# Patient Record
Sex: FEMALE | Race: WHITE | NOT HISPANIC OR LATINO | Employment: OTHER | URBAN - METROPOLITAN AREA
[De-identification: names, ages, dates, MRNs, and addresses within clinical notes are randomized per-mention and may not be internally consistent; named-entity substitution may affect disease eponyms.]

---

## 2017-01-09 ENCOUNTER — TELEPHONE (OUTPATIENT)
Dept: ORTHOPEDICS | Facility: CLINIC | Age: 62
End: 2017-01-09

## 2017-01-09 DIAGNOSIS — M17.12 PRIMARY OSTEOARTHRITIS OF LEFT KNEE: Primary | ICD-10-CM

## 2017-01-09 DIAGNOSIS — M23.307 DEGENERATIVE TEAR OF MENISCUS OF LEFT KNEE: ICD-10-CM

## 2017-01-09 NOTE — TELEPHONE ENCOUNTER
Patient called triage requesting left knee MRI since sx were exacerbated over the weekend did more activity. Will hold off on PT until she see's Dr. Robertson to discuss results of MRI. Dr. Robertson OK'd MRI of left knee she will call radiology and schedule and follow up with Dr. Robertson in clinic after.

## 2017-01-18 ENCOUNTER — OFFICE VISIT (OUTPATIENT)
Dept: ORTHOPEDICS | Facility: CLINIC | Age: 62
End: 2017-01-18

## 2017-01-18 VITALS
WEIGHT: 210 LBS | HEIGHT: 66 IN | SYSTOLIC BLOOD PRESSURE: 128 MMHG | DIASTOLIC BLOOD PRESSURE: 86 MMHG | BODY MASS INDEX: 33.75 KG/M2

## 2017-01-18 DIAGNOSIS — M17.12 PRIMARY OSTEOARTHRITIS OF LEFT KNEE: Primary | ICD-10-CM

## 2017-01-18 NOTE — Clinical Note
1/18/2017      RE: Gregoria Stein  [address]  [place] [facility] [address]       January 18, 2017: Gregoria Stein is a 61 year old female who is seen in f/u up for left knee MRI results.    Impression:  1. Horizontal oblique tear of the posterior horn of the medial  meniscus extending into the posterior root attachment, however no  definite root attachment tear. Associated, focal full-thickness  cartilage loss within the weightbearing aspect of the medial femoral  condyle.  2. Focal full-thickness cartilage loss of the median ridge of the  patella with subjacent bone marrow edema.  3. Moderate to large patellofemoral joint effusion.       I have personally reviewed the examination and initial interpretation  and I agree with the findings.     JUTTA ELLERMANN, MD        PMH:  Past Medical History   Diagnosis Date     Arthritis      C spine, thumbs bilateral, feet, shoulders, knees     Other chronic pain      feet, knees, shoulders, full spine, thumbs     Gastro-oesophageal reflux disease      intermittent     Anxiety      Restless leg syndrome      Labial irritation      labial mass       Active problem list:  Patient Active Problem List   Diagnosis     Vulvar irritation     Anxiety     Mass     PTSD (post-traumatic stress disorder)       FH:  History reviewed. No pertinent family history.    SH:  Social History     Social History     Marital Status: Single     Spouse Name: N/A     Number of Children: N/A     Years of Education: N/A     Occupational History     Not on file.     Social History Main Topics     Smoking status: Former Smoker     Quit date: 09/09/1983     Smokeless tobacco: Never Used     Alcohol Use: Yes      Comment: occasional     Drug Use: No     Sexual Activity: No     Other Topics Concern     Not on file     Social History Narrative       MEDS:  See EMR, reviewed  ALL:  See EMR, reviewed    REVIEW OF SYSTEMS:  CONSTITUTIONAL:NEGATIVE for fever, chills, change in weight  INTEGUMENTARY/SKIN:  NEGATIVE for worrisome rashes, moles or lesions  EYES: NEGATIVE for vision changes or irritation  ENT/MOUTH: NEGATIVE for ear, mouth and throat problems  RESP:NEGATIVE for significant cough or SOB  BREAST: NEGATIVE for masses, tenderness or discharge  CV: NEGATIVE for chest pain, palpitations or peripheral edema  GI: NEGATIVE for nausea, abdominal pain, heartburn, or change in bowel habits  :NEGATIVE for frequency, dysuria, or hematuria  :NEGATIVE for frequency, dysuria, or hematuria  NEURO: NEGATIVE for weakness, dizziness or paresthesias  ENDOCRINE: NEGATIVE for temperature intolerance, skin/hair changes  HEME/ALLERGY/IMMUNE: NEGATIVE for bleeding problems  PSYCHIATRIC: NEGATIVE for changes in mood or affect    SUBJECTIVE:  This 61-year-old female is seen in followup for her left knee MRI.  It showed degenerative changes, especially about the medial joint space with significant chondral wear along the femoral condyle with some gentle tearing of the medial meniscus and some wear on the posterior aspect of the kneecap.      OBJECTIVE:  Her exam is unchanged.  She denies locking or catching.  She has adequate range of motion, some discomfort along the medial joint line, nontender about the lateral joint line.  She has not had complaints of numbness or tingling into the extremity or pain that shoots in the extremity from the back.  Normal distal pulses and sensation.      ASSESSMENT:  Left knee DJD.      PLAN:  We went over conservative cares, including Synvisc injections, cortisone injections, medial  brace and indications for knee replacement.  I indicated that based on her x-rays, it would be best to try to maximize conservative cares before considering knee replacement.  The cortisone shot 1 month ago provided some transitory relief, but did not provide significant relief.  She had Synvisc injections into her opposite knee.  She will check with her insurance to see if it is covered.  She does not want  to do an  brace for the knees, as she thinks she is unlikely to wear it.  She will follow up with me for a Synvisc injection if allowed by her insurance.  She knows we could also do a repeat cortisone injection in 2 months and see if it gives her better relief.  If she would like to talk to the surgeon, I would be glad to refer her.     Priyank Robertson MD

## 2017-04-08 ENCOUNTER — APPOINTMENT (OUTPATIENT)
Dept: CT IMAGING | Facility: CLINIC | Age: 62
End: 2017-04-08
Attending: EMERGENCY MEDICINE
Payer: MEDICARE

## 2017-04-08 ENCOUNTER — HOSPITAL ENCOUNTER (EMERGENCY)
Facility: CLINIC | Age: 62
Discharge: HOME | End: 2017-04-08
Attending: EMERGENCY MEDICINE | Admitting: EMERGENCY MEDICINE
Payer: MEDICARE

## 2017-04-08 VITALS
BODY MASS INDEX: 32.47 KG/M2 | RESPIRATION RATE: 16 BRPM | TEMPERATURE: 97.5 F | SYSTOLIC BLOOD PRESSURE: 129 MMHG | HEIGHT: 66 IN | OXYGEN SATURATION: 99 % | HEART RATE: 73 BPM | DIASTOLIC BLOOD PRESSURE: 92 MMHG | WEIGHT: 202 LBS

## 2017-04-08 DIAGNOSIS — R10.9 RIGHT FLANK PAIN: ICD-10-CM

## 2017-04-08 LAB
ALBUMIN SERPL-MCNC: 3.8 G/DL (ref 3.4–5)
ALBUMIN UR-MCNC: NEGATIVE MG/DL
ALP SERPL-CCNC: 84 U/L (ref 40–150)
ALT SERPL W P-5'-P-CCNC: 33 U/L (ref 0–50)
ANION GAP SERPL CALCULATED.3IONS-SCNC: 6 MMOL/L (ref 3–14)
APPEARANCE UR: CLEAR
AST SERPL W P-5'-P-CCNC: 20 U/L (ref 0–45)
BACTERIA #/AREA URNS HPF: ABNORMAL /HPF
BASOPHILS # BLD AUTO: 0 10E9/L (ref 0–0.2)
BASOPHILS NFR BLD AUTO: 0.2 %
BILIRUB SERPL-MCNC: 0.5 MG/DL (ref 0.2–1.3)
BILIRUB UR QL STRIP: NEGATIVE
BUN SERPL-MCNC: 8 MG/DL (ref 7–30)
CALCIUM SERPL-MCNC: 8.8 MG/DL (ref 8.5–10.1)
CHLORIDE SERPL-SCNC: 109 MMOL/L (ref 94–109)
CO2 SERPL-SCNC: 28 MMOL/L (ref 20–32)
COLOR UR AUTO: YELLOW
CREAT SERPL-MCNC: 0.7 MG/DL (ref 0.52–1.04)
DIFFERENTIAL METHOD BLD: NORMAL
EOSINOPHIL # BLD AUTO: 0.1 10E9/L (ref 0–0.7)
EOSINOPHIL NFR BLD AUTO: 2.1 %
ERYTHROCYTE [DISTWIDTH] IN BLOOD BY AUTOMATED COUNT: 12.4 % (ref 10–15)
GFR SERPL CREATININE-BSD FRML MDRD: 85 ML/MIN/1.7M2
GLUCOSE SERPL-MCNC: 115 MG/DL (ref 70–99)
GLUCOSE UR STRIP-MCNC: NEGATIVE MG/DL
HCT VFR BLD AUTO: 42.6 % (ref 35–47)
HGB BLD-MCNC: 15 G/DL (ref 11.7–15.7)
HGB UR QL STRIP: NEGATIVE
IMM GRANULOCYTES # BLD: 0 10E9/L (ref 0–0.4)
IMM GRANULOCYTES NFR BLD: 0.2 %
KETONES UR STRIP-MCNC: NEGATIVE MG/DL
LEUKOCYTE ESTERASE UR QL STRIP: ABNORMAL
LIPASE SERPL-CCNC: 130 U/L (ref 73–393)
LYMPHOCYTES # BLD AUTO: 1.5 10E9/L (ref 0.8–5.3)
LYMPHOCYTES NFR BLD AUTO: 24 %
MCH RBC QN AUTO: 30.1 PG (ref 26.5–33)
MCHC RBC AUTO-ENTMCNC: 35.2 G/DL (ref 31.5–36.5)
MCV RBC AUTO: 86 FL (ref 78–100)
MONOCYTES # BLD AUTO: 0.3 10E9/L (ref 0–1.3)
MONOCYTES NFR BLD AUTO: 4.8 %
MUCOUS THREADS #/AREA URNS LPF: PRESENT /LPF
NEUTROPHILS # BLD AUTO: 4.3 10E9/L (ref 1.6–8.3)
NEUTROPHILS NFR BLD AUTO: 68.7 %
NITRATE UR QL: NEGATIVE
NRBC # BLD AUTO: 0 10*3/UL
NRBC BLD AUTO-RTO: 0 /100
PH UR STRIP: 7.5 PH (ref 5–7)
PLATELET # BLD AUTO: 196 10E9/L (ref 150–450)
POTASSIUM SERPL-SCNC: 3.5 MMOL/L (ref 3.4–5.3)
PROT SERPL-MCNC: 7.3 G/DL (ref 6.8–8.8)
RBC # BLD AUTO: 4.98 10E12/L (ref 3.8–5.2)
RBC #/AREA URNS AUTO: <1 /HPF (ref 0–2)
SODIUM SERPL-SCNC: 143 MMOL/L (ref 133–144)
SP GR UR STRIP: 1.01 (ref 1–1.03)
SQUAMOUS #/AREA URNS AUTO: <1 /HPF (ref 0–1)
URN SPEC COLLECT METH UR: ABNORMAL
UROBILINOGEN UR STRIP-MCNC: NORMAL MG/DL (ref 0–2)
WBC # BLD AUTO: 6.3 10E9/L (ref 4–11)
WBC #/AREA URNS AUTO: 1 /HPF (ref 0–2)

## 2017-04-08 PROCEDURE — 96374 THER/PROPH/DIAG INJ IV PUSH: CPT

## 2017-04-08 PROCEDURE — 74176 CT ABD & PELVIS W/O CONTRAST: CPT

## 2017-04-08 PROCEDURE — 99285 EMERGENCY DEPT VISIT HI MDM: CPT | Mod: 25

## 2017-04-08 PROCEDURE — 25000128 H RX IP 250 OP 636: Performed by: EMERGENCY MEDICINE

## 2017-04-08 PROCEDURE — 81001 URINALYSIS AUTO W/SCOPE: CPT | Performed by: EMERGENCY MEDICINE

## 2017-04-08 PROCEDURE — A9270 NON-COVERED ITEM OR SERVICE: HCPCS | Mod: GY | Performed by: EMERGENCY MEDICINE

## 2017-04-08 PROCEDURE — 83690 ASSAY OF LIPASE: CPT | Performed by: EMERGENCY MEDICINE

## 2017-04-08 PROCEDURE — 25000132 ZZH RX MED GY IP 250 OP 250 PS 637: Mod: GY | Performed by: EMERGENCY MEDICINE

## 2017-04-08 PROCEDURE — 96361 HYDRATE IV INFUSION ADD-ON: CPT

## 2017-04-08 PROCEDURE — 80053 COMPREHEN METABOLIC PANEL: CPT | Performed by: EMERGENCY MEDICINE

## 2017-04-08 PROCEDURE — 85025 COMPLETE CBC W/AUTO DIFF WBC: CPT | Performed by: EMERGENCY MEDICINE

## 2017-04-08 RX ORDER — KETOROLAC TROMETHAMINE 30 MG/ML
30 INJECTION, SOLUTION INTRAMUSCULAR; INTRAVENOUS ONCE
Status: COMPLETED | OUTPATIENT
Start: 2017-04-08 | End: 2017-04-08

## 2017-04-08 RX ORDER — ACETAMINOPHEN 500 MG
500 TABLET ORAL ONCE
Status: COMPLETED | OUTPATIENT
Start: 2017-04-08 | End: 2017-04-08

## 2017-04-08 RX ORDER — METHYLPREDNISOLONE 4 MG
TABLET, DOSE PACK ORAL
Qty: 21 TABLET | Refills: 0 | Status: SHIPPED | OUTPATIENT
Start: 2017-04-08 | End: 2019-09-24

## 2017-04-08 RX ADMIN — ACETAMINOPHEN 500 MG: 500 TABLET, FILM COATED ORAL at 15:55

## 2017-04-08 RX ADMIN — SODIUM CHLORIDE 1000 ML: 9 INJECTION, SOLUTION INTRAVENOUS at 14:04

## 2017-04-08 RX ADMIN — KETOROLAC TROMETHAMINE 30 MG: 30 INJECTION, SOLUTION INTRAMUSCULAR at 14:09

## 2017-04-08 ASSESSMENT — ENCOUNTER SYMPTOMS
FLANK PAIN: 1
APPETITE CHANGE: 1

## 2017-04-08 NOTE — ED AVS SNAPSHOT
Emergency Department    [address] [facility] [address]    Phone:  [phone]    Fax:  [phone]                                       Gregoria Stein   MRN: [id number]    Department:   Emergency Department   Date of Visit:  4/8/2017           Patient Information     Date Of Birth          1955        Your diagnoses for this visit were:     Right flank pain        You were seen by Rebekah Frausto MD.      Follow-up Information     Follow up with Krystal Dominguez MD. Schedule an appointment as soon as possible for a visit in 3 days.    Specialty:  Family Practice    Contact information:    [facility]  [address] [address] [phone]          Follow up with  Emergency Department.    Specialty:  EMERGENCY MEDICINE    Why:  As needed, If symptoms worsen    Contact information:    [address] [facility] [id number] [phone]        Discharge Instructions         Discharge Instructions  Back Pain  You were seen today for back pain. Back pain can have many causes, but most will get better without surgery or other specific treatment. Sometimes there is a herniated ( slipped ) disc. We don t usually do MRI scans to look for these right away, since most herniated discs will get better on their own with time.  Today, we did not find any evidence that your back pain was caused by a serious condition, such as an infection, fracture, or tumor. However, sometimes symptoms develop over time and cannot be found during an emergency visit, so it is very important that you follow up with your primary doctor.  Return to the Emergency Department if:    You develop a fever with your back pain.     You have weakness or change in sensation in one or both legs.    You lose control of your bowels or bladder, or can t empty your bladder.    Your pain gets much worse.     Follow-up with your doctor:    Unless your pain has completely gone away, please make an  appointment with your doctor within one week.  You may need further management of your back pain, such as more pain medication, imaging such as an X-ray or MRI, or physical therapy.    What can I do to help myself?    Remain Active -- People are often afraid that they will hurt their back further or delay recovery by remaining active, but this is one of the best things you can do for your back. In fact, prolonged bed rest is not recommended. Studies have shown that people with low back pain recover faster when they remain active. Movement helps to bring blood flow to the muscles and relieve muscle spasms as well as preventing loss of muscle strength.    Heat -- Using a heating pad can help with low back pain during the first few weeks. Do not sleep with a heating pad, as you can be burned.     Pain medications - You may take a pain medication such as Tylenol  (acetaminophen), Advil , Nuprin  (ibuprofen) or Aleve  (naproxen).  If you have been given a narcotic such as Vicodin  (hydrocodone with acetaminophen), Percocet  (oxycodone with acetaminophen), codeine, or a muscle relaxant such as Flexeril  (cyclobenzaprine) or Soma  (carisoprodol), do not drive for four hours after you have taken it. If the narcotic contains Tylenol  (acetaminophen), do not take Tylenol  with it. All narcotics will cause constipation, so eat a high fiber diet.   If you were given a prescription for medicine here today, be sure to read all of the information (including the package insert) that comes with your prescription.  This will include important information about the medicine, its side effects, and any warnings that you need to know about.  The pharmacist who fills the prescription can provide more information and answer questions you may have about the medicine.  If you have questions or concerns that the pharmacist cannot address, please call or return to the Emergency Department.   Opioid Medication Information    Pain medications  are among the most commonly prescribed medicines, so we are including this information for all our patients. If you did not receive pain medication or get a prescription for pain medicine, you can ignore it.     You may have been given a prescription for an opioid (narcotic) pain medicine and/or have received a pain medicine while here in the Emergency Department. These medicines can make you drowsy or impaired. You must not drive, operate dangerous equipment, or engage in any other dangerous activities while taking these medications. If you drive while taking these medications, you could be arrested for DUI, or driving under the influence. Do not drink any alcohol while you are taking these medications.     Opioid pain medications can cause addiction. If you have a history of chemical dependency of any type, you are at a higher risk of becoming addicted to pain medications.  Only take these prescribed medications to treat your pain when all other options have been tried. Take it for as short a time and as few doses as possible. Store your pain pills in a secure place, as they are frequently stolen and provide a dangerous opportunity for children or visitors in your house to start abusing these powerful medications. We will not replace any lost or stolen medicine.  As soon as your pain is better, you should flush all your remaining medication.     Many prescription pain medications contain Tylenol  (acetaminophen), including Vicodin , Tylenol #3 , Norco , Lortab , and Percocet .  You should not take any extra pills of Tylenol  if you are using these prescription medications or you can get very sick.  Do not ever take more than 3000 mg of acetaminophen in any 24 hour period.    All opioids tend to cause constipation. Drink plenty of water and eat foods that have a lot of fiber, such as fruits, vegetables, prune juice, apple juice and high fiber cereal.  Take a laxative if you don t move your bowels at least every  other day. Miralax , Milk of Magnesia, Colace , or Senna  can be used to keep you regular.      Remember that you can always come back to the Emergency Department if you are not able to see your regular doctor in the amount of time listed above, if you get any new symptoms, or if there is anything that worries you.        24 Hour Appointment Hotline       To make an appointment at any [facility], call [phone] ([phone]). If you don't have a family doctor or clinic, we will help you find one. [facility] are conveniently located to serve the needs of you and your family.             Review of your medicines      START taking        Dose / Directions Last dose taken    methylPREDNISolone 4 MG tablet   Commonly known as:  MEDROL DOSEPAK   Quantity:  21 tablet        Follow package instructions   Refills:  0          Our records show that you are taking the medicines listed below. If these are incorrect, please call your family doctor or clinic.        Dose / Directions Last dose taken    CLONAZEPAM PO   Dose:  0.5 mg        Take 0.5 mg by mouth 2 times daily Also 2 additional tablets at bedtime   Refills:  0        conjugated estrogens cream   Commonly known as:  PREMARIN        Place vaginally three times a week   Refills:  0        DHEA PO        Refills:  0        diclofenac 1 % Gel topical gel   Commonly known as:  VOLTAREN        Place onto the skin 4 times daily   Refills:  0        gabapentin 300 MG half-tab   Commonly known as:  NEURONTIN        Refills:  0        latanoprost 0.005 % ophthalmic solution   Commonly known as:  XALATAN   Dose:  1 drop        Place 1 drop into both eyes At Bedtime   Refills:  0        Magnesium Oxide 500 MG Caps        Take by mouth daily   Refills:  0        MIRAPEX 0.125 MG tablet   Dose:  0.5 mg   Generic drug:  pramipexole        Take 0.5 mg by mouth At Bedtime 2 tablets at bedtime   Refills:  0        OMEGA 3 PO   Dose:  1 tablet        Take 1 tablet by  mouth daily.   Refills:  0        st johns wort 300 MG Tabs tablet   Dose:  300 mg        Take 300 mg by mouth 4 times daily   Refills:  0        traMADol 50 MG tablet   Commonly known as:  ULTRAM   Dose:  1 tablet        Take 1 tablet by mouth every 12 hours as needed   Refills:  0        TYLENOL PO   Dose:  1000 mg        Take 1,000 mg by mouth every 8 hours as needed   Refills:  0        UNABLE TO FIND   Dose:  1 capsule        1 capsule daily MEDICATION NAME: Ashwagandha   Refills:  0        vitamin B complex with vitamin C Tabs tablet   Dose:  1 tablet        Take 1 tablet by mouth daily   Refills:  0        Vitamin D-3 5000 UNITS Tabs   Dose:  1 tablet        Take 1 tablet by mouth daily.   Refills:  0                Prescriptions were sent or printed at these locations (1 Prescription)                   Other Prescriptions                Printed at Department/Unit printer (1 of 1)         methylPREDNISolone (MEDROL DOSEPAK) 4 MG tablet                Procedures and tests performed during your visit     Abd/pelvis CT no contrast - Stone Protocol    CBC with platelets + differential    Comprehensive metabolic panel    Lipase    UA with Microscopic      Orders Needing Specimen Collection     None      Pending Results     No orders found from 4/6/2017 to 4/9/2017.            Pending Culture Results     No orders found from 4/6/2017 to 4/9/2017.            Test Results From Your Hospital Stay        4/8/2017  2:26 PM      Component Results     Component Value Ref Range & Units Status    Color Urine Yellow  Final    Appearance Urine Clear  Final    Glucose Urine Negative NEG mg/dL Final    Bilirubin Urine Negative NEG Final    Ketones Urine Negative NEG mg/dL Final    Specific Gravity Urine 1.013 1.003 - 1.035 Final    Blood Urine Negative NEG Final    pH Urine 7.5 (H) 5.0 - 7.0 pH Final    Protein Albumin Urine Negative NEG mg/dL Final    Urobilinogen mg/dL Normal 0.0 - 2.0 mg/dL Final    Nitrite Urine Negative  NEG Final    Leukocyte Esterase Urine Small (A) NEG Final    Source Midstream Urine  Final    WBC Urine 1 0 - 2 /HPF Final    RBC Urine <1 0 - 2 /HPF Final    Bacteria Urine Few (A) NEG /HPF Final    Squamous Epithelial /HPF Urine <1 0 - 1 /HPF Final    Mucous Urine Present (A) NEG /LPF Final         4/8/2017  2:25 PM      Component Results     Component Value Ref Range & Units Status    WBC 6.3 4.0 - 11.0 10e9/L Final    RBC Count 4.98 3.8 - 5.2 10e12/L Final    Hemoglobin 15.0 11.7 - 15.7 g/dL Final    Hematocrit 42.6 35.0 - 47.0 % Final    MCV 86 78 - 100 fl Final    MCH 30.1 26.5 - 33.0 pg Final    MCHC 35.2 31.5 - 36.5 g/dL Final    RDW 12.4 10.0 - 15.0 % Final    Platelet Count 196 150 - 450 10e9/L Final    Diff Method Automated Method  Final    % Neutrophils 68.7 % Final    % Lymphocytes 24.0 % Final    % Monocytes 4.8 % Final    % Eosinophils 2.1 % Final    % Basophils 0.2 % Final    % Immature Granulocytes 0.2 % Final    Nucleated RBCs 0 0 /100 Final    Absolute Neutrophil 4.3 1.6 - 8.3 10e9/L Final    Absolute Lymphocytes 1.5 0.8 - 5.3 10e9/L Final    Absolute Monocytes 0.3 0.0 - 1.3 10e9/L Final    Absolute Eosinophils 0.1 0.0 - 0.7 10e9/L Final    Absolute Basophils 0.0 0.0 - 0.2 10e9/L Final    Abs Immature Granulocytes 0.0 0 - 0.4 10e9/L Final    Absolute Nucleated RBC 0.0  Final         4/8/2017  2:42 PM      Component Results     Component Value Ref Range & Units Status    Sodium 143 133 - 144 mmol/L Final    Potassium 3.5 3.4 - 5.3 mmol/L Final    Chloride 109 94 - 109 mmol/L Final    Carbon Dioxide 28 20 - 32 mmol/L Final    Anion Gap 6 3 - 14 mmol/L Final    Glucose 115 (H) 70 - 99 mg/dL Final    Urea Nitrogen 8 7 - 30 mg/dL Final    Creatinine 0.70 0.52 - 1.04 mg/dL Final    GFR Estimate 85 >60 mL/min/1.7m2 Final    Non  GFR Calc    GFR Estimate If Black >90   GFR Calc   >60 mL/min/1.7m2 Final    Calcium 8.8 8.5 - 10.1 mg/dL Final    Bilirubin Total 0.5 0.2 - 1.3  mg/dL Final    Albumin 3.8 3.4 - 5.0 g/dL Final    Protein Total 7.3 6.8 - 8.8 g/dL Final    Alkaline Phosphatase 84 40 - 150 U/L Final    ALT 33 0 - 50 U/L Final    AST 20 0 - 45 U/L Final         4/8/2017  2:41 PM      Component Results     Component Value Ref Range & Units Status    Lipase 130 73 - 393 U/L Final         4/8/2017  4:02 PM      Narrative     ABDOMEN AND PELVIS CT WITHOUT CONTRAST 4/8/2017 3:45 PM    HISTORY: Right flank pain. Evaluate for stone.    COMPARISON: 10/1/2013    TECHNIQUE: Axial images performed from the lung bases to the ischial  tuberosities without IV or oral contrast. Coronal reformatted images  were also evaluated. Radiation dose for this scan was reduced using  automated exposure control, adjustment of the mA and/or kV according  to patient size, or iterative reconstruction technique.    FINDINGS:  The lung bases are clear. A few subcentimeter  low-attenuation hepatic lesions are too small to characterize but  unchanged. Allowing for noncontrast technique, the spleen, pancreas,  adrenal glands, and kidneys are unremarkable. No renal, ureteral, or  bladder calculi are seen. There is no hydronephrosis. Normal caliber  bowel. The appendix is normal. There is no free intraperitoneal air or  ascites.        Impression     IMPRESSION:   1. No urinary tract calculi are identified.  2. Normal appendix.  3. No acute abnormality.    JUANITA DICKENS MD                Clinical Quality Measure: Blood Pressure Screening     Your blood pressure was checked while you were in the emergency department today. The last reading we obtained was  BP: (!) 129/92 . Please read the guidelines below about what these numbers mean and what you should do about them.  If your systolic blood pressure (the top number) is less than 120 and your diastolic blood pressure (the bottom number) is less than 80, then your blood pressure is normal. There is nothing more that you need to do about it.  If your systolic blood  pressure (the top number) is 120-139 or your diastolic blood pressure (the bottom number) is 80-89, your blood pressure may be higher than it should be. You should have your blood pressure rechecked within a year by a primary care provider.  If your systolic blood pressure (the top number) is 140 or greater or your diastolic blood pressure (the bottom number) is 90 or greater, you may have high blood pressure. High blood pressure is treatable, but if left untreated over time it can put you at risk for heart attack, stroke, or kidney failure. You should have your blood pressure rechecked by a primary care provider within the next 4 weeks.  If your provider in the emergency department today gave you specific instructions to follow-up with your doctor or provider even sooner than that, you should follow that instruction and not wait for up to 4 weeks for your follow-up visit.        Thank you for choosing [facility]       Thank you for choosing [facility] for your care. Our goal is always to provide you with excellent care. Hearing back from our patients is one way we can continue to improve our services. Please take a few minutes to complete the written survey that you may receive in the mail after you visit with us. Thank you!        [facility] Information     [facility] gives you secure access to your electronic health record. If you see a primary care provider, you can also send messages to your care team and make appointments. If you have questions, please call your primary care clinic.  If you do not have a primary care provider, please call [phone] and they will assist you.        Care EveryWhere ID     This is your Care EveryWhere ID. This could be used by other organizations to access your [facility] medical records  [id number]        After Visit Summary       This is your record. Keep this with you and show to your community pharmacist(s) and doctor(s) at your next visit.

## 2017-04-08 NOTE — ED PROVIDER NOTES
"  History     Chief Complaint:  Flank pain    HPI   Gregoria Stein is a 61 year old female with a history of chronic pain from arthritis and GERD who presents with flank pain. Three days ago, the patient got out of her car for a hair appointment when she began experiencing right flank pain that radiates into her hip and groin. During her hair appointment, she needed to stand up 3-4 times due to the pain. Two days ago, the pain became more severe and constant and she describes it as being the worst pain she has ever had. She has also been experiencing a loss of appetite and has not been drinking normally. The patient went in to see her primary care physician who thought her pain was musculoskeletal. She has taken tramadol and medical marijuana for the pain, which have not been helping. She states that the pain is not on her skin and does not feel like her previous episode of shingles. The pain is not exacerbated with deep breathing.     Allergies:  Penicillins  Versed     Medications:    Gabapentin  Vitamin B complex  Magnesium oxide  Voltaren gel  Premarin  Clonazepam  Tylenol  Xalatan  Mirapex  Tramadol  Vitamin D3  Omega 3   Medical marijuana     Past Medical History:    Anxiety  Arthritis  GERD  Chronic pain  PTSD  Restless leg syndrome  Shingles     Past Surgical History:    Colonoscopy  Excise mass vagina  Urethral dilation  Orthopedic surgery    Family History:    History reviewed. No pertinent family history.    Social History:  Marital Status: single  Presents to the ED alone  Tobacco Use: former smoker  Alcohol Use: positive  PCP: Krystal Dominguez     Review of Systems   Constitutional: Positive for appetite change.   Genitourinary: Positive for flank pain.   10 point review of systems performed and is negative except as above and in HPI.    Physical Exam   First Vitals:  BP: 170/88  Pulse: 90  Temp: 97.5  F (36.4  C)  Resp: 16  Height: 167.6 cm (5' 6\")  Weight: 91.6 kg (202 lb)  SpO2: 97 %    Physical " Exam  General: Sitting on the gurney. Appears somewhat uncomfortable.  Head:  The scalp, face, and head appear normal  Mouth/Throat: Mucous membranes are moist.  CV:  Regular rate    Normal S1 and S2  No pathological murmur   Resp:  Breath sounds clear and equal bilaterally    Non-labored, no retractions or accessory muscle use    No coarseness    No wheezing   GI:  Abdomen is soft, no rigidity    No tenderness to palpation  MS:  Normal motor assessment of all extremities.    Good capillary refill noted.    No CVA tenderness to percussion.     Mild right SI tenderness to palpation.  Skin:  No rash or lesions noted.  Neuro: Speech is normal and fluent. No apparent deficit.    No numbness or weakness of the lower extremities.  Psych: Awake. Alert.  Normal affect.      Appropriate interactions.    Emergency Department Course   Imaging:  Radiographic findings were communicated with the patient who voiced understanding of the findings.    Abd/pelvis CT no contrast - [facility] Protocol  1. No urinary tract calculi are identified.  2. Normal appendix.  3. No acute abnormality.  Report per radiology.    Laboratory:  CBC:  WBC 6.3, HGB 15.0, , otherwise WNL  CMP: glucose 115 (H), otherwise WNL (Creatinine 0.70)  Lipase: 130    UA: Clear yellow urine,  pH 7.5 (H), leukocyte esterase small, bacteria few, mucous present, otherwise WNL    Interventions:  (1558) Normal Saline, 1 liter, IV bolus  (1409) Toradol, 30 mg, IV injection  (1555) Tylenol, 500 mg, PO    Emergency Department Course:  Nursing notes and vitals reviewed.  I performed an exam of the patient as documented above.   A peripheral IV was established. Blood was drawn from the patient. This was sent for laboratory testing, findings above.   Urine sample was obtained and sent for laboratory analysis, findings above.   The patient was sent for a abd/pelvis CT while in the emergency department, findings above.   (1505) I rechecked the patient.   (1614) I rechecked  the patient and discussed her results.  Findings and plan explained to the patient. Patient discharged home with instructions regarding supportive care, medications, and reasons to return. The importance of close follow-up was reviewed. The patient was prescribed a Medrol Dosepak    Impression & Plan    Medical Decision Making:  Gregoria Stein is a 61 year old female who presents with right flank pain.     A broad differential diagnosis was considered including diverticulitis, ureterolithiasis, tumor, colitis, cholecystitis, aneurysm, dissection, hydronephrosis, pneumonia, rib fracture, UTI, pyelonephritis amongst many other etiologies.  I doubt PE given the patient's lack of symptoms and risk factors.      The workup in the ED is at this point negative.  No etiology for the patients pain is found at this point and my suspicion of an intraabdominal or intrathoracic catastrophe or other worrisome etiology is very low.  I will not therefore admit for serial exams and further workup.  Patient is hemodynamically stable in ED. Plan is home with flank pain recheck by primary care physician or return to ED at that time.  Return for fevers greater than 102, increasing pain, other new symptoms develop.  Flank pain handout given.  Questions were answered.     Diagnosis:    ICD-10-CM    1. Right flank pain R10.9        Disposition:  Discharge to home.    Discharge Medications:  New Prescriptions    METHYLPREDNISOLONE (MEDROL DOSEPAK) 4 MG TABLET    Follow package instructions     Blanco HERRERA, am serving as a scribe on 4/8/2017 at 1:54 PM to personally document services performed by Dr. Frausto based on my observations and the provider's statements to me.        Rebekah Frausto MD  04/11/17 0221

## 2017-04-08 NOTE — ED AVS SNAPSHOT
Emergency Department    [address] [place] [address]    Phone:  [phone]    Fax:  [phone]                                       Gregoria Stein   MRN: [id number]    Department:   Emergency Department   Date of Visit:  4/8/2017           After Visit Summary Signature Page     I have received my discharge instructions, and my questions have been answered. I have discussed any challenges I see with this plan with the nurse or doctor.    ..........................................................................................................................................  Patient/Patient Representative Signature      ..........................................................................................................................................  Patient Representative Print Name and Relationship to Patient    ..................................................               ................................................  Date                                            Time    ..........................................................................................................................................  Reviewed by Signature/Title    ...................................................              ..............................................  Date                                                            Time

## 2017-07-08 ENCOUNTER — HEALTH MAINTENANCE LETTER (OUTPATIENT)
Age: 62
End: 2017-07-08

## 2019-09-24 ENCOUNTER — OFFICE VISIT (OUTPATIENT)
Dept: FAMILY MEDICINE | Facility: CLINIC | Age: 64
End: 2019-09-24
Payer: MEDICARE

## 2019-09-24 VITALS
OXYGEN SATURATION: 97 % | TEMPERATURE: 98 F | RESPIRATION RATE: 16 BRPM | HEART RATE: 83 BPM | DIASTOLIC BLOOD PRESSURE: 80 MMHG | HEIGHT: 66 IN | SYSTOLIC BLOOD PRESSURE: 124 MMHG | WEIGHT: 214 LBS | BODY MASS INDEX: 34.39 KG/M2

## 2019-09-24 DIAGNOSIS — M25.561 RIGHT KNEE PAIN, UNSPECIFIED CHRONICITY: ICD-10-CM

## 2019-09-24 DIAGNOSIS — G25.81 RESTLESS LEG SYNDROME: ICD-10-CM

## 2019-09-24 DIAGNOSIS — D17.30 LIPOMA OF SKIN AND SUBCUTANEOUS TISSUE: Primary | ICD-10-CM

## 2019-09-24 DIAGNOSIS — R53.83 FATIGUE, UNSPECIFIED TYPE: ICD-10-CM

## 2019-09-24 LAB
% GRANULOCYTES: 72.3 %G (ref 40–75)
GRANULOCYTES #: 4 K/UL (ref 1.6–8.3)
HCT VFR BLD AUTO: 45.7 % (ref 35–47)
HEMOGLOBIN: 14 G/DL (ref 11.7–15.7)
LYMPHOCYTES # BLD AUTO: 1.2 K/UL (ref 0.8–5.3)
LYMPHOCYTES NFR BLD AUTO: 22.7 %L (ref 20–48)
MCH RBC QN AUTO: 28.3 PG (ref 26.5–35)
MCHC RBC AUTO-ENTMCNC: 30.6 G/DL (ref 32–36)
MCV RBC AUTO: 92.3 FL (ref 78–100)
MID #: 0.3 K/UL (ref 0–2.2)
MID %: 5 %M (ref 0–20)
PLATELET # BLD AUTO: 192 K/UL (ref 150–450)
RBC # BLD AUTO: 4.95 M/UL (ref 3.8–5.2)
TSH SERPL DL<=0.005 MIU/L-ACNC: 1.22 MU/L (ref 0.4–4)
WBC # BLD AUTO: 5.5 K/UL (ref 4–11)

## 2019-09-24 RX ORDER — LAMOTRIGINE 25 MG/1
50 TABLET ORAL DAILY
COMMUNITY
End: 2022-06-30

## 2019-09-24 RX ORDER — PRAMIPEXOLE DIHYDROCHLORIDE 0.12 MG/1
0.25 TABLET ORAL AT BEDTIME
Qty: 60 TABLET | Refills: 3 | Status: SHIPPED | OUTPATIENT
Start: 2019-09-24 | End: 2020-07-30

## 2019-09-24 ASSESSMENT — ENCOUNTER SYMPTOMS
JOINT SWELLING: 1
DIAPHORESIS: 1
FATIGUE: 1
ARTHRALGIAS: 1
WEAKNESS: 0
WHEEZING: 0
FEVER: 0
BLOOD IN STOOL: 0
CHILLS: 0
NERVOUS/ANXIOUS: 1
RHINORRHEA: 0
VOMITING: 0
DIARRHEA: 0
BRUISES/BLEEDS EASILY: 0
FREQUENCY: 0
SHORTNESS OF BREATH: 0
UNEXPECTED WEIGHT CHANGE: 0
CONSTIPATION: 0
HEADACHES: 0
NUMBNESS: 0
NAUSEA: 0
ABDOMINAL PAIN: 0
CHEST TIGHTNESS: 0
PALPITATIONS: 0
DYSURIA: 0

## 2019-09-24 ASSESSMENT — MIFFLIN-ST. JEOR: SCORE: 1547.45

## 2019-09-24 NOTE — PATIENT INSTRUCTIONS
Here is the plan from today's visit    1. Lipoma of skin and subcutaneous tissue  Wait for U/S results from [facility]    2. Right knee pain, unspecified chronicity  Take 600 mg of Ibuprofen every 6 hours as needed and alternate with Tylenol in between  Stretch hamstrings  Continue to use your topical medications  Go to PT    3. Fatigue, unspecified type  Will draw labs (TSH, CBC, TB) and follow up with a phone call.    4. Restless leg syndrome  Take meds as prescribed  - pramipexole (MIRAPEX) 0.25 MG tablet; Take 2 tablets (0.25 mg) by mouth At Bedtime 2 tablets at bedtime    Exercises for Patellofemoral Syndrome  Ice your knee before and after the exercises  1. The Clam Shell  Hold it open for 10 seconds,  repeat 10 times in the AM and 10 in the PM      2 The VMO Exercise  Sit on a hard surface, grab your less painful leg, sit up straight and raise the other leg off the floor just a few inches-don't lean back. Hold it up for 10 sec then relax  -repeat 10 times in the AM and 10 times in the PM       3 The Plank  Hold your body straight as a board for 10 sec then -repeat 10 times in the AM and 10 the  PM          Please call or return to clinic if your symptoms don't go away.    Follow up plan  Please make a clinic appointment for follow up with me (DANIEL PERSAUD) in 4  weeks for follow up of knee pain and sweating.    Thank you for coming to [facility] today.  Lab Testing:  **If you had lab testing today and your results are reassuring or normal they will be mailed to you or sent through [employer] within 7 days.   **If the lab tests need quick action we will call you with the results.  The phone number we will call with results is # [phone] (home) . If this is not the best number please call our clinic and change the number.  Medication Refills:  If you need any refills please call your pharmacy and they will contact us.   If you need to  your refill at a new pharmacy, please contact the new  pharmacy directly. The new pharmacy will help you get your medications transferred faster.   Scheduling:  If you have any concerns about today's visit or wish to schedule another appointment please call our office during normal business hours [phone] (8-5:00 M-F)  If a referral was made to a [facility] and you don't get a call from central scheduling please call [phone].  If a Mammogram was ordered for you at [facility] call [phone] to schedule or change your appointment.  If you had an XRay/CT/Ultrasound/MRI ordered the number is [phone] to schedule or change your radiology appointment.   Medical Concerns:  If you have urgent medical concerns please call [phone] at any time of the day.    Susan Coronel MD

## 2019-09-24 NOTE — PROGRESS NOTES
Preceptor Attestation:   Patient seen and discussed with the resident. Assessment and plan reviewed with resident and agreed upon.   Supervising Physician:  DO [facility]

## 2019-09-24 NOTE — PROGRESS NOTES
"       HPI       Gregoria Stein is a 64 year old  who presents for   Chief Complaint   Patient presents with     Knee Pain     Right knee arthritis pain       Joint/Muscle Pain      Onset: Had a lump behind you R knee that has been there for 2 years, but they got an U/S at [facility] and it looked like normal tissue \"skin lump\". Hard to tell if it has gotten bigger. Had a R knee replacement in July of 2018 and is going to follow up with [facility] and re X-ray due to the swelling.     Injury?  no    Description:   Location(s): Distal posterior middle hamstring  Character: Burning and tingling, feels tight and stretched, increase pressure feel, sometimes radiates up back of leg    Intensity: 8/10 at it's worst, 4-5/10 at its best    Progression of Symptoms: same    Accompanying Signs & Symptoms:  Other symptoms: tingling    History:   Previous similar pain: no      Worsened by    Overuse?: Yes Details: with standing and driving  Morning Stiffness?:Yes Details: since replacement    Alleviating factors:  Improved by: marijuana helps, ibuprofen and tylenol didn't help too much, PT helped early on, cbd oil, capacin, tiger balm  Therapies Tried and outcome: Nothing    Alcohol: sometimes, 1 once a week, glass of wine  Drugs: Marijuana   Smoking: no    +++++++  Sweating/Fatigue    Has been going on for about 6 months. No night sweats, more during the day. Hard to tell if exercise tolerance is down due to knee therapy. Not sure if its around stress and anxiety. A/C seems to helps. She is post-menopausal and feels like hot flashes. No abnormal bleeding or bruising. No abnormal weight less, is gaining weight. No fevers or chills. No recent travels or incarceration. Has had a positive Mantoux test back in 1983 and was treated. No viral illness.    Problem, Medication and Allergy Lists were reviewed and updated if needed..    Patient is a new patient to this clinic and so  I reviewed/updated the Past Medical History, the Family " "History and the Social History .         Review of Systems:   Review of Systems   Constitutional: Positive for diaphoresis and fatigue. Negative for chills, fever and unexpected weight change.   HENT: Negative for postnasal drip, rhinorrhea and sneezing.    Respiratory: Negative for chest tightness, shortness of breath and wheezing.    Cardiovascular: Negative for chest pain and palpitations.   Gastrointestinal: Negative for abdominal pain, blood in stool, constipation, diarrhea, nausea and vomiting.   Endocrine: Positive for heat intolerance.   Genitourinary: Negative for dysuria, frequency, urgency, vaginal bleeding, vaginal discharge and vaginal pain.   Musculoskeletal: Positive for arthralgias and joint swelling.        R knee   Skin: Negative for rash.   Neurological: Negative for syncope, weakness, numbness and headaches.   Hematological: Does not bruise/bleed easily.   Psychiatric/Behavioral: The patient is nervous/anxious.             Physical Exam:     Vitals:    09/24/19 0931 09/24/19 0933   BP: (!) 140/83 124/80   Pulse: 83    Resp: 16    Temp: 98  F (36.7  C)    TempSrc: Oral    SpO2: 97%    Weight: 97.1 kg (214 lb)    Height: 1.676 m (5' 6\")      Body mass index is 34.54 kg/m .  Vitals were reviewed and were normal     Physical Exam  Constitutional:       General: She is not in acute distress.     Appearance: Normal appearance. She is obese. She is not ill-appearing or diaphoretic.   HENT:      Head: Normocephalic and atraumatic.   Cardiovascular:      Rate and Rhythm: Normal rate and regular rhythm.      Pulses: Normal pulses.      Heart sounds: Normal heart sounds. No murmur.   Pulmonary:      Effort: Pulmonary effort is normal.      Breath sounds: Normal breath sounds. No stridor. No wheezing or rhonchi.   Abdominal:      General: Abdomen is flat. Bowel sounds are normal.      Palpations: Abdomen is soft.   Musculoskeletal:      Right knee: She exhibits swelling. She exhibits normal range of motion, " no effusion, no deformity, no erythema, no LCL laxity and no MCL laxity. No tenderness found.        Legs:       Comments: R vertical midline scar from R knee replacement   Skin:     General: Skin is warm and dry.   Neurological:      Mental Status: She is alert and oriented to person, place, and time.          Right Knee Exam     Muscle Strength   The patient has normal right knee strength.    Tenderness   Right knee tenderness location: distal lateral hamsting.    Range of Motion   The patient has normal right knee ROM.    Tests   Suzanne:  Medial - negative Lateral - negative  Varus: negative Valgus: negative  Lachman:  Anterior - negative    Posterior - negative  Drawer:  Anterior - negative    Posterior - negative    Other   Erythema: absent  Scars: present  Sensation: normal  Swelling: moderate  Effusion: no effusion present    Comments:  Gait: Limps      Left Knee Exam   Left knee exam is normal.    Muscle Strength   The patient has normal left knee strength.              Results:   Results are ordered and pending    Assessment and Plan       Gregoria Stein is a 64 year old  who presents for R knee pain with lump and diaphoresis with fatigue. Given patients history, surgical history, physical exam with her limping I believe the R knee pain is due to her R knee replacement and limping and the lump is most likely a lipoma looking at past notes, but will wait for U/S results from [facility].  As for her diaphoresis and fatigue given her lack of other symptoms and history of anxiety that it is most likely due to anxiety and stress, but she has been treated for TB in the past and this needs to be ruled out.   DDx: TB, Hyperthyroidism, Cancer    1. Lipoma of skin and subcutaneous tissue  Wait for U/S results from [facility]    2. Right knee pain, unspecified chronicity  Take 600 mg of Ibuprofen every 6 hours as needed and alternate with Tylenol in between  Stretch hamstrings  Continue to use topical medications  (Mendoza Steiner)  Go to PT appointment on Oct 5.  Follow up sooner with her ortho doc at [facility].    3. Fatigue, unspecified type  Will draw labs (TSH, CBC, TB) and follow up with a phone call.    4. Restless leg syndrome  Take meds as prescribed  - pramipexole (MIRAPEX) 0.25 MG tablet; Take 2 tablets (0.25 mg) by mouth At Bedtime 2 tablets at bedtime    Follow up plan  Follow up with me in 4 weeks for follow up of knee pain and sweating.      Medications Discontinued During This Encounter   Medication Reason     methylPREDNISolone (MEDROL DOSEPAK) 4 MG tablet Medication Reconciliation Clean Up       Options for treatment and follow-up care were reviewed with the patient. Gregoria Stein  engaged in the decision making process and verbalized understanding of the options discussed and agreed with the final plan.    Susan Coronel MD

## 2019-09-26 LAB
GAMMA INTERFERON BACKGROUND BLD IA-ACNC: 0.02 IU/ML
M TB IFN-G BLD-IMP: NEGATIVE
M TB IFN-G CD4+ BCKGRND COR BLD-ACNC: >10 IU/ML
MITOGEN IGNF BCKGRD COR BLD-ACNC: 0.02 IU/ML
MITOGEN IGNF BCKGRD COR BLD-ACNC: 0.03 IU/ML

## 2019-10-05 ENCOUNTER — HEALTH MAINTENANCE LETTER (OUTPATIENT)
Age: 64
End: 2019-10-05

## 2019-10-11 ENCOUNTER — OFFICE VISIT (OUTPATIENT)
Dept: FAMILY MEDICINE | Facility: CLINIC | Age: 64
End: 2019-10-11
Payer: MEDICARE

## 2019-10-11 VITALS
HEART RATE: 80 BPM | HEIGHT: 66 IN | WEIGHT: 218 LBS | OXYGEN SATURATION: 97 % | DIASTOLIC BLOOD PRESSURE: 82 MMHG | SYSTOLIC BLOOD PRESSURE: 138 MMHG | BODY MASS INDEX: 35.03 KG/M2 | TEMPERATURE: 97.8 F

## 2019-10-11 DIAGNOSIS — M75.41 IMPINGEMENT SYNDROME, SHOULDER, RIGHT: Primary | ICD-10-CM

## 2019-10-11 DIAGNOSIS — T14.8XXA BRUISING: ICD-10-CM

## 2019-10-11 DIAGNOSIS — S46.001A ROTATOR CUFF INJURY, RIGHT, INITIAL ENCOUNTER: ICD-10-CM

## 2019-10-11 ASSESSMENT — MIFFLIN-ST. JEOR: SCORE: 1565.59

## 2019-10-11 NOTE — PATIENT INSTRUCTIONS
"Here is the plan from today's visit    1. Impingement syndrome, shoulder, right  Go to the PT you like and schedule an appointment for your R shoulder impingement and rotator cuff injury  - PHYSICAL THERAPY REFERRAL - EXTERNAL; Future    2. Rotator cuff injury, right, initial encounter  Same as above  - PHYSICAL THERAPY REFERRAL - EXTERNAL; Future    3. Bruising  Come in for a lab visit and I'll call if anything abnormal  - CBC with Diff Plt ([facility])  - Comprehensive Metabolic Panel ([facility]); Future  - Vitamin B12  - Folate    Exercises for Shoulder Pain  Patient will start range of motion exercise at home. He should perform the following sequence 2-3 times per day with the affected shoulder    1) Ice shoulder for 15 minutes prior to exercises    2) Perform Codman's exercises: bend at waist supporting yourself by resting unaffected arm on table. Left the affected arm go limp and hang down. Rotate arm in clockwise [place] 10 times. Then switch directions are repeat. Continue this cycle for 5 minutes. Move arm in small circles to begin with progressively increasing the circumference of rotation.      3) Wall walk: stand just less that arm's length away from wall. Place hand on wall and use finger to \"walk\" up the wall surface. Stop when limited by pain. \"Walk\" back down to starting position. Repeat for 5 minutes.      2) Ice shoulder for 15 minutes following exercises.    These exercises were demonstrated for the patient in the examine room.    Please call or return to clinic if your symptoms don't go away.    Follow up plan  Please make a clinic appointment for follow up with me (DANIEL PERSAUD) in 4  weeks for R shoulder pain and bruising.    Thank you for coming to [facility] today.  Lab Testing:  **If you had lab testing today and your results are reassuring or normal they will be mailed to you or sent through [employer] within 7 days.   **If the lab tests need quick action we will call you with the " results.  The phone number we will call with results is # [phone] (home) . If this is not the best number please call our clinic and change the number.  Medication Refills:  If you need any refills please call your pharmacy and they will contact us.   If you need to  your refill at a new pharmacy, please contact the new pharmacy directly. The new pharmacy will help you get your medications transferred faster.   Scheduling:  If you have any concerns about today's visit or wish to schedule another appointment please call our office during normal business hours [phone] (8-5:00 M-F)  If a referral was made to a [facility] and you don't get a call from central scheduling please call [phone].  If a Mammogram was ordered for you at [facility] call [phone] to schedule or change your appointment.  If you had an XRay/CT/Ultrasound/MRI ordered the number is [phone] to schedule or change your radiology appointment.   Medical Concerns:  If you have urgent medical concerns please call [phone] at any time of the day.    Susan Coronel MD

## 2019-10-11 NOTE — PROGRESS NOTES
Preceptor Attestation:   Patient seen, evaluated and discussed with the resident. I have verified the content of the note, which accurately reflects my assessment of the patient and the plan of care.   Supervising Physician:  Amalia Landon MD

## 2019-10-11 NOTE — PROGRESS NOTES
HPI       Gregoria Stein is a 64 year old  who presents for   Chief Complaint   Patient presents with     Pain     right shoulder pain, sharp stabbing,1 yr, recently gotten worse, pain level 6     Bleeding/Bruising     brusing in multiple areas, arms, legs, thighs.       Joint/Muscle Pain      Onset: Had for about a year, lately R shoulder hurts more, pain shooting down to thumb, RC repair about 14 yo    Injury?  no    Description:   Location(s): R shoudler  Character: Sharp, Stabbing and Burning    Intensity: 5-9/10 every day    Progression of Symptoms: worse    Accompanying Signs & Symptoms:  Other symptoms: radiation of pain to R thumb    History:   Previous similar pain: Yes Details: about a year, xray with arthritis       Worsened by    Overuse?: Hard to tell  Morning Stiffness?:no    Alleviating factors:  Improved by: stretching, acetaminophen, Ibuprofen and tiger balm and cbd pain  Therapies Tried and outcome: Nothing    Easy bruising  Noticed bruising along stomach and inner thigh that she can't explain. But juan pushpa bone. Knows the ones on her R arm are from her hitting herself during PTSD episode. Not sleeping well. Can't tell about fatigue. No wt changes. No fever or night sweats.  No n/v. NO hair or skin changes. No heat or cold intolerance. Has IBS. No lymphadenopathy. Poor diet.      Off note pt was very anxious and tearful when I came in. Pt stated she had a bad experience in at another doctors office recently.  Pt therapist of 10 years recently moved away and she has a new one, but has been stressful.  She has had thoughts of suicide and had a plan before, but she does not intend to follow through. She made a suicide contract with her new therapist. Pt states she currently has suicidal thoughts, but does not have a plan nor would she go through with one. Pt declined seeing behavioral health.     +++++++    Problem, Medication and Allergy Lists were reviewed and updated if  "needed..    Patient is an established patient of this clinic..         Review of Systems:   Review of Systems   Constitutional: Negative for chills, fatigue, fever and unexpected weight change.   HENT: Negative for postnasal drip, rhinorrhea, sneezing and sore throat.    Eyes: Negative for visual disturbance.   Respiratory: Negative for cough, shortness of breath and wheezing.    Cardiovascular: Negative for chest pain and palpitations.   Gastrointestinal: Negative for abdominal pain, blood in stool, constipation, diarrhea, nausea and vomiting.   Endocrine: Negative for cold intolerance and heat intolerance.   Genitourinary: Negative for dysuria, frequency, urgency, vaginal bleeding and vaginal discharge.   Musculoskeletal: Positive for arthralgias.   Skin: Negative for rash.        Bruising on R forearm and bilateral inner thighs    Allergic/Immunologic: Negative for immunocompromised state.   Neurological: Negative for syncope, weakness and headaches.   Hematological: Negative for adenopathy. Bruises/bleeds easily.   Psychiatric/Behavioral: Positive for self-injury and suicidal ideas. Negative for confusion. The patient is nervous/anxious.    All other systems reviewed and are negative.           Physical Exam:     Vitals:    10/11/19 1638 10/11/19 1639   BP: (!) 142/83 138/82   Pulse: 80    Temp: 97.8  F (36.6  C)    TempSrc: Oral    SpO2: 97%    Weight: 98.9 kg (218 lb)    Height: 1.676 m (5' 6\")      Body mass index is 35.19 kg/m .  Vitals were reviewed and were normal     Physical Exam  Vitals signs and nursing note reviewed.   Constitutional:       Appearance: Normal appearance. She is obese.   HENT:      Head: Normocephalic and atraumatic.   Cardiovascular:      Rate and Rhythm: Normal rate and regular rhythm.      Pulses: Normal pulses.      Heart sounds: Normal heart sounds. No murmur.   Pulmonary:      Effort: Pulmonary effort is normal.      Breath sounds: Normal breath sounds. No wheezing.   Abdominal: "      General: Abdomen is flat. Bowel sounds are normal. There is no distension.      Palpations: Abdomen is soft.      Tenderness: There is no tenderness.   Skin:     General: Skin is warm and dry.      Findings: Bruising present.      Comments: R forearm old healing, green yellow   Neurological:      Mental Status: She is alert.   Psychiatric:         Attention and Perception: Attention and perception normal.         Mood and Affect: Mood is anxious and depressed. Affect is tearful.         Speech: Speech is delayed.         Behavior: Behavior is withdrawn. Behavior is cooperative.         Thought Content: Thought content includes suicidal ideation. Thought content does not include suicidal plan.         Judgement: Judgment normal.        Right Shoulder Exam     Tenderness   The patient is experiencing no tenderness.    Range of Motion   Active abduction: normal   Passive abduction: normal   Extension: normal   External rotation: normal   Forward flexion: normal Right shoulder forward flexion: Painful.   Internal rotation 90 degrees: normal     Muscle Strength   The patient has normal right shoulder strength.    Other   Erythema: absent  Scars: absent  Sensation: normal  Pulse: present    Comments:  Positive: Neers and Apley's behind the head  Negative: Aydin Cyr and Bony      Left Shoulder Exam   Left shoulder exam is normal.                Results:   No testing ordered today    Assessment and Plan     Gregoria Stein is a 64 year old  who presents for R shoulder pain and bruising.     Given patients history and exam her shoulder pain is most likely due to a rotator cuff injury and impingement syndrome with a positive Neer's and Apley, plus pain with certain motions. As for the patients bruising, it appears to be self inflicted as pt knows she does hit herself in distress and sometimes can not remember these events.     1. Impingement syndrome, shoulder, right  Go to the PT you like and schedule an  appointment for your R shoulder impingement and rotator cuff injury  Home stretches  Ibuprofen and heat for pain  - PHYSICAL THERAPY REFERRAL - EXTERNAL; Future    2. Rotator cuff injury, right, initial encounter  Same as above  - PHYSICAL THERAPY REFERRAL - EXTERNAL; Future    3. Bruising  Come in for a lab visit and I'll call if anything abnormal  - CBC with Diff Plt ([facility])  - Comprehensive Metabolic Panel ([facility]); Future  - Vitamin B12  - Folate    Follow up plan  Please make a clinic appointment for follow up with me (SUSAN CORONEL) in 4  weeks for R shoulder pain and bruising.     There are no discontinued medications.    Options for treatment and follow-up care were reviewed with the patient. Gregoria Stein  engaged in the decision making process and verbalized understanding of the options discussed and agreed with the final plan.    Susan Coronel MD

## 2019-10-11 NOTE — Clinical Note
Karen: in future, I might add a trauma dx or a mental health dx to the visit list for this type of pt - affects her ability to follow up, attend recommended consultants and do self care, and you had to do some medical care around it (safety plan check in, etc). MARTIN aragon

## 2019-10-12 ASSESSMENT — ENCOUNTER SYMPTOMS
RHINORRHEA: 0
NAUSEA: 0
PALPITATIONS: 0
FEVER: 0
WEAKNESS: 0
WHEEZING: 0
NERVOUS/ANXIOUS: 1
DIARRHEA: 0
CONFUSION: 0
BLOOD IN STOOL: 0
FREQUENCY: 0
FATIGUE: 0
COUGH: 0
HEADACHES: 0
SORE THROAT: 0
ABDOMINAL PAIN: 0
ARTHRALGIAS: 1
ADENOPATHY: 0
BRUISES/BLEEDS EASILY: 1
VOMITING: 0
CHILLS: 0
UNEXPECTED WEIGHT CHANGE: 0
CONSTIPATION: 0
SHORTNESS OF BREATH: 0
DYSURIA: 0

## 2019-10-14 DIAGNOSIS — M75.41 IMPINGEMENT SYNDROME, SHOULDER, RIGHT: ICD-10-CM

## 2019-10-14 DIAGNOSIS — T14.8XXA BRUISING: ICD-10-CM

## 2019-10-14 LAB
ALBUMIN SERPL-MCNC: 4.5 MG/DL (ref 3.5–4.7)
ALP SERPL-CCNC: 66.7 U/L (ref 31.7–110.5)
ALT SERPL-CCNC: 27.3 U/L (ref 0–45)
AST SERPL-CCNC: 21.1 U/L (ref 0–45)
BASOPHILS # BLD AUTO: 0 10E9/L (ref 0–0.2)
BASOPHILS NFR BLD AUTO: 0.4 %
BILIRUB SERPL-MCNC: <0.4 MG/DL (ref 0.2–1.3)
BUN SERPL-MCNC: 14.7 MG/DL (ref 7–19)
CALCIUM SERPL-MCNC: 9 MG/DL (ref 8.5–10.1)
CHLORIDE SERPLBLD-SCNC: 104.9 MMOL/L (ref 98–110)
CO2 SERPL-SCNC: 24.8 MMOL/L (ref 20–32)
CREAT SERPL-MCNC: 0.7 MG/DL (ref 0.5–1)
DIFFERENTIAL METHOD BLD: NORMAL
EOSINOPHIL # BLD AUTO: 0.3 10E9/L (ref 0–0.7)
EOSINOPHIL NFR BLD AUTO: 3.8 %
ERYTHROCYTE [DISTWIDTH] IN BLOOD BY AUTOMATED COUNT: 13.2 % (ref 10–15)
FOLATE SERPL-MCNC: 10.2 NG/ML
GFR SERPL CREATININE-BSD FRML MDRD: >90 ML/MIN/1.7 M2
GLUCOSE SERPL-MCNC: 129.5 MG'DL (ref 70–99)
HCT VFR BLD AUTO: 44 % (ref 35–47)
HGB BLD-MCNC: 14.1 G/DL (ref 11.7–15.7)
IMM GRANULOCYTES # BLD: 0 10E9/L (ref 0–0.4)
IMM GRANULOCYTES NFR BLD: 0.4 %
LYMPHOCYTES # BLD AUTO: 1.3 10E9/L (ref 0.8–5.3)
LYMPHOCYTES NFR BLD AUTO: 19.4 %
MCH RBC QN AUTO: 28.5 PG (ref 26.5–33)
MCHC RBC AUTO-ENTMCNC: 32 G/DL (ref 31.5–36.5)
MCV RBC AUTO: 89 FL (ref 78–100)
MONOCYTES # BLD AUTO: 0.4 10E9/L (ref 0–1.3)
MONOCYTES NFR BLD AUTO: 5.5 %
NEUTROPHILS # BLD AUTO: 4.9 10E9/L (ref 1.6–8.3)
NEUTROPHILS NFR BLD AUTO: 70.5 %
NRBC # BLD AUTO: 0 10*3/UL
NRBC BLD AUTO-RTO: 0 /100
PLATELET # BLD AUTO: 211 10E9/L (ref 150–450)
POTASSIUM SERPL-SCNC: 4 MMOL/DL (ref 3.3–4.5)
PROT SERPL-MCNC: 6.9 G/DL (ref 6.8–8.8)
RBC # BLD AUTO: 4.95 10E12/L (ref 3.8–5.2)
SODIUM SERPL-SCNC: 141.9 MMOL/L (ref 132.6–141.4)
VIT B12 SERPL-MCNC: 259 PG/ML (ref 193–986)
WBC # BLD AUTO: 6.9 10E9/L (ref 4–11)

## 2019-10-16 ENCOUNTER — TELEPHONE (OUTPATIENT)
Dept: FAMILY MEDICINE | Facility: CLINIC | Age: 64
End: 2019-10-16

## 2019-10-16 NOTE — TELEPHONE ENCOUNTER
"Per PCP,\"Please call patient with the following message: Labs look fairly unremarkable, your blood sugar level was up. We should recheck this next visit. Call with any questions or concerns. \"    RN attempted to reach patient, but was unable to reach. Left voicemail with name and callback number. If she calls back, please relay the message above. Thanks.  Elena Hartman RN    "

## 2019-10-16 NOTE — LETTER
October 17, 2019      Gregoria Stein  [address]  [place] [address]        Dear ,    We attempted reaching you by phone but we were unsuccessful. We are writing to inform you of your test results.    Your labs look fairly unremarkable, your blood sugar level was up. We should recheck this next visit. Call with any questions or concerns      If you have any questions or concerns, please call the clinic at the number listed above.       Sincerely,        Susan Coronel MD, Elena Hartman RN    Component      Latest Ref Rng & Units 10/14/2019   WBC      4.0 - 11.0 10e9/L 6.9   RBC Count      3.8 - 5.2 10e12/L 4.95   Hemoglobin      11.7 - 15.7 g/dL 14.1   Hematocrit      35.0 - 47.0 % 44.0   MCV      78 - 100 fl 89   MCH      26.5 - 33.0 pg 28.5   MCHC      31.5 - 36.5 g/dL 32.0   RDW      10.0 - 15.0 % 13.2   Platelet Count      150 - 450 10e9/L 211   Diff Method       Automated Method   % Neutrophils      % 70.5   % Lymphocytes      % 19.4   % Monocytes      % 5.5   % Eosinophils      % 3.8   % Basophils      % 0.4   % Immature Granulocytes      % 0.4   Nucleated RBCs      0 /100 0   Absolute Neutrophil      1.6 - 8.3 10e9/L 4.9   Absolute Lymphocytes      0.8 - 5.3 10e9/L 1.3   Absolute Monocytes      0.0 - 1.3 10e9/L 0.4   Absolute Eosinophils      0.0 - 0.7 10e9/L 0.3   Absolute Basophils      0.0 - 0.2 10e9/L 0.0   Abs Immature Granulocytes      0 - 0.4 10e9/L 0.0   Absolute Nucleated RBC       0.0   Calcium      8.5 - 10.1 mg/dL 9.0   Chloride      98.0 - 110.0 mmol/L 104.9   Carbon Dioxide      20.0 - 32.0 mmol/L 24.8   Creatinine      0.5 - 1.0 mg/dL 0.7   Glucose      70.0 - 99.0 mg'dL 129.5 (H)   Potassium      3.3 - 4.5 mmol/dL 4.0   Sodium      132.6 - 141.4 mmol/L 141.9 (H)   Protein Total      6.8 - 8.8 g/dL 6.9   GFR Estimate      >60.0 mL/min/1.7 m2 >90   GFR Estimate If Black      >60.0 mL/min/1.7 m2 >90   Albumin      3.5 - 4.7 mg/dL 4.5   Alkaline Phosphatase      31.7 - 110.5 U/L  66.7   ALT      0.0 - 45.0 U/L 27.3   AST      0.0 - 45.0 U/L 21.1   Bilirubin Total      0.2 - 1.3 mg/dL <0.4   Urea Nitrogen      7.0 - 19.0 mg/dL 14.7   Vitamin B12      193 - 986 pg/mL 259   Folate      >5.4 ng/mL 10.2

## 2019-10-17 NOTE — TELEPHONE ENCOUNTER
RN made second and final attempt to reach patient, unable to reach. Left message with name and callback number.    If she calls back, please relay message from provider in previous note. RN sent letter.  Elena Hartman RN

## 2019-10-18 ENCOUNTER — TRANSFERRED RECORDS (OUTPATIENT)
Dept: HEALTH INFORMATION MANAGEMENT | Facility: CLINIC | Age: 64
End: 2019-10-18

## 2019-10-21 ENCOUNTER — TELEPHONE (OUTPATIENT)
Dept: FAMILY MEDICINE | Facility: CLINIC | Age: 64
End: 2019-10-21

## 2019-10-21 NOTE — TELEPHONE ENCOUNTER
Called JAMES at 3:12 pm October 21, 2019    RN triage had a call form [facility] about pt complaining of her arm pain.      Informed JAMES that I had diagnosed the patient with Shoulder impingement and rotator cuff injury. My treatment plan for the patient was to do Physical Therapy, home stretches, ibuprofen and heat. SW relayed understanding.  Told her to call back with any questions.    Susan Coronel MD

## 2019-10-21 NOTE — TELEPHONE ENCOUNTER
[facility] phone call message- patient requesting to speak directly with PCP or provider.    PCP: Susan Coronel    Reason for Call: The mental health  called from [facility] to let you know that the patient has been hospitalized for MH reasons. She has told them that you diagnosed her with a pinched nerve and they are wondering if this is true and how she should be treated.  Would you be willing to call Unit #[id number] at [phone] and talk with any SW about this?    Additional Details:     Are you willing to speak with a nurse? Yes    Is a call back needed? Yes    Patient informed that it may take up to 2 business days to hear back from PCP:Yes    OK to leave a message on voice mail? Yes    Primary language: English      needed? No    Call taken on October 21, 2019 at 1:36 PM by Liz Grover    Only route to PCP unless willing to speak with a nurse.

## 2019-10-22 ASSESSMENT — PATIENT HEALTH QUESTIONNAIRE - PHQ9: SUM OF ALL RESPONSES TO PHQ QUESTIONS 1-9: 23

## 2019-11-04 ENCOUNTER — TELEPHONE (OUTPATIENT)
Dept: FAMILY MEDICINE | Facility: CLINIC | Age: 64
End: 2019-11-04

## 2019-11-04 ENCOUNTER — OFFICE VISIT (OUTPATIENT)
Dept: FAMILY MEDICINE | Facility: CLINIC | Age: 64
End: 2019-11-04
Payer: MEDICARE

## 2019-11-04 VITALS
SYSTOLIC BLOOD PRESSURE: 134 MMHG | HEART RATE: 101 BPM | OXYGEN SATURATION: 99 % | WEIGHT: 207 LBS | RESPIRATION RATE: 16 BRPM | DIASTOLIC BLOOD PRESSURE: 89 MMHG | BODY MASS INDEX: 33.27 KG/M2 | HEIGHT: 66 IN | TEMPERATURE: 98.2 F

## 2019-11-04 DIAGNOSIS — B37.0 THRUSH: ICD-10-CM

## 2019-11-04 DIAGNOSIS — B37.2 YEAST INFECTION OF THE SKIN: ICD-10-CM

## 2019-11-04 DIAGNOSIS — K11.7 XEROSTOMIA: ICD-10-CM

## 2019-11-04 DIAGNOSIS — R73.9 HYPERGLYCEMIA: ICD-10-CM

## 2019-11-04 DIAGNOSIS — H04.123 DRY EYES: ICD-10-CM

## 2019-11-04 DIAGNOSIS — L30.4 INTERTRIGO: Primary | ICD-10-CM

## 2019-11-04 DIAGNOSIS — R63.1 POLYDIPSIA: Primary | ICD-10-CM

## 2019-11-04 DIAGNOSIS — F43.10 PTSD (POST-TRAUMATIC STRESS DISORDER): ICD-10-CM

## 2019-11-04 LAB — HBA1C MFR BLD: 5.3 % (ref 4.1–5.7)

## 2019-11-04 RX ORDER — CLOTRIMAZOLE 10 MG/1
10 LOZENGE ORAL
Qty: 70 TROCHE | Refills: 0 | Status: SHIPPED | OUTPATIENT
Start: 2019-11-04 | End: 2019-11-11

## 2019-11-04 RX ORDER — HYDROXYZINE PAMOATE 50 MG/1
50 CAPSULE ORAL DAILY PRN
COMMUNITY
Start: 2019-10-31 | End: 2020-01-03

## 2019-11-04 RX ORDER — CLOTRIMAZOLE 1 %
CREAM (GRAM) TOPICAL 2 TIMES DAILY
Qty: 60 G | Refills: 0 | Status: SHIPPED | OUTPATIENT
Start: 2019-11-04 | End: 2019-12-13

## 2019-11-04 ASSESSMENT — MIFFLIN-ST. JEOR: SCORE: 1521.7

## 2019-11-04 NOTE — TELEPHONE ENCOUNTER
Message Return    11/4/2019  5:32 AM    Message returned by Jaqueline Gilbert DO    Patient: Gregoria Stein   Phone number-  [phone] (home)       return their call  Phone conversation with: Patient    Situation: Gregoria Stein  Is a 64 year old  female who is calling because of  Symptoms, wants to know if she needs to go to the hospital.  Background : Home since Fri from inpatient psych admission. Had blurred vision so went to eye Dr Dowling - said exam was significant for double vision that improved with covering one eye. Right now mild stomachache, small blurred vision that clears in the AM, mouth dry, thirsty. No n/v. Is an RN. Did have high BG on 10/14, unknown if fasting. Did have some concerning events while hospitalized - was given ECT and got some breathing problems/salivation/vomiting requiring 12h monitoring. Was weaning off clonazepam and started vistaril, last few months started lamictal. Does have a persistent underarm yeast infection. Some intermittent mild HA. No falls, syncope, fever, severe abdominal pain, bleeding, focal weakness, diarrhea.  Assessment: 63 yo with recent ECT during an inpatient psych hospitalization, recent wean off clonazepam and newly dx diplopia, now with intermittent mild HA, thirst. One mild elevated BG on clinic labs (unknown fasting), but symptoms not concerning for new HHS/DKA.  Recommendation/Plan: Call clinic in AM for same day appt.  Patient agreeable to plan.    DO [facility] Resident PGY-2

## 2019-11-04 NOTE — PROGRESS NOTES
HPI       Gregoria Stein is a 64 year old who presents for RECHECK (went to [facility] on 10/14 and [facility] on 10/15 admitted. Was having double vision on Saturday however went to her eye provider and eyes are fine per pt. Dry mouth, thirsty, abdomianl pain with no N/V. Improved today along with her vision. ) and Derm Problem (infection x's 1mo under her left armpit and some in her groin area not going away. )       # Depression: Patient feeling quite depressed. She feels defeated. She recently admitted herself to [facility] from 10/15-11/1. This was voluntary. She also left voluntarily because her experience was so poor. She now feels somewhat discouraged about being able to get better and is fearful that if she ever feels suicidal, she will have nowhere to go. She does state hope for getting better, just isn't sure how she'll do it. She is future-oriented in several aspects. She wants to lose weight to be able to exercise like she did when she was young. She thinks this round of depression is related, at least in part, to her therapist of 10 years having left the practice recently. She does follow with psychiatric nurse practitioner, who manages her medications.  She was prescribed hydroxyzine, but hasn't been taking that much. Has only taken one 50mg tablet since discharge on11/1. She was weaned down on clonazepam. She currently has 3 days left of 0.25mg daily, then she'll be completely done. Previously she smoked cannabis, but she hasn't smoked since prior to her admission because her lungs hurt so bad (she denies any chest pain today).    # Dry eyes and mouth: Started at end of hospitalization. Patient tried systane eye drops, which helped a little, but not much. She noticed blurry vision over the weekend, but thinks her vision is better now. Had glycopyrollate during ECT; otherwise no anticholinergics. Noted dry mouth started early in admission at [facility]. She said  "she was always thirsty and thinks she didn't drink very much while she was at [facility]--she only had small sips with pills. Feels like mouth continues to be very dry. No increased urination. No heat or cold intolerance. No lightheadedness, dizziness, or headaches out of the ordinary. No numbness or tingling.       # Yeast infections: In bilateral armpits and groin. Patient also has some white on lateral tongue. She notes she had biteblock in place during ECT and wonders if she bit her tongue. She's used some antifungal cream, but is running out of it.    Problem, Medication and Allergy Lists were reviewed and updated if needed..    Patient is an established patient of this clinic..         Review of Systems:   ROS as per HPI, otherwise negative.          Physical Exam:    /89   Pulse 101   Temp 98.2  F (36.8  C) (Oral)   Resp 16   Ht 1.686 m (5' 6.38\")   Wt 93.9 kg (207 lb)   SpO2 99%   BMI 33.03 kg/m    General appearance: well-developed, dressed casually. obese, patient appears quite fidgety and anxious at times, and calmer at others (quite calm while looking at pictures of dogs, for example).   Psych: cooperative, makes good eye contact, smiles occasionally, seems anxious, but can be calmed, \"anxious\" mood with congruent and labile affect, appropriate but slightly pressured speech with normal prosody, logical and goal oriented thought process with some loose associations. Insight and judgement are appropriate. Attention span and concentration intact.   Eyes: extraocular movements intact, sclera are anicteric and not injected, EOMi, PERRL  Oropharynx: appears moist with healthy pink mucosa, no erythema or exudates, white plaque at left lateral border of tongue that does not scrape away  Neck: neck supple without impaired range of motion  Cardiovascular: regular rate and rhythm without appreciable murmur or extra sounds, no edema noted  Lungs: breathing comfortably on room air without increased effort, " LCTAB  Skin: erythematous patches with clearly demarcated borders in bilateral armpits without scaling or ulceration; did not assess groin folds  Lymph: nontender lymphadenopathy bilateral armpits   Musculoskeletal: range of motion normal based on observed movements during encounter  Neurologic: alert and oriented to person, place, and time; spontaneous speech intact, comprehends well        Results:     Results for orders placed or performed in visit on 11/04/19   Hemoglobin A1c ([facility])     Status: None   Result Value Ref Range    Hemoglobin A1C 5.3 4.1 - 5.7 %     Assessment and Plan   Gregoria Stein is a 64 year old who presented to clinic today for dry eyes and mouth. Really, she is a very pleasant woman with complex mental illness at this time including treatment-resistant depression. The complexity of her case was too much to cover in 20 minutes, with her being a new patient to me. We discussed this with each other and she understood at our visit today, we'd only have time to address a few issues, but that she should continue following with her PCP and psychiatrist for regular and more in-depth care.     Keratoconjunctivitis sicca and xerostomia: As for dry eyes and dry mouth, the most likely explanation is medication side effect. Lamotrigine and pramipexole can both cause dry eyes and dry mouth. She recently increased from 25mg lamotrigine to 50mg (within the past month). Other causes of dry eyes/mouth may include rheum diseases such as Sjogren's (though this seems less likely in absence of other signs/symptoms) vs diabetes (ruled out with normal HbA1c). Glycopyrollate likely wore off well before she was even discharged from hospital, so I don't think this contributed.   - Encouraged symptomatic management with sugar-free lozenges for dry mouth and eye drops  - Encouraged patient to follow-up with psychiatric nurse practitioner for continued monitoring of psychiatric medications    Intertrigo and thrush:  Intertrigo typically quite common and benign. Will continue to monitor. Have slightly heightened awareness because she has it in bilateral axillae, groin, and thrush on left lateral tongue border.    - prescribed oral clotrimazole   - prescribed topical clotrimazole     Encouraged patient to follow-up at [facility] (possibly with Dr. Coronel) regarding these symptoms in 1-2 weeks.     Medications Discontinued During This Encounter   Medication Reason     st johns wort 300 MG TABS      diclofenac (VOLTAREN) 1 % GEL      vitamin  B complex with vitamin C (VITAMIN  B COMPLEX) TABS      Magnesium Oxide 500 MG CAPS      Omega-3 Fatty Acids (OMEGA 3 PO)      Cholecalciferol (VITAMIN D-3) 5000 UNITS TABS      traMADol (ULTRAM) 50 MG tablet      latanoprost (XALATAN) 0.005 % ophthalmic solution      UNABLE TO FIND      conjugated estrogens (PREMARIN) vaginal cream      Nutritional Supplements (DHEA PO)      CLONAZEPAM PO         Options for treatment and follow-up care were reviewed with the patient. Gregoria Stein  engaged in the decision making process and verbalized understanding of the options discussed and agreed with the final plan.    Jeaneth Patterson MD , PGY-1, 3:54 PM November 4, 2019

## 2019-11-04 NOTE — PATIENT INSTRUCTIONS
- Go to lab to have blood drawn  - Follow-up with your nurse practitioner regarding lamotrigine dosing  - Follow-up at [facility] (possibly with Dr. Coronel) regarding these symptoms in 1-2 weeks. If symptoms not improving, can make adjustments at this point.  - Prescriptions sent for topical clotrimazole, oral clotrimazole, and eye drops

## 2019-11-06 ENCOUNTER — DOCUMENTATION ONLY (OUTPATIENT)
Dept: PSYCHOLOGY | Facility: CLINIC | Age: 64
End: 2019-11-06

## 2019-11-06 NOTE — PROGRESS NOTES
BH Referral was placed, chart review was complete and indicates that patient has a significant mental health team in place.  Chart also states she recently completed day treatment program at [facility] and they suggested she start a partial hospitalization program.  Did not see other info regarding where this program might be located.      Patient was hospitalized at [facility] recently for suicidal ideation.  One piece that contributed to increase in symptoms is long-time therapist retired and had first appt with a new therapist prior to the admission.    Psychiatrist: JODI Perry at [facility]  Therapist: Emily Lang, saw today for 1st visit, [phone]  [facility] : Shirley Coombsbright, [place], [phone]  [facility] Worker: Franky Gandara, [phone] (office), [phone] (cell)    Plan: Before adding more people to the team which could confuse things further, it would be good to try to get a hold of her [facility] Worker or the [facility]  to find out what plans are in place.  They typically assist with referrals and connecting to other  providers if needed.  We can clarify who is going to do what.

## 2019-11-07 NOTE — PROGRESS NOTES
Preceptor Attestation:   Patient seen, evaluated and discussed with the resident. I have verified the content of the note, which accurately reflects my assessment of the patient and the plan of care.   Supervising Physician:  Alvin Schwartz MD

## 2019-11-11 ENCOUNTER — TELEPHONE (OUTPATIENT)
Dept: PSYCHOLOGY | Facility: CLINIC | Age: 64
End: 2019-11-11

## 2019-11-11 ENCOUNTER — OFFICE VISIT (OUTPATIENT)
Dept: FAMILY MEDICINE | Facility: CLINIC | Age: 64
End: 2019-11-11
Payer: MEDICARE

## 2019-11-11 VITALS
DIASTOLIC BLOOD PRESSURE: 73 MMHG | WEIGHT: 211 LBS | SYSTOLIC BLOOD PRESSURE: 140 MMHG | HEIGHT: 66 IN | OXYGEN SATURATION: 96 % | TEMPERATURE: 98.3 F | BODY MASS INDEX: 33.91 KG/M2 | HEART RATE: 80 BPM

## 2019-11-11 DIAGNOSIS — F43.10 PTSD (POST-TRAUMATIC STRESS DISORDER): ICD-10-CM

## 2019-11-11 DIAGNOSIS — B37.2 YEAST INFECTION OF THE SKIN: ICD-10-CM

## 2019-11-11 DIAGNOSIS — R03.0 ELEVATED BP WITHOUT DIAGNOSIS OF HYPERTENSION: ICD-10-CM

## 2019-11-11 DIAGNOSIS — S46.001D INJURY OF RIGHT ROTATOR CUFF, SUBSEQUENT ENCOUNTER: ICD-10-CM

## 2019-11-11 DIAGNOSIS — T14.8XXA BRUISING: ICD-10-CM

## 2019-11-11 DIAGNOSIS — K11.7 XEROSTOMIA: Primary | ICD-10-CM

## 2019-11-11 ASSESSMENT — ENCOUNTER SYMPTOMS
SEIZURES: 0
ARTHRALGIAS: 1
EYE PAIN: 0
BACK PAIN: 0
VOMITING: 0
FEVER: 0
SORE THROAT: 0
NAUSEA: 0
ABDOMINAL PAIN: 0
COLOR CHANGE: 0
HEMATURIA: 0
COUGH: 0
DIARRHEA: 0
NERVOUS/ANXIOUS: 1
CHILLS: 0
DYSURIA: 0
SHORTNESS OF BREATH: 0
PALPITATIONS: 0

## 2019-11-11 ASSESSMENT — MIFFLIN-ST. JEOR: SCORE: 1533.84

## 2019-11-11 NOTE — PATIENT INSTRUCTIONS
Here is the plan from today's visit    1. Xerostomia  - Has improved. Most likely from past medication.  Use Lozenges as needed.    2. PTSD (post-traumatic stress disorder)  - Follow up with your NP as scheduled  - Will have Release of Information set up  - Speak with Behavioral Health here for bridging therapy    3. Yeast infection of the skin  - Continue using medication till gone  - Follow up with me in 1-2 weeks    4. Bruising  -  Improving.   - Labs have been normal.   - Keep an eye on     5. Rotator cuff injury, right, initial encounter  - Go to PT   - Do stretches and take ibuprofen as needed  - Follow up in 4 weeks after PT    Please call or return to clinic if your symptoms don't go away.    Follow up plan  Please make a clinic appointment for follow up with me (DANIEL PERSAUD) in 2  weeks for follow up for rash.    Thank you for coming to [facility] today.  Lab Testing:  **If you had lab testing today and your results are reassuring or normal they will be mailed to you or sent through [employer] within 7 days.   **If the lab tests need quick action we will call you with the results.  The phone number we will call with results is # [phone] (home) . If this is not the best number please call our clinic and change the number.  Medication Refills:  If you need any refills please call your pharmacy and they will contact us.   If you need to  your refill at a new pharmacy, please contact the new pharmacy directly. The new pharmacy will help you get your medications transferred faster.   Scheduling:  If you have any concerns about today's visit or wish to schedule another appointment please call our office during normal business hours [phone] (8-5:00 M-F)  If a referral was made to a [facility] and you don't get a call from central scheduling please call [phone].  If a Mammogram was ordered for you at [facility] call [phone] to schedule or change  your appointment.  If you had an XRay/CT/Ultrasound/MRI ordered the number is [phone] to schedule or change your radiology appointment.   Medical Concerns:  If you have urgent medical concerns please call [phone] at any time of the day.    Susan Coronel MD
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
## 2019-11-11 NOTE — TELEPHONE ENCOUNTER
"Scheduled with Dr. eTe for 11/21 at 8:20 am.    Primary Care Behavioral Health Consult Note    Meeting lasted: 10 minutes  Others present: patient    Identifying Information and Presenting Problem:    Dr. Coronel requested behavioral health consultation for this patient regarding possible referrals.  The patient is a 64 year old American individual that agreed to be seen by behavioral health today.    Topics Discussed/Interventions Provided:       Please see documentation review note from 11/6/19 as well.    Gregoria had a therapist for 10 years. She trusted her implicitly and they had a very good working relationship.  This therapist was at an agency in [facility].  This therapist suggested that Gregoria see a therapist she knew at [facility] who works with complex trauma.  Gregoria has seen this therapist about 6x and hasn't felt like it's a good fit for her.  She recognizes that it will be difficult to find someone that she connects with as much as she did with her previous therapist, but she thinks she needs to look elsewhere.  Is not comfortable asking this therapist for other referrals within [facility] because this therapist is the .  Asked her psychiatric NP if there was a therapist at that agency, but Gregoria states her NP told her \"I'm not sure.\"     discharge summary stated that Gregoria was referred to a PHP program. Gregoria went one time to this program and did not feel like it fit for her.  She is unsure if she wants to pursue other day tx or php options at this time.    This provider met with the patient to discuss the role of the behavioral health service in the clinic, that we typically provide 8-10 sessions, and generally educate the patient on the psychotherapy services. The patient was given the opportunity to ask questions and state her goals/expectations for initiating services with the behavioral health team. Gregoria is interested in meeting with our  team here right now so " we can assist her with locating another therapist for ongoing therapy.      Assessment:     Mental Status: Gregoria appeared generally alert and oriented. Dress was neat and casual and appropriate to the weather and occasion. Grooming and hygiene were good. Eye contact was good. Speech was of normal volume and rate and was clear, coherent, and relevant. Mood was sad/tearful with congruent affect. Thought processes were circumstantial. Thought content was WNL with no evidence of psychotic or paranoid features. No evidence of SI/HI or self-harm, intent, or plans. Memory appeared grossly intact. Insight and judgment appeared good and patient exhibited good impulse control during the appointment.     PHQ-9 SCORE 10/11/2019   PHQ-9 Total Score 23       Plan:      Will see this provider next Thursday    She will continue with her psychiatric nurse practitioner.    Kimberli was getting NETTIE for identified team members so we can contact them for assistance in determining next steps to best meet Gregoria's needs.

## 2019-11-12 ENCOUNTER — OFFICE VISIT (OUTPATIENT)
Dept: FAMILY MEDICINE | Facility: CLINIC | Age: 64
End: 2019-11-12
Payer: MEDICARE

## 2019-11-12 ENCOUNTER — MYC MEDICAL ADVICE (OUTPATIENT)
Dept: FAMILY MEDICINE | Facility: CLINIC | Age: 64
End: 2019-11-12

## 2019-11-12 ENCOUNTER — CARE COORDINATION (OUTPATIENT)
Dept: FAMILY MEDICINE | Facility: CLINIC | Age: 64
End: 2019-11-12

## 2019-11-12 ENCOUNTER — TELEPHONE (OUTPATIENT)
Dept: FAMILY MEDICINE | Facility: CLINIC | Age: 64
End: 2019-11-12

## 2019-11-12 VITALS
WEIGHT: 213.4 LBS | BODY MASS INDEX: 34.44 KG/M2 | DIASTOLIC BLOOD PRESSURE: 86 MMHG | HEART RATE: 99 BPM | OXYGEN SATURATION: 97 % | SYSTOLIC BLOOD PRESSURE: 139 MMHG | RESPIRATION RATE: 16 BRPM | TEMPERATURE: 97.8 F

## 2019-11-12 DIAGNOSIS — M25.511 CHRONIC RIGHT SHOULDER PAIN: Primary | ICD-10-CM

## 2019-11-12 DIAGNOSIS — F51.01 PRIMARY INSOMNIA: ICD-10-CM

## 2019-11-12 DIAGNOSIS — G89.29 CHRONIC RIGHT SHOULDER PAIN: Primary | ICD-10-CM

## 2019-11-12 NOTE — TELEPHONE ENCOUNTER
RN called her and the only opening we have is 0920 and she needs more time to get here. RN and patient determined that the 1100 will have to be kept.  Elena Hartman RN
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
## 2019-11-12 NOTE — TELEPHONE ENCOUNTER
Message Return    11/12/2019  3:53 AM    Message returned by Sissy Saul MD    Patient: Gregoria Stein   Phone number-  [phone] (home)       Phone conversation with: Patient    Situation: Gregoria Stein  Is a 64 year old  female who is calling because of : pain  Background : unable to tolerate pain. Has chronic pain at baseline but for past month has been having subacute stabbing right arm pain. Shoots down arm. Cannot get comfortable. No injury. Also has restless legs. In the past used medical marijuana with relief. Would like to be certified. Was seen in clinic for this and was referred to PT, but states needs another plan.  Assessment: 64 year old with anxiety and chronic pain calling with intolerable right arm pain x1 month  Recommendation/Plan:   Advised caller to  call clinic in AM for same day appointment if pain remains intolerable in the am.    Monique Saul MD

## 2019-11-12 NOTE — PROGRESS NOTES
"Behavioral Health Home Services  [facility]     Social Work Care Navigator Note    Patient: Gregoria Stein  Date: November 12, 2019  Preferred Name: Gregoria    Previous PHQ-9:   PHQ-9 SCORE 10/11/2019   PHQ-9 Total Score 23     Previous CLAIRE-7: No flowsheet data found.  NORMAN LEVEL:  No flowsheet data found.    Preferred Contact:  No data recorded    Type of Contact Today: Face to Face in Clinic    Data: (subjective / Objective):  Struggling with chronic pain, mental health, restless legs, and not sleeping well.  Wants a therapy dog.  Wants to be prescribed medical marijuana again. Would like a new individual therapist since her previous therapist recently retired.     [facility] Introduction:  Getting to Know You - Whole Person Care:  This new service is called Behavioral Health Home services, which is designed to support you as a whole person beyond just your medical needs.      Tell me about what types of things that are causing you stress OR impacting your quality of life?  \"I can't sleep, I hurt so bad, I just want to drive off and take care of myself,\" (said multiple times) asked pt where would she drive off to she replied \"I don't know, somewhere like [place], [place], [place].\"  She was crying throughout most of assessment. It appears her chronic pain is the main contributor to not sleeping and amplifying her mental health symptoms.     Was prescribed medical marijuana in the past but currently has no prescription.  Per pt, she has a friend who saw Dr. Landon then was prescribed medical marijuana therefore her friend suggested to her to see Dr. Landon.  States she will not buy marijuana off the streets, has bought marijuana from friends who bring it back from [place] but stated she does not want to rely on them. Previously was being prescribed it by a provider at [facility]. She reports the physician she was seeing there stopped working there and they never reassigned her a new " "physician.     Scheduled appt with Dr. Landon on 12/5/2019 at 9:20am.  Once scheduled pt stopped crying, softened her voice back to a normal volume and was able to have a conversation with this writer about her concerns and what to do from here.  Pt stated she bottles up all her emotions and apologized for the consistent crying.  She reports having a hard time asking for help.    She stated she had a bad experience with receiving ECT through [facility] - felt forced to do it even though she was voluntary.  Educated pt about the [facility] program - she stated she was interested.  Signed NETTIE for Mental Health Counseling Service who likely completed a DA. Thanked this writer for listening and said she felt like our conversation went somewhere.   Discussed a safety plan.  She stated she would call her  and/or [facility] worker - when asked what would she do if neither answered, she stated \"call a crisis line.\"  Did not have the number for a crisis line.  Gave pt a brochure for [facility] and the [facility] suicide hotline.  Gave business card. Mentioned her appt with Dr. Tee next week; will visit with her after this appt.     Pt has a MH dx and eligible health insurance to enroll in [facility]. Will have pt sign the 2 [place] [employer] at next appointment.     Current providers from Dr. Tee's note:  [facility]iat[facility], [phone]  [facility] Worker: Franky Gandara, [phone] (office), [phone] (cell)    Patient response to [facility] Service offering:   Interested in enrolling in [facility] services and scheduled appt / will drop-in to complete the consent form and Brief Needs Assessment    Renuka Flynn, Social Work Care Coordinator             "

## 2019-11-14 ENCOUNTER — TELEPHONE (OUTPATIENT)
Dept: FAMILY MEDICINE | Facility: CLINIC | Age: 64
End: 2019-11-14

## 2019-11-14 NOTE — TELEPHONE ENCOUNTER
Yesterday 11/13/19, this writer canceled pt's appt with Dr. Landon due to finding out Dr. Landon is not taking new pt's at this time.      Pt called this writer today 11/14/2019 and had questions about appts made.  Reviewed appts made on 11/21/2019.  Explained Dr. Landon is not taking new pt's and the appt with Dr. Coronel is specifically to talk about medical marijuana; if Dr. Coronel believes she meets qualifications she then will make a referral to a MD who can enroll her through the medical marijuana registry.  Apologized to pt and she agreed with all appts, appeared to understand the purpose of her appt with Dr. Coronel.  Renuka Flynn, [facility] Social Work Care Coordinator

## 2019-11-15 ENCOUNTER — TELEPHONE (OUTPATIENT)
Dept: FAMILY MEDICINE | Facility: CLINIC | Age: 64
End: 2019-11-15

## 2019-11-15 NOTE — TELEPHONE ENCOUNTER
"[facility] phone call message- medication clarification/question:    Full Medication Name:   pramipexole (MIRAPEX) 0.125 MG tablet  gabapentin (NEURONTIN) 300 MG tablet  Trazodone 50 MG tablets (not listed in medication tab of chart)    Question/Clarification needed: Patient expressed concerns she has with her medications. Patient is fairly new to our clinic, and from her perspective some medications have not been transitioned smoothly during the establish care process.    pramipexole (MIRAPEX) 0.125 MG tablet - patient reports previously taking 3 - 0.25 MG tablets and acknowledges having a conversation with Dr. Coronel about lowering her does to 2 - 0.25 MG tablets. Medication was sent as 0.125 MG tablets, but in the sig it states \"Take 2 tablets (0.25 mg) by mouth At Bedtime 2 tablets at bedtime - Oral\", please clarify.    gabapentin (NEURONTIN) 300 MG tablet - patient reports taking 3 - 300 MG tablets to equal 900 MG at bedtime for her restless leg syndrome.    Trazodone (not listed in the medication tab for patient) 50 MG tablets - Patient states in the hospital she was taking 50 - 100 MG at bedtime to help her sleep.     Please call patient to discuss if medications can be prescribed as requested/previously prescribed, or to discuss why the dose changes are necessary.     Pharmacy confirmed as   [id number] [address] [address]  Phone: [phone] Fax: [phone]  : Yes    OK to leave a message on voice mail? Yes    Advised patient that RN would call back within 3 hours, unless emergent.    Primary language: English      needed? No    Call taken on November 15, 2019 at 2:51 PM by Ervin Carrera    "

## 2019-11-15 NOTE — TELEPHONE ENCOUNTER
RN notified patient that message was received and that I will be routing this to PCP for corrections to be made if appropriate.  Elena Hartman RN

## 2019-11-16 NOTE — TELEPHONE ENCOUNTER
Message Return  11/16/2019  10:22 AM    Message returned by Susan Coronel MD    Patient: Gregoria Stein   Phone number-  [phone] (home)       I left a message on answering machine  Phone conversation with: Patient    Situation: Gregoria Stein  Is a 64 year old  female who is called with questions about her medications.  Background : Left message for pt.  Assessment: Left message.  Recommendation/Plan: Left message clarify that pramipexole should be 2 tablets at bedtime. Gabapentin changes where changed be a different provider, but can increase to a total of 600 mg a day for now and will reassess at next visit. As for the trazodone, it was prescribed at her last hospital visit. I would prefer we discuss options at next visit.   Advised caller to call with any questions and to follow up with me at her scheduled appointment.      Susan Coronel MD

## 2019-11-18 ENCOUNTER — TELEPHONE (OUTPATIENT)
Dept: FAMILY MEDICINE | Facility: CLINIC | Age: 64
End: 2019-11-18

## 2019-11-18 NOTE — TELEPHONE ENCOUNTER
[facility] [[email or web address]] Family Medicine phone call message - order or referral request from patient:     Order or referral being requested: Requested order for OT    Additional Details: OT orders to evaluate and treat.    Referral only -Specialty  Location     OK to leave a message on voice mail? Yes    Primary language: English      needed? No    Call taken on November 18, 2019 at 2:12 PM by Liz Grover    Order request route to [facility] TRIAGE   Referrals Route to [facility] (Green/[place]/Purple) CARE COORDINATOR

## 2019-11-18 NOTE — TELEPHONE ENCOUNTER
Returned call to home care OT, unable to reach. Left VM with verbal orders per protocol as requested. Left callback number for questions.    Sol Rivera RN

## 2019-11-21 ENCOUNTER — CARE COORDINATION (OUTPATIENT)
Dept: PSYCHOLOGY | Facility: CLINIC | Age: 64
End: 2019-11-21
Payer: MEDICARE

## 2019-11-21 ENCOUNTER — OFFICE VISIT (OUTPATIENT)
Dept: FAMILY MEDICINE | Facility: CLINIC | Age: 64
End: 2019-11-21
Payer: MEDICARE

## 2019-11-21 ENCOUNTER — OFFICE VISIT (OUTPATIENT)
Dept: PSYCHOLOGY | Facility: CLINIC | Age: 64
End: 2019-11-21
Payer: MEDICARE

## 2019-11-21 ENCOUNTER — TRANSFERRED RECORDS (OUTPATIENT)
Dept: HEALTH INFORMATION MANAGEMENT | Facility: CLINIC | Age: 64
End: 2019-11-21

## 2019-11-21 VITALS
BODY MASS INDEX: 34.23 KG/M2 | HEIGHT: 66 IN | DIASTOLIC BLOOD PRESSURE: 82 MMHG | RESPIRATION RATE: 16 BRPM | TEMPERATURE: 97.9 F | OXYGEN SATURATION: 97 % | HEART RATE: 89 BPM | WEIGHT: 213 LBS | SYSTOLIC BLOOD PRESSURE: 135 MMHG

## 2019-11-21 DIAGNOSIS — F33.2 SEVERE EPISODE OF RECURRENT MAJOR DEPRESSIVE DISORDER, WITHOUT PSYCHOTIC FEATURES (H): Primary | ICD-10-CM

## 2019-11-21 DIAGNOSIS — F41.1 GENERALIZED ANXIETY DISORDER: ICD-10-CM

## 2019-11-21 DIAGNOSIS — G89.29 OTHER CHRONIC PAIN: ICD-10-CM

## 2019-11-21 DIAGNOSIS — Z78.9 PATIENT REQUESTS ALTERNATE TREATMENT: Primary | ICD-10-CM

## 2019-11-21 DIAGNOSIS — F43.10 PTSD (POST-TRAUMATIC STRESS DISORDER): ICD-10-CM

## 2019-11-21 RX ORDER — TRAMADOL HYDROCHLORIDE 50 MG/1
50 TABLET ORAL ONCE
COMMUNITY
Start: 2017-04-05 | End: 2019-11-21

## 2019-11-21 ASSESSMENT — MIFFLIN-ST. JEOR: SCORE: 1542.91

## 2019-11-21 NOTE — PROGRESS NOTES
Behavioral Health Home Services  [facility] Clinic: [place]    Social Work Care Navigator Note    Patient: Gregoria Stein  Date: November 21, 2019  Preferred Name: Gregoria    Previous PHQ-9:   PHQ-9 SCORE 10/11/2019   PHQ-9 Total Score 23     Previous CLAIRE-7: No flowsheet data found.  NORMAN LEVEL:  No flowsheet data found.    Preferred Contact:  Need for : No  Preferred Contact: Cell    Type of Contact Today: Face to Face in Clinic    Data: (subjective / Objective):  Recent ED/IP Admission or Discharge?   Non-[place] IP Visit date: 10/15/2019, discharged on 11/1/2019    Patient Goals:  No data recorded    [facility] Core Service Provided:  Care Coordination: provided care management services/referrals necessary to ensure patient and their identified supports have access to medical, behavioral health, pharmacology and recovery support services.  Ensured that patient's care is integrated across all settings and services.   Health and Wellness Promotion  Individual and Family Support: aimed to help clients reduce barriers to achieving goals, increase health literacy and knowledge about chronic condition(s), increase self-efficacy skills, and improve health outcomes    Current Stressors / Issues / Care Plan Objective Addressed Today:  Managing chronic pain, lack of sleep and MH - PTSD and anxiety. Working with this writer to become enrolled in [facility].     Intervention:  Motivational Interviewing: Expressed Empathy/Understanding and Open-ended questions   Target Behavior(s): Explored thoughts and readiness to participate in individual therapy and Explored current social supports and reinforced opportunities to increase engagement    Assessment: (Progress on Goals / Homework):   Re-introduced self and role due to pt not recognizing this writer from last week.  Pt presented significantly different than the last week.  She appeared well groomed, calm, and had organized thoughts.    Spoke about her session with Dr. Tee and  the decision to continue working with Gianna Brewer ([facility]) since this is who her previous therapist of 10 years recommended.  Signed NETTIE to receive DA from them - this writer faxed today 19.   Pt signed [facility] [place] DHS documents today 2019.  Completed Brief Needs, and Health & Wellness flowsheets.  Will complete HAP in next 30-60 days - chose to complete this another time to not overwhelm pt.    Spent the next 10-15 minutes talking about how pt [place] people in [place], then around 28yo moved near [place] where she lived for 30 years.  She enjoyed rock climbing out there, states she misses being out there since many of her friends still live there.  Has a bachelors as a RN.  Talked about her dog - collie/rotweiler mix had  3 years ago which appears to still be a hard conversation to talk about.  She showed this writer pictures.  Her ultimate goal is to buy an RV and travel around the  [place] and [place].      Plan: (Homework, other):  Patient was encouraged to continue to seek condition-related information and education.      Scheduled a Clinic follow up appointment with PCP in 1 week     Patient has set self-identified goals and will monitor progress until the next appointment on: 2019 at 1:00pm.     Renuka Flynn, Social Work Care Coordinator

## 2019-11-21 NOTE — PROGRESS NOTES
Preceptor Attestation:   Patient seen, evaluated and discussed with the resident. I have verified the content of the note, which accurately reflects my assessment of the patient and the plan of care.   Supervising Physician:  Kim Taylor MD
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
## 2019-11-21 NOTE — PATIENT INSTRUCTIONS
Here is the plan from today's visit    1. Patient requests alternate treatment  - Will refer to either Dr. Schwartz or Dr. Simmons to discuss medical cannabis     2. PTSD (post-traumatic stress disorder)  - as above     3. Other chronic pain  - as above       Follow up plan  Follow up with me for a wellness exam when able.    Thank you for coming to [facility] today.  Lab Testing:  **If you had lab testing today and your results are reassuring or normal they will be mailed to you or sent through [facility] within 7 days.   **If the lab tests need quick action we will call you with the results.  The phone number we will call with results is # [phone] (home) . If this is not the best number please call our clinic and change the number.  Medication Refills:  If you need any refills please call your pharmacy and they will contact us.   If you need to  your refill at a new pharmacy, please contact the new pharmacy directly. The new pharmacy will help you get your medications transferred faster.   Scheduling:  If you have any concerns about today's visit or wish to schedule another appointment please call our office during normal business hours [phone] (8-5:00 M-F)  If a referral was made to a [facility] and you don't get a call from central scheduling please call [phone].  If a Mammogram was ordered for you at [facility] call [phone] to schedule or change your appointment.  If you had an XRay/CT/Ultrasound/MRI ordered the number is [phone] to schedule or change your radiology appointment.   Medical Concerns:  If you have urgent medical concerns please call [phone] at any time of the day.    Susan Coronel MD      You will be referred to a [facility] Medicinal Cannabis Evaluator for a 40 minute visit to see whether Medicinal Cannabis would be indicated for your condition. This is the next step in the process at [facility] and does not guarantee you  will receive certification for Medical Cannabis.  It is possible they may need more records, may ask you to see other providers for evaluation, or may determine that you do not meet criteria for Medical Cannabis    Medical Cannabis is usually not the best choice nor the best studied choice for the above conditions, therefore most patients will not qualify if they have not tried other treatments before considering Medical Cannabis.    Annual program fee is $200, ($50 for some Medical Assistance Programs/Social security Disability.)    All cost for the medication is out-of-pocket and can average between $100-$300/month    You will need to schedule a 40 minute visit with a qualifying physician.    The visit is to determine if you would qualify for the program and does not guarantee that you will qualify.  If there is not enough information to determine this, then you may need release records, do additional visits or visits to other providers before the determination is made.    You will need to be recertified yearly.

## 2019-11-22 ENCOUNTER — TRANSFERRED RECORDS (OUTPATIENT)
Dept: HEALTH INFORMATION MANAGEMENT | Facility: CLINIC | Age: 64
End: 2019-11-22

## 2019-11-22 ASSESSMENT — ANXIETY QUESTIONNAIRES
1. FEELING NERVOUS, ANXIOUS, OR ON EDGE: SEVERAL DAYS
7. FEELING AFRAID AS IF SOMETHING AWFUL MIGHT HAPPEN: MORE THAN HALF THE DAYS
5. BEING SO RESTLESS THAT IT IS HARD TO SIT STILL: MORE THAN HALF THE DAYS
GAD7 TOTAL SCORE: 13
2. NOT BEING ABLE TO STOP OR CONTROL WORRYING: MORE THAN HALF THE DAYS
4. TROUBLE RELAXING: MORE THAN HALF THE DAYS
IF YOU CHECKED OFF ANY PROBLEMS ON THIS QUESTIONNAIRE, HOW DIFFICULT HAVE THESE PROBLEMS MADE IT FOR YOU TO DO YOUR WORK, TAKE CARE OF THINGS AT HOME, OR GET ALONG WITH OTHER PEOPLE: VERY DIFFICULT
6. BECOMING EASILY ANNOYED OR IRRITABLE: MORE THAN HALF THE DAYS
3. WORRYING TOO MUCH ABOUT DIFFERENT THINGS: MORE THAN HALF THE DAYS

## 2019-11-22 ASSESSMENT — PATIENT HEALTH QUESTIONNAIRE - PHQ9: SUM OF ALL RESPONSES TO PHQ QUESTIONS 1-9: 16

## 2019-11-23 ASSESSMENT — ANXIETY QUESTIONNAIRES: GAD7 TOTAL SCORE: 13

## 2019-11-25 NOTE — PROGRESS NOTES
Behavioral Health Progress Note    Client Legal Name: Gregoria Stein   Client Preferred Name: Gregoria   Service Type: Individual  Length of Visit: 45 minutes  Attendees: patient     Identifying Information and Presenting Problem:    The patient is a 64 year old American female who I saw briefly when she was in a visit with Dr. Coronel on 11/21.  Gregoria was quite overwhelmed and trying to figure out next steps.  She was also scheduled for this intake today to complete a diagnostic assessment for enrollment in behavioral health home program.  Gregoria had a therapist she was seeing, but when I saw her a week ago, she was not going to return to this therapist and did not want us to reach out to this therapist for the DA so she could enroll in [facility].  So, she was scheduled with me today so that I could complete the DA so she could enroll.     Treatment Objective(s) Addressed in This Session:  first visit n/a    Progress on / Status of Treatment Objective(s) / Homework:  first visit n/a    PHQ-9 SCORE 10/11/2019 11/22/2019   PHQ-9 Total Score 23 16       CLAIRE-7 SCORE 11/22/2019   Total Score 13     WHODAS 2.0 Total Score 11/22/2019   Total Score 41     CAGE-AID: 0/4  PTSD 4/4    Topics Discussed/Interventions Provided:  See paragraph above for background information.  Gregoria presents today and states she is in a very different spot. She was feeling very desperate and unable to cope, so she decided to reach back out to the therapist she was seeing.  This therapist was very relieved hear back from her.  They decided Gregoria would come back in (appt scheduled later today) to see if they can work out a way to move forward.  Gregoria acknowledges that the transition has been very hard because her therapist of 10 years retired.  The therapist recommended this new therapist that Gregoria saw about 6 times.      Gregoria wanted to talk through her decision to return to this therapist.  Gregoria shared some insights she had about why  she was reacting the way she was to the new therapist.  Provided reinforcement that she was making a good decision for herself.  Discussed that her previous therapist had specifically picked this other person for Gregoria, so it is definitely worth returning to have more conversation.  Encouraged Gregoria to think about what it is she would like to communicate to the therapist to help move their work forward.  Gregoria states that if she can find a way to trust someone else, it will help her feel more sure of herself, decrease her anxiety, and she will feel less scared of the world.     Gregoria felt badly about how she had acted when she was in clinic the last time.  Provided reassurance that the team is aware of her pain, as well as how overwhelmed she has been and understand how that may be effecting her behavior.          Assessment: The patient appeared to be active and engaged in today's session and was receptive to feedback.     Mental Status: Gregoria appeared generally alert and oriented. Dress was casual and appropriate to the weather and occasion. Grooming and hygiene were good. Eye contact was good. Speech was of normal volume and rate and was clear, coherent, and relevant. Mood was anxious with congruent affect. Thought processes were relevant, logical and goal-directed. Thought content was WNL with no evidence of psychotic or paranoid features. No evidence of SI/HI or self-harm, intent, or plans. Memory appeared grossly intact. Insight and judgment appeared fair to good and patient exhibited good impulse control during the appointment.     Does the patient appear to be at imminent risk of harm to self/others at this time? No    The session was necessary to address providing support for Gregoria's decision to return to the therapist that her long-time therapist had referred her to.  If we are not able to get a DA from this therapist for entrance into [facility], Gregoria will return to see me and we will complete the  DA at that time.    Diagnosis (DSM-5):  MDD, recurrent, severe  CLAIRE    Plan:  1. Follow up if a DA is needed, deferred today as she is returning to other therapist and may be open to signing a release so that DA can be released to us so she can be enrolled in [facility].   2. Discussed role of primary care  in her care.     Psychiatrist: JODI Perry at [facility]  Therapist: Emily Lang, saw today for 1st visit, [phone]  [facility], [phone]  [facility] Worker: Franky Gandara, [phone] (office), [phone] (cell)     3. Patient met with [facility] after our visit today.

## 2019-12-06 ENCOUNTER — DOCUMENTATION ONLY (OUTPATIENT)
Dept: PSYCHOLOGY | Facility: CLINIC | Age: 64
End: 2019-12-06

## 2019-12-06 NOTE — PROGRESS NOTES
Behavioral Health Home Diagnostic Assessment Review    PATIENT'S NAME: Gregoria Stein  MRN:   [id number]  :   1955  DATE OF Review 2019    Date of Diagnostic Assessment:  10/14/19 - Psychiatric Evaluation as part of ED visit and hospitalization, this writer also met with patient on . Reviewed Psychiatric Evaluations at that time and also discussed symptoms and ongoing treatment plan further with patient, as well as administered the WHODAS and CAGE-AID.    Mental Health Provider and Clinic: [place] [facility] [place] [facility] for ongoing therapy.    DSM5 Diagnoses:  Major Depression, recurrent, severe; Generalized Anxiety Disorder;   PTSD also likely, but further documentation needed  WHODAS Score:   WHODAS 2.0 Total Score 2019   Total Score 41       Cage-AID score is: 0/4     [facility] RECOMMENDATIONS/PLAN:     Diagnostic assessment has been reviewed and patient has met diagnostic criteria for enrollment into the Behavioral Health Home program.      Sharmila Tee PsyD, LP  Behavioral Health Clinician  [facility]

## 2019-12-10 ENCOUNTER — PATIENT OUTREACH (OUTPATIENT)
Dept: FAMILY MEDICINE | Facility: CLINIC | Age: 64
End: 2019-12-10
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
## 2019-12-13 ENCOUNTER — OFFICE VISIT (OUTPATIENT)
Dept: FAMILY MEDICINE | Facility: CLINIC | Age: 64
End: 2019-12-13
Payer: MEDICARE

## 2019-12-13 ENCOUNTER — CARE COORDINATION (OUTPATIENT)
Dept: PSYCHOLOGY | Facility: CLINIC | Age: 64
End: 2019-12-13
Payer: MEDICARE

## 2019-12-13 VITALS
HEIGHT: 66 IN | RESPIRATION RATE: 16 BRPM | OXYGEN SATURATION: 97 % | SYSTOLIC BLOOD PRESSURE: 132 MMHG | TEMPERATURE: 98.3 F | HEART RATE: 90 BPM | DIASTOLIC BLOOD PRESSURE: 80 MMHG | BODY MASS INDEX: 33.62 KG/M2 | WEIGHT: 209.2 LBS

## 2019-12-13 DIAGNOSIS — L30.9 DERMATITIS: ICD-10-CM

## 2019-12-13 DIAGNOSIS — B37.2 YEAST INFECTION OF THE SKIN: Primary | ICD-10-CM

## 2019-12-13 RX ORDER — CLOTRIMAZOLE 1 %
CREAM (GRAM) TOPICAL 2 TIMES DAILY
Qty: 60 G | Refills: 0 | Status: SHIPPED | OUTPATIENT
Start: 2019-12-13 | End: 2020-02-05

## 2019-12-13 RX ORDER — TRIAMCINOLONE ACETONIDE 1 MG/ML
LOTION TOPICAL 3 TIMES DAILY
Qty: 60 ML | Refills: 0 | Status: SHIPPED | OUTPATIENT
Start: 2019-12-13 | End: 2020-02-05

## 2019-12-13 ASSESSMENT — ENCOUNTER SYMPTOMS
FEVER: 0
COUGH: 0
PALPITATIONS: 0
CHILLS: 0
ARTHRALGIAS: 1
VOMITING: 0
NERVOUS/ANXIOUS: 0
SEIZURES: 0
SORE THROAT: 0
DYSURIA: 0
ABDOMINAL PAIN: 0
BACK PAIN: 0
EYE PAIN: 0
SHORTNESS OF BREATH: 0
HEMATURIA: 0
COLOR CHANGE: 0

## 2019-12-13 ASSESSMENT — MIFFLIN-ST. JEOR: SCORE: 1517.73

## 2019-12-13 NOTE — PATIENT INSTRUCTIONS
Here is the plan from today's visit    1. Yeast infection of the skin  This could be a recurrence of your previous infections. Start using Lotrimin twice a day again. If it doesn't seem to clear up most likely skin irritation.  If it clears; try using deoderant to prevent moisture build up and change of recurrence.  - clotrimazole (LOTRIMIN) 1 % external cream; Apply topically 2 times daily  Dispense: 60 g; Refill: 0    2. Dermatitis  If it appears to be more like skin irritation use Kenalog 2 to 3 times a day.  Lotion after showers  Avoid long hot showers  - triamcinolone (KENALOG) 0.1 % external lotion; Apply topically 3 times daily  Dispense: 60 mL; Refill: 0    Please call or return to clinic if your symptoms don't go away.    Follow up plan  Please make a clinic appointment for follow up with me (DANIEL PERSAUD) in 2-4  weeks for follow up of rash.    Thank you for coming to [facility] today.  Lab Testing:  **If you had lab testing today and your results are reassuring or normal they will be mailed to you or sent through [employer] within 7 days.   **If the lab tests need quick action we will call you with the results.  The phone number we will call with results is # [phone] (home) . If this is not the best number please call our clinic and change the number.  Medication Refills:  If you need any refills please call your pharmacy and they will contact us.   If you need to  your refill at a new pharmacy, please contact the new pharmacy directly. The new pharmacy will help you get your medications transferred faster.   Scheduling:  If you have any concerns about today's visit or wish to schedule another appointment please call our office during normal business hours [phone] (8-5:00 M-F)  If a referral was made to a [facility] and you don't get a call from central scheduling please call [phone].  If a Mammogram was ordered for you at [facility] call  [phone] to schedule or change your appointment.  If you had an XRay/CT/Ultrasound/MRI ordered the number is [phone] to schedule or change your radiology appointment.   Medical Concerns:  If you have urgent medical concerns please call [phone] at any time of the day.    Susan Coronel MD

## 2019-12-13 NOTE — PROGRESS NOTES
Behavioral Health Home Services  [facility]: [place]    Social Work Care Navigator Note    Patient: Gregoria Stein  Date: December 13, 2019  Preferred Name: Gregoria    Previous PHQ-9:   PHQ-9 SCORE 10/11/2019 11/22/2019   PHQ-9 Total Score 23 16     Previous CLAIRE-7:   CLAIRE-7 SCORE 11/22/2019   Total Score 13     NORMAN LEVEL:  No flowsheet data found.    Preferred Contact:  Need for : No  Preferred Contact: Cell    Type of Contact Today: Face to Face in Clinic    Data: (subjective / Objective):  Recent ED/IP Admission or Discharge?   None    Patient Goals:  No data recorded    [facility] Core Service Provided:  Care Coordination: provided care management services/referrals necessary to ensure patient and their identified supports have access to medical, behavioral health, pharmacology and recovery support services.  Ensured that patient's care is integrated across all settings and services.   Individual and Family Support: aimed to help clients reduce barriers to achieving goals, increase health literacy and knowledge about chronic condition(s), increase self-efficacy skills, and improve health outcomes    Current Stressors / Issues / Care Plan Objective Addressed Today:  None reported.  Resolved car issues, still took medical ride to appt today 12/13/2019.  Spoke about potential services.       Intervention:  Motivational Interviewing: Expressed Empathy/Understanding and Open-ended questions   Target Behavior(s): Explored current social supports and reinforced opportunities to increase engagement    Assessment: (Progress on Goals / Homework):  Pt appeared calm and in good spirits during appointment today 12/13/2019; states she took vistaril prior to appt.  She went to the dentist on 12/11/2019; had a great experience and plans to continue going back there.  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    Gave pt a copy of an email I received when asking questions about the adult  art therapy group at [facility] which is every other Friday.  Pt loves art and has made a few pieces herself.  Signed NETTIE; faxed to [facility].  I sent an email to Blanca who runs the group to ask her of next steps.       Plan: (Homework, other):  Patient was encouraged to continue to seek condition-related information and education.      Scheduled a Clinic follow up appointment with JAMES BAIN and PCP in 1 week on 12/18/2019 at 1:00pm. This writer will likely be calling her regarding the referral update to [facility] for the art therapy group.     Patient has set self-identified goals and will monitor progress until the next appointment.     Renuka Flynn, Social Work Care Coordinator

## 2019-12-13 NOTE — PROGRESS NOTES
"       HPI       Gregoria Stein is a 64 year old  who presents for   Chief Complaint   Patient presents with     Derm Problem     rash under left arm, pt thinking it is a fungal issue     Referral     mammogram referral        Rash/Lesion  Onset: Maybe there a couple of months (Pt history of a \"crack\" under L axilla 7-8 yrs ago)    Description:   Location: Left axillary  Color: Darker syed color used to be brither, but cream helped (Lotrimin)  Character: round, smooth  Itching (Pruritis): no  Pain?:no    Progression of Symptoms:  same    Accompanying Signs & Symptoms:  Fever: no  Body aches or joint pain:  no  Sore throat symptoms:no  Recent cold symptoms: no    History:   Previous similar rash: Yes Details: 7-8 years ago    Precipitating factors:   Exposure to similar rash: no  New exposures: no  Recent travel: no  New Medication: no    What makes it better?:  Cream helps when irritated   Therapies Tried and outcome:  Nothing    +++++++  Bruising  Pt had seen me previously for bruising on inner thigh. We had done a work up for this which came back unremarkable and she hadn't been having bruising after she was seen for this. Pt states she doesn't have bruising there now, but it just feel achy in her R thigh. Pt thinks it may be related to her knee replacement.  She has an appointment with [facility] for this soon and will bring it up with them. If they do not think it's related she is going to come back and see me.     Problem, Medication and Allergy Lists were reviewed and updated if needed..    Patient is an established patient of this clinic..         Review of Systems:   Review of Systems   Constitutional: Negative for chills and fever.   HENT: Negative for ear pain and sore throat.    Eyes: Negative for pain and visual disturbance.   Respiratory: Negative for cough and shortness of breath.    Cardiovascular: Negative for chest pain and palpitations.   Gastrointestinal: Negative for abdominal pain and vomiting. " "  Genitourinary: Negative for dysuria and hematuria.   Musculoskeletal: Positive for arthralgias. Negative for back pain.        R knee and thigh pain   Skin: Positive for rash. Negative for color change.   Neurological: Negative for seizures and syncope.   Psychiatric/Behavioral: The patient is not nervous/anxious.    All other systems reviewed and are negative.           Physical Exam:     Vitals:    12/13/19 1305   BP: 132/80   BP Location: Left arm   Patient Position: Sitting   Cuff Size: Adult Regular   Pulse: 90   Resp: 16   Temp: 98.3  F (36.8  C)   TempSrc: Oral   SpO2: 97%   Weight: 94.9 kg (209 lb 3.2 oz)   Height: 1.664 m (5' 5.5\")     Body mass index is 34.28 kg/m .  Vitals were reviewed and were normal     Physical Exam  Vitals signs and nursing note reviewed.   Constitutional:       General: She is not in acute distress.     Appearance: Normal appearance. She is obese. She is not ill-appearing or toxic-appearing.   HENT:      Head: Normocephalic and atraumatic.   Skin:     General: Skin is warm and dry.      Capillary Refill: Capillary refill takes less than 2 seconds.      Findings: Erythema and rash present.          Neurological:      Mental Status: She is alert.   Psychiatric:         Mood and Affect: Mood normal.         Behavior: Behavior normal.                 Results:   No testing ordered today    Assessment and Plan      Gregoria Stein is a 64 year old  who presents for rash. Pt was seen previously by Dr. Patterson for a yeast infection of her left axilla back on 11/4 and was prescribed lotrimin, which seemed to help. She now has it again or it never went away she is unsure. Given history and exam it is most likely a yeast infection again, but is possible that is a dermatitis.       1. Yeast infection of the skin  This could be a recurrence of your previous infections. Start using Lotrimin twice a day again. If it doesn't seem to clear up most likely skin irritation.  If it clears; try using " deoderant to prevent moisture build up and chance of recurrence.  - clotrimazole (LOTRIMIN) 1 % external cream; Apply topically 2 times daily  Dispense: 60 g; Refill: 0    2. Dermatitis  If it appears to be more like skin irritation use Kenalog 2 to 3 times a day.  Lotion after showers  Avoid long hot showers  - triamcinolone (KENALOG) 0.1 % external lotion; Apply topically 3 times daily  Dispense: 60 mL; Refill: 0    Follow up plan  Follow up with me (SUSAN CORONEL) in 2-4  weeks for follow up of rash.       Medications Discontinued During This Encounter   Medication Reason     clotrimazole (LOTRIMIN) 1 % external cream Reorder       Options for treatment and follow-up care were reviewed with the patient. Gregoria Stein  engaged in the decision making process and verbalized understanding of the options discussed and agreed with the final plan.    Susan Coronel MD

## 2019-12-13 NOTE — PROGRESS NOTES
Preceptor Attestation:   Patient seen, evaluated and discussed with the resident. I have verified the content of the note, which accurately reflects my assessment of the patient and the plan of care.   Supervising Physician:  Martin Domingo MD

## 2019-12-31 ENCOUNTER — OFFICE VISIT (OUTPATIENT)
Dept: FAMILY MEDICINE | Facility: CLINIC | Age: 64
End: 2019-12-31
Payer: MEDICARE

## 2019-12-31 VITALS
SYSTOLIC BLOOD PRESSURE: 123 MMHG | BODY MASS INDEX: 33.97 KG/M2 | HEART RATE: 90 BPM | HEIGHT: 67 IN | RESPIRATION RATE: 16 BRPM | TEMPERATURE: 98.7 F | WEIGHT: 216.4 LBS | DIASTOLIC BLOOD PRESSURE: 83 MMHG | OXYGEN SATURATION: 92 %

## 2019-12-31 DIAGNOSIS — Z13.9 SCREENING FOR CONDITION: ICD-10-CM

## 2019-12-31 DIAGNOSIS — F43.10 PTSD (POST-TRAUMATIC STRESS DISORDER): Primary | ICD-10-CM

## 2019-12-31 DIAGNOSIS — G89.4 CHRONIC PAIN SYNDROME: ICD-10-CM

## 2019-12-31 LAB
AMPHETAMINES QUAL: NEGATIVE
BARBITURATES QUAL URINE: NEGATIVE
BENZODIAZEPINE QUAL URINE: NEGATIVE
BUPRENORPHINE QUAL URINE: NEGATIVE
CANNABINOIDS UR QL SCN: POSITIVE
COCAINE QUAL URINE: NEGATIVE
METHAMPHETAMINE: NEGATIVE
METHODONE QUAL: NEGATIVE
MORPHINE QUAL: NEGATIVE
OXYCODONE QUAL: NEGATIVE
PHENCYCLIDINE: NEGATIVE
PROPOXYPHENE: NEGATIVE
TEMPERATURE OF URINE WAS BETWEEN 90-100 DEGREES F: YES
TRICYCLIC ANTIDEPRESSANTS: NEGATIVE

## 2019-12-31 ASSESSMENT — MIFFLIN-ST. JEOR: SCORE: 1566.82

## 2019-12-31 ASSESSMENT — PAIN SCALES - GENERAL: PAINLEVEL: SEVERE PAIN (6)

## 2019-12-31 NOTE — PROGRESS NOTES
Indication for Medical Cannabis Certification:  Patient presents with:  Follow Up: Patient is here to discuss medical connibus     Post traumatic stress disorder, Dr. Mandel saw and reiterated diagnosis 11/19.  Was certified for Medical Cannabis before through [facility] medicine at [place].  Found good relief both with daytime symtpoms and then would use a higher THC for night.    Also has had shooting pain, burning from knee issues.  Had replacement 7/19/18 right knee.       ASSESSMENT/PLAN:      ICD-10-CM    1. PTSD (post-traumatic stress disorder) F43.10    2. Chronic pain syndrome G89.4    3. Screening for condition Z13.9 Rapid Urine Drug Screen ([facility])     Meet criteria for Medical Cannabis both for PTSD and for Chronic Pain.  Will certify for PTSD since that is what is most bothering patient now but also may show benefit with her pain.  >25 minutes was spent with patient, with >50% in counseling and coordination of care        SUBJECTIVE:    Gregoria Stein is a 64 year old female who presents to clinic today for the following health issues PTSD and chronic pain.       History of treatments for qualifying conditions:    For PTSD, did serotonin specific reuptake inhibitor's, Clonzepam, ECT, inpatient hospitalization, Sensory motor therapy, other therapies such as EMDR.  Continues to suffer from PTSD without a lot of relief.  Has become more isolated, does not do activities she used to.  DSM-5 criteria reviewed and patient meets criteria.    Behavioral Health Diagnoses:    Any history of psychosis, hallucinations or delusions?  No   Substance Use Diagnoses:  Some marijuana    Problem list and histories reviewed & adjusted, as indicated.  Additional history: as documented    Patient Active Problem List   Diagnosis     Vulvar irritation     Anxiety     Mass     PTSD (post-traumatic stress disorder)     Past Surgical History:   Procedure Laterality Date     COLONOSCOPY       EXCISE MASS VAGINA Left  4/10/2015    Procedure: EXCISE MASS VAGINA;  Surgeon: Stanislav Byers MD;  Location: [facility] OR     GENITOURINARY SURGERY      Urethrial dilation     JOINT REPLACEMENT Right 2018     ORTHOPEDIC SURGERY      right rotator cuff, left achilles tendon repair, neuroma removed right foot, right knee arthroscopy,        Social History     Tobacco Use     Smoking status: Former Smoker     Packs/day: 0.00     Last attempt to quit: 1983     Years since quittin.3     Smokeless tobacco: Never Used   Substance Use Topics     Alcohol use: Yes     Comment: occasional     Family History   Problem Relation Age of Onset     Macular Degeneration Mother      Osteoporosis Mother      Heart Disease Father 49     Multiple Sclerosis Sister      Diabetes Paternal Uncle         heart issues     Cancer No family hx of      Coronary Artery Disease No family hx of          Current Outpatient Medications   Medication Sig Dispense Refill     Acetaminophen (TYLENOL PO) Take 1,000 mg by mouth every 8 hours as needed        clotrimazole (LOTRIMIN) 1 % external cream Apply topically 2 times daily 60 g 0     gabapentin (NEURONTIN) 300 MG tablet        hydrOXYzine (VISTARIL) 50 MG capsule Take 50 mg by mouth daily as needed       ketotifen (ZADITOR/REFRESH ANTI-ITCH) 0.025 % ophthalmic solution Place 1 drop into both eyes 2 times daily 10 mL 0     lamoTRIgine (LAMICTAL) 25 MG tablet Take 75 mg by mouth daily        pramipexole (MIRAPEX) 0.125 MG tablet Take 2 tablets (0.25 mg) by mouth At Bedtime 2 tablets at bedtime 60 tablet 3     triamcinolone (KENALOG) 0.1 % external lotion Apply topically 3 times daily 60 mL 0     Allergies   Allergen Reactions     Penicillins Rash and Hives     Versed [Midazolam] Other (See Comments)     Stopped breathing  Over 10 years ago but possible sensitivity to too much of this medication  Became apnic       Reviewed and updated as needed this visit by clinical staff  Tobacco  Allergies  Meds  Med Hx  Surg Hx   "Fam Hx  Soc Hx      Reviewed and updated as needed this visit by Provider         Review of Systems    Constitutional, HEENT, cardiovascular, pulmonary, gi and gu systems are negative, except as otherwise noted.    OBJECTIVE:    /83   Pulse 90   Temp 98.7  F (37.1  C) (Oral)   Resp 16   Ht 1.69 m (5' 6.54\")   Wt 98.2 kg (216 lb 6.4 oz)   LMP  (LMP Unknown)   SpO2 92%   Breastfeeding No   BMI 34.37 kg/m    Body mass index is 34.37 kg/m .    Physical Exam    GENERAL: healthy, alert and no distress, but sometimes tearful.  RESP: lungs clear to auscultation - no rales, rhonchi or wheezes  CV: regular rate and rhythm, normal S1 S2, no S3 or S4, no murmur, click or rub, no peripheral edema and peripheral pulses strong  ABDOMEN: soft, nontender, no hepatosplenomegaly, no masses and bowel sounds normal  MS: limited motion of right shoulder, but can lift just above 90 degrees.  Slight effusion of right knee, good rom, but slightly tender, no erythema.    Diagnostic Test Results:  Results for orders placed or performed in visit on 12/31/19   Rapid Urine Drug Screen ([facility])     Status: Abnormal   Result Value Ref Range    Cannabinoids Qual Urine POSITIVE (A) NEGATIVE    Phencyclidine NEGATIVE NEGATIVE    Cocaine Qual Urine NEGATIVE NEGATIVE    Methamphetamine Qual NEGATIVE NEGATIVE    Morphine Qual NEGATIVE NEGATIVE    Amphetamines Qual NEGATIVE NEGATIVE    Benzodiazepine Qual Urine NEGATIVE NEGATIVE    Tricyclic Antidepressants NEGATIVE NEGATIVE    Methadone Qual NEGATIVE NEGATIVE    Barbiturates Qual Urine NEGATIVE NEGATIVE    Oxycodone Qual NEGATIVE NEGATIVE    Propoxyphene NEGATIVE NEGATIVE    Buprenorphine Qual Urine NEGATIVE NEGATIVE    Temperature of Urine was Between  Degrees F YES YES           [facility]      Had discussion that:       Medical Cannabis is usually not the best choice nor the best studied choice for the above conditions, therefore most " patients will not qualify if they have not tried other treatments before considering Medical Cannabis.        Annual program fee is $200, ($50 for some Medical Assistance Programs/Social security Disability.)        All cost for the medication is out-of-pocket and can average between $100-$300/month    The patient will need to be recertified yearly.    E-mail [email or web address]    I certify that this patient has intractable pain. That is, this patient has pain whose cause cannot be removed and, according to generally accepted medical practice, the full range of pain management modalities appropriate for this patient has been used without adequate result or with intolerable side effects.    Have patient fill out PEG 3 item pain scale form:  1. What number best describes your pain on average in the past week, on a scale from 0 to 10  where 0 is  no pain  and 10 is  pain as bad as you can imagine ?  5  The following two questions ask you to describe how, during the past week, pain has interfered with your life on a  0 to 10  scale, where 0 is  does not interfere at all  and 10 is  completelyinterferes.     2. What number best describes how, during the past week, pain has interfered with your enjoyment of life? [0 to 10]  7  3. What number best describes how, during the past week, pain has interfered with your general activity? [0 to 10]  7  Scoring: The PEG score is the average of the 3 individual item scores. For clinical use, round to the nearest whole number

## 2020-01-03 ENCOUNTER — OFFICE VISIT (OUTPATIENT)
Dept: FAMILY MEDICINE | Facility: CLINIC | Age: 65
End: 2020-01-03
Payer: MEDICARE

## 2020-01-03 VITALS
DIASTOLIC BLOOD PRESSURE: 82 MMHG | TEMPERATURE: 97.9 F | WEIGHT: 213 LBS | RESPIRATION RATE: 16 BRPM | SYSTOLIC BLOOD PRESSURE: 125 MMHG | BODY MASS INDEX: 33.83 KG/M2 | HEART RATE: 93 BPM | OXYGEN SATURATION: 97 %

## 2020-01-03 DIAGNOSIS — F51.04 PSYCHOPHYSIOLOGICAL INSOMNIA: Primary | ICD-10-CM

## 2020-01-03 DIAGNOSIS — K21.9 LARYNGOPHARYNGEAL REFLUX: ICD-10-CM

## 2020-01-03 DIAGNOSIS — N39.3 FEMALE STRESS INCONTINENCE: ICD-10-CM

## 2020-01-03 DIAGNOSIS — G89.4 CHRONIC PAIN SYNDROME: ICD-10-CM

## 2020-01-03 DIAGNOSIS — F41.9 ANXIETY: ICD-10-CM

## 2020-01-03 DIAGNOSIS — F51.01 PRIMARY INSOMNIA: ICD-10-CM

## 2020-01-03 RX ORDER — GABAPENTIN 300 MG/1
900 CAPSULE ORAL AT BEDTIME
Refills: 2 | COMMUNITY
Start: 2019-11-14 | End: 2020-01-03

## 2020-01-03 RX ORDER — GABAPENTIN 300 MG/1
900 CAPSULE ORAL AT BEDTIME
Qty: 270 CAPSULE | Refills: 0 | Status: SHIPPED | OUTPATIENT
Start: 2020-01-03 | End: 2020-03-16

## 2020-01-03 RX ORDER — HYDROXYZINE PAMOATE 50 MG/1
50 CAPSULE ORAL 3 TIMES DAILY PRN
Qty: 90 CAPSULE | Refills: 3 | Status: SHIPPED | OUTPATIENT
Start: 2020-01-03 | End: 2023-01-11

## 2020-01-03 RX ORDER — TRAZODONE HYDROCHLORIDE 50 MG/1
TABLET, FILM COATED ORAL
COMMUNITY
Start: 2019-12-19 | End: 2020-01-03

## 2020-01-03 RX ORDER — FAMOTIDINE 20 MG/1
20 TABLET, FILM COATED ORAL 2 TIMES DAILY
COMMUNITY
Start: 2019-12-10 | End: 2022-06-30

## 2020-01-03 RX ORDER — TRAZODONE HYDROCHLORIDE 100 MG/1
TABLET ORAL
Qty: 30 TABLET | Refills: 3 | Status: SHIPPED | OUTPATIENT
Start: 2020-01-03 | End: 2020-03-23

## 2020-01-03 ASSESSMENT — PAIN SCALES - GENERAL: PAINLEVEL: MODERATE PAIN (5)

## 2020-01-03 NOTE — PROGRESS NOTES
HPI       Gregoria Stein is a 64 year old  who presents for GERD, urinary incontinence and increasing her Trazodone.     GERD  Pt has been on Famotidine in the past for GERD, but hasn't been taking it everyday for at least 4-6 months. Uses PRN. Pt noticed when she is lying down she gets a tickle in her throat, causing some voice hoarsness, and sore throat with trying to clear her throat. Some foods make it worse (tomatoes and a glass of wine). Pt question is wondering if she should start taking the medication it every day.     Trazadone  Currently on 50 mg PRN at pm since leaving [facility], which isn't working. Was on 100 mg PRN there and it helped. Would like to go back to 100 mg again. Having trouble staying asleep and getting to sleep. Chronic issue.     Urinary Incontinence  Pt has had in the past had leakage when she has to go. Recently had an episode when she started to have an urge to go while walking and then began to have incontinence and was not able to stop it on Xmas day. Hasn't had that happen again. Does get leakage with coughing. Pt gets the feeling that she has to go, but isn't urgent right away and then becomes urgent. Pt now going to the bathroom with first feeling. Pt feels like she doesn't empty completely. Pt has had known problem with leakage and saw PT for kegals which helped 3 years ago, but didn't keep it up.  No dysuria, abnormal discharge, fever, or frequency symptoms.     +++++++    Problem, Medication and Allergy Lists were reviewed and updated if needed..    Patient is an established patient of this clinic..         Review of Systems:   Review of Systems   ROS: 10 point ROS neg other than the symptoms noted above in the HPI.         Physical Exam:     Vitals:    01/03/20 1312   BP: 125/82   Pulse: 93   Resp: 16   Temp: 97.9  F (36.6  C)   TempSrc: Oral   SpO2: 97%   Weight: 96.6 kg (213 lb)     Body mass index is 33.83 kg/m .  Vitals were reviewed and were normal     Physical  Exam  Vitals signs and nursing note reviewed.   Constitutional:       General: She is not in acute distress.     Appearance: Normal appearance. She is obese. She is not ill-appearing or diaphoretic.   HENT:      Mouth/Throat:      Mouth: Mucous membranes are moist.      Pharynx: Oropharynx is clear. No posterior oropharyngeal erythema.   Abdominal:      General: Abdomen is flat. Bowel sounds are normal.      Palpations: Abdomen is soft.   Skin:     General: Skin is warm and dry.      Capillary Refill: Capillary refill takes less than 2 seconds.   Neurological:      Mental Status: She is alert.   Psychiatric:         Attention and Perception: Attention and perception normal.         Mood and Affect: Mood is anxious.         Speech: Speech normal.         Behavior: Behavior is cooperative.         Thought Content: Thought content normal.         Judgment: Judgment normal.       Pt declined a  examination      Results:   No testing ordered today    Assessment and Plan      Pt is a well known to me. Has frequent anxiety and stress. Pt says things are getting better one step at a time. Pt meds were refilled and adjusted accordingly.    Pts incontinence is most likely due to stress incontinence given exam and history. DDx: Mixed incontinence, Urge incontinence     1. Psychophysiological insomnia  - traZODone (DESYREL) 100 MG tablet; Take1 Tablet at night orally for sleep PRN  Dispense: 30 tablet; Refill: 3    2. Laryngopharyngeal reflux  - Continue to take the prescribed Famotidine     3. Female stress incontinence  Discussed timed-voiding  PT referral for stress incontinence   - [facility]; Future    4. Primary insomnia  - traZODone (DESYREL) 100 MG tablet; Take1 Tablet at night orally for sleep PRN  Dispense: 30 tablet; Refill: 3    5. Anxiety  - hydrOXYzine (VISTARIL) 50 MG capsule; Take 1 capsule (50 mg) by mouth 3 times daily as needed for anxiety  Dispense: 90 capsule; Refill:  3    6. Chronic pain syndrome  - gabapentin (NEURONTIN) 300 MG capsule; Take 3 capsules (900 mg) by mouth At Bedtime  Dispense: 270 capsule; Refill: 0      Follow up plan  Follow up as needed. Plan to make an wellness exam with me.     Medications Discontinued During This Encounter   Medication Reason     gabapentin (NEURONTIN) 300 MG tablet Medication Reconciliation Clean Up     Famotidine 20 MG CHEW Medication Reconciliation Clean Up     hydrOXYzine (VISTARIL) 50 MG capsule Reorder     gabapentin (NEURONTIN) 300 MG capsule Reorder     traZODone (DESYREL) 50 MG tablet Reorder       Options for treatment and follow-up care were reviewed with the patient. Gregoria Stein  engaged in the decision making process and verbalized understanding of the options discussed and agreed with the final plan.    Susan Coronel MD

## 2020-01-03 NOTE — PATIENT INSTRUCTIONS
Here is the plan from today's visit    1. Psychophysiological insomnia  - traZODone (DESYREL) 100 MG tablet; Take1 Tablet at night orally for sleep PRN  Dispense: 30 tablet; Refill: 3    2. Laryngopharyngeal reflux  - Continue to take the prescribed Famotidine     3. Female stress incontinence  Discussed timed-voiding  PT referral for stress incontinence   - [facility] - [facility]; Future    4. Primary insomnia  - traZODone (DESYREL) 100 MG tablet; Take1 Tablet at night orally for sleep PRN  Dispense: 30 tablet; Refill: 3    5. Anxiety  - hydrOXYzine (VISTARIL) 50 MG capsule; Take 1 capsule (50 mg) by mouth 3 times daily as needed for anxiety  Dispense: 90 capsule; Refill: 3    6. Chronic pain syndrome  - gabapentin (NEURONTIN) 300 MG capsule; Take 3 capsules (900 mg) by mouth At Bedtime  Dispense: 270 capsule; Refill: 0      Please call or return to clinic if your symptoms don't go away.    Follow up plan  Follow up as needed. Plan to make an wellness exam with me.    Thank you for coming to [facility] today.  Lab Testing:  **If you had lab testing today and your results are reassuring or normal they will be mailed to you or sent through [email or web address] within 7 days.   **If the lab tests need quick action we will call you with the results.  The phone number we will call with results is # [phone] (home) . If this is not the best number please call our clinic and change the number.  Medication Refills:  If you need any refills please call your pharmacy and they will contact us.   If you need to  your refill at a new pharmacy, please contact the new pharmacy directly. The new pharmacy will help you get your medications transferred faster.   Scheduling:  If you have any concerns about today's visit or wish to schedule another appointment please call our office during normal business hours [phone] (8-5:00 M-F)  If a referral was made to a [facility] and you  don't get a call from central scheduling please call [phone].  If a Mammogram was ordered for you at [facility] call [phone] to schedule or change your appointment.  If you had an XRay/CT/Ultrasound/MRI ordered the number is [phone] to schedule or change your radiology appointment.   Medical Concerns:  If you have urgent medical concerns please call [phone] at any time of the day.    Susan Coronel MD

## 2020-01-04 ASSESSMENT — PATIENT HEALTH QUESTIONNAIRE - PHQ9: SUM OF ALL RESPONSES TO PHQ QUESTIONS 1-9: 13

## 2020-01-09 ENCOUNTER — TRANSFERRED RECORDS (OUTPATIENT)
Dept: HEALTH INFORMATION MANAGEMENT | Facility: CLINIC | Age: 65
End: 2020-01-09

## 2020-01-11 ASSESSMENT — PATIENT HEALTH QUESTIONNAIRE - PHQ9: SUM OF ALL RESPONSES TO PHQ QUESTIONS 1-9: 14

## 2020-01-17 ENCOUNTER — TELEPHONE (OUTPATIENT)
Dept: FAMILY MEDICINE | Facility: CLINIC | Age: 65
End: 2020-01-17

## 2020-01-17 NOTE — TELEPHONE ENCOUNTER
[facility]  [facility]: Analys    Social Work Care Navigator Note    Patient: Gregoria Stein  Date: January 17, 2020  Preferred Name: Gregoria    Previous PHQ-9:   PHQ-9 SCORE 11/22/2019 12/31/2019 1/3/2020   PHQ-9 Total Score 16 14 13   PHQ-A Total Score - 0 0     Previous CLAIRE-7:   CLAIRE-7 SCORE 11/22/2019   Total Score 13     NORMAN LEVEL:  No flowsheet data found.    Preferred Contact:  Need for : No  Preferred Contact: Cell    Type of Contact Today: Phone call (patient / identified key support person reached)    Data: (subjective / Objective):  Recent ED/IP Admission or Discharge?   None    Patient Goals:  No data recorded    [facility] Core Service Provided:  Care Coordination: provided care management services/referrals necessary to ensure patient and their identified supports have access to medical, behavioral health, pharmacology and recovery support services.  Ensured that patient's care is integrated across all settings and services.     Current Stressors / Issues / Care Plan Objective Addressed Today:  None reported during this phone call.     Intervention:  Motivational Interviewing: Expressed Empathy/Understanding and Open-ended questions   Target Behavior(s): Explored current social supports and reinforced opportunities to increase engagement    Assessment: (Progress on Goals / Homework):  This writer called pt today 1/17/2020.  Updated her on [facility] receiving referral for the [facility] every other Friday from 9:00am to 10:00am.  Pt has been accepted.  Pt asked this writer to send the details over [employer] since she was on the bus.   She is able to attend their next session on 1/24/2020.     Plan: (Homework, other):  Patient was encouraged to continue to seek condition-related information and education.  [employer] message sent today 1/17/2020.    Scheduled a Phone follow up appointment with JAMES BAIN in 1 week     Patient has set self-identified goals and will  monitor progress.   Renuka Flynn, Social Work Care Coordinator

## 2020-02-05 ENCOUNTER — OFFICE VISIT (OUTPATIENT)
Dept: FAMILY MEDICINE | Facility: CLINIC | Age: 65
End: 2020-02-05
Payer: MEDICARE

## 2020-02-05 VITALS
DIASTOLIC BLOOD PRESSURE: 81 MMHG | HEIGHT: 66 IN | TEMPERATURE: 98.4 F | WEIGHT: 219.2 LBS | SYSTOLIC BLOOD PRESSURE: 132 MMHG | BODY MASS INDEX: 35.23 KG/M2 | OXYGEN SATURATION: 98 % | RESPIRATION RATE: 16 BRPM | HEART RATE: 75 BPM

## 2020-02-05 DIAGNOSIS — Z23 NEED FOR PROPHYLACTIC VACCINATION AND INOCULATION AGAINST INFLUENZA: ICD-10-CM

## 2020-02-05 DIAGNOSIS — H61.23 BILATERAL IMPACTED CERUMEN: ICD-10-CM

## 2020-02-05 DIAGNOSIS — Z00.00 ENCOUNTER FOR MEDICARE ANNUAL WELLNESS EXAM: Primary | ICD-10-CM

## 2020-02-05 ASSESSMENT — MIFFLIN-ST. JEOR: SCORE: 1578.9

## 2020-02-05 ASSESSMENT — PATIENT HEALTH QUESTIONNAIRE - PHQ9: SUM OF ALL RESPONSES TO PHQ QUESTIONS 1-9: 21

## 2020-02-05 NOTE — PROGRESS NOTES
Preceptor Attestation:   Patient seen, evaluated and discussed with the resident. I have verified the content of the note, which accurately reflects my assessment of the patient and the plan of care.   Supervising Physician:  Stacey Ly MD.

## 2020-02-05 NOTE — NURSING NOTE
Ear Wash Report    bilateral  ear wash completed. 460 mL of water with hydrogen peroxide were irrigated in left ear and 160 mL of water with hydrogen peroxide were irrigated in right ear. Patient tolerated well.       Patricia Avery, MAGUE

## 2020-02-05 NOTE — PROGRESS NOTES
"SUBJECTIVE:   Gregoria Stein is a 64 year old female who presents for Preventive Visit.    Are you in the first 12 months of your Medicare Part B coverage?  No    Physical Health:    In general, how would you rate your overall physical health? fair    Outside of work, how many days during the week do you exercise? 1 day/week    Outside of work, approximately how many minutes a day do you exercise?15-30 minutes  If you drink alcohol do you typically have >3 drinks per day or >7 drinks per week? Yes - AUDIT SCORE:       1    Do you usually eat at least 4 servings of fruit and vegetables a day, include whole grains & fiber and avoid regularly eating high fat or \"junk\" foods? Yes    Do you have any problems taking medications regularly?  YES, working it (80-90%)    Do you have any side effects from medications? none    Needs assistance for the following daily activities: no assistance needed    Which of the following safety concerns are present in your home?  none identified     Hearing impairment: No    In the past 6 months, have you been bothered by leaking of urine? yes    Mental Health:    In general, how would you rate your overall mental or emotional health? poor  PHQ-2 Score: (!) 4; reviewed long history of depression and anxiety    Do you feel safe in your environment? Yes    Have you ever done Advance Care Planning? (For example, a Health Directive, POLST, or a discussion with a medical provider or your loved ones about your wishes): No, advance care planning information given to patient to review.  Patient declined advance care planning discussion at this time.    Additional concerns to address?  YES; Eye discharge with left ear, discharge was whitish like mucus, just last night, but none today, no fevers chills, no cough, no chest pain, SOB with exercise, some eye redness, and blurry vision but eyedrops helped.    Fall risk: Not applicable      Cognitive Screenin) Repeat 3 items (Leader, Season, Table) " : repeated them  2) Clock draw: NORMAL  3) 3 item recall: Recalls 3 objects  Results: NORMAL clock, 1-2 items recalled: COGNITIVE IMPAIRMENT LESS LIKELY    Mini-CogTM Copyright ARIANE Murphy. Licensed by the author for use in [employer]; reprinted with permission (lalita@[facility]). All rights reserved.      Do you have sleep apnea, excessive snoring or daytime drowsiness?: no        Reviewed and updated as needed this visit by clinical staff  Tobacco  Allergies  Meds  Med Hx  Surg Hx  Fam Hx  Soc Hx        Reviewed and updated as needed this visit by Provider        Social History     Tobacco Use     Smoking status: Former Smoker     Packs/day: 0.00     Last attempt to quit: 1983     Years since quittin.4     Smokeless tobacco: Never Used   Substance Use Topics     Alcohol use: Yes     Comment: occasional                           Current providers sharing in care for this patient include:   Patient Care Team:  Susan Coronel MD as PCP - General  [place], Radha Blanco MD as MD (OB/Gyn)  Stanislav Byers MD as Referring Physician (Oncology)  Renuka Flynn as  (Clinic)    The following health maintenance items are reviewed in Epic and correct as of today:  Health Maintenance   Topic Date Due     DEPRESSION ACTION PLAN  1955     LIPID  2002     ZOSTER IMMUNIZATION (1 of 2) 2007     PAP  2018     PHQ-9  2020     MAMMO SCREENING  2021     MEDICARE ANNUAL WELLNESS VISIT  2021     COLONOSCOPY  2023     ADVANCE CARE PLANNING  2025     DTAP/TDAP/TD IMMUNIZATION (4 - Td) 2029     HEPATITIS C SCREENING  Completed     HIV SCREENING  Completed     INFLUENZA VACCINE  Completed     IPV IMMUNIZATION  Aged Out     MENINGITIS IMMUNIZATION  Aged Out     Lab work is in process  Mammogram Screening: Mammogram Screening: Patient over age 50, mutual decision to screen reflected in health maintenance. Referral in.  Last 3 Pap and HPV  "Results:  normal    ROS:  Constitutional, HEENT, cardiovascular, pulmonary, GI, , musculoskeletal, neuro, skin, endocrine and psych systems are negative, except as otherwise noted.    OBJECTIVE:   /81   Pulse 75   Temp 98.4  F (36.9  C) (Oral)   Resp 16   Ht 1.689 m (5' 6.5\")   Wt 99.4 kg (219 lb 3.2 oz)   LMP  (LMP Unknown)   SpO2 98%   BMI 34.85 kg/m   Estimated body mass index is 34.85 kg/m  as calculated from the following:    Height as of this encounter: 1.689 m (5' 6.5\").    Weight as of this encounter: 99.4 kg (219 lb 3.2 oz).  EXAM:   GENERAL APPEARANCE: healthy, alert and no distress  EYES: Eyes grossly normal to inspection, PERRL and conjunctivae and sclerae normal  HENT: ear canals both impacted with cerumen, nose and mouth without ulcers or lesions, oropharynx clear and oral mucous membranes moist  NECK: no adenopathy, no asymmetry, masses, or scars and thyroid normal to palpation  RESP: lungs clear to auscultation - no rales, rhonchi or wheezes  CV: regular rate and rhythm, normal S1 S2, no S3 or S4, no murmur, click or rub, no peripheral edema and peripheral pulses strong  ABDOMEN: soft, nontender, no hepatosplenomegaly, no masses and bowel sounds normal  MS: no musculoskeletal defects are noted and gait is age appropriate without ataxia  SKIN: no suspicious lesions or rashes  NEURO: Normal strength and tone, sensory exam grossly normal, mentation intact and speech normal  PSYCH: mentation appears normal and affect normal/bright    Pt wanted deferred PAP and breast exam for a different visit.    Diagnostic Test Results:  Ordered and pending    ASSESSMENT / PLAN:   Gregoria was seen today for physical and imm/inj.    Diagnoses and all orders for this visit:    Encounter for Medicare annual wellness exam  -     Lipid Cascade (Bridgman's); Future    Need for prophylactic vaccination and inoculation against influenza  -     Fluzone quad, preserve-free/prefilled  0.5ml, 6+ months " "[08391]    Bilateral impacted cerumen      Bilateral impacted cerumen  - Ear flush done by PCS    COUNSELING:  Reviewed preventive health counseling, as reflected in patient instructions  Special attention given to:       Regular exercise       Healthy diet/nutrition    Estimated body mass index is 34.85 kg/m  as calculated from the following:    Height as of this encounter: 1.689 m (5' 6.5\").    Weight as of this encounter: 99.4 kg (219 lb 3.2 oz).    Weight management plan: Discussed healthy diet and exercise guidelines     reports that she quit smoking about 36 years ago. She smoked 0.00 packs per day. She has never used smokeless tobacco.    Appropriate preventive services were discussed with this patient, including applicable screening as appropriate for cardiovascular disease, diabetes, osteopenia/osteoporosis, and glaucoma.  As appropriate for age/gender, discussed screening for colorectal cancer, prostate cancer, breast cancer, and cervical cancer. Checklist reviewing preventive services available has been given to the patient.    Reviewed patients plan of care and provided an AVS. The Basic Care Plan (routine screening as documented in Health Maintenance) for Gregoria meets the Care Plan requirement. This Care Plan has been established and reviewed with the Patient.    Counseling Resources:  ATP IV Guidelines  Pooled Cohorts Equation Calculator  Breast Cancer Risk Calculator  FRAX Risk Assessment  ICSI Preventive Guidelines  Dietary Guidelines for Americans, 2010  USDA's MyPlate  ASA Prophylaxis  Lung CA Screening    Susan Coronel MD  [facility]  "

## 2020-02-05 NOTE — PATIENT INSTRUCTIONS
Here is the plan from today's visit    1. Encounter for Medicare annual wellness exam  Come when fasting for lab visit  - Lipid [place] ([place]); Future      Please call or return to clinic if your symptoms don't go away.    Follow up plan  Please make a clinic appointment for follow up with me (SUSAN CORONEL) in 2-4  weeks for PAP.    Thank you for coming to [facility] today.  Lab Testing:  **If you had lab testing today and your results are reassuring or normal they will be mailed to you or sent through [employer] within 7 days.   **If the lab tests need quick action we will call you with the results.  The phone number we will call with results is # [phone] (home) . If this is not the best number please call our clinic and change the number.  Medication Refills:  If you need any refills please call your pharmacy and they will contact us.   If you need to  your refill at a new pharmacy, please contact the new pharmacy directly. The new pharmacy will help you get your medications transferred faster.   Scheduling:  If you have any concerns about today's visit or wish to schedule another appointment please call our office during normal business hours [phone] (8-5:00 M-F)  If a referral was made to a [facility] and you don't get a call from central scheduling please call [phone].  If a Mammogram was ordered for you at [facility] call [phone] to schedule or change your appointment.  If you had an XRay/CT/Ultrasound/MRI ordered the number is [phone] to schedule or change your radiology appointment.   Medical Concerns:  If you have urgent medical concerns please call [phone] at any time of the day.    Susan Coronel MD  If you had an XRay/CT/Ultrasound/MRI ordered the number is [phone] to schedule or change your radiology appointment.   Medical Concerns:  If you have urgent medical concerns please call [phone] at any time of the  day.    MD JIMENEZ Rodríguez MEDICARE PERSONAL PREVENTIVE SERVICES PLAN - SERVICES     Review these tests with your doctor then decide which ones you want and take this page home for your reference  Immunization History   Administered Date(s) Administered     TDAP Vaccine (Boostrix) 07/26/2010, 09/10/2015, 03/09/2019          IMMUNIZATIONS Description Recommend today?     Influenza (flu shot) Helps to prevent flu; you should get it every year Yes; Recommended and ordered.   PCV 13 Prevents pneumonia; given once No: is not indicated today.   PPSV 23 Prevents pneumonia; given once No: is not indicated today.   Tetanus Prevents tetanus; need every 10 years No; is up to date.   Herpes Zoster (shingles) Prevents or lessens symptoms from shingles; given once Recommeded and patient declined.    Hepatitis B  If you have any of the following risk factors you should be immunized for hepatitis B: severe kidney disease, people who live in the same house as a carrier of Hepatitis B virus, people who live in  institutions (e.g. nursing homes or group homes), homosexual men, patients with hemophilia who received Factor VIII or IX concentrates, abusers of illicit injectable drugs No; is up to date.           Health Maintenance   Topic Date Due     ADVANCE CARE PLANNING  1955     DEPRESSION ACTION PLAN  1955     MEDICARE ANNUAL WELLNESS VISIT  08/02/1975     LIPID  08/02/2002     ZOSTER IMMUNIZATION (1 of 2) 08/02/2007     PAP  11/03/2018     INFLUENZA VACCINE (1) 09/01/2019     PHQ-9  07/03/2020     MAMMO SCREENING  01/07/2021     COLONOSCOPY  01/02/2023     DTAP/TDAP/TD IMMUNIZATION (4 - Td) 03/09/2029     HEPATITIS C SCREENING  Completed     HIV SCREENING  Completed     IPV IMMUNIZATION  Aged Out     MENINGITIS IMMUNIZATION  Aged Out       SCREENING TESTS     Description   Year of Last Screening   Recommended Today?   Heart disease screening blood tests    Cholesterol level Reducing cholesterol can reduce  your risk of heart attacks by 25%.  Screening is recommended yearly if you are at risk of heart disease otherwise every 4-5 years  Yes; Recommended and ordered.   Diabetes screening tests    Hemoglobin A1c blood test   Finding and treating diabetes early can reduce complications.  Screening recommended/covered yearly if you have high blood pressure, high cholesterol, obesity (BMI >30), or a history of high blood glucose tests; or 2 of the following: family history of diabetes, overweight (BMI >25 but <30), age 65 years or older, and a history of diabetes of pregnancy or gave birth to baby weighing more than 9 lbs.  No; is up to date.   Hepatitis B screening Finding hepatitis B early can reduce complications.  Screening is recommended for persons with selected risk factors.  No: is not indicated today.   Hepatitis C screening Finding hepatitis C early can reduce complications.  Screening is recommended for all persons born from 1945 through 1965 and for those with selected other risk factors.   No; is up to date.   HIV screening Finding HIV early can reduce complications.  Screening is recommended for persons with risk factors for HIV infection.  No; is up to date.   Glaucoma screening Early detection of glaucoma can prevent blindness.   Please talk to your eye doctor about this.           SCREENING TESTS     Description   Year of Last Screening   Recommended Today?   Colorectal cancer screening    Fecal occult blood test     Screening colonoscopy Screening for colon cancer has been shown to reduce death from colon cancer by 25-30%. Screening recommended to start at 50 years and continuing until age 75 years.    No; is up to date.   Breast Cancer Screening (women)    Mammogram Mammogram screening for breast cancer has been shown to reduce the risk of dying from breast cancer and prolong life. Screening is recommended every 1-2 years for women aged 50 to 74 years.   Yes; Recommended and ordered.   Cervical Cancer  screening (women)    Pap Cervical pap smears can reduce cervical cancer. Screening is recommended annually if high risk (history of abnormal pap smears) otherwise every 2-3 years, stop screening at 65 years of age if history of normal paps.  Recommeded and patient declined.    Screening for Osteoporosis:  Bone mass measurements (women)    Dexa Scan Screening and treating Osteoporosis can reduce the risk of hip and spine fractures. Screening is recommended in women 65 years or older and in women and men at risk of osteoporosis.  No: is not indicated today.   Screening for Lung Cancer     Low-dose CT scanning Screening can reduce mortality in persons aged 55-80 who have smoked at least 30 pack-years and who are either still smoking or have quit in the past 15 years.  No: is not indicated today.   Abdominal Aortic Aneurysm (AAA) screening    Ultrasound (US)   An aneurysm treated before rupture is very safe -a ruptured aneurysm can be fatal.  Screening  by US for AAA is limited to patients who meet one of the following criteria:    Men who are 65-75 years old and have smoked more than 100 cigarettes in their lifetime    Anyone with a family history of abdominal aortic aneurysm  No: is not indicated today.       MEDICARE WELLNESS EXAM PATIENT INSTRUCTIONS    Yearly exam:     See your health care provider every year in order to review changes in your health, review medicines that you take, and discuss preventive care needs such as immunizations and cancer screening.    Get a flu shot each year.     Advance Directive:    If you have not done so, you are encouraged to complete an advance directive. If you would like support with this, please contact the clinic  through the main clinic line. More information about advance directives can be found at:     [email or web address]    Nutrition:     Eat at least 5 servings of fruits and vegetables each day.     Eat  whole-grain bread, whole-wheat pasta and brown rice instead of white grains and rice.     Talk to your doctor about Calcium and Vitamin D.     Lifestyle:    Exercise for at least 150 minutes a week (30 minutes a day, 5 days a week). This will help you control your weight and prevent disease.     Limit alcohol to one drink per day.     If you smoke, try to quit - your doctor will be happy to help.     Wear sunscreen to prevent skin cancer.     See your dentist every six months for an exam and cleaning.     See your eye doctor every 1 to 2 years to screen for conditions such as glaucoma, macular degeneration and cataracts.          Patient Education   Personalized Prevention Plan  You are due for the preventive services outlined below.  Your care team is available to assist you in scheduling these services.  If you have already completed any of these items, please share that information with your care team to update in your medical record.  Health Maintenance Due   Topic Date Due     Discuss Advance Care Planning  1955     Depression Action Plan  1955     Annual Wellness Visit  08/02/1975     Cholesterol Lab  08/02/2002     Zoster (Shingles) Vaccine (1 of 2) 08/02/2007     PAP Smear  11/03/2018     Flu Vaccine (1) 09/01/2019      Getting flu today, lipids when fasting, deferred PAP for another visit

## 2020-02-06 ENCOUNTER — DOCUMENTATION ONLY (OUTPATIENT)
Dept: FAMILY MEDICINE | Facility: CLINIC | Age: 65
End: 2020-02-06

## 2020-02-06 ENCOUNTER — THERAPY VISIT (OUTPATIENT)
Dept: PHYSICAL THERAPY | Facility: CLINIC | Age: 65
End: 2020-02-06
Payer: MEDICARE

## 2020-02-06 DIAGNOSIS — N39.46 MIXED STRESS AND URGE URINARY INCONTINENCE: ICD-10-CM

## 2020-02-06 DIAGNOSIS — N39.3 FEMALE STRESS INCONTINENCE: ICD-10-CM

## 2020-02-06 PROCEDURE — 97112 NEUROMUSCULAR REEDUCATION: CPT | Mod: GP | Performed by: PHYSICAL THERAPIST

## 2020-02-06 PROCEDURE — 97535 SELF CARE MNGMENT TRAINING: CPT | Mod: GP | Performed by: PHYSICAL THERAPIST

## 2020-02-06 PROCEDURE — 97161 PT EVAL LOW COMPLEX 20 MIN: CPT | Mod: GP | Performed by: PHYSICAL THERAPIST

## 2020-02-06 NOTE — PROGRESS NOTES
"When opening a documentation only encounter, be sure to enter in \"Chief Complaint\" Forms and in \" Comments\" Title of form, description if needed.    Gregoria is a 64 year old  female  Form received via: Fax  Form now resides in: Provider Ready    HAZEL Foy 10:47 AM February 6, 2020      Form has been completed by provider.     Form sent out via: Fax to Leida Holder at Fax Number: [phone]  Patient informed: No  Output date: February 6, 2020    HAZEL Foy 10:47 AM February 6, 2020    **Please close the encounter**                      "

## 2020-02-06 NOTE — LETTER
DEPARTMENT OF HEALTH AND HUMAN SERVICES  CENTERS FOR MEDICARE & MEDICAID SERVICES    PLAN/UPDATED PLAN OF PROGRESS FOR OUTPATIENT REHABILITATION    PATIENTS NAME:  Gregoria Stein     : 1955    PROVIDER NUMBER:    [id number]    HICN:  [id number]    [facility]    MEDICAL RECORD NUMBER: [id number]     START OF CARE DATE:  SOC Date: 20   TYPE:  PT    PRIMARY/TREATMENT DIAGNOSIS: (Pertinent Medical Diagnosis)     Female stress incontinence  Mixed stress and urge urinary incontinence    VISITS FROM START OF CARE:  Rxs Used: 1     [facility] Initial Evaluation  Subjective:  The history is provided by the patient. No  was used.   Gregoria Stein being seen for incontinence.   Date of Onset: 1/3/20, date of order. Problem occurred: felt strong urge to urinate and leaked  and reported as 0/10 on pain scale. General health as reported by patient is fair. Health conditions: history of fractures, implanted device, incontinence, mental illness, osteoarthritis, depression, overweight, sleep disorder.  Medical allergies: penecillin.    Current medications: sleep, RLS medication, mood medication, PTSD and pain medication.   Primary job tasks include:  Repetitive tasks, driving and prolonged sitting.           Patient is RN (disabled/retired), work at  for memory care part time.     Therapist Assessment:   Clinical Impression: Pt presents to [facility] [address] with primary complaint of urge and stress incontinence.  Per clinical examination, pt with myofascial tenderness of pelvic floor muscles, poor coordination of pelvic floor muscles.  Pt will benefit from skilled physical therapy for muscle coordination training and down regulation of nervous system to address urinary urge and stress incontinence.    Subjective: Patient presents today with urge and stress incontinence.  She had one episode the end of December she had an episode of leaking urine with a strong urge to go. She has noticed her stream of urine being weaker. She is also seeing physical therapy for her left shoulder and right knee.  Current activity:biking, walking  Goals:to have bladder control and no incontinence    Urination   Do you leak on the way to the bathroom or with a strong urge to void? yes  Do you leak with cough, sneeze, jumping, running? yes  Any other activities that cause leaking? n/a  Do you have triggers that make you feel you can't wait to go to the bathroom? no What are they? PATIENTS NAME:  Gregoria Stein   : 1955    n/a  Type of pad and number used per day? no  When you leak what is the amount? Medium to large  How long can you delay the need to urinate? 30 minutes  How many times do you get up to urinate at night? 2x  How many times do you urinate during the day? Every 2 hours  Can you stop the flow of urine when on the toilet? yes  Is the volume of urine passed usually: medium  Do you strain to pass urine? no  Do you have a slow or hesitant urinary stream? yes  Do you have difficulty initiating the urine stream? no  How many bladder infections have you had in the last 12 months? 0  What is you fluid intake per day? Water (8oz) 8oz-32oz  Caffeine occasionally 1cup  Alcohol occasionally 1 cup    Bowel Habits  Frequency of bowel movements? 2, a day  Consistency of stool? [place] stool scale? Type 4-type 6  Do you ignore the urge to defecate? no  Do you strain to pass stool? No  Diet: patient reports that it is very erratic, she tries to eat vegan. She tries to eat a lot of fruits and and veggies.    Pelvic Pain  When do you have pelvic pain? No  Are you sexually active? no  Have you given birth? no  Have you ever been worried for your physical safety? yes  Any abdominal or pelvic surgeries? Yes, vulvar mass removed  Are you having regular exercise? Performing PT HEP  Have you practiced the PF  (kegel) exercise for 4 or more weeks? no  The patient has had dull back pain since her 20's.     Objective:    OBSERVATION: rounded shoulders, posterior pelvic tilt in sitting    ABDOMINAL WALL: no trigger points noted along rectus abdominis, iliopsoas, and transverse abdominis bilaterally.    EXTERNAL ASSESSMENT:  Skin condition:normal  Bearing down/coughing: NT  Muscle contraction/perineal mobility: slight lift, no urogenital triangle descent, co-contraction of abdominals and glutes, breath holding      INTERNAL ASSESSMENT:  Baseline PF tone: slight higher tone of levator ani bilatearlly  PF Tone with cough: NT   PF Response quality: sluggish  PF Power: Center: 2+/5  Accessory Muscle use:glutes and abdominals  Endurance: Maximum contraction in seconds:3-5 seconds   Quick contraction repetitions prior to fatigue: NT      PATIENTS NAME:  Gregoria Stein   : 1955    Hip MMT: WNL  Lumbar ROM: WNL    Assessment/Plan:    Patient is a 64 year old female with pelvic complaints.    Patient has the following significant findings with corresponding treatment plan.                Diagnosis 1:  Pelvic floor dysfunction  Decreased strength - therapeutic exercise, therapeutic activities and home program  Impaired muscle performance - biofeedback, electric stimulation, neuro re-education and home program  Decreased function - therapeutic activities, home program and functional performance testing  Impaired posture - neuro re-education, therapeutic activities and home program    Therapy Evaluation Codes:   Cumulative Therapy Evaluation is: Low complexity.    Previous and current functional limitations:  (See Goal Flow Sheet for this information)    Short term and Long term goals: (See Goal Flow Sheet for this information)     Communication ability:  Patient appears to be able to clearly communicate and understand verbal and written communication and follow directions correctly.  Treatment Explanation - The following has  "been discussed with the patient:   RX ordered/plan of care  Anticipated outcomes  Possible risks and side effects  This patient would benefit from PT intervention to resume normal activities.   Rehab potential is good.    Frequency:  1 X week, once daily  Duration:  for 4 weeks tapering to 2 X a month over 2 months  Discharge Plan:  Achieve all LTG.  Independent in home treatment program.  Reach maximal therapeutic benefit.    Caregiver Signature/Credentials _____________________________ Date ________      Laura Hook DPT   I have reviewed and certified the need for these services and plan of treatment while under my care.        PHYSICIAN'S SIGNATURE:   _____________________________________  Date___________     Amalia Landon MD    Certification period:  Beginning of Cert date period: 02/06/20 to  End of Cert period date: 05/01/20     Functional Level Progress Report: Please see attached \"Goal Flow sheet for Functional level.\"    ____X____ Continue Services or       ________ DC Services                Service dates: From  SOC Date: 02/06/20 date to present                         "

## 2020-02-06 NOTE — PROGRESS NOTES
[facility] Initial Evaluation  Subjective:  The history is provided by the patient. No  was used.   Gregoria Stein being seen for incontinence.   Date of Onset: 1/3/20, date of order. Problem occurred: felt strong urge to urinate and leaked  and reported as 0/10 on pain scale. General health as reported by patient is fair. Health conditions: history of fractures, implanted device, incontinence, mental illness, osteoarthritis, depression, overweight, sleep disorder.  Medical allergies: penecillin.    Current medications: sleep, RLS medication, mood medication, PTSD and pain medication.   Primary job tasks include:  Repetitive tasks, driving and prolonged sitting.           Patient is RN (disabled/retired), work at  for memory care part time.         Therapist Assessment:   Clinical Impression: Pt presents to [facility] with primary complaint of urge and stress incontinence.  Per clinical examination, pt with myofascial tenderness of pelvic floor muscles, poor coordination of pelvic floor muscles.  Pt will benefit from skilled physical therapy for muscle coordination training and down regulation of nervous system to address urinary urge and stress incontinence.    Subjective: Patient presents today with urge and stress incontinence. She had one episode the end of December she had an episode of leaking urine with a strong urge to go. She has noticed her stream of urine being weaker. She is also seeing physical therapy for her left shoulder and right knee.    Current activity:biking, walking    Goals:to have bladder control and no incontinence    Urination   Do you leak on the way to the bathroom or with a strong urge to void? yes  Do you leak with cough, sneeze, jumping, running? yes  Any other activities that cause leaking? n/a  Do you have triggers that make you feel you can't wait to go to the bathroom? no What are they? n/a  Type  of pad and number used per day? no  When you leak what is the amount? Medium to large  How long can you delay the need to urinate? 30 minutes  How many times do you get up to urinate at night? 2x  How many times do you urinate during the day? Every 2 hours  Can you stop the flow of urine when on the toilet? yes  Is the volume of urine passed usually: medium  Do you strain to pass urine? no  Do you have a slow or hesitant urinary stream? yes  Do you have difficulty initiating the urine stream? no  How many bladder infections have you had in the last 12 months? 0  What is you fluid intake per day? Water (8oz) 8oz-32oz  Caffeine occasionally 1cup  Alcohol occasionally 1 cup    Bowel Habits  Frequency of bowel movements? 2, a day  Consistency of stool? [place] stool scale? Type 4-type 6  Do you ignore the urge to defecate? no  Do you strain to pass stool? No  Diet: patient reports that it is very erratic, she tries to eat vegan. She tries to eat a lot of fruits and and veggies.    Pelvic Pain  When do you have pelvic pain? No  Are you sexually active? no  Have you given birth? no  Have you ever been worried for your physical safety? yes  Any abdominal or pelvic surgeries? Yes, vulvar mass removed  Are you having regular exercise? Performing PT HEP  Have you practiced the PF (kegel) exercise for 4 or more weeks? no  The patient has had dull back pain since her 20's.     Objective:    OBSERVATION: rounded shoulders, posterior pelvic tilt in sitting    ABDOMINAL WALL: no trigger points noted along rectus abdominis, iliopsoas, and transverse abdominis bilaterally.    EXTERNAL ASSESSMENT:  Skin condition:normal  Bearing down/coughing: NT  Muscle contraction/perineal mobility: slight lift, no urogenital triangle descent, co-contraction of abdominals and glutes, breath holding      INTERNAL ASSESSMENT:  Baseline PF tone: slight higher tone of levator ani bilatearlly  PF Tone with cough: NT   PF Response quality: sluggish  PF  Power: Center: 2+/5  Accessory Muscle use:glutes and abdominals  Endurance: Maximum contraction in seconds:3-5 seconds   Quick contraction repetitions prior to fatigue: NT       Hip MMT: WNL       Lumbar ROM: WNL      Assessment/Plan:    Patient is a 64 year old female with pelvic complaints.    Patient has the following significant findings with corresponding treatment plan.                Diagnosis 1:  Pelvic floor dysfunction  Decreased strength - therapeutic exercise, therapeutic activities and home program  Impaired muscle performance - biofeedback, electric stimulation, neuro re-education and home program  Decreased function - therapeutic activities, home program and functional performance testing  Impaired posture - neuro re-education, therapeutic activities and home program    Therapy Evaluation Codes:   Cumulative Therapy Evaluation is: Low complexity.    Previous and current functional limitations:  (See Goal Flow Sheet for this information)    Short term and Long term goals: (See Goal Flow Sheet for this information)     Communication ability:  Patient appears to be able to clearly communicate and understand verbal and written communication and follow directions correctly.  Treatment Explanation - The following has been discussed with the patient:   RX ordered/plan of care  Anticipated outcomes  Possible risks and side effects  This patient would benefit from PT intervention to resume normal activities.   Rehab potential is good.    Frequency:  1 X week, once daily  Duration:  for 4 weeks tapering to 2 X a month over 2 months  Discharge Plan:  Achieve all LTG.  Independent in home treatment program.  Reach maximal therapeutic benefit.    Please refer to the daily flowsheet for treatment today, total treatment time and time spent performing 1:1 timed codes.

## 2020-02-13 ENCOUNTER — MYC MEDICAL ADVICE (OUTPATIENT)
Dept: FAMILY MEDICINE | Facility: CLINIC | Age: 65
End: 2020-02-13

## 2020-02-13 ENCOUNTER — THERAPY VISIT (OUTPATIENT)
Dept: PHYSICAL THERAPY | Facility: CLINIC | Age: 65
End: 2020-02-13
Payer: MEDICARE

## 2020-02-13 DIAGNOSIS — T88.7XXA MEDICATION SIDE EFFECTS: Primary | ICD-10-CM

## 2020-02-13 DIAGNOSIS — N39.46 MIXED STRESS AND URGE URINARY INCONTINENCE: ICD-10-CM

## 2020-02-13 PROCEDURE — 97535 SELF CARE MNGMENT TRAINING: CPT | Mod: GP | Performed by: PHYSICAL THERAPIST

## 2020-02-13 PROCEDURE — 97112 NEUROMUSCULAR REEDUCATION: CPT | Mod: GP | Performed by: PHYSICAL THERAPIST

## 2020-02-14 ENCOUNTER — TELEPHONE (OUTPATIENT)
Dept: FAMILY MEDICINE | Facility: CLINIC | Age: 65
End: 2020-02-14

## 2020-02-14 NOTE — TELEPHONE ENCOUNTER
"[facility]  Type of Contact: Phone call (patient / identified key support person reached)  Renuka Flynn, Social Work Care Coordinator    Health and Wellness Promotion  Individual and Family Support: aimed to help clients reduce barriers to achieving goals, increase health literacy and knowledge about chronic condition(s), increase self-efficacy skills, and improve health outcomes    Pt left VM with this writer today 2/14/2020 stating she is uncomfortable, was incontinent last night before bed, and wants to figure out a care plan.     Called pt back today 2/14/2020.  First talked about her starting art therapy every other week at [facility] - she said she is liking it, she has it again next Friday 2/28/2020.  She had a in-home therapist come to her home for crisis stabilization (this part of conversation was hard to follow).  She said she did not like this person since she was not relatable, \"too young\" and did not have her degree yet.  She wants to see a therapist at [facility], her online bio says:  LGBT, likes dogs, , gerontology minor, works with trauma.  Shirley,  is calling them to see if this therapist has availability.   She then went into talking about her mental and physical health by saying \"I got so many health things to deal with\" and \"I just cant take it anymore... I am not sleeping. I am exhausted by my health and getting help... I am exhausted by everything.  I just want to walk the other way.\"  This writer suggested seeing her PCP Dr. Coronel more often, scheduled an appointment on 2/28/2020 at 1:00pm (will see this writer after).  Since this appt is 2 weeks away, encouraged pt to call anytime and if she has more specific urgent medical questions we can connect her with Elena BILLS (Elena is aware of this conversation).    Wrote in speciality comments for Dr. Coronel to review this note prior to this appt.           "

## 2020-02-14 NOTE — TELEPHONE ENCOUNTER
Pt is on Lamictal and hasn't had a liver panel done recently. Will add on to other labs previously ordered at last visit.     Susan Coronel MD

## 2020-02-20 ENCOUNTER — THERAPY VISIT (OUTPATIENT)
Dept: PHYSICAL THERAPY | Facility: CLINIC | Age: 65
End: 2020-02-20
Payer: MEDICARE

## 2020-02-20 DIAGNOSIS — N39.46 MIXED STRESS AND URGE URINARY INCONTINENCE: ICD-10-CM

## 2020-02-20 PROCEDURE — 97112 NEUROMUSCULAR REEDUCATION: CPT | Mod: GP | Performed by: PHYSICAL THERAPIST

## 2020-02-20 PROCEDURE — 97535 SELF CARE MNGMENT TRAINING: CPT | Mod: GP | Performed by: PHYSICAL THERAPIST

## 2020-03-05 ENCOUNTER — THERAPY VISIT (OUTPATIENT)
Dept: PHYSICAL THERAPY | Facility: CLINIC | Age: 65
End: 2020-03-05
Payer: MEDICARE

## 2020-03-05 DIAGNOSIS — N39.46 MIXED STRESS AND URGE URINARY INCONTINENCE: ICD-10-CM

## 2020-03-05 PROCEDURE — 97535 SELF CARE MNGMENT TRAINING: CPT | Mod: GP | Performed by: PHYSICAL THERAPIST

## 2020-03-05 PROCEDURE — 97112 NEUROMUSCULAR REEDUCATION: CPT | Mod: GP | Performed by: PHYSICAL THERAPIST

## 2020-03-11 DIAGNOSIS — T88.7XXA MEDICATION SIDE EFFECTS: ICD-10-CM

## 2020-03-11 DIAGNOSIS — Z00.00 ENCOUNTER FOR MEDICARE ANNUAL WELLNESS EXAM: ICD-10-CM

## 2020-03-11 LAB
ALBUMIN SERPL-MCNC: 4.6 MG/DL (ref 3.5–4.7)
ALP SERPL-CCNC: 75.4 U/L (ref 31.7–110.5)
ALT SERPL-CCNC: 19.6 U/L (ref 0–45)
AST SERPL-CCNC: 19.2 U/L (ref 0–45)
BILIRUB SERPL-MCNC: <0.4 MG/DL (ref 0.2–1.3)
BILIRUBIN DIRECT: 0.1 MG/DL (ref 0.1–0.3)
CHOLEST SERPL-MCNC: 206.1 MG/DL (ref 0–200)
CHOLEST/HDLC SERPL: 4.2 {RATIO} (ref 0–5)
HDLC SERPL-MCNC: 49 MG/DL
LDLC SERPL CALC-MCNC: 143 MG/DL (ref 0–129)
PROT SERPL-MCNC: 6.8 G/DL (ref 6.8–8.8)
TRIGL SERPL-MCNC: 70.9 MG/DL (ref 0–150)
VLDL CHOLESTEROL: 14.2 MG/DL (ref 7–32)

## 2020-03-12 ENCOUNTER — DOCUMENTATION ONLY (OUTPATIENT)
Dept: FAMILY MEDICINE | Facility: CLINIC | Age: 65
End: 2020-03-12

## 2020-03-12 NOTE — PROGRESS NOTES
"When opening a documentation only encounter, be sure to enter in \"Chief Complaint\" Forms and in \" Comments\" Title of form, description if needed.    Gregoria is a 64 year old  female  Form received via: Fax  Form now resides in: Provider Ready    Rekha Bryant CMA        Form has been completed by provider.     Form sent out via: Fax to [facility] [employer] 3/10/2020 at Fax Number: [phone]  Patient informed: NA   Output date: March 12, 2020    Rekha Bryant CMA      **Please close the encounter**            "

## 2020-03-16 DIAGNOSIS — G89.4 CHRONIC PAIN SYNDROME: ICD-10-CM

## 2020-03-16 RX ORDER — GABAPENTIN 300 MG/1
900 CAPSULE ORAL AT BEDTIME
Qty: 270 CAPSULE | Refills: 0 | Status: SHIPPED | OUTPATIENT
Start: 2020-03-16 | End: 2020-04-28

## 2020-03-16 NOTE — TELEPHONE ENCOUNTER

## 2020-03-17 ENCOUNTER — PATIENT OUTREACH (OUTPATIENT)
Dept: FAMILY MEDICINE | Facility: CLINIC | Age: 65
End: 2020-03-17

## 2020-03-17 NOTE — PROGRESS NOTES
"[facility]  Type of Contact: Phone call (patient / identified key support person reached)  Renuka Flynn, Social Work Care Coordinator    Care Coordination: provided care management services/referrals necessary to ensure patient and their identified supports have access to medical, behavioral health, pharmacology and recovery support services.  Ensured that patient's care is integrated across all settings and services.     This writer called pt today 3/17/2020 for follow-up.     Mental Health: Pt mentioned her anxiety about COVID-19, advised pt of the CDC guidelines.  Said she has 2 roles of toilet paper left, low on food but feels she will be okay.  Stopped attending art therapy at [facility], \"I felt like I just did not want to go anymore.\"  Getting a new [employer] worker soon.     Physical Health/Medications:   Started medical marijuana today 3/17/2020.  Reviewed gabapentin medication order sent to [facility].  Has appt scheduled next week on 3/25/2020 with PCP Dr. Coronel.  Does not want to come in - would still like appt, mentioned phone appointments and that someone will call her about this in the next week.     Will route to Dr. Coronel    "

## 2020-03-18 ENCOUNTER — TELEPHONE (OUTPATIENT)
Dept: FAMILY MEDICINE | Facility: CLINIC | Age: 65
End: 2020-03-18

## 2020-03-18 ENCOUNTER — VIRTUAL VISIT (OUTPATIENT)
Dept: FAMILY MEDICINE | Facility: CLINIC | Age: 65
End: 2020-03-18
Payer: MEDICARE

## 2020-03-18 DIAGNOSIS — R42 DIZZINESS: ICD-10-CM

## 2020-03-18 DIAGNOSIS — G89.29 OTHER CHRONIC PAIN: ICD-10-CM

## 2020-03-18 DIAGNOSIS — H53.9 VISION CHANGES: Primary | ICD-10-CM

## 2020-03-18 NOTE — TELEPHONE ENCOUNTER
[facility]  Type of Contact: Phone call (patient / identified key support person reached)  Renuka Flynn, Social Work Care Coordinator    Health and Wellness Promotion  Individual and Family Support: aimed to help clients reduce barriers to achieving goals, increase health literacy and knowledge about chronic condition(s), increase self-efficacy skills, and improve health outcomes     Pt called this writer today 3/18/2020 after her telephone appt with Dr. Srivastava.  Pt was tearful throughout phone call.  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  Having multiple physical issues.    Appears to be experiencing some paranoia, talked about being able to hear neighbors coughing and their vents connecting with her apartment vents.  Said she keeps thinking her phone is ringing, like the alarm is going off. Stating passive suicidal statements, no plan. Has feelings of wanting to give up.    Encouraged pt to stop/limit her time on social media, talk with her work about her concerns, and call a crisis line like [facility].  She is interested in talking with Dr. Tee tomorrow 3/19/20.        Set up a telephone appt with Dr. Tee tomorrow 3/19/20 at 10:20am.

## 2020-03-18 NOTE — PROGRESS NOTES
"Family Medicine Video Visit Note    Patient was verbally read the following and verbal consent was obtained.    \"I understand that I may revoke this request for a phone visit at any time.  This consent will automatically  3 months from the signed date and time.\"    Name person giving consent:  Patient   Date verbal consent given:  3/18/2020  Time verbal consent given:  1:37 PM    Patients name: Gregoria  Appointment start time:  2:00 PM    HPI  Gregoria Stein is a 64 year old female     Dizziness, pressure in eyes. Changes in vision which have been ongoing but might be worse now. Feels like it is hard to get them open. Ongoing for several weeks. Feeling somewhat tired but no specific weakness. Aches in neck and shoulders. Ibuprofen is helping, but hard to tell.     Taking hydroxyzine more for anxiety.     Not measuring temperature. No cough, chest pain, shortness of breath. A little bit of congestion in the throat and hoarse voice. No nausea, vomiting. No change in diarrhea (hx IBS). No nasal discharge. No headache.     Rash on the waist that is itching and burning. Using triamcinolone that is helping.     Sweating, vision changes, facial pressure are worsening but very slowly over many weeks.     Objective:   General: Awake, alert, no distress  Respiratory: No respiratory distress  Skin: Area of rash examined with no visual rash or skin lesions, could not palpate given video  Neuro: Patient ambulatory without focal deficit.  Cranial nerves appear grossly intact with symmetric feces.    Assessment/Plan:    1. Vision changes  2. Dizziness  3. Other chronic pain  Patient is being seen today for vision changes, dizziness, and some muscular aches in the shoulders.  Based on the visual exam on video this showed no focal neurological deficit and she had no significantly worrisome symptoms although cannot check her temperature.  Reassured her that this is unlikely to be an intracranial process given that it is very " slowly progressive not acutely worsening.  Her vision changes also were not consistent with this as she describes them as being difficult to open the eyelids bilaterally.  Given we cannot assess visual fields or visual acuity over video conferencing recommended an in person visit for next week, sooner if acutely worsening.    After visit information  Patient declined AVS       Appointment end time: 2:19 PM  This is a telephone visit that took 19 minutes.  Clinician location:  ALEE Keys

## 2020-03-18 NOTE — TELEPHONE ENCOUNTER
[facility] phone call message-patient reporting a symptom:     Symptom: Headache, Dizzy    When did symptoms begin? Yesterday    Characteristics: (location on body, intensity, what makes it better or worse, associated symptoms):      Additional Details: No travel, fever, cough    Same Day Visit Offered: Not scheduling     Additional comments:     OK to leave message on voice mail? Yes    Advised patient that RN would call back within 3 hours, unless emergent   Primary language: English      needed? No    Call taken on March 18, 2020 at 8:20 AM by Pati Wilson

## 2020-03-18 NOTE — LETTER
[facility]  [address],  [address]  [facility] [address]  Phone: [phone]  Fax: [phone]    March 18, 2020        Gregoria Stein  [address]  [place] [address]          To whom it may concern:    RE: Gregoria Stein    Patient was seen by our clinic on 3/18/20 and will be out of work until 3/25/20.     Please contact me for questions or concerns.      Sincerely,        Feliciano Srivastava MD  (signed electronically as above)

## 2020-03-18 NOTE — TELEPHONE ENCOUNTER
Patient complains that she has been having throbbing headaches and her dizziness and blurred vision have worsened (she has had both of these symptoms since her hospitalization in October).    RN scheduled her for a telephone visit.  Elena Hartman RN

## 2020-03-18 NOTE — PROGRESS NOTES
Preceptor Attestation:   Patient discussed with the resident. Assessment and plan reviewed with resident and agreed upon.   Supervising Physician:  DO [facility]

## 2020-03-19 ENCOUNTER — VIRTUAL VISIT (OUTPATIENT)
Dept: PSYCHOLOGY | Facility: CLINIC | Age: 65
End: 2020-03-19
Payer: MEDICARE

## 2020-03-19 DIAGNOSIS — F43.10 PTSD (POST-TRAUMATIC STRESS DISORDER): Primary | ICD-10-CM

## 2020-03-19 DIAGNOSIS — F33.2 SEVERE EPISODE OF RECURRENT MAJOR DEPRESSIVE DISORDER, WITHOUT PSYCHOTIC FEATURES (H): ICD-10-CM

## 2020-03-19 NOTE — TELEPHONE ENCOUNTER
Message Return    3/18/2020  11:37 PM    Message returned by Janel Greco MD    Patient: Gregoria Stein   Phone number-  [phone] (home)       Phone conversation with: Patient    Situation: Gregoria Stein  Is a 64 year old  female who is calling because of  Dizziness/imbalanced, frontal headache    Background: Dizziness has been going on for a couple of days. Headache goes in a band around her forehead, and then will radiated down to neck and upper back. Also reports decreased vision and double vision, but says that it's been going on for a long time, she's not sure how long; says that she's followed by ophthalmology and that they haven't found anything wrong with her eyes. Has tried ibuprofen and nyquil, may have brought the pain down a point or two. (was 6 or 7 and now is 5 or 6).      Unsure if she has a fever, is having some sweating. Denies cough or shortness of breath. Denies sick contact that she knows of and no recent travel.     She reports that her call was prompted because she has not been feeling better and headache has gotten a little worse (though improved with ibuprofen)    Assessment: Patient calling with symptoms of dizziness, headache and decreased vision for which she was seen earlier today in a virtual visit with Dr. Srivastava (see his 3/18/20 note). Based on the information provided, there does not appear to be any emergent worsening of her symptoms. Her symptoms are consistent with a tension headache, though her dizziness is difficult to clearly assess over the phone. Of note, per chart review, she started medical marijuana on 3/17/20; this could be contributing to feelings of dizziness or feeling unwell and should be further investigated.     Recommendation/Plan: I recommended continuing tylenol and iburpofen, and adding on ice or hot packs to her head and neck. She has an appointment with her PCP Dr. Coronel on 3/25/20; I recommended that she keep this appointment and that if she  began to feel worse that she should try to make an appointment to come in sooner so that she could be evaluated in-person. She understood chago greed with this plan. Would recommend that the provider she works with next discuss if she has noticed significant worsening since starting marijuana therapy.

## 2020-03-19 NOTE — PROGRESS NOTES
"Gregoria Stein is a 64 year old female who is being evaluated via a billable telephone visit.      The patient has been notified of following:     \"This telephone visit will be conducted via a call between you and your physician/provider. We have found that certain health care needs can be provided without the need for a physical exam.  This service lets us provide the care you need with a short phone conversation.  If a prescription is necessary we can send it directly to your pharmacy.  If lab work is needed we can place an order for that and you can then stop by our lab to have the test done at a later time.    If during the course of the call the physician/provider feels a telephone visit is not appropriate, you will not be charged for this service.\"     Gregoria Stein complains of increased symptoms - feeling flooded, anxious, hypervigilant, jumpy and startled.  She feels in such distress that she doesn't feel as if she can understand the conversations she is having with people in order to relay them to others.    I have reviewed and updated the patient's Past Medical History, Social History, Family History and Medication List.    ALLERGIES  Penicillins and Versed [midazolam]    Additional provider notes:   Reviewed risks/benefits of telephone visits, discussed that I will call her back if we get disconnected  Gregoria provided the name and phone number of her  , Heaven, [phone], as a person to contact if there is a crisis. She states she does not have another family member for us to contact.  Gregoria states she was at home for our visit.    Gregoria describes feeling completely overwhelmed. In addition to her ongoing trauma symptoms that she tries to cope with on a daily basis, she is experiencing an exacerbation of symptoms.  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  All of this with the coronavirus is " bringing up the feelings, memories, and fears that she experienced at that time.  She is very scared about going into work, but is finding it very difficult to navigate what she needs to do.  She is having some physical symptoms that she has discussed with MD and they recommended that she not go to work.  She is unsure of what she needs to do next.  Discussed needing to call her supervisor or HR to find out what their protocols are right now if she is sick.  Gregoria doesn't feel as if she is able to do this because she can't relay the information to them of what she has been told and she states she has a hard time understanding what they are saying to her as well.  She has been in contact with her supportive work , but is unsure what the result of that conversation was.  Encouraged her to ask people to send her a summary of her conversations with them by email or in written format to help her with having more time to process what she is being told.  Encouraged her to limit social media right now. Focus on what she is able to control to the best of her ability, as well as focusing on the things that have not changed despite all the other changes happening now.  Reminded her that she still has all the skills she learned when working with her long-term therapist that retired that summer - all she learned is still with her even with all that is happening.     Due to the level of distress that Gregoria is experiencing, conducted risk assessment.  PATIENT RISK ASSESSMENT:  Today Gregoria Stein reports feeling very overwhelmed, flooded, anxious, and unable to understand information being told to her easily.  She denies any plan to harm herself.  It appears the patient has a history of self-harm (punching herself), but not of attempting to die by suicide.  Based on all available evidence including the factors cited above, she does not appear to be at imminent risk for self-harm, does not meet criteria for a  72-hr hold, and therefore involuntary hospitalization will not be pursued at this  time.  Did offer to Gregoria if she felt hospitalization would be helpful right now, she did not think it was warranted.  She does not want to harm herself, but is feeling very distressed.   We will call her   to follow-up and she is scheduled with another phone visit with me for next week.  Provided Gregoria with the crisis line number. She decliend the text number because it is too hard for her to text right now.  She is aware she can call 911 and go to the hospital if needed.  The patient agreed to utilize these services as indicated if the need were to arise.    I asked about sending       Assessment/Plan:  PTSD   MDD, recurent, severe, w/o psychosis    Plan:  We will Call MH  Shirley  Crisis numbers provided  Encouraged Gregoria to call her HR department to find out what they need/protocols in place about when to stay home depending on symptoms experiencing  F/u appt on Monday at 10:40.    [facility] : [facility], [phone]  [facility] Worker: Franky Gandara, [phone] (office), [phone] (cell)  Phone call duration: 40 minutes    Sharmila Tee PsyD, LP    Thank you

## 2020-03-23 ENCOUNTER — TELEPHONE (OUTPATIENT)
Dept: PSYCHOLOGY | Facility: CLINIC | Age: 65
End: 2020-03-23

## 2020-03-23 NOTE — TELEPHONE ENCOUNTER
I had set up a time last week to talk to Gregoria at 10:40 this morning.  The appointment never ended up getting on my schedule and so I did not call her at 10:40.  I realized this this afternoon and attempted to call her to check-in to see how she is doing.  Her phone went to [employer].  I left a message telling her I would call her back tomorrow or she could also call the clinic and let them know a couple of times that might work best for me to give her a call.

## 2020-03-24 NOTE — TELEPHONE ENCOUNTER
Tried to call Gregoria today to check-in on her after our telephone visit last Thursday. I was not able to reach her. Asked her to please give the clinic a call to let them know what might be some good times/days for me to call.  Let her know I would try back again later.

## 2020-03-25 ENCOUNTER — OFFICE VISIT (OUTPATIENT)
Dept: FAMILY MEDICINE | Facility: CLINIC | Age: 65
End: 2020-03-25
Payer: MEDICARE

## 2020-03-25 VITALS
TEMPERATURE: 98.5 F | BODY MASS INDEX: 34.03 KG/M2 | HEART RATE: 96 BPM | SYSTOLIC BLOOD PRESSURE: 136 MMHG | WEIGHT: 214 LBS | OXYGEN SATURATION: 97 % | DIASTOLIC BLOOD PRESSURE: 84 MMHG

## 2020-03-25 DIAGNOSIS — F41.9 ANXIETY: ICD-10-CM

## 2020-03-25 DIAGNOSIS — E66.812 CLASS 2 OBESITY WITH BODY MASS INDEX (BMI) OF 35.0 TO 35.9 IN ADULT, UNSPECIFIED OBESITY TYPE, UNSPECIFIED WHETHER SERIOUS COMORBIDITY PRESENT: Chronic | ICD-10-CM

## 2020-03-25 DIAGNOSIS — H53.9 VISION CHANGES: ICD-10-CM

## 2020-03-25 DIAGNOSIS — R42 DIZZINESS: ICD-10-CM

## 2020-03-25 DIAGNOSIS — F43.10 PTSD (POST-TRAUMATIC STRESS DISORDER): Primary | ICD-10-CM

## 2020-03-25 RX ORDER — PRAMIPEXOLE DIHYDROCHLORIDE 0.25 MG/1
0.75 TABLET ORAL
COMMUNITY
Start: 2018-07-20 | End: 2020-06-05

## 2020-03-25 ASSESSMENT — ANXIETY QUESTIONNAIRES
7. FEELING AFRAID AS IF SOMETHING AWFUL MIGHT HAPPEN: NEARLY EVERY DAY
3. WORRYING TOO MUCH ABOUT DIFFERENT THINGS: NEARLY EVERY DAY
6. BECOMING EASILY ANNOYED OR IRRITABLE: MORE THAN HALF THE DAYS
5. BEING SO RESTLESS THAT IT IS HARD TO SIT STILL: NEARLY EVERY DAY
2. NOT BEING ABLE TO STOP OR CONTROL WORRYING: NEARLY EVERY DAY
1. FEELING NERVOUS, ANXIOUS, OR ON EDGE: NEARLY EVERY DAY
4. TROUBLE RELAXING: NEARLY EVERY DAY
GAD7 TOTAL SCORE: 20

## 2020-03-25 ASSESSMENT — PATIENT HEALTH QUESTIONNAIRE - PHQ9: SUM OF ALL RESPONSES TO PHQ QUESTIONS 1-9: 19
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
## 2020-03-25 NOTE — PATIENT INSTRUCTIONS
Here is the plan from today's visit    1. PTSD (post-traumatic stress disorder)  2. Anxiety  - Try meditation music and meditation.  - Follow up with me more often either via telephone or visit.  - Call Dr. Tee as needed.  - Can take atarax at least four times a day.    3. Vision changes  - Wear glasses more often  - Eat and drink even if you don't feel like it    4. Dizziness  - Do the maneuver (Spock) around ear for 10 secs 3-5 times a day  - Take hydroxyzine if needed (1/2 tab)     Please call or return to clinic if your symptoms don't go away.    Follow up plan  Please make a clinic appointment for follow up with me (DANIEL PERSAUD) in 2 weeks for check-in.    Thank you for coming to [facility] today.  Lab Testing:  **If you had lab testing today and your results are reassuring or normal they will be mailed to you or sent through [email or web address] within 7 days.   **If the lab tests need quick action we will call you with the results.  The phone number we will call with results is # [phone] (home) . If this is not the best number please call our clinic and change the number.  Medication Refills:  If you need any refills please call your pharmacy and they will contact us.   If you need to  your refill at a new pharmacy, please contact the new pharmacy directly. The new pharmacy will help you get your medications transferred faster.   Scheduling:  If you have any concerns about today's visit or wish to schedule another appointment please call our office during normal business hours [phone] (8-5:00 M-F)  If a referral was made to a [facility] and you don't get a call from central scheduling please call [phone].  If a Mammogram was ordered for you at [facility] call [phone] to schedule or change your appointment.  If you had an XRay/CT/Ultrasound/MRI ordered the number is [phone] to schedule or change your radiology appointment.   Medical  Concerns:  If you have urgent medical concerns please call [phone] at any time of the day.    Susan Coronel MD

## 2020-03-25 NOTE — PROGRESS NOTES
Preceptor Attestation:   Patient seen, evaluated and discussed with the resident. I have verified the content of the note, which accurately reflects my assessment of the patient and the plan of care.   Supervising Physician:  Rigo Simmons MD

## 2020-03-26 ENCOUNTER — TELEPHONE (OUTPATIENT)
Dept: FAMILY MEDICINE | Facility: CLINIC | Age: 65
End: 2020-03-26

## 2020-03-26 ASSESSMENT — ANXIETY QUESTIONNAIRES: GAD7 TOTAL SCORE: 20

## 2020-03-26 NOTE — TELEPHONE ENCOUNTER
"This writer called Shirley BOYCE (Targeted ), [facility], [phone].  Spoke to her for 30 minutes.    Reviewed recent office visit with PCP Dr. Coronel from yesterday 3/25/20.  Went over each concern Gregoria has.  We mainly talked about her working and what happens if she decided to work less along with what are the best ways to support her.      Work: Pt has her Medicaid supplement because she is working.  She is on MA-EPD.  Per Shirley, to keep her MA-EPD pt needs tomake $50ish/month.  There is a possibility of her not working for 4 months and still keep her MA-EPD.  Shirley did not know the details but knows the financial worker could assist with this if it gets to that point.     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  When we should be concerned is if she stops coming/contacting us.  Shirley would want to know.    Since pt's previous therapist retired, Shirley has attempted to connect pt with 3-4 different therapists.  She knows pt has been trying to connect with a therapist who has a sliding scale fee, but she is not sure who and where this person works.   This writer suggested to Shirley to make a referral to Rain Johnson at [facility] who has soon openings and has experience working with those with trauma/PTSD, also takes Medicare as a primary.     PLAN:  -[facility] Care Team will have frequent contact to make sure pt feels supported  -Pt will continue to have appts with Dr. Tee as needed    -Pt will have visits with PCP Dr. Coronel every 2 weeks/as needed  -Pt will be [facility] discharged due to pt having a TCM (unable to bill Medicaid since billing for both [facility] and TCM is considered double " alley  -Renuka RAMIREZ will continue to work with her and call her as needed for additional support

## 2020-03-27 NOTE — TELEPHONE ENCOUNTER
[facility]  Type of Contact: Phone call (patient / identified key support person reached)  Renuka Flynn, Social Work Care Coordinator    Care Coordination: provided care management services/referrals necessary to ensure patient and their identified supports have access to medical, behavioral health, pharmacology and recovery support services.  Ensured that patient's care is integrated across all settings and services.   Health and Wellness Promotion  Individual and Family Support: aimed to help clients reduce barriers to achieving goals, increase health literacy and knowledge about chronic condition(s), increase self-efficacy skills, and improve health outcomes     This writer called pt today 3/27/2020.      Work: Working it out with employment counselor who will talk with pt's boss about future shifts and what that looks like.   Wants to continue working as much as she is able to.      Medications:  She spoke with her psychiatrist Mera Fonseca yesterday 3/26/20.  Will see her again in one month. LamoTRIgine (LAMICTAL) went down to 1.5 25MG tablets (was taking 3 25MG tablets day).      Physical Concerns: Said she had a rash, unknown cause.  She mentioned she forgot to bring it up at her last appt with Dr. Coronel.  Says she will MyChart Dr. Coronel and/or call our clinic if it gets worse and/or she becomes concerned.     Mental Health:   Pt is starting with new therapists, Asha QIU through [facility] in [place] [phone] on 4/21/2020.  She seemed excited about this.  Encouraged pt to talk about this appointment with Dr. Tee so she can discuss what it may be like to establish a new relationship with a therapist. She will continue to work with [facility] through [facility] and this writer as needed.      Future Appointments: Pt mentioned a few things she really liked about her last appointment with Dr. Coronel that she would like continued.   -Enjoyed  having meditation music at the beginning of appt.    -Likes when Dr. Coronel asks her to recall what they just went over.    -We discussed possible ways to organize the appts to help herself and Dr. Coronel, and still address the concerns she has.  The plan is pt will bring a sheet of paper, with each concern in its own square. This may help with memory and organization.   Scheduled appt in one month with PCP Dr. Coronel, attempted to schedule in 2 weeks but pt refused to be scheduled that day since she knows she will be working.      Pt decided to continue working with our care team at [facility] and if she has thoughts of wanting to switch clinics, she may switch in the summer or fall months.     Future appointments scheduled:   -4/2/2020 at 10:20am with Dr. Tee  -4/22/2020 at 3:40pm with PCP Dr. Coronel     Will route to Dr. Coronel, Dr. Tee, and Elena BILLS

## 2020-04-01 ENCOUNTER — VIRTUAL VISIT (OUTPATIENT)
Dept: PHYSICAL THERAPY | Facility: CLINIC | Age: 65
End: 2020-04-01
Payer: MEDICARE

## 2020-04-01 DIAGNOSIS — N39.46 MIXED STRESS AND URGE URINARY INCONTINENCE: ICD-10-CM

## 2020-04-01 PROCEDURE — 99207 C THERAPEUTIC ACTIVITIES: CPT | Mod: GP | Performed by: PHYSICAL THERAPIST

## 2020-04-01 NOTE — PROGRESS NOTES
"Physical Therapy Virtual Follow Up Visit      The patient has been notified of following:     \"This virtual visit will be conducted between you and your provider. We have found that certain health care needs can be provided without the need for physical presence.  This service lets us provide the care you need with a virtual visit.\"    Due to external, as well as internal [facility] management of the COVID-19 Virus, Gregoria Stein was not seen in our clinic.  As a substitution, we implemented a virtual visit to manage this patient's condition utilizing the PTRx virtual visit platform via the patient s existing code.  The provider, Laura Hook, reviewed the patient's chart, PTRx prescription, and spoke with the patient to determine the following telemedicine visit is appropriate and effective for the patient's care.    The following type of visit was completed:   Telephone Visit:  A telephone visit was used in conjunction with the online PTRx exercise platform.        S: Gregoria Stein is a 64 year old female. Connected virtually on the PTRx platform to discuss their condition/progress. They noted improvements in urges to urinate, and no urinary incontinence in the past few weeks.  They noted ongoing pain or limitations with endurance holds of pelvic floor muscles and consistently keeping up with all of her physical therapy exercises (knee, shoulder, and pelvis).     Current pain level: 0/10    O: Patient demonstrated no pain and good awareness of pelvic floor muscles in sitting after cued to place towel underneath perineum for tactile feedback. Pt able to contract pelvic floor muscles for 2-3 seconds in seated without breath holding. Pt reports only 2-3 strong urges to urinate this past week.    PTRx Content from today's visit:    Therapeutic Activity:    Clam Shell- Verbally reviewed clam shell exercise with band above knees. Instructed pt to perform on both sides 15x, once a day. Educated pt on how " strengthening glutes and hip rotators can help stabilize pelvis and may assist with reducing incontinence.    Seated PFM- pt completed 6x 2-3 second PFM contractions with towel under perineum for feedback. Pt able to perform without breath holding. Educated pt on goal of increasing PFM contraction to 5 seconds at 75% contraction of PFM.    Verbally reviewed supine PFM strengthening exercise and knees to chest. Discussed pt call back if she has any questions/concerns, or if her symptoms are not improving.    A:   Patient's symptoms are resolving.  Response to therapy has shown an improvement in  Incontinence, reduced urinary urgency.      P: Patient will continue with the exercise program assigned on their PTRx code and will add the following measures to manage their pain/condition: clam shells, tactile cues with towel for PFM strengthening exercise in sitting.     Current treatment program is being advanced to more complex exercises.      Virtual visit contact time    Time of service began: 9:00 AM  Time of service ended: 9:25 AM  Total Time for set up, visit, and documentation: 8 minutes    Payor: MEDICARE / Plan: MEDICARE / Product Type: Medicare /   .  Procedure Code/s   Therapeutic Activities (30060): 25 minutes    I have reviewed the note as documented above.  This accurately captures the substance of my conversation with the patient.  Provider location: [place], [place]  ([facility]State)  Patient location: home    ___________________________________________________

## 2020-04-02 ENCOUNTER — VIRTUAL VISIT (OUTPATIENT)
Dept: PSYCHOLOGY | Facility: CLINIC | Age: 65
End: 2020-04-02
Payer: MEDICARE

## 2020-04-02 DIAGNOSIS — F41.1 GENERALIZED ANXIETY DISORDER: ICD-10-CM

## 2020-04-02 DIAGNOSIS — F33.2 SEVERE EPISODE OF RECURRENT MAJOR DEPRESSIVE DISORDER, WITHOUT PSYCHOTIC FEATURES (H): ICD-10-CM

## 2020-04-02 DIAGNOSIS — F43.10 PTSD (POST-TRAUMATIC STRESS DISORDER): Primary | ICD-10-CM

## 2020-04-02 NOTE — PROGRESS NOTES
"Behavioral Health Progress Note    Client Legal Name: Gregoria Stein   Client Preferred Name: Gregoria   Service Type: Phone Visit  Length of Visit: 40 minutes  Attendees: patient     The following statement was read to the patient at the outset of this visit:    \"We have found that certain health care needs can be provided without the need for a face to face visit.  This service lets us provide the care you need with a phone conversation.   I will have full access to your [facility] medical record during this entire phone call.   I will be taking notes for your medical record.  Since this is like an office visit, we will bill your insurance company for this service. There are potential benefits and risks of telephone visits (e.g. limits to patient confidentiality) that differ from in-person visits.?  Confidentiality still applies for telephone services, and nobody will record the visit.  It is important to be in a quiet, private space that is free of distractions (including cell phone or other devices) during the visit.??If during the course of the call I believe a telephone visit is not appropriate, you will not be charged for this service.\"    Consent has been obtained for this service by care team member: Yes    Emergency contact: Ely rausch [phone]  Closest Emergency Room: uses [place]   Location at time of call: at home    Identifying Information and Presenting Problem:    The patient is a 64 year old American female who is being seen for problematic symptoms of anxiety.    Treatment Objective(s) Addressed in This Session:  No treatment plan as Gregoria is transferring to a community therapist in a couple of weeks.  Assisting with bridging right now.    Progress on / Status of Treatment Objective(s) / Homework:  Gregoria reports anxiety is just as bad as when I spoke to her a couple of weeks ago. She sounds calmer and more able to engage in the visit today.    PHQ-9 SCORE 1/3/2020 2/5/2020 " 3/25/2020   PHQ-9 Total Score 13 21 19   PHQ-A Total Score 0 - -       CLAIRE-7 SCORE 11/22/2019 3/25/2020   Total Score 13 20       Topics Discussed/Interventions Provided:  Gregoria was having a cup of tea to help herself feel more calm at the outset of our visit.  She states she has been having a very difficult time right now with her anxiety.  She wanted to do an in person visit at clinic with her PCP, but felt shaky the whole time. Dr. Coronel did play some music for her at the end of the visit and Gregoria felt this was very helpful.  She is wondering if she comes in for another visit, if maybe music could be played at the outset to help decrease her shakiness.  She will talk to Dr. Coronel about that.     Gregoria is anxious today because she is supposed to return to work this afternoon. She is worried because her  was going to be there, but then said she couldn't be there in person.  Problem solved with Gregoria what might be some options she could talk to her  about.  Gregoria is going to call her after this visit to see if her  can call in on gregoria's phone when Gregoria meets with her supervisor so they can all still meet together.  Gregoria is worried that she won't be able to say what she wants to say the right way because cognitively she feels so foggy right now and the anxiety makes the fogginess even worse.      Gregoria has been doing deep breathing, playing music to help with her anxiety.     Gregoria has an appt with a new thearpist on 4/21.  She wanted to focus part of our visit on this upcoming appt because it has been hard with her to establish with someone new.  Someone referred Gregoria to this person and Gregoria states the messages the therapist has left have been very nice.  Discussed giving the relationship time to develop.  It may be difficult to recall when she first started with her last therapist she was with for 10 years, but it took time for them to develop the  relationship they had.  She won't likely have this with this person right away, but that doesn't mean it won't happen.      Gregoria worries about asking her team members for help as she sees it as losing her independence. Suggested a reframe that working with her team members and asking for help from them, helps her maintain her independence, not lost it. Gregoria liked that frame. Met her new ILS worker this week and felt good about that person.  Looking forward to working with her.     Discussed doing a vidoe visit as a follow up for our next appt.  We will try it through her smartphone.        Assessment: The patient appeared to be active and engaged in today's session and was receptive to feedback.     Mental Status: Gregoria sounded alert and oriented. She seemed more calm and focused on the phone today, but still very anxious. Speech was of normal volume and rate and was clear, coherent, and relevant. Mood was anxious with congruent affect. Thought processes were less rapid and more organized today. Thought content was WNL with no evidence of psychotic or paranoid features. No evidence of SI/HI or self-harm, intent, or plans. Memory appeared grossly intact. Insight and judgment appeared good and patient exhibited good impulse control during the appointment.     Does the patient appear to be at imminent risk of harm to self/others at this time? No    The session was necessary to address significant anxiety that have been interfering with patient's ability to function at home and work.  Ongoing psychotherapy is necessary to decrease anxiety and assist with bridging care until she establishes with her new therapist on 4/21.    Diagnosis (DSM-5):  PTSD, MDD< severe w/o psychosis, Za    Plan:  1. Follow up on 4/13 at 11:20 by video  2. Sending video instructions by [employer]  3. Utilize crisis resources as needed.      NOTE: Treatment plan update due n/a.  Diagnostic assessment update due n/a.

## 2020-04-03 NOTE — PATIENT INSTRUCTIONS
"You can use either your smartphone or a laptop/computer that is set up with a camera and microphone to do a phone visit.  You will select which device you want to use for the virtual visit.  If you use a smartphone/tablet, we will need the phone number to send you a text invitation to the visit.  If you are using a laptop/computer, we will need your email address to send you the invitation to the visit.      Smartphone/tablet:  Go to your Apple Angle Store or Google Play store to download an angle called \"[employer] Touchpoint\"  This is the angle that you will use for your visit. It is free.  That way when you receive the text invitation, you can just click on the invitation and it will bring right into the visit through the angle.    Computer/Laptop with Webcam:  It is important that you have set our default web browser to a modern web browswer such as "[employer]e (recommended), [employer]der or My Visual Brief.  Internet [employer] is not compatible with the telemedicine website.  If you need instructions about how to change your default web browser on a PC, we can email them to you or send them through a Anke message.  If you use a Mac, your default web browser should work already.    Please be ready 15 minutes before your visit.  You will receive an invitation to the visit via email or text message (whichever was specified by you) from your provider as soon as the provider is ready.  Please be ready to click the link in the invitation so you can join the visit.  Your provider will send the invitation once.  If you don't join the telemedicine visit within five minutes, the provider will call you to complete a telephone visit instead.  If you don't answer the phone or respond to the invitation within 15 minutes, your provider will likely go onto their next patient and you will need to reschedule your visit.    "

## 2020-04-09 ENCOUNTER — PATIENT OUTREACH (OUTPATIENT)
Dept: FAMILY MEDICINE | Facility: CLINIC | Age: 65
End: 2020-04-09

## 2020-04-09 NOTE — PROGRESS NOTES
"Behavioral Health Home Services  Type of Contact: Phone call (patient / identified key support person reached)  Renuka Flynn, Social Work Care Coordinator    Care Coordination: provided care management services/referrals necessary to ensure patient and their identified supports have access to medical, behavioral health, pharmacology and recovery support services.  Ensured that patient's care is integrated across all settings and services.      Pt called this writer today 4/9/2020, left a VM.  Called back and spoke with pt.  She said she mainly wanted to talk about how she is having difficultly determining if how she is feeling is due to mental health or if there is something physical and/or mediation related.  She feels groggy each morning, still decreasing her Lamictal day to day, psychiatrist is not suggesting to completely go off.  Still having dizziness, thinks it is improving \"a little bit.\" Still unsure if there are any changes.   We spent most of our conversation focusing on what are ways to improve each aspect since it is likely a mix of mental/physical/meds.  Feelings of being more isolated, heightening anxiety. She has a bike in her living room (a real bike, that she puts on a block that locks the bike in place to be able to bike within home).  She road it for 20 minutes today 4/9/2020, played some music.  She has always been an avid biker - good memories for her.  Encouraged her to continue doing this every morning, focusing on getting on the bike even if its only for 5 minutes.  She is going to work today 4/9/2020, started going back to work last week.  Going to work makes her anxious.     Reviewed future appointments, encouraged her to call anytime and thanked her for reaching out.    Will route to PCP Dr. Coronel and Dr. Tee.   "

## 2020-04-13 ENCOUNTER — VIRTUAL VISIT (OUTPATIENT)
Dept: PSYCHOLOGY | Facility: CLINIC | Age: 65
End: 2020-04-13
Payer: MEDICARE

## 2020-04-13 DIAGNOSIS — F41.1 GENERALIZED ANXIETY DISORDER: ICD-10-CM

## 2020-04-13 DIAGNOSIS — F33.2 SEVERE EPISODE OF RECURRENT MAJOR DEPRESSIVE DISORDER, WITHOUT PSYCHOTIC FEATURES (H): ICD-10-CM

## 2020-04-13 DIAGNOSIS — F43.10 PTSD (POST-TRAUMATIC STRESS DISORDER): Primary | ICD-10-CM

## 2020-04-21 ENCOUNTER — TELEPHONE (OUTPATIENT)
Dept: FAMILY MEDICINE | Facility: CLINIC | Age: 65
End: 2020-04-21

## 2020-04-21 NOTE — TELEPHONE ENCOUNTER
Pcs LVM to confirm Video Visit. Informed pt to check email on video visit instructions and if any questions to contact clinic.    Taylor Saavedra MA

## 2020-04-22 ENCOUNTER — VIRTUAL VISIT (OUTPATIENT)
Dept: FAMILY MEDICINE | Facility: CLINIC | Age: 65
End: 2020-04-22
Payer: MEDICARE

## 2020-04-22 DIAGNOSIS — F41.9 ANXIETY: ICD-10-CM

## 2020-04-22 DIAGNOSIS — Z00.00 HEALTHCARE MAINTENANCE: Primary | ICD-10-CM

## 2020-04-22 NOTE — PROGRESS NOTES
"Family Medicine Telephone Visit Note           Telephone Visit Consent   Patient was verbally read the following and verbal consent was obtained.    \"This telephone visit will be conducted via a call between you and your physician/provider. We have found that certain health care needs can be provided without the need for a physical exam.  This service lets us provide the care you need with a short phone conversation.  If a prescription is necessary we can send it directly to your pharmacy.  If lab work is needed we can place an order for that and you can then stop by our lab to have the test done at a later time.    Telephone visits are billed at different rates depending on your insurance coverage. During this emergency period, for some insurers they may be billed the same as an in-person visit.  Please reach out to your insurance provider with any questions.    If during the course of the call the physician/provider feels a telephone visit is not appropriate, you will not be charged for this service.\"    Name person giving consent:  Patient   Date verbal consent given:  4/22/2020  Time verbal consent given: 3:57 PM     Chief Complaint   Patient presents with     Bump on head     Mammogram            HPI   Patients name: Gregoria  Appointment start time:  3:57 PM    Was meant to be a video visit however cameras were not working to view each other so had to turn to a telephone visit.     Bump on Head: Will rescheduled for video visit as pt would like to it to be visualized as pictures were not even able to be sent to me. However, based on description: flaky, bump with some scabbing that she has scratched at. Has had long history of sun exposure with rock climbing in past. Could be actinic keratosis, however unable to assess given technically difficulty.      Mammogram: Will order as patient has been wanting it based on recommendation. We planned to put in an order and she would like that. No new breast pain or nipple " discharge. Pt has nipple piercing in the past and that can gunk up, but no pus. Pt states her left breast has had tenderness for a long time and had a mammogram after that started that were unremarkable. Feels like if she squeezes breast really hard she will have a sharp pain there that last for a little bit. No skin changes or unexpected wt loss. No night sweats.      Memory Concern:  Pt has noticed now and that she is forgets things here and then. Such as forgetting directions and forgetting what she wanted to ask when talking to . She thinks some of it could be due to her anxiety or PTSD, but still is concerned.       Current Outpatient Medications   Medication Sig Dispense Refill     Acetaminophen (TYLENOL PO) Take 1,000 mg by mouth every 8 hours as needed        famotidine (PEPCID) 20 MG tablet Take 20 mg by mouth 2 times daily       gabapentin (NEURONTIN) 300 MG capsule Take 3 capsules (900 mg) by mouth At Bedtime 270 capsule 0     hydrOXYzine (VISTARIL) 50 MG capsule Take 1 capsule (50 mg) by mouth 3 times daily as needed for anxiety 90 capsule 3     lamoTRIgine (LAMICTAL) 25 MG tablet Take 75 mg by mouth daily        pramipexole (MIRAPEX) 0.125 MG tablet Take 2 tablets (0.25 mg) by mouth At Bedtime 2 tablets at bedtime 60 tablet 3     pramipexole (MIRAPEX) 0.25 MG tablet Take 0.75 mg by mouth       traZODone (DESYREL) 100 MG tablet Take1 Tablet at night orally for sleep PRN 30 tablet 3     Allergies   Allergen Reactions     Penicillins Rash and Hives     Versed [Midazolam] Other (See Comments)     Stopped breathing  Over 10 years ago but possible sensitivity to too much of this medication  Became apnic              Review of Systems:     ROS COMP: Constitutional, HEENT, cardiovascular, pulmonary, gi and gu systems are negative, except as otherwise noted.         Physical Exam:     LMP  (LMP Unknown)   Estimated body mass index is 34.03 kg/m  as calculated from the following:    Height as of  "2/5/20: 1.689 m (5' 6.5\").    Weight as of 3/25/20: 97.1 kg (214 lb).    Exam:  Constitutional: healthy, alert and no distress  Psychiatric: mentation appears normal and affect normal/bright  Memory: Able to repeat 3 words immediately and five minutes later, able to spell world forward and backwards, able to to serial 7's, oriented to person, place, and time.         Assessment and Plan   1. Healthcare maintenance  Order placed for mammogram post-covid, as this is for a routine screening, and this is an elective imaging.   - Screening Mammogram Digital Bilateral; Future    2. Memory Concern  Your memory is reassuring based on the questions and activities you were able to pass. Would recommend trying memory games. If from your anxiety try breathing exercises and clearing thoughts first.   - Lumosity  - Word puzzles  - Sudoku     3. Bump on head  - Will have  schedule a new video visit      Please call or return to clinic if your symptoms don't go away.    Follow up plan  Please make a clinic appointment for follow up with me (SUSAN CORONEL) in 2  weeks for check in.    Refilled medications that would be required in the next 3 months.     After Visit Information:  Patient chose to view AVS via [email or web address]    No follow-ups on file.    Appointment end time: 4:30 PM  This is a telephone visit that took 30 minutes.      Clinician location:  Home    Susan Coronel MD  I precepted today with Dr. Giovanni Fuentes.        "

## 2020-04-22 NOTE — PROGRESS NOTES
Preceptor Attestation:   Patient discussed with the resident. Assessment and plan reviewed with resident and agreed upon. Spoke to patient on the phone.   Supervising Physician:  Giovanni Fuentes MD  [facility]

## 2020-04-22 NOTE — PATIENT INSTRUCTIONS
Here is the plan from today's visit    1. Healthcare maintenance  Order placed for mammogram post-covid, as this is for a routine screening, and this is an elective imaging.   - Screening Mammogram Digital Bilateral; Future    2. Memory Concern  Your memory is reassuring based on the questions and activities you were able to pass. Would recommend trying memory games. If from your anxiety try breathing exercises and clearing thoughts first.   - Lumosity  - Word puzzles  - Sudoku     3. Bump on head  - Will have  schedule a new video visit      Please call or return to clinic if your symptoms don't go away.    Follow up plan  Please make a clinic appointment for follow up with me (DANIEL PERSAUD) in 2  weeks for check in.    Thank you for coming to [facility] today.  Lab Testing:  **If you had lab testing today and your results are reassuring or normal they will be mailed to you or sent through [employer] within 7 days.   **If the lab tests need quick action we will call you with the results.  The phone number we will call with results is # [phone] (home) . If this is not the best number please call our clinic and change the number.  Medication Refills:  If you need any refills please call your pharmacy and they will contact us.   If you need to  your refill at a new pharmacy, please contact the new pharmacy directly. The new pharmacy will help you get your medications transferred faster.   Scheduling:  If you have any concerns about today's visit or wish to schedule another appointment please call our office during normal business hours [phone] (8-5:00 M-F)  If a referral was made to a [facility] and you don't get a call from central scheduling please call [phone].  If a Mammogram was ordered for you at [facility] call [phone] to schedule or change your appointment.  If you had an XRay/CT/Ultrasound/MRI ordered the number is [phone] to  schedule or change your radiology appointment.   Medical Concerns:  If you have urgent medical concerns please call [phone] at any time of the day.    Susan Coronel MD

## 2020-04-22 NOTE — Clinical Note
Can we reschedule her a video visit for a bump on her head? Can be with any provider this week. We were unable to see each other during our visit.

## 2020-04-27 ENCOUNTER — TELEPHONE (OUTPATIENT)
Dept: FAMILY MEDICINE | Facility: CLINIC | Age: 65
End: 2020-04-27

## 2020-04-27 NOTE — TELEPHONE ENCOUNTER
Called patient to verify the source of technology that would be used in tomorrow's video visit. Patient did not answer, left detailed message informing patient to download AW Touchpoint fatou if they would be using phone. Otherwise if using laptop they will receive a link around appointment time. Welcomed call back with any concerns or questions.  Rola Flores CMA on 4/27/2020 at 3:43 PM

## 2020-04-28 ENCOUNTER — VIRTUAL VISIT (OUTPATIENT)
Dept: FAMILY MEDICINE | Facility: CLINIC | Age: 65
End: 2020-04-28
Payer: MEDICARE

## 2020-04-28 DIAGNOSIS — G89.4 CHRONIC PAIN SYNDROME: ICD-10-CM

## 2020-04-28 DIAGNOSIS — R23.2 HOT FLASHES: ICD-10-CM

## 2020-04-28 DIAGNOSIS — L98.9 SKIN LESION: Primary | ICD-10-CM

## 2020-04-28 RX ORDER — GABAPENTIN 300 MG/1
900 CAPSULE ORAL AT BEDTIME
Qty: 270 CAPSULE | Refills: 0 | Status: SHIPPED | OUTPATIENT
Start: 2020-04-28 | End: 2020-08-11

## 2020-04-28 RX ORDER — FOLIC ACID/MULTIVIT,IRON,MINER 0.4MG-18MG
1 TABLET ORAL DAILY
COMMUNITY
End: 2023-08-09

## 2020-04-28 NOTE — PROGRESS NOTES
"Family Medicine Video Visit Note  Gregoria is being evaluated via a billable video visit.           Video Visit Consent     Patient was verbally read the following and verbal consent was obtained.  \"This video visit will be conducted via a call between you and your physician/provider. We have found that certain health care needs can be provided without the need for an in-person physical exam.  This service lets us provide the care you need with a video conversation.  If a prescription is necessary we can send it directly to your pharmacy.  If lab work is needed we can place an order for that and you can then stop by our lab to have the test done at a later time.    If during the course of the call the physician/provider feels a video visit is not appropriate, you will not be charged for this service.\"     (Name person giving consent:  Patient   Date verbal consent given:  4/28/2020  Time verbal consent given:  2:18 PM)    Patient would like the video invitation sent by: Text to cell phone: [phone]    Chief Complaint   Patient presents with     Lesion     spot on right back side of head above ear, noticed a couple weeks ago, it can be sensitive, seems to be about the same size as it started at, no known injury to cause it     Current Outpatient Medications   Medication Sig Dispense Refill     Acetaminophen (TYLENOL PO) Take 1,000 mg by mouth every 8 hours as needed        cholecalciferol (VITAMIN D3) 5000 units (125 mcg) capsule Take by mouth daily       famotidine (PEPCID) 20 MG tablet Take 20 mg by mouth 2 times daily       gabapentin (NEURONTIN) 300 MG capsule Take 3 capsules (900 mg) by mouth At Bedtime 270 capsule 0     hydrOXYzine (VISTARIL) 50 MG capsule Take 1 capsule (50 mg) by mouth 3 times daily as needed for anxiety 90 capsule 3     lamoTRIgine (LAMICTAL) 25 MG tablet Take 50 mg by mouth daily        Omega-3 Fatty Acids (OMEGA-3 FISH OIL) 1200 MG CAPS        pramipexole (MIRAPEX) 0.25 MG tablet Take " "0.75 mg by mouth       traZODone (DESYREL) 100 MG tablet Take1 Tablet at night orally for sleep PRN 30 tablet 3     pramipexole (MIRAPEX) 0.125 MG tablet Take 2 tablets (0.25 mg) by mouth At Bedtime 2 tablets at bedtime 60 tablet 3     Allergies   Allergen Reactions     Penicillins Rash and Hives     Versed [Midazolam] Other (See Comments)     Stopped breathing  Over 10 years ago but possible sensitivity to too much of this medication  Became apnic            HPI     Video Start Time: 2:26 PM    Gregoria presents to clinic today for the following health issues:    She feels she is coping okay, and she is having some trouble with these COVID19 changes. 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 She was concerned about PTSD flaring up due to this chaos, and     Saw Dr. Coronel last week via telephone. Has lesion on head and she picks at it. She is hoping to show it to me on video today.    It is not itchy   No other spots she notices   She spends a lot of time outside - rock climber, snowboarding, hiking - she is concerned because she has lots of sun exposure  Had a lesion on forehead, and saw dermatologist about 3 months ago. Did look at scalp, but perhaps too fast. She used nitrogen spray to treat lesions on forehead. She had a lot of areas - on her body as well.   First noticed it a couple weeks ago  Has not noticed any change in the last couple weeks  She picks at it, and it has not bled though - but notices a little bit of scab. Did not have scab prior to picking - does not think she is picking off a plaque.     Would prefer to start process with us, not with dermatologist.     Additional concerns  - Sweats: When she saw Dr. Coronel last week, she was concerned about sweating a lot. She said it feels like menopause, not night sweats. She is not sure why it is happening. It has been going on for a few months. Now she notices it more since she is not in a \"regular " "life.\" She won't be exerting herself, and will sweat a lot - mostly in face, neck, and upper body. Will happen every day. Not legs or arms. LMP was about 10 years ago - post menopausal. Had hot flashes then, and did not have years. It feels reminiscent of that.     - Refill: Takes gabapentin for restless leg, will run out in 5 days          Review of Systems:     ROS COMP: Constitutional, HEENT, cardiovascular, pulmonary, gi and gu systems are negative, except as otherwise noted.         Physical Exam:     LMP  (LMP Unknown)   Estimated body mass index is 34.03 kg/m  as calculated from the following:    Height as of 2/5/20: 1.689 m (5' 6.5\").    Weight as of 3/25/20: 97.1 kg (214 lb).    Physical exam performed via video visit.  Physical Exam  Vitals signs reviewed.   Constitutional:       General: She is not in acute distress.     Appearance: She is obese. She is not ill-appearing, toxic-appearing or diaphoretic.   HENT:      Mouth/Throat:      Mouth: Mucous membranes are moist.   Eyes:      Conjunctiva/sclera: Conjunctivae normal.   Pulmonary:      Effort: Pulmonary effort is normal.      Breath sounds: Normal breath sounds.   Skin:     Coloration: Skin is not jaundiced.      Findings: Lesion (difficult to view, but able to see on video - about 1cm well circumscribed pigmented lesion, patient reports as being rough texture, no surrounding erythema, no flaking) present. No bruising, erythema or rash.   Neurological:      Mental Status: She is alert and oriented to person, place, and time.   Psychiatric:         Mood and Affect: Mood normal.         Behavior: Behavior normal.         Thought Content: Thought content normal.         Judgment: Judgment normal.           Assessment and Plan   1. Skin lesion  Unable to provide definitive diagnosis over video visit. Most likely, based on limited exam and history, seborrheic keratosis. However, cannot exclude malignancy. Discussed the risks and benefits of an in-person " exam. Patient agreeable to waiting 1 month, and then will come in for inperson visit for dermoscopy and better diagnosis. At this time, does not appear to be fungal/dermatitis requiring topical treatment. We discussed signs and symptoms and when to contact us sooner (bleeding, change in shape/texture, pustular discharge, etc)    2. Hot flashes  Patient did not want to delve into this further today, and wants to discuss at future in person visit, but would like it noted. For the past couple of months, nearly every day she is having hot flashes including sweating in her neck, upper body. Patient says this is like menopause, however she has been post-menopausal for about 10 years. Patient does not have any vaginal symptoms. Could be related to anxiety, could be related to medications (miripex?). Unlikely malignancy, but recommend usual HM including pap and mammogram, would recommend PCP to perform complete physical including lymph node palpation, breast exam, and gynecologic exam.     3. Chronic pain syndrome  Refill given.  checked, no concerns.   - gabapentin (NEURONTIN) 300 MG capsule; Take 3 capsules (900 mg) by mouth At Bedtime  Dispense: 270 capsule; Refill: 0    Refilled medications that would be required in the next 3 months.     After Visit Information:  Patient chose to view AVS via [employer]  No follow-ups on file.    Video-Visit Details    Type of service:  Video Visit, completed majority of exam (about 15min) and had disconnection - but called patient via telephone to review plan for about 3 minutes.     Video End Time (time video stopped): 2:46 PM    Originating Location (pt. Location): Home    Distant Location (provider location):  [facility]     Mode of Communication:  Video Conference via [employer]  Precepted with Dr. Valero, who did see patient on video.

## 2020-04-28 NOTE — Clinical Note
Please call patient and scheduled in person visit with PCP, Dr. Coronel, in 1 month.    Thank you!  Annabelle Elizalde,   PGY3 Family Medicine Resident  Pager: [phone]

## 2020-04-28 NOTE — PROGRESS NOTES
"1. When she saw Dr. Coronel last week, she was concerned about sweating a lot. She said it feels like menopause, not night sweats. She is not sure why it is happening. It has been going on for a few months. Now she notices it more since she is not in a \"regular life.\" She won't be exerting herself, and will sweat a lot - mostly in face, neck, and upper body. Will happen every day. Not legs or arms. LMP was about 10 years ago - post menopausal. Had hot flashes then, and did not have years. It feels reminiscent of that.     2. Takes gabapentin for restless leg, willr un out in 5 days   "

## 2020-04-28 NOTE — PROGRESS NOTES
Preceptor Attestation:   Patient seen, evaluated and discussed with the resident. I have verified the content of the note, which accurately reflects my assessment of the patient and the plan of care.   Supervising Physician:  Megan Valero MD

## 2020-05-13 ENCOUNTER — OFFICE VISIT (OUTPATIENT)
Dept: FAMILY MEDICINE | Facility: CLINIC | Age: 65
End: 2020-05-13
Payer: MEDICARE

## 2020-05-13 VITALS
RESPIRATION RATE: 22 BRPM | WEIGHT: 222 LBS | HEART RATE: 80 BPM | TEMPERATURE: 98.1 F | SYSTOLIC BLOOD PRESSURE: 119 MMHG | DIASTOLIC BLOOD PRESSURE: 76 MMHG | OXYGEN SATURATION: 96 % | BODY MASS INDEX: 35.68 KG/M2 | HEIGHT: 66 IN

## 2020-05-13 DIAGNOSIS — F43.10 PTSD (POST-TRAUMATIC STRESS DISORDER): ICD-10-CM

## 2020-05-13 DIAGNOSIS — F41.9 ANXIETY: ICD-10-CM

## 2020-05-13 DIAGNOSIS — R23.2 HOT FLASHES: ICD-10-CM

## 2020-05-13 DIAGNOSIS — L98.9 SKIN LESION: Primary | ICD-10-CM

## 2020-05-13 ASSESSMENT — ANXIETY QUESTIONNAIRES
1. FEELING NERVOUS, ANXIOUS, OR ON EDGE: MORE THAN HALF THE DAYS
GAD7 TOTAL SCORE: 16
7. FEELING AFRAID AS IF SOMETHING AWFUL MIGHT HAPPEN: MORE THAN HALF THE DAYS
2. NOT BEING ABLE TO STOP OR CONTROL WORRYING: MORE THAN HALF THE DAYS
3. WORRYING TOO MUCH ABOUT DIFFERENT THINGS: NEARLY EVERY DAY
6. BECOMING EASILY ANNOYED OR IRRITABLE: MORE THAN HALF THE DAYS
5. BEING SO RESTLESS THAT IT IS HARD TO SIT STILL: MORE THAN HALF THE DAYS
IF YOU CHECKED OFF ANY PROBLEMS ON THIS QUESTIONNAIRE, HOW DIFFICULT HAVE THESE PROBLEMS MADE IT FOR YOU TO DO YOUR WORK, TAKE CARE OF THINGS AT HOME, OR GET ALONG WITH OTHER PEOPLE: VERY DIFFICULT

## 2020-05-13 ASSESSMENT — PATIENT HEALTH QUESTIONNAIRE - PHQ9
5. POOR APPETITE OR OVEREATING: NEARLY EVERY DAY
SUM OF ALL RESPONSES TO PHQ QUESTIONS 1-9: 21

## 2020-05-13 ASSESSMENT — MIFFLIN-ST. JEOR: SCORE: 1575.8

## 2020-05-13 NOTE — PROGRESS NOTES
Gregoria is a 64 year old  who presents for   Patient presents with:  Derm Problem: x2-3 Months, pt states to have some derm issues on scalp and forehead states to notice scabbing  Excessive Sweating: pt states to have be swesting more than usual       Assessment and Plan       Pt has been doing well. Pt has had a history of frequent sun exposure with rock climbing. Pt also notes rubbing/scratcing her head a lot with anxiety. The lesions on her scalp are likely from irritation. DDx: carbuncle or actinic keratosis. Lesion on her ear has been there for about a month and not bothersome, but has had skin removed from this ear in the past, but doesn't remember exactly what for, most likely BCC given nodular appearance and location. DDx: scar tissue or keloid. Unsure cause of sweats, but given no other associating symptoms less likely due to CAD, cancer, lymphoma, or thyroid issue, could be due to anxiety/PTSD, but would like to monitor.      1. Skin lesion  Follow up with dermatology for ear lesion  As for other lesions on scalp: avoid place anything irritating over site, can use triple antibiotic cream, use head and shoulders and conditioner to prevent dry skin.   Avoid rubbing or scratching area.   - DERMATOLOGY REFERRAL - INTERNAL    2. Anxiety  3. PTSD (post-traumatic stress disorder)  - Continue to follow up with your Therapist and Psychiatrist     4. Sweating  - Log  events of sweating to see if there is a pattern. Note other symptoms that happen at these times.     Follow up plan  Please make a clinic appointment for follow up with your primary physician Susan Coronel MD in 2-4 weeks for check in.       Return in about 2 weeks (around 5/27/2020) for Follow up for skin lesion.    There are no discontinued medications.      Susan Coronel MD         HPI       Gregoria is a 64 year old  who presents for   Patient presents with:  Derm Problem: x2-3 Months, pt states to have some derm issues on scalp and  forehead states to notice scabbing  Excessive Sweating: pt states to have be swesting more than usual       Visit [place]  1. Skin Rash    Rash/Lesion  Onset: Noticed it 2- 3 months ago. Pt has been rubbing her head when she's nervous. Found it then. Hasn't really scratched scalp, but does sometimes. Cute 1 weeks ago. Noticed bump on left ear noticed it at least 2 weeks.     Description:   Location: Right side posterior   Color: unable to see   Character: Not ozzing, feels like a bump, not itchy, has a history of acatinic keratosis in past. Frequently outdoor in the past with rock climbing.   Bump on ear not painful or itchy, had acitinic keratosis removed from there.   Itching (Pruritis): no  Pain?:Yes Details: only when scratching or rubbing at it.    Progression of Symptoms:  same    Accompanying Signs & Symptoms:  Fever: no  Body aches or joint pain:  no  Sore throat symptoms:no  Recent cold symptoms: no    History:   Previous similar rash: Yes Details: Acitinic keratosis     Precipitating factors:   Exposure to similar rash: no  New exposures: {no  Recent travel: no  New Medication: no    What makes it better?:  Triple antibiotic helps when irritated   Therapies Tried and outcome:  Nothing    2. Sweating  Pt feels like she been sweating more then usual. Was in the laundry area, but was warm there. Started getting sweating like when menopausal. Seems to happen with light activity. No other symptoms. Not shortness of breath. No chest pain. No increase in normal pain. No palpitations. No night sweats.  No new visual changes. No headaches. No anxiety during this time. Some lethargy. No diarrhea or constipation. Maybe some heat intolerance. No weakness. No hair or skin changes.     3. Anxiety/Depression    Pt has found a therapist that works for her which has been very helpful. Feels about the same as our last visit. Still feels like it's ramped up due to Covid-19. Some intrusive thougths of suicide, but says not a  "good idea, no plans. Talked about with her therapist.     PHQ 2/5/2020 3/25/2020 5/13/2020   PHQ-9 Total Score 21 19 21   Q9: Thoughts of better off dead/self-harm past 2 weeks More than half the days More than half the days More than half the days   F/U: Thoughts of suicide or self-harm - - -   F/U: Self harm-plan - - -   F/U: Self-harm action - - -   F/U: Safety concerns - - -   PHQ-A Total Score - - -   PHQ-A Mood affect on daily activities - - -   PHQ-A Suicide Ideation past 2 weeks - - -     CLAIRE-7 SCORE 11/22/2019 3/25/2020 5/13/2020   Total Score 13 20 16     Problem, Medication and Allergy Lists were reviewed and updated if needed..  Patient is an established patient of this clinic.  Reviewed and updated as needed this visit by clinical staff  Tobacco  Allergies  Meds  Med Hx  Surg Hx  Fam Hx  Soc Hx      Reviewed and updated as needed this visit by Provider       Health Maintenance Due   Topic Date Due     DEPRESSION ACTION PLAN  1955     ZOSTER IMMUNIZATION (1 of 2) 08/02/2007     PAP  11/03/2018          Review of Systems:   Review of Systems  10 point review of symptoms was otherwise negative except as noted in HPI         Physical Exam:     Vitals:    05/13/20 1100 05/13/20 1104   BP: (!) 150/93 119/76   BP Location: Right arm Right arm   Patient Position: Sitting Sitting   Cuff Size: Adult Regular Adult Large   Pulse: 80    Resp: 22    Temp: 98.1  F (36.7  C)    TempSrc: Oral    SpO2: 96%    Weight: 100.7 kg (222 lb)    Height: 1.664 m (5' 5.5\")      Body mass index is 36.38 kg/m .  Vitals were reviewed and were normal     Physical Exam  Vitals signs and nursing note reviewed.   Constitutional:       General: Gregoria Stein is not in acute distress.     Appearance: Normal appearance. Gregoria Stein is obese. Gregoria Stein is not ill-appearing or diaphoretic.   HENT:      Head: Normocephalic.      Comments: 2 small bumps on scalp, appear to be furuncles from irritating of razors and or " itching. See photos below     Ears:      Comments: Small raised bump on Left ear anterior helix. No telangiectasis or ulceration.   Eyes:      Extraocular Movements: Extraocular movements intact.      Conjunctiva/sclera: Conjunctivae normal.      Pupils: Pupils are equal, round, and reactive to light.   Neck:      Musculoskeletal: Normal range of motion and neck supple.   Cardiovascular:      Rate and Rhythm: Normal rate and regular rhythm.      Pulses: Normal pulses.      Heart sounds: Normal heart sounds. No murmur.   Musculoskeletal: Normal range of motion.      Right lower leg: No edema.      Left lower leg: No edema.   Lymphadenopathy:      Cervical: No cervical adenopathy.   Skin:     General: Skin is warm and dry.      Findings: Lesion present.      Comments: See photos below and head and ear exam   Neurological:      Mental Status: Gregoria Stein is alert.   Psychiatric:         Attention and Perception: Attention and perception normal.         Mood and Affect: Mood is anxious.         Speech: Speech normal.         Behavior: Behavior normal. Behavior is cooperative.         Thought Content: Thought content does not include suicidal ideation. Thought content does not include suicidal plan.         Judgment: Judgment normal.                           Results:   No testing ordered today    Options for treatment and follow-up care were reviewed with the patient. Gregoria Stein  engaged in the decision making process and verbalized understanding of the options discussed and agreed with the final plan.  Susan Coronel MD  PGY 1 [facility]

## 2020-05-13 NOTE — PATIENT INSTRUCTIONS
Here is the plan from today's visit    1. Skin lesion  Follow up with dermatology for ear lesion  As for other lesions on scalp: avoid place anything irritating over site, can use triple antibiotic cream, use head and shoulders and conditioner to prevent dry skin.   Avoid rubbing or scratching area.   - DERMATOLOGY REFERRAL - INTERNAL    2. Anxiety  3. PTSD (post-traumatic stress disorder)  - Continue to follow up with your Therapist and Psychiatrist     4. Sweating  Log your events of sweating to see if there is a pattern. Note other symptoms that happen at these times.       Please call or return to clinic if your symptoms don't go away.    Follow up plan  Please make a clinic appointment for follow up with your primary physician Susan Coronel MD in 2-4 weeks for check in.    Thank you for coming to [facility] today.  Lab Testing:  **If you had lab testing today and your results are reassuring or normal they will be mailed to you or sent through [employer] within 7 days.   **If the lab tests need quick action we will call you with the results.  The phone number we will call with results is # [phone] (home) . If this is not the best number please call our clinic and change the number.  Medication Refills:  If you need any refills please call your pharmacy and they will contact us.   If you need to  your refill at a new pharmacy, please contact the new pharmacy directly. The new pharmacy will help you get your medications transferred faster.   Scheduling:  If you have any concerns about today's visit or wish to schedule another appointment please call our office during normal business hours [phone] (8-5:00 M-F)  If a referral was made to a [facility] and you don't get a call from central scheduling please call [phone].  If a Mammogram was ordered for you at [facility] call [phone] to schedule or change your appointment.  If you had an  XRay/CT/Ultrasound/MRI ordered the number is [phone] to schedule or change your radiology appointment.   Medical Concerns:  If you have urgent medical concerns please call [phone] at any time of the day.    Susan Coronel MD

## 2020-05-13 NOTE — PROGRESS NOTES
Preceptor Attestation:    Patient seen and evaluated in person. I discussed the patient with the resident. I have verified the content of the note, which accurately reflects my assessment of the patient and the plan of care.   Supervising Physician:  Giovanni Fuentes MD.

## 2020-05-14 ASSESSMENT — ANXIETY QUESTIONNAIRES: GAD7 TOTAL SCORE: 16

## 2020-05-21 ENCOUNTER — TELEPHONE (OUTPATIENT)
Dept: FAMILY MEDICINE | Facility: CLINIC | Age: 65
End: 2020-05-21

## 2020-05-21 NOTE — TELEPHONE ENCOUNTER
"Pt left this writer a VM today 5/21/2020 stating she is \"really anxious\" and having a difficult time working. Requesting an appointment with Dr. Coronel to talk about physical health symptoms and a leave of absence for work letter.     Will route to PCP Dr. Coronel and [facility] .   "

## 2020-05-27 ENCOUNTER — VIRTUAL VISIT (OUTPATIENT)
Dept: FAMILY MEDICINE | Facility: CLINIC | Age: 65
End: 2020-05-27
Payer: MEDICARE

## 2020-05-27 ENCOUNTER — TELEPHONE (OUTPATIENT)
Dept: PSYCHOLOGY | Facility: CLINIC | Age: 65
End: 2020-05-27

## 2020-05-27 DIAGNOSIS — F41.9 ANXIETY: Primary | ICD-10-CM

## 2020-05-27 DIAGNOSIS — F43.10 PTSD (POST-TRAUMATIC STRESS DISORDER): ICD-10-CM

## 2020-05-27 DIAGNOSIS — L98.9 SKIN LESION: ICD-10-CM

## 2020-05-27 NOTE — PATIENT INSTRUCTIONS
Here is the plan from today's visit    1. Anxiety  2. PTSD (post-traumatic stress disorder)  - Follow up with JAMES and Dr. Raza  - Keep your appt with your therapist today  - Please call us or hot line if feel thoughts of suicide  - Below are resources of support groups for gender dysphoria   - Please see me next week; if I don't hear from you next week I will reach out  - Here is your info for the dermatologist:  [facility]: [facility] [place] [phone]   [email or web address]      Some online resources for transgender health    Need a therapist or other type of provider? Check out:     LGBT therapists.org    http://mnlgbtqdirectory.org    [facility]  Comprehensive, effective, and affirming healthcare services for trans and gender-diverse communities.  http://[email or web address]?[email or web address]-00-05    [facility]   Community-based non-profit committed to advancing the health & wellness of LGBTQ communities through research, education and advocacy. http://www.[employer].org    Thoughts of self harm?   Call Trans Lifeline at 231-190-4544. This service is staffed by trans people 24/7.     https://www.[place]transgenderhealth.org/support-groups      Please call or return to clinic if your symptoms don't go away.    Follow up plan  Please make a clinic appointment for follow up with me (DANIEL PERSAUD) in 1  week for PTSD follow up.    Thank you for coming to [facility] today.  Lab Testing:  **If you had lab testing today and your results are reassuring or normal they will be mailed to you or sent through [employer] within 7 days.   **If the lab tests need quick action we will call you with the results.  The phone number we will call with results is # [phone] (home) . If this is not the best number please call our clinic and change the number.  Medication Refills:  If you need any refills please call your pharmacy and they  will contact us.   If you need to  your refill at a new pharmacy, please contact the new pharmacy directly. The new pharmacy will help you get your medications transferred faster.   Scheduling:  If you have any concerns about today's visit or wish to schedule another appointment please call our office during normal business hours [phone] (8-5:00 M-F)  If a referral was made to a [facility] and you don't get a call from central scheduling please call [phone].  If a Mammogram was ordered for you at [facility] call [phone] to schedule or change your appointment.  If you had an XRay/CT/Ultrasound/MRI ordered the number is [phone] to schedule or change your radiology appointment.   Medical Concerns:  If you have urgent medical concerns please call [phone] at any time of the day.    Susan Coronel MD

## 2020-05-27 NOTE — LETTER
WORK/SCHOOL FORM    5/27/2020    Re: Gregoria Stein  1955    From: [facility]       To Whom It May Concern:     Gregoria Stein underwent a .  As such Gregoria Stein age and medical conditions has her currently fit into a  high risk  category for COVID-19.     For patients that work outside the home, we recommend that all reasonable accommodations be made to allow working remotely or modifying work responsibilities to protect your health.     If you have any questions or concerns about the above request, please contact the [facility] [phone].    Thank you for your partnership.    Sincerely,   [employer]      Susan Coronel MD  5/27/2020 1:39 PM

## 2020-05-27 NOTE — TELEPHONE ENCOUNTER
"Primary Care Behavioral Health Consult Note    Meeting lasted: 25 minutes  Others present: none    Identifying Information and Presenting Problem:    Dr. Coronel requested behavioral health consultation for this patient regarding crisis consultation and safety planning.  The patient is a 64 year old American individual that agreed to be called by behavioral health today.    Topics Discussed/Interventions Provided:       Discussed case with Dr. Coronel and SW before calling patient. See Dr. Coronel note for additional information on patient's presentation and risk assessment. Dr. Coronel requested assistance establishing a safety plan.     Reached patient by phone who reported that she was feeling calmer. She acknowledged that she is easily distressed and overwhelmed. Has been feeling very stressed and vulnerable at work with Covid-19 and feels like her PTSD and anxiety has been getting triggered.     Facilitated establishing a safety plan (see below note). Praised Gregroia for reaching out for help and contacting her health team for support. Plan is for Dr. Coronel to write a letter to help her take time off work and advocate for her health needs. Feels reassured by this.     Gregoria felt comfortable following through with safety plan and denied any safety concerns. Although she thinks about suicide, she said she has no plans to follow o thoughts and knows that she just wants to fix this situation. Established close follow up plan during this difficult time.     Assessment:     Mental Status: Gregoria sounded generally alert and oriented. Speech was of normal volume and rate and was clear, coherent, and relevant. Mood was \"better\" with calmer, sometimes tearful affect. Thought processes were relevant, logical and goal-directed. Thought content was WNL with no evidence of psychotic or paranoid features. Memory appeared grossly intact. Insight and judgment appeared good and patient exhibited good impulse control during the " appointment.     Suicide risk assessment: Today Gregoria Stein reports active suicidal ideation of jumping off a bridge. She has a history of feeling easily overwhelmed, flooded, and anxious which she recognizes.  In addition, Gregoria Stein has notable risk factors for self-harm, including single status, anxiety and a history of self-harm (punching herself). However, risk is mitigated by spiritual beliefs, sobriety, absence of past attempts, ability to volunteer a safety plan, history of seeking help when needed and upcoming psychotherapy sessions. Therefore, based on all available evidence including the factors cited above, Gregoria Stein does not appear to be at imminent risk for self-harm, does not meet criteria for a 72-hr hold, and therefore remains appropriate for ongoing outpatient level of care. Dr. Coronel did offer to Gregoria if she felt hospitalization would be helpful right now, but she did not think it was warranted. She does not want to harm herself, but is feeling very distressed. We made a plan for her to attend therapy, will call her MH  and she will schedule close follow up with her providers. Made a safety plan that will be sent to Gregoria through [employer] and mail.     PHQ-9 SCORE 2/5/2020 3/25/2020 5/13/2020   PHQ-9 Total Score 21 19 21   PHQ-A Total Score - - -       CLAIRE-7 SCORE 11/22/2019 3/25/2020 5/13/2020   Total Score 13 20 16       Diagnosis (DSM-5):  PTSD  MDD, recurrent, severe w/o psychosis  CLAIRE    Plan:      Patient will talk to her employment counselor today at 1 pm to discuss what she needs at work.     Has called both CM and therapist today. Left a VM for there therapist letting her know of the agenda for today. Therapist appointment at 3 pm. Will discuss crisis, safety plan, and strategies. Patient gave verbal consent to communicate with therapist. May be useful to mail NETTIE to Gregoria to sign and mail back to clinic.     SW will call patient tomorrow to check-in.      Dr. Coronel requested weekly follow up at the moment. JAMES can help schedule this appointment tomorrow.     Safety plan shared through [employer] and sent through mail for patient to have available.     Routing note to PCP, SW, and Dr. Tee for update.       Safety Plan  Step 1: Warning Signs (thoughts, images, mood, situations, behaviors) that a crisis may be developing  1. Start feeling very stressed and anxious  2. I start feeling pressure and feel trapped  3. Feel like I'm dissociating  4. I think that I just want to escape     Step 2: Internal coping strategies - Things I can do to take my mind off my problems without contacting another person (relaxation technique, physical activity)  1. Thinking of that man in [place] got talent who sang so beautifully and went through many things  2. Grounding myself - 5 things I see, hear, smell, touch  3. Drawing  4. Just going outside  5. Remember that I have my medication (hydroxyzine) and I will remember to take it    Step 3: People and social settings that help to distract me  Name: SUG[employer] - LGBT group on Saturdays   Place: Outside in general - go to a park or lake    Step 4: People whom I can ask for help  Name: Provider - Dr. Coronel and Roro's team Phone: [phone]  Name: Gregoria Damon - [phone]  Name: My  - Shirley Velazco     Step 5: Professionals or agencies I can contract during a crisis  - [facility] Suicide Hotline: [phone] (suicidal thoughts)  - [facility]: [phone] (psychiatric crisis, but can transport self)  - [facility]: [phone] (psychiatric crisis, but cannot transport self)  - [facility] Adult Crisis Line: [phone] (crisis counseling and walk-in urgent care mental health)  - [facility] Multilingual Crisis Line: [phone]  -Suicide Prevention Lifeline Phone: 0-055-807-TALK (5968)  -If in immediate danger of harming self/others, call 9-1-1.    Crisis Text Line: Text MN to 770806. Free support at your  fingertips 24/7  People who text MN to 347542 will be connected with a counselor. Crisis Text Line is available 24 hours a day, seven days a week.    The one thing that is most important to me and worth living for is:  - My love of nature - seeing beautiful animals and landscapes. I have a strong spiritual connection to nature.   - I have a team of people to help me   - I just need to remember that this feeling is temporary, I just need to ride out the anxiety, I will feel better in time    Cleve GODOY & Derick BOOTH (2008). Suicide Safety Plan Template. Publisher: [facility] ([employer]) Mental Health Program and Suicide Prevention Resource Center

## 2020-05-27 NOTE — PROGRESS NOTES
Some online resources for transgender health    Need a therapist or other type of provider? Check out:     LGBT therapists.org    http://mnlgbtqdirectory.org    [facility] Excellence for Transgender Health  Comprehensive, effective, and affirming healthcare services for trans and gender-diverse communities.  http://[email or web address].[facility].edu/trans?page=case-00-05    [facility]   Community-based non-profit committed to advancing the health & wellness of LGBTQ communities through research, education and advocacy. http://www.[facility].org    Thoughts of self harm?   Call Trans Lifeline at 773-745-0660. This service is staffed by trans people 24/7.     https://www.[place]transgenderhealth.org/support-groups

## 2020-05-27 NOTE — TELEPHONE ENCOUNTER
Left VM with pt TCM (targeted ) Shirley Velazco [phone].  Summarized MH concerns right now.  Asked Shirley to call Gregoria SAMUELS to talk about financial resources available and to offer more emotional support.     This writer will check-in with pt tomorrow 5/28/20.  Renuka Flynn, FELIX  [facility] Social Work Care Coordinator

## 2020-05-27 NOTE — PROGRESS NOTES
"Family Medicine Telephone Visit Note               Telephone Visit Consent   Patient was verbally read the following and verbal consent was obtained.    \"Telephone visits are billed at different rates depending on your insurance coverage. During this emergency period, for some insurers they may be billed the same as an in-person visit.  Please reach out to your insurance provider with any questions.  If during the course of the call the physician/provider feels a telephone visit is not appropriate, you will not be charged for this service.\"    Name person giving consent:  Patient   Date verbal consent given:  5/27/2020  Time verbal consent given:  10:47 AM      Chief Complaint   Patient presents with     Anxiety     sweating and wants to discuss work     Derm Problem     pt wants to discuss possible skin cancer           HPI   Patients name: Gregoria  Appointment start time:  11:30 AM    Anxiety/PTSD  Pt supposed to go to work tomorrow. Wanting to see her counselor and wants FMLA, but not eligible.  Pt can't stop working as she'll lose insurance. Work is not providing appropriate protection for her. She's having to clean the goggle and she doesn't know what  she's using or for how long, not given proper information about this or instruction.  Some employees not wearing goggles. Coworker of hers was positive for Covid and is back at work. She's worried about getting it. She's starting to dissociate and gets anxious. Pt keeps getting stress at home, her PTSD is being triggered it feels like. She's mainly been sleeping and lying day. Pt feels like she can't handle what's happening at work. Pt feels like she can do this anymore. Pt works at  of nursing home. Pt is wanting to call [employer] and it's causing her to much distress. Feels like life is derailed. Pt feels like she's in a corner or trapped and starts to build at work. Sometimes she has thoughts of driving to the bridge after work in order to get out. " Pt has gone to the bridge and taken pictures and had thoughts of climbing the bridge and humberto up how she would do it. Keeps checking out the bridge. Pt currently doesn't have thoughts now, but everything is pushing her to it. Reason for not going through it is because she doesn't want her work to be the cause and not wanting to be able to stick up for others. Pt is going to talk to her therapist at 3 and wanting to increase those. Starting to have gender identity issues.     Would like a gender support group.     Therapist: Asha Damon [facility] in [place] [phone]; We have permission per pt to talk to her.     Skin Lesion  At our last visit Gregoria was referred to dermatology, but never heard from them about scheduling. Pt requesting phone number of clinic she was referred to do so. Has also not been helping with her anxiety.     Current Outpatient Medications   Medication Sig Dispense Refill     cholecalciferol (VITAMIN D3) 5000 units (125 mcg) capsule Take by mouth daily       famotidine (PEPCID) 20 MG tablet Take 20 mg by mouth 2 times daily       gabapentin (NEURONTIN) 300 MG capsule Take 3 capsules (900 mg) by mouth At Bedtime 270 capsule 0     hydrOXYzine (VISTARIL) 50 MG capsule Take 1 capsule (50 mg) by mouth 3 times daily as needed for anxiety 90 capsule 3     lamoTRIgine (LAMICTAL) 25 MG tablet Take 50 mg by mouth daily        Omega-3 Fatty Acids (OMEGA-3 FISH OIL) 1200 MG CAPS        pramipexole (MIRAPEX) 0.25 MG tablet Take 0.75 mg by mouth       traZODone (DESYREL) 100 MG tablet Take1 Tablet at night orally for sleep PRN 30 tablet 3     Acetaminophen (TYLENOL PO) Take 1,000 mg by mouth every 8 hours as needed        pramipexole (MIRAPEX) 0.125 MG tablet Take 2 tablets (0.25 mg) by mouth At Bedtime 2 tablets at bedtime 60 tablet 3     Allergies   Allergen Reactions     Penicillins Rash and Hives     Versed [Midazolam] Other (See Comments)     Stopped breathing  Over 10 years ago  "but possible sensitivity to too much of this medication  Became apnic              Review of Systems:     Constitutional, HEENT, cardiovascular, pulmonary, gi and gu systems are negative, except as otherwise noted.         Physical Exam:     LMP  (LMP Unknown)   Estimated body mass index is 36.38 kg/m  as calculated from the following:    Height as of 5/13/20: 1.664 m (5' 5.5\").    Weight as of 5/13/20: 100.7 kg (222 lb).    Exam:  Constitutional: healthy, alert, moderate distress and crying  Psychiatric: mentation appears normal, anxious, crying, fatigued, judgment and insight intact, worried and recently having thoughts of suicide, currently does not.         Assessment and Plan   Gregoria was seen today for anxiety and derm problem.    Diagnoses and all orders for this visit:    Anxiety  PTSD (post-traumatic stress disorder)  -  consulted for crisis and safety planning  - SW consulted for help with financial and work concerns  - Letter sent to pt for leave from work  - Encouraged to keep therapy appointment today; contacted pt therapist to update on situation and what has been done, permission given by pt.   Some online resources for transgender health provided to pt.  Need a therapist or other type of provider? Check out:     LGBT therapists.org    http://mnlgbtqdirectory.org    [facility] of Excellence for Transgender Health  Comprehensive, effective, and affirming healthcare services for trans and gender-diverse communities.  http://www.transhealth.[facility].edu/trans?page=case-00-05    [facility]   Community-based non-profit committed to advancing the health & wellness of LGBTQ communities through research, education and advocacy. http://www.[employer].org    Thoughts of self harm?   Call Trans Lifeline at 314-571-3958. This service is staffed by trans people 24/7.     https://www.[place]transgenderhealth.org/support-groups    Skin lesion  - [place] info for the dermatologist:  [place]: [facility] [place] [phone]   [email or web address]    Refilled medications that would be required in the next 3 months.     After Visit Information:  Will print and mail AVS     Return in about 1 week (around 6/3/2020) for Follow up on anxiety and PTSD.         Appointment end time: 1215 PM  This is a telephone visit that took 45 minutes.      Clinician location:  [facility]    Susan Coronel MD  I precepted today with Dr. Kim Taylor.

## 2020-05-27 NOTE — PROGRESS NOTES
Dr. Coronel requested behavioral health outreach to establish safety plan with patient. Please see outreach call by Dr. Raza on 5/27/2020 for details on this call. This safety plan has been shared with patient through [employer] and postal mail.     Safety Plan    Step 1: Warning Signs (thoughts, images, mood, situations, behaviors) that a crisis may be developing  1. Start feeling very stressed and anxious  2. I start feeling pressure and feel trapped  3. Feel like I'm dissociating  4. I think that I just want to escape     Step 2: Internal coping strategies - Things I can do to take my mind off my problems without contacting another person (relaxation technique, physical activity)  1. Thinking of that man in [place] got talent who sang so beautifully and went through many things  2. Grounding myself - 5 things I see, hear, smell, touch  3. Drawing  4. Just going outside  5. Remember that I have my medication (hydroxyzine) and I will remember to take it    Step 3: People and social settings that help to distract me  Name: [employer] - LGBT group on Saturdays   Place: Outside in general - go to a park or lake    Step 4: People whom I can ask for help  Name: Provider - Dr. Coronel and Roro's team Phone: [phone]  Name: Gregoria Damon - [phone]  Name: My  - Shirley Velazco     Step 5: Professionals or agencies I can contract during a crisis  - [facility] Suicide Hotline: [phone] (suicidal thoughts)  - [facility]: [phone] (psychiatric crisis, but can transport self)  - [facility]: [phone] (psychiatric crisis, but cannot transport self)  - [facility] Adult Crisis Line: [phone] (crisis counseling and walk-in urgent care mental health)  - [facility] Multilingual Crisis Line: [phone]  -Suicide Prevention Lifeline Phone: 2-382-353-TALK (2018)  -If in immediate danger of harming self/others, call 9-1-1.    Crisis Text Line: Text MN to 188987. Free support at your fingertips  24/7  People who text MN to 167985 will be connected with a counselor. Crisis Text Line is available 24 hours a day, seven days a week.    The one thing that is most important to me and worth living for is:  - My love of nature - seeing beautiful animals and landscapes. I have a strong spiritual connection to nature.   - I have a team of people to help me   - I just need to remember that this feeling is temporary, I just need to ride out the anxiety, I will feel better in time    Cleve GODOY & Derick BOOTH (2008). Suicide Safety Plan Template. Publisher: [facility] ([employer]) Mental Health Program and Suicide Prevention Resource Center

## 2020-05-27 NOTE — PROGRESS NOTES
Preceptor Attestation:   I talked to the patient on the phone. I discussed the patient with the resident. I have verified the content of the note, which accurately reflects my assessment of the patient and the plan of care.   Supervising Physician:  Kim Taylor MD.

## 2020-05-28 NOTE — TELEPHONE ENCOUNTER
"Talked with therapists at 3pm - talked about it \"I think\"  Was doing every other week with therapist, changing it to every week or even 2x a week.    Does not recall any safety plan    Has not heard from Shirley BOYCE, told,     SSDI right now,     She would love to work, but questions if she should go to work tomorrow 5/29/20.        Pt has her Medicaid supplement because she is working.  She is on MA-EPD.  Per Shirley, to keep her MA-EPD pt needs tomake $50ish/month.  There is a possibility of her not working for 4 months and still keep her MA-EPD.  Shirley did not know the details but knows the financial worker could assist with this if it gets to that point.        "

## 2020-05-28 NOTE — TELEPHONE ENCOUNTER
Called pt today 5/28/2020.  Pt talked with her therapist yesterday 5/27/20 at 3pm, unable to recall what was talked about. Does not recall talking about the safety plan made with Dr. Raza with her therapist.  We reviewed it together.  We talked about her working/not working is not black/white.  Talked about how not working is temporary as of now.    Reviewed crisis numbers to call and text. Pt states she has no active thoughts of wanting to hurt or kill herself as of right now.     PLAN:  -Was seeing therapist every other week, changing it to every week or even 2x a week.   -video appts with PCP Dr. Coronel every week now (appts are scheduled)   -continue to review safety plan at each visit  -Pt will talk with Shirley BOYCE about keeping her health insurance and possible financial help  -Planted tomatoes, zucchini, cucumbers close to her appt - in a neglected bed.  Plans to go visit this regularly to tend to plants and enjoy nature     Will route to Dr. Raza, PCP Dr. Coronel, and Dr. Tee.     Renuka Flynn, [facility] Social Work Care Coordinator

## 2020-05-29 NOTE — PROGRESS NOTES
Preceptor Attestation:  I have reviewed and agree with the behavioral health fellow's documentation for this video/phone visit.  I did not see the patient.  Supervising Clinical Psychologist:  Sharmila Tee PSYD LP

## 2020-06-05 ENCOUNTER — VIRTUAL VISIT (OUTPATIENT)
Dept: FAMILY MEDICINE | Facility: CLINIC | Age: 65
End: 2020-06-05
Payer: MEDICARE

## 2020-06-05 DIAGNOSIS — F43.10 PTSD (POST-TRAUMATIC STRESS DISORDER): Primary | ICD-10-CM

## 2020-06-05 DIAGNOSIS — Z00.00 HEALTHCARE MAINTENANCE: ICD-10-CM

## 2020-06-05 DIAGNOSIS — G25.81 RESTLESS LEG SYNDROME: ICD-10-CM

## 2020-06-05 DIAGNOSIS — F41.9 ANXIETY: ICD-10-CM

## 2020-06-05 RX ORDER — PRAMIPEXOLE DIHYDROCHLORIDE 0.25 MG/1
0.75 TABLET ORAL AT BEDTIME
Qty: 90 TABLET | Refills: 3 | Status: SHIPPED | OUTPATIENT
Start: 2020-06-05

## 2020-06-05 ASSESSMENT — ANXIETY QUESTIONNAIRES
IF YOU CHECKED OFF ANY PROBLEMS ON THIS QUESTIONNAIRE, HOW DIFFICULT HAVE THESE PROBLEMS MADE IT FOR YOU TO DO YOUR WORK, TAKE CARE OF THINGS AT HOME, OR GET ALONG WITH OTHER PEOPLE: EXTREMELY DIFFICULT
6. BECOMING EASILY ANNOYED OR IRRITABLE: NEARLY EVERY DAY
5. BEING SO RESTLESS THAT IT IS HARD TO SIT STILL: NEARLY EVERY DAY
1. FEELING NERVOUS, ANXIOUS, OR ON EDGE: SEVERAL DAYS
2. NOT BEING ABLE TO STOP OR CONTROL WORRYING: NEARLY EVERY DAY
3. WORRYING TOO MUCH ABOUT DIFFERENT THINGS: NEARLY EVERY DAY
GAD7 TOTAL SCORE: 19
7. FEELING AFRAID AS IF SOMETHING AWFUL MIGHT HAPPEN: NEARLY EVERY DAY

## 2020-06-05 ASSESSMENT — PATIENT HEALTH QUESTIONNAIRE - PHQ9
SUM OF ALL RESPONSES TO PHQ QUESTIONS 1-9: 25
5. POOR APPETITE OR OVEREATING: NEARLY EVERY DAY

## 2020-06-05 NOTE — PATIENT INSTRUCTIONS
Here is the plan from today's visit    1. PTSD (post-traumatic stress disorder)  2. Anxiety  - Keep therapy visits  - Follow up with me in a week  - Make appointment with NP to adjust medications.     3. Healthcare maintenance  - Screening Mammogram Digital Bilateral; Future  - They should call you with scheduling otherwise call Clinical referral to [facility] [phone] in 2 days.    4. Restless leg syndrome  - pramipexole (MIRAPEX) 0.25 MG tablet; Take 3 tablets (0.75 mg) by mouth At Bedtime  Dispense: 90 tablet; Refill: 3      Please call or return to clinic if your symptoms don't go away.    Follow up plan  Please make a clinic appointment for follow up with me (DANIEL PERSAUD) in 1  week for mental health follow up.    Thank you for coming to [facility] today.  Lab Testing:  **If you had lab testing today and your results are reassuring or normal they will be mailed to you or sent through [employer] within 7 days.   **If the lab tests need quick action we will call you with the results.  The phone number we will call with results is # [phone] (home) . If this is not the best number please call our clinic and change the number.  Medication Refills:  If you need any refills please call your pharmacy and they will contact us.   If you need to  your refill at a new pharmacy, please contact the new pharmacy directly. The new pharmacy will help you get your medications transferred faster.   Scheduling:  If you have any concerns about today's visit or wish to schedule another appointment please call our office during normal business hours [phone] (8-5:00 M-F)  If a referral was made to a [facility] and you don't get a call from central scheduling please call [phone].  If a Mammogram was ordered for you at [facility] call [phone] to schedule or change your appointment.  If you had an XRay/CT/Ultrasound/MRI ordered the number is  [phone] to schedule or change your radiology appointment.   Medical Concerns:  If you have urgent medical concerns please call [phone] at any time of the day.    Susan Coronel MD

## 2020-06-05 NOTE — PROGRESS NOTES
"Family Medicine Video Visit Note  Gregoria is being evaluated via a billable video visit.               Video Visit Consent     Patient was verbally read the following and verbal consent was obtained.  \"This video visit will be conducted via a call between you and your physician/provider. We have found that certain health care needs can be provided without the need for an in-person physical exam.  This service lets us provide the care you need with a video conversation.  If a prescription is necessary we can send it directly to your pharmacy.  If lab work is needed we can place an order for that and you can then stop by our lab to have the test done at a later time.    If during the course of the call the physician/provider feels a video visit is not appropriate, you will not be charged for this service.\"     Name person giving consent:  Patient   Date verbal consent given:  6/5/2020  Time verbal consent given:  1:36 PM    Patient would like the video invitation sent by: Text to cell phone: [phone]      Chief Complaint   Patient presents with     Recheck Medication     Mental health and medicaions     Current Outpatient Medications   Medication Sig Dispense Refill     Acetaminophen (TYLENOL PO) Take 1,000 mg by mouth every 8 hours as needed        cholecalciferol (VITAMIN D3) 5000 units (125 mcg) capsule Take by mouth daily       famotidine (PEPCID) 20 MG tablet Take 20 mg by mouth 2 times daily       gabapentin (NEURONTIN) 300 MG capsule Take 3 capsules (900 mg) by mouth At Bedtime 270 capsule 0     hydrOXYzine (VISTARIL) 50 MG capsule Take 1 capsule (50 mg) by mouth 3 times daily as needed for anxiety 90 capsule 3     lamoTRIgine (LAMICTAL) 25 MG tablet Take 50 mg by mouth daily        Omega-3 Fatty Acids (OMEGA-3 FISH OIL) 1200 MG CAPS        pramipexole (MIRAPEX) 0.25 MG tablet Take 3 tablets (0.75 mg) by mouth At Bedtime 90 tablet 3     traZODone (DESYREL) 100 MG tablet Take1 Tablet at night orally for sleep " "PRN 30 tablet 3     pramipexole (MIRAPEX) 0.125 MG tablet Take 2 tablets (0.25 mg) by mouth At Bedtime 2 tablets at bedtime 60 tablet 3     Allergies   Allergen Reactions     Penicillins Rash and Hives     Versed [Midazolam] Other (See Comments)     Stopped breathing  Over 10 years ago but possible sensitivity to too much of this medication  Became apnic              HPI     Video Start Time: 1:45 PM    Gregoria presents to clinic today for the following health issues:    PTSD/Anxiety   They had a therapy appt at noon today. It went well and focused on her mental health. Haven't talked about gender identity issues yet, wanting to focus on her depression on the moment. Last few days less thoughts of suicide. Feeling numb and shell. No plan. Pt planted some tomatoes and looking forward to taking care of them. Hangs out in hammock. Pt has been working on her bike and doing her PT exercises for her knee. Hasn't seen her NP who manages her psych medications.  Still medications everyday.     Preferred name Killian and has decided to use They/them/their pronouns. Pt knows they would freak out having how to wear a dress as a kid. Hated wearing make up. [place] like they weren't in they're own skin. Pt felt \"inocencia\" identity was pushed on them. Felt like they couldn't relate to lesbians their age.Hadn't bras and their boobs.  Found support group from last visit and plans to reach out to them. No knows is Trans.       PHQ 3/25/2020 5/13/2020 6/5/2020   PHQ-9 Total Score 19 21 25   Q9: Thoughts of better off dead/self-harm past 2 weeks More than half the days More than half the days More than half the days   F/U: Thoughts of suicide or self-harm - - -   F/U: Self harm-plan - - -   F/U: Self-harm action - - -   F/U: Safety concerns - - -   PHQ-A Total Score - - -   PHQ-A Mood affect on daily activities - - -   PHQ-A Suicide Ideation past 2 weeks - - -     Skin lesion  Follow up with Dermatology and they removed the lesion on her ear and " "waiting for results; seeing them has helped with her anxiety.     Medication Refill  Refill for Mirapex for restless leg syndrome. It does help, but has been worse with all the rioting and staying up too late. And is going to try and sleep sooner.    Mammogram  Pt was hesitant in the past and we were going to try the Mammogram bus, but covid interfered. Would like to go and get it done now. Explained that she may emy Covid testing. Pt hasn't noticed anything concerning such as lumps, skin changes, or abnormal discharge. No hx of breast cancer.           Review of Systems:     Constitutional, HEENT, cardiovascular, pulmonary, gi and gu systems are negative, except as otherwise noted.         Physical Exam:     LMP  (LMP Unknown)   Estimated body mass index is 36.38 kg/m  as calculated from the following:    Height as of 5/13/20: 1.664 m (5' 5.5\").    Weight as of 5/13/20: 100.7 kg (222 lb).    GENERAL: healthy, alert and no distress  HENT: normal cephalic/atraumatic,  Left ear with scaring from lesion removal  PSYCH: mentation appears normal, affect normal/bright, anxious, judgement and insight intact and appearance well groomed          Assessment and Plan   (F43.10) PTSD (post-traumatic stress disorder)  (primary encounter diagnosis)  (F41.9) Anxiety  Comment: Killian last week was commenting on past actions of actie suicidal thoughts and plans. Pt was contacted by SHELBY, JAMES, and saw her therapist.   Plan:    - Keep therapy visits   - Follow up with me in a week   - Make appointment with NP to adjust medications.     (Z00.00) Healthcare maintenance  Comment: Pt couldn't remember about referral prior and was going to try mammogram bus, but feel through due to Covid. Pt requesting to have done if available to due again.   Plan:     - Screening Mammogram Digital Bilateral    (G25.81) Restless leg syndrome  Plan: - pramipexole (MIRAPEX) 0.25 MG tablet    Refilled medications that would be required in the next 3 months. "     After Visit Information:  Will print and mail AVS   Return in about 1 week (around 6/12/2020) for Mental Health Follow up.      Video-Visit Details    Type of service:  Video Visit    Video End Time (time video stopped): 2:16 PM    Originating Location (pt. Location): Home    Distant Location (provider location):  [facility]     Mode of Communication:  Video Conference via [employer]ANI[employer]

## 2020-06-06 ASSESSMENT — ANXIETY QUESTIONNAIRES: GAD7 TOTAL SCORE: 19

## 2020-06-12 ENCOUNTER — VIRTUAL VISIT (OUTPATIENT)
Dept: FAMILY MEDICINE | Facility: CLINIC | Age: 65
End: 2020-06-12
Payer: MEDICARE

## 2020-06-12 DIAGNOSIS — F66 GENDER IDENTITY UNCERTAINTY IN ADULT: ICD-10-CM

## 2020-06-12 DIAGNOSIS — F43.10 PTSD (POST-TRAUMATIC STRESS DISORDER): Primary | ICD-10-CM

## 2020-06-12 ASSESSMENT — ANXIETY QUESTIONNAIRES
6. BECOMING EASILY ANNOYED OR IRRITABLE: SEVERAL DAYS
IF YOU CHECKED OFF ANY PROBLEMS ON THIS QUESTIONNAIRE, HOW DIFFICULT HAVE THESE PROBLEMS MADE IT FOR YOU TO DO YOUR WORK, TAKE CARE OF THINGS AT HOME, OR GET ALONG WITH OTHER PEOPLE: VERY DIFFICULT
1. FEELING NERVOUS, ANXIOUS, OR ON EDGE: SEVERAL DAYS
5. BEING SO RESTLESS THAT IT IS HARD TO SIT STILL: SEVERAL DAYS
GAD7 TOTAL SCORE: 10
2. NOT BEING ABLE TO STOP OR CONTROL WORRYING: SEVERAL DAYS
7. FEELING AFRAID AS IF SOMETHING AWFUL MIGHT HAPPEN: MORE THAN HALF THE DAYS
3. WORRYING TOO MUCH ABOUT DIFFERENT THINGS: MORE THAN HALF THE DAYS

## 2020-06-12 ASSESSMENT — PATIENT HEALTH QUESTIONNAIRE - PHQ9
5. POOR APPETITE OR OVEREATING: MORE THAN HALF THE DAYS
SUM OF ALL RESPONSES TO PHQ QUESTIONS 1-9: 16

## 2020-06-12 NOTE — PROGRESS NOTES
"Family Medicine Video Visit Note  Killian is being evaluated via a billable video visit.             Video Visit Consent     Patient was verbally read the following and verbal consent was obtained.  \"Video visits are billed at different rates depending on your insurance coverage. During this emergency period, for some insurers they may be billed the same as an in-person visit.  Please reach out to your insurance provider with any questions.  If during the course of the call the physician/provider feels a video visit is not appropriate, you will not be charged for this service.\"     (Name person giving consent:  Patient   Date verbal consent given:  6/12/2020  Time verbal consent given:  1:40 PM)    Patient would like the video invitation sent by: Text to cell phone: [phone]       Chief Complaint   Patient presents with     RECHECK              HPI     Video Start Time: 2:00 PM    Killian presents to clinic today for the following health issues:    PTSD/Anxiety:  Pt is doing pretty good today. Went for a bike ride today, for 4 miles. Hasn't been out since her knee problems. Woke up bad today, but told her said she needs to go out. Morning are hard for pt, \"oh shit, i'm here again\", passive thoughts of suicide. \"Doesn't know how to be in the world.\" Pt feels like their gender identity issues is contributing. Talk to therapist about it a lot. Doesn't feel like her therapist gets it enough. Pt does having some intrusive thoughts of suicide, but it is diminishing and can go across bridges without those thoughts. Pt reaching out transgender support groups.     Skin Lesion  Bx showed that it was benign. Has helped with anxiety.     Mammogram  Pt set up appointment     PHQ 5/13/2020 6/5/2020 6/12/2020   PHQ-9 Total Score 21 25 16   Q9: Thoughts of better off dead/self-harm past 2 weeks More than half the days More than half the days More than half the days   F/U: Thoughts of suicide or self-harm - - -   F/U: Self harm-plan - - " "-   F/U: Self-harm action - - -   F/U: Safety concerns - - -   PHQ-A Total Score - - -   PHQ-A Mood affect on daily activities - - -   PHQ-A Suicide Ideation past 2 weeks - - -     CLAIRE-7 SCORE 5/13/2020 6/5/2020 6/12/2020   Total Score 16 19 10       Current Outpatient Medications   Medication Sig Dispense Refill     Acetaminophen (TYLENOL PO) Take 1,000 mg by mouth every 8 hours as needed        cholecalciferol (VITAMIN D3) 5000 units (125 mcg) capsule Take by mouth daily       famotidine (PEPCID) 20 MG tablet Take 20 mg by mouth 2 times daily       gabapentin (NEURONTIN) 300 MG capsule Take 3 capsules (900 mg) by mouth At Bedtime 270 capsule 0     hydrOXYzine (VISTARIL) 50 MG capsule Take 1 capsule (50 mg) by mouth 3 times daily as needed for anxiety 90 capsule 3     lamoTRIgine (LAMICTAL) 25 MG tablet Take 50 mg by mouth daily        Omega-3 Fatty Acids (OMEGA-3 FISH OIL) 1200 MG CAPS        pramipexole (MIRAPEX) 0.25 MG tablet Take 3 tablets (0.75 mg) by mouth At Bedtime 90 tablet 3     traZODone (DESYREL) 100 MG tablet Take1 Tablet at night orally for sleep PRN 30 tablet 3     pramipexole (MIRAPEX) 0.125 MG tablet Take 2 tablets (0.25 mg) by mouth At Bedtime 2 tablets at bedtime 60 tablet 3     Allergies   Allergen Reactions     Penicillins Rash and Hives     Versed [Midazolam] Other (See Comments)     Stopped breathing  Over 10 years ago but possible sensitivity to too much of this medication  Became apnic              Review of Systems:     Constitutional, HEENT, cardiovascular, pulmonary, GI, , musculoskeletal, neuro, skin, endocrine and psych systems are negative, except as otherwise noted.         Physical Exam:     LMP  (LMP Unknown)   Estimated body mass index is 36.38 kg/m  as calculated from the following:    Height as of 5/13/20: 1.664 m (5' 5.5\").    Weight as of 5/13/20: 100.7 kg (222 lb).    GENERAL: Healthy, alert and no distress  EYES: Eyes grossly normal to inspection.  No discharge or " erythema, or obvious scleral/conjunctival abnormalities.  RESP: No audible wheeze, cough, or visible cyanosis.  No visible retractions or increased work of breathing.    SKIN: Visible skin clear. No significant rash, abnormal pigmentation or lesions.  NEURO: Cranial nerves grossly intact.  Mentation and speech appropriate for age.  PSYCH: Mentation appears normal, affect normal/bright, judgement and insight intact, normal speech and appearance well-groomed.          Assessment and Plan   1. PTSD (post-traumatic stress disorder)  2. 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   Resource for transgender therapist:    [email or web address]/[email or web address]    Refilled medications that would be required in the next 3 months.     After Visit Information:  Will print and mail AVS       Return in about 1 week (around 6/19/2020) for Mental health care.      Video-Visit Details    Type of service:  Video Visit    Video End Time (time video stopped): 2:28 PM    Originating Location (pt. Location): Home    Distant Location (provider location):  [facility]     Mode of Communication:  Video Conference via [employer]      Susan Coronel MD  I precepted today with Dr. Nereida Garcia

## 2020-06-12 NOTE — PATIENT INSTRUCTIONS
Here is the plan from today's visit    1. PTSD (post-traumatic stress disorder)  - Continue to have weekly follow up with me  - Continue to see therapist   - Follow up for other healthcare issues  - Follow up with your NP for medications    2. 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 Also has support groups.  Http://www.[place]transhealth.org/   Wednesdays: Support Group 6-7:30pm for all gender variant people    [facility] for Transgender Health  Increasing access to comprehensive, effective, and affirming healthcare services for trans and gender-variant communities.  http://www.transhealth.[facility].edu/trans?page=case-00-05    [facility]   Community-based non-profit committed to advancing the health & wellness of LGBTQ communities through research, education and advocacy. http://www.[employer].org    Safe, gender-neutral public restrooms in the [facility]   http://www.[employer].org//index.php?option=com_content&task=view&id=12&Itemid    Trans Youth Support Network and [employer]  For people 26 and under who identify as a trans or gender non-conforming person and want to be a part of an activist organization. Offers peer support, education and community building opportunities. Find them on Facebook!    Reclaim  RECLAIM offers mental and integrative health services for LGBTQ youth and their families.  http://www.reclaim-lgbtyouth.org/    Gender Spectrum, for Trans Youth  Gender Spectrum provides education, training and support to help create a gender sensitive and inclusive environment for all children and teens. http://www.genderspectrum.org/    Home s FT Resource Guide  Information on topics of interest to  female-to-male (FTM, F2M) trans men, and their friends and loved ones.  http://ftmguide.org/    Also check out your local [employer] queer resource center!  LGBTQIA Services at [facility]: http://www.[facility]/lgbtqia/  LGBTQ@[facility] at [facility]: http://www.[facility]/multiculturallife/lgbtq/  GLBTA Programs office at [facility]: https://diversity.[facility]/glbta/    Thoughts of self harm?   Trans Lifeline can be reached at [phone]. This service is staffed by trans people 24/7.   For LGBT youth (ages 24 and younger) contemplating suicide, the [employer] Lifeline can be reached at 4-454-9600.      Please call or return to clinic if your symptoms don't go away.    Follow up plan  Please make a clinic appointment for follow up with your primary physician Susan Coronel MD in 1 week for PTSD/Anxiety and other healthcare concerns.    Thank you for coming to [facility] today.  Lab Testing:  **If you had lab testing today and your results are reassuring or normal they will be mailed to you or sent through [employer] within 7 days.   **If the lab tests need quick action we will call you with the results.  The phone number we will call with results is # [phone] (home) . If this is not the best number please call our clinic and change the number.  Medication Refills:  If you need any refills please call your pharmacy and they will contact us.   If you need to  your refill at a new pharmacy, please contact the new pharmacy directly. The new pharmacy will help you get your medications transferred faster.   Scheduling:  If you have any concerns about today's visit or wish to schedule another appointment please call our office during normal business hours [phone] (8-5:00 M-F)  If a referral was made to a [facility] and you don't get a call from central scheduling please call [phone].  If a Mammogram was ordered for you at [facility] call [phone] to schedule or  change your appointment.  If you had an XRay/CT/Ultrasound/MRI ordered the number is [phone] to schedule or change your radiology appointment.   Medical Concerns:  If you have urgent medical concerns please call [phone] at any time of the day.    Susan Coronel MD

## 2020-06-13 ASSESSMENT — ANXIETY QUESTIONNAIRES: GAD7 TOTAL SCORE: 10

## 2020-06-23 ENCOUNTER — VIRTUAL VISIT (OUTPATIENT)
Dept: FAMILY MEDICINE | Facility: CLINIC | Age: 65
End: 2020-06-23
Payer: MEDICARE

## 2020-06-23 ENCOUNTER — TELEPHONE (OUTPATIENT)
Dept: FAMILY MEDICINE | Facility: CLINIC | Age: 65
End: 2020-06-23

## 2020-06-23 DIAGNOSIS — F41.9 ANXIETY: ICD-10-CM

## 2020-06-23 DIAGNOSIS — R61 DIAPHORESIS: ICD-10-CM

## 2020-06-23 DIAGNOSIS — R45.851 PASSIVE SUICIDAL IDEATIONS: ICD-10-CM

## 2020-06-23 DIAGNOSIS — F43.10 PTSD (POST-TRAUMATIC STRESS DISORDER): Primary | ICD-10-CM

## 2020-06-23 DIAGNOSIS — Z02.89 ENCOUNTER FOR COMPLETION OF FORM WITH PATIENT: ICD-10-CM

## 2020-06-23 DIAGNOSIS — R06.02 SHORTNESS OF BREATH: ICD-10-CM

## 2020-06-23 PROBLEM — M22.42 CHONDROMALACIA OF LEFT PATELLA: Status: ACTIVE | Noted: 2017-01-25

## 2020-06-23 PROBLEM — M17.11 PRIMARY OSTEOARTHRITIS OF RIGHT KNEE: Status: ACTIVE | Noted: 2017-11-14

## 2020-06-23 PROBLEM — M25.561 CHRONIC PAIN OF RIGHT KNEE: Status: ACTIVE | Noted: 2017-11-14

## 2020-06-23 PROBLEM — S83.232A COMPLEX TEAR OF MEDIAL MENISCUS OF LEFT KNEE AS CURRENT INJURY: Status: ACTIVE | Noted: 2017-01-25

## 2020-06-23 PROBLEM — M54.16 LUMBAR RADICULOPATHY: Status: ACTIVE | Noted: 2017-06-20

## 2020-06-23 PROBLEM — Z98.890 S/P ARTHROSCOPIC SURGERY OF RIGHT KNEE: Status: ACTIVE | Noted: 2020-06-23

## 2020-06-23 PROBLEM — G89.29 CHRONIC PAIN OF RIGHT KNEE: Status: ACTIVE | Noted: 2017-11-14

## 2020-06-23 NOTE — PATIENT INSTRUCTIONS
Here is the plan from today's visit    1. PTSD (post-traumatic stress disorder)  2. Anxiety  3. Depression  - Pt will come in for an in-person visit  - Reached out to  team who will contact    3. Diaphoresis  4. Shortness of breath  - In person visit with me for work up  - Make a diary    6. Encounter for completion of form with patient  Go to  and sign and then will be faxed to work      Please call or return to clinic if your symptoms don't go away.    Follow up plan  Please make a clinic appointment for follow up with me (DANIEL PERSAUD) in 10  days for follow up of sweating and shortness of breath.    Thank you for coming to [facility] today.  Lab Testing:  **If you had lab testing today and your results are reassuring or normal they will be mailed to you or sent through [employer] within 7 days.   **If the lab tests need quick action we will call you with the results.  The phone number we will call with results is # [phone] (home) . If this is not the best number please call our clinic and change the number.  Medication Refills:  If you need any refills please call your pharmacy and they will contact us.   If you need to  your refill at a new pharmacy, please contact the new pharmacy directly. The new pharmacy will help you get your medications transferred faster.   Scheduling:  If you have any concerns about today's visit or wish to schedule another appointment please call our office during normal business hours [phone] (8-5:00 M-F)  If a referral was made to a [facility] and you don't get a call from central scheduling please call [phone].  If a Mammogram was ordered for you at [facility] call [phone] to schedule or change your appointment.  If you had an XRay/CT/Ultrasound/MRI ordered the number is [phone] to schedule or change your radiology appointment.   Medical Concerns:  If you have urgent medical concerns please call  [phone] at any time of the day.    Susan Coronel MD

## 2020-06-23 NOTE — PROGRESS NOTES
"Family Medicine Video Visit Note  Killian is being evaluated via a billable video visit.           Video Visit Consent     Patient was verbally read the following and verbal consent was obtained.  \"This video visit will be conducted via a call between you and your physician/provider. We have found that certain health care needs can be provided without the need for an in-person physical exam.  This service lets us provide the care you need with a video conversation.  If a prescription is necessary we can send it directly to your pharmacy.  If lab work is needed we can place an order for that and you can then stop by our lab to have the test done at a later time.    If during the course of the call the physician/provider feels a video visit is not appropriate, you will not be charged for this service.\"     (Name person giving consent:  Patient   Date verbal consent given:  6/23/2020  Time verbal consent given:  10:37 AM    Patient would like the video invitation sent by: mynor    Chief Complaint   Patient presents with     Derm Problem     recheck     Current Outpatient Medications   Medication Sig Dispense Refill     Acetaminophen (TYLENOL PO) Take 1,000 mg by mouth every 8 hours as needed        cholecalciferol (VITAMIN D3) 5000 units (125 mcg) capsule Take by mouth daily       famotidine (PEPCID) 20 MG tablet Take 20 mg by mouth 2 times daily       gabapentin (NEURONTIN) 300 MG capsule Take 3 capsules (900 mg) by mouth At Bedtime 270 capsule 0     hydrOXYzine (VISTARIL) 50 MG capsule Take 1 capsule (50 mg) by mouth 3 times daily as needed for anxiety 90 capsule 3     lamoTRIgine (LAMICTAL) 25 MG tablet Take 50 mg by mouth daily        Omega-3 Fatty Acids (OMEGA-3 FISH OIL) 1200 MG CAPS        pramipexole (MIRAPEX) 0.25 MG tablet Take 3 tablets (0.75 mg) by mouth At Bedtime 90 tablet 3     traZODone (DESYREL) 100 MG tablet Take1 Tablet at night orally for sleep PRN 30 tablet 3     pramipexole (MIRAPEX) 0.125 MG tablet " "Take 2 tablets (0.25 mg) by mouth At Bedtime 2 tablets at bedtime 60 tablet 3     Allergies   Allergen Reactions     Penicillins Rash and Hives     Versed [Midazolam] Other (See Comments)     Stopped breathing  Over 10 years ago but possible sensitivity to too much of this medication  Became apnic                 HPI     Video Start Time: 10:45 AM   Killian presents to clinic today for the following health issues:    Encounter for Form  Most important thing to discuss today. Pt became overwhelmed when asked about what all we should discuss today. See below about these topics. Pt states that letter for work I wrote about not working helped, but they are wanting a disability from filled out which I have done, however it was noted the pt did not sign for release of information. Killian stated they would come by to sign it at the  so this could be faxed right away. Pt discussed the plan for from with my and PCS Ann and was reassured.      Sweating  Pt states people notice she gets sweaty, so much that she can wipe it off her forehead. People notice at work. Pt has had this come and go. Dr. Elizalde felt that maybe it was due to their Mirapex and on follow up with me as there were no other signs and symptoms and no night sweats or association with when they occurred that they would keep a diary. When asked pt stated that has not done this, but will do so for next visit. Pt stated it doesn't come on with exertion and is not experiencing any chest pain or shortness of breath when it comes on.     Shortness of Breath  Feels like comes on with activity to the point PT has noticed. Pt states they will go up three flights of stairs and out to their garden and notice while taking the stairs that they feel short of breath and are sucking in a mask which then makes them feel like they can't breath more so. They feel \"gased\". Pt states they have been biking outside and it will come on after a while doing this and will have to take " "mask off. Pt does know they are out of shape, but it is concerning to them. Pt is not having any fevers, cough, chest pain, running nose, congestion, not worse with lying flat, no leg swelling.     Passive suicidal ideation  Pt feels overwhelmed with everything that's going on. And feels like if she's dying let it happen. Had started talking about the shortness of breath and was like if its cardiac related just let it happen or would go to [place] and say \"I can't do this anymore\". When asked about active thoughts about suicide the stated they didn't want to comment on that, but are pulling themselves up by their bootstraps and won't do, will make an appointment and will be okay. Does not want ECT or hospital experience like that one again. Pt stated they were okay with our Behavioral health team reaching out.              Review of Systems:     Constitutional, HEENT, cardiovascular, pulmonary, GI, , musculoskeletal, neuro, skin, endocrine and psych systems are negative, except as otherwise noted.         Physical Exam:     LMP  (LMP Unknown)   Estimated body mass index is 36.38 kg/m  as calculated from the following:    Height as of 5/13/20: 1.664 m (5' 5.5\").    Weight as of 5/13/20: 100.7 kg (222 lb).    GENERAL: healthy, alert and no distress, no diaphoresis   RESP: No acute distress, can speak in full sentences, no audible wheezing  PSYCH: concentration poor, tangential, tearful, anxious, fatigued, speech pressured, judgement and insight intact and appearance well groomed          Assessment and Plan   1. PTSD (post-traumatic stress disorder)  2. Anxiety  3. Passive Suicidal Ideation  Pt has a complex mental health history. 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 Pt recently had admitted to having made a suicide plan and accepted help from us. Pt has been having frequent follow up with me. Pt continues to " need this. Pt continues to have factors such as work, financial, social, and pandemic stressors  - Pt will come in for an in-person visit  - Reached out to  team who will contact pt; pt agreed  - Pt states will call therapist too    4. Diaphoresis  5. Shortness of breath  Pt has been having random bouts of sweating that we have discussed, but had to stop work up with Covid and then pt mental health taking priority. Pt sweating seemed unrelated to a underlying disease, but maybe medication or anxiety. Now with new report of SOB, which based on being around increased rate of breathing and mask may still be related to their anxiety, however want further work up to make sure no cardiac or respiratory cause. Feel Covid risk is low given SOB related to exertion and not other systemic features.   - Pt to make an in person visit with me.     6. Encounter for Completion of Form with Patient  - Filled out work form. Pt to come to , where it is at, sign, and then PCS will fax it.     Refilled medications that would be required in the next 3 months.     After Visit Information:  Will print and mail AVS   Return in about 10 days (around 7/3/2020) for Follow up sweating and shortness of breath.      Video-Visit Details    Type of service:  Video Visit    Video End Time (time video stopped): 11:17 AM    Originating Location (pt. Location): Home    Distant Location (provider location):  [facility]     Mode of Communication:  Video Conference via DANIEL Kan  Seen and discussed with Dr. Schwartz

## 2020-06-23 NOTE — TELEPHONE ENCOUNTER
"Behavioral Health Telephone Consult  Purpose of call: outreach - asked by Dr. Coronel by way of clinic SW to check in on patient due to SI  Time on phone: 12 minutes  Spoke with: Patient   used: No    Topics discussed/Interventions Provided:  I reviewed the recent records in this chart including notes from Wilfred Coronel, Ry, and Nay over the past few months.     Patient saw Dr. Coronel this morning and had worsening suicidal ideation but did not want to talk about it that much. They were open to a phone call from  this afternoon. Patient feels very lonely. Feels like no one except their care team cares about them. Reinforced our care for them and desire to support them. They are fearful to disclose suicidal thoughts due to fear of being hospitalized. Explained to patient that I understood that and I also cared about her enough to want to be able to keep her safe. Patient responded well to this. Patient was tearful, stating they were not sure \"how much longer I can take it of this world.\" They denied any current plan or intent to die by suicide, and identified suicidal thoughts as a sort of \"escape plan\". The thoughts have increased in past week. Feeling some hope because they know Dr. Coronel and other providers do care about them.     Last week saw therapist UYEN Lopez at  [facility]   3 times in one week. Patient acknowledges they need this level of care. Patient's therapist is out of town this week which has been hard. Has appts scheduled for next week.     They also saw their psychiatry prescriber last week (Mera Ramos, per care team). Stated that there were no changes to meds.      Patient easily recalls what is on their safety plan from 5/28/2020 and intention to use safety plan, \"I will do whatever I can to cope to avoid going to the hospital.\"     Provided empathy, attempted to instill hope, praised patient for being willing to speak with me.     Suicide risk " "assessment: Today Gregoria Stein reports passive suicidal ideation \"I can't take this anymore.\" She notes being easily overwhelmed, flooded, and anxious which she recognizes.  In addition, Gregoria Stein has notable risk factors for self-harm, including single status, anxiety and a history of self-harm (punching self). However, risk is mitigated by spiritual beliefs, sobriety, absence of past attempts, ability to volunteer a safety plan, history of seeking help when needed and upcoming psychotherapy sessions. Therefore, based on all available evidence including the factors cited above, Gregoria Stein does not appear to be at imminent risk for self-harm, does not meet criteria for a 72-hr hold, and therefore remains appropriate for ongoing outpatient level of care. Patient does not want to harm herself, but is feeling very distressed. We made a plan for pt to attend therapy and schedule follow up with Dr. Coronel. I will call pt in 2 days time to check in.     Plan:   1. Pt will utilize safety plan established with Dr. Raza on 5/28/2020.   2. I will check in with Killian again on Thursday after 3 PM.   3. Patient will keep therapy appointments for next week (has 2 scheduled).   4. I will update care team with therapist's name. We should get an NETTIE at some point for this provider. I will ask on Thursday if I could mail patient an NETTIE to sign and send back.   5. Routing to team.   6. Pt seemed to highly benefit from simple caring outreach so would recommend this as a regular component of our care during symptom exacerbations.         Dorcas Carvalho, PhD, LP  Behavioral Health Fellow    "

## 2020-06-25 ENCOUNTER — TELEPHONE (OUTPATIENT)
Dept: FAMILY MEDICINE | Facility: CLINIC | Age: 65
End: 2020-06-25

## 2020-06-25 DIAGNOSIS — F33.2 MAJOR DEPRESSIVE DISORDER, RECURRENT SEVERE WITHOUT PSYCHOTIC FEATURES (H): ICD-10-CM

## 2020-06-25 DIAGNOSIS — F43.10 PTSD (POST-TRAUMATIC STRESS DISORDER): Primary | ICD-10-CM

## 2020-06-25 NOTE — TELEPHONE ENCOUNTER
Patient requesting referral for day treatment subsequent to suicide attempt interrupted by other people on 6/23/2020. I agree with this referral. See my encounter from 6/25/2020 for more details.     The following options may be suitable for the patient but I would need to gather more information on whether these are functioning during the pandemic.     Programs available at [place]  Partial Hospitalization - Program for individuals experiencing a significant change in their ability to function due to symptoms of depression and anxiety. Designed for those new to mental health services to reduce likelihood of hospitalization through intensive programming and psychiatric care.   5 days a week, 9:00 a.m. - 3:00 p.m.; Every other Tuesday, 1:00 - 6:00 p.m.  55+ Outpatient Program - The only [place] of [place]. Our expertise is in helping people age 55+ navigate through life transitions. Five specialty tracks designed to meet individual unique needs.   2 days a week, 10:00 a.m. - 3:00 p.m.      [facility] is for adults who need intensive therapy and have the support of family and friends to continue to live in your community.  You will attend therapy during the day and return to your home at night and on weekends.   For more information, call [phone]        Dorcas Carvalho, PhD, LP  Behavioral Health Fellow
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
## 2020-06-26 ENCOUNTER — TELEPHONE (OUTPATIENT)
Dept: FAMILY MEDICINE | Facility: CLINIC | Age: 65
End: 2020-06-26

## 2020-06-26 NOTE — TELEPHONE ENCOUNTER
Placed outreach call to Shirley Velazco, patient's TCM.   Time on phone: 25 minutes    Shared information about patient's report of suicide attempt on Tuesday. Shirley noted that she had gotten mostly the same report from the patient, but that when she talked to the patient, the patient did not describe the behavior as a suicide attempt but rather more of a suicide rehearsal. In any case, Shirley does agree with plan for day treatment but had not been working on that already.     Plan:   1. Will ask care coordinator to help coordinate referral to a day treatment program, either [facility] 55+ program  (Shirley thinks Killian would particularly be interested in this) or [facility] (Shirley said they continue to offer their in-person day treatment program, [phone]. I'll update the referral doc only encounter to reflect this.     2. Pt to be seen by Dr. Coronel in person next week.     3. Shirley will outreach again to patient today to check in.     4. Routing to team.       Dorcas Carvalho, PhD, LP  Behavioral Health Fellow

## 2020-06-26 NOTE — TELEPHONE ENCOUNTER
Updated after speaking with patient's TCM:        Will ask care coordinator to help coordinate referral to a day treatment program, [facility]+ [facility]  (Shirley/ thinks Killian would particularly be interested in this)/      55+ Outpatient Program - The only [place] of its [place] in the [place]. Our expertise is in helping people age 55+ navigate through life transitions. Five specialty tracks designed to meet individual unique needs.     2 days a week, 10:00 a.m. - 3:00 p.m.        Dorcas Carvalho, , LP  Behavioral Health Fellow

## 2020-06-26 NOTE — PROGRESS NOTES
Preceptor Attestation:   Patient seen and evaluated via video visit. I discussed the patient with the resident. I have verified the content of the note, which accurately reflects my assessment of the patient and the plan of care.   Supervising Physician:  Alvin Schwartz MD.

## 2020-06-29 ENCOUNTER — THERAPY VISIT (OUTPATIENT)
Dept: PHYSICAL THERAPY | Facility: CLINIC | Age: 65
End: 2020-06-29
Payer: MEDICARE

## 2020-06-29 DIAGNOSIS — N39.46 MIXED STRESS AND URGE URINARY INCONTINENCE: ICD-10-CM

## 2020-06-29 PROCEDURE — 97530 THERAPEUTIC ACTIVITIES: CPT | Mod: GP | Performed by: PHYSICAL THERAPIST

## 2020-06-29 PROCEDURE — 97110 THERAPEUTIC EXERCISES: CPT | Mod: GP | Performed by: PHYSICAL THERAPIST

## 2020-06-29 NOTE — TELEPHONE ENCOUNTER
Preceptor Attestation:  I have reviewed and agree with the behavioral health fellow's documentation for this encounter.  I did not see the patient.  Supervising Clinical Psychologist:  Sharmila Tee PSYD LP

## 2020-06-29 NOTE — PROGRESS NOTES
PROGRESS  REPORT    Progress reporting period is from 4/1/20 to 6/29/20.      SUBJECTIVE  Subjective changes noted by patient:  Patient reports incomplete emptying that continues to happen as well as urge incontinence occasionally with a full bladder..       Changes in function:  None  Adverse reaction to treatment or activity: None    OBJECTIVE  Changes noted in objective findings:  Yes,     urge incontinence:3x a week  Frequency of voids during the day: every 3 hours  Nighttime voids: 2x    ASSESSMENT/PLAN  Updated problem list and treatment plan: Diagnosis 1:  Pelvic floor dysfunction  Impaired muscle performance - biofeedback, electric stimulation, neuro re-education and home program  STG/LTGs have been met or progress has been made towards goals:  Yes (See Goal flow sheet completed today.)  Assessment of Progress: The patient's condition has potential to improve.  Self Management Plans:  Patient has been instructed in a home treatment program.  Patient  has been instructed in self management of symptoms.  I have re-evaluated this patient and find that the nature, scope, duration and intensity of the therapy is appropriate for the medical condition of the patient.  Gregoria continues to require the following intervention to meet STG and LTG's:  PT    Recommendations:  This patient would benefit from continued therapy.     Frequency:  2 X a month, once daily  Duration:  for 3 months        Please refer to the daily flowsheet for treatment today, total treatment time and time spent performing 1:1 timed codes.

## 2020-07-03 ENCOUNTER — TELEPHONE (OUTPATIENT)
Dept: FAMILY MEDICINE | Facility: CLINIC | Age: 65
End: 2020-07-03

## 2020-07-03 NOTE — TELEPHONE ENCOUNTER
Received VM from [facility] Shirley [phone] asking name of therapist she spoke to at \[facility] [place] [facility]\"" and if the [facility] 55+ Day Treatment referral was made.  Left Shirley a VM today 7/3/2020 stating Dr. Carvalho spoke with her and message will be sent to her and pts PCP Dr. Coronel to place the referral/order.    Will route to Dr. Carvalho and Dr. Coronel.

## 2020-07-08 NOTE — TELEPHONE ENCOUNTER
7/8/20 Contacted the [place] [address]+ Program ([phone]) and spoke to Bethel. He went over the initial process with me on what will be needed to schedule the first appointment with an evaluator. Name, demographics, current insurance, any days / times that work best for patient and what it is the patient is being referred for.  I contacted and spoke to patient and gave them all information. Patient will call and schedule first appointment with evaluator.    Yumiko Lindo  Care Coordinator

## 2020-07-09 ENCOUNTER — HOSPITAL ENCOUNTER (OUTPATIENT)
Dept: BEHAVIORAL HEALTH | Facility: CLINIC | Age: 65
Discharge: HOME | End: 2020-07-09
Attending: PSYCHIATRY & NEUROLOGY | Admitting: PSYCHIATRY & NEUROLOGY
Payer: MEDICARE

## 2020-07-09 PROCEDURE — 90791 PSYCH DIAGNOSTIC EVALUATION: CPT | Mod: TEL | Performed by: SOCIAL WORKER

## 2020-07-09 ASSESSMENT — PATIENT HEALTH QUESTIONNAIRE - PHQ9
SUM OF ALL RESPONSES TO PHQ QUESTIONS 1-9: 13
5. POOR APPETITE OR OVEREATING: MORE THAN HALF THE DAYS

## 2020-07-09 ASSESSMENT — ANXIETY QUESTIONNAIRES
GAD7 TOTAL SCORE: 10
3. WORRYING TOO MUCH ABOUT DIFFERENT THINGS: SEVERAL DAYS
2. NOT BEING ABLE TO STOP OR CONTROL WORRYING: SEVERAL DAYS
5. BEING SO RESTLESS THAT IT IS HARD TO SIT STILL: SEVERAL DAYS
1. FEELING NERVOUS, ANXIOUS, OR ON EDGE: MORE THAN HALF THE DAYS
IF YOU CHECKED OFF ANY PROBLEMS ON THIS QUESTIONNAIRE, HOW DIFFICULT HAVE THESE PROBLEMS MADE IT FOR YOU TO DO YOUR WORK, TAKE CARE OF THINGS AT HOME, OR GET ALONG WITH OTHER PEOPLE: VERY DIFFICULT
6. BECOMING EASILY ANNOYED OR IRRITABLE: SEVERAL DAYS
7. FEELING AFRAID AS IF SOMETHING AWFUL MIGHT HAPPEN: MORE THAN HALF THE DAYS

## 2020-07-09 NOTE — PROGRESS NOTES
"Outpatient Mental Health Services - Adult    MY COPING PLAN FOR SAFETY    PATIENT'S NAME: Gregoria Stein  MRN:   [id number]  SAFETY PLAN:  Step 1: Warning signs / cues (Thoughts, images, mood, situation, behavior) that a crisis may be developing:    Thoughts: \"I can't do this anymore\", \"Nothing makes it better\" and I am a failure.    Images: no.    Thinking Processes: intrusive thoughts (bothersome, unwanted thoughts that come out of nowhere): thoughts of suicide and highly critical and negative thoughts: that of being a failure.    Mood: worsening depression, hopelessness, helplessness and agitation    Behaviors: isolating/withdrawing , impulsive, reckless behaviors (acting without thinking): feels trauma has impacted impulsivity when triggered. and not sleeping enough    Situations: pain and trauma    Step 2: Coping strategies - Things I can do to take my mind off of my problems without contacting another person (relaxation technique, physical activity):    Distress Tolerance Strategies:  grounding exercises including sensory strategies.    Physical Activities: exercise: rides bike and meditaton and music.    Focus on helpful thoughts:  They are on the same team as me, people that are trying to help me.  Step 3: People and social settings that provide distraction:   Name: Beth Phone: friend- [phone]     park/ Nature  Step 4: Remind myself of people and things that are important to me and worth living for:  \" friend Beth, my sister Ely, being in nature\".   Step 5: When I am in crisis, I can ask these people to help me use my safety plan:   Name: kolby flores  Phone: [phone]   Name:  Shirleycorrina Coombsbright Phone: [phone]  Step 6: Making the environment safe:     be around others  Step 7: Professionals or agencies I can contact during a crisis:    Suicide Prevention Lifeline: 4-496-250-THZT (1420)    Crisis Text Line Service (available 24 hours a day, 7 days a week): Text [place] to 695901    Call "  **CRISIS (858782) from a cell phone to talk to a team of professionals who can help you.  Crisis Services By County: Phone Number:   [place]     [phone]   [place]    [phone]   [place]    [phone]   [place]    [phone]   [place]    [phone]   [place] [phone]   [place]     [phone]       Call 911 or go to my nearest emergency department.     I helped develop this safety plan and agree to use it when needed.  I have been given a copy of this plan.      Client signature _________________________________________________________________  Today s date:  7/9/2020  Adapted from Safety Plan Template 2008 Elena Sepulveda and Blanco Poon is reprinted with the express permission of the authors.  No portion of the Safety Plan Template may be reproduced without the express, written permission.  You can contact the authors at [email or web address] or [email or web address].

## 2020-07-09 NOTE — PATIENT INSTRUCTIONS
"Outpatient Mental Health Services - Adult    MY COPING PLAN FOR SAFETY    PATIENT'S NAME: Gregoria Stein  MRN:   [id number]  SAFETY PLAN:  Step 1: Warning signs / cues (Thoughts, images, mood, situation, behavior) that a crisis may be developing:    Thoughts: \"I can't do this anymore\", \"Nothing makes it better\" and I am a failure.    Images: no.    Thinking Processes: intrusive thoughts (bothersome, unwanted thoughts that come out of nowhere): thoughts of suicide and highly critical and negative thoughts: that of being a failure.    Mood: worsening depression, hopelessness, helplessness and agitation    Behaviors: isolating/withdrawing , impulsive, reckless behaviors (acting without thinking): feels trauma has impacted impulsivity when triggered. and not sleeping enough    Situations: pain and trauma    Step 2: Coping strategies - Things I can do to take my mind off of my problems without contacting another person (relaxation technique, physical activity):    Distress Tolerance Strategies:  grounding exercises including sensory strategies.    Physical Activities: exercise: rides bike and meditaton and music.    Focus on helpful thoughts:  They are on the same team as me, people that are trying to help me.  Step 3: People and social settings that provide distraction:   Name: Beth Phone: friend- [phone]     park/ Nature  Step 4: Remind myself of people and things that are important to me and worth living for:  \" friend Beth, my sister Ely, being in nature\".   Step 5: When I am in crisis, I can ask these people to help me use my safety plan:   Name: kolby flores  Phone: [phone]   Name:  Shirleycorrina Coombsbright Phone: [phone]  Step 6: Making the environment safe:     be around others  Step 7: Professionals or agencies I can contact during a crisis:    Suicide Prevention Lifeline: 2-546-150-MADT (7319)    Crisis Text Line Service (available 24 hours a day, 7 days a week): Text [place] to 930374    Call "  **CRISIS (706778) from a cell phone to talk to a team of professionals who can help you.  Crisis Services By County: Phone Number:   [place]     [phone]   [place]    [phone]   [place]    [phone]   [place]    [phone]   [place]    [phone]   [place] [phone]   [place]     [phone]       Call 911 or go to my nearest emergency department.     I helped develop this safety plan and agree to use it when needed.  I have been given a copy of this plan.      Client signature _________________________________________________________________  Today s date:  7/9/2020  Adapted from Safety Plan Template 2008 Elena Sepulveda and Blanco Poon is reprinted with the express permission of the authors.  No portion of the Safety Plan Template may be reproduced without the express, written permission.  You can contact the authors at [email or web address] or [email or web address].
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
## 2020-07-10 ENCOUNTER — ANCILLARY PROCEDURE (OUTPATIENT)
Dept: GENERAL RADIOLOGY | Facility: CLINIC | Age: 65
End: 2020-07-10
Attending: FAMILY MEDICINE
Payer: MEDICARE

## 2020-07-10 ENCOUNTER — OFFICE VISIT (OUTPATIENT)
Dept: FAMILY MEDICINE | Facility: CLINIC | Age: 65
End: 2020-07-10
Payer: MEDICARE

## 2020-07-10 VITALS
OXYGEN SATURATION: 98 % | BODY MASS INDEX: 34.37 KG/M2 | RESPIRATION RATE: 20 BRPM | HEART RATE: 73 BPM | WEIGHT: 219 LBS | TEMPERATURE: 98.3 F | SYSTOLIC BLOOD PRESSURE: 133 MMHG | DIASTOLIC BLOOD PRESSURE: 80 MMHG | HEIGHT: 67 IN

## 2020-07-10 DIAGNOSIS — R06.02 SHORTNESS OF BREATH: Primary | ICD-10-CM

## 2020-07-10 DIAGNOSIS — F43.10 PTSD (POST-TRAUMATIC STRESS DISORDER): ICD-10-CM

## 2020-07-10 LAB
ALBUMIN SERPL-MCNC: 4.4 MG/DL (ref 3.5–4.7)
ALP SERPL-CCNC: 79.9 U/L (ref 31.7–110.5)
ALT SERPL-CCNC: 24.5 U/L (ref 0–45)
AST SERPL-CCNC: 24.6 U/L (ref 0–45)
BILIRUB SERPL-MCNC: 0.5 MG/DL (ref 0.2–1.3)
BUN SERPL-MCNC: 14.1 MG/DL (ref 7–19)
CALCIUM SERPL-MCNC: 9.7 MG/DL (ref 8.5–10.1)
CHLORIDE SERPLBLD-SCNC: 104.3 MMOL/L (ref 98–110)
CO2 SERPL-SCNC: 28.5 MMOL/L (ref 20–32)
CREAT SERPL-MCNC: 0.8 MG/DL (ref 0.5–1)
GFR SERPL CREATININE-BSD FRML MDRD: 76.1 ML/MIN/1.7 M2
GLUCOSE SERPL-MCNC: 122.8 MG'DL (ref 70–99)
HCT VFR BLD AUTO: 46.8 % (ref 35–47)
HEMOGLOBIN: 14.4 G/DL (ref 11.7–15.7)
MCH RBC QN AUTO: 28.5 PG (ref 26.5–35)
MCHC RBC AUTO-ENTMCNC: 30.8 G/DL (ref 32–36)
MCV RBC AUTO: 92.6 FL (ref 78–100)
PLATELET # BLD AUTO: 203 K/UL (ref 150–450)
POTASSIUM SERPL-SCNC: 3.9 MMOL/L (ref 3.3–4.5)
PROT SERPL-MCNC: 8.1 G/DL (ref 6.8–8.8)
RBC # BLD AUTO: 5.05 M/UL (ref 3.8–5.2)
SODIUM SERPL-SCNC: 140.6 MMOL/L (ref 132.6–141.4)
WBC # BLD AUTO: 6.3 K/UL (ref 4–11)

## 2020-07-10 ASSESSMENT — ANXIETY QUESTIONNAIRES
6. BECOMING EASILY ANNOYED OR IRRITABLE: MORE THAN HALF THE DAYS
7. FEELING AFRAID AS IF SOMETHING AWFUL MIGHT HAPPEN: MORE THAN HALF THE DAYS
3. WORRYING TOO MUCH ABOUT DIFFERENT THINGS: MORE THAN HALF THE DAYS
IF YOU CHECKED OFF ANY PROBLEMS ON THIS QUESTIONNAIRE, HOW DIFFICULT HAVE THESE PROBLEMS MADE IT FOR YOU TO DO YOUR WORK, TAKE CARE OF THINGS AT HOME, OR GET ALONG WITH OTHER PEOPLE: VERY DIFFICULT
GAD7 TOTAL SCORE: 10
5. BEING SO RESTLESS THAT IT IS HARD TO SIT STILL: SEVERAL DAYS
1. FEELING NERVOUS, ANXIOUS, OR ON EDGE: SEVERAL DAYS
GAD7 TOTAL SCORE: 13
2. NOT BEING ABLE TO STOP OR CONTROL WORRYING: MORE THAN HALF THE DAYS

## 2020-07-10 ASSESSMENT — PATIENT HEALTH QUESTIONNAIRE - PHQ9
5. POOR APPETITE OR OVEREATING: NEARLY EVERY DAY
SUM OF ALL RESPONSES TO PHQ QUESTIONS 1-9: 12

## 2020-07-10 ASSESSMENT — MIFFLIN-ST. JEOR: SCORE: 1584.88

## 2020-07-10 NOTE — PATIENT INSTRUCTIONS
Here is the plan from today's visit    1. Shortness of breath  [facility]  - Echo Stress Echocardiogram; Future  - Comprehensive Metabolic Panel ([facility])  - CBC with Plt ([facility])  - XR CHEST 2 VW    Please call or return to clinic if your symptoms don't go away.    Follow up plan  Please make a clinic appointment for follow up with me (SUSAN CORONEL) in 2 weeks for follow results.    Thank you for coming to [facility] today.  Lab Testing:  **If you had lab testing today and your results are reassuring or normal they will be mailed to you or sent through [employer] Thing within 7 days.   **If the lab tests need quick action we will call you with the results.  The phone number we will call with results is # [phone] (home) . If this is not the best number please call our clinic and change the number.  Medication Refills:  If you need any refills please call your pharmacy and they will contact us.   If you need to  your refill at a new pharmacy, please contact the new pharmacy directly. The new pharmacy will help you get your medications transferred faster.   Scheduling:  If you have any concerns about today's visit or wish to schedule another appointment please call our office during normal business hours [phone] (8-5:00 M-F)  If a referral was made to a [facility] and you don't get a call from central scheduling please call [phone].  If a Mammogram was ordered for you at [facility] call [phone] to schedule or change your appointment.  If you had an XRay/CT/Ultrasound/MRI ordered the number is [phone] to schedule or change your radiology appointment.   Medical Concerns:  If you have urgent medical concerns please call [phone] at any time of the day.    Susan Coronel MD

## 2020-07-10 NOTE — LETTER
RETURN TO WORK/SCHOOL FORM    7/10/2020    Re: Gregoria Stein  1955      To Whom It May Concern:     Gregoria Stein was seen in clinic today..  Killian Stein may return to work  on 9/15/20. Remorte work okay. Pt is high risk if received Covid-19.             Susan Coronel MD  7/10/2020 2:37 PM

## 2020-07-10 NOTE — PROGRESS NOTES
Killian is a 64 year old  who presents for   Patient presents with:  RECHECK: Breathing concerns,lung eval.      Assessment and Plan      1. Shortness of breath  Patient is well-known to me and has been complaining over the last month about worsening shortness of breath with movement of stairs and or biking but unclear if having anxiety due to having to wear a mask as his breathing and heavy labor during these periods.  However patient also noted having random bouts of significant diaphoresis, with unclear etiology or pattern.  Patient unsure if it is associated with anxiety or not.  Patient denying any chest pain or palpitations or fatigue.  But has noticed a more short upon lying down, but no leg edema.  Concerned that there may be organic cause versus anxiety.  Possibilities could be heart failure, arrhythmia, coronary artery disease, anemia.  - Echo Stress Echocardiogram; Future  - Comprehensive Metabolic Panel ([facility])  - CBC with Plt ([facility])  - XR CHEST 2 VW      2. PTSD  Pt appears to be in a stable state. Seeing Therapist more and will be going to day program next week.     Return in about 2 weeks (around 7/24/2020) for SOB follow up.    There are no discontinued medications.      Susan Coronel MD         HPI       Killian is a 64 year old  who presents for   Patient presents with:  RECHECK: Breathing concerns,lung eval.      Visit [facility]  1. Shortness of breath  Pt feels winded and trouble sucking in air. Seems to be exacerbated by her mask, but doesn't not want to wear it. Noticing sweating a lot and good be related when SOB or even not. Seems to be to be excessive sweating, happened with just putting belt. Not noticing a fatigue issue, not related to exertion. Can get SOB with lying flat. Not waking up from sleep. Gets palpitations when sob and sweaty. No chest pain. Hard to tell if anxiety is worsening it or associated with it. No fever, chills, no cough.     2. PTSD  Still having trouble dealing with  "COVID. Residents in apartment building not wearing masks and causing distress. Pt plans to start day program next week. Therapy on Monday and Wednesday.     Problem, Medication and Allergy Lists were reviewed and updated if needed..  Patient is an established patient of this clinic.  Reviewed and updated as needed this visit by clinical staff  Tobacco  Allergies  Meds  Problems  Med Hx  Surg Hx  Fam Hx  Soc Hx        Reviewed and updated as needed this visit by Provider  Tobacco  Allergies  Meds  Problems  Med Hx  Surg Hx  Fam Hx       Health Maintenance Due   Topic Date Due     DEPRESSION ACTION PLAN  1955     ZOSTER IMMUNIZATION (1 of 2) 08/02/2007     PAP  11/03/2018          Review of Systems:   Review of Systems  10 point review of symptoms was otherwise negative except as noted in HPI         Physical Exam:     Vitals:    07/10/20 1355   BP: 133/80   Pulse: 73   Resp: 20   Temp: 98.3  F (36.8  C)   TempSrc: Oral   SpO2: 98%   Weight: 99.3 kg (219 lb)   Height: 1.7 m (5' 6.93\")     Body mass index is 34.37 kg/m .  Vitals were reviewed and were normal     Physical Exam  Vitals signs reviewed.   Constitutional:       General: Killian Stein is not in acute distress.     Appearance: Normal appearance. Killian Stein is obese. Killian Stein is diaphoretic. Killian Stein is not ill-appearing or toxic-appearing.   Cardiovascular:      Rate and Rhythm: Normal rate and regular rhythm.      Pulses: Normal pulses.      Heart sounds: Normal heart sounds. No murmur. No friction rub. No gallop.    Pulmonary:      Effort: Pulmonary effort is normal. No respiratory distress.      Breath sounds: Normal breath sounds. No stridor. No wheezing, rhonchi or rales.   Abdominal:      General: Abdomen is flat. Bowel sounds are normal. There is no distension.      Palpations: Abdomen is soft. There is no mass.      Tenderness: There is no abdominal tenderness. There is no guarding.   Skin:     General: Skin is warm.      " Capillary Refill: Capillary refill takes less than 2 seconds.      Findings: No rash.   Neurological:      Mental Status: Killian Stein is alert.               Results:   Results are ordered and pending    Options for treatment and follow-up care were reviewed with the patient. Gregoria Stein  engaged in the decision making process and verbalized understanding of the options discussed and agreed with the final plan.  Susan Coronel MD  [facility]    Preceptor Attestation:  Patient seen, evaluated and discussed with the resident. I have verified the content of the note, which accurately reflects my assessment of the patient and the plan of care.   Supervising Physician:  Jacki Luna MD

## 2020-07-11 ASSESSMENT — ANXIETY QUESTIONNAIRES: GAD7 TOTAL SCORE: 13

## 2020-07-13 ENCOUNTER — BEH TREATMENT PLAN (OUTPATIENT)
Dept: BEHAVIORAL HEALTH | Facility: CLINIC | Age: 65
End: 2020-07-13
Attending: PSYCHIATRY & NEUROLOGY

## 2020-07-13 DIAGNOSIS — F33.2 MDD (MAJOR DEPRESSIVE DISORDER), RECURRENT SEVERE, WITHOUT PSYCHOSIS (H): ICD-10-CM

## 2020-07-14 ENCOUNTER — HOSPITAL ENCOUNTER (OUTPATIENT)
Dept: BEHAVIORAL HEALTH | Facility: CLINIC | Age: 65
End: 2020-07-14
Attending: PSYCHIATRY & NEUROLOGY
Payer: MEDICARE

## 2020-07-14 ENCOUNTER — TELEPHONE (OUTPATIENT)
Dept: BEHAVIORAL HEALTH | Facility: CLINIC | Age: 65
End: 2020-07-14

## 2020-07-14 PROCEDURE — 90853 GROUP PSYCHOTHERAPY: CPT | Mod: PO

## 2020-07-14 ASSESSMENT — ANXIETY QUESTIONNAIRES
IF YOU CHECKED OFF ANY PROBLEMS ON THIS QUESTIONNAIRE, HOW DIFFICULT HAVE THESE PROBLEMS MADE IT FOR YOU TO DO YOUR WORK, TAKE CARE OF THINGS AT HOME, OR GET ALONG WITH OTHER PEOPLE: VERY DIFFICULT
7. FEELING AFRAID AS IF SOMETHING AWFUL MIGHT HAPPEN: NEARLY EVERY DAY
1. FEELING NERVOUS, ANXIOUS, OR ON EDGE: SEVERAL DAYS
GAD7 TOTAL SCORE: 13
6. BECOMING EASILY ANNOYED OR IRRITABLE: SEVERAL DAYS
5. BEING SO RESTLESS THAT IT IS HARD TO SIT STILL: MORE THAN HALF THE DAYS
3. WORRYING TOO MUCH ABOUT DIFFERENT THINGS: MORE THAN HALF THE DAYS
2. NOT BEING ABLE TO STOP OR CONTROL WORRYING: SEVERAL DAYS

## 2020-07-14 ASSESSMENT — PATIENT HEALTH QUESTIONNAIRE - PHQ9
5. POOR APPETITE OR OVEREATING: NEARLY EVERY DAY
SUM OF ALL RESPONSES TO PHQ QUESTIONS 1-9: 15

## 2020-07-14 NOTE — GROUP NOTE
Psychotherapy Group Note    PATIENT'S NAME: Gregoria Stein  MRN:   [id number]  :   1955  ACCT. NUMBER: [id number]  DATE OF SERVICE: 20  START TIME:  3:00 PM  END TIME:  3:50 PM  FACILITATOR: Arcelia Harris PsyD, LP  TOPIC: MH EBP Group: Relationship Skills  Telemedicine Visit: The patient's condition can be safely assessed and treated via synchronous audio and visual telemedicine encounter.      Reason for Telemedicine Visit: Services only offered telehealth    Originating Site (Patient Location): Patient's home    Distant Site (Provider Location): Provider Remote Setting    Consent:  The patient/guardian has verbally consented to: the potential risks and benefits of telemedicine (video visit) versus in person care; bill my insurance or make self-payment for services provided; and responsibility for payment of non-covered services.     Mode of Communication:  Video Conference via [employer]    As the provider I attest to compliance with applicable laws and regulations related to telemedicine.     55+ Program  TRACK: B2    NUMBER OF PARTICIPANTS: 4    Summary of Group / Topics Discussed:  Relationship Skills: Conflict Resolution: Topic of conflict resolution in interpersonal communication was discussed. Patients received an overview of how communication skills during times of conflict impact symptoms and functioning. Patients discussed their current communication challenges and how this impacts their functioning. Patients also verbalized understanding of skills learned and practiced in session.     Patient Session Goals / Objectives:    Identified and discussed patient individual challenges with communication    Presented and practiced effective communication skills in session    Assisted patients in implementing more effective communication skills in their relationships      Patient Participation / Response:  Fully participated with the group by sharing personal reflections / insights and  openly received / provided feedback with other participants.    Demonstrated understanding of topics discussed through group discussion and participation, Demonstrated understanding of relationship skills and communication skills and Practiced skills in session    Treatment Plan:  Patient has an initial individualized treatment plan that was created as part of their diagnostic assessment / admission process.  A master individualized treatment plan is in the process of being developed with the patient and multi-disciplinary care team.    Arcelia Harris PsyD, LP   7/14/20

## 2020-07-14 NOTE — PROGRESS NOTES
"Outpatient Mental Health Services - Adult     MY COPING PLAN FOR SAFETY     PATIENT'S NAME:           Gregoria Stein  MRN:                                  [id number]  SAFETY PLAN:  Step 1: Warning signs / cues (Thoughts, images, mood, situation, behavior) that a crisis may be developing:  ? Thoughts: \"I can't do this anymore\", \"Nothing makes it better\" and I am a failure.  ? Images: no.  ? Thinking Processes: intrusive thoughts (bothersome, unwanted thoughts that come out of nowhere): thoughts of suicide and highly critical and negative thoughts: that of being a failure.  ? Mood: worsening depression, hopelessness, helplessness and agitation  ? Behaviors: isolating/withdrawing , impulsive, reckless behaviors (acting without thinking): feels trauma has impacted impulsivity when triggered. and not sleeping enough  ? Situations: pain and trauma    Step 2: Coping strategies - Things I can do to take my mind off of my problems without contacting another person (relaxation technique, physical activity):  ? Distress Tolerance Strategies:  grounding exercises including sensory strategies.  ? Physical Activities: exercise: rides bike and meditaton and music.  ? Focus on helpful thoughts:  They are on the same team as me, people that are trying to help me.  Step 3: People and social settings that provide distraction:                 Name: Beth   Phone: friend- [phone]                    park/     Nature  Step 4: Remind myself of people and things that are important to me and worth living for:  \" friend Beth, my sister Ely, being in nature\".   Step 5: When I am in crisis, I can ask these people to help me use my safety plan:                 Name: kolby flores   Phone: [phone]                 Name:  Shirleycorrina Coombsbright Phone: [phone]  Step 6: Making the environment safe:   ? be around others  Step 7: Professionals or agencies I can contact during a crisis:  ? Suicide Prevention Lifeline: " 4-862-248-TALK (1170)  ? Crisis Text Line Service (available 24 hours a day, 7 days a week): Text [place] to 714777  ? Call  **CRISIS (000679) from a cell phone to talk to a team of professionals who can help you.       Crisis Services By [facility]: Phone Number:   [place]     [phone]   [place]    [phone]   [place]    [phone]   [place]    [phone]   [place]    [phone]   [place] [phone]   [place]     [phone]      ? Call 911 or go to my nearest emergency department.              I helped develop this safety plan and agree to use it when needed.  I have been given a copy of this plan.       Client signature _________________________________________________________________  Today s date:  7/9/2020  Adapted from Safety Plan Template 2008 Elena Sepulveda and Blanco Poon is reprinted with the express permission of the authors.  No portion of the Safety Plan Template may be reproduced without the express, written permission.  You can contact the authors at [email or web address] or [email or web address].

## 2020-07-14 NOTE — PROGRESS NOTES
Admission Date: 7/14/2020    Identify any current concerns with potential impact to admission:     medication/medical concerns: no     immediate safety concerns: not currently     Does patient have safety plan? Yes  Note: Please copy safety plan copied into BEH Encounter     Other (insurance/childcare/transportation/housing/planned absences/etc): Has therapy appointment at 3pm Wednesday-working on changing the time of the appointment    Patient's insurance is: [place] and [place] . Does patient need appointment with provider? Yes    If patient has Medical Assistance ([place]) is LOCUS and Functional Assessment completed? N/A    If patient is in Partial Hospitalization Program is LOCUS completed? N/A                                                                                              Completed by: KENNEDY Gatica/GARRETT

## 2020-07-14 NOTE — TELEPHONE ENCOUNTER
Completed admission with Killian today.  Has a therapy appointment at 3pm on Wednesday with her individual therapist and is working on changing this.  We tried a video visit and had some trouble connecting. Will troubleshoot as needed.  Killian prefers text invite [phone].     Valencia Ponce, OTR/L

## 2020-07-14 NOTE — GROUP NOTE
Psychoeducation Group Note    PATIENT'S NAME: Gregoria Stein  MRN:   [id number]  :   1955  ACCT. NUMBER: [id number]  DATE OF SERVICE: 20  START TIME:  2:00 PM  END TIME:  2:50 PM  FACILITATOR: Abbie Eugene LICSW  TOPIC: RASTA RN Group: Health Maintenance  55+ Program  TRACK: B2  Telemedicine Visit: The patient's condition can be safely assessed and treated via synchronous audio and visual telemedicine encounter.      Reason for Telemedicine Visit: Services only offered telehealth    Originating Site (Patient Location): Patient's home    Distant Site (Provider Location): [facility]: [facility]    Consent:  The patient/guardian has verbally consented to: the potential risks and benefits of telemedicine (video visit) versus in person care; bill my insurance or make self-payment for services provided; and responsibility for payment of non-covered services.     Mode of Communication:  Video Conference via [employer]    As the provider I attest to compliance with applicable laws and regulations related to telemedicine.     NUMBER OF PARTICIPANTS: 4    Summary of Group / Topics Discussed:  Health Maintenance: Goal Setting: Meaningful goals can bring a sense of direction and purpose in life.  They also highlight our most important values. Patients were assisted by instructor to identify short term goals to promote their mental health recovery and improve overall health and wellness. Patients were educated on SMART goal setting framework as a strategy to increase outcomes and promote success.    Patient Session Goals / Objectives:  ? Explained the key concepts of SMART goal setting framework  ? Identified three goals successfully using SMART goal setting framework  ? Reviewed concept of balance in wellness as it pertains to goal setting        Patient Participation / Response:  Fully participated with the group by sharing personal reflections / insights and openly received / provided feedback  with other participants.    Demonstrated understanding of topics discussed through group discussion and participation    Treatment Plan:  Patient has an initial individualized treatment plan that was created as part of their diagnostic assessment / admission process.  A master individualized treatment plan is in the process of being developed with the patient and multi-disciplinary care team.    UYEN Castellanos   DISPLAY PLAN FREE TEXT

## 2020-07-15 ENCOUNTER — HOSPITAL ENCOUNTER (OUTPATIENT)
Dept: BEHAVIORAL HEALTH | Facility: CLINIC | Age: 65
End: 2020-07-15
Attending: PSYCHIATRY & NEUROLOGY
Payer: MEDICARE

## 2020-07-15 PROCEDURE — 90853 GROUP PSYCHOTHERAPY: CPT | Mod: PO | Performed by: SOCIAL WORKER

## 2020-07-15 PROCEDURE — 90853 GROUP PSYCHOTHERAPY: CPT | Mod: PO | Performed by: COUNSELOR

## 2020-07-15 ASSESSMENT — ANXIETY QUESTIONNAIRES: GAD7 TOTAL SCORE: 13

## 2020-07-15 NOTE — GROUP NOTE
Process Group Note    PATIENT'S NAME: Gregoria Stein  MRN:   [id number]  :   1955  ACCT. NUMBER: [id number]  DATE OF SERVICE: 7/15/20  START TIME:  2:00 PM  END TIME:  2:50 PM  FACILITATOR: Gabbie Davidson LPCC  TOPIC:  Process Group    Diagnoses:  309.81 (F43.10) Posttraumatic Stress Disorder (includes Posttraumatic Stress Disorder for Children 6 Years and Younger)  Without dissociative symptoms. Per client report that she has past diagnosis of this per her therapist who is now retired.  4. Other Diagnoses that is relevant to services:   296.33 (F33.2) Major Depressive Disorder, Recurrent Episode, Severe _ and With anxious distress.  5. Provisional Diagnosis:  309.81 (F43.10) Posttraumatic Stress Disorder (includes Posttraumatic Stress Disorder for Children 6 Years and Younger)  Without dissociative symptoms as evidenced by client reported hypervigilence, hyper arousal, decreased functioning and past history of trauma and diagnosis, she did not wish to disclose trauma at this time       55+ Program  TRACK: B-2    NUMBER OF PARTICIPANTS: 4    Telemedicine Visit: The patient's condition can be safely assessed and treated via synchronous audio and visual telemedicine encounter.      Reason for Telemedicine Visit: Services only offered telehealth and due to COVID-19    Originating Site (Patient Location): Patient's home    Distant Site (Provider Location): Provider Remote Setting    Consent:  The patient/guardian has verbally consented to: the potential risks and benefits of telemedicine (video visit) versus in person care; bill my insurance or make self-payment for services provided; and responsibility for payment of non-covered services.     Mode of Communication:  Video Conference via [employer]    As the provider I attest to compliance with applicable laws and regulations related to telemedicine.              Data:    Session content: At the start of this group, patients were invited to check in by  "identifying themselves, describing their current emotional status, and identifying issues to address in this group.   Area(s) of treatment focus addressed in this session included Symptom Management, Personal Safety and Community Resources/Discharge Planning.    Patient reported feeling \"ok, tired, and nervous about tomorrow.\" Patient discussed working toward preparing to stat at her friend's house tomorrow night to care for her following a surgical procedure. Patient identified essential oils and distraction as skills they will use to address their goal(s). Patient reported feeling nervous about sleeping over at her friend's house may be a barrier to working toward their goal(s) and/or addressing mental health symptoms. Patient reported no safety concerns and/or self-injurious behaviors. Patient reported no substance use. Patient reported they are taking their medications as prescribed. Patient reported feeling proud that they shared in group today. Patient discussed not feeling comfortable and worrying she won't sleep at the friend's apartment with the treatment group.     Therapeutic Interventions/Treatment Strategies:  Psychotherapist offered support, feedback and validation and reinforced use of skills. Treatment modalities used include Motivational Interviewing and Cognitive Behavioral Therapy. Interventions include Coping Skills: Discussed use of self-soothe skills to decrease distress in the body and Assisted patient in identifying 1-2 healthy distraction skills to reduce overall distress, Mindfulness: Facilitated discussion of when/how to use mindfulness skills to benefit general health, mental health symptoms, and stressors and Symptoms Management: Promoted understanding of their diagnoses and how it impacts their functioning.    Assessment:    Patient response:   Patient responded to session by accepting feedback, giving feedback, listening, focusing on goals and being attentive    Possible barriers to " participation / learning include: and no barriers identified    Health Issues:   None reported       Substance Use Review:   Substance Use: No active concerns identified.    Mental Status/Behavioral Observations  Appearance:   Appropriate   Eye Contact:   Good   Psychomotor Behavior: Normal   Attitude:   Cooperative   Orientation:   All  Speech   Rate / Production: Normal/ Responsive Normal    Volume:  Normal   Mood:    Anxious  Normal  Affect:    Appropriate   Thought Content:   Clear  Thought Form:  Coherent  Logical     Insight:    Good     Plan:     Safety Plan: No current safety concerns identified.  Recommended that patient call 911 or go to the local ED should there be a change in any of these risk factors.     Barriers to treatment: None identified    Patient Contracts (see media tab):  None    Substance Use: Provided encouragement towards sobriety     Continue or Discharge: Patient will continue in 55+ Program (55+) as planned. Patient is likely to benefit from learning and using skills as they work toward the goals identified in their treatment plan.      Gabbie Davidson, [facility]  July 15, 2020

## 2020-07-16 NOTE — GROUP NOTE
Psychotherapy Group Note    PATIENT'S NAME: Gregoria Stein  MRN:   [id number]  :   1955  ACCT. NUMBER: [id number]  DATE OF SERVICE: 7/15/20  START TIME:  1:00 PM  END TIME:  1:50 PM  FACILITATOR: Asha Gibbons LICSW  TOPIC: MH EBP Group: Symptom Awareness  55+ Program  TRACK: B2    NUMBER OF PARTICIPANTS: 4    Summary of Group / Topics Discussed:  Symptom Awareness: Mood Disorders: Patients received a general overview of mood disorders including depressive disorders, anxiety disorders, and bipolar disorders and how it relates to their current symptoms. The purpose is to promote understanding of their diagnoses and how it impacts their functioning. Patients reviewed their current awareness of symptoms and diagnoses. Patients received information regarding diagnoses, etiology, cultural, and environmental factors as well as impact on functioning.     Patient Session Goals / Objectives:    Discussed patient individual symptoms and experiences    Reviewed diagnostic criteria and etiology of diagnoses   Telemedicine Visit: The patient's condition can be safely assessed and treated via synchronous audio and visual telemedicine encounter.      Reason for Telemedicine Visit: Services only offered telehealth    Originating Site (Patient Location): Patient's home    Distant Site (Provider Location): Provider Remote Setting    Consent:  The patient/guardian has verbally consented to: the potential risks and benefits of telemedicine (video visit) versus in person care; bill my insurance or make self-payment for services provided; and responsibility for payment of non-covered services.     Mode of Communication:  Video Conference via [employer]    As the provider I attest to compliance with applicable laws and regulations related to telemedicine.       Patient Participation / Response:  Fully participated with the group by sharing personal reflections / insights and openly received / provided feedback with other  participants.    Demonstrated understanding of topics discussed through group discussion and participation    Treatment Plan:  Patient has an initial individualized treatment plan that was created as part of their diagnostic assessment / admission process.  A master individualized treatment plan is in the process of being developed with the patient and multi-disciplinary care team.    Asha Gibbons [facility]

## 2020-07-17 ENCOUNTER — HOSPITAL ENCOUNTER (OUTPATIENT)
Dept: BEHAVIORAL HEALTH | Facility: CLINIC | Age: 65
End: 2020-07-17
Attending: PSYCHIATRY & NEUROLOGY
Payer: MEDICARE

## 2020-07-17 PROCEDURE — 90853 GROUP PSYCHOTHERAPY: CPT | Mod: PO | Performed by: SOCIAL WORKER

## 2020-07-17 PROCEDURE — 90853 GROUP PSYCHOTHERAPY: CPT | Mod: GT

## 2020-07-17 PROCEDURE — 90853 GROUP PSYCHOTHERAPY: CPT | Mod: PO

## 2020-07-17 NOTE — GROUP NOTE
Psychotherapy Group Note    PATIENT'S NAME: Gregoria Stein  MRN:   [id number]  :   1955  ACCT. NUMBER: [id number]  DATE OF SERVICE: 20  START TIME:  3:00 PM  END TIME:  3:50 PM  FACILITATOR: Asha Desai LICSW  TOPIC: MH EBP Group: Specialty Awareness  55+ Program  TRACK: B2    NUMBER OF PARTICIPANTS: 4    Summary of Group / Topics Discussed:  Specialty Topics: Hope: The topic of hope was presented in order to help patients better understand the symptoms of hopelessness and how to become more hopeful. Patients discussed their current awareness of the topic and relevance to their functioning. Individual experiences with symptoms and treatment options were also discussed. Patients explored options for ongoing/future treatment and symptom management.      Patient Session Goals / Objectives:    Discussed definition of hopelessness    Discussed how hopelessness impacts functioning    Set a plan to utilize skills to reduce hopelessness  Telemedicine Visit: The patient's condition can be safely assessed and treated via synchronous audio and visual telemedicine encounter.      Reason for Telemedicine Visit: Services only offered telehealth and covid19    Originating Site (Patient Location): Patient's home    Distant Site (Provider Location): Provider Remote Setting    Consent:  The patient/guardian has verbally consented to: the potential risks and benefits of telemedicine (video visit) versus in person care; bill my insurance or make self-payment for services provided; and responsibility for payment of non-covered services.     Mode of Communication:  Video Conference via [employer]    As the provider I attest to compliance with applicable laws and regulations related to telemedicine.       Patient Participation / Response:  Fully participated with the group by sharing personal reflections / insights and openly received / provided feedback with other participants.    Demonstrated understanding of topics  discussed through group discussion and participation and Identified / Expressed readiness to act on skill suggestions discussed in topic    Treatment Plan:  Patient has an initial individualized treatment plan that was created as part of their diagnostic assessment / admission process.  A master individualized treatment plan is in the process of being developed with the patient and multi-disciplinary care team.    UYEN Richmond

## 2020-07-17 NOTE — PROGRESS NOTES
Patient Active Problem List   Diagnosis     Vulvar irritation     CLAIRE (generalized anxiety disorder)     Mass     PTSD (post-traumatic stress disorder)     Mixed stress and urge urinary incontinence     Other chronic pain     Restless leg syndrome     S/P arthroscopic surgery of right knee     Osteoarthritis of spine with radiculopathy, lumbar region     Chondromalacia of left patella     Chronic bilateral low back pain without sciatica     Chronic joint pain     Complex tear of medial meniscus of left knee as current injury     GERD (gastroesophageal reflux disease)     Glaucoma     IBS (irritable bowel syndrome)     Chronic pain of right knee     Lumbar radiculopathy     Major depressive disorder, recurrent severe without psychotic features (H)     Obesity     Postmenopausal     Primary osteoarthritis of right knee     MDD (major depressive disorder), recurrent severe, 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)       Current Outpatient Medications:      Acetaminophen (TYLENOL PO), Take 1,000 mg by mouth every 8 hours as needed , Disp: , Rfl:      cholecalciferol (VITAMIN D3) 5000 units (125 mcg) capsule, Take by mouth daily, Disp: , Rfl:      famotidine (PEPCID) 20 MG tablet, Take 20 mg by mouth 2 times daily, Disp: , Rfl:      gabapentin (NEURONTIN) 300 MG capsule, Take 3 capsules (900 mg) by mouth At Bedtime, Disp: 270 capsule, Rfl: 0     hydrOXYzine (VISTARIL) 50 MG capsule, Take 1 capsule (50 mg) by mouth 3 times daily as needed for anxiety, Disp: 90 capsule, Rfl: 3     lamoTRIgine (LAMICTAL) 25 MG tablet, Take 50 mg by mouth daily , Disp: , Rfl:      Omega-3 Fatty Acids (OMEGA-3 FISH OIL) 1200 MG CAPS, , Disp: , Rfl:      pramipexole (MIRAPEX) 0.125 MG tablet, Take 2 tablets (0.25 mg) by mouth At Bedtime 2 tablets at bedtime, Disp: 60 tablet, Rfl: 3     pramipexole (MIRAPEX) 0.25 MG tablet, Take 3 tablets (0.75 mg) by mouth At Bedtime, Disp: 90 tablet, Rfl: 3     traZODone (DESYREL) 100 MG tablet, Take1 Tablet at night orally for  sleep PRN, Disp: 30 tablet, Rfl: 3  Psychiatry staffing: case discussed  Diagnosis:  MDD, with anxious distress, PTSD.  Tech issues at [employer].

## 2020-07-17 NOTE — GROUP NOTE
Process Group Note    PATIENT'S NAME: Gregoria Stein  MRN:   [id number]  :   1955  ACCT. NUMBER: [id number]  DATE OF SERVICE: 20  START TIME:  1:00 PM  END TIME:  1:50 PM  FACILITATOR: Abbie Eugene LICSW  TOPIC:  Process Group    Diagnoses:   Principal DSM5 Diagnoses  (Sustained by DSM5 Criteria Listed Above):   309.81 (F43.10) Posttraumatic Stress Disorder (includes Posttraumatic Stress Disorder for Children 6 Years and Younger)  Without dissociative symptoms. Per client report that she has past diagnosis of this per her therapist who is now retired.  4. Other Diagnoses that is relevant to services:   296.33 (F33.2) Major Depressive Disorder, Recurrent Episode, Severe _ and With anxious distress.      55+ Program  TRACK: B2  Telemedicine Visit: The patient's condition can be safely assessed and treated via synchronous audio and visual telemedicine encounter.      Reason for Telemedicine Visit: Services only offered telehealth    Originating Site (Patient Location): Patient's home    Distant Site (Provider Location): [facility]: [facility]    Consent:  The patient/guardian has verbally consented to: the potential risks and benefits of telemedicine (video visit) versus in person care; bill my insurance or make self-payment for services provided; and responsibility for payment of non-covered services.     Mode of Communication:  Video Conference via [employer]    As the provider I attest to compliance with applicable laws and regulations related to telemedicine.     NUMBER OF PARTICIPANTS: 4          Data:    Session content: At the start of this group, patients were invited to check in by identifying themselves, describing their current emotional status, and identifying issues to address in this group.   Area(s) of treatment focus addressed in this session included Symptom Management and Personal Safety.  Pt reports sadness that she could not help her friend, who had surgery  yesterday more. She identifies that her restless leg syndrome caused her to stay up all night, also noting that this is a recurrent problem. Upon further exploration, she not only took her friend to the hospital, but stayed over night with her and made her breakfast this morning. 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 Denies safety issues.    Therapeutic Interventions/Treatment Strategies:  Psychotherapist offered support, feedback and validation and reinforced use of skills. Treatment modalities used include Cognitive Behavioral Therapy. Interventions include Other: Encouraged her to take credit for her kindness.    Assessment:    Patient response:   Patient responded to session by accepting feedback, giving feedback, listening, focusing on goals, being attentive and accepting support    Possible barriers to participation / learning include: and no barriers identified    Health Issues:   Yes: restless leg syndrome       Substance Use Review:   Substance Use: No active concerns identified.    Mental Status/Behavioral Observations  Appearance:   Appropriate   Eye Contact:   Good   Psychomotor Behavior: Normal   Attitude:   Cooperative   Orientation:   All  Speech   Rate / Production: Normal    Volume:  Normal   Mood:    Anxious  Depressed   Affect:    Appropriate  Tearful  Thought Content:   Clear  Thought Form:  Coherent  Logical     Insight:    Good     Plan:     Safety Plan: No current safety concerns identified.  Recommended that patient call 911 or go to the local ED should there be a change in any of these risk factors.     Barriers to treatment: None identified    Patient Contracts (see media tab):  None    Substance Use: Not addressed in session     Continue or Discharge: Patient will continue in 55+ Program (55+) as  planned. Patient is likely to benefit from learning and using skills as they work toward the goals identified in their treatment plan.      Abbie Maddox, [facility]  July 17, 2020

## 2020-07-20 NOTE — GROUP NOTE
Psychotherapy Group Note    PATIENT'S NAME: Gregoria Stein  MRN:   [id number]  :   1955  ACCT. NUMBER: [id number]  DATE OF SERVICE: 20  START TIME:  2:00 PM  END TIME:  2:50 PM  FACILITATOR: Abbie Eugene LICSW  TOPIC: MH EBP Group: Self-Awareness  55+ Program  TRACK: B2  Telemedicine Visit: The patient's condition can be safely assessed and treated via synchronous audio and visual telemedicine encounter.      Reason for Telemedicine Visit: Services only offered telehealth    Originating Site (Patient Location): Patient's home    Distant Site (Provider Location): [facility]: [facility]    Consent:  The patient/guardian has verbally consented to: the potential risks and benefits of telemedicine (video visit) versus in person care; bill my insurance or make self-payment for services provided; and responsibility for payment of non-covered services.     Mode of Communication:  Video Conference via [employer]    As the provider I attest to compliance with applicable laws and regulations related to telemedicine.     NUMBER OF PARTICIPANTS: 4    Summary of Group / Topics Discussed:  Self-Awareness: Values: Patients identified personal values by examining development of their current values and how their values influence their daily functioning and life choices. Patients explored the impact of their values on their thoughts, feelings, and actions. Patients discussed definition of personal values and how they develop and change over time. The goal is to help patients reconcile value conflicts and achieve balance and flexibility to improve mood and daily functioning.     Patient Session Goals / Objectives:    Examined development of values and impact of values on functioning    Identified and prioritized important values related to current well-being     Identified strategies to change or enhance values to positively impact symptoms    Assisted patients to find ways to adapt functioning to  better fit their values        Patient Participation / Response:  Fully participated with the group by sharing personal reflections / insights and openly received / provided feedback with other participants.    Demonstrated understanding of topics discussed through group discussion and participation    Treatment Plan:  Patient has an initial individualized treatment plan that was created as part of their diagnostic assessment / admission process.  A master individualized treatment plan is in the process of being developed with the patient and multi-disciplinary care team.    GEREMIAS CastellanosSW

## 2020-07-21 ENCOUNTER — HOSPITAL ENCOUNTER (OUTPATIENT)
Dept: BEHAVIORAL HEALTH | Facility: CLINIC | Age: 65
End: 2020-07-21
Attending: PSYCHIATRY & NEUROLOGY
Payer: MEDICARE

## 2020-07-21 ENCOUNTER — TELEPHONE (OUTPATIENT)
Dept: BEHAVIORAL HEALTH | Facility: CLINIC | Age: 65
End: 2020-07-21

## 2020-07-21 PROCEDURE — 90853 GROUP PSYCHOTHERAPY: CPT | Mod: PO | Performed by: COUNSELOR

## 2020-07-21 PROCEDURE — 90853 GROUP PSYCHOTHERAPY: CPT | Mod: PO

## 2020-07-21 NOTE — PROGRESS NOTES
Acknowledgement of Current Treatment Plan       I have reviewed my treatment plan with my therapist on 7/21/20  .   I agree with the plan as it is written in the electronic health record. (B-2 Track)    Name:      Signature:  Gregoria Stein         Unable to sign due to COVID-19. Killian verbally agreed to the Treatment Plan   Dr Aniceto Gamez MD  Psychiatrist      Dr. Arcelia Harris, [facility]      Psychotherapist  SANCHEZ Burr LPCC on 7/21/2020 at 12:31 PM

## 2020-07-21 NOTE — GROUP NOTE
Psychotherapy Group Note    PATIENT'S NAME: Gregoria Stein  MRN:   [id number]  :   1955  ACCT. NUMBER: [id number]  DATE OF SERVICE: 20  START TIME:  3:00 PM  END TIME:  3:50 PM  FACILITATOR: Gabbie Davidson LPCC  TOPIC: MH EBP Group: Emotions Management  55+ Program  TRACK: B2    NUMBER OF PARTICIPANTS: 5    Summary of Group / Topics Discussed:  Emotions Management: Understanding Emotions: Patients discussed the purpose of emotions and function they serve in our lives.  Reviewed core emotions, why they happen (triggers), and how they occur. The group assisted one anothers' understanding that: emotional experiences are important; difficult emotions have a place in our lives; and the differences between various emotions.    Patient Session Goals / Objectives:    Demonstrate understanding of types various emotions    Identify and discuss specific emotions and when they occur; understand triggers    Discuss barriers to emotional regulation    Telemedicine Visit: The patient's condition can be safely assessed and treated via synchronous audio and visual telemedicine encounter.      Reason for Telemedicine Visit: Services only offered telehealth and due to COVID-19    Originating Site (Patient Location): Patient's home    Distant Site (Provider Location): Provider Remote Setting    Consent:  The patient/guardian has verbally consented to: the potential risks and benefits of telemedicine (video visit) versus in person care; bill my insurance or make self-payment for services provided; and responsibility for payment of non-covered services.     Mode of Communication:  Video Conference via [employer]    As the provider I attest to compliance with applicable laws and regulations related to telemedicine.        Patient Participation / Response:  Fully participated with the group by sharing personal reflections / insights and openly received / provided feedback with other participants.    Demonstrated  understanding of topics discussed through group discussion and participation and Self-aware of experiences with difficult emotions, and strategies to employ to manage them    Treatment Plan:  Patient has a current master individualized treatment plan and today was our weekly review of the patient's progress.  See Epic treatment plan for progress / updates on goals and plan.    Gabbie Davidson, [facility]

## 2020-07-21 NOTE — TELEPHONE ENCOUNTER
Writer and patient discussed treatment plan goals (see BEH encounter) and patient requested a copy be sent through [employer]. Copy was sent at 12:30 on 7/21.    SANCHEZ Alvarez on 7/21/2020 at 12:30 PM

## 2020-07-21 NOTE — PROGRESS NOTES
55+ Treatment Program:  Individualized Treatment Plan     Date of Plan: 20    Name: Killian Stein MRN: [id number]    : 1955     Program: 55+ Outpatient Program    Clinical Track: B-2    DSM5 Diagnosis:  309.81 (F43.10) Posttraumatic Stress Disorder (includes Posttraumatic Stress Disorder for Children 6 Years and Younger)  Without dissociative symptoms. Per client report that she has past diagnosis of this per her therapist who is now retired.  4. Other Diagnoses that is relevant to services:   296.33 (F33.2) Major Depressive Disorder, Recurrent Episode, Severe _ and With anxious distress.  5. Provisional Diagnosis:  309.81 (F43.10) Posttraumatic Stress Disorder (includes Posttraumatic Stress Disorder for Children 6 Years and Younger)  Without dissociative symptoms as evidenced by client reported hypervigilence, hyper arousal, decreased functioning and past history of trauma and diagnosis, she did not wish to disclose trauma at this time       55+ Multidisciplinary Team Members:  Dr Aniceto Gamez MD and/or Dr. Arcelia Harris PsyD, ANITA, Addie Lynn RN; Abbie Eugene, [facility]; Gregoria Díaz [facility]; Valencia Ponce, OTR/L; LIZZY Yusuf    Gregoria Stein will participate in the 55+ Program 3 days per week, 3 hours per day.   Anticipated duration/discharge: 12 weeks    Due to COVID-19, services will be delivered via telemedicine until further notice.     Program Start Date: 20  Anticipated Discharge Date: 10/5/20 (pending authorization/clinical changes)    NOTE: Complete CGI     Review Date: Does Gregoria Stein continue to meet criteria to participate in the 55+ Program, 3 days per week; 3 hours per day?   20 yes   20 yes - Arcelia Harris PsyD, LP   20 Killian discharged. Killian has community resources.             Client Strengths:  caring, goal-focused, good listener, has a previous history of therapy, insightful, intelligent, motivated, open to learning, open to suggestions /  feedback and supportive    Client Participation in Plan:  Contributed to goals and plan   Attended individual treatment plan meeting on 7/21/20  Agrees with plan     Areas of Vulnerability:  Suicidal Ideation   Anxiety  Depressive symptoms   Trauma/Abuse/Neglect    Long-Term Goals:  Knowledge about illness and management of symptoms   Maintenance of personal safety     Abuse Prevention Plan:  Safe, therapeutic environment   Safety coping plan as needed   Education regarding illness and skill development   Coordination with care providers     Discharge Criteria:  Satisfactory progress toward treatment goals   Improvement re: identified problems and symptoms   Ability to continue recovery at next level of service   Has a discharge plan in place   Has safety/coping plan in place      Areas of Treatment Focus        Area of Treatment Focus:   Personal Safety  Start Date:    7/21/20    Goal: Target Date: 9/18/20 Status: Stopped  Client will notify staff when needing assistance to develop or implement a coping plan to manage suicidal or self injurious urges.  Client will learn and practice 1 - 2 coping skills to help improve depressive symptoms and suicidal ideation  Client will use coping plan for safety, as needed.      Progress:   7/21/20: Initial treatment plan  8/12/20 Killian denies active suicidal ideation at time of discharge. She is aware of coping skills for symptom management.       Treatment Strategies:   Assist clients in establishing / strengthening support network  Engage in safety planning when indicated  Provide education regarding healthy coping skills      Area of Treatment Focus:   Symptom Stabilization and Management  Start Date:    7/21/20    Goal: Target Date: 9/18/20 Status: Stopped  Will increase self-awareness, self-validation, authenticity. Practice stating your feelings, opinions, and preferences in group and in personal life., Will report on symptoms and identify skills to use to manage depression  and anxiety  and Will learn and practice 1-2 coping skills to help improve mood symptoms      Progress:   7/21/20: Initial treatment plan  8/12/2020 Killian is naming needs and feelings with her recovery. Killian is expressing authencity.        Treatment Strategies:   Assist clients in establishing / strengthening support network  Assist to identify treatment goals  Assess / reassess for appropriate therapy program involvement, encourage participation in therapies  Provide education regarding self-awareness and coping skills       Area of Treatment Focus:   Community Resources / Support and Discharge Planning  Start Date:    7/21/20    Goal:  Target Date: 9/18/20 Status: Stopped  Will improve wellness related behaviors by engaging in daily wellness activites , Will increase effective use of support / increase ability to ask for help. Identify support needs, create a list of things you could use help with and Identify attitudes or beliefs about asking for help that interfere with your ability to ask for help and identify more helpful attitudes and Will develop an aftercare / transition plan by scheduling on-going individual therapy      Progress:   7/21/20: Initial treatment plan  8/12/20 Killian has been identifying her pattern of mood symptoms and asserting needs for support. Killian will continue to consult with Dr. Susan Coronel at [facility] and seek treatment at [facility] where there is therapy, psychiatry, OT and peer support. [employer] is also a resource.         Treatment Strategies:   Assist clients in establishing / strengthening support network  Assist with discharge planning  Assess / reassess for appropriate therapy program involvement, encourage participation in therapies      Area of Treatment Focus:   Develop / Improve Independent Living / Socialization Skills  Start Date:    7/21/20    Goal: Target Date: 9/18/20 Status: Active  Will improve ability to communicate with others openly and  "responsibly. Reach out to friends that could be helpful, Will identify and apply strategies to increase independent living skills by contacting CAD[employer] worker and  consistently and Will enlist involvement of support network by contacting CADII worker and  through [facility] (Mental Health Resources: Shirley Velazco)      Progress:   7/21/20: Initial treatment plan      8/1/20 Killian can consult with providers via [facility] listed above.     Treatment Strategies:   Assist clients in establishing / strengthening support network  Assist to identify treatment goals  Assess / reassess for appropriate therapy program involvement, encourage participation in therapies     Gbabie Davidson, [facility] on 7/21/2020 at 12:19 PM    Katey Gonzalez [facility]      NOTE: Required signatures are completed manually and scanned into the electronic medical record. See \"Media\" tab in epic.    The Individualized Treatment Plan Signature Page has been routed to the provider for co-sign.    I have reviewed the patient's Individualized Treatment Plan and agree with the current goals, interventions and level of care.     Arcelia Harris PsyD, LP  7/23/2020     "

## 2020-07-22 ENCOUNTER — HOSPITAL ENCOUNTER (OUTPATIENT)
Dept: BEHAVIORAL HEALTH | Facility: CLINIC | Age: 65
End: 2020-07-22
Attending: PSYCHIATRY & NEUROLOGY
Payer: MEDICARE

## 2020-07-22 PROCEDURE — 90853 GROUP PSYCHOTHERAPY: CPT | Mod: GT

## 2020-07-22 PROCEDURE — 90853 GROUP PSYCHOTHERAPY: CPT | Mod: PO | Performed by: COUNSELOR

## 2020-07-22 NOTE — GROUP NOTE
Psychotherapy Group Note    PATIENT'S NAME: Gregoria Stein  MRN:   [id number]  :   1955  ACCT. NUMBER: [id number]  DATE OF SERVICE: 20  START TIME:  2:00 PM  END TIME:  2:52 PM  FACILITATOR: Elba Calle LMFT  TOPIC:  EBP Group: Coping Skills  55+ Program  TRACK: B1 & B2    NUMBER OF PARTICIPANTS: 6    Summary of Group / Topics Discussed:  Coping Skills: Grounding: Patients discussed and practiced strategies to increase attachment / presence to the current moment.  Patients identified situations in which using these strategies will help improve emotion regulation sense of calm in the body.  Reviewed the benefits of applying grounding strategies, as well as past / current practices of each member.  Patients identified situations in which using these strategies would reduce stress. They developed the ability to distinguish when these strategies can be useful in their lives for management and stress and psychological well-being.    Patient Session Goals / Objectives:    Understand the purpose of using grounding strategies to reduce stress.    Verbalize understanding of how and when to apply grounding strategies to reduce distress and increase presence in the moment.    Review patients current grounding practices and discuss a more formal way of practicing and accessing skills.    Practice using various calming strategies (e.g. 5-4-3-2-1; mental and body awareness).    Choose 1-2 grounding strategies to apply during times of distress.    Telemedicine Visit: The patient's condition can be safely assessed and treated via synchronous audio and visual telemedicine encounter.      Reason for Telemedicine Visit: Services only offered telehealth    Originating Site (Patient Location): Patient's home    Distant Site (Provider Location): Provider Remote Setting    Consent:  The patient/guardian has verbally consented to: the potential risks and benefits of telemedicine (video visit) versus in person care; bill my  insurance or make self-payment for services provided; and responsibility for payment of non-covered services.     Mode of Communication:  Video Conference via [employer]    As the provider I attest to compliance with applicable laws and regulations related to telemedicine.    Patient Participation / Response:  Moderately participated, sharing some personal reflections / insights and adequately adequately received / provided feedback with other participants.    Demonstrated understanding of topics discussed through group discussion and participation, Expressed understanding of the relevance / importance of coping skills at distressing times in life and Demonstrated knowledge of when to consider using a variety of coping skills in daily life    Treatment Plan:  Patient has a current master individualized treatment plan.  See Epic treatment plan for more information.    LIZZY Yusuf

## 2020-07-22 NOTE — GROUP NOTE
Process Group Note    PATIENT'S NAME: Gregoria Stein  MRN:   [id number]  :   1955  ACCT. NUMBER: [id number]  DATE OF SERVICE: 20  START TIME:  1:00 PM  END TIME:  1:50 PM  FACILITATOR: Abbie Eugene LICSW  TOPIC:  Process Group    Diagnoses:   Principal DSM5 Diagnoses  (Sustained by DSM5 Criteria Listed Above):   309.81 (F43.10) Posttraumatic Stress Disorder (includes Posttraumatic Stress Disorder for Children 6 Years and Younger)  Without dissociative symptoms. Per client report that she has past diagnosis of this per her therapist who is now retired.  4. Other Diagnoses that is relevant to services:   296.33 (F33.2) Major Depressive Disorder, Recurrent Episode, Severe _ and With anxious distress.      55+ Program  TRACK: B2  Telemedicine Visit: The patient's condition can be safely assessed and treated via synchronous audio and visual telemedicine encounter.      Reason for Telemedicine Visit: Services only offered telehealth    Originating Site (Patient Location): Patient's home    Distant Site (Provider Location): [facility]: [facility]    Consent:  The patient/guardian has verbally consented to: the potential risks and benefits of telemedicine (video visit) versus in person care; bill my insurance or make self-payment for services provided; and responsibility for payment of non-covered services.     Mode of Communication:  Video Conference via [employer]    As the provider I attest to compliance with applicable laws and regulations related to telemedicine.     NUMBER OF PARTICIPANTS: 5          Data:    Session content: At the start of this group, patients were invited to check in by identifying themselves, describing their current emotional status, and identifying issues to address in this group.   Area(s) of treatment focus addressed in this session included Symptom Management and Personal Safety.  Pt reports multiple break downs over the weekend ie: car, bike and  "refrigerator. These increased her anxiety. She states \"Things break down, so I can rise up\". Pt reports a difficult conversation with mother. She states \"I felt like I was dying on Saturday, but I have more optimism now\". Denies safety concerns.    Therapeutic Interventions/Treatment Strategies:  Psychotherapist offered support, feedback and validation and reinforced use of skills. Treatment modalities used include Cognitive Behavioral Therapy. Interventions include Other: Discussed ways to increase hopefulness.    Assessment:    Patient response:   Patient responded to session by accepting feedback, giving feedback, listening, focusing on goals, being attentive and accepting support    Possible barriers to participation / learning include: and no barriers identified    Health Issues:   None reported       Substance Use Review:   Substance Use: No active concerns identified.    Mental Status/Behavioral Observations  Appearance:   Appropriate   Eye Contact:   Good   Psychomotor Behavior: Normal   Attitude:   Cooperative   Orientation:   All  Speech   Rate / Production: Normal    Volume:  Normal   Mood:    Anxious  Depressed   Affect:    Appropriate   Thought Content:   Clear  Thought Form:  Coherent  Logical     Insight:    Good     Plan:     Safety Plan: No current safety concerns identified.  Recommended that patient call 911 or go to the local ED should there be a change in any of these risk factors.     Barriers to treatment: None identified    Patient Contracts (see media tab):  None    Substance Use: Not addressed in session     Continue or Discharge: Patient will continue in 55+ Program (55+) as planned. Patient is likely to benefit from learning and using skills as they work toward the goals identified in their treatment plan.      Abbie Maddox, [facility]  July 22, 2020  "

## 2020-07-22 NOTE — GROUP NOTE
Process Group Note    PATIENT'S NAME: Gregoria Stein  MRN:   [id number]  :   1955  ACCT. NUMBER: [id number]  DATE OF SERVICE: 20  START TIME:  1:00 PM  END TIME:  1:50 PM  FACILITATOR: Gabbie Davidson [facility]  TOPIC:  Process Group    Diagnoses:  309.81 (F43.10) Posttraumatic Stress Disorder (includes Posttraumatic Stress Disorder for Children 6 Years and Younger)  Without dissociative symptoms. Per client report that she has past diagnosis of this per her therapist who is now retired.  4. Other Diagnoses that is relevant to services:   296.33 (F33.2) Major Depressive Disorder, Recurrent Episode, Severe _ and With anxious distress.  5. Provisional Diagnosis:  309.81 (F43.10) Posttraumatic Stress Disorder (includes Posttraumatic Stress Disorder for Children 6 Years and Younger)  Without dissociative symptoms as evidenced by client reported hypervigilence, hyper arousal, decreased functioning and past history of trauma and diagnosis, she did not wish to disclose trauma at this time       55+ Program  TRACK: B-2    NUMBER OF PARTICIPANTS: 3    Telemedicine Visit: The patient's condition can be safely assessed and treated via synchronous audio and visual telemedicine encounter.      Reason for Telemedicine Visit: Services only offered telehealth and due to COVID-19    Originating Site (Patient Location): Patient's home    Distant Site (Provider Location): Provider Remote Setting    Consent:  The patient/guardian has verbally consented to: the potential risks and benefits of telemedicine (video visit) versus in person care; bill my insurance or make self-payment for services provided; and responsibility for payment of non-covered services.     Mode of Communication:  Video Conference via [employer]    As the provider I attest to compliance with applicable laws and regulations related to telemedicine.            Data:    Session content: At the start of this group, patients were invited to check in by  "identifying themselves, describing their current emotional status, and identifying issues to address in this group.   Area(s) of treatment focus addressed in this session included Symptom Management, Personal Safety and Community Resources/Discharge Planning.    Patient reported feeling \"groggy and sad\" about waking up late and family relationships. Patient discussed working toward creating more emotional connections with people that are supportive instead of her family relationships. Patient identified reaching out to a friend as skills they will use to address their goal(s). Patient reported feeling sad and grieving the loss of family relationships and lack of emotional connections may be a barrier to working toward their goal(s) and/or addressing mental health symptoms. Patient reported no safety concerns and/or self-injurious behaviors. Patient reported no substance use. Patient reported they are taking their medications as prescribed. Patient reported feeling proud that they shared her experience in group. Patient discussed her lack of emotional connection with family with the treatment group.     Therapeutic Interventions/Treatment Strategies:  Psychotherapist offered support, feedback and validation and reinforced use of skills. Treatment modalities used include Motivational Interviewing and Cognitive Behavioral Therapy. Interventions include Coping Skills: Discussed use of self-soothe skills to decrease distress in the body and Assisted patient in identifying 1-2 healthy distraction skills to reduce overall distress, Mindfulness: Facilitated discussion of when/how to use mindfulness skills to benefit general health, mental health symptoms, and stressors and Symptoms Management: Promoted understanding of their diagnoses and how it impacts their functioning.    Assessment:    Patient response:   Patient responded to session by accepting feedback, giving feedback, listening, focusing on goals and being " attentive    Possible barriers to participation / learning include: and no barriers identified    Health Issues:   None reported       Substance Use Review:   Substance Use: No active concerns identified.    Mental Status/Behavioral Observations  Appearance:   Appropriate   Eye Contact:   Good   Psychomotor Behavior: Normal   Attitude:   Cooperative   Orientation:   All  Speech   Rate / Production: Normal/ Responsive Normal    Volume:  Normal   Mood:    Anxious  Depressed  Normal  Affect:    Appropriate   Thought Content:   Clear  Thought Form:  Coherent  Logical     Insight:    Good     Plan:     Safety Plan: No current safety concerns identified.  Recommended that patient call 911 or go to the local ED should there be a change in any of these risk factors.     Barriers to treatment: None identified    Patient Contracts (see media tab):  None    Substance Use: Provided encouragement towards sobriety     Continue or Discharge: Patient will continue in 55+ Program (55+) as planned. Patient is likely to benefit from learning and using skills as they work toward the goals identified in their treatment plan.      Gabbie Davidson, [facility]  July 22, 2020

## 2020-07-23 ENCOUNTER — HOSPITAL ENCOUNTER (OUTPATIENT)
Dept: BEHAVIORAL HEALTH | Facility: CLINIC | Age: 65
Discharge: HOME | End: 2020-07-23
Attending: PSYCHOLOGIST | Admitting: PSYCHOLOGIST
Payer: MEDICARE

## 2020-07-23 ENCOUNTER — TELEPHONE (OUTPATIENT)
Dept: BEHAVIORAL HEALTH | Facility: CLINIC | Age: 65
End: 2020-07-23

## 2020-07-23 PROCEDURE — 90834 PSYTX W PT 45 MINUTES: CPT | Mod: GT | Performed by: PSYCHOLOGIST

## 2020-07-23 NOTE — PROGRESS NOTES
55+ Program  TRACK: B2    PATIENT'S NAME: Gregoria Stein  MRN:   [id number]  :   1955  ACCT. NUMBER: [id number]  DATE OF SERVICE: 20   START TIME: 13:01  END TIME: 13:43    Telemedicine Visit: The patient's condition can be safely assessed and treated via synchronous audio and visual telemedicine encounter.      Reason for Telemedicine Visit: Patient unable to travel due to COVID 19    Originating Site (Patient Location): Patient's home    Distant Site (Provider Location): Provider Remote Setting    Consent:  The patient/guardian has verbally consented to: the potential risks and benefits of telemedicine (video visit) versus in person care; bill my insurance or make self-payment for services provided; and responsibility for payment of non-covered services.     Mode of Communication:  Video Conference via [employer]    As the provider I attest to compliance with applicable laws and regulations related to telemedicine.    ATTENDEES: Patient and this author    Previous PHQ-9:   PHQ-9 SCORE 2020 7/10/2020 2020   PHQ-9 Total Score 16 12 15   PHQ-A Total Score - - -   Some encounter information is confidential and restricted. Go to Review Flowsheets activity to see all data.     Previous CLAIRE-7:   CLAIRE-7 SCORE 2020 7/10/2020 2020   Total Score 10 13 13   Some encounter information is confidential and restricted. Go to Review Flowsheets activity to see all data.     DATA    Treatment Objective(s) Addressed in This Session:  The purpose of today's call is for this author to provide oversight of patient's care while receiving program services. Specific treatment goals addressed included personal safety, symptoms stabilization and management, wellness and mental health, and community resources/discharge planning.     Patient identified the following initial areas for treatment focus: knee replacement complications led to discouragement, easily triggered (medical stuff, feeling , etc.),  experienced suicidal ideation back in October, recent hospitalization (voluntary), elevated anxiety, elevated depressive symptoms, sleep issues    Current Stressors / Issues:  During today's visit, this author identified herself, the purpose of the visit and her role of provider oversight while patient is participating in the program. In addition, this author assessed the patient's mental health diagnoses and symptoms. The patient reports they currently manage mental health symptoms by biking, engaging in art, pray & meditate, and taking medications.  Patient reports relief from their presenting issue because of attending group and using skills.     Patient reports effectiveness of current medications managed by: JODI Perry, CNS ([facility]). Patient denies that their medications have changed.     Acetaminophen (TYLENOL PO)  Taking as needed    cholecalciferol (VITAMIN D3) 5000 units (125 mcg) capsule  Taking    famotidine (PEPCID) 20 MG tablet  Taking    gabapentin (NEURONTIN) 300 MG capsule (restless leg) Taking    hydrOXYzine (VISTARIL) 50 MG capsule  Taking    lamoTRIgine (LAMICTAL) 25 MG tablet  Taking    Omega-3 Fatty Acids (OMEGA-3 FISH OIL) 1200 MG CAPS  Taking    pramipexole (MIRAPEX) 0.25 MG tablet  Taking    Medical Cannabis Taking   traZODone (DESYREL) 100 MG tablet  Taking      Medication Adherence:  Patient reports taking prescribed medications as prescribed.    Patient  reports  that they plan to continue to work with their individual therapist weekly (gender identity) and a . They were urged to continue services. They requested referrals for someone who specializes in C-PTSD. This author agreed to send referrals via [employer] after today's visit.    Patient identified that working with a therapist knowledgeable about CPTSD and attending group would be beneficial for their care moving forward.     Progress on Treatment Objective(s) / Homework:  Not applicable to current  visit    Therapeutic Interventions/Treatment Strategies:  Support, Feedback and Safety Assessments    Response to Treatment Strategies:  Accepted Feedback, Listened and Attentive    Changes in Health Issues:  Excessive sweating and shortness of breath    Chemical Use Review:  No substance use concerns reported / identified    Assessment: Current Emotional / Mental Status (status of significant symptoms):    Risk status (Self / Other harm or suicidal ideation)  Patient has had a history of suicidal ideation: most recent - within past 30 days; first began during teenage years and suicide attempts: denies having an attempt but doctor classified actions last fall as an attempt because emergency services were called  Patient denies current fears or concerns for personal safety.  Patient reports the following current or recent suicidal ideation or behaviors: passive fleeting ideation in past 30 days (couple of times per week); denies thoughts of method or intent to end life in past 30 days.  Patient denies current or recent homicidal ideation or behaviors.  Patient denies current or recent self injurious behavior or ideation.  Patient denies other safety concerns.  A safety and risk management plan has been developed including: Patient consented to co-developed safety plan.  A safety and risk management plan was completed.  Patient agreed to use safety plan should any safety concerns arise.  A copy was given to the patient.     Appearance:   Appropriate   Eye Contact:   Unable to assess via video   Psychomotor Behavior: Normal   Attitude:   Cooperative   Orientation:   All  Speech   Rate / Production: Normal/ Responsive   Volume:  Normal   Mood:    Depressed   Affect:    Tearful  Thought Content:  Clear   Thought Form:  Coherent  Logical   Insight:    Good     Diagnoses:    309.81 (F43.10) Posttraumatic Stress Disorder (includes Posttraumatic Stress Disorder for Children 6 Years and Younger)  With dissociative  symptoms. Per client report that she has past diagnosis of Complex PTSD per her therapist who is now retired.  296.33 (F33.2) Major Depressive Disorder, Recurrent Episode, Severe and With anxious distress.     Plan/Recommendations: (Homework, other):  This author will follow up with the patient in approximately 30 days.    Patient continues to meet criteria for recommended level of care: Yes - 55+ Program 3x/week for 3 hours per day    Patient would be at reasonable risk of requiring a higher level of care in the absence of current services.    Patient does agree with the current plan of care.    Arcelia Harris PsyD, LP  7/23/2020

## 2020-07-23 NOTE — TELEPHONE ENCOUNTER
This author received a voicemail from the patient at 11:11am in which the patient stated they were having difficulty setting up the visit through [employer]. As such, this author returned the call and left the patient a message stating a link would be sent by this author via text at 1:00pm in order to join the call, similar to the group process.

## 2020-07-23 NOTE — ADDENDUM NOTE
Encounter addended by: Gabbie Davidson [facility] on: 7/23/2020 10:12 AM   Actions taken: Flowsheet accepted

## 2020-07-24 ENCOUNTER — HOSPITAL ENCOUNTER (OUTPATIENT)
Dept: BEHAVIORAL HEALTH | Facility: CLINIC | Age: 65
End: 2020-07-24
Attending: PSYCHIATRY & NEUROLOGY
Payer: MEDICARE

## 2020-07-24 PROCEDURE — 90853 GROUP PSYCHOTHERAPY: CPT | Mod: GT | Performed by: COUNSELOR

## 2020-07-24 PROCEDURE — 90853 GROUP PSYCHOTHERAPY: CPT | Mod: GT | Performed by: SOCIAL WORKER

## 2020-07-24 NOTE — GROUP NOTE
Psychotherapy Group Note    PATIENT'S NAME: Gregoria Stein  MRN:   [id number]  :   1955  ACCT. NUMBER: [id number]  DATE OF SERVICE: 20  START TIME:  3:00 PM  END TIME:  3:50 PM  FACILITATOR: Asha Desai LICSW  TOPIC: MH EBP Group: Enhanced Mindfulness  55+ Program  TRACK: B2    NUMBER OF PARTICIPANTS: 5    Summary of Group / Topics Discussed:  Enhanced Mindfulness: Body and Mind Integration: Patients received an overview and education regarding the importance of including the body in the management of emotional health and self-care and as a direct route to mindfulness practice.  Patients discussed various ways in which the body can serve as an informant to their physical and emotional experiences/need. Patients discussed the body as a direct link to the present moment and to mindfulness practice.  Patients discussed current relationship with body, self-awareness, mindfulness practice and barriers to connection with body.  Patients were guided through breath work and movement to facilitate greater self-awareness, grounding, self-expression, and connection to other.  Patients discussed how the experiential could be applied to better manage mental health and develop oliva connection to self.    Patient Session Goals / Objectives:    Identify how movement awareness could be used for grounding, stress management, self-expression, connection to other and self-regulation    Improved awareness of breath and movement preferences    Identify how movement and the body is used in mindfulness practice    Reflect on use of these practices in everyday life.    Identify barriers to attending to body  Telemedicine Visit: The patient's condition can be safely assessed and treated via synchronous audio and visual telemedicine encounter.      Reason for Telemedicine Visit: Services only offered telehealth and covid19    Originating Site (Patient Location): Patient's home    Distant Site (Provider Location): Provider  Remote Setting    Consent:  The patient/guardian has verbally consented to: the potential risks and benefits of telemedicine (video visit) versus in person care; bill my insurance or make self-payment for services provided; and responsibility for payment of non-covered services.     Mode of Communication:  Video Conference via [employer]    As the provider I attest to compliance with applicable laws and regulations related to telemedicine.       Patient Participation / Response:  Fully participated with the group by sharing personal reflections / insights and openly received / provided feedback with other participants.    Demonstrated understanding of topics discussed through group discussion and participation and Practiced skills in session    Treatment Plan:  Patient has a current master individualized treatment plan.  See Epic treatment plan for more information.    Asha Desai, GEREMIASSW

## 2020-07-24 NOTE — GROUP NOTE
Process Group Note    PATIENT'S NAME: Gregoria Stein  MRN:   [id number]  :   1955  ACCT. NUMBER: [id number]  DATE OF SERVICE: 20  START TIME:  1:00 PM  END TIME:  1:50 PM  FACILITATOR: Gabbie Davidson LPCC  TOPIC:  Process Group    Diagnoses:  309.81 (F43.10) Posttraumatic Stress Disorder (includes Posttraumatic Stress Disorder for Children 6 Years and Younger)  Without dissociative symptoms. Per client report that she has past diagnosis of this per her therapist who is now retired.  4. Other Diagnoses that is relevant to services:   296.33 (F33.2) Major Depressive Disorder, Recurrent Episode, Severe _ and With anxious distress.  5. Provisional Diagnosis:  309.81 (F43.10) Posttraumatic Stress Disorder (includes Posttraumatic Stress Disorder for Children 6 Years and Younger)  Without dissociative symptoms as evidenced by client reported hypervigilence, hyper arousal, decreased functioning and past history of trauma and diagnosis, she did not wish to disclose trauma at this time       55+ Program  TRACK: B-2    NUMBER OF PARTICIPANTS: 5    Telemedicine Visit: The patient's condition can be safely assessed and treated via synchronous audio and visual telemedicine encounter.      Reason for Telemedicine Visit: Services only offered telehealth and due to COVID-19    Originating Site (Patient Location): Patient's home    Distant Site (Provider Location): Provider Remote Setting    Consent:  The patient/guardian has verbally consented to: the potential risks and benefits of telemedicine (video visit) versus in person care; bill my insurance or make self-payment for services provided; and responsibility for payment of non-covered services.     Mode of Communication:  Video Conference via [employer]    As the provider I attest to compliance with applicable laws and regulations related to telemedicine.            Data:    Session content: At the start of this group, patients were invited to check in by  "identifying themselves, describing their current emotional status, and identifying issues to address in this group.   Area(s) of treatment focus addressed in this session included Symptom Management, Personal Safety and Community Resources/Discharge Planning.    Patient reported feeling \"pretty good\" today besides some physical pain. Patient discussed working toward completing her physical therapy exrcises. Patient identified stretching as skills they will use to address their goal(s). Patient reported shortness of breath may be a barrier to working toward their goal(s) and/or addressing mental health symptoms. Patient reported no safety concerns and/or self-injurious behaviors. Patient reported no substance use. Patient reported they are taking their medications as prescribed. Patient reported feeling proud that they completed laundry and went for a bike ride/hammocking yesterday. Patient discussed her sleep routine and not liking the feeling she gets from trazedone with the treatment group.     Therapeutic Interventions/Treatment Strategies:  Psychotherapist offered support, feedback and validation and reinforced use of skills. Treatment modalities used include Motivational Interviewing and Cognitive Behavioral Therapy. Interventions include Coping Skills: Discussed use of self-soothe skills to decrease distress in the body, Assisted patient in identifying 1-2 healthy distraction skills to reduce overall distress and Discussed how the use of intentional \"in the moment\" actions can help reduce distress.    Assessment:    Patient response:   Patient responded to session by accepting feedback, giving feedback, listening, focusing on goals, being attentive and accepting support    Possible barriers to participation / learning include: and no barriers identified    Health Issues:   Yes: Pain, Associated Psychological Distress       Substance Use Review:   Substance Use: No active concerns identified.    Mental " Status/Behavioral Observations  Appearance:   Appropriate   Eye Contact:   Good   Psychomotor Behavior: Normal   Attitude:   Cooperative   Orientation:   All  Speech   Rate / Production: Normal/ Responsive Normal    Volume:  Normal   Mood:    Anxious  Depressed  Normal  Affect:    Appropriate   Thought Content:   Clear and Safety denies any current safety concerns including suicidal ideation, self-harm, and homicidal ideation  Thought Form:  Coherent  Logical     Insight:    Good     Plan:     Safety Plan: No current safety concerns identified.  Recommended that patient call 911 or go to the local ED should there be a change in any of these risk factors.     Barriers to treatment: None identified    Patient Contracts (see media tab):  None    Substance Use: Provided encouragement towards sobriety     Continue or Discharge: Patient will continue in 55+ Program (55+) as planned. Patient is likely to benefit from learning and using skills as they work toward the goals identified in their treatment plan.      Gabbie Davidson, [facility]  July 24, 2020

## 2020-07-24 NOTE — GROUP NOTE
Psychotherapy Group Note    PATIENT'S NAME: Gregoria Stein  MRN:   [id number]  :   1955  ACCT. NUMBER: [id number]  DATE OF SERVICE: 20  START TIME:  2:00 PM  END TIME:  2:50 PM  FACILITATOR: Gabbie Davidson LPCC  TOPIC: MH EBP Group: Relationship Skills  55+ Program  TRACK: B-2    NUMBER OF PARTICIPANTS: 5    Summary of Group / Topics Discussed:  Relationship Skills: Basic Communication: Patients were provided with a general overview of interpersonal communication skills and information about how communication skills impact symptoms and functioning. The goal of this topic is to help patients identify communication issues and gain skills to work towards healthier interpersonal communication. Patients reviewed their current awareness of communication issues and how communication skills impact relationships and functioning.      Patient Session Goals / Objectives:    Identified and discussed patients individual challenges with basic communication    Presented and practiced effective communication skills in session    Assisted patients in implementing more effective communication skills in their relationships    Telemedicine Visit: The patient's condition can be safely assessed and treated via synchronous audio and visual telemedicine encounter.      Reason for Telemedicine Visit: Services only offered telehealth and due to COVID-19    Originating Site (Patient Location): Patient's home    Distant Site (Provider Location): Provider Remote Setting    Consent:  The patient/guardian has verbally consented to: the potential risks and benefits of telemedicine (video visit) versus in person care; bill my insurance or make self-payment for services provided; and responsibility for payment of non-covered services.     Mode of Communication:  Video Conference via [employer]    As the provider I attest to compliance with applicable laws and regulations related to telemedicine.        Patient Participation /  Response:  Fully participated with the group by sharing personal reflections / insights and openly received / provided feedback with other participants.    Demonstrated understanding of topics discussed through group discussion and participation, Demonstrated understanding of relationship skills and communication skills and Identified / Expressed personal readiness to incorporate effective communication skills    Treatment Plan:  Patient has a current master individualized treatment plan.  See Epic treatment plan for more information.    Gabbie Davidson, LPCC

## 2020-07-27 ENCOUNTER — HOSPITAL ENCOUNTER (OUTPATIENT)
Dept: CARDIOLOGY | Facility: CLINIC | Age: 65
Discharge: HOME | End: 2020-07-27
Attending: FAMILY MEDICINE | Admitting: FAMILY MEDICINE
Payer: MEDICARE

## 2020-07-27 DIAGNOSIS — R06.02 SHORTNESS OF BREATH: ICD-10-CM

## 2020-07-27 PROCEDURE — 93321 DOPPLER ECHO F-UP/LMTD STD: CPT | Mod: 26 | Performed by: INTERNAL MEDICINE

## 2020-07-27 PROCEDURE — 93350 STRESS TTE ONLY: CPT | Mod: 26 | Performed by: INTERNAL MEDICINE

## 2020-07-27 PROCEDURE — 93016 CV STRESS TEST SUPVJ ONLY: CPT | Performed by: INTERNAL MEDICINE

## 2020-07-27 PROCEDURE — 93018 CV STRESS TEST I&R ONLY: CPT | Performed by: INTERNAL MEDICINE

## 2020-07-27 PROCEDURE — 93325 DOPPLER ECHO COLOR FLOW MAPG: CPT | Mod: 26 | Performed by: INTERNAL MEDICINE

## 2020-07-27 PROCEDURE — 25500064 ZZH RX 255 OP 636: Performed by: INTERNAL MEDICINE

## 2020-07-27 PROCEDURE — 40000264 ECHO STRESS ECHOCARDIOGRAM

## 2020-07-27 RX ADMIN — PERFLUTREN 5 ML: 6.52 INJECTION, SUSPENSION INTRAVENOUS at 13:16

## 2020-07-28 ENCOUNTER — TELEPHONE (OUTPATIENT)
Dept: BEHAVIORAL HEALTH | Facility: CLINIC | Age: 65
End: 2020-07-28

## 2020-07-28 ENCOUNTER — HOSPITAL ENCOUNTER (OUTPATIENT)
Dept: BEHAVIORAL HEALTH | Facility: CLINIC | Age: 65
End: 2020-07-28
Attending: PSYCHIATRY & NEUROLOGY
Payer: MEDICARE

## 2020-07-28 PROCEDURE — 90853 GROUP PSYCHOTHERAPY: CPT | Mod: PO | Performed by: COUNSELOR

## 2020-07-28 NOTE — TELEPHONE ENCOUNTER
This author shared information from the patient's telephone call with the treatment team via [employer].

## 2020-07-28 NOTE — GROUP NOTE
Process Group Note    PATIENT'S NAME: Gregoria Stein  MRN:   [id number]  :   1955  ACCT. NUMBER: [id number]  DATE OF SERVICE: 20  START TIME:  1:00 PM  END TIME:  1:50 PM  FACILITATOR: Gabbie Davidson LPCC  TOPIC:  Process Group    Diagnoses:  309.81 (F43.10) Posttraumatic Stress Disorder (includes Posttraumatic Stress Disorder for Children 6 Years and Younger)  Without dissociative symptoms. Per client report that she has past diagnosis of this per her therapist who is now retired.  4. Other Diagnoses that is relevant to services:   296.33 (F33.2) Major Depressive Disorder, Recurrent Episode, Severe _ and With anxious distress.  5. Provisional Diagnosis:  309.81 (F43.10) Posttraumatic Stress Disorder (includes Posttraumatic Stress Disorder for Children 6 Years and Younger)  Without dissociative symptoms as evidenced by client reported hypervigilence, hyper arousal, decreased functioning and past history of trauma and diagnosis, she did not wish to disclose trauma at this time       55+ Program  TRACK: B-2    NUMBER OF PARTICIPANTS: 6    Telemedicine Visit: The patient's condition can be safely assessed and treated via synchronous audio and visual telemedicine encounter.      Reason for Telemedicine Visit: Services only offered telehealth and due to COVID-19    Originating Site (Patient Location): Patient's home    Distant Site (Provider Location): Provider Remote Setting    Consent:  The patient/guardian has verbally consented to: the potential risks and benefits of telemedicine (video visit) versus in person care; bill my insurance or make self-payment for services provided; and responsibility for payment of non-covered services.     Mode of Communication:  Video Conference via [employer]    As the provider I attest to compliance with applicable laws and regulations related to telemedicine.            Data:    Session content: At the start of this group, patients were invited to check in by  "identifying themselves, describing their current emotional status, and identifying issues to address in this group.   Area(s) of treatment focus addressed in this session included Symptom Management, Personal Safety and Community Resources/Discharge Planning.    Patient reported feeling like they didn't want to talk about how they were feeling today. Patient discussed feeling overwhelmed \"by a lot of things I don't want to talk about in group\" and decided to \"pass\" on check-in. Patient and treatment agreed to connect via phone following group today.     Therapeutic Interventions/Treatment Strategies:  Psychotherapist offered support, feedback and validation and reinforced use of skills. Treatment modalities used include Motivational Interviewing and Cognitive Behavioral Therapy. Interventions include Coping Skills: Discussed use of self-soothe skills to decrease distress in the body and Assisted patient in identifying 1-2 healthy distraction skills to reduce overall distress, Mindfulness: Facilitated discussion of when/how to use mindfulness skills to benefit general health, mental health symptoms, and stressors and Symptoms Management: Promoted understanding of their diagnoses and how it impacts their functioning.    Assessment:    Patient response:   Patient responded to session by listening and being attentive    Possible barriers to participation / learning include: distracted by thoughts that were causing them to feel overwhelmed    Health Issues:   Yes: Pain, Associated Psychological Distress       Substance Use Review:   Substance Use: No active concerns identified.    Mental Status/Behavioral Observations  Appearance:   Appropriate   Eye Contact:   Good   Psychomotor Behavior: Normal   Attitude:   Cooperative   Orientation:   All  Speech   Rate / Production: Normal/ Responsive Normal    Volume:  Normal   Mood:    Depressed  Normal  Affect:    Subdued   Thought Content:   Clear and Safety denies any current " safety concerns including suicidal ideation, self-harm, and homicidal ideation  Thought Form:  Coherent  Logical     Insight:    Fair     Plan:     Safety Plan: No current safety concerns identified.  Recommended that patient call 911 or go to the local ED should there be a change in any of these risk factors.     Barriers to treatment: None identified    Patient Contracts (see media tab):  None    Substance Use: Provided encouragement towards sobriety     Continue or Discharge: Patient will continue in 55+ Program (55+) as planned. Patient is likely to benefit from learning and using skills as they work toward the goals identified in their treatment plan.      Gabbie Davidson, [facility]  July 28, 2020

## 2020-07-28 NOTE — TELEPHONE ENCOUNTER
"During our call, Killian shared feelings of discomfort they've been having because they feel as if they are compartmentalizing their gender identity. They feel as if they have repressed gender identity issues for quite some time and have somehow gotten the message that they needed to continue compartmentalizing gender identity in our groups (I.e \"I can't separate that [gender identity] out\" and \"it feels weird to be in a room, in hiding.\" This author validated the patient's experience. This author then assured Killian that our goal was to let them be in our groups in a way that feels safe for them. I asked what we can do to help groups feels more safe. Killian mentioned just to be careful about language we use, which I validated. They then shared that they will be exploring transitioning with their doctor on August 5th. They wanted group facilitators to know, but have requested we keep the knowledge from other group clients until/if they feel safe to disclose. Killian was encouraged to talk about the transition process more in depth with the team and/or their group, if needed. At the conclusion of the call, this author checked in on safety. Killian denied any thoughts of SI, HI, or SIB, and was committed to safety. Killian also stated they felt much calmer after speaking with this author.   "

## 2020-07-28 NOTE — TELEPHONE ENCOUNTER
Writer attempted to contact patient after they left the group during the 2:00 group; left a message and requested a call back to [phone].    Gabbie Davidson [facility] on 7/28/2020 at 2:55 PM

## 2020-07-29 ENCOUNTER — HOSPITAL ENCOUNTER (OUTPATIENT)
Dept: BEHAVIORAL HEALTH | Facility: CLINIC | Age: 65
End: 2020-07-29
Attending: PSYCHIATRY & NEUROLOGY
Payer: MEDICARE

## 2020-07-29 PROCEDURE — 90853 GROUP PSYCHOTHERAPY: CPT | Mod: PO | Performed by: SOCIAL WORKER

## 2020-07-29 PROCEDURE — 90853 GROUP PSYCHOTHERAPY: CPT | Mod: PO | Performed by: COUNSELOR

## 2020-07-29 NOTE — GROUP NOTE
Psychotherapy Group Note    PATIENT'S NAME: Gregoria Stein  MRN:   [id number]  :   1955  ACCT. NUMBER: [id number]  DATE OF SERVICE: 20  START TIME:  2:00 PM  END TIME:  2:50 PM  FACILITATOR: Asha Gibbons LICSW  TOPIC: MH EBP Group: Relationship Skills  55+ Program  TRACK: B2    NUMBER OF PARTICIPANTS: 5    Summary of Group / Topics Discussed:  Relationship Skills: Assertive Communication: Patients were provided with a general overview of assertive communication skills and how practicing assertive communication skills will assist patients in developing healthier and more effective relationships. Patients reviewed their current awareness on ability to practice assertive communication, ways to increase assertive communication, and identified/problem solved barriers to assertive communication.     Patient Session Goals / Objectives:    Identified and discussed patient individual challenges with communication    Presented and practiced effective communication skills in session    Assisted patients in implementing more effective communication skills in their relationships    Telemedicine Visit: The patient's condition can be safely assessed and treated via synchronous audio and visual telemedicine encounter.      Reason for Telemedicine Visit: Services only offered telehealth    Originating Site (Patient Location): Patient's home    Distant Site (Provider Location): Provider Remote Setting    Consent:  The patient/guardian has verbally consented to: the potential risks and benefits of telemedicine (video visit) versus in person care; bill my insurance or make self-payment for services provided; and responsibility for payment of non-covered services.     Mode of Communication:  Video Conference via [employer]    As the provider I attest to compliance with applicable laws and regulations related to telemedicine.       Patient Participation / Response:  Fully participated with the group by sharing  personal reflections / insights and openly received / provided feedback with other participants.    Demonstrated understanding of relationship skills and communication skills and Practiced skills in session    Treatment Plan:  Patient has a current master individualized treatment plan.  See Epic treatment plan for more information.    Asha Gibbons, [facility]

## 2020-07-29 NOTE — GROUP NOTE
Psychoeducation Group Note    PATIENT'S NAME: Gregoria Stein  MRN:   [id number]  :   1955  ACCT. NUMBER: [id number]  DATE OF SERVICE: 20  START TIME:  3:00 PM  END TIME:  3:50 PM  FACILITATOR: Gabbie Davidson LPCC  TOPIC: MH Life Skills Group: Lifestyle Balance and Structure  55+ Program  TRACK: B-2    NUMBER OF PARTICIPANTS: 5    Summary of Group / Topics Discussed:  Lifestyle Balance and Strucure:  Routines, Habits, Rituals, and Roles: Patients were assisted to identify meaningful roles that they want to promote and the impact their mental health symptoms have on this.  Patients learned, applied, and generalized skills needed to live and participate in meaningful roles as effectively and independently as possible.  Patients developed awareness of strengths and challenges in fulfilling these roles and worked on integrating specific and individualized rituals and habits into their daily life.     Patient Session Goals / Objectives:    Improved awareness and engagement in life s meaningful roles, routines, habits, and rituals    Explored and identified current roles and challenges due to mental health symptoms     Identified ways to establish and integrate daily self care and wellness routines and habits to support mental health recovery    Practiced and reflected on how to generalize taught skills to their everyday life  Telemedicine Visit: The patient's condition can be safely assessed and treated via synchronous audio and visual telemedicine encounter.      Reason for Telemedicine Visit: Services only offered telehealth and due to COVID-19    Originating Site (Patient Location): Patient's home    Distant Site (Provider Location): Provider Remote Setting    Consent:  The patient/guardian has verbally consented to: the potential risks and benefits of telemedicine (video visit) versus in person care; bill my insurance or make self-payment for services provided; and responsibility for payment of  non-covered services.     Mode of Communication:  Video Conference via [email or web address]    As the provider I attest to compliance with applicable laws and regulations related to telemedicine.      Patient Participation / Response:  Fully participated with the group by sharing personal reflections / insights and openly received / provided feedback with other participants.    Verbalized understanding of content    Treatment Plan:  Patient has a current master individualized treatment plan.  See Epic treatment plan for more information.    Gabbie Davidson, [facility]

## 2020-07-30 ENCOUNTER — OFFICE VISIT (OUTPATIENT)
Dept: FAMILY MEDICINE | Facility: CLINIC | Age: 65
End: 2020-07-30
Payer: MEDICARE

## 2020-07-30 VITALS
RESPIRATION RATE: 18 BRPM | SYSTOLIC BLOOD PRESSURE: 115 MMHG | HEIGHT: 66 IN | OXYGEN SATURATION: 97 % | TEMPERATURE: 98.6 F | BODY MASS INDEX: 34.68 KG/M2 | WEIGHT: 215.8 LBS | DIASTOLIC BLOOD PRESSURE: 78 MMHG | HEART RATE: 73 BPM

## 2020-07-30 DIAGNOSIS — R61 DIAPHORESIS: ICD-10-CM

## 2020-07-30 DIAGNOSIS — R06.02 SHORTNESS OF BREATH: Primary | ICD-10-CM

## 2020-07-30 DIAGNOSIS — F43.10 PTSD (POST-TRAUMATIC STRESS DISORDER): ICD-10-CM

## 2020-07-30 DIAGNOSIS — R45.851 SUICIDAL IDEATION: ICD-10-CM

## 2020-07-30 DIAGNOSIS — F43.0 PANIC STATE AS ACUTE REACTION TO STRESS: ICD-10-CM

## 2020-07-30 ASSESSMENT — ANXIETY QUESTIONNAIRES
3. WORRYING TOO MUCH ABOUT DIFFERENT THINGS: MORE THAN HALF THE DAYS
1. FEELING NERVOUS, ANXIOUS, OR ON EDGE: MORE THAN HALF THE DAYS
GAD7 TOTAL SCORE: 15
6. BECOMING EASILY ANNOYED OR IRRITABLE: MORE THAN HALF THE DAYS
IF YOU CHECKED OFF ANY PROBLEMS ON THIS QUESTIONNAIRE, HOW DIFFICULT HAVE THESE PROBLEMS MADE IT FOR YOU TO DO YOUR WORK, TAKE CARE OF THINGS AT HOME, OR GET ALONG WITH OTHER PEOPLE: EXTREMELY DIFFICULT
2. NOT BEING ABLE TO STOP OR CONTROL WORRYING: MORE THAN HALF THE DAYS
5. BEING SO RESTLESS THAT IT IS HARD TO SIT STILL: MORE THAN HALF THE DAYS
7. FEELING AFRAID AS IF SOMETHING AWFUL MIGHT HAPPEN: NEARLY EVERY DAY

## 2020-07-30 ASSESSMENT — PATIENT HEALTH QUESTIONNAIRE - PHQ9
SUM OF ALL RESPONSES TO PHQ QUESTIONS 1-9: 20
5. POOR APPETITE OR OVEREATING: MORE THAN HALF THE DAYS

## 2020-07-30 ASSESSMENT — MIFFLIN-ST. JEOR: SCORE: 1555.61

## 2020-07-30 NOTE — PROGRESS NOTES
Preceptor Attestation:    Patient seen and evaluated in person. I discussed the patient with the resident. I have verified the content of the note, which accurately reflects my assessment of the patient and the plan of care.   Supervising Physician:  Jordan Luna MD.

## 2020-07-30 NOTE — PATIENT INSTRUCTIONS
Here is the plan from today's visit    1. Shortness of breath  2. Diaphoresis  I'll place a cardiology referral and have my care coordinator call you  Follow-up with Dr Coronel next week  Keep talking with your therapist    - CARDIOLOGY EVAL ADULT REFERRAL - INTERNAL; Future      Please call or return to clinic if your symptoms don't go away.    Follow up plan  See Dr Coronel at your scheduled appointment.    Thank you for coming to [facility] today.  Lab Testing:  **If you had lab testing today and your results are reassuring or normal they will be mailed to you or sent through [employer] within 7 days.   **If the lab tests need quick action we will call you with the results.  The phone number we will call with results is # [phone] (home) . If this is not the best number please call our clinic and change the number.  Medication Refills:  If you need any refills please call your pharmacy and they will contact us.   If you need to  your refill at a new pharmacy, please contact the new pharmacy directly. The new pharmacy will help you get your medications transferred faster.   Scheduling:  If you have any concerns about today's visit or wish to schedule another appointment please call our office during normal business hours [phone] (8-5:00 M-F)  If a referral was made to a [facility] and you don't get a call from central scheduling please call [phone].  If a Mammogram was ordered for you at [facility] call [phone] to schedule or change your appointment.  If you had an XRay/CT/Ultrasound/MRI ordered the number is [phone] to schedule or change your radiology appointment.   Medical Concerns:  If you have urgent medical concerns please call [phone] at any time of the day.    Gilson Gilbert, DO    Cardiology referral  [address]  3rd Floor  [facility]  [address]  [phone]  8/3/20 @ 3:00p.m with  (beverly Knight)      MY COPING PLAN FOR  "SAFETY     PATIENT'S NAME:           Gregoria Stein  MRN:                                  [id number]  SAFETY PLAN:    Step 1: Warning signs / cues (Thoughts, images, mood, situation, behavior) that a crisis may be developing:  ? Thoughts: \"I can't do this anymore\", \"Nothing makes it better\" and I am a failure.  ? Images: no.  ? Thinking Processes: intrusive thoughts (bothersome, unwanted thoughts that come out of nowhere): thoughts of suicide and highly critical and negative thoughts: that of being a failure.  ? Mood: worsening depression, hopelessness, helplessness and agitation  ? Behaviors: isolating/withdrawing , impulsive, reckless behaviors (acting without thinking): feels trauma has impacted impulsivity when triggered. and not sleeping enough  ? Situations: pain and trauma      Step 2: Coping strategies - Things I can do to take my mind off of my problems without contacting another person (relaxation technique, physical activity):  ? Distress Tolerance Strategies:  grounding exercises including sensory strategies.  ? Physical Activities: exercise: rides bike and meditaton and music.  ? Focus on helpful thoughts:  They are on the same team as me, people that are trying to help me.    Step 3: People and social settings that provide distraction:                 Name: Beth   Phone: friend- [phone]                 park/     Nature    Step 4: Remind myself of people and things that are important to me and worth living for:  \" friend Beth, my sister Ely, being in nature\".     Step 5: When I am in crisis, I can ask these people to help me use my safety plan:                 Name: kolby flores   Phone: [phone]                 Name:  Shirley Velazco Phone: [phone]    Step 6: Making the environment safe:   ? be around others    Step 7: Professionals or agencies I can contact during a crisis:  ? Suicide Prevention Lifeline: 0-275-475-TOGO (2280)  ? Crisis Text Line Service (available 24 " hours a day, 7 days a week): Text [place] to 135230  ? Call  **CRISIS (103588) from a cell phone to talk to a team of professionals who can help you.          Crisis Services [facility]: Phone Number:   [place]     [phone]   [place]    [phone]   [place]    [phone]   [place]    [phone]   [place]    [phone]   [place] [phone]   [place]     [phone]      ? Call 911 or go to my nearest emergency department.             ?    I helped develop this safety plan and agree to use it when needed.  I have been given a copy of this plan.

## 2020-07-30 NOTE — PROGRESS NOTES
HPI       Gregoria Stein is a 64 year old  who presents for   Chief Complaint   Patient presents with     Excessive Sweating     per pt they also had a stress test done on Monday, pt having trouble taking stairs and on her bike       1. Dyspnea, diaphoresis, anxiety  Last visit with Dr Coronel: Patient is well-known to me and has been complaining over the last month about worsening shortness of breath with movement of stairs and or biking but unclear if having anxiety due to having to wear a mask as his breathing and heavy labor during these periods.  However patient also noted having random bouts of significant diaphoresis, with unclear etiology or pattern.  Patient unsure if it is associated with anxiety or not.  Patient denying any chest pain or palpitations or fatigue.  But has noticed a more short upon lying down, but no leg edema.  Concerned that there may be organic cause versus anxiety.  Possibilities could be heart failure, arrhythmia, coronary artery disease, anemia.    Today, they are extremely concerned about their stress test results and were a same day add on to discuss this further. They were unable to exercise for more than 2-3 minutes on the bike due to significant dyspnea. They state they know something is deeply, deeply wrong with their heart and are in such a terrible state of distress. Kept repeating/ruminating on how they perceived the reactions of medical staff in the room to their testing- feel that they were significantly disturbed by Killian's inability to complete the test and this further confirms Killian's suspicions. 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 Used to be able to bike for extensive amounts of time.    Patient kept ruminating/fixating on something being wrong. Stated they know something will be wrong and this will be fatal for them. They also stated that if this workup is negative for anything, that they  "would not be able to handle the uncertainty of not having a diagnosis and they would pursue suicide.  Their plan: move to [place] and pursue physician assisted suicide. They understand patients need to qualify for this, and they would likely need a terminal diagnosis to qualify. They have no other plans of suicide or intent at the moment.    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    Can't afford the copay on the CT coronary calcium study      Problem, Medication and Allergy Lists were reviewed and updated if needed..    Patient is an established patient of this clinic..         Review of Systems:   Review of Systems         Physical Exam:     Vitals:    20 1121 20 1133   BP: (!) 155/83 115/78   BP Location: Left arm Left arm   Patient Position: Sitting Sitting   Cuff Size: Adult Large Adult Large   Pulse: 73    Resp: 18    Temp: 98.6  F (37  C)    TempSrc: Oral    SpO2: 97%    Weight: 97.9 kg (215 lb 12.8 oz)    Height: 1.676 m (5' 6\")      Body mass index is 34.83 kg/m .  Vitals were reviewed and were normal     Physical Exam  Constitutional:       Comments: Emotionally distressed, ha complexion and crying   Cardiovascular:      Rate and Rhythm: Normal rate and regular rhythm.   Pulmonary:      Effort: Pulmonary effort is normal. No respiratory distress.      Breath sounds: Normal breath sounds.      Comments: Speaks for extended amounts of time without dyspnea or other signs of respiratory distress  Musculoskeletal:      Right lower leg: No edema.      Left lower leg: No edema.   Neurological:      General: No focal deficit present.      Mental Status: Killian Stein is alert and oriented to person, place, and time.   Psychiatric:      Comments: Tearful, anxious, ruminative, " fixating on stressors. Suicidal ideation per HPI. Poor eye contact.           Results:   Results from last visit:  Office Visit on 07/10/2020   Component Date Value Ref Range Status     Calcium 07/10/2020 9.7  8.5 - 10.1 mg/dL Final     Chloride 07/10/2020 104.3  98.0 - 110.0 mmol/L Final     Carbon Dioxide 07/10/2020 28.5  20.0 - 32.0 mmol/L Final     Creatinine 07/10/2020 0.8  0.5 - 1.0 mg/dL Final     Glucose 07/10/2020 122.8* 70.0 - 99.0 mg'dL Final     Potassium 07/10/2020 3.9  3.3 - 4.5 mmol/L Final     Sodium 07/10/2020 140.6  132.6 - 141.4 mmol/L Final     Protein Total 07/10/2020 8.1  6.8 - 8.8 g/dL Final     GFR Estimate 07/10/2020 76.1  >60.0 mL/min/1.7 m2 Final     GFR Estimate If Black 07/10/2020 >90  >60.0 mL/min/1.7 m2 Final     Albumin 07/10/2020 4.4  3.5 - 4.7 mg/dL Final     Alkaline Phosphatase 07/10/2020 79.9  31.7 - 110.5 U/L Final     ALT 07/10/2020 24.5  0.0 - 45.0 U/L Final     AST 07/10/2020 24.6  0.0 - 45.0 U/L Final     Bilirubin Total 07/10/2020 0.5  0.2 - 1.3 mg/dL Final     Urea Nitrogen 07/10/2020 14.1  7.0 - 19.0 mg/dL Final     WBC 07/10/2020 6.3  4.0 - 11.0 K/uL Final     RBC 07/10/2020 5.05  3.80 - 5.20 M/uL Final     Hemoglobin 07/10/2020 14.4  11.7 - 15.7 g/dL Final     Hematocrit 07/10/2020 46.8  35.0 - 47.0 % Final     MCV 07/10/2020 92.6  78.0 - 100.0 fL Final     MCH 07/10/2020 28.5  26.5 - 35.0 pg Final     MCHC 07/10/2020 30.8* 32.0 - 36.0 g/dL Final     Platelets 07/10/2020 203.0  150.0 - 450.0 K/uL Final       Assessment and Plan        Gregoria was seen today for excessive sweating.    Diagnoses and all orders for this visit:    Shortness of breath  -     Cancel: CT Coronary Calcium Scan; Future  -     CARDIOLOGY EVAL ADULT REFERRAL - INTERNAL; Future    Diaphoresis  -     Cancel: CT Coronary Calcium Scan; Future  -     CARDIOLOGY EVAL ADULT REFERRAL - INTERNAL; Future    PTSD (post-traumatic stress disorder)    Panic state as acute reaction to stress    Suicidal  ideation    Killian is a 64 yo nonbinary person assigned female at birth with significant dyspnea and recent non-diagnostic stress echo due to patient intolerance. My suspicion for CAD is low-moderate at this point. I am much more suspicious of panic as the cause of Killian's severe, sudden dyspnea and inability to tolerate exercise. However, agree that with age and FHx early cardiac disease that further cardiac workup is warranted. Patient is unable to afford the co-pay required for coronary cardiac CT, so will refer to cardiology and discuss possible catheterization.    For their psychiatric disease, panic and suicidal ideation, asked our  fellow Dr Raza to join me in the visit. The patient is currently in day treatment, and has an appointment with their therapist scheduled today after this appointment. They agree to keep this appointment and have been consistent with following up. They do have persistent suicidal ideation, but their plan is quite far-fetched and they do not seem committed to finding a more realistic suicide plan at this point. Discussed with Dr Raza and we agree that hospitalization would likely not be theraputic at this point, and they can continue to be managed as an outpatient with their current resources. See Dr Raza's note for further details and safety planning.         There are no discontinued medications.    Options for treatment and follow-up care were reviewed with the patient. Gregoria Stein  engaged in the decision making process and verbalized understanding of the options discussed and agreed with the final plan.    DO [facility] Resident PGY-3  [phone]

## 2020-07-31 ENCOUNTER — HOSPITAL ENCOUNTER (OUTPATIENT)
Dept: BEHAVIORAL HEALTH | Facility: CLINIC | Age: 65
End: 2020-07-31
Attending: PSYCHIATRY & NEUROLOGY
Payer: MEDICARE

## 2020-07-31 PROCEDURE — 90853 GROUP PSYCHOTHERAPY: CPT | Mod: GT,95 | Performed by: COUNSELOR

## 2020-07-31 PROCEDURE — 90853 GROUP PSYCHOTHERAPY: CPT | Mod: PO | Performed by: COUNSELOR

## 2020-07-31 PROCEDURE — 90853 GROUP PSYCHOTHERAPY: CPT | Mod: GT

## 2020-07-31 ASSESSMENT — ANXIETY QUESTIONNAIRES: GAD7 TOTAL SCORE: 15

## 2020-07-31 NOTE — GROUP NOTE
Psychoeducation Group Note    PATIENT'S NAME: Gregoria Stein  MRN:   [id number]  :   1955  ACCT. NUMBER: [id number]  DATE OF SERVICE: 20  START TIME:  3:00 PM  END TIME:  3:50 PM  FACILITATOR: Gabbie Davidson LPCC  TOPIC: MH RN Group: Health Maintenance  55+ Program  TRACK: B-2    NUMBER OF PARTICIPANTS: 4    Summary of Group / Topics Discussed:  Health Maintenance: Weekend planning: Patients were given time to complete a weekend plan of what they will do to promote wellness and sobriety over the weekend when they do not have the structure of group. Patients were encouraged to review progress on their treatment goals and were challenged to identify ways to work toward meeting them. Patients identified and discussed foreseeable barriers to success over the weekend and then developed a plan to overcome them. Patients reviewed their distress coping skills and social support network and discussed this with the group.       Patient Session Goals / Objectives:    ?    Identified activities to engage in that promote balance in wellness  ?    Distinguished possible barriers to success over the weekend and created a plan to overcome them  ?    Listed distress coping skills and identified social support network to utilize if in crisis during the weekend    Telemedicine Visit: The patient's condition can be safely assessed and treated via synchronous audio and visual telemedicine encounter.      Reason for Telemedicine Visit: Services only offered telehealth and due to COVID-19    Originating Site (Patient Location): Patient's home    Distant Site (Provider Location): Provider Remote Setting    Consent:  The patient/guardian has verbally consented to: the potential risks and benefits of telemedicine (video visit) versus in person care; bill my insurance or make self-payment for services provided; and responsibility for payment of non-covered services.     Mode of Communication:  Video Conference via  Meet    As the provider I attest to compliance with applicable laws and regulations related to telemedicine.        Patient Participation / Response:  Fully participated with the group by sharing personal reflections / insights and openly received / provided feedback with other participants.    Demonstrated understanding of topics discussed through group discussion and participation, Identified / Expressed personal readiness to practice skills and Verbalized understanding of health maintenance topic    Treatment Plan:  Patient has a current master individualized treatment plan.  See Epic treatment plan for more information.    Gabbie Davidson, GRZEGORZC

## 2020-07-31 NOTE — TELEPHONE ENCOUNTER
RECORDS RECEIVED FROM: Internal/Care Everywhere   DATE RECEIVED: 8-3   NOTES STATUS DETAILS   OFFICE NOTE from referring provider    Internal Dr. Gilbert 7-30-20   OFFICE NOTE from other cardiologist    N/A    DISCHARGE SUMMARY from hospital    N/A    DISCHARGE REPORT from the ER   N/A    OPERATIVE REPORT    N/A    MEDICATION LIST   Internal    LABS     BMP   N/A    CBC   Internal 7-10-20   CMP   Internal 7-10-20   Lipids   Internal 3-11-20   TSH   Internal 9-24-19   DIAGNOSTIC PROCEDURES     EKG   In process    Monitor Reports   N/A    IMAGING (DISC & REPORT)      Echo   Internal 7-27-20   Stress Tests   Internal 7-27-20   Cath   N/A    MRI/MRA   N/A    CT/CTA   N/A      Action    Action Taken 7-31: Requested EKG from [facility] 10-19-19

## 2020-07-31 NOTE — GROUP NOTE
Psychotherapy Group Note    PATIENT'S NAME: Gregoria Stein  MRN:   [id number]  :   1955  ACCT. NUMBER: [id number]  DATE OF SERVICE: 20  START TIME:  2:00 PM  END TIME:  2:50 PM  FACILITATOR: Abbie Eugene LICSW  TOPIC: MH EBP Group: Coping Skills  55+ Program  TRACK: B2  Telemedicine Visit: The patient's condition can be safely assessed and treated via synchronous audio and visual telemedicine encounter.      Reason for Telemedicine Visit: Services only offered telehealth    Originating Site (Patient Location): Patient's home    Distant Site (Provider Location): [facility]: [facility]    Consent:  The patient/guardian has verbally consented to: the potential risks and benefits of telemedicine (video visit) versus in person care; bill my insurance or make self-payment for services provided; and responsibility for payment of non-covered services.     Mode of Communication:  Video Conference via [employer]    As the provider I attest to compliance with applicable laws and regulations related to telemedicine.     NUMBER OF PARTICIPANTS: 5    Summary of Group / Topics Discussed:  Coping Skills: Additional Coping Skills:  Patients discussed and practiced focusing on the positive moment.  Reviewed the benefits of applying the aforementioned coping strategies.  Patients explored how these strategies might be applied to daily stressors or distressing situations.    Patient Session Goals / Objectives:    Understand the purpose and benefits of focusing on the positives to reduce negative rumination and decrease depression    Learn techniques to change negative thought patterns    Address barriers to utilizing coping skills when in distress.        Patient Participation / Response:  Fully participated with the group by sharing personal reflections / insights and openly received / provided feedback with other participants.    Demonstrated understanding of topics discussed through group discussion  and participation and Expressed understanding of the relevance / importance of coping skills at distressing times in life    Treatment Plan:  Patient has a current master individualized treatment plan.  See Epic treatment plan for more information.    Abbie Maddox, LICSW

## 2020-07-31 NOTE — PROGRESS NOTES
"Primary Care Behavioral Health Consult Note    Meeting lasted: 20 minutes  Others present: Dr. Gilbert    Identifying Information and Presenting Problem:    Dr. Gilbert requested behavioral health consultation for this patient regarding risk assessment and distress management.  The patient is a 64 year old American individual that agreed to be seen by behavioral health today.    Topics Discussed/Interventions Provided:       I have talked with this patient on the phone (5/27/2020) for similar risk assessment and am familiar with patient's recent history.    I was asked to see patient who made a same-day appointment and was feeling very distressed over unexplained physical symptoms: worsening shortness of breath with movement of stairs and or biking but unclear if having anxiety due to having to wear a mask as his breathing and heavy labor during these periods.  However patient also noted having random bouts of significant diaphoresis, with unclear etiology or pattern. Patient read results of a stress test (which seemed normal) without having spoken to PCP and grew very anxious about thoughts of her health declining.    Patient expressed very high anxiety and fears about her symptoms. Said that she tries to ride her bike or do things and suddenly she feels like she is suffocating and is extremely weak. Cannot ride her bike anymore which was a main source of coping. Also feels like they are drenched in sweat for no reason. No organic cause identified yet. Patient related that their father passed away suddenly of a heart attack when he was in his 40s. Although results are reassuring, patient is worried this will happen to them. Feels like no one is giving them answers.     Today patient reported that they wished the could just die if these symptoms were going to continue since they can't breath. They were very distressed. Also grieving loss of a family member (who was a \"perpetrator\") and people dying from " "coronavirus. \"The world is mad and no one cares.\" Said family is crazy and doesn't support or accept them. Said they were raised as a [employer] and have complex PTSD. Feels they have been struggling they're whole life and cannot take it anymore. Denied active plans at this time, but talked at length about how they think of going to [place] to ask for assisted suicide if symptoms continue - \"it is my life, it is my right, how can I live if I can't breath.\"    Provided empathic support and validation. Reassured patient that they were not on their own in figuring out physical symptoms and that thoughts were catastrophizing. Did risk assessment (see section below). Patient credibly stated they had no plans to hurt themselves at this time and is connected to various mental health services. Encouraged them to take a step back and take one day at a time. Also to discuss these concerns in therapy and with other supports. Patient was more relieved at session's end and said they were tired. They just wanted to go home and rest before 3 pm appointment. Made plan for follow up care with PCP.     Assessment:     Mental Status: Killian appeared generally alert and oriented. Dress was casual and appropriate to the weather and occasion. Grooming and hygiene were good. Eye contact was good. Speech was of elevated volume and rapid rate yet clear, coherent, and relevant. Mood was \"desperate\" with congruent anxious, tearful affect. Thought processes were relevant, logical and goal-directed. Thought content was WNL with no evidence of psychotic or paranoid features. Memory appeared grossly intact. Insight and judgment appeared limited due to anxiety and patient exhibited good impulse control during the appointment.     Risk assessment: Today Gregoria Stein reports passive suicidal ideation saying they can't live with these health problems. Denied active plans at the moment, but stated that if unexplained physical symptoms continue they will " travel to [place] to [facility] for compassionate euthanasia. They have not looked into this process or made any travel plans. In addition, Killian Stein has notable risk factors for self-harm, including age, single status, anxiety, very low distress tolerance, and comorbid medical condition of somatic complaints. However, risk is mitigated by commitment to family, sobriety, absence of past attempts, ability to volunteer a safety plan, history of seeking help when needed and upcoming individual and day treatment appointments that Killian is committed to attend. Therefore, based on all available evidence including the factors cited above, Killian Stein does not appear to be at imminent risk for self-harm, does not meet criteria for a 72-hr hold, and therefore remains appropriate for ongoing outpatient level of care. Patient already connected with a day treatment program and individual therapy.    PHQ-9 SCORE 7/10/2020 7/14/2020 7/30/2020   PHQ-9 Total Score 12 15 20   PHQ-A Total Score - - -       CLAIRE-7 SCORE 7/10/2020 7/14/2020 7/30/2020   Total Score 13 13 15       Diagnoses per chart:  MDD, recurrent, severe, with anxious distress  PTSD  CLAIRE    Plan:      Patient will attend individual therapy appointment today 7/30/2020 at 3:00 pm. Patient will continue with Day Treatment program for continued support. Patient agreed to go to these appointments and keep taking one day at a time.     Follow up with Dr. Coronel on 8/4/2020 and Cardiology referral and appointment scheduled on 8/3/2020.    Found detailed safety plan developed with Day Treatment team. Patient has been given a copy of this plan, although I still added this to the AVS. Recommend to Dr. Coronel and Dr. Gilbert to keep adding this safety plan to AVS during visits to ensure continued redirection and reinforcement of following plan and utilizing coping skills.

## 2020-07-31 NOTE — GROUP NOTE
Process Group Note    PATIENT'S NAME: Gregoria Stein  MRN:   [id number]  :   1955  ACCT. NUMBER: [id number]  DATE OF SERVICE: 20  START TIME:  1:00 PM  END TIME:  1:50 PM  FACILITATOR: Gabbie Davidson LPCC  TOPIC:  Process Group    Diagnoses:  309.81 (F43.10) Posttraumatic Stress Disorder (includes Posttraumatic Stress Disorder for Children 6 Years and Younger)  Without dissociative symptoms. Per client report that she has past diagnosis of this per her therapist who is now retired.  4. Other Diagnoses that is relevant to services:   296.33 (F33.2) Major Depressive Disorder, Recurrent Episode, Severe _ and With anxious distress.  5. Provisional Diagnosis:  309.81 (F43.10) Posttraumatic Stress Disorder (includes Posttraumatic Stress Disorder for Children 6 Years and Younger)  Without dissociative symptoms as evidenced by client reported hypervigilence, hyper arousal, decreased functioning and past history of trauma and diagnosis, she did not wish to disclose trauma at this time       55+ Program  TRACK: B-2    NUMBER OF PARTICIPANTS: 5    Telemedicine Visit: The patient's condition can be safely assessed and treated via synchronous audio and visual telemedicine encounter.      Reason for Telemedicine Visit: Services only offered telehealth and due to COVID-19    Originating Site (Patient Location): Patient's home    Distant Site (Provider Location): Provider Remote Setting    Consent:  The patient/guardian has verbally consented to: the potential risks and benefits of telemedicine (video visit) versus in person care; bill my insurance or make self-payment for services provided; and responsibility for payment of non-covered services.     Mode of Communication:  Video Conference via [employer]    As the provider I attest to compliance with applicable laws and regulations related to telemedicine.            Data:    Session content: At the start of this group, patients were invited to check in by  identifying themselves, describing their current emotional status, and identifying issues to address in this group.   Area(s) of treatment focus addressed in this session included Symptom Management, Personal Safety and Community Resources/Discharge Planning.    Patient reported feeling frustrated with technology issues. Patient discussed working toward getting connected to the group and contacted his doctor appointments. Patient identified talking with the group as skills they will use to address their goal(s). Patient reported technical issues may be a barrier to working toward their goal(s) and/or addressing mental health symptoms. Patient reported no safety concerns and/or self-injurious behaviors. Patient reported no substance use. Patient reported they are taking their medications as prescribed. Patient reported feeling proud that they came to group. Patient discussed frustrations with the treatment group.     Therapeutic Interventions/Treatment Strategies:  Psychotherapist offered support, feedback and validation and reinforced use of skills. Treatment modalities used include Motivational Interviewing and Cognitive Behavioral Therapy. Interventions include Coping Skills: Discussed use of self-soothe skills to decrease distress in the body and Assisted patient in identifying 1-2 healthy distraction skills to reduce overall distress, Mindfulness: Facilitated discussion of when/how to use mindfulness skills to benefit general health, mental health symptoms, and stressors and Symptoms Management: Promoted understanding of their diagnoses and how it impacts their functioning.    Assessment:    Patient response:   Patient responded to session by accepting feedback, giving feedback, listening, focusing on goals and being attentive    Possible barriers to participation / learning include: and no barriers identified    Health Issues:   None reported       Substance Use Review:   Substance Use: No active concerns  identified.    Mental Status/Behavioral Observations  Appearance:   Appropriate   Eye Contact:   Good   Psychomotor Behavior: Normal   Attitude:   Cooperative   Orientation:   All  Speech   Rate / Production: Normal/ Responsive Normal    Volume:  Normal   Mood:    Anxious  Depressed  Normal  Affect:    Appropriate   Thought Content:   Clear and Safety denies any current safety concerns including suicidal ideation, self-harm, and homicidal ideation  Thought Form:  Logical     Insight:    Good     Plan:     Safety Plan: No current safety concerns identified.  Recommended that patient call 911 or go to the local ED should there be a change in any of these risk factors.     Barriers to treatment: None identified    Patient Contracts (see media tab):  None    Substance Use: Provided encouragement towards sobriety     Continue or Discharge: Patient will continue in 55+ Program (55+) as planned. Patient is likely to benefit from learning and using skills as they work toward the goals identified in their treatment plan.      Gabbie Davidson, [facility]  July 31, 2020

## 2020-07-31 NOTE — PROGRESS NOTES
Referring provider:Jaqueline Gilbert DO    Chief complaint: Dyspnea on exertion and sweating    HPI:   Gregoria Stein is a 64 year old  adult with PMH significant for major depression and anxiety  Patient has recently started exercising to lose some weight.  Over the last 3 months she reports dyspnea on exertion particularly up hills when she is biking.  Patient used to be very active up until 2 years ago.  Patient underwent knee replacement 2 years ago and then she was inactive for some time.  Patient also reports weight gain of about 80 pounds which she attributes to medications she is on.  Patient reports a lot of sweating.  She denies exertional chest pain, palpitations, dizziness, syncope or lower extremity edema.  Patient has no prior history of cardiac disease.  She used to smoke but quit in 1983.  No history of hypertension or diabetes.  Patient has hyperlipidemia but not on treatment.  Reports premature CAD in the family.  Father had MI at the age of 49.  I have personally reviewed the exercise stress echocardiogram on 7/27/2020 which showed baseline normal biventricular function with no valve disease.  Patient was not able to reach target heart rate due to dyspnea on exertion.    Most recent labs showed LDL elevated at 143.  BMP is normal.  Hemoglobin and TSH are normal.    Medications, personal, family, and social history reviewed with patient and revised.    PAST MEDICAL HISTORY:  Past Medical History:   Diagnosis Date     Anxiety      Arthritis     C spine, thumbs bilateral, feet, shoulders, knees     Depressive disorder      Gastro-oesophageal reflux disease     intermittent     Labial irritation     labial mass     Other chronic pain     feet, knees, shoulders, full spine, thumbs     PTSD (post-traumatic stress disorder)      Restless leg syndrome        CURRENT MEDICATIONS:  Current Outpatient Medications   Medication Sig Dispense Refill     Acetaminophen (TYLENOL PO) Take 1,000 mg by mouth  every 8 hours as needed        cholecalciferol (VITAMIN D3) 5000 units (125 mcg) capsule Take by mouth daily       famotidine (PEPCID) 20 MG tablet Take 20 mg by mouth 2 times daily       gabapentin (NEURONTIN) 300 MG capsule Take 3 capsules (900 mg) by mouth At Bedtime 270 capsule 0     hydrOXYzine (VISTARIL) 50 MG capsule Take 1 capsule (50 mg) by mouth 3 times daily as needed for anxiety 90 capsule 3     lamoTRIgine (LAMICTAL) 25 MG tablet Take 50 mg by mouth daily        Omega-3 Fatty Acids (OMEGA-3 FISH OIL) 1200 MG CAPS        pramipexole (MIRAPEX) 0.25 MG tablet Take 3 tablets (0.75 mg) by mouth At Bedtime 90 tablet 3       PAST SURGICAL HISTORY:  Past Surgical History:   Procedure Laterality Date     COLONOSCOPY       EXCISE MASS VAGINA Left 4/10/2015    Procedure: EXCISE MASS VAGINA;  Surgeon: Stanislav Byers MD;  Location: [facility] OR     GENITOURINARY SURGERY      Urethrial dilation     JOINT REPLACEMENT Right 2018     ORTHOPEDIC SURGERY      right rotator cuff, left achilles tendon repair, neuroma removed right foot, right knee arthroscopy,        ALLERGIES:     Allergies   Allergen Reactions     Penicillins Rash and Hives     Versed [Midazolam] Other (See Comments)     Stopped breathing  Over 10 years ago but possible sensitivity to too much of this medication  Became apnic       FAMILY HISTORY:  Family History   Problem Relation Age of Onset     Macular Degeneration Mother      Osteoporosis Mother      Heart Disease Father 49     Multiple Sclerosis Sister      Diabetes Paternal Uncle         heart issues     Cancer No family hx of      Coronary Artery Disease No family hx of          SOCIAL HISTORY:  Social History     Tobacco Use     Smoking status: Former Smoker     Packs/day: 0.00     Last attempt to quit: 1983     Years since quittin.9     Smokeless tobacco: Never Used   Substance Use Topics     Alcohol use: Yes     Comment: occasional     Drug use: Yes     Types: Marijuana     Comment: daily  "only at night time for her medical conditions        ROS:   Constitutional: No fever, chills, or sweats. Weight stable.   ENT: No visual disturbance, ear ache, epistaxis, sore throat.   Cardiovascular: As per HPI.   Respiratory: No cough, hemoptysis.    GI: No nausea, vomiting, hematemesis, melena, or hematochezia.   : No hematuria.   Integument: Negative.   Psychiatric: Anxiety+  Hematologic:  No easy bruising, no easy bleeding.  Neuro: Negative.   Endocrinology: No significant heat or cold intolerance   Musculoskeletal: No myalgia.    Exam:  /83 (BP Location: Right arm, Patient Position: Sitting, Cuff Size: Adult Regular)   Pulse 96   Ht 1.676 m (5' 6\")   Wt 98.4 kg (217 lb)   LMP  (LMP Unknown)   SpO2 96%   BMI 35.02 kg/m    GENERAL APPEARANCE: alert and no distress  HEENT: no icterus, no central cyanosis  LYMPH/NECK: no adenopathy, no asymmetry, JVP not elevated.  RESPIRATORY: lungs clear to auscultation - no rales, rhonchi or wheezes, no use of accessory muscles, no retractions, respirations are unlabored, normal respiratory rate  CARDIOVASCULAR: regular rhythm, normal S1, S2, no S3 or S4 and no murmur, click or rub, precordium quiet with normal PMI.  GI: soft, non tender  EXTREMITIES:no edema  NEURO: alert, normal speech,and affect  SKIN: no ecchymoses, no rashes     I have reviewed the labs and personally reviewed the imaging below and made my comment in the assessment and plan.    Labs:  CBC RESULTS:   Lab Results   Component Value Date    WBC 6.9 10/14/2019    RBC 4.95 10/14/2019    HGB 14.4 07/10/2020    HCT 46.8 07/10/2020    MCV 92.6 07/10/2020    MCH 28.5 07/10/2020    MCHC 30.8 (L) 07/10/2020    RDW 13.2 10/14/2019     10/14/2019       BMP RESULTS:  Lab Results   Component Value Date    .6 07/10/2020    POTASSIUM 3.9 07/10/2020    CHLORIDE 104.3 07/10/2020    CO2 28.5 07/10/2020    ANIONGAP 6 04/08/2017    .8 (H) 07/10/2020    BUN 14.1 07/10/2020    CR 0.8 07/10/2020 "    GFRESTIMATED 76.1 07/10/2020    GFRESTBLACK >90 07/10/2020    BRITTNI 9.7 07/10/2020      Echocardiogram 7/27/2020  Non-diagnostic stress test due to inability to achieve target heart rate.  Consider alternate test such as coronary CTA.  Patient was only able to reach 65% of the age predicted maximal heart rate due  to dyspnea and no ischemia was identified by imaging or ECG at this heart  rate.  No significant valve disease on screening doppler evaluation. The aortic root  and visualized ascending aorta are normal.      EKG reviewed personally in clinic 8/3/2020 sinus rhythm, right bundle branch block, left axis deviation.    Assessment and Plan:   Gregoria Stein is a 64 year old  adult with PMH significant for major depression anxiety.    Dyspnea on exertion: Patient reports dyspnea on exertion over the last 3 months.  She was not able to complete exercise stress test due to BIRD.   The differential includes coronary artery disease, obesity, or exercise-induced asthma.  She is euvolemic on exam.  Patient's risk factors for coronary artery disease are age, prior history of smoking and family history of CAD.  Recommend CT coronary angiogram.  Patient is agreeable to proceed.  If patient's CTA is normal she might benefit from PFTs to rule out COPD.    I did not make any medication changes today. Return to clinic based on CT results.    A total of 30 minutes spent face-toface with greater than 50% of the time spent in counseling and coordinating cares of the issues above.     Please donot hesitate to contact me if you have any questions or concerns. Again, thank you for allowing me to participate in the care of your patient.    Yuliya HAAS MD  [facility] Division of Cardiology  Pager [phone]

## 2020-08-03 ENCOUNTER — OFFICE VISIT (OUTPATIENT)
Dept: CARDIOLOGY | Facility: CLINIC | Age: 65
End: 2020-08-03
Attending: FAMILY MEDICINE
Payer: MEDICARE

## 2020-08-03 ENCOUNTER — PRE VISIT (OUTPATIENT)
Dept: CARDIOLOGY | Facility: CLINIC | Age: 65
End: 2020-08-03

## 2020-08-03 VITALS
HEIGHT: 66 IN | SYSTOLIC BLOOD PRESSURE: 131 MMHG | DIASTOLIC BLOOD PRESSURE: 83 MMHG | BODY MASS INDEX: 34.87 KG/M2 | HEART RATE: 96 BPM | OXYGEN SATURATION: 96 % | WEIGHT: 217 LBS

## 2020-08-03 DIAGNOSIS — R94.39 ABNORMAL RESULT OF OTHER CARDIOVASCULAR FUNCTION STUDY: ICD-10-CM

## 2020-08-03 DIAGNOSIS — R61 DIAPHORESIS: ICD-10-CM

## 2020-08-03 DIAGNOSIS — R06.02 SHORTNESS OF BREATH: Primary | ICD-10-CM

## 2020-08-03 DIAGNOSIS — R06.09 DOE (DYSPNEA ON EXERTION): ICD-10-CM

## 2020-08-03 PROCEDURE — G0463 HOSPITAL OUTPT CLINIC VISIT: HCPCS | Mod: 25,ZF

## 2020-08-03 PROCEDURE — 99204 OFFICE O/P NEW MOD 45 MIN: CPT | Mod: ZP | Performed by: INTERNAL MEDICINE

## 2020-08-03 PROCEDURE — 93005 ELECTROCARDIOGRAM TRACING: CPT | Mod: ZF

## 2020-08-03 PROCEDURE — 93010 ELECTROCARDIOGRAM REPORT: CPT | Mod: ZP | Performed by: INTERNAL MEDICINE

## 2020-08-03 ASSESSMENT — PAIN SCALES - GENERAL: PAINLEVEL: NO PAIN (0)

## 2020-08-03 ASSESSMENT — MIFFLIN-ST. JEOR: SCORE: 1556.06

## 2020-08-03 NOTE — PATIENT INSTRUCTIONS
Patient Instructions:    CT Coronary Angiogram :    Follow these instructions:  1.The day before the test drink extra water and also on the day of the test.  2. Nothing to eat or drink except water 3 hours prior.  3. No caffeine, smoking, or strenuous activity before test.    4. You will be receiving contrast  5. HOLD these medications:   NSAIDS (ibuprofen, naproxen) day of. You may take Tylenol if needed.  6. Testing takes about 15 min. Please allow 2 hours for test as you may need medications to slow your heart rate for the test.        It was a pleasure to see you in the cardiology clinic today.    We are encouraging the use of [employer] to communicate with your Healthcare Provider.  If you have any questions, call  Shailesh Rubio LPN, at [phone].  Press Option #1 for the [facility], and then press Option #4 for nursing.  Cardiology Fax  : [phone]      If you have an urgent need after hours (8:00 am to 4:30 pm) please call [phone] and ask for the cardiology fellow on call.

## 2020-08-03 NOTE — LETTER
8/3/2020      RE: Gregoria Stein  [address]  [place] [address]       Dear Colleague,    Thank you for the opportunity to participate in the care of your patient, Gregoria Stein, at the [facility] at [facility]. Please see a copy of my visit note below.    Referring provider:Jaqueline Gilbert DO    Chief complaint: Dyspnea on exertion and sweating    HPI:   Gregoria Stein is a 64 year old  adult with PMH significant for major depression and anxiety  Patient has recently started exercising to lose some weight.  Over the last 3 months she reports dyspnea on exertion particularly up hills when she is biking.  Patient used to be very active up until 2 years ago.  Patient underwent knee replacement 2 years ago and then she was inactive for some time.  Patient also reports weight gain of about 80 pounds which she attributes to medications she is on.  Patient reports a lot of sweating.  She denies exertional chest pain, palpitations, dizziness, syncope or lower extremity edema.  Patient has no prior history of cardiac disease.  She used to smoke but quit in 1983.  No history of hypertension or diabetes.  Patient has hyperlipidemia but not on treatment.  Reports premature CAD in the family.  Father had MI at the age of 49.  I have personally reviewed the exercise stress echocardiogram on 7/27/2020 which showed baseline normal biventricular function with no valve disease.  Patient was not able to reach target heart rate due to dyspnea on exertion.    Most recent labs showed LDL elevated at 143.  BMP is normal.  Hemoglobin and TSH are normal.    Medications, personal, family, and social history reviewed with patient and revised.    PAST MEDICAL HISTORY:  Past Medical History:   Diagnosis Date     Anxiety      Arthritis     C spine, thumbs bilateral, feet, shoulders, knees     Depressive disorder      Gastro-oesophageal reflux disease     intermittent      Labial irritation     labial mass     Other chronic pain     feet, knees, shoulders, full spine, thumbs     PTSD (post-traumatic stress disorder)      Restless leg syndrome        CURRENT MEDICATIONS:  Current Outpatient Medications   Medication Sig Dispense Refill     Acetaminophen (TYLENOL PO) Take 1,000 mg by mouth every 8 hours as needed        cholecalciferol (VITAMIN D3) 5000 units (125 mcg) capsule Take by mouth daily       famotidine (PEPCID) 20 MG tablet Take 20 mg by mouth 2 times daily       gabapentin (NEURONTIN) 300 MG capsule Take 3 capsules (900 mg) by mouth At Bedtime 270 capsule 0     hydrOXYzine (VISTARIL) 50 MG capsule Take 1 capsule (50 mg) by mouth 3 times daily as needed for anxiety 90 capsule 3     lamoTRIgine (LAMICTAL) 25 MG tablet Take 50 mg by mouth daily        Omega-3 Fatty Acids (OMEGA-3 FISH OIL) 1200 MG CAPS        pramipexole (MIRAPEX) 0.25 MG tablet Take 3 tablets (0.75 mg) by mouth At Bedtime 90 tablet 3       PAST SURGICAL HISTORY:  Past Surgical History:   Procedure Laterality Date     COLONOSCOPY       EXCISE MASS VAGINA Left 4/10/2015    Procedure: EXCISE MASS VAGINA;  Surgeon: Stanislav Byers MD;  Location: [facility] OR     GENITOURINARY SURGERY      Urethrial dilation     JOINT REPLACEMENT Right 07/2018     ORTHOPEDIC SURGERY      right rotator cuff, left achilles tendon repair, neuroma removed right foot, right knee arthroscopy,        ALLERGIES:     Allergies   Allergen Reactions     Penicillins Rash and Hives     Versed [Midazolam] Other (See Comments)     Stopped breathing  Over 10 years ago but possible sensitivity to too much of this medication  Became apnic       FAMILY HISTORY:  Family History   Problem Relation Age of Onset     Macular Degeneration Mother      Osteoporosis Mother      Heart Disease Father 49     Multiple Sclerosis Sister      Diabetes Paternal Uncle         heart issues     Cancer No family hx of      Coronary Artery Disease No family hx of          SOCIAL  "HISTORY:  Social History     Tobacco Use     Smoking status: Former Smoker     Packs/day: 0.00     Last attempt to quit: 1983     Years since quittin.9     Smokeless tobacco: Never Used   Substance Use Topics     Alcohol use: Yes     Comment: occasional     Drug use: Yes     Types: Marijuana     Comment: daily only at night time for her medical conditions        ROS:   Constitutional: No fever, chills, or sweats. Weight stable.   ENT: No visual disturbance, ear ache, epistaxis, sore throat.   Cardiovascular: As per HPI.   Respiratory: No cough, hemoptysis.    GI: No nausea, vomiting, hematemesis, melena, or hematochezia.   : No hematuria.   Integument: Negative.   Psychiatric: Anxiety+  Hematologic:  No easy bruising, no easy bleeding.  Neuro: Negative.   Endocrinology: No significant heat or cold intolerance   Musculoskeletal: No myalgia.    Exam:  /83 (BP Location: Right arm, Patient Position: Sitting, Cuff Size: Adult Regular)   Pulse 96   Ht 1.676 m (5' 6\")   Wt 98.4 kg (217 lb)   LMP  (LMP Unknown)   SpO2 96%   BMI 35.02 kg/m    GENERAL APPEARANCE: alert and no distress  HEENT: no icterus, no central cyanosis  LYMPH/NECK: no adenopathy, no asymmetry, JVP not elevated.  RESPIRATORY: lungs clear to auscultation - no rales, rhonchi or wheezes, no use of accessory muscles, no retractions, respirations are unlabored, normal respiratory rate  CARDIOVASCULAR: regular rhythm, normal S1, S2, no S3 or S4 and no murmur, click or rub, precordium quiet with normal PMI.  GI: soft, non tender  EXTREMITIES:no edema  NEURO: alert, normal speech,and affect  SKIN: no ecchymoses, no rashes     I have reviewed the labs and personally reviewed the imaging below and made my comment in the assessment and plan.    Labs:  CBC RESULTS:   Lab Results   Component Value Date    WBC 6.9 10/14/2019    RBC 4.95 10/14/2019    HGB 14.4 07/10/2020    HCT 46.8 07/10/2020    MCV 92.6 07/10/2020    MCH 28.5 07/10/2020    MCHC " 30.8 (L) 07/10/2020    RDW 13.2 10/14/2019     10/14/2019       BMP RESULTS:  Lab Results   Component Value Date    .6 07/10/2020    POTASSIUM 3.9 07/10/2020    CHLORIDE 104.3 07/10/2020    CO2 28.5 07/10/2020    ANIONGAP 6 04/08/2017    .8 (H) 07/10/2020    BUN 14.1 07/10/2020    CR 0.8 07/10/2020    GFRESTIMATED 76.1 07/10/2020    GFRESTBLACK >90 07/10/2020    BRITTNI 9.7 07/10/2020      Echocardiogram 7/27/2020  Non-diagnostic stress test due to inability to achieve target heart rate.  Consider alternate test such as coronary CTA.  Patient was only able to reach 65% of the age predicted maximal heart rate due  to dyspnea and no ischemia was identified by imaging or ECG at this heart  rate.  No significant valve disease on screening doppler evaluation. The aortic root  and visualized ascending aorta are normal.      EKG reviewed personally in clinic 8/3/2020 sinus rhythm, right bundle branch block, left axis deviation.    Assessment and Plan:   Gregoria Stein is a 64 year old  adult with PMH significant for major depression anxiety.    Dyspnea on exertion: Patient reports dyspnea on exertion over the last 3 months.  She was not able to complete exercise stress test due to BIRD.   The differential includes coronary artery disease, obesity, or exercise-induced asthma.  She is euvolemic on exam.  Patient's risk factors for coronary artery disease are age, prior history of smoking and family history of CAD.  Recommend CT coronary angiogram.  Patient is agreeable to proceed.  If patient's CTA is normal she might benefit from PFTs to rule out COPD.    I did not make any medication changes today. Return to clinic based on CT results.    A total of 30 minutes spent face-toface with greater than 50% of the time spent in counseling and coordinating cares of the issues above.     Please donot hesitate to contact me if you have any questions or concerns. Again, thank you for allowing me to participate in the  care of your patient.    Yuliya HAAS MD  [facility] Division of Cardiology  Pager [phone]

## 2020-08-04 ENCOUNTER — TELEPHONE (OUTPATIENT)
Dept: BEHAVIORAL HEALTH | Facility: CLINIC | Age: 65
End: 2020-08-04

## 2020-08-04 ENCOUNTER — HOSPITAL ENCOUNTER (OUTPATIENT)
Dept: BEHAVIORAL HEALTH | Facility: CLINIC | Age: 65
End: 2020-08-04
Attending: PSYCHIATRY & NEUROLOGY
Payer: MEDICARE

## 2020-08-04 ENCOUNTER — OFFICE VISIT (OUTPATIENT)
Dept: FAMILY MEDICINE | Facility: CLINIC | Age: 65
End: 2020-08-04
Payer: MEDICARE

## 2020-08-04 VITALS
WEIGHT: 217 LBS | HEART RATE: 77 BPM | HEIGHT: 66 IN | DIASTOLIC BLOOD PRESSURE: 82 MMHG | SYSTOLIC BLOOD PRESSURE: 126 MMHG | OXYGEN SATURATION: 98 % | BODY MASS INDEX: 34.87 KG/M2

## 2020-08-04 DIAGNOSIS — R61 DIAPHORESIS: ICD-10-CM

## 2020-08-04 DIAGNOSIS — F33.2 MDD (MAJOR DEPRESSIVE DISORDER), RECURRENT SEVERE, WITHOUT PSYCHOSIS (H): ICD-10-CM

## 2020-08-04 DIAGNOSIS — F43.10 PTSD (POST-TRAUMATIC STRESS DISORDER): ICD-10-CM

## 2020-08-04 DIAGNOSIS — F41.1 GAD (GENERALIZED ANXIETY DISORDER): ICD-10-CM

## 2020-08-04 DIAGNOSIS — F51.04 PSYCHOPHYSIOLOGICAL INSOMNIA: ICD-10-CM

## 2020-08-04 DIAGNOSIS — R06.02 SHORTNESS OF BREATH: Primary | ICD-10-CM

## 2020-08-04 LAB — INTERPRETATION ECG - MUSE: NORMAL

## 2020-08-04 PROCEDURE — 90853 GROUP PSYCHOTHERAPY: CPT | Mod: PO | Performed by: SOCIAL WORKER

## 2020-08-04 PROCEDURE — 90853 GROUP PSYCHOTHERAPY: CPT | Mod: GT

## 2020-08-04 PROCEDURE — 90853 GROUP PSYCHOTHERAPY: CPT | Mod: GT | Performed by: SOCIAL WORKER

## 2020-08-04 ASSESSMENT — MIFFLIN-ST. JEOR: SCORE: 1556.06

## 2020-08-04 NOTE — PROGRESS NOTES
Killian is a 65 year old  who presents for   Patient presents with:  Follow Up: F/U SOB & Sweating.. Patient states they feel like they sweat by doing the smallest things.       Assessment and Plan      1. Shortness of breath  2. Diaphoresis   Pt had a stress ECHO and was unable to reach full stress, but no abnormal EKG and heart function normal at baseline. Pt became concerned about results and was seen last week by Dr. Gilbert and was referred to cardiology. They ordered at CT angiogram give family history of CAD. Will follow up. DDx: CAD, Exercise induced asthma, restrictive lung disease, anxiety related   - If CT angiogram unremarkable will do PFTs    3. PTSD (post-traumatic stress disorder)  4. MDD  5. CLAIRE  Pt going to Day Treatment which is very helpful and plans to continue. Still seeing regular therapist who is helping with Gender Dysphoria. Pt states has been seeing her NP and would like us to continue to communicate to each other. Plans to send AVS's to each other as I feel pt could benefit from improved medication management on top of therapy.     4. Psychophysiological insomnia  Has had issues in past, but trazodone has lingering effect and doesn't like it. Most likely exacerbated in relation to mental health issues. Plan to try sleep hygiene, yoga, and atarax for now, but understands may need to help control mental health concerns too.        Return in about 2 weeks (around 8/18/2020) for Routine Visit.    There are no discontinued medications.      Susan Coronel MD         HPI       Killian is a 65 year old  who presents for   Patient presents with:  Follow Up: F/U SOB & Sweating.. Patient states they feel like they sweat by doing the smallest things.       Visit [place]  1. PTSD/Anxiety  Hasn't had any sleep last 4 days. Not sure if related to birthday or anxiety or stress. Trouble falling asleep. Sleep causes trouble. Was a bad time for them as a kid, triggering. Gets break through restless legs if not  asleep before. Doesn't drink caffeine. But using screen time before bed. Feels like too much going on. Hasn't tried atarax. Has trazodone, but doesn't like it because still effects her in the morning.   No SI or HI at this time. Therapy has been helpful still have passive thoughts about [place] when worked up.      2. SOB/Diaporesis   Pt still notices it the most with exercise. Has seemed to be improving with the exercise as still continuing. Still having the sweating with it with exercise. Pt says masks makes her feel weird, makes them feel like they are choking or hand over mouth. Feels like it cane ramp it up. Feels a little SOB. Feels like anxiety at the moment.  Still sweating at rest, skin just feels. No fevers, no cough. Pt had a stress ECHO and was unable to reach full stress, but no abnormal EKG and heart function normal at baseline. Pt became concerned about results and was seen last week by Dr. Gilbert and was referred to cardiology. They ordered at CT angiogram give family history of CAD.     The 10-year ASCVD risk score ([place] DC Jr., et al., 2013) is: 4.7%    Values used to calculate the score:      Age: 65 years      Sex: Female      Is Non- : No      Diabetic: No      Tobacco smoker: No      Systolic Blood Pressure: 126 mmHg      Is BP treated: No      HDL Cholesterol: 49 mg/dL      Total Cholesterol: 206.1 mg/dL      Problem, Medication and Allergy Lists were reviewed and updated if needed..  Patient is an established patient of this clinic.  Reviewed and updated as needed this visit by clinical staff  Tobacco  Allergies  Meds  Med Hx  Surg Hx  Fam Hx  Soc Hx      Reviewed and updated as needed this visit by Provider       Health Maintenance Due   Topic Date Due     DEPRESSION ACTION PLAN  1955     ZOSTER IMMUNIZATION (1 of 2) 08/02/2007     PAP  11/03/2018          Review of Systems:   Review of Systems  10 point review of symptoms was otherwise negative except as  "noted in HPI         Physical Exam:     Vitals:    08/04/20 0917 08/04/20 0920   BP: (!) 143/85 126/82   BP Location: Left arm Left arm   Patient Position: Sitting Sitting   Cuff Size: Adult Large Adult Large   Pulse: 77    SpO2: 98%    Weight: 98.4 kg (217 lb)    Height: 1.676 m (5' 6\")      Body mass index is 35.02 kg/m .  Vital signs normal except O2 sats at 96%, but has had clear recent Xray and most likely due to anxiety from wearing the mask (pt states this as well).     Physical Exam  Vitals signs reviewed.   Constitutional:       General: Killian Stein is not in acute distress.     Appearance: Normal appearance. Killian Stein is obese. Killian Stein is not ill-appearing or diaphoretic.   HENT:      Head: Normocephalic and atraumatic.   Cardiovascular:      Rate and Rhythm: Normal rate and regular rhythm.      Pulses: Normal pulses.      Heart sounds: Normal heart sounds. No murmur.   Pulmonary:      Effort: Pulmonary effort is normal. No respiratory distress.      Breath sounds: Normal breath sounds. No stridor. No wheezing.   Musculoskeletal: Normal range of motion.         General: No swelling.      Right lower leg: No edema.      Left lower leg: No edema.   Skin:     General: Skin is warm and dry.      Capillary Refill: Capillary refill takes less than 2 seconds.   Neurological:      Mental Status: Killian Stein is alert.   Psychiatric:         Mood and Affect: Mood is anxious. Affect is labile and tearful.         Speech: Speech is delayed.         Behavior: Behavior is slowed. Behavior is cooperative.         Thought Content: Thought content normal. Thought content does not include suicidal ideation. Thought content does not include suicidal plan.         Cognition and Memory: Cognition and memory normal.         Judgment: Judgment normal.           Results:   Results from last visit:  Office Visit on 08/03/2020   Component Date Value Ref Range Status     Interpretation ECG 08/03/2020 Click View Image link to view " waveform and result   Final       Options for treatment and follow-up care were reviewed with the patient. Gregoria Stein  engaged in the decision making process and verbalized understanding of the options discussed and agreed with the final plan.  Susan Coronel MD  [facility]

## 2020-08-04 NOTE — GROUP NOTE
Telemedicine Visit: The patient's condition can be safely assessed and treated via synchronous audio and visual telemedicine encounter.      Reason for Telemedicine Visit: Services only offered telehealth    Originating Site (Patient Location): Patient's home    Distant Site (Provider Location): [facility]: [place]    Consent:  The patient/guardian has verbally consented to: the potential risks and benefits of telemedicine (video visit) versus in person care; bill my insurance or make self-payment for services provided; and responsibility for payment of non-covered services.     Mode of Communication:  Video Conference via [employer] group    As the provider I attest to compliance with applicable laws and regulations related to telemedicine.    Psychotherapy Group Note    PATIENT'S NAME: Gregoria Stein  MRN:   [id number]  :   1955  ACCT. NUMBER: [id number]  DATE OF SERVICE: 20  START TIME:  2:00 PM  END TIME:  2:50 PM  FACILITATOR: Gregoria Díaz LICSW  TOPIC: MH EBP Group: Self-Awareness  55+ Program  TRACK: B2    NUMBER OF PARTICIPANTS: 6    Summary of Group / Topics Discussed:  Self-Awareness: Personal Strengths: Topic focused on assisting patients in identifying personal strengths and how they relate to the management of mental health symptoms. Patients discussed the benefits of acknowledging their personal strengths and their impact on mood improvement, mindfulness, and perspective. Patients worked to increase time spent on recognition and appreciation of what is positive and working in their lives. The goal is to reduce rumination and negative thinking resulting in increased mindfulness and resilience. Patients will work to put skills into practice and problem-solve barriers.     Patient Session Goals / Objectives:    Identified personal strengths    Identified barriers to recognition of personal strengths    Verbalized understanding of strategies to increase use of their  strengths in management of daily symptoms      Patient Participation / Response:  Fully participated with the group by sharing personal reflections / insights and openly received / provided feedback with other participants.    Demonstrated understanding of values, strengths, and challenges to learn about themselves    Treatment Plan:  Patient has a current master individualized treatment plan.  See Epic treatment plan for more information.    Gregoria Díaz, LICSW

## 2020-08-04 NOTE — GROUP NOTE
Psychoeducation Group Note    PATIENT'S NAME: Gregoria Stein  MRN:   [id number]  :   1955  ACCT. NUMBER: [id number]  DATE OF SERVICE: 20  START TIME:  3:00 PM  END TIME:  3:50 PM  FACILITATOR: Abbie Eugene LICSW  TOPIC: RASTA RN Group: Health Maintenance  55+ Program  TRACK: B2  Telemedicine Visit: The patient's condition can be safely assessed and treated via synchronous audio and visual telemedicine encounter.      Reason for Telemedicine Visit: Services only offered telehealth    Originating Site (Patient Location): Patient's home    Distant Site (Provider Location): [facility]: [facility] [place]    Consent:  The patient/guardian has verbally consented to: the potential risks and benefits of telemedicine (video visit) versus in person care; bill my insurance or make self-payment for services provided; and responsibility for payment of non-covered services.     Mode of Communication:  Video Conference via [employer]    As the provider I attest to compliance with applicable laws and regulations related to telemedicine.     NUMBER OF PARTICIPANTS: 6    Summary of Group / Topics Discussed:  Health Maintenance: Goal Setting: Meaningful goals can bring a sense of direction and purpose in life.  They also highlight our most important values. Patients were assisted by instructor to identify short term goals to promote their mental health recovery and improve overall health and wellness. Patients were educated on SMART goal setting framework as a strategy to increase outcomes and promote success.    Patient Session Goals / Objectives:  ? Explained the key concepts of SMART goal setting framework  ? Identified three goals successfully using SMART goal setting framework  ? Reviewed concept of balance in wellness as it pertains to goal setting        Patient Participation / Response:  Minimally participated, only when prompted / asked.    Demonstrated understanding of topics discussed through group  discussion and participation and Identified / Expressed personal readiness to practice skills    Treatment Plan:  Patient has a current master individualized treatment plan.  See Epic treatment plan for more information.    Abbie Maddox, LICSW

## 2020-08-04 NOTE — PATIENT INSTRUCTIONS
"Here is the plan from today's visit    1. Shortness of breath  2. Diaphoresis  - follow up with cardiolgoy  - go to CT angiogram    3. PTSD (post-traumatic stress disorder)  - will communicate with NP to have update on medication  - continue to go to day therapy  - Will talk about gender dysphoria tomorrow    4. Psychophysiological insomnia  - Try atarax before bed if not wanting to use Trazodone  - Sleep hygiene   - Yoga has been shown to help as well      Sleep hygiene: Basic rules for a good night's sleep  Sleep only as much as you need to feel rested and then get out of bed   Keep a regular sleep schedule   Do not try to sleep unless you feel sleepy   Exercise regularly, preferably at least 4 to 5 hours before bedtime   Avoid caffeinated beverages after lunch   Avoid alcohol near bedtime: no \"night cap\"   Avoid smoking, especially in the evening   Do not go to bed hungry   Make the bedroom environment conducive to sleep   Avoid prolonged use of light-emitting screens before bedtime    Deal with your worries before bedtime         Please call or return to clinic if your symptoms don't go away.    Follow up plan  Please make a clinic appointment for follow up with me (DANIEL PERSAUD) in 2-4 weeks for regular follow up.    Thank you for coming to [facility] today.  Lab Testing:  **If you had lab testing today and your results are reassuring or normal they will be mailed to you or sent through [employer] within 7 days.   **If the lab tests need quick action we will call you with the results.  The phone number we will call with results is # [phone] (home) . If this is not the best number please call our clinic and change the number.  Medication Refills:  If you need any refills please call your pharmacy and they will contact us.   If you need to  your refill at a new pharmacy, please contact the new pharmacy directly. The new pharmacy will help you get your medications transferred faster. "   Scheduling:  If you have any concerns about today's visit or wish to schedule another appointment please call our office during normal business hours [phone] (8-5:00 M-F)  If a referral was made to a [facility] and you don't get a call from central scheduling please call [phone].  If a Mammogram was ordered for you at [facility] call [phone] to schedule or change your appointment.  If you had an XRay/CT/Ultrasound/MRI ordered the number is [phone] to schedule or change your radiology appointment.   Medical Concerns:  If you have urgent medical concerns please call [phone] at any time of the day.    Susan Coronel MD

## 2020-08-04 NOTE — GROUP NOTE
Telemedicine Visit: The patient's condition can be safely assessed and treated via synchronous audio and visual telemedicine encounter.      Reason for Telemedicine Visit: Services only offered telehealth    Originating Site (Patient Location): Patient's home    Distant Site (Provider Location): [facility]    Consent:  The patient/guardian has verbally consented to: the potential risks and benefits of telemedicine (video visit) versus in person care; bill my insurance or make self-payment for services provided; and responsibility for payment of non-covered services.     Mode of Communication:  Video Conference via [employer]    As the provider I attest to compliance with applicable laws and regulations related to telemedicine.    Process Group Note    PATIENT'S NAME: Gregoria Stein  MRN:   [id number]  :   1955  ACCT. NUMBER: [id number]  DATE OF SERVICE: 20  START TIME:  1:00 PM  END TIME:  1:50 PM  FACILITATOR: Gregoria Díaz LICSW  TOPIC:  Process Group    Diagnoses:  309.81 (F43.10) Posttraumatic Stress Disorder (includes Posttraumatic Stress Disorder for Children 6 Years and Younger)  Without dissociative symptoms. Per client report that she has past diagnosis of this per her therapist who is now retired.  4. Other Diagnoses that is relevant to services:   296.33 (F33.2) Major Depressive Disorder, Recurrent Episode, Severe _ and With anxious distress.  5. Provisional Diagnosis:  309.81 (F43.10) Posttraumatic Stress Disorder (includes Posttraumatic Stress Disorder for Children 6 Years and Younger)  Without dissociative symptoms as evidenced by client reported hypervigilence, hyper arousal, decreased functioning and past history of trauma and diagnosis, she did not wish to disclose trauma at this time       55+ Program  TRACK: B2    NUMBER OF PARTICIPANTS: 6          Data:    Session content: At the start of this group, patients were invited to check in by  identifying themselves, describing their current emotional status, and identifying issues to address in this group.   Area(s) of treatment focus addressed in this session included Symptom Management, Personal Safety, Community Resources/Discharge Planning and Abstinence/Relapse Prevention.    Killian reported on symptoms including interrupted sleep pattern. Killian reports low energy and was encouraged to consult with her outpatient providers.       Therapeutic Interventions/Treatment Strategies:  Psychotherapist offered support, feedback and validation and reinforced use of skills. Treatment modalities used include Cognitive Behavioral Therapy.    Assessment:    Patient response:   Patient responded to session by listening, focusing on goals and being attentive    Possible barriers to participation / learning include: COVID-19    Health Issues:   Yes: Hx of Insomnia       Substance Use Review:   Substance Use: No active concerns identified.    Mental Status/Behavioral Observations  Appearance:   Appropriate   Eye Contact:   Good   Psychomotor Behavior: Normal   Attitude:   Cooperative  Pleasant  Orientation:   All  Speech   Rate / Production: Normal    Volume:  Normal   Mood:    Depressed   Affect:    Appropriate   Thought Content:   Clear and Safety denies any current safety concerns including suicidal ideation, self-harm, and homicidal ideation  Thought Form:  Coherent  Goal Directed  Logical     Insight:    Good     Plan:     Safety Plan: No current safety concerns identified.  Recommended that patient call 911 or go to the local ED should there be a change in any of these risk factors.     Barriers to treatment: COVID-19    Patient Contracts (see media tab):  None    Substance Use: Not addressed in session     Continue or Discharge: Patient will continue in 55+ Program (55+) as planned. Patient is likely to benefit from learning and using skills as they work toward the goals identified in their treatment plan.  Killian  will continue in the program for mood stabilization and symptom management education for mental health recovery.      Gregoria Díaz, [facility]  August 4, 2020

## 2020-08-05 ENCOUNTER — OFFICE VISIT (OUTPATIENT)
Dept: FAMILY MEDICINE | Facility: CLINIC | Age: 65
End: 2020-08-05
Payer: MEDICARE

## 2020-08-05 ENCOUNTER — HOSPITAL ENCOUNTER (OUTPATIENT)
Dept: BEHAVIORAL HEALTH | Facility: CLINIC | Age: 65
End: 2020-08-05
Attending: PSYCHIATRY & NEUROLOGY
Payer: MEDICARE

## 2020-08-05 VITALS
OXYGEN SATURATION: 97 % | DIASTOLIC BLOOD PRESSURE: 81 MMHG | SYSTOLIC BLOOD PRESSURE: 125 MMHG | HEIGHT: 67 IN | RESPIRATION RATE: 18 BRPM | WEIGHT: 215.2 LBS | BODY MASS INDEX: 33.78 KG/M2 | HEART RATE: 74 BPM | TEMPERATURE: 97.9 F

## 2020-08-05 DIAGNOSIS — F66 GENDER IDENTITY UNCERTAINTY IN ADULT: ICD-10-CM

## 2020-08-05 DIAGNOSIS — F64.0 GENDER DYSPHORIA IN ADULT: Primary | ICD-10-CM

## 2020-08-05 LAB
% GRANULOCYTES: 69.3 %G (ref 40–75)
ALBUMIN SERPL-MCNC: 4.8 MG/DL (ref 3.5–4.7)
ALP SERPL-CCNC: 86 U/L (ref 31.7–110.5)
ALT SERPL-CCNC: 19.1 U/L (ref 0–45)
AST SERPL-CCNC: 12.1 U/L (ref 0–45)
BILIRUB SERPL-MCNC: 0.8 MG/DL (ref 0.2–1.3)
BILIRUBIN DIRECT: 0.2 MG/DL (ref 0.1–0.3)
CHOLEST SERPL-MCNC: 220.3 MG/DL (ref 0–200)
CHOLEST/HDLC SERPL: 5.8 {RATIO} (ref 0–5)
GLUCOSE SERPL-MCNC: 94 MG'DL (ref 70–99)
GRANULOCYTES #: 3.6 K/UL (ref 1.6–8.3)
HCT VFR BLD AUTO: 49.7 % (ref 35–47)
HDLC SERPL-MCNC: 38.3 MG/DL
HEMOGLOBIN: 14.7 G/DL (ref 11.7–15.7)
LDLC SERPL CALC-MCNC: 142 MG/DL (ref 0–129)
LYMPHOCYTES # BLD AUTO: 1.3 K/UL (ref 0.8–5.3)
LYMPHOCYTES NFR BLD AUTO: 25.5 %L (ref 20–48)
MCH RBC QN AUTO: 27.6 PG (ref 26.5–35)
MCHC RBC AUTO-ENTMCNC: 29.6 G/DL (ref 32–36)
MCV RBC AUTO: 93.4 FL (ref 78–100)
MID #: 0.3 K/UL (ref 0–2.2)
MID %: 5.2 %M (ref 0–20)
PLATELET # BLD AUTO: 223 K/UL (ref 150–450)
PROT SERPL-MCNC: 7.4 G/DL (ref 6.8–8.8)
RBC # BLD AUTO: 5.32 M/UL (ref 3.8–5.2)
TRIGL SERPL-MCNC: 199.5 MG/DL (ref 0–150)
VLDL CHOLESTEROL: 39.9 MG/DL (ref 7–32)
WBC # BLD AUTO: 5.2 K/UL (ref 4–11)

## 2020-08-05 PROCEDURE — 90853 GROUP PSYCHOTHERAPY: CPT | Mod: GT | Performed by: SOCIAL WORKER

## 2020-08-05 PROCEDURE — 90853 GROUP PSYCHOTHERAPY: CPT | Mod: PO | Performed by: SOCIAL WORKER

## 2020-08-05 PROCEDURE — 90853 GROUP PSYCHOTHERAPY: CPT | Mod: PO

## 2020-08-05 ASSESSMENT — MIFFLIN-ST. JEOR: SCORE: 1555.83

## 2020-08-05 NOTE — GROUP NOTE
Psychotherapy Group Note    PATIENT'S NAME: Gregoria Stein  MRN:   [id number]  :   1955  ACCT. NUMBER: [id number]  DATE OF SERVICE: 20  START TIME:  1:00 PM  END TIME:  1:53 PM  FACILITATOR: Elba Calle LMFT  TOPIC: MH EBP Group: Cognitive Restructuring  55+ Program  TRACK: B2    NUMBER OF PARTICIPANTS: 4    Summary of Group / Topics Discussed:  Cognitive Restructuring: Core Beliefs: Patients received an overview of what a core belief is, and how they develop. Patients then began to identify their negative core beliefs. Patients worked to modify core beliefs with the goal of improved self-image and functioning.     Patient Session Goals / Objectives:    Familiarize self with the concept of core beliefs and how they develop.      Explore personal core beliefs (positive and negative)    Develop / advance recognition of the connection between negative thoughts and negative core beliefs.    Formulate new neutral/positive core beliefs           Telemedicine Visit: The patient's condition can be safely assessed and treated via synchronous audio and visual telemedicine encounter.      Reason for Telemedicine Visit: Services only offered telehealth    Originating Site (Patient Location): Patient's home    Distant Site (Provider Location): Provider Remote Setting    Consent:  The patient/guardian has verbally consented to: the potential risks and benefits of telemedicine (video visit) versus in person care; bill my insurance or make self-payment for services provided; and responsibility for payment of non-covered services.     Mode of Communication:  Video Conference via [employer]    As the provider I attest to compliance with applicable laws and regulations related to telemedicine.    Patient Participation / Response:  Moderately participated, sharing some personal reflections / insights and adequately adequately received / provided feedback with other participants.    Demonstrated understanding of topics  discussed through group discussion and participation, Expressed understanding of the relationship between behaviors, thoughts, and feelings, Demonstrated knowledge of personal thought patterns and how they impact their mood and behavior. and Identified barriers to changing thought patterns    Treatment Plan:  Patient has a current master individualized treatment plan.  See Epic treatment plan for more information.    LIZZY Yusuf
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
## 2020-08-05 NOTE — PATIENT INSTRUCTIONS
"Here is the plan from today's visit    1. Gender dysphoria in adult  2. 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 Last updated   From: National  [employer] [place] Chapter  Http://[place].[email or web address].[email or web address]    Changing your Name  Anyone who is over the age of 18 and has lived in [place] for at least 6 months can file an application for a name change in [place]. The \"Application for a Name Change\" must be filed in the county where the applicant currently lives. The applicant must either pay the filing fee (which is currently $322 in [facility] and $320 in [facility]) or get a fee waiver based on low income. Information about how to obtain a fee waiver is available on the [facility] court website at: www.[employer].gov/.    After the Application and related forms have been filled out, brought to the  to be filed, and the file fee has been paid, the  will give you a hearing date and time for when you must appear before a . Alternatively, the  may tell you how to schedule your hearing. Many courts have help centers and /or information on their websites to help people prepare to represent themselves at their hearing.     Under [place] law, a name change of a minor requires that both parents have notice that an Application for a Name Change for their minor child will be filed. The applicant must show proof that the non-applicant parent(s) have been served with a Notice of Hearing. If the non-applicant parent cannot be served, then the applicant must publish the Notice of Hearing in the legal newspaper in the county were the non-applicant parent was last known to have lived. If the applicant parent does not know who the other parent is, then they must " "bring a certified copy of the child's birth certificate to the court hearing to show that the non-applicant parent's name is not on the birth certificate.    An applicant for a name change must bring two witnesses to the court hearing to testify about their identity. If the Application is made on behalf of a minor, the minor must be present at the court hearing.     Before filing an Application for a Name Change, an applicant should keep two considerations in mind. First, it is illegal to change your name to avoid legal obligations like paying debts, being sued, or being arrested or charged with a crime. Second, any information in an individual's name change file may be accessed by the public unless the individual's name is being changed because they are in a victim or witness protection program.     If the  approves the Application, they will sign a document called an \"order Granting Name Change.\" This Order will be entered into the court's record. Changing important documents like 's licenses, Social Security cards, and bank accounts requires certified copies of the Order, which can be obtained from the  for a fee. One certified copy of the Order should also be kept with the individual to notify certain public agencies, officials or other third parties about their name change.     It is also common for individuals to begin using a new name without formally changing their name. This is legal as long as the individual is not using the new name to commit fraud or other illegal activity.    Changing your Birth Certificate  If you intend to change both your name and your gender marker on your birth certificate, you may file an Application for a Name Change with the court. Changes to your birth certificate will only be made if you can prove that there are exceptional circumstances that make changing the birth certificate necessary. You must state your reasons on your Application form and " "specifically ask the  to order the Office of Vital Records ([place] Dept of Health) to change the birth certificate, indicating which specific items on the birth certificate are to be changed, and then submit a certified copy of the court order to the Office of Vital Records with the change fee, which is currently $40. To determine if you can use your court order to amend a birth record, review the Requirements Checklist for Amending a Birth Record With a Court Order, available at www.health.state.[place]. An 's advice may be useful when pursuing this option.     Changing your 's Licence/State Identification  To change your name on your 's license or ID, bring the court order demonstrating your name change to a  and Vehicle Services location. Be sure to bring your current license/ID as well. Updated licenses are issued for $13.50 .    To change the gender marker on your license or ID requires a \"variance\" issued by the central office of [place]  and Vehicle services in [place]. You must complete a \"Petition for [facility] Rules 7410 Identify/Residency\" and file it with the central office, being sure to include photocopies of your supporting documentation (such as a new or amended birth certificate and/or a letter from a physician or surgeon confirming some form of permanent physical transition) and a check or money order for $10.    Changing your Name/Gender Marker with the Social Security Administration  Changing your name and gender marker with the Social Security Administration, which will result in both a change to your SSA [employer] file and, at your request, a new card being issued, can be accomplished by submitting documentation of both your \"old\" and \"new\" legal identities, suca as a copy of the court order used to changed your name or birth certificate. The [facility] also requires a physician's letter indicating that some permanent physical transition has occurred. " "    Changing your Passport  A copy of the court order confirming your name change is required to change the name on your passport. To change the gender marker, the State Department requires a detailed statement from a surgeon or hospital indicating that you have, or plan on having corrective surgery. Note however, the the [facility] Department will simply \"stamp over\" the previous gender marker on your passport if you request an amendment, indicating the date on which the gender change took place. The State Department may require additional documents to process your request.     GOVERNMENT RESOURCE LIST    [facility]  [address] [place] [address]  [phone]  [email or web address]  [address] [place] [address]  [phone]  [email or web address] Dept of Health and Office of Vital Records  [address] [address]  [place] [address]  [phone]  [email or web address] Dept of Public Safety  and Vehicle Services  [address] [place] [address]  [place] [address]  [phone]  [email or web address]    [place] Social Security Offices  [phone]    [facility] Office  [address] [place] [address]    [facility] Office  [address]  [address]  [place] [address]       nor Informed Consent Form for Testosterone Therapy   This form refers to the use of testosterone by persons in the female-to-male spectrum who wish to become more masculine to reduce gender dysphoria and facilitate a more masculine gender presentation. While there are risks associated with taking testosterone, when appropriately prescribed it can greatly improve mental health and quality of life.   Please seek another opinion if you want additional perspective on any aspect of your care.     Masculinizing Effects     1. I understand that testosterone may be prescribed to reduce female physical characteristics and masculinize my body.     2. I understand that the masculinizing effects " of testosterone can take several months to a year to become noticeable, that the rate and degree of change can t be predicted, and that changes may not be complete for 2-5 years after I start testosterone.     3. I understand that these changes will likely be permanent even if I stop taking testosterone:    Lower voice pitch (i.e., voice becoming deeper).     Increased growth of hair, with thicker/coarser hairs, on arms, legs, chest, back, and abdomen.     Gradual growth of moustache/facial hair.     Hair loss at the temples and crown of the head, with the possibility of becoming completely bald.     Genital changes may or may not be permanent if testosterone is stopped. These include clitoral growth (typically 1-3 cm) and vaginal dryness.     4. I understand that the following changes are usually not permanent (that is, they will likely reverse if I stop taking testosterone). Although testosterone does not change these features, there are other treatments that may be helpful:    Acne, which may be severe and can cause permanent scarring if not treated.     Fat may redistribute to a more masculine pattern (decreased on buttocks/hips/thighs, increased in abdomen - changing from  pear shape  to  apple shape ).     Increased muscle mass and upper body strength.     Increased libido (sex drive).     Menstrual periods typically stop within 3-6 months of starting testosterone.     5. I understand that it is not known what the effects of testosterone are on fertility. Fertility is reduced and I may never be able to get pregnant, even if I stop testosterone. On the other hand, even if my period stops, it may still be possible for me to get pregnant, and I am aware of birth control options (if applicable). I have been informed that I can t take testosterone if I am pregnant.     6. I understand that there are some aspects of my body that will not be changed by testosterone:     Breasts may appear slightly smaller due to fat  loss, but will not substantially shrink.     Although voice pitch will likely drop, other aspects of speech will not become more masculine.     Risks of Testosterone     7. I understand that the medical effects and safety of testosterone are not fully understood, and that there may be long-term risks that are not yet known.     8. I understand that I am strongly advised not to take more testosterone than I am prescribed, as this increases health risks. I have been informed that taking more will not make masculinization happen more quickly or increase the degree of change.     9. I understand that testosterone can cause changes that increase my risk of heart disease, including:     decreasing good cholesterol (HDL) and increasing bad cholesterol (LDL)     increasing blood pressure     increasing deposits of fat around my internal organs    I have been advised that my risks of heart disease are greater if people in my family have had heart disease, if I am overweight, or if I smoke.   I have been advised that heart health checkups, including monitoring of my weight and cholesterol levels, should be done periodically as long as I am taking testosterone.     10. I understand that testosterone can damage the liver, possibly leading to liver disease. I have been advised that I should be monitored for as long as I am taking testosterone.     11. I understand that testosterone can increase the red blood cells and hemoglobin, and while the increase is usually only to a normal male range (which does not pose health risks), a high increase can cause potentially life-threatening problems such as stroke and heart attack. I have been advised that my blood should be monitored periodically while I am taking testosterone.     12. I understand that taking testosterone can increase my risk for diabetes by decreasing my body s response to insulin, causing weight gain, and increasing deposits of fat around my internal organs. I have  been advised that my fasting blood glucose should be monitored periodically while I am taking testosterone.     13. I understand that it is not known if testosterone increases the risk of ovarian, breast, or uterine cancer.     14. I understand that taking testosterone can lead to my cervix and the walls of my vagina becoming more fragile, and that this can lead to tears or abrasions that increase the risk of sexually transmitted infections (including HIV) if I have vaginal sex - no matter what the gender of my partner is. I have been advised that angeles discussion with my doctor about my sexual practices can help determine how best to prevent and monitor for sexually transmitted infections.     15. I have been informed that testosterone can cause headaches or migraines. I understand that if I am frequently having headaches or migraines, or the pain is unusually severe, it is recommended that I talk with my health care provider.     16. I understand that testosterone can cause emotional changes, including increased irritability, frustration, and anger. I have been advised that my doctor can assist me in finding resources to explore and cope with these changes.     17. I understand that testosterone will result in changes that will be noticeable by other people, and that some transgender people in similar circumstances have experienced harassment, discrimination, and violence, while others have lost support of loved ones. I have been advised that my doctor can assist me in finding advocacy and support resources.     Prevention of Medical Complications     18. I agree to take testosterone as prescribed and to tell my doctor if I am not happy with the treatment or am experiencing any problems.     19. I understand that the right dose or type of medication prescribed for me may not be the same as for someone else.     20. I understand that physical examinations and blood tests are needed on a regular basis to check for  negative side effects of testosterone.     21. I understand that testosterone can interact with other medication (including other sources of hormones), dietary supplements, herbs, alcohol, and street drugs. I understand that being honest with my doctor about what else I am taking will help prevent medical complications that could be life-threatening. I have been informed that I will continue to get medical care no matter what information I share.     22. I understand that some medical conditions make it dangerous to take testosterone. I agree that I will be checked for risky conditions before the decision to take testosterone is made.     23. I understand that I can choose to stop taking testosterone at any time, and that it is advised that I do this with the help of my doctor to make sure there are no negative reactions to stopping. I understand that my doctor may suggest I reduce or stop taking testosterone if there are severe side effects or health risks that can t be controlled.     My signature below confirms that:     My doctor has talked with me about the benefits and risks of testosterone, the possible or likely consequences of hormone therapy, and potential alternative treatment options.     I understand the risks that may be involved.     I understand that this form covers known effects and risks and that there may be long-term effects or risks that are not yet known.     I have had sufficient opportunity to discuss treatment options with my doctor. All of my questions have been answered to my satisfaction.     I believe I have adequate knowledge on which to base informed consent to the provision of testosterone therapy.     Based on this:   _____ I wish to begin taking testosterone.     _____ I do not wish to begin taking testosterone at this time.     Whatever your current decision is, please talk with your doctor any time you have questions, concerns, or want to re-evaluate your options.          ____________________________________ __________________   Patient Signature     Date           ____________________________________ __________________   Prescribing clinician Signature    Date      Please call or return to clinic if your symptoms don't go away.    Follow up plan  Please make a clinic appointment for follow up with me (SUSAN CORONEL) in 1  week for HRT.    Thank you for coming to [facility] today.  Lab Testing:  **If you had lab testing today and your results are reassuring or normal they will be mailed to you or sent through [facility] within 7 days.   **If the lab tests need quick action we will call you with the results.  The phone number we will call with results is # [phone] (home) . If this is not the best number please call our clinic and change the number.  Medication Refills:  If you need any refills please call your pharmacy and they will contact us.   If you need to  your refill at a new pharmacy, please contact the new pharmacy directly. The new pharmacy will help you get your medications transferred faster.   Scheduling:  If you have any concerns about today's visit or wish to schedule another appointment please call our office during normal business hours [phone] (8-5:00 M-F)  If a referral was made to a [facility] and you don't get a call from central scheduling please call [phone].  If a Mammogram was ordered for you at [facility] call [phone] to schedule or change your appointment.  If you had an XRay/CT/Ultrasound/MRI ordered the number is [phone] to schedule or change your radiology appointment.   Medical Concerns:  If you have urgent medical concerns please call [phone] at any time of the day.    Susan Coronel MD

## 2020-08-06 ENCOUNTER — TELEPHONE (OUTPATIENT)
Dept: BEHAVIORAL HEALTH | Facility: CLINIC | Age: 65
End: 2020-08-06

## 2020-08-06 NOTE — RESULT ENCOUNTER NOTE
Dear Killian  Here are your labs as you can see the your lipids are mildly elevated, I'm sure Covid has changed the way you eat, I suggest trying to change diet. Your hematocrit is elevated a bit too, so we will need to keep a close eye on this if starting testosterone.   Please message me if you have any concerns.  Sincerely,   Susan Coronel MD

## 2020-08-06 NOTE — ADDENDUM NOTE
Encounter addended by: Katey Gonzalez, LICSW on: 8/6/2020 5:51 PM   Actions taken: Charge Capture section accepted

## 2020-08-06 NOTE — TELEPHONE ENCOUNTER
This author received a voicemail from the patient on 8/4/20 at 3:16pm. In their voicemail, Killian indicated that group continued to feel feel unsafe in the current group. They indicated they have sought out day treatment services at [facility]. As such, this author contacted the patient. During the call, the patient stated language continues to be gender exclusive in group, which feels difficult for Killian. Killian has, however, decided to stay in group at this time until they find another group that feels more supportive. This author informed Killian that her supervisor, Katey Gonzalez, would be reaching out to connect in and find ways to make the group feel more supportive. Killian stated they were receptive to a call from Ms. Gonzalez.

## 2020-08-06 NOTE — TELEPHONE ENCOUNTER
"Writer called Pt today to discuss their experience in the program. Writer had heard Killian had called one of the program providers and had stated the group was not a safe place for them in terms of beng able to be comfortable with expressing worries/thoughts/emotions about their potential gender transition.  Killian started the conversation with this writer acknowledging that they are very raw and sensitive with their emotions. They understand they bring this to the table.  They are exploring terms right now and are identifying self as non-binary. They would like to use their name, \"Killian\" mostly when being addressed.  If not for Killian, then they would like to use they/them pronouns.  This will be passed along to the treatment team.  Using the name Killian is also fairly new for them.      Killian stated almost all the therapists they have had contact with over the past two weeks have made a comment that they struggled with.  A few examples are as follows:  1. One staff dismissing their identity as \"being pertinent to this situation\"  2. One staff dismissing the whole topic of identity when they were clearly crying and needing to process it - went on to a different topic.  3. One staff asking if they had a \"cool\" name. Killian stated to this writer,  \"It's not a \"cool\" name, it's my name and I'm experimenting how I feel with it.    4. [facility] staff directed the males to introduce themselves first, followed by the females.      Killian states when something such as the above comments are made, they \"disappear into themselves and are quiet\".     Pt stated they have addressed much of the above directly with staff when there was a concern.  They have found staff to be compassionate and receptive.  They stated, \"everytime I have addressed it, it has had a positive outcome.\"  They expressed some staff where they have felt a sense of warmth coming from them.  They feel the program as a whole will possibly develop into a safe place for them and would " "like to continue for now.  They are exploring a program at [facility] as well.  They did state, \"I was on a bridge before this and so need to continue in the group for now.  I need the support.\"      Killian stated the group itself has been fine and they haven't noticed concerns with pronouns.  The only other idea they had to increase awareness and to help themselves is to have a topic around culture/identity. Something that would talk about exploring who people are and how they see themselves.  They thought this may give them a safe space to share within the group.  Writer listened to the idea and told Killian they would bring it forward to the team.  If we are able to do a well thought out group around this area, we would let Killian know ahead of time so is prepared to open up with their peers (only if feeling ready at that time).      Writer shared with Killian that would talk to the treatment team more about this conversation in order to allow for a safer group environment.  Killian agreed with the plan.     "

## 2020-08-06 NOTE — GROUP NOTE
Telemedicine Visit: The patient's condition can be safely assessed and treated via synchronous audio and visual telemedicine encounter.      Reason for Telemedicine Visit: Services only offered telehealth    Originating Site (Patient Location): Patient's home    Distant Site (Provider Location): [facility]: [place]    Consent:  The patient/guardian has verbally consented to: the potential risks and benefits of telemedicine (video visit) versus in person care; bill my insurance or make self-payment for services provided; and responsibility for payment of non-covered services.     Mode of Communication:  Video Conference via [employer]    As the provider I attest to compliance with applicable laws and regulations related to telemedicine.    Process Group Note    PATIENT'S NAME: Gregoria Steni  MRN:   [id number]  :   1955  ACCT. NUMBER: [id number]  DATE OF SERVICE: 20  START TIME:  2:00 PM  END TIME:  2:50 PM  FACILITATOR: Gregoria Díaz LICSW  TOPIC:  Process Group    Diagnoses:  309.81 (F43.10) Posttraumatic Stress Disorder (includes Posttraumatic Stress Disorder for Children 6 Years and Younger)  Without dissociative symptoms. Per client report that she has past diagnosis of this per her therapist who is now retired.  4. Other Diagnoses that is relevant to services:   296.33 (F33.2) Major Depressive Disorder, Recurrent Episode, Severe _ and With anxious distress.  5. Provisional Diagnosis:  309.81 (F43.10) Posttraumatic Stress Disorder (includes Posttraumatic Stress Disorder for Children 6 Years and Younger)  Without dissociative symptoms as evidenced by client reported hypervigilence, hyper arousal, decreased functioning and past history of trauma and diagnosis, she did not wish to disclose trauma at this time       55+ Program  TRACK: B2    NUMBER OF PARTICIPANTS: 5          Data:    Session content: At the start of this group, patients were invited to check in by  identifying themselves, describing their current emotional status, and identifying issues to address in this group.   Area(s) of treatment focus addressed in this session included Symptom Management, Personal Safety, Community Resources/Discharge Planning, Abstinence/Relapse Prevention and Develop / Improve Independent Living Skills.    Killian reports depressed mood. Denies S/I or safety issues. Killian is aware of coping skills for symptom management and has been reading a sensory modulation book. 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      Therapeutic Interventions/Treatment Strategies:  Psychotherapist offered support, feedback and validation and reinforced use of skills. Treatment modalities used include Cognitive Behavioral Therapy.    Assessment:    Patient response:   Patient responded to session by accepting feedback, giving feedback and listening    Possible barriers to participation / learning include: COVID-19    Health Issues:   None reported       Substance Use Review:   Substance Use: No active concerns identified.    Mental Status/Behavioral Observations  Appearance:   Appropriate   Eye Contact:   Good   Psychomotor Behavior: Normal   Attitude:   Cooperative  Interested Pleasant  Orientation:   All  Speech   Rate / Production: Normal    Volume:  Normal   Mood:    Depressed   Affect:    Appropriate   Thought Content:   Clear and Safety denies any current safety concerns including suicidal ideation, self-harm, and homicidal ideation  Thought Form:  Coherent  Goal Directed  Logical     Insight:    Good     Plan:     Safety Plan: No current safety concerns identified.  Recommended that patient call 911 or go to the local ED should there be a change in any of these risk factors.     Barriers to treatment: None identified    Patient Contracts (see media tab):  None    Substance Use: Not addressed in session     Continue or Discharge: Patient will continue in 55+ Program (55+) as planned. Patient is likely to benefit from learning and  using skills as they work toward the goals identified in their treatment plan.  Killian would benefit from symptom management education for mood stabilization.       Gregoria Díaz, Dorothea Dix Psychiatric CenterSW  August 6, 2020

## 2020-08-06 NOTE — GROUP NOTE
Telemedicine Visit: The patient's condition can be safely assessed and treated via synchronous audio and visual telemedicine encounter.      Reason for Telemedicine Visit: Services only offered telehealth    Originating Site (Patient Location): Patient's home    Distant Site (Provider Location): [facility]: [place]    Consent:  The patient/guardian has verbally consented to: the potential risks and benefits of telemedicine (video visit) versus in person care; bill my insurance or make self-payment for services provided; and responsibility for payment of non-covered services.     Mode of Communication:  Video Conference via [employer]    As the provider I attest to compliance with applicable laws and regulations related to telemedicine.    Psychotherapy Group Note    PATIENT'S NAME: Gregoria Stein  MRN:   [id number]  :   1955  ACCT. NUMBER: [id number]  DATE OF SERVICE: 20  START TIME:  3:00 PM  END TIME:  3:50 PM  FACILITATOR: Gregoria Díaz LICSW  TOPIC: MH EBP Group: Coping Skills  55+ Program  TRACK: B2    NUMBER OF PARTICIPANTS: 5  Summary of Group / Topics Discussed:  Coping Skills: Self-Soothe: Patients learned to apply self-soothe as a way to decrease heightened stress in the moment.  Patients identified situations that necessitate self-soothe strategies.  They focused on ways to manage physical symptoms of distress using the senses. They discussed how to distinguish when this can be useful in their lives when other strategies are more relevant or helpful.    Patient Session Goals / Objectives:    Understand the purpose of using the senses to decrease distress    Process what happens in the body when using self-soothe strategies    Demonstrate understanding of when to use self-soothe strategies    Identify and problem solve barriers to applying self-soothe strategies.    Choose 1-2 self-soothe strategies to apply during times of distress.        Patient Participation /  Response:  Fully participated with the group by sharing personal reflections / insights and openly received / provided feedback with other participants.    Demonstrated understanding of topics discussed through group discussion and participation and Expressed understanding of the relevance / importance of coping skills at distressing times in life    Treatment Plan:  Patient has a current master individualized treatment plan.  See Epic treatment plan for more information.    Gregoria Díaz, LICSW

## 2020-08-07 ENCOUNTER — TELEPHONE (OUTPATIENT)
Dept: BEHAVIORAL HEALTH | Facility: CLINIC | Age: 65
End: 2020-08-07

## 2020-08-07 ENCOUNTER — HOSPITAL ENCOUNTER (OUTPATIENT)
Dept: BEHAVIORAL HEALTH | Facility: CLINIC | Age: 65
End: 2020-08-07
Attending: PSYCHIATRY & NEUROLOGY
Payer: MEDICARE

## 2020-08-07 LAB — TESTOST SERPL-MCNC: 19 NG/DL (ref 8–60)

## 2020-08-07 PROCEDURE — 90853 GROUP PSYCHOTHERAPY: CPT | Mod: GT

## 2020-08-07 PROCEDURE — 90853 GROUP PSYCHOTHERAPY: CPT | Mod: PO | Performed by: SOCIAL WORKER

## 2020-08-07 NOTE — TELEPHONE ENCOUNTER
Spoke with pt who was upset earlier today due to not receiving invite for 55+ video visit. Pt is now feeling better. Reports a difficult year, stressors and various losses. Pt plans to do relaxing things including nature meditation this weekend. Pt will return to program next week.

## 2020-08-07 NOTE — GROUP NOTE
Telemedicine Visit: The patient's condition can be safely assessed and treated via synchronous audio and visual telemedicine encounter.      Reason for Telemedicine Visit: Services only offered telehealth    Originating Site (Patient Location): Patient's home    Distant Site (Provider Location): [facility]: [place]    Consent:  The patient/guardian has verbally consented to: the potential risks and benefits of telemedicine (video visit) versus in person care; bill my insurance or make self-payment for services provided; and responsibility for payment of non-covered services.     Mode of Communication:  Video Conference via [employer]    As the provider I attest to compliance with applicable laws and regulations related to telemedicine.    Psychotherapy Group Note    PATIENT'S NAME: Gregoria Stein  MRN:   [id number]  :   1955  ACCT. NUMBER: [id number]  DATE OF SERVICE: 20  START TIME:  3:00 PM  END TIME:  3:50 PM  FACILITATOR: Gregoria Díaz LICSW  TOPIC: MH EBP Group: Coping Skills  55+ Program  TRACK: b2    NUMBER OF PARTICIPANTS: 5    Summary of Group / Topics Discussed:  Coping Skills: Radical Acceptance: Patients learned radical acceptance as a way to tolerate heightened stress in the moment.  Patients identified situations that necessitate radical acceptance.  They focused on applying acceptance of the moment to tolerate difficult emotions and events. Patients discussed how to distinguish when this can be useful in their lives and when other skills are more relevant or helpful.    Patient Session Goals / Objectives:    Understand that some amount of pain exists for each of us in our lives    Process the difficulty of acceptance in our lives and benefits of radical acceptance to mood and functioning.    Demonstrate understanding of when to use the radical acceptance to tolerate distress by providing examples of situations where this could be applied.    Identify barriers to  applying radical acceptance to difficult situations and explore strategies to overcome them        Patient Participation / Response:  Fully participated with the group by sharing personal reflections / insights and openly received / provided feedback with other participants.    Demonstrated understanding of topics discussed through group discussion and participation, Expressed understanding of the relevance / importance of coping skills at distressing times in life and Demonstrated knowledge of when to consider using a variety of coping skills in daily life    Treatment Plan:  Patient has a current master individualized treatment plan.  See Epic treatment plan for more information.    Gregoria Díaz, LICSW

## 2020-08-07 NOTE — GROUP NOTE
Psychotherapy Group Note    PATIENT'S NAME: Gregoria Stein  MRN:   [id number]  :   1955  ACCT. NUMBER: [id number]  DATE OF SERVICE: 20  START TIME:  2:00 PM  END TIME:  2:50 PM  FACILITATOR: Abbie Eugene LICSW  TOPIC: MH EBP Group: Coping Skills  55+ Program  TRACK: B1  Telemedicine Visit: The patient's condition can be safely assessed and treated via synchronous audio and visual telemedicine encounter.      Reason for Telemedicine Visit: Services only offered telehealth    Originating Site (Patient Location): Patient's home    Distant Site (Provider Location): [facility]: [facility]    Consent:  The patient/guardian has verbally consented to: the potential risks and benefits of telemedicine (video visit) versus in person care; bill my insurance or make self-payment for services provided; and responsibility for payment of non-covered services.     Mode of Communication:  Video Conference via [employer]    As the provider I attest to compliance with applicable laws and regulations related to telemedicine.     NUMBER OF PARTICIPANTS: 4    Summary of Group / Topics Discussed:  Coping Skills: Meditation: Patients learned about meditation and explored how and when to utilize it to increase focus, reduce mental health symptoms, decrease physical tension, and improve mental well-being.  Approaches to meditation were presented as a means of increasing self-awareness. The benefits of various meditation practices were discussed, as well as barriers to utilization of this coping strategy.     Patient Session Goals / Objectives:    Understand the purpose and efficacy of using meditation modalities to reduce stress / symptoms.    Review / discuss situations in daily life that cause distress, where establishing a meditation routine or meditating as needed may improve functioning.      Verbalize understanding of how and when to apply grounding strategies to reduce distress and increase presence in  the moment.    Practice meditation and address barriers to use in daily life.        Patient Participation / Response:  Fully participated with the group by sharing personal reflections / insights and openly received / provided feedback with other participants.    Demonstrated understanding of topics discussed through group discussion and participation, Practiced 2-3 new coping skills in session and Identified / Expressed personal readiness to practice new coping skills    Treatment Plan:  Patient has a current master individualized treatment plan.  See Epic treatment plan for more information.    Abbie Maddox, LICSW

## 2020-08-10 ENCOUNTER — HOSPITAL ENCOUNTER (OUTPATIENT)
Dept: CT IMAGING | Facility: CLINIC | Age: 65
Discharge: HOME | End: 2020-08-10
Attending: INTERNAL MEDICINE | Admitting: INTERNAL MEDICINE
Payer: MEDICARE

## 2020-08-10 ENCOUNTER — OFFICE VISIT (OUTPATIENT)
Dept: FAMILY MEDICINE | Facility: CLINIC | Age: 65
End: 2020-08-10
Payer: MEDICARE

## 2020-08-10 ENCOUNTER — TELEPHONE (OUTPATIENT)
Dept: BEHAVIORAL HEALTH | Facility: CLINIC | Age: 65
End: 2020-08-10

## 2020-08-10 VITALS
DIASTOLIC BLOOD PRESSURE: 74 MMHG | OXYGEN SATURATION: 97 % | SYSTOLIC BLOOD PRESSURE: 127 MMHG | RESPIRATION RATE: 18 BRPM | HEART RATE: 53 BPM

## 2020-08-10 VITALS
BODY MASS INDEX: 34.58 KG/M2 | HEART RATE: 59 BPM | WEIGHT: 215.2 LBS | DIASTOLIC BLOOD PRESSURE: 77 MMHG | OXYGEN SATURATION: 98 % | HEIGHT: 66 IN | TEMPERATURE: 98.1 F | RESPIRATION RATE: 18 BRPM | SYSTOLIC BLOOD PRESSURE: 130 MMHG

## 2020-08-10 DIAGNOSIS — R06.09 DOE (DYSPNEA ON EXERTION): ICD-10-CM

## 2020-08-10 DIAGNOSIS — R06.02 SHORTNESS OF BREATH: Primary | ICD-10-CM

## 2020-08-10 DIAGNOSIS — F64.0 GENDER DYSPHORIA IN ADULT: ICD-10-CM

## 2020-08-10 DIAGNOSIS — R94.39 ABNORMAL RESULT OF OTHER CARDIOVASCULAR FUNCTION STUDY: ICD-10-CM

## 2020-08-10 PROCEDURE — 25000132 ZZH RX MED GY IP 250 OP 250 PS 637: Mod: GY | Performed by: INTERNAL MEDICINE

## 2020-08-10 PROCEDURE — 75574 CT ANGIO HRT W/3D IMAGE: CPT

## 2020-08-10 PROCEDURE — 75574 CT ANGIO HRT W/3D IMAGE: CPT | Mod: 26 | Performed by: INTERNAL MEDICINE

## 2020-08-10 PROCEDURE — 25000128 H RX IP 250 OP 636: Performed by: INTERNAL MEDICINE

## 2020-08-10 RX ORDER — METHYLPREDNISOLONE SODIUM SUCCINATE 125 MG/2ML
125 INJECTION, POWDER, LYOPHILIZED, FOR SOLUTION INTRAMUSCULAR; INTRAVENOUS
Status: DISCONTINUED | OUTPATIENT
Start: 2020-08-10 | End: 2020-08-11 | Stop reason: HOSPADM

## 2020-08-10 RX ORDER — ACYCLOVIR 200 MG/1
0-1 CAPSULE ORAL
Status: DISCONTINUED | OUTPATIENT
Start: 2020-08-10 | End: 2020-08-11 | Stop reason: HOSPADM

## 2020-08-10 RX ORDER — IOPAMIDOL 755 MG/ML
120 INJECTION, SOLUTION INTRAVASCULAR ONCE
Status: COMPLETED | OUTPATIENT
Start: 2020-08-10 | End: 2020-08-10

## 2020-08-10 RX ORDER — DIPHENHYDRAMINE HYDROCHLORIDE 50 MG/ML
25-50 INJECTION INTRAMUSCULAR; INTRAVENOUS
Status: DISCONTINUED | OUTPATIENT
Start: 2020-08-10 | End: 2020-08-11 | Stop reason: HOSPADM

## 2020-08-10 RX ORDER — METOPROLOL TARTRATE 1 MG/ML
5-15 INJECTION, SOLUTION INTRAVENOUS
Status: DISCONTINUED | OUTPATIENT
Start: 2020-08-10 | End: 2020-08-11 | Stop reason: HOSPADM

## 2020-08-10 RX ORDER — ONDANSETRON 2 MG/ML
4 INJECTION INTRAMUSCULAR; INTRAVENOUS
Status: DISCONTINUED | OUTPATIENT
Start: 2020-08-10 | End: 2020-08-11 | Stop reason: HOSPADM

## 2020-08-10 RX ORDER — DIPHENHYDRAMINE HCL 25 MG
25 CAPSULE ORAL
Status: DISCONTINUED | OUTPATIENT
Start: 2020-08-10 | End: 2020-08-11 | Stop reason: HOSPADM

## 2020-08-10 RX ORDER — NITROGLYCERIN 0.4 MG/1
.4-.8 TABLET SUBLINGUAL
Status: DISCONTINUED | OUTPATIENT
Start: 2020-08-10 | End: 2020-08-11 | Stop reason: HOSPADM

## 2020-08-10 RX ORDER — METOPROLOL TARTRATE 25 MG/1
25-100 TABLET, FILM COATED ORAL
Status: COMPLETED | OUTPATIENT
Start: 2020-08-10 | End: 2020-08-10

## 2020-08-10 RX ADMIN — NITROGLYCERIN 0.8 MG: 0.4 TABLET SUBLINGUAL at 13:15

## 2020-08-10 RX ADMIN — METOPROLOL TARTRATE 100 MG: 100 TABLET, FILM COATED ORAL at 11:52

## 2020-08-10 RX ADMIN — IOPAMIDOL 120 ML: 755 INJECTION, SOLUTION INTRAVENOUS at 13:21

## 2020-08-10 ASSESSMENT — MIFFLIN-ST. JEOR: SCORE: 1547.89

## 2020-08-10 NOTE — TELEPHONE ENCOUNTER
Pt contacted and was unable to to talk at this time. Requested call back either later today or tomorrow.

## 2020-08-10 NOTE — PATIENT INSTRUCTIONS
Here is the plan from today's visit    1. Shortness of breath  CTA negative  - General PFT Lab (Please always keep checked); Future  - Pulmonary Function Test; Future  - Exercise Spirometry Test (GH); Future    2. Gender dysphoria in adult  Repeat CBC at next visit  - CBC with Plt ([facility]); Future    Informed Consent Form for Testosterone Therapy   This form refers to the use of testosterone by persons in the female-to-male spectrum who wish to become more masculine to reduce gender dysphoria and facilitate a more masculine gender presentation. While there are risks associated with taking testosterone, when appropriately prescribed it can greatly improve mental health and quality of life.   Please seek another opinion if you want additional perspective on any aspect of your care.     Masculinizing Effects     1. I understand that testosterone may be prescribed to reduce female physical characteristics and masculinize my body.     2. I understand that the masculinizing effects of testosterone can take several months to a year to become noticeable, that the rate and degree of change can t be predicted, and that changes may not be complete for 2-5 years after I start testosterone.     3. I understand that these changes will likely be permanent even if I stop taking testosterone:    Lower voice pitch (i.e., voice becoming deeper).     Increased growth of hair, with thicker/coarser hairs, on arms, legs, chest, back, and abdomen.     Gradual growth of moustache/facial hair.     Hair loss at the temples and crown of the head, with the possibility of becoming completely bald.     Genital changes may or may not be permanent if testosterone is stopped. These include clitoral growth (typically 1-3 cm) and vaginal dryness.     4. I understand that the following changes are usually not permanent (that is, they will likely reverse if I stop taking testosterone). Although testosterone does not change these features, there are  other treatments that may be helpful:    Acne, which may be severe and can cause permanent scarring if not treated.     Fat may redistribute to a more masculine pattern (decreased on buttocks/hips/thighs, increased in abdomen - changing from  pear shape  to  apple shape ).     Increased muscle mass and upper body strength.     Increased libido (sex drive).     Menstrual periods typically stop within 3-6 months of starting testosterone.     5. I understand that it is not known what the effects of testosterone are on fertility. Fertility is reduced and I may never be able to get pregnant, even if I stop testosterone. On the other hand, even if my period stops, it may still be possible for me to get pregnant, and I am aware of birth control options (if applicable). I have been informed that I can t take testosterone if I am pregnant.     6. I understand that there are some aspects of my body that will not be changed by testosterone:     Breasts may appear slightly smaller due to fat loss, but will not substantially shrink.     Although voice pitch will likely drop, other aspects of speech will not become more masculine.     Risks of Testosterone     7. I understand that the medical effects and safety of testosterone are not fully understood, and that there may be long-term risks that are not yet known.     8. I understand that I am strongly advised not to take more testosterone than I am prescribed, as this increases health risks. I have been informed that taking more will not make masculinization happen more quickly or increase the degree of change.     9. I understand that testosterone can cause changes that increase my risk of heart disease, including:     decreasing good cholesterol (HDL) and increasing bad cholesterol (LDL)     increasing blood pressure     increasing deposits of fat around my internal organs    I have been advised that my risks of heart disease are greater if people in my family have had heart  disease, if I am overweight, or if I smoke.   I have been advised that heart health checkups, including monitoring of my weight and cholesterol levels, should be done periodically as long as I am taking testosterone.     10. I understand that testosterone can damage the liver, possibly leading to liver disease. I have been advised that I should be monitored for as long as I am taking testosterone.     11. I understand that testosterone can increase the red blood cells and hemoglobin, and while the increase is usually only to a normal male range (which does not pose health risks), a high increase can cause potentially life-threatening problems such as stroke and heart attack. I have been advised that my blood should be monitored periodically while I am taking testosterone.     12. I understand that taking testosterone can increase my risk for diabetes by decreasing my body s response to insulin, causing weight gain, and increasing deposits of fat around my internal organs. I have been advised that my fasting blood glucose should be monitored periodically while I am taking testosterone.     13. I understand that it is not known if testosterone increases the risk of ovarian, breast, or uterine cancer.     14. I understand that taking testosterone can lead to my cervix and the walls of my vagina becoming more fragile, and that this can lead to tears or abrasions that increase the risk of sexually transmitted infections (including HIV) if I have vaginal sex - no matter what the gender of my partner is. I have been advised that [place] discussion with my doctor about my sexual practices can help determine how best to prevent and monitor for sexually transmitted infections.     15. I have been informed that testosterone can cause headaches or migraines. I understand that if I am frequently having headaches or migraines, or the pain is unusually severe, it is recommended that I talk with my health care provider.     16. I  understand that testosterone can cause emotional changes, including increased irritability, frustration, and anger. I have been advised that my doctor can assist me in finding resources to explore and cope with these changes.     17. I understand that testosterone will result in changes that will be noticeable by other people, and that some transgender people in similar circumstances have experienced harassment, discrimination, and violence, while others have lost support of loved ones. I have been advised that my doctor can assist me in finding advocacy and support resources.     Prevention of Medical Complications     18. I agree to take testosterone as prescribed and to tell my doctor if I am not happy with the treatment or am experiencing any problems.     19. I understand that the right dose or type of medication prescribed for me may not be the same as for someone else.     20. I understand that physical examinations and blood tests are needed on a regular basis to check for negative side effects of testosterone.     21. I understand that testosterone can interact with other medication (including other sources of hormones), dietary supplements, herbs, alcohol, and street drugs. I understand that being honest with my doctor about what else I am taking will help prevent medical complications that could be life-threatening. I have been informed that I will continue to get medical care no matter what information I share.     22. I understand that some medical conditions make it dangerous to take testosterone. I agree that I will be checked for risky conditions before the decision to take testosterone is made.     23. I understand that I can choose to stop taking testosterone at any time, and that it is advised that I do this with the help of my doctor to make sure there are no negative reactions to stopping. I understand that my doctor may suggest I reduce or stop taking testosterone if there are severe side  effects or health risks that can t be controlled.     My signature below confirms that:     My doctor has talked with me about the benefits and risks of testosterone, the possible or likely consequences of hormone therapy, and potential alternative treatment options.     I understand the risks that may be involved.     I understand that this form covers known effects and risks and that there may be long-term effects or risks that are not yet known.     I have had sufficient opportunity to discuss treatment options with my doctor. All of my questions have been answered to my satisfaction.     I believe I have adequate knowledge on which to base informed consent to the provision of testosterone therapy.     Based on this:   _____ I wish to begin taking testosterone.     _____ I do not wish to begin taking testosterone at this time.     Whatever your current decision is, please talk with your doctor any time you have questions, concerns, or want to re-evaluate your options.         ____________________________________ __________________   Patient Signature     Date           ____________________________________ __________________   Prescribing clinician Signature    Date      Please call or return to clinic if your symptoms don't go away.    Follow up plan  Please make a clinic appointment for follow up with me (DANIEL PERSAUD) in 3  weeks for HRT and SOB.    Thank you for coming to [facility] today.  Lab Testing:  **If you had lab testing today and your results are reassuring or normal they will be mailed to you or sent through [employer] within 7 days.   **If the lab tests need quick action we will call you with the results.  The phone number we will call with results is # [phone] (home) . If this is not the best number please call our clinic and change the number.  Medication Refills:  If you need any refills please call your pharmacy and they will contact us.   If you need to  your refill at a new  pharmacy, please contact the new pharmacy directly. The new pharmacy will help you get your medications transferred faster.   Scheduling:  If you have any concerns about today's visit or wish to schedule another appointment please call our office during normal business hours [phone] (8-5:00 M-F)  If a referral was made to a [facility] and you don't get a call from central scheduling please call [phone].  If a Mammogram was ordered for you at [facility] call [phone] to schedule or change your appointment.  If you had an XRay/CT/Ultrasound/MRI ordered the number is [phone] to schedule or change your radiology appointment.   Medical Concerns:  If you have urgent medical concerns please call [phone] at any time of the day.    Susan Coronel MD

## 2020-08-10 NOTE — PROGRESS NOTES
Gender History Intake:       HPI      ----------  Killian is a 65 year old individual that uses  pronouns Killian that presents today for interest in masculinizing hormone therapy to better align their body with their gender identity.    SOB  Still having SOB, had CTA angiogram today which was negative for CAD. Lots of dyspnea on exertion. Still exercising as much as able. Next step PFTs? Affecting being able to wear a binder.    Mental Health Assessment:  Non gender related Therapist? Yes, helps with PTSD, anxiety, and depression  Gender therapist?    Trouble finding one, but current therapist has been helping and taking it on.  Chemical use history   None  Mental Health diagnosis history PTSD, CLAIRE, MDD  Self harm   History in past   Hitting self in the past, last was a couple of months ago; had episode of plan with bridge didn't attempt   Current   Nothing currently  - Anxiety  - Depression  - Posttraumatic stress disorder  - Sleep disturbance  - Suicidal ideation     Currently on Lamictal and Hydroxyzine     PHQ-9 SCORE 7/10/2020 7/14/2020 7/30/2020   PHQ-9 Total Score 12 15 20   PHQ-A Total Score - - -     CLAIRE-7 SCORE 7/10/2020 7/14/2020 7/30/2020   Total Score 13 13 15     Medications prescribed for above and by whom? Mera Del Rio NP  NETTIE sent to:  To Mera Del Rio    Past Surgical History:   Procedure Laterality Date     COLONOSCOPY       EXCISE MASS VAGINA Left 4/10/2015    Procedure: EXCISE MASS VAGINA;  Surgeon: Stanislav Byers MD;  Location: [facility] OR     GENITOURINARY SURGERY      Urethrial dilation     JOINT REPLACEMENT Right 07/2018     ORTHOPEDIC SURGERY      right rotator cuff, left achilles tendon repair, neuroma removed right foot, right knee arthroscopy,        Patient Active Problem List   Diagnosis     Vulvar irritation     CLAIRE (generalized anxiety disorder)     Mass     PTSD (post-traumatic stress disorder)     Mixed stress and urge urinary incontinence     Other chronic pain     Restless leg syndrome      S/P arthroscopic surgery of right knee     Osteoarthritis of spine with radiculopathy, lumbar region     Chondromalacia of left patella     Chronic bilateral low back pain without sciatica     Chronic joint pain     Complex tear of medial meniscus of left knee as current injury     GERD (gastroesophageal reflux disease)     Glaucoma     IBS (irritable bowel syndrome)     Chronic pain of right knee     Lumbar radiculopathy     Major depressive disorder, recurrent severe without psychotic features (H)     Obesity     Postmenopausal     Primary osteoarthritis of right knee     MDD (major depressive disorder), recurrent severe, 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)     Past Medical History:   Diagnosis Date     Anxiety      Arthritis     C spine, thumbs bilateral, feet, shoulders, knees     Depressive disorder      Gastro-oesophageal reflux disease     intermittent     Labial irritation     labial mass     Other chronic pain     feet, knees, shoulders, full spine, thumbs     PTSD (post-traumatic stress disorder)      Restless leg syndrome        Current Outpatient Medications   Medication Sig Dispense Refill     Acetaminophen (TYLENOL PO) Take 1,000 mg by mouth every 8 hours as needed        cholecalciferol (VITAMIN D3) 5000 units (125 mcg) capsule Take by mouth daily       famotidine (PEPCID) 20 MG tablet Take 20 mg by mouth 2 times daily       hydrOXYzine (VISTARIL) 50 MG capsule Take 1 capsule (50 mg) by mouth 3 times daily as needed for anxiety 90 capsule 3     lamoTRIgine (LAMICTAL) 25 MG tablet Take 50 mg by mouth daily        Omega-3 Fatty Acids (OMEGA-3 FISH OIL) 1200 MG CAPS        pramipexole (MIRAPEX) 0.25 MG tablet Take 3 tablets (0.75 mg) by mouth At Bedtime 90 tablet 3     gabapentin (NEURONTIN) 300 MG capsule Take 3 capsules (900 mg) by mouth At Bedtime 270 capsule 0       History   Smoking Status     Former Smoker     Packs/day: 0.00     Quit date: 9/9/1983   Smokeless Tobacco     Never Used       Family History    Problem Relation Age of Onset     Macular Degeneration Mother      Osteoporosis Mother      Heart Disease Father 49     Multiple Sclerosis Sister      Diabetes Paternal Uncle         heart issues     Cancer No family hx of      Coronary Artery Disease No family hx of      Hx of DVT in family? Yes; brother stroke at 83  Allergies   Allergen Reactions     Penicillins Rash and Hives     Versed [Midazolam] Other (See Comments)     Stopped breathing  Over 10 years ago but possible sensitivity to too much of this medication  Became apnic              Review of Systems:   CONSTITUTIONAL: NEGATIVE for fever, chills, change in weight  INTEGUMENTARY/SKIN: NEGATIVE for worrisome rashes, moles or lesions  EYES: NEGATIVE for vision changes or irritation  ENT/MOUTH: NEGATIVE for ear, mouth and throat problems  RESP:NEGATIVE for significant cough or SOB and SOB/dyspnea  BREAST: NEGATIVE for masses, tenderness or discharge  CV: NEGATIVE for chest pain, palpitations or peripheral edema  GI: NEGATIVE for nausea, abdominal pain, heartburn, or change in bowel habits  : NEGATIVE for frequency, dysuria, or hematuria  MUSCULOSKELETAL: NEGATIVE for significant arthralgias or myalgia  NEURO: NEGATIVE for weakness, dizziness or paresthesias  ENDOCRINE: NEGATIVE for temperature intolerance, skin/hair changes  HEME/ALLERGY: NEGATIVE for bleeding problems  PSYCHIATRIC: NEGATIVE for changes in mood or affect         Social History     Social History     Socioeconomic History     Marital status: Single     Spouse name: None     Number of children: None     Years of education: None     Highest education level: None   Occupational History     None   Social Needs     Financial resource strain: None     Food insecurity     Worry: None     Inability: None     Transportation needs     Medical: None     Non-medical: None   Tobacco Use     Smoking status: Former Smoker     Packs/day: 0.00     Last attempt to quit: 9/9/1983     Years since quitting:  "36.9     Smokeless tobacco: Never Used   Substance and Sexual Activity     Alcohol use: Yes     Comment: occasional     Drug use: Yes     Types: Marijuana     Comment: daily only at night time for her medical conditions      Sexual activity: Not Currently   Lifestyle     Physical activity     Days per week: None     Minutes per session: None     Stress: None   Relationships     Social connections     Talks on phone: None     Gets together: None     Attends Confucianist service: None     Active member of club or organization: None     Attends meetings of clubs or organizations: None     Relationship status: None     Intimate partner violence     Fear of current or ex partner: None     Emotionally abused: None     Physically abused: None     Forced sexual activity: None   Other Topics Concern     Parent/sibling w/ CABG, MI or angioplasty before 65F 55M? Not Asked   Social History Narrative     None            Physical Exam:     Vitals:    08/10/20 1632   BP: 130/77   Pulse: 59   Resp: 18   Temp: 98.1  F (36.7  C)   TempSrc: Oral   SpO2: 98%   Weight: 97.6 kg (215 lb 3.2 oz)   Height: 1.676 m (5' 6\")     BMI= Body mass index is 34.73 kg/m .   Appearance: masculine appearance and dress  GENERAL:: healthy, alert and no distress  EYES: Eyes grossly normal to inspection, fundi benign and PERRL  HENT: ear canals normal, TM's normal, Nose normal, Mouth- no ulcers, no lesions  RESP: lungs clear to auscultation - no rales, no rhonchi, no wheezes  CV: regular rates and rhythm, normal S1 S2, no S3 or S4 and no murmur, no click or rub -  ABDOMEN: soft, no tenderness, no  hepatosplenomegaly, no masses, normal bowel sounds  SKIN: no suspicious lesions, no rashes  NEURO: Normal strength and tone, sensory exam grossly normal, mentation intact and speech normal, reflexes symmetric  Psych: Alert and oriented times 3; coherent speech, normal rate and volume, able to articulate logical thoughts, able to abstract reason, no tangential " thoughts, no hallucinations or delusions. Affect is anxious.  Affect: Appropriate/mood-congruent      Assessment and Plan   Gregoria was seen today for follow up, breathing problem and fall.    Diagnoses and all orders for this visit:    Shortness of breath  Pt has had SOB with exertion with CT angiogram today not showing any CAD, but some mild Pulmonary HTN. Will be following up with cardiology for further work up. Pt used to smoke but quit in 1983 so lower risk, but if PFTs reassuring would be concerned for possible PE, however SPO2 improved from last visit and no chest pain or palpitations.   -     Cancel: BRONCHIAL CHALLENGE SPIROMETRY; Future  -     Cancel: General PFT Lab (Please always keep checked); Future  -     Cancel: Pulmonary Function Test; Future  -     Cancel: Exercise Spirometry Test ([facility]); Future  -     General PFT Lab (Please always keep checked); Future  -     Pulmonary Function Test; Future  -     Exercise Spirometry Test ([facility]); Future    Gender dysphoria in adult  -     CBC with Plt ([facility]); Future      Today s visit included assessment of interventions to alleviate symptoms related to gender dysphoria, including     psychological suppor    medical treatment (hormones or blockers)    options for social support or changes in gender expression.   Educated about 3 parts of evaluation:     1. Mental health assessment (may refer out for this if needed)  To be completed by: Dr. Coronel  Diagnoses are stable and/or are likely to become stabilized by treatment of gender dysphoria  Will get letter of support for HRT  Fear of medical issues; worried about bullying at building    Susan Coronel MD

## 2020-08-11 ENCOUNTER — NURSE TRIAGE (OUTPATIENT)
Dept: NURSING | Facility: CLINIC | Age: 65
End: 2020-08-11

## 2020-08-11 ENCOUNTER — TELEPHONE (OUTPATIENT)
Dept: FAMILY MEDICINE | Facility: CLINIC | Age: 65
End: 2020-08-11

## 2020-08-11 ENCOUNTER — VIRTUAL VISIT (OUTPATIENT)
Dept: CARDIOLOGY | Facility: CLINIC | Age: 65
End: 2020-08-11
Payer: MEDICARE

## 2020-08-11 ENCOUNTER — TELEPHONE (OUTPATIENT)
Dept: CARDIOLOGY | Facility: CLINIC | Age: 65
End: 2020-08-11

## 2020-08-11 DIAGNOSIS — G25.81 RESTLESS LEG SYNDROME: ICD-10-CM

## 2020-08-11 DIAGNOSIS — Z72.821 POOR SLEEP HYGIENE: ICD-10-CM

## 2020-08-11 DIAGNOSIS — G89.4 CHRONIC PAIN SYNDROME: ICD-10-CM

## 2020-08-11 DIAGNOSIS — R06.09 DOE (DYSPNEA ON EXERTION): Primary | ICD-10-CM

## 2020-08-11 PROCEDURE — 99213 OFFICE O/P EST LOW 20 MIN: CPT | Mod: 95 | Performed by: INTERNAL MEDICINE

## 2020-08-11 RX ORDER — GABAPENTIN 300 MG/1
900 CAPSULE ORAL AT BEDTIME
Qty: 270 CAPSULE | Refills: 0 | Status: SHIPPED | OUTPATIENT
Start: 2020-08-11

## 2020-08-11 NOTE — TELEPHONE ENCOUNTER
Spoke with patient, who is requesting sooner appointment with Dr. Rosado to follow up after angiogram. Added to schedule for today; clinic staff notified.   Patient stated to call center staff that PCP recommended she follow up with cardiology as well.  Patient agrees with plan and says they understand how to do video visit.

## 2020-08-11 NOTE — LETTER
8/11/2020      RE: Gregoria Steni  [address]  [place] [address]       Dear Colleague,    Thank you for the opportunity to participate in the care of your patient, Gregoria Stein, at the [facility] HEART AT [facility] at [facility]. Please see a copy of my visit note below.    Gregoria Stein is a 65 year old adult who is being evaluated via a billable video visit.        Referring provider:Jaqueline Gilbert DO    Chief complaint: Dyspnea on exertion and sweating    HPI:   Gregoria Stein is a 64 year old  adult with PMH significant for major depression and anxiety  Patient has recently started exercising to lose some weight.  Over the last 3 months she reports dyspnea on exertion particularly up hills when she is biking.  Patient used to be very active up until 2 years ago.  Patient underwent knee replacement 2 years ago and then she was inactive for some time.  Patient also reports weight gain of about 80 pounds which she attributes to medications she is on.  Patient reports a lot of sweating.  She denies exertional chest pain, palpitations, dizziness, syncope or lower extremity edema.  Patient has no prior history of cardiac disease.  She used to smoke but quit in 1983.  No history of hypertension or diabetes.  Patient has hyperlipidemia but not on treatment.  Reports premature CAD in the family.  Father had MI at the age of 49.  I have personally reviewed the exercise stress echocardiogram on 7/27/2020 which showed baseline normal biventricular function with no valve disease.  Patient was not able to reach target heart rate due to dyspnea on exertion.    Most recent labs showed LDL elevated at 143.  BMP is normal.  Hemoglobin and TSH are normal.    Medications, personal, family, and social history reviewed with patient and revised.    Interval history 8/11/2020:  Today I have called and talked to the patient through video  encounter to discuss the results of the CT coronary angiogram which showed minimal coronary atherosclerosis with no obstructive disease.  Coronary artery calcium score is 6 which is very low.  Patient today tells me that she is feeling very tired ,lack of energy, and cannot climb a flight of stairs without shortness of breath.  She is planning to do pulmonary function test as recommended by her PCP.  She recently had multiple blood tests including CBC, BMP, hepatic panel which were all unremarkable.  Patient today tells me that she sleeps very poorly.  She has restless leg but the current medications not helping much.  She wakes up unrefreshed.  Feels sleepy during the day.  She again repeated how active she was up until a few years ago biking and hiking.  She is wondering why she is so tired and short of breath now.     PAST MEDICAL HISTORY:  Past Medical History:   Diagnosis Date     Anxiety      Arthritis     C spine, thumbs bilateral, feet, shoulders, knees     Depressive disorder      Gastro-oesophageal reflux disease     intermittent     Labial irritation     labial mass     Other chronic pain     feet, knees, shoulders, full spine, thumbs     PTSD (post-traumatic stress disorder)      Restless leg syndrome        CURRENT MEDICATIONS:  Current Outpatient Medications   Medication Sig Dispense Refill     Acetaminophen (TYLENOL PO) Take 1,000 mg by mouth every 8 hours as needed        cholecalciferol (VITAMIN D3) 5000 units (125 mcg) capsule Take by mouth daily       famotidine (PEPCID) 20 MG tablet Take 20 mg by mouth 2 times daily       gabapentin (NEURONTIN) 300 MG capsule Take 3 capsules (900 mg) by mouth At Bedtime 270 capsule 0     hydrOXYzine (VISTARIL) 50 MG capsule Take 1 capsule (50 mg) by mouth 3 times daily as needed for anxiety 90 capsule 3     lamoTRIgine (LAMICTAL) 25 MG tablet Take 50 mg by mouth daily        Omega-3 Fatty Acids (OMEGA-3 FISH OIL) 1200 MG CAPS        pramipexole (MIRAPEX) 0.25 MG  tablet Take 3 tablets (0.75 mg) by mouth At Bedtime 90 tablet 3       PAST SURGICAL HISTORY:  Past Surgical History:   Procedure Laterality Date     COLONOSCOPY       EXCISE MASS VAGINA Left 4/10/2015    Procedure: EXCISE MASS VAGINA;  Surgeon: Stanislav Byers MD;  Location: [facility] OR     GENITOURINARY SURGERY      Urethrial dilation     JOINT REPLACEMENT Right 2018     ORTHOPEDIC SURGERY      right rotator cuff, left achilles tendon repair, neuroma removed right foot, right knee arthroscopy,        ALLERGIES:     Allergies   Allergen Reactions     Penicillins Rash and Hives     Versed [Midazolam] Other (See Comments)     Stopped breathing  Over 10 years ago but possible sensitivity to too much of this medication  Became apnic       FAMILY HISTORY:  Family History   Problem Relation Age of Onset     Macular Degeneration Mother      Osteoporosis Mother      Heart Disease Father 49     Multiple Sclerosis Sister      Diabetes Paternal Uncle         heart issues     Cancer No family hx of      Coronary Artery Disease No family hx of          SOCIAL HISTORY:  Social History     Tobacco Use     Smoking status: Former Smoker     Packs/day: 0.00     Last attempt to quit: 1983     Years since quittin.9     Smokeless tobacco: Never Used   Substance Use Topics     Alcohol use: Yes     Comment: occasional     Drug use: Yes     Types: Marijuana     Comment: daily only at night time for her medical conditions        ROS:   Constitutional: No fever, chills, or sweats. Weight stable.  Tiredness+  ENT: No visual disturbance, ear ache, epistaxis, sore throat.   Cardiovascular: As per HPI.   Respiratory: No cough, hemoptysis.    GI: No nausea, vomiting, hematemesis, melena, or hematochezia.   : No hematuria.   Neuro: Negative.   Musculoskeletal: Lack of energy+    Exam:  Physical Exam Elements attainable via telehealth:       Constitutional - alert and no distress    Eyes - no redness, no discharge    Respiratory - no cough,  no labored breathing    Skin - no discoloration or lesions on the face or the arm.    Neurological -alert, normal speech,and affect, no tremor.    I have reviewed the labs and personally reviewed the imaging below and made my comment in the assessment and plan.    Labs:  CBC RESULTS:   Lab Results   Component Value Date    WBC 6.9 10/14/2019    RBC 4.95 10/14/2019    HGB 14.7 08/05/2020    HCT 49.7 (H) 08/05/2020    MCV 93.4 08/05/2020    MCH 27.6 08/05/2020    MCHC 29.6 (L) 08/05/2020    RDW 13.2 10/14/2019     10/14/2019       BMP RESULTS:  Lab Results   Component Value Date    .6 07/10/2020    POTASSIUM 3.9 07/10/2020    CHLORIDE 104.3 07/10/2020    CO2 28.5 07/10/2020    ANIONGAP 6 04/08/2017    GLC 94.0 08/05/2020    BUN 14.1 07/10/2020    CR 0.8 07/10/2020    GFRESTIMATED 76.1 07/10/2020    GFRESTBLACK >90 07/10/2020    BRITTNI 9.7 07/10/2020     CT coronary angiogram 8/10/2010:  1.  Minimal non-obstructive CAD.  2.  Total Agatston score 6 placing the patient in the 55th percentile  when compared to age and gender matched control group.  3.  Please review Radiology report for incidental noncardiac findings  that will follow separately.    Radiology report:  1. No acute airspace disease or suspicious pulmonary nodule.  2. Please see cardiology report for full assessment of the heart.  3. Mild enlargement of the main pulmonary artery, suggestive of  pulmonary arterial hypertension.    Echocardiogram 7/27/2020  Non-diagnostic stress test due to inability to achieve target heart rate.  Consider alternate test such as coronary CTA.  Patient was only able to reach 65% of the age predicted maximal heart rate due  to dyspnea and no ischemia was identified by imaging or ECG at this heart  rate.  No significant valve disease on screening doppler evaluation. The aortic root  and visualized ascending aorta are normal.    EKG reviewed personally in clinic 8/3/2020 sinus rhythm, right bundle branch block, left axis  deviation.    Assessment and Plan:   Gregoria Stein is a 64 year old  adult with PMH significant for major depression, restless leg syndrome, obesity and anxiety.    Dyspnea on exertion: Patient reports dyspnea on exertion over the last 3 months.  She was not able to complete exercise stress test in on 7/27  due to BIRD.  Due to patient's prior history of smoking I recommended CT coronary angiogram during my previous visit with the patient on 8/3/2020.  CT coronary angiogram showed no evidence of obstructive disease.  Radiology reported mild enlargement of the main pulmonary artery which might be suggestive of pulmonary hypertension.  There was no evidence of pulmonary hypertension on the limited exercise echocardiogram. I will order a complete echocardiogram to see if there is any evidence of pulmonary pretension.  Patient continues to report significant dyspnea on exertion and tiredness along with poor sleep hygiene.  I am wondering if she has sleep apnea contributing to all these.  I recommended a sleep study.  In the meantime she is undergoing pulmonary function testing with PCP.  I also ordered TSH to see if there is evidence of hypothyroidism to explain her symptoms.  I ordered NT proBNP and d-dimer as well.  Patient has no significant risk factors for pulmonary embolism at this time other than maybe a sedentary lifestyle. If d-dimer is elevated she will need CT pulmonary angiogram.    Patient is not on any cardiac medications at this time.  Patient's coronary calcium score is 6 therefore I I did not recommend either aspirin or statin at this time.    A total of 15 minutes spent face-toface with greater than 50% of the time spent in counseling and coordinating cares of the issues above.     Please donot hesitate to contact me if you have any questions or concerns. Again, thank you for allowing me to participate in the care of your patient.    Yuliya HAAS MD  [facility] Division of Cardiology  Pager  [phone]    Please do not hesitate to contact me if you have any questions/concerns.     Sincerely,     Yuliya Rosado MD

## 2020-08-11 NOTE — PROGRESS NOTES
"Gregoria Stein is a 65 year old adult who is being evaluated via a billable video visit.      The patient has been notified of following:     \"This video visit will be conducted via a call between you and your physician/provider. We have found that certain health care needs can be provided without the need for an in-person physical exam.  This service lets us provide the care you need with a video conversation.  If a prescription is necessary we can send it directly to your pharmacy.  If lab work is needed we can place an order for that and you can then stop by our lab to have the test done at a later time.    Video visits are billed at different rates depending on your insurance coverage.  Please reach out to your insurance provider with any questions.    If during the course of the call the physician/provider feels a video visit is not appropriate, you will not be charged for this service.\"    Patient has given verbal consent for Video visit? Yes  How would you like to obtain your AVS? Mail a copy  If you are dropped from the video visit, the video invite should be resent to: Send to e-mail at: [email or web address]  Will anyone else be joining your video visit? No        Video-Visit Details    Type of service:  Video Visit    Video Start Time: 1:30 p.m.    Video End Time: 1:45 PM  (video encounter duration 15 minutes)    Originating Location (pt. Location): Home    Distant Location (provider location):  [facility]     Platform used for Video Visit: Doximity    Referring provider:Jaqueline Gilbert DO    Chief complaint: Dyspnea on exertion and sweating    HPI:   Gregoria Stein is a 64 year old  adult with PMH significant for major depression and anxiety  Patient has recently started exercising to lose some weight.  Over the last 3 months she reports dyspnea on exertion particularly up hills when she is biking.  Patient used to be very active up until 2 years " ago.  Patient underwent knee replacement 2 years ago and then she was inactive for some time.  Patient also reports weight gain of about 80 pounds which she attributes to medications she is on.  Patient reports a lot of sweating.  She denies exertional chest pain, palpitations, dizziness, syncope or lower extremity edema.  Patient has no prior history of cardiac disease.  She used to smoke but quit in 1983.  No history of hypertension or diabetes.  Patient has hyperlipidemia but not on treatment.  Reports premature CAD in the family.  Father had MI at the age of 49.  I have personally reviewed the exercise stress echocardiogram on 7/27/2020 which showed baseline normal biventricular function with no valve disease.  Patient was not able to reach target heart rate due to dyspnea on exertion.    Most recent labs showed LDL elevated at 143.  BMP is normal.  Hemoglobin and TSH are normal.    Medications, personal, family, and social history reviewed with patient and revised.    Interval history 8/11/2020:  Today I have called and talked to the patient through video encounter to discuss the results of the CT coronary angiogram which showed minimal coronary atherosclerosis with no obstructive disease.  Coronary artery calcium score is 6 which is very low.  Patient today tells me that she is feeling very tired ,lack of energy, and cannot climb a flight of stairs without shortness of breath.  She is planning to do pulmonary function test as recommended by her PCP.  She recently had multiple blood tests including CBC, BMP, hepatic panel which were all unremarkable.  Patient today tells me that she sleeps very poorly.  She has restless leg but the current medications not helping much.  She wakes up unrefreshed.  Feels sleepy during the day.  She again repeated how active she was up until a few years ago biking and hiking.  She is wondering why she is so tired and short of breath now.     PAST MEDICAL HISTORY:  Past Medical  History:   Diagnosis Date     Anxiety      Arthritis     C spine, thumbs bilateral, feet, shoulders, knees     Depressive disorder      Gastro-oesophageal reflux disease     intermittent     Labial irritation     labial mass     Other chronic pain     feet, knees, shoulders, full spine, thumbs     PTSD (post-traumatic stress disorder)      Restless leg syndrome        CURRENT MEDICATIONS:  Current Outpatient Medications   Medication Sig Dispense Refill     Acetaminophen (TYLENOL PO) Take 1,000 mg by mouth every 8 hours as needed        cholecalciferol (VITAMIN D3) 5000 units (125 mcg) capsule Take by mouth daily       famotidine (PEPCID) 20 MG tablet Take 20 mg by mouth 2 times daily       gabapentin (NEURONTIN) 300 MG capsule Take 3 capsules (900 mg) by mouth At Bedtime 270 capsule 0     hydrOXYzine (VISTARIL) 50 MG capsule Take 1 capsule (50 mg) by mouth 3 times daily as needed for anxiety 90 capsule 3     lamoTRIgine (LAMICTAL) 25 MG tablet Take 50 mg by mouth daily        Omega-3 Fatty Acids (OMEGA-3 FISH OIL) 1200 MG CAPS        pramipexole (MIRAPEX) 0.25 MG tablet Take 3 tablets (0.75 mg) by mouth At Bedtime 90 tablet 3       PAST SURGICAL HISTORY:  Past Surgical History:   Procedure Laterality Date     COLONOSCOPY       EXCISE MASS VAGINA Left 4/10/2015    Procedure: EXCISE MASS VAGINA;  Surgeon: Stanislav Byers MD;  Location: [facility] OR     GENITOURINARY SURGERY      Urethrial dilation     JOINT REPLACEMENT Right 07/2018     ORTHOPEDIC SURGERY      right rotator cuff, left achilles tendon repair, neuroma removed right foot, right knee arthroscopy,        ALLERGIES:     Allergies   Allergen Reactions     Penicillins Rash and Hives     Versed [Midazolam] Other (See Comments)     Stopped breathing  Over 10 years ago but possible sensitivity to too much of this medication  Became apnic       FAMILY HISTORY:  Family History   Problem Relation Age of Onset     Macular Degeneration Mother      Osteoporosis Mother       Heart Disease Father 49     Multiple Sclerosis Sister      Diabetes Paternal Uncle         heart issues     Cancer No family hx of      Coronary Artery Disease No family hx of          SOCIAL HISTORY:  Social History     Tobacco Use     Smoking status: Former Smoker     Packs/day: 0.00     Last attempt to quit: 1983     Years since quittin.9     Smokeless tobacco: Never Used   Substance Use Topics     Alcohol use: Yes     Comment: occasional     Drug use: Yes     Types: Marijuana     Comment: daily only at night time for her medical conditions        ROS:   Constitutional: No fever, chills, or sweats. Weight stable.  Tiredness+  ENT: No visual disturbance, ear ache, epistaxis, sore throat.   Cardiovascular: As per HPI.   Respiratory: No cough, hemoptysis.    GI: No nausea, vomiting, hematemesis, melena, or hematochezia.   : No hematuria.   Neuro: Negative.   Musculoskeletal: Lack of energy+    Exam:  Physical Exam Elements attainable via telehealth:       Constitutional - alert and no distress    Eyes - no redness, no discharge    Respiratory - no cough, no labored breathing    Skin - no discoloration or lesions on the face or the arm.    Neurological -alert, normal speech,and affect, no tremor.    I have reviewed the labs and personally reviewed the imaging below and made my comment in the assessment and plan.    Labs:  CBC RESULTS:   Lab Results   Component Value Date    WBC 6.9 10/14/2019    RBC 4.95 10/14/2019    HGB 14.7 2020    HCT 49.7 (H) 2020    MCV 93.4 2020    MCH 27.6 2020    MCHC 29.6 (L) 2020    RDW 13.2 10/14/2019     10/14/2019       BMP RESULTS:  Lab Results   Component Value Date    .6 07/10/2020    POTASSIUM 3.9 07/10/2020    CHLORIDE 104.3 07/10/2020    CO2 28.5 07/10/2020    ANIONGAP 6 2017    GLC 94.0 2020    BUN 14.1 07/10/2020    CR 0.8 07/10/2020    GFRESTIMATED 76.1 07/10/2020    GFRESTBLACK >90 07/10/2020    BRITTNI 9.7  07/10/2020     CT coronary angiogram 8/10/2010:  1.  Minimal non-obstructive CAD.  2.  Total Agatston score 6 placing the patient in the 55th percentile  when compared to age and gender matched control group.  3.  Please review Radiology report for incidental noncardiac findings  that will follow separately.    Radiology report:  1. No acute airspace disease or suspicious pulmonary nodule.  2. Please see cardiology report for full assessment of the heart.  3. Mild enlargement of the main pulmonary artery, suggestive of  pulmonary arterial hypertension.    Echocardiogram 7/27/2020  Non-diagnostic stress test due to inability to achieve target heart rate.  Consider alternate test such as coronary CTA.  Patient was only able to reach 65% of the age predicted maximal heart rate due  to dyspnea and no ischemia was identified by imaging or ECG at this heart  rate.  No significant valve disease on screening doppler evaluation. The aortic root  and visualized ascending aorta are normal.    EKG reviewed personally in clinic 8/3/2020 sinus rhythm, right bundle branch block, left axis deviation.    Assessment and Plan:   Gregoria Stein is a 64 year old  adult with PMH significant for major depression, restless leg syndrome, obesity and anxiety.    Dyspnea on exertion: Patient reports dyspnea on exertion over the last 3 months.  She was not able to complete exercise stress test in on 7/27  due to BIRD.  Due to patient's prior history of smoking I recommended CT coronary angiogram during my previous visit with the patient on 8/3/2020.  CT coronary angiogram showed no evidence of obstructive disease.  Radiology reported mild enlargement of the main pulmonary artery which might be suggestive of pulmonary hypertension.  There was no evidence of pulmonary hypertension on the limited exercise echocardiogram. I will order a complete echocardiogram to see if there is any evidence of pulmonary pretension.  Patient continues to report  significant dyspnea on exertion and tiredness along with poor sleep hygiene.  I am wondering if she has sleep apnea contributing to all these.  I recommended a sleep study.  In the meantime she is undergoing pulmonary function testing with PCP.  I also ordered TSH to see if there is evidence of hypothyroidism to explain her symptoms.  I ordered NT proBNP and d-dimer as well.  Patient has no significant risk factors for pulmonary embolism at this time other than maybe a sedentary lifestyle. If d-dimer is elevated she will need CT pulmonary angiogram.    Patient is not on any cardiac medications at this time.  Patient's coronary calcium score is 6 therefore I I did not recommend either aspirin or statin at this time.    A total of 15 minutes spent face-toface with greater than 50% of the time spent in counseling and coordinating cares of the issues above.     Please donot hesitate to contact me if you have any questions or concerns. Again, thank you for allowing me to participate in the care of your patient.    Yuliya HAAS MD  [facility] Division of Cardiology  Pager [phone]

## 2020-08-11 NOTE — TELEPHONE ENCOUNTER
"[facility] Call Center    Phone Message    May a detailed message be left on voicemail: yes     Reason for Call: Symptoms or Concerns     If patient has red-flag symptoms, warm transfer to triage line    Current symptom or concern: Exhaustion/tired all the time, some shortness of breath, chest pressure, and sweating.    Symptoms have been present for:  1 month(s)    Has patient previously been seen for this? No    Are there any new or worsening symptoms? Yes: Killian says that they have been getting more tired all the time for the last month or so, but after their angiogram on 8/10/20 they felt that they \"ran out of gas\". It was difficult for them to walk after.  Killian also myles that they have had some shortness of breath even with short walks, and that they are sweating much more often. Killian mentioned last night that they felt some chest pressure while they were falling asleep too. Killian had an appointment with their primary doctor today, but the primary doctor said that Killian should also follow up with Dr. Rosado. Killian was scheduled for next available appointment on 9/21/20, but would like to talk with the care team before then. Please give Killian a call back at your earliest convenience.      Action Taken: Message routed to:  [facility]  Cardio    Travel Screening: Not Applicable                                                                      "

## 2020-08-11 NOTE — TELEPHONE ENCOUNTER

## 2020-08-11 NOTE — TELEPHONE ENCOUNTER
"Pt calls to request direction on seeking further cardiac eval.  No new symptoms at this time.  Had angiogram with \"questionable result for potential pulmonary artery hypertension.\"  States \"Haven't felt right.\"  Expressed this to her PCP at [facility] yesterday (8/10/2020).  Was told no cardiac issues found.  Pt states \"Even while no actual CAD was diagnosed, I'm concerned about the potential PAH noted in the radiology report.\"  Pt intends to contact Dr Rosado's Office [phone] to seek another consult appointment.    No further questions at this time.    [facility] Nurse Advisor     Additional Information    General information question, no triage required and triager able to answer question    Health Information question, no triage required and triager able to answer question    Protocols used: INFORMATION ONLY CALL-A-AH    ____________________    No suspicion of covid symptoms.  Provided precautionary measures per current protocols:  COVID 19 Nurse Triage Plan/Patient Instructions    Please be aware that novel coronavirus (COVID-19) may be circulating in the community. If you develop symptoms such as fever, cough, or SOB or if you have concerns about the presence of another infection including coronavirus (COVID-19), please contact your health care provider or visit [email or web address].     Disposition/Instructions    Additional COVID19 information to add for patients.   How can I protect others?  If you have symptoms (fever, cough, body aches or trouble breathing): Stay home and away from others (self-isolate) until:    At least 10 days have passed since your symptoms started. And     You ve had no fever--and no medicine that reduces fever--for 3 full days (72 hours). And      Your other symptoms have resolved (gotten better).     If you don t have symptoms, but a test showed that you have COVID-19 (you tested positive):    Stay home and away from others (self-isolate) until at least 10 days have passed since the " date of your first positive COVID-19 test.    During this time:    Stay in your own room, even for meals. Use your own bathroom if you can.     Stay away from others in your home. No hugging, kissing or shaking hands. No visitors.    Don t go to work, school or anywhere else.     Clean  high touch  surfaces often (doorknobs, counters, handles, etc.). Use a household cleaning spray or wipes. You ll find a full list on the EPA website:  www.epa.gov/pesticide-registration/list-n-disinfectants-use-against-sars-cov-2.    Cover your mouth and nose with a mask, tissue or washcloth to avoid spreading germs.    Wash your hands and face often. Use soap and water.    Caregivers in these groups are at risk for severe illness due to COVID-19:  o People 65 years and older  o People who live in a nursing home or long-term care facility  o People with chronic disease (lung, heart, cancer, diabetes, kidney, liver, immunologic)  o People who have a weakened immune system, including those who:  - Are in cancer treatment  - Take medicine that weakens the immune system, such as corticosteroids  - Had a bone marrow or organ transplant  - Have an immune deficiency  - Have poorly controlled HIV or AIDS  - Are obese (body mass index of 40 or higher)  - Smoke regularly    Caregivers should wear gloves while washing dishes, handling laundry and cleaning bedrooms and bathrooms.    Use caution when washing and drying laundry: Don t shake dirty laundry, and use the warmest water setting that you can.    For more tips, go to www.cdc.gov/coronavirus/2019-ncov/downloads/10Things.pdf.    How can I take care of myself?  1. Get lots of rest. Drink extra fluids (unless a doctor has told you not to).     2. Take Tylenol (acetaminophen) for fever or pain. If you have liver or kidney problems, ask your family doctor if it s okay to take Tylenol.     Adults can take either:     650 mg (two 325 mg pills) every 4 to 6 hours, or     1,000 mg (two 500 mg  pills) every 8 hours as needed.     Note: Don t take more than 3,000 mg in one day.   Acetaminophen is found in many medicines (both prescribed and over-the-counter medicines). Read all labels to be sure you don t take too much.     For children, check the Tylenol bottle for the right dose. The dose is based on the child s age or weight.    3. If you have other health problems (like cancer, heart failure, an organ transplant or severe kidney disease): Call your specialty clinic if you don t feel better in the next 2 days.    4. Know when to call 911: Emergency warning signs include:    Trouble breathing or shortness of breath    Pain or pressure in the chest that doesn t go away    Feeling confused like you haven t felt before, or not being able to wake up    Bluish-colored lips or face    What are the symptoms of COVID-19?     The most common symptoms are cough, fever and trouble breathing.     Less common symptoms include body aches, chills, diarrhea (loose, watery poops), fatigue (feeling very tired), headache, runny nose, sore throat and loss of smell.    COVID-19 can cause severe coughing (bronchitis) and lung infection (pneumonia).    How does it spread?     The virus may spread when a person coughs or sneezes into the air. The virus can travel about 6 feet this way, and it can live on surfaces.      Common  (household disinfectants) will kill the virus.    Who is at risk?  Anyone can catch COVID-19 if they re around someone who has the virus.    How can others protect themselves?     Stay away from people who have COVID-19 (or symptoms of COVID-19).    Wash hands often with soap and water. Or, use hand  with at least 60% alcohol.    Avoid touching the eyes, nose or mouth.     Wear a face mask when you go out in public, when sick or when caring for a sick person.    Where can I get more information?     [facility]: About COVID-19: [email or web address]    CDC: What to Do If  You re Sick: www.cdc.gov/coronavirus/2019-ncov/about/steps-when-sick.html    CDC: Ending Home Isolation: www.cdc.gov/coronavirus/2019-ncov/hcp/disposition-in-home-patients.html     CDC: Caring for Someone: www.cdc.gov/coronavirus/2019-ncov/if-you-are-sick/care-for-someone.html     [facility]: Interim Guidance for Hospital Discharge to Home: www.[facility]/diseases/coronavirus/hcp/hospdischarge.pdf    [facility] clinical trials (COVID-19 research studies): clinicalaffairs.[facility]-clinical-trials     Below are the COVID-19 hotlines at the [place] [facility] ([facility]). Interpreters are available.   o For health questions: Call 268-542-2145 or 1-710.773.2015 (7 a.m. to 7 p.m.)  o For questions about schools and childcare: Call 473-283-3838 or 1-518.830.3952 (7 a.m. to 7 p.m.)          Thank you for taking steps to prevent the spread of this virus.  o Limit your contact with others.  o Wear a simple mask to cover your cough.  o Wash your hands well and often.    [facility]: About COVID-19: [email or web address]    CDC: What to Do If You're Sick: www.cdc.gov/coronavirus/2019-ncov/about/steps-when-sick.html    CDC: Ending Home Isolation: www.cdc.gov/coronavirus/2019-ncov/hcp/disposition-in-home-patients.html     CDC: Caring for Someone: www.cdc.gov/coronavirus/2019-ncov/if-you-are-sick/care-for-someone.html     [facility]: Interim Guidance for Hospital Discharge to Home: www.[facility]/diseases/coronavirus/hcp/hospdischarge.pdf    [facility] clinical trials (COVID-19 research studies): clinicalaffairs.[facility]-clinical-trials     Below are the COVID-19 hotlines at the [place] [facility] ([facility]). Interpreters are available.   o For health questions: Call 353-943-9975 or 1-690.428.1301 (7 a.m. to 7 p.m.)  o For questions about schools and childcare: Call 651-269-5333 or 1-983.182.2293 (7 a.m. to 7 p.m.)

## 2020-08-11 NOTE — PATIENT INSTRUCTIONS
1.  Recommend sleep study, referral placed to sleep medicine.    2.  New labs ordered.    3.  Transthoracic echocardiogram.

## 2020-08-12 DIAGNOSIS — R06.09 DOE (DYSPNEA ON EXERTION): ICD-10-CM

## 2020-08-12 LAB
D DIMER PPP FEU-MCNC: 0.7 UG/ML FEU (ref 0–0.5)
NT-PROBNP SERPL-MCNC: 87 PG/ML (ref 0–125)
TSH SERPL DL<=0.005 MIU/L-ACNC: 0.87 MU/L (ref 0.4–4)

## 2020-08-13 ENCOUNTER — TELEPHONE (OUTPATIENT)
Dept: FAMILY MEDICINE | Facility: CLINIC | Age: 65
End: 2020-08-13

## 2020-08-13 NOTE — TELEPHONE ENCOUNTER
"Spoke with patient who was audibly upset \"about the care that they're getting from [facility].\" Patient stated that RN doesn't care about their care, that the doctors don't care about their care, that no one cares. When asked if she contacted their therapist Killian stated \"no, this isn't about her, this is about my health.\" Killian stated that they want to talk to a [facility] psychologist. Killian is requesting a call from a supervisor as well, Ervin is aware.     Routing to [facility] psychologist as an FYI and if she feels it would be appropriate to reach out to patient.    Suki Hall RN    "

## 2020-08-13 NOTE — TELEPHONE ENCOUNTER
"Received a call from patient who was tearful and upset at the care that they have received. Killian stated that her cardiologist diagnosed her with \"a life threatening disease\" and no one has said anything about it. Killian stated that they have an enlarged pulmonary artery which they described as a \"death sentence\" and no one is doing anything about it. Killian stated that they are still having debilitating symptoms stating that she feels weak, is not strong enough to walk or ride a bike sometimes, is still having shortness of breath and dizziness. Killian stated that she wakes up to their heart racing and they feel sweaty. Patient stated that they are having a complete mental breakdown but has no one to talk to. Patient does have a large team of , social workers, and therapist, viewable in Care Teams. Encouraged patient to get in touch with their therapist while I touch base with her cardiologist.    Per Cardiology \"Radiology reported mild enlargement of the main pulmonary artery which might be suggestive of pulmonary hypertension.  There was no evidence of pulmonary hypertension on the limited exercise echocardiogram. I will order a complete echocardiogram to see if there is any evidence of pulmonary pretension. If d-dimer is elevated she will need CT pulmonary angiogram.\"    Echocardiogram has been ordered but not scheduled.  D-dimer results from yesterday:  Component      Latest Ref Rng & Units 8/12/2020   D-Dimer      0.0 - 0.50 ug/ml FEU 0.7 (H)     Routing to Dr. Rosado for review, please advise on the plan for the patient. Killian would benefit from a call from your team.    Also routing to PCP as an FYI.    Suki Hall RN    "

## 2020-08-13 NOTE — TELEPHONE ENCOUNTER
"This writer received a VM from pt at 2:43pm.    \"Vince Grayson this is Killian.  I am trying to reach ppl there to see what is going on with my dx.  I am symptomatic... I am waking up sweaty, my heart is racing, short of breath... I've been feeling like I am going to passout.  I have been trying to tell the doctors about this and trying to reach the clinic director today.  I have been feeling suicidal about some stuff on record that is considered life threatening. And no one is talking to me about it. There is a possible dx of pulmonary arterial hypertension and I was symptomatic with Dr. Coronel at my last visit who Dr. Coronel said I'd have to go to cardiology.  Cardiologist said I didn't need to come back.  There was a time I felt I could get somewhere, but I dont know how I can get anywhere like this right now.  I was told to call my therapist when I feel suicidal, to call Sammi.  And I spoke to a nurse I don't even know.  I am trying to get ahold of the manager, I have been on hold for 16 minutes and I feel shut out. And I dont know whats happening.\" End of VM, may have gotten cut off.      Based on recent notes and speaking with Suki BILLS, other Care Team members are calling her back.     Will route to Dr. Raza, Dr. Coronel, Suki Flynn, [facility] Social Work Care Coordinator    "

## 2020-08-14 ENCOUNTER — TELEPHONE (OUTPATIENT)
Dept: PSYCHOLOGY | Facility: CLINIC | Age: 65
End: 2020-08-14

## 2020-08-14 ENCOUNTER — HOSPITAL ENCOUNTER (EMERGENCY)
Facility: CLINIC | Age: 65
Discharge: HOME | End: 2020-08-14
Attending: EMERGENCY MEDICINE | Admitting: EMERGENCY MEDICINE
Payer: MEDICARE

## 2020-08-14 ENCOUNTER — TELEPHONE (OUTPATIENT)
Dept: CARDIOLOGY | Facility: CLINIC | Age: 65
End: 2020-08-14

## 2020-08-14 VITALS
HEIGHT: 66 IN | OXYGEN SATURATION: 95 % | BODY MASS INDEX: 31.98 KG/M2 | WEIGHT: 199 LBS | DIASTOLIC BLOOD PRESSURE: 74 MMHG | SYSTOLIC BLOOD PRESSURE: 127 MMHG | RESPIRATION RATE: 16 BRPM | TEMPERATURE: 98.6 F | HEART RATE: 78 BPM

## 2020-08-14 DIAGNOSIS — R06.00 DYSPNEA, UNSPECIFIED TYPE: ICD-10-CM

## 2020-08-14 LAB
ANION GAP SERPL CALCULATED.3IONS-SCNC: 4 MMOL/L (ref 3–14)
BASOPHILS # BLD AUTO: 0 10E9/L (ref 0–0.2)
BASOPHILS NFR BLD AUTO: 0.4 %
BUN SERPL-MCNC: 12 MG/DL (ref 7–30)
CALCIUM SERPL-MCNC: 8.8 MG/DL (ref 8.5–10.1)
CHLORIDE SERPL-SCNC: 108 MMOL/L (ref 94–109)
CO2 SERPL-SCNC: 28 MMOL/L (ref 20–32)
CREAT SERPL-MCNC: 0.85 MG/DL (ref 0.52–1.04)
DIFFERENTIAL METHOD BLD: ABNORMAL
EOSINOPHIL # BLD AUTO: 0.1 10E9/L (ref 0–0.7)
EOSINOPHIL NFR BLD AUTO: 2.6 %
ERYTHROCYTE [DISTWIDTH] IN BLOOD BY AUTOMATED COUNT: 12.3 % (ref 10–15)
GFR SERPL CREATININE-BSD FRML MDRD: 73 ML/MIN/{1.73_M2}
GLUCOSE SERPL-MCNC: 115 MG/DL (ref 70–99)
HCT VFR BLD AUTO: 46 % (ref 35–47)
HGB BLD-MCNC: 15.5 G/DL (ref 11.7–15.7)
IMM GRANULOCYTES # BLD: 0 10E9/L (ref 0–0.4)
IMM GRANULOCYTES NFR BLD: 0.2 %
INTERPRETATION ECG - MUSE: NORMAL
LYMPHOCYTES # BLD AUTO: 1.2 10E9/L (ref 0.8–5.3)
LYMPHOCYTES NFR BLD AUTO: 26 %
MCH RBC QN AUTO: 28.6 PG (ref 26.5–33)
MCHC RBC AUTO-ENTMCNC: 33.7 G/DL (ref 31.5–36.5)
MCV RBC AUTO: 85 FL (ref 78–100)
MONOCYTES # BLD AUTO: 0.3 10E9/L (ref 0–1.3)
MONOCYTES NFR BLD AUTO: 5.7 %
NEUTROPHILS # BLD AUTO: 3 10E9/L (ref 1.6–8.3)
NEUTROPHILS NFR BLD AUTO: 65.1 %
NRBC # BLD AUTO: 0 10*3/UL
NRBC BLD AUTO-RTO: 0 /100
PLATELET # BLD AUTO: 214 10E9/L (ref 150–450)
POTASSIUM SERPL-SCNC: 3.7 MMOL/L (ref 3.4–5.3)
RBC # BLD AUTO: 5.42 10E12/L (ref 3.8–5.2)
SODIUM SERPL-SCNC: 139 MMOL/L (ref 133–144)
TROPONIN I SERPL-MCNC: <0.015 UG/L (ref 0–0.04)
WBC # BLD AUTO: 4.6 10E9/L (ref 4–11)

## 2020-08-14 PROCEDURE — 99284 EMERGENCY DEPT VISIT MOD MDM: CPT | Performed by: EMERGENCY MEDICINE

## 2020-08-14 PROCEDURE — 93010 ELECTROCARDIOGRAM REPORT: CPT | Mod: Z6 | Performed by: EMERGENCY MEDICINE

## 2020-08-14 PROCEDURE — 99285 EMERGENCY DEPT VISIT HI MDM: CPT | Mod: 25 | Performed by: EMERGENCY MEDICINE

## 2020-08-14 PROCEDURE — C9803 HOPD COVID-19 SPEC COLLECT: HCPCS | Performed by: EMERGENCY MEDICINE

## 2020-08-14 PROCEDURE — 84484 ASSAY OF TROPONIN QUANT: CPT | Performed by: EMERGENCY MEDICINE

## 2020-08-14 PROCEDURE — U0003 INFECTIOUS AGENT DETECTION BY NUCLEIC ACID (DNA OR RNA); SEVERE ACUTE RESPIRATORY SYNDROME CORONAVIRUS 2 (SARS-COV-2) (CORONAVIRUS DISEASE [COVID-19]), AMPLIFIED PROBE TECHNIQUE, MAKING USE OF HIGH THROUGHPUT TECHNOLOGIES AS DESCRIBED BY CMS-2020-01-R: HCPCS | Performed by: EMERGENCY MEDICINE

## 2020-08-14 PROCEDURE — 85025 COMPLETE CBC W/AUTO DIFF WBC: CPT | Performed by: EMERGENCY MEDICINE

## 2020-08-14 PROCEDURE — 80048 BASIC METABOLIC PNL TOTAL CA: CPT | Performed by: EMERGENCY MEDICINE

## 2020-08-14 PROCEDURE — 93005 ELECTROCARDIOGRAM TRACING: CPT | Performed by: EMERGENCY MEDICINE

## 2020-08-14 RX ORDER — SODIUM CHLORIDE 9 MG/ML
INJECTION, SOLUTION INTRAVENOUS CONTINUOUS
Status: DISCONTINUED | OUTPATIENT
Start: 2020-08-14 | End: 2020-08-14 | Stop reason: HOSPADM

## 2020-08-14 ASSESSMENT — MIFFLIN-ST. JEOR: SCORE: 1474.41

## 2020-08-14 ASSESSMENT — ENCOUNTER SYMPTOMS
COUGH: 0
SHORTNESS OF BREATH: 1
ACTIVITY CHANGE: 1
GASTROINTESTINAL NEGATIVE: 1
MUSCULOSKELETAL NEGATIVE: 1
FATIGUE: 1
FEVER: 0

## 2020-08-14 NOTE — ED TRIAGE NOTES
Pt presents through triage with SOB and intermittent diaphoresis x 6 months. Pt recently evaluated for Pulm HTN (Mon) and had D Dimer of 0.7 on Wednesday per pt. Pt was told to come to ER by clinic.

## 2020-08-14 NOTE — DISCHARGE SUMMARY
Adult Mental Health Intensive Outpatient Discharge Summary/Instructions      Patient: Gregoria Stein MRN: [id number]   : 1955 Age: 65 year old Sex: adult     Admission Date: 2020  Discharge Date: 2020  Diagnosis: 309.81 (F43.10) Posttraumatic Stress Disorder (includes Posttraumatic Stress Disorder for Children 6 Years and Younger)  Without dissociative symptoms. Per client report that she has past diagnosis of this per her therapist who is now retired.  296.33 (F33.2) Major Depressive Disorder, Recurrent Episode, Severe _ and With anxious distress.  309.81 (F43.10) Posttraumatic Stress Disorder (includes Posttraumatic Stress Disorder for Children 6 Years and Younger)  Without dissociative symptoms as evidenced by client reported hypervigilence, hyper arousal, decreased functioning and past history of trauma and diagnosis, she did not wish to disclose trauma at this time       Focus of Treatment / Progress    Personal Safety: Denies S/I or safety issues upon discharge     * Follow your safety plan     * Call crisis lines as needed:    [facility] [phone]   [facility] [phone]  [facility] [phone]   [facility] [phone]   [facility] [phone]  [facility] [phone]   National Suicide Prevention [phone]  [facility] [phone]   Suicide Prevention [phone]  [facility] [phone]    Managing symptoms of:  Depressed mood, anxious mood, periods of irritability, feelings of guilt, tearfulness, social isolation, changes in sleep, changes in appetite, low self worth, worry, hypervigilence,  helplessness, hopelessness and recent passive suicidal ideation    Community support/health:   National Stafford on Mental Illness (www.ying.org): 329.192.5598 or 577-533-7049.   Mental Health Association (www.mentalhealth.org): 309.183.6969 or 473-185-8806.  [place] Crisis Text Line: Text [place] to 516276  Suicide LifeLine Chat:  suicidepreventionlifeline.org/chat    Managing Symptoms and Preventing Relapse    * Go to all of your appointments    * Take all medications as directed.      * Carry a current list if medication with you    * Do not use illicit (street) drugs.  Avoid alcohol    * Report these symptoms to your care team. These are early signs of relapse:   Thoughts of suicide   Losing more sleep   Increased confusion   Mood getting worse   Feeling more aggressive     *Use these skills daily:   Mindfulness, practice self-compassion, practice authenticity in making choices, maintain balance in schedule (time for self and others, time for relaxation and time for activities, time for leisure and time for tasks, time at home and time out of the house), assert needs and set limits with others as needed, practice intentional physical relaxation, challenge negative thoughts/use affirming and encouraging self-talk, engage with support people on regular basis, practice good self-care (sleep hygiene, balanced eating, regular rest, take care of medical needs, maintain personal hygiene, self-nurturing activities), acknowledge and accept your feelings, use sensory modulation skills plan     Copy of summary sent to: [facility] Providers can access medical records via Epic.    Follow up with psychiatrist / main caregiver: [facility]    Next visit: 8/13/2020    Follow up with your therapist: [facility]   Next visit: 8/13/2020    Go to group therapy and / or support groups at: [facility]    See your medical doctor, Dr. Susan Coronel at [facility] about physical health concerns or issues.      Your treatment team appreciates having the opportunity to work with you and wishes you the best.      Client Signature:__Unable to sign _  Date / Time:__8/12/2020_____  Staff Signature:___Gregoria__DARRICK Díaz, [facility]   Date / Time:_8/12/2020 ___

## 2020-08-14 NOTE — TELEPHONE ENCOUNTER
Reviewed chart at 11:30 AM - patient in ED, routed update to team      This provider called patient today 8/14/2020 to check-in after leaving overnight message with [facility] and stating she wished to speak with me. I spoke with patient briefly who was feeling very distressed. She said she has a blood clot on her lung and needed to get to the [facility] ED. Agreed that she should go to ED since cardiologist recommended this as well. Patient was trying to call an Uber since her car was not working. She was having a hard time with this. She had her sister on the other line helping her call an Uber to take her to the ED. Offered to help her call a taxi or ultimately an ambulance if necessary. Patient declined saying she didn't want any ambulances and said she's already getting an [employer] which is a taxi. Said she will keep in touch with her sister on the way to the ED.     Asked if it's okay if I call her a bit later to check in on her and whether she got to ED. Patient accepted this offer.     Will call patient back later today to see if they've made it to ED. Routing to team for update - pt trying to get to ED through [employer] fatou.

## 2020-08-14 NOTE — TELEPHONE ENCOUNTER
"This provider called patient to check in after discharge from hospital. Patient stated they were at home. Patient sounded a little bit calmer since call this morning. Patient said they were sent home from ED since everything was okay. They still do not agree with this assessment. Is thinking of getting a second opinion and maybe going to [place].     Patient expressed feeling frustrated with care since they have symptoms that are not being explained and are potentially life-threatening. Also misses being able to come to [facility] [place] and see  and psychologist all at once. Feels like everything is chaotic now. Patient did say that when they get scared everything feels overwhelming and they cannot think straight. Feels frustrated with not having an answer to their symptoms.     Empathized. Apologized that with the pandemic some care has been more fragmented than usual. Did note that patient has targeted case management through [facility] to help with any social work concerns. Patient does feel comfortable calling . Informed them of follow-up appointments scheduled. Patient is unsure yet if they will follow through.    Checked in on risk and suicidiality. Patient does sometimes feel like giving up because of all these medical concerns (\"it's no way to live if you're suffocating\"), but affirmed that they will try to figure it out and get a second opinion because they want their health to improve. If patient did have a plan it is to go to [place] to ask for compassionate care (I.e., euthanasia). They have stated this at previous visit (see 7/30/2020 w/Dr. Gilbert). No plans to hurt themselves at this time. Has crisis resources and is well-connected to family (sister) and case management, therapy, and [facility] services.    Plan:    Will route note to team for update.     Would recommend discussing case at [facility] for further coordination and plan, as well as including  if needed for " recommendations and updates.     Patient may benefit from a check-in next week if they haven't contacted clinic. Will discuss with team.

## 2020-08-14 NOTE — TELEPHONE ENCOUNTER
I recommend her to go to ED and get CT PE study to rule out pulmonary embolism for BIRD symptoms and marginally elevated d dimer.    DR.CAN

## 2020-08-14 NOTE — ED NOTES
Bed: ED06  Expected date:   Expected time:   Means of arrival:   Comments:  Killian Stein   MRN [id number]  Eval by Dr.John cordova on 8/11 for SOA and dyspnea on exertion, CT angio preformed negative but ? Pulmonary artery issue  Labs preformed elevated D dimer  Called PCP yesterday and reported SI   Recommendation Eval for PE and assess SI

## 2020-08-14 NOTE — TELEPHONE ENCOUNTER
Call to ED/[place] (this is where pt stated she would go).   Updated ED staff on reason pt was sent to ED.   Also mentioned note to PCP yesterday where pt was stating she was suicidal.

## 2020-08-14 NOTE — ED PROVIDER NOTES
"    [place] EMERGENCY DEPARTMENT ([facility])  August 14, 2020  ED 6    History     Chief Complaint   Patient presents with     Shortness of Breath     The history is provided by the patient and medical records.     Killian Stein is a 65 year old adult (they them pronouns) who presents with shortness of breath, decreased activity tolerance and for further evaluation of slightly elevated d-dimer in clinic.  Patient states that she has been experiencing shortness of breath and recurrent diaphoresis over the past 3-4 months.  They have been seen at [facility] for this states that they did not really evaluate this other than a physical exam.  They did have lab work done, as well as a CT angiogram and referral to Cardiology.  Patient states that they did see their cardiologist afterwards, and states that they were told that they would not need to come back to cardiology.  Per cardiology clinic note from 8/11/2020, Dr. Rosado ordered sleep study to assess for possible sleep apnea, TSH, BNP, d-dimer and for patient to continue with plans for a pulmonary function test.  Patient states that the cardiologist told him that they would not need to return to cardiology clinic.  Patient was later notified of an elevated d-dimer of 0.7 and was told to come to the ER for further evaluation.  Here patient states that they continue to have fatigue, shortness of breath, night sweats, episodes of diaphoresis, heart racing and decreased activity tolerance.  Patient does not feel satisfied with the work-up that has been done so far and has plans to go to [facility] to \"get ahead of this.\"  Patient states that they are an avid cyclist but cannot cycle of pills like that he normally can.  Patient has undergone menopause many years before.    CT angiogram was performed on 8/10/2020 and showed:  IMPRESSION:   1. No acute airspace disease or suspicious pulmonary nodule.  2. Please see cardiology report for full assessment of the " heart.  3. Mild enlargement of the main pulmonary artery, suggestive of  pulmonary arterial hypertension.    Patient is a nurse    PAST MEDICAL HISTORY:   Past Medical History:   Diagnosis Date     Anxiety      Arthritis     C spine, thumbs bilateral, feet, shoulders, knees     Depressive disorder      Gastro-oesophageal reflux disease     intermittent     Labial irritation     labial mass     Other chronic pain     feet, knees, shoulders, full spine, thumbs     PTSD (post-traumatic stress disorder)      Restless leg syndrome        PAST SURGICAL HISTORY:   Past Surgical History:   Procedure Laterality Date     COLONOSCOPY       EXCISE MASS VAGINA Left 4/10/2015    Procedure: EXCISE MASS VAGINA;  Surgeon: Stanislav Byers MD;  Location: [facility] OR     GENITOURINARY SURGERY      Urethrial dilation     JOINT REPLACEMENT Right 2018     ORTHOPEDIC SURGERY      right rotator cuff, left achilles tendon repair, neuroma removed right foot, right knee arthroscopy,        Past medical history, past surgical history, medications, and allergies were reviewed with the patient. Additional pertinent items: None    FAMILY HISTORY:   Family History   Problem Relation Age of Onset     Macular Degeneration Mother      Osteoporosis Mother      Heart Disease Father 49     Multiple Sclerosis Sister      Diabetes Paternal Uncle         heart issues     Cancer No family hx of      Coronary Artery Disease No family hx of        SOCIAL HISTORY:   Social History     Tobacco Use     Smoking status: Former Smoker     Packs/day: 0.00     Last attempt to quit: 1983     Years since quittin.9     Smokeless tobacco: Never Used   Substance Use Topics     Alcohol use: Yes     Comment: occasional     Social history was reviewed with the patient. Additional pertinent items: None      Patient's Medications   New Prescriptions    No medications on file   Previous Medications    ACETAMINOPHEN (TYLENOL PO)    Take 1,000 mg by mouth every 8 hours as  "needed     CHOLECALCIFEROL (VITAMIN D3) 5000 UNITS (125 MCG) CAPSULE    Take by mouth daily    FAMOTIDINE (PEPCID) 20 MG TABLET    Take 20 mg by mouth 2 times daily    GABAPENTIN (NEURONTIN) 300 MG CAPSULE    Take 3 capsules (900 mg) by mouth At Bedtime    HYDROXYZINE (VISTARIL) 50 MG CAPSULE    Take 1 capsule (50 mg) by mouth 3 times daily as needed for anxiety    LAMOTRIGINE (LAMICTAL) 25 MG TABLET    Take 50 mg by mouth daily     OMEGA-3 FATTY ACIDS (OMEGA-3 FISH OIL) 1200 MG CAPS        PRAMIPEXOLE (MIRAPEX) 0.25 MG TABLET    Take 3 tablets (0.75 mg) by mouth At Bedtime   Modified Medications    No medications on file   Discontinued Medications    No medications on file          Allergies   Allergen Reactions     Penicillins Rash and Hives     Versed [Midazolam] Other (See Comments)     Stopped breathing  Over 10 years ago but possible sensitivity to too much of this medication  Became apnic        Review of Systems   Constitutional: Positive for activity change (Decreased activity tolerance) and fatigue. Negative for fever.   HENT: Positive for congestion.    Respiratory: Positive for shortness of breath. Negative for cough.    Cardiovascular: Negative for leg swelling.   Gastrointestinal: Negative.    Genitourinary: Negative.    Musculoskeletal: Negative.    All other systems reviewed and are negative.    A complete review of systems was performed with pertinent positives and negatives noted in the HPI, and all other systems negative.    Physical Exam   BP: (!) 156/106  Pulse: 78  Temp: 98.6  F (37  C)  Resp: 18  Height: 167.6 cm (5' 6\")  Weight: 90.3 kg (199 lb)  SpO2: 97 %      Physical Exam  Vitals signs and nursing note reviewed.   Constitutional:       General: Killian Stein is not in acute distress.     Appearance: Killian Stein is well-developed.   HENT:      Head: Normocephalic and atraumatic.   Eyes:      General: No scleral icterus.     Conjunctiva/sclera: Conjunctivae normal.      Pupils: Pupils are " equal, round, and reactive to light.   Neck:      Musculoskeletal: Neck supple.   Cardiovascular:      Rate and Rhythm: Normal rate and regular rhythm.      Heart sounds: Normal heart sounds.   Pulmonary:      Effort: Pulmonary effort is normal. No respiratory distress.      Breath sounds: Normal breath sounds. No wheezing.   Skin:     General: Skin is warm and dry.   Neurological:      General: No focal deficit present.      Mental Status: Killian Stein is alert and oriented to person, place, and time.      Cranial Nerves: No cranial nerve deficit.   Psychiatric:         Mood and Affect: Mood normal.         Behavior: Behavior normal.         ED Course        Procedures             EKG Interpretation:      Interpreted by Sebastian Dasilva MD  Time reviewed: 12:10 PM   Symptoms at time of EKG: none   Rhythm: normal sinus   Rate: normal  Axis: normal  Ectopy: none  Conduction: normal  ST Segments/ T Waves: No ST-T wave changes  Q Waves: none  Comparison to prior: Unchanged    Clinical Impression: normal EKG             Results for orders placed or performed during the hospital encounter of 08/14/20 (from the past 24 hour(s))   EKG 12-lead, tracing only   Result Value Ref Range    Interpretation ECG Click View Image link to view waveform and result    CBC with platelets differential   Result Value Ref Range    WBC 4.6 4.0 - 11.0 10e9/L    RBC Count 5.42 (H) 3.8 - 5.2 10e12/L    Hemoglobin 15.5 11.7 - 15.7 g/dL    Hematocrit 46.0 35.0 - 47.0 %    MCV 85 78 - 100 fl    MCH 28.6 26.5 - 33.0 pg    MCHC 33.7 31.5 - 36.5 g/dL    RDW 12.3 10.0 - 15.0 %    Platelet Count 214 150 - 450 10e9/L    Diff Method Automated Method     % Neutrophils 65.1 %    % Lymphocytes 26.0 %    % Monocytes 5.7 %    % Eosinophils 2.6 %    % Basophils 0.4 %    % Immature Granulocytes 0.2 %    Nucleated RBCs 0 0 /100    Absolute Neutrophil 3.0 1.6 - 8.3 10e9/L    Absolute Lymphocytes 1.2 0.8 - 5.3 10e9/L    Absolute Monocytes 0.3 0.0 - 1.3  10e9/L    Absolute Eosinophils 0.1 0.0 - 0.7 10e9/L    Absolute Basophils 0.0 0.0 - 0.2 10e9/L    Abs Immature Granulocytes 0.0 0 - 0.4 10e9/L    Absolute Nucleated RBC 0.0    Basic metabolic panel   Result Value Ref Range    Sodium 139 133 - 144 mmol/L    Potassium 3.7 3.4 - 5.3 mmol/L    Chloride 108 94 - 109 mmol/L    Carbon Dioxide 28 20 - 32 mmol/L    Anion Gap 4 3 - 14 mmol/L    Glucose 115 (H) 70 - 99 mg/dL    Urea Nitrogen 12 7 - 30 mg/dL    Creatinine 0.85 0.52 - 1.04 mg/dL    GFR Estimate 73 >60 mL/min/[1.73_m2]    GFR Estimate If Black 84 >60 mL/min/[1.73_m2]    Calcium 8.8 8.5 - 10.1 mg/dL   Troponin I   Result Value Ref Range    Troponin I ES <0.015 0.000 - 0.045 ug/L     Medications   sodium chloride 0.9% infusion (has no administration in time range)             11:37 AM case discussed with Radiology who felt that CT angiogram studies showed good opacification, that it was a quality scan and that there's no evidence of PE. Radiology felt that we did not need to repeat study to assess for PE.     Assessments & Plan (with Medical Decision Making)   65-year-old female undergoing extensive evaluation for dyspnea through her primary care clinic the evaluation has been unrevealing to this point.  She did have a cardiac stress test and CT angiogram as well as multiple routine studies.  She was advised to come here because of a slightly elevated d-dimer.  We were able to exclude pulmonary embolism based on the coronary CTA done a few days ago and therefore a repeat scan was not done.  Her labs are normal her EKG is normal this needs to be pursued further as an outpatient she is considering going to [facility].  Comfortable that were not missing a serious cause for her subacute symptoms based on her extensive recent evaluations lack of hypoxia or tachycardia or ischemic changes.    I have reviewed the nursing notes.    I have reviewed the findings, diagnosis, plan and need for follow up with the  patient.    New Prescriptions    No medications on file       Final diagnoses:   Dyspnea, unspecified type       8/14/2020   [facility], [place], EMERGENCY DEPARTMENT     Sebastian Dasilva MD  08/14/20 [id number]

## 2020-08-14 NOTE — TELEPHONE ENCOUNTER
Date: 8/14/2020    Time of Call: 9:26 AM     Diagnosis:  Elevated d dimer, BIRD, SOB     [ TORB ] Ordering provider: Dr Rosado   Order:   Elevated d dimer  Send pt to ED to be evaluated for PE     Order received by:Edilia Baker RN      Follow-up/additional notes: spoke to pt, reviewed results.   Pt agreed to go to ED for evaluation. Pt is an RN and verbalized understanding

## 2020-08-14 NOTE — TELEPHONE ENCOUNTER
"Pt left the below message on this writers direct phone yesterday 8/13/20 at 10:30pm.    \"Renuka, I this is Killian Stein. This is my second call today but I am really sorry and I am desperate  for help, I feel shuffled around at [facility].  It is critical.  I continue to have symptoms, I wrote a letter in March and I remember we spoke in the office I was really feeling I couldn't get to the bottom of my symptoms.  I don't have confidence, and I feel ppl are sick and tired of me and I know they are but I need to get some sort of care. I feel shuffled around, if you read the letter from 3/22 it is the same scenario I know ppl are trying hard. I have some critical things going on and feel I need to talk to ppl there and I also feel like I need a regular doctor... I am exhausted by this and scared... I may have pulmonary arterial hupertension and basically no one is interested in hearing about pursuing it further. Please call me, I need to do something.  I am refused to talk to the psychologist there, I am refused to talk to you. I cant write you a letter everyone is getting short with me. I am not doing well.  This is my own foreseeable future of my own disaster for me.  I need help [phone], if I could reach you another way I would but I can't. Thank you.\"     Will route to Dr. Raza with high priority.     Renuka Flynn, [facility] Social Work Care Coordinator  "

## 2020-08-14 NOTE — DISCHARGE INSTRUCTIONS
The EKG and troponin are both normal.  The radiologist reviewed the CT scan of your coronary arteries and there was no evidence for pulmonary embolism so I do not feel that you need to have a repeat scan.

## 2020-08-14 NOTE — ED AVS SNAPSHOT
[facility], [place], Emergency Department  [address] [place] [id number]  Phone:  [phone]                                    Gregoria Stein   MRN: [id number]    Department:  [facility], Emergency Department   Date of Visit:  8/14/2020           After Visit Summary Signature Page    I have received my discharge instructions, and my questions have been answered. I have discussed any challenges I see with this plan with the nurse or doctor.    ..........................................................................................................................................  Patient/Patient Representative Signature      ..........................................................................................................................................  Patient Representative Print Name and Relationship to Patient    ..................................................               ................................................  Date                                   Time    ..........................................................................................................................................  Reviewed by Signature/Title    ...................................................              ..............................................  Date                                               Time          22EPIC Rev 08/18

## 2020-08-14 NOTE — TELEPHONE ENCOUNTER
This provider called patient's  - Shirley Velazco - for care coordination. Consent to communicate on file (media tab 12/2/2019).     Per chart, patient has been discharged from the hospital with follow-up scheduled for sleep study, pulmonary function study, and follow-up with PCP. Spoke to Shirley who had talked with patient this morning on the way to the ED. Updated Shirley about patient's disposition. Shared that ED ruled-out pulmonary embolism or major concerns. Labs and EKG were normal. Pt may be interested in a second opinion at the [facility] per note. Noted that with discharge this provider was worried that patient may be suicidal and impulsive.     Shirley shared that patient had called her this morning saying that she was being sent to the ED for potential blood clot and a problem with her lungs. Confirmed cardiology recommending ED visit for safety, but no serious findings. Team was especially worried about SI, but I don't see any mention in ED note about an assessment, although ED nurse had been informed. Noted that team has tried to reassuring patient of findings unsuccessfully.      shared that patient has difficulty hearing people when she feels she has been let down. Patient was receiving treatment from [facility] team about a month ago and she was discharged when it seemed she was stable. Pt had expressed interest in this again. Informed Shirley pt had withdrawn from day treatment program ( telephone note on 8/11/2020) in preference of a [facility] day program. Unsure if this is the same, but we both agreed patient would benefit from higher level of care.     Patient called  while we were on phone together. Shirley appreciated this call and update since she wasn't sure what was going on medically with patient. Encouraged Shirley to call [facility] if needed for further information and that she can ask for PCP or  team.  was going  to call patient back right after and check in on safety, as well as follow up on plans for Psychiatric stabilization if needed.     Routing team for update.

## 2020-08-17 LAB
SARS-COV-2 RNA SPEC QL NAA+PROBE: NOT DETECTED
SPECIMEN SOURCE: NORMAL

## 2020-08-25 ENCOUNTER — OFFICE VISIT (OUTPATIENT)
Dept: FAMILY MEDICINE | Facility: CLINIC | Age: 65
End: 2020-08-25
Payer: MEDICARE

## 2020-08-25 ENCOUNTER — VIRTUAL VISIT (OUTPATIENT)
Dept: SLEEP MEDICINE | Facility: CLINIC | Age: 65
End: 2020-08-25
Attending: INTERNAL MEDICINE
Payer: MEDICARE

## 2020-08-25 VITALS
DIASTOLIC BLOOD PRESSURE: 85 MMHG | TEMPERATURE: 98.4 F | BODY MASS INDEX: 33.78 KG/M2 | SYSTOLIC BLOOD PRESSURE: 136 MMHG | HEART RATE: 86 BPM | RESPIRATION RATE: 18 BRPM | HEIGHT: 66 IN | OXYGEN SATURATION: 96 % | WEIGHT: 210.2 LBS

## 2020-08-25 DIAGNOSIS — R06.09 DOE (DYSPNEA ON EXERTION): ICD-10-CM

## 2020-08-25 DIAGNOSIS — E83.19 OTHER DISORDERS OF IRON METABOLISM: Primary | ICD-10-CM

## 2020-08-25 DIAGNOSIS — H00.015 HORDEOLUM EXTERNUM OF LEFT LOWER EYELID: Primary | ICD-10-CM

## 2020-08-25 DIAGNOSIS — F51.04 PSYCHOPHYSIOLOGICAL INSOMNIA: ICD-10-CM

## 2020-08-25 DIAGNOSIS — G47.9 SLEEP DISTURBANCE: ICD-10-CM

## 2020-08-25 PROCEDURE — 99203 OFFICE O/P NEW LOW 30 MIN: CPT | Mod: 95 | Performed by: INTERNAL MEDICINE

## 2020-08-25 ASSESSMENT — ANXIETY QUESTIONNAIRES
5. BEING SO RESTLESS THAT IT IS HARD TO SIT STILL: SEVERAL DAYS
3. WORRYING TOO MUCH ABOUT DIFFERENT THINGS: MORE THAN HALF THE DAYS
IF YOU CHECKED OFF ANY PROBLEMS ON THIS QUESTIONNAIRE, HOW DIFFICULT HAVE THESE PROBLEMS MADE IT FOR YOU TO DO YOUR WORK, TAKE CARE OF THINGS AT HOME, OR GET ALONG WITH OTHER PEOPLE: VERY DIFFICULT
1. FEELING NERVOUS, ANXIOUS, OR ON EDGE: SEVERAL DAYS
GAD7 TOTAL SCORE: 14
2. NOT BEING ABLE TO STOP OR CONTROL WORRYING: MORE THAN HALF THE DAYS
6. BECOMING EASILY ANNOYED OR IRRITABLE: NEARLY EVERY DAY
7. FEELING AFRAID AS IF SOMETHING AWFUL MIGHT HAPPEN: NEARLY EVERY DAY

## 2020-08-25 ASSESSMENT — PATIENT HEALTH QUESTIONNAIRE - PHQ9
5. POOR APPETITE OR OVEREATING: MORE THAN HALF THE DAYS
SUM OF ALL RESPONSES TO PHQ QUESTIONS 1-9: 13

## 2020-08-25 ASSESSMENT — MIFFLIN-ST. JEOR: SCORE: 1525.21

## 2020-08-25 NOTE — LETTER
RETURN TO WORK/SCHOOL FORM    8/25/2020    Re: Gregoria Stein  1955      To Whom It May Concern:     Gregoria Stein was seen in clinic today. Pt is high risk if they contracted Covid-19.  Pt should continue to work remotely until Pandemic concerns are uplifted.            Susan Coronel MD  8/25/2020 5:04 PM

## 2020-08-25 NOTE — PROGRESS NOTES
Killian is a 65 year old  who presents for   Patient presents with:  Eye Problem: possible stye in left eye, seems to be irritated  RECHECK: HRT      Assessment and Plan        Pt is a 64 yo person who comes in due to lower lid eye swelling and not HRT. Based on exam and history most likely a stye.     1. Hordeolum externum of left lower eyelid  Warm compresses for 5 to 10 minutes three to five times per day in order to facilitate drainage. Massage and gentle wiping of the affected eyelid after the warm compress can also aid in drainag      Please call or return to clinic if your symptoms don't go away.    Follow up plan  Follow up if you are not improving in the next 1-2 weeks.         Return in about 2 weeks (around 9/8/2020), or if symptoms worsen or fail to improve.    There are no discontinued medications.      Susan Coronel MD         [facility]       Killian is a 65 year old  who presents for   Patient presents with:  Eye Problem: possible stye in left eye, seems to be irritated  RECHECK: HRT      Visit [facility]  1. Left Eye Stye  Left eye itchy a few days ago, red, slightly swollen. No using anything around eye. No discharge. More teary with crusty. No fever, chills, no new sob, no cough. No one else with stye near them.       Problem, Medication and Allergy Lists were reviewed and updated if needed..  Patient is an established patient of this clinic.  Reviewed and updated as needed this visit by clinical staff  Tobacco  Allergies  Meds       Reviewed and updated as needed this visit by Provider       Health Maintenance Due   Topic Date Due     DEPRESSION ACTION PLAN  1955     ZOSTER IMMUNIZATION (1 of 2) 08/02/2007     PAP  11/03/2018     INFLUENZA VACCINE (1) 09/01/2020          Review of Systems:   Review of Systems  10 point review of symptoms was otherwise negative except as noted in HPI         Physical Exam:     Vitals:    08/25/20 1613 08/25/20 1616   BP: (!) 146/89 136/85   BP Location: Left arm Left  "arm   Patient Position: Sitting Sitting   Cuff Size: Adult Large Adult Large   Pulse: 86    Resp: 18    Temp: 98.4  F (36.9  C)    TempSrc: Oral    SpO2: 96%    Weight: 95.3 kg (210 lb 3.2 oz)    Height: 1.676 m (5' 6\")      Body mass index is 33.93 kg/m .  Vitals were reviewed and were normal     Physical Exam  Vitals signs reviewed.   Constitutional:       General: Killian Stein is not in acute distress.     Appearance: Normal appearance. Killian Stein is obese. Killian Stein is not ill-appearing.   HENT:      Head: Normocephalic and atraumatic.      Nose: Nose normal.      Mouth/Throat:      Mouth: Mucous membranes are moist.      Pharynx: Oropharynx is clear.   Eyes:      General: Lids are everted, no foreign bodies appreciated. No allergic shiner.        Right eye: No foreign body or discharge.         Left eye: No foreign body or discharge.      Extraocular Movements: Extraocular movements intact.      Conjunctiva/sclera: Conjunctivae normal.      Pupils: Pupils are equal, round, and reactive to light.     Pulmonary:      Effort: Pulmonary effort is normal.      Breath sounds: Normal breath sounds. No wheezing.   Neurological:      Mental Status: Killian Stein is alert.             Results:   No testing ordered today    Options for treatment and follow-up care were reviewed with the patient. Gregoria Stein  engaged in the decision making process and verbalized understanding of the options discussed and agreed with the final plan.  Susan Coronel MD  [facility]  "

## 2020-08-25 NOTE — PATIENT INSTRUCTIONS
Here is the plan from today's visit    1. Hordeolum externum of left lower eyelid  Warm compresses for 5 to 10 minutes three to five times per day in order to facilitate drainage. Massage and gentle wiping of the affected eyelid after the warm compress can also aid in drainag      Please call or return to clinic if your symptoms don't go away.    Follow up plan  Follow up if you are not improving in the next 1-2 weeks.    Thank you for coming to [facility] today.  Lab Testing:  **If you had lab testing today and your results are reassuring or normal they will be mailed to you or sent through [employer] within 7 days.   **If the lab tests need quick action we will call you with the results.  The phone number we will call with results is # [phone] (home) . If this is not the best number please call our clinic and change the number.  Medication Refills:  If you need any refills please call your pharmacy and they will contact us.   If you need to  your refill at a new pharmacy, please contact the new pharmacy directly. The new pharmacy will help you get your medications transferred faster.   Scheduling:  If you have any concerns about today's visit or wish to schedule another appointment please call our office during normal business hours [phone] (8-5:00 M-F)  If a referral was made to a [facility] and you don't get a call from central scheduling please call [phone].  If a Mammogram was ordered for you at [facility] call [phone] to schedule or change your appointment.  If you had an XRay/CT/Ultrasound/MRI ordered the number is [phone] to schedule or change your radiology appointment.   Medical Concerns:  If you have urgent medical concerns please call [phone] at any time of the day.    Susan Coronel MD

## 2020-08-26 DIAGNOSIS — R06.02 SHORTNESS OF BREATH: ICD-10-CM

## 2020-08-26 ASSESSMENT — ANXIETY QUESTIONNAIRES: GAD7 TOTAL SCORE: 14

## 2020-08-26 NOTE — PATIENT INSTRUCTIONS
"MY TREATMENT INFORMATION FOR SLEEP APNEA-  Gregoria Stein    DOCTOR : Unruly Boyd MD  SLEEP CENTER :      MY CONTACT NUMBER:     Am I having a sleep study at a sleep center?  Make sure you have an appointment for the study before you leave!    Am I having a home sleep study?  Watch this video:  /drop off device-   [email or web address] [email or web address]/watch?v=yGGFBdELGhk  Disposable device sent out require phone/computer application-   https://www.ELENZA.com/watch?v=BCce_vbiwxE  Please verify your insurance coverage with your insurance carrier    Frequently asked questions:  1. What is Obstructive Sleep Apnea (ASHLYN)? ASHLYN is the most common type of sleep apnea. Apnea means, \"without breath.\"  Apnea is most often caused by narrowing or collapse of the upper airway as muscles relax during sleep.   Almost everyone has occasional apneas. Most people with sleep apnea have had brief interruptions at night frequently for many years.  The severity of sleep apnea is related to how frequent and severe the events are.   2. What are the consequences of ASHLYN? Symptoms include: feeling sleepy during the day, snoring loudly, gasping or stopping of breathing, trouble sleeping, and occasionally morning headaches or heartburn at night.  Sleepiness can be serious and even increase the risk of falling asleep while driving. Other health consequences may include development of high blood pressure and other cardiovascular disease in persons who are susceptible. Untreated ASHLYN  can contribute to heart disease, stroke and diabetes.   3. What are the treatment options? In most situations, sleep apnea is a lifelong disease that must be managed with daily therapy. Medications are not effective for sleep apnea and surgery is generally not considered until other therapies have been tried. Your treatment is your choice . Continuous Positive Airway (CPAP) works right away and is the therapy that is effective in nearly everyone. An " oral device to hold your jaw forward is usually the next most reliable option. Other options include postioning devices (to keep you off your back), weight loss, and surgery including a tongue pacing device. There is more detail about some of these options below.    Important tips for using CPAP and similar devices   Know your equipment:  CPAP is continuous positive airway pressure that prevents obstructive sleep apnea by keeping the throat from collapsing while you are sleeping. In most cases, the device is  smart  and can slowly self-adjusts if your throat collapses and keeps a record every day of how well you are treated-this information is available to you and your care team.  BPAP is bilevel positive airway pressure that keeps your throat open and also assists each breath with a pressure boost to maintain adequate breathing.  Special kinds of BPAP are used in patients who have inadequate breathing from lung or heart disease. In most cases, the device is  smart  and can slowly self-adjusts to assist breathing. Like CPAP, the device keeps a record of how well you are treated.  Your mask is your connection to the device. You get to choose what feels most comfortable and the staff will help to make sure if fits. Here: are some examples of the different masks that are available:       Key points to remember on your journey with sleep apnea:  1. Sleep study.  PAP devices often need to be adjusted during a sleep study to show that they are effective and adjusted right.  2. Good tips to remember: Try wearing just the mask during a quiet time during the day so your body adapts to wearing it. A humidifier is recommended for comfort in most cases to prevent drying of your nose and throat. Allergy medication from your provider may help you if you are having nasal congestion.  3. Getting settled-in. It takes more than one night for most of us to get used to wearing a mask. Try wearing just the mask during a quiet time  during the day so your body adapts to wearing it. A humidifier is recommended for comfort in most cases. Our team will work with you carefully on the first day and will be in contact within 4 days and again at 2 and 4 weeks for advice and remote device adjustments. Your therapy is evaluated by the device each day.   4. Use it every night. The more you are able to sleep naturally for 7-8 hours, the more likely you will have good sleep and to prevent health risks or symptoms from sleep apnea. Even if you use it 4 hours it helps. Occasionally all of us are unable to use a medical therapy, in sleep apnea, it is not dangerous to miss one night.   5. Communicate. Call our skilled team on the number provided on the first day if your visit for problems that make it difficult to wear the device. Over 2 out of 3 patients can learn to wear the device long-term with help from our team. Remember to call our team or your sleep providers if you are unable to wear the device as we may have other solutions for those who cannot adapt to mask CPAP therapy. It is recommended that you sleep your sleep provider within the first 3 months and yearly after that if you are not having problems.   6. Use it for your health. We encourage use of CPAP masks during daytime quiet periods to allow your face and brain to adapt to the sensation of CPAP so that it will be a more natural sensation to awaken to at night or during naps. This can be very useful during the first few weeks or months of adapting to CPAP though it does not help medically to wear CPAP during wakefulness and  should not be used as a strategy just to meet guidelines.  7. Take care of your equipment. Make sure you clean your mask and tubing using directions every day and that your filter and mask are replaced as recommended or if they are not working.     BESIDES CPAP, WHAT OTHER THERAPIES ARE THERE?    Positioning Device  Positioning devices are generally used when sleep apnea is  mild and only occurs on your back.This example shows a pillow that straps around the waist. It may be appropriate for those whose sleep study shows milder sleep apnea that occurs primarily when lying flat on one's back. Preliminary studies have shown benefit but effectiveness at home may need to be verified by a home sleep test. These devices are generally not covered by medical insurance.  Examples of devices that maintain sleeping on the back to prevent snoring and mild sleep apnea.    Belt type body positioner  Http://[employer]/    Electronic reminder  Http://nightshifttherapy.com/  Http://www.[employer].au/      Oral Appliance  What is oral appliance therapy?  An oral appliance device fits on your teeth at night like a retainer used after having braces. The device is made by a specialized dentist and requires several visits over 1-2 months before a manufactured device is made to fit your teeth and is adjusted to prevent your sleep apnea. Once an oral device is working properly, snoring should be improved. A home sleep test may be recommended at that time if to determine whether the sleep apnea is adequately treated.       Some things to remember:  -Oral devices are often, but not always, covered by your medical insurance. Be sure to check with your insurance provider.   -If you are referred for oral therapy, you will be given a list of specialized dentists to consider or you may choose to visit the Web site of the American Academy of Dental Sleep Medicine  -Oral devices are less likely to work if you have severe sleep apnea or are extremely overweight.     More detailed information  An oral appliance is a small acrylic device that fits over the upper and lower teeth  (similar to a retainer or a mouth guard). This device slightly moves jaw forward, which moves the base of the tongue forward, opens the airway, improves breathing for effective treat snoring and obstructive sleep apnea in perhaps 7 out of 10  people .  The best working devices are custom-made by a dental device  after a mold is made of the teeth 1, 2, 3.  When is an oral appliance indicated?  Oral appliance therapy is recommended as a first-line treatment for patients with primary snoring, mild sleep apnea, and for patients with moderate sleep apnea who prefer appliance therapy to use of CPAP4, 5. Severity of sleep apnea is determined by sleep testing and is based on the number of respiratory events per hour of sleep.   How successful is oral appliance therapy?  The success rate of oral appliance therapy in patients with mild sleep apnea is 75-80% while in patients with moderate sleep apnea it is 50-70%. The chance of success in patients with severe sleep apnea is 40-50%. The research also shows that oral appliances have a beneficial effect on the cardiovascular health of ASHLYN patients at the same magnitude as CPAP therapy7.  Oral appliances should be a second-line treatment in cases of severe sleep apnea, but if not completely successful then a combination therapy utilizing CPAP plus oral appliance therapy may be effective. Oral appliances tend to be effective in a broad range of patients although studies show that the patients who have the highest success are females, younger patients, those with milder disease, and less severe obesity. 3, 6.   Finding a dentist that practices dental sleep medicine  Specific training is available through the American Academy of Dental Sleep Medicine for dentists interested in working in the field of sleep. To find a dentist who is educated in the field of sleep and the use of oral appliances, near you, visit the Web site of the American Academy of Dental Sleep Medicine.    References  1. Gabi et al. Objectively measured vs self-reported compliance during oral appliance therapy for sleep-disordered breathing. Chest 2013; 144(5): 3243-3736.  2. Juan C et al. Objective measurement of compliance  during oral appliance therapy for sleep-disordered breathing. Thorax 2013; 68(1): 91-96.  3. Lázaro, et al. Mandibular advancement devices in 620 men and women with ASHLYN and snoring: tolerability and predictors of treatment success. Chest 2004; 125: 7431-0118.  4. Maryam et al. Oral appliances for snoring and ASHLYN: a review. Sleep 2006; 29: 244-262.  5. Essie et al. Oral appliance treatment for ASHLYN: an update. J Clin Sleep Med 2014; 10(2): 215-227.  6. Lilia et al. Predictors of OSAH treatment outcome. J Dent Res 2007; 86: 2679-7904.      Weight Loss:    Weight loss is a long-term strategy that may improve sleep apnea in some patients.    Weight management is a personal decision and the decision should be based on your interest and the potential benefits.  If you are interested in exploring weight loss strategies, the following discussion covers the impact on weight loss on sleep apnea and the approaches that may be successful.    Being overweight does not necessarily mean you will have health consequences.  Those who have BMI over 35 or over 27 with existing medical conditions carries greater risk.   Weight loss decreases severity of sleep apnea in most people with obesity. For those with mild obesity who have developed snoring with weight gain, even 15-30 pound weight loss can improve and occasionally eliminate sleep apnea.  Structured and life-long dietary and health habits are necessary to lose weight and keep healthier weight levels.     Though there may be significant health benefits from weight loss, long-term weight loss is very difficult to achieve- studies show success with dietary management in less than 10% of people. In addition, substantial weight loss may require years of dietary control and may be difficult if patients have severe obesity. In these cases, surgical management may be considered.  Finally, older individuals who have tolerated obesity without health complications may be less  likely to benefit from weight loss strategies.        Your BMI is There is no height or weight on file to calculate BMI.  Weight management is a personal decision.  If you are interested in exploring weight loss strategies, the following discussion covers the approaches that may be successful. Body mass index (BMI) is one way to tell whether you are at a healthy weight, overweight, or obese. It measures your weight in relation to your height.  A BMI of 18.5 to 24.9 is in the healthy range. A person with a BMI of 25 to 29.9 is considered overweight, and someone with a BMI of 30 or greater is considered obese. More than two-thirds of American adults are considered overweight or obese.  Being overweight or obese increases the risk for further weight gain. Excess weight may lead to heart disease and diabetes.  Creating and following plans for healthy eating and physical activity may help you improve your health.  Weight control is part of healthy lifestyle and includes exercise, emotional health, and healthy eating habits. Careful eating habits lifelong are the mainstay of weight control. Though there are significant health benefits from weight loss, long-term weight loss with diet alone may be very difficult to achieve- studies show long-term success with dietary management in less than 10% of people. Attaining a healthy weight may be especially difficult to achieve in those with severe obesity. In some cases, medications, devices and surgical management might be considered.  What can you do?  If you are overweight or obese and are interested in methods for weight loss, you should discuss this with your provider.     Consider reducing daily calorie intake by 500 calories.     Keep a food journal.     Avoiding skipping meals, consider cutting portions instead.    Diet combined with exercise helps maintain muscle while optimizing fat loss. Strength training is particularly important for building and maintaining muscle  mass. Exercise helps reduce stress, increase energy, and improves fitness. Increasing exercise without diet control, however, may not burn enough calories to loose weight.       Start walking three days a week 10-20 minutes at a time    Work towards walking thirty minutes five days a week     Eventually, increase the speed of your walking for 1-2 minutes at time    In addition, we recommend that you review healthy lifestyles and methods for weight loss available through the National Institutes of Health patient information sites:  http://win.niddk.nih.gov/publications/index.htm    And look into health and wellness programs that may be available through your health insurance provider, employer, local community center, or roselyn club.      Surgery:    Surgery for obstructive sleep apnea is considered generally only when other therapies fail to work. Surgery may be discussed with you if you are having a difficult time tolerating CPAP and or when there is an abnormal structure that requires surgical correction.  Nose and throat surgeries often enlarge the airway to prevent collapse.  Most of these surgeries create pain for 1-2 weeks and up to half of the most common surgeries are not effective throughout life.  You should carefully discuss the benefits and drawbacks to surgery with your sleep provider and surgeon to determine if it is the best solution for you.   More information  Surgery for ASHLYN is directed at areas that are responsible for narrowing or complete obstruction of the airway during sleep.  There are a wide range of procedures available to enlarge and/or stabilize the airway to prevent blockage of breathing in the three major areas where it can occur: the palate, tongue, and nasal regions.  Successful surgical treatment depends on the accurate identification of the factors responsible for obstructive sleep apnea in each person.  A personalized approach is required because there is no single treatment that  works well for everyone.  Because of anatomic variation, consultation with an examination by a sleep surgeon is a critical first step in determining what surgical options are best for each patient.  In some cases, examination during sedation may be recommended in order to guide the selection of procedures.  Patients will be counseled about risks and benefits as well as the typical recovery course after surgery. Surgery is typically not a cure for a person s ASHLYN.  However, surgery will often significantly improve one s ASHLYN severity (termed  success rate ).  Even in the absence of a cure, surgery will decrease the cardiovascular risk associated with OSA7; improve overall quality of life8 (sleepiness, functionality, sleep quality, etc).      Palate Procedures:  Patients with ASHLYN often have narrowing of their airway in the region of their tonsils and uvula.  The goals of palate procedures are to widen the airway in this region as well as to help the tissues resist collapse.  Modern palate procedure techniques focus on tissue conservation and soft tissue rearrangement, rather than tissue removal.  Often the uvula is preserved in this procedure. Residual sleep apnea is common in patient after pharyngoplasty with an average reduction in sleep apnea events of 33%2.      Tongue Procedures:  ExamWhile patients are awake, the muscles that surround the throat are active and keep this region open for breathing. These muscles relax during sleep, allowing the tongue and other structures to collapse and block breathing.  There are several different tongue procedures available.  Selection of a tongue base procedure depends on characteristics seen on physical exam.  Generally, procedures are aimed at removing bulky tissues in this area or preventing the back of the tongue from falling back during sleep.  Success rates for tongue surgery range from 50-62%3.    Hypoglossal Nerve Stimulation:  Hypoglossal nerve stimulation has recently  received approval from the [place] Food and Drug Administration for the treatment of obstructive sleep apnea.  This is based on research showing that the system was safe and effective in treating sleep apnea6.  Results showed that the median AHI score decreased 68%, from 29.3 to 9.0. This therapy uses an implant system that senses breathing patterns and delivers mild stimulation to airway muscles, which keeps the airway open during sleep.  The system consists of three fully implanted components: a small generator (similar in size to a pacemaker), a breathing sensor, and a stimulation lead.  Using a small handheld remote, a patient turns the therapy on before bed and off upon awakening.    Candidates for this device must be greater than 22 years of age, have moderate to severe ASHLYN (AHI between 20-65), BMI less than 32, have tried CPAP/oral appliance without success, and have appropriate upper airway anatomy (determined by a sleep endoscopy performed by Dr. Fischer).    Hypoglossal Nerve Stimulation Pathway:    The sleep surgeon s office will work with the patient through the insurance prior-authorization process (including communications and appeals).    Nasal Procedures:  Nasal obstruction can interfere with nasal breathing during the day and night.  Studies have shown that relief of nasal obstruction can improve the ability of some patients to tolerate positive airway pressure therapy for obstructive sleep apnea1.  Treatment options include medications such as nasal saline, topical corticosteroid and antihistamine sprays, and oral medications such as antihistamines or decongestants. Non-surgical treatments can include external nasal dilators for selected patients. If these are not successful by themselves, surgery can improve the nasal airway either alone or in combination with these other options.      Combination Procedures:  Combination of surgical procedures and other treatments may be recommended,  particularly if patients have more than one area of narrowing or persistent positional disease.  The success rate of combination surgery ranges from 66-80%2,3.    References  1. Radha BYRD. The Role of the Nose in Snoring and Obstructive Sleep Apnoea: An Update.  Eur Arch Otorhinolaryngol. 2011; 268: 1365-73.  2.  Sharifa SM; Alex JA; Bettie JR; Pallanch JF; Harry MB; Saroj SG; Candelaria TURNER. Surgical modifications of the upper airway for obstructive sleep apnea in adults: a systematic review and meta-analysis. SLEEP 2010;33(10):2171-3937. Gina CAMEJO. Hypopharyngeal surgery in obstructive sleep apnea: an evidence-based medicine review.  Arch Otolaryngol Head Neck Surg. 2006 Feb;132(2):206-13.  3. Laureano HUGHESH1, Khadra Y, Quentin NIDIA. The efficacy of anatomically based multilevel surgery for obstructive sleep apnea. Otolaryngol Head Neck Surg. 2003 Oct;129(4):327-35.  4. Gina CAMEJO, Goldberg A. Hypopharyngeal Surgery in Obstructive Sleep Apnea: An Evidence-Based Medicine Review. Arch Otolaryngol Head Neck Surg. 2006 Feb;132(2):206-13.  5. Keesha AVERY et al. Upper-Airway Stimulation for Obstructive Sleep Apnea.  N Engl J Med. 2014 Jan 9;370(2):139-49.  6. Tra Y et al. Increased Incidence of Cardiovascular Disease in Middle-aged Men with Obstructive Sleep Apnea. Am J Respir Crit Care Med; 2002 166: 159-165  7. Gunter EM et al. Studying Life Effects and Effectiveness of Palatopharyngoplasty (SLEEP) study: Subjective Outcomes of Isolated Uvulopalatopharyngoplasty. Otolaryngol Head Neck Surg. 2011; 144: 623-631.    With regards to the restless legs symptoms:  Recommend getting blood work to check ferritin level. This is the storage form of iron. Iron deficiency can be associated with the RLS symptoms.  Will consider  iron supplementation if less than 75ng/mL. Encouraged to try non-pharmacological treatments as well - hot bath/shower, stretching, heating pads, avoiding caffeine/alcohol/chocolate prior to bed.  Recommended  discontinuing hydroxyzine which can potentially worsen RLS. Please discuss with your PCP/psychiatrist regarding substituting the hydroxyzine with an alternate antianxiety medication.   Anxiety/depression: Insomnia  can be associated with anxiety/depression. Please  continue follow-up with your  primary care provider/psychiatrist for optimizing the management of  the symptoms.

## 2020-08-27 LAB
DLCOCOR-%PRED-PRE: 112 %
DLCOCOR-PRE: 24.13 ML/MIN/MMHG
DLCOUNC-%PRED-PRE: 119 %
DLCOUNC-PRE: 25.55 ML/MIN/MMHG
DLCOUNC-PRED: 21.37 ML/MIN/MMHG
ERV-%PRED-PRE: 20 %
ERV-PRE: 0.11 L
ERV-PRED: 0.51 L
EXPTIME-PRE: 4.66 SEC
FEF2575-%PRED-PRE: 132 %
FEF2575-PRE: 2.96 L/SEC
FEF2575-PRED: 2.24 L/SEC
FEFMAX-%PRED-PRE: 102 %
FEFMAX-PRE: 6.58 L/SEC
FEFMAX-PRED: 6.43 L/SEC
FEV1-%PRED-PRE: 92 %
FEV1-PRE: 2.38 L
FEV1FEV6-PRE: 87 %
FEV1FEV6-PRED: 80 %
FEV1FVC-PRE: 87 %
FEV1FVC-PRED: 79 %
FEV1SVC-PRE: 85 %
FEV1SVC-PRED: 74 %
FIFMAX-PRE: 6.26 L/SEC
FRCPLETH-%PRED-PRE: 65 %
FRCPLETH-PRE: 1.84 L
FRCPLETH-PRED: 2.82 L
FVC-%PRED-PRE: 82 %
FVC-PRE: 2.73 L
FVC-PRED: 3.3 L
IC-%PRED-PRE: 90 %
IC-PRE: 2.68 L
IC-PRED: 2.95 L
RVPLETH-%PRED-PRE: 84 %
RVPLETH-PRE: 1.74 L
RVPLETH-PRED: 2.04 L
TLCPLETH-%PRED-PRE: 85 %
TLCPLETH-PRE: 4.52 L
TLCPLETH-PRED: 5.27 L
VA-%PRED-PRE: 78 %
VA-PRE: 4.07 L
VC-%PRED-PRE: 80 %
VC-PRE: 2.79 L
VC-PRED: 3.46 L

## 2020-08-28 ENCOUNTER — TELEPHONE (OUTPATIENT)
Dept: CARDIOLOGY | Facility: CLINIC | Age: 65
End: 2020-08-28

## 2020-08-28 ENCOUNTER — TELEPHONE (OUTPATIENT)
Dept: PSYCHOLOGY | Facility: CLINIC | Age: 65
End: 2020-08-28

## 2020-08-28 NOTE — RESULT ENCOUNTER NOTE
Dear Killian  Here are your labs as you can see your lung function test was normal. Meaning your lungs are working well. I think the sleep study is our next best step. I know we're wanting answers, but so far everything is looking like we can't find a specific cause outside of maybe this mild pulmonary hypertension, which you are seeing cardiology for and looks like they will be doing a complete ECHO for this, so keep in touch with them. Look forward to seeing you at our next visit.   Please message me if you have any concerns.  Sincerely,   Susan Coronel MD

## 2020-08-28 NOTE — TELEPHONE ENCOUNTER
WQ. Note due for Echocardiogram. Ladarius Nicolas L.P.N.        Left message to return clinic call to .  Ladarius Nicolas L.P.N.

## 2020-08-28 NOTE — TELEPHONE ENCOUNTER
"This provider called patient today 8/28/2020 to check in on how they're doing. Spoke to patient on phone. They said they're \"kind of okay, having a hard time physically.\" Seems like physical symptoms continue. Mental health seems slightly better. Said their \"dysphoria is acting up\" but they met with their therapist today and had a good session. Sees therapist once a week for now, but can schedule twice a week if needed.     They also started the [facility] day treatment program. Has been there for 2 weeks and feels it is a better fit. Has been able to be open about being transgender and received good support. Also said they opened up to their mom about being transgender and were surprised that mom did not reject them for it. Mom said they had suspected for a while. Feels relieved about this, that transitioning will not come as much of a shock, but expects that there'll still be some family difficulties. Provided empathic support and validation. Praised their bravery in speaking to mom and engagement in treatment. Plans to continue with day treatment and individual therapy. Appreciated the call to check in.     Routing to team for update.     Amy Raza, PhD.  Behavioral Health Fellow    "

## 2020-08-31 NOTE — TELEPHONE ENCOUNTER
Preceptor Attestation:  I have reviewed and agree with the behavioral health fellow's documentation for this video/phone call.  I did not see the patient.  Supervising Clinical Psychologist:  Sharmila Tee PSYD LP

## 2020-09-02 ENCOUNTER — OFFICE VISIT (OUTPATIENT)
Dept: FAMILY MEDICINE | Facility: CLINIC | Age: 65
End: 2020-09-02
Payer: MEDICARE

## 2020-09-02 VITALS
SYSTOLIC BLOOD PRESSURE: 134 MMHG | BODY MASS INDEX: 33.86 KG/M2 | RESPIRATION RATE: 16 BRPM | TEMPERATURE: 98.4 F | DIASTOLIC BLOOD PRESSURE: 82 MMHG | WEIGHT: 209.8 LBS | OXYGEN SATURATION: 98 %

## 2020-09-02 DIAGNOSIS — F64.0 GENDER DYSPHORIA IN ADULT: Primary | ICD-10-CM

## 2020-09-02 PROBLEM — N39.46 MIXED STRESS AND URGE URINARY INCONTINENCE: Status: RESOLVED | Noted: 2020-02-06 | Resolved: 2020-09-02

## 2020-09-02 RX ORDER — TESTOSTERONE 1.62 MG/G
40.25 GEL TRANSDERMAL DAILY
Qty: 75 G | Refills: 0 | Status: SHIPPED | OUTPATIENT
Start: 2020-09-02 | End: 2020-10-09

## 2020-09-02 ASSESSMENT — ANXIETY QUESTIONNAIRES
6. BECOMING EASILY ANNOYED OR IRRITABLE: SEVERAL DAYS
7. FEELING AFRAID AS IF SOMETHING AWFUL MIGHT HAPPEN: MORE THAN HALF THE DAYS
5. BEING SO RESTLESS THAT IT IS HARD TO SIT STILL: SEVERAL DAYS
3. WORRYING TOO MUCH ABOUT DIFFERENT THINGS: MORE THAN HALF THE DAYS
GAD7 TOTAL SCORE: 11
4. TROUBLE RELAXING: MORE THAN HALF THE DAYS
1. FEELING NERVOUS, ANXIOUS, OR ON EDGE: MORE THAN HALF THE DAYS
2. NOT BEING ABLE TO STOP OR CONTROL WORRYING: SEVERAL DAYS

## 2020-09-02 NOTE — PROGRESS NOTES
Gender History Intake:       HPI          Patient has primary care outside of [facility]? no  Has been seeing me for the last year.   ----------  Killian is a 65 year old individual that uses  pronouns They/Them/Their/Theirs that presents today for interest in masculinizing hormone therapy to better align their body with their gender identity.    Since our last visit pt has talked to their mom about being transgender Male and their mom said that aren't surprised and are more relieved and feeling better since then. Has been continuing talking to their mom. They are nervous and excited to start T.     There was some concerns with their shortness of breath, but work up has been fairly unremarkable minus mild pulmonary hypertension. Has been going to [facility] that has been helpful. Looking at reddit group.     Pt being followed by cardiology for work up of shortness of breath and pulmonary hypertension, plan for ECHO Friday.     Past Surgical History:   Procedure Laterality Date     COLONOSCOPY       EXCISE MASS VAGINA Left 4/10/2015    Procedure: EXCISE MASS VAGINA;  Surgeon: Stanislav Byers MD;  Location: [facility] OR     GENITOURINARY SURGERY      Urethrial dilation     JOINT REPLACEMENT Right 07/2018     ORTHOPEDIC SURGERY      right rotator cuff, left achilles tendon repair, neuroma removed right foot, right knee arthroscopy,        Patient Active Problem List   Diagnosis     Vulvar irritation     CLAIRE (generalized anxiety disorder)     Mass     PTSD (post-traumatic stress disorder)     Other chronic pain     Restless leg syndrome     S/P arthroscopic surgery of right knee     Osteoarthritis of spine with radiculopathy, lumbar region     Chondromalacia of left patella     Chronic bilateral low back pain without sciatica     Chronic joint pain     Complex tear of medial meniscus of left knee as current injury     GERD (gastroesophageal reflux disease)     Glaucoma     IBS (irritable bowel syndrome)     Chronic pain of right knee      Lumbar radiculopathy     Major depressive disorder, recurrent severe without psychotic features (H)     Obesity     Postmenopausal     Primary osteoarthritis of right knee     MDD (major depressive disorder), recurrent severe, 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)     Past Medical History:   Diagnosis Date     Anxiety      Arthritis     C spine, thumbs bilateral, feet, shoulders, knees     Depressive disorder      Gastro-oesophageal reflux disease     intermittent     Labial irritation     labial mass     Other chronic pain     feet, knees, shoulders, full spine, thumbs     PTSD (post-traumatic stress disorder)      Restless leg syndrome        Current Outpatient Medications   Medication Sig Dispense Refill     Acetaminophen (TYLENOL PO) Take 1,000 mg by mouth every 8 hours as needed        cholecalciferol (VITAMIN D3) 5000 units (125 mcg) capsule Take by mouth daily       famotidine (PEPCID) 20 MG tablet Take 20 mg by mouth 2 times daily       gabapentin (NEURONTIN) 300 MG capsule Take 3 capsules (900 mg) by mouth At Bedtime 270 capsule 0     hydrOXYzine (VISTARIL) 50 MG capsule Take 1 capsule (50 mg) by mouth 3 times daily as needed for anxiety 90 capsule 3     lamoTRIgine (LAMICTAL) 25 MG tablet Take 50 mg by mouth daily        Omega-3 Fatty Acids (OMEGA-3 FISH OIL) 1200 MG CAPS        pramipexole (MIRAPEX) 0.25 MG tablet Take 3 tablets (0.75 mg) by mouth At Bedtime 90 tablet 3     testosterone (ANDROGEL 1.62 % PUMP) 20.25 MG/ACT gel Place 40.25 mg onto the skin daily 75 g 0       History   Smoking Status     Former Smoker     Packs/day: 0.00     Quit date: 9/9/1983   Smokeless Tobacco     Never Used       Family History   Problem Relation Age of Onset     Macular Degeneration Mother      Osteoporosis Mother      Heart Disease Father 49     Multiple Sclerosis Sister      Diabetes Paternal Uncle         heart issues     Cancer No family hx of      Coronary Artery Disease No family hx of      Hx of DVT in family? no  Allergies    Allergen Reactions     Penicillins Rash and Hives     Versed [Midazolam] Other (See Comments)     Stopped breathing  Over 10 years ago but possible sensitivity to too much of this medication  Became apnic       Mental Health Assessment:    PHQ-9 SCORE 7/14/2020 7/30/2020 8/25/2020   PHQ-9 Total Score 15 20 13   PHQ-A Total Score - - -     CLAIRE-7 SCORE 7/30/2020 8/25/2020 9/2/2020   Total Score 15 14 11     Much improved since last visit with excitement to start therapy and getting to talk to mother about gender identity.          Review of Systems:   CONSTITUTIONAL: NEGATIVE for fever, chills, change in weight  INTEGUMENTARY/SKIN: NEGATIVE for worrisome rashes, moles or lesions  EYES: NEGATIVE for vision changes or irritation  ENT/MOUTH: NEGATIVE for ear, mouth and throat problems  RESP: NEGATIVE for significant cough or SOB  BREAST: NEGATIVE for masses, tenderness or discharge  CV: NEGATIVE for chest pain, palpitations or peripheral edema  GI: NEGATIVE for nausea, abdominal pain, heartburn, or change in bowel habits  : NEGATIVE for frequency, dysuria, or hematuria  MUSCULOSKELETAL: NEGATIVE for significant arthralgias or myalgia  NEURO: NEGATIVE for weakness, dizziness or paresthesias  ENDOCRINE: NEGATIVE for temperature intolerance, skin/hair changes  HEME/ALLERGY: NEGATIVE for bleeding problems  PSYCHIATRIC: NEGATIVE for changes in mood or affect         Social History     Social History     Socioeconomic History     Marital status: Single     Spouse name: Not on file     Number of children: Not on file     Years of education: Not on file     Highest education level: Not on file   Occupational History     Not on file   Social Needs     Financial resource strain: Not on file     Food insecurity     Worry: Not on file     Inability: Not on file     Transportation needs     Medical: Not on file     Non-medical: Not on file   Tobacco Use     Smoking status: Former Smoker     Packs/day: 0.00     Last attempt to  quit: 1983     Years since quittin.0     Smokeless tobacco: Never Used   Substance and Sexual Activity     Alcohol use: Yes     Comment: occasional     Drug use: Yes     Types: Marijuana     Comment: daily only at night time for her medical conditions      Sexual activity: Not Currently   Lifestyle     Physical activity     Days per week: Not on file     Minutes per session: Not on file     Stress: Not on file   Relationships     Social connections     Talks on phone: Not on file     Gets together: Not on file     Attends Oriental orthodox service: Not on file     Active member of club or organization: Not on file     Attends meetings of clubs or organizations: Not on file     Relationship status: Not on file     Intimate partner violence     Fear of current or ex partner: Not on file     Emotionally abused: Not on file     Physically abused: Not on file     Forced sexual activity: Not on file   Other Topics Concern     Parent/sibling w/ CABG, MI or angioplasty before 65F 55M? Not Asked   Social History Narrative     Not on file              Physical Exam:     Vitals:    20 1612 20 1617   BP: (!) 145/84 134/82   Resp: 16    Temp: 98.4  F (36.9  C)    TempSrc: Oral    SpO2: 98%    Weight: 95.2 kg (209 lb 12.8 oz)      BMI= Body mass index is 33.86 kg/m .   Appearance: nonbinary appearance and dress  GENERAL:: healthy, alert and no distress  EYES: Eyes grossly normal to inspection, fundi benign and PERRL  RESP: lungs clear to auscultation - no rales, no rhonchi, no wheezes  CV: regular rates and rhythm, normal S1 S2, no S3 or S4 and no murmur, no click or rub -  ABDOMEN: soft, no tenderness, no  hepatosplenomegaly, no masses, normal bowel sounds  SKIN: no suspicious lesions, no rashes  Psych: Alert and oriented times 3; coherent speech, normal rate and volume, able to articulate logical thoughts, able to abstract reason, no tangential thoughts, no hallucinations or delusions. Affect is bright.  Affect:  Appropriate/mood-congruent      Assessment and Plan   Gregoria was seen today for hrt.    Diagnoses and all orders for this visit:    Gender dysphoria in adult  -     testosterone (ANDROGEL 1.62 % PUMP) 20.25 MG/ACT gel; Place 40.25 mg onto the skin daily  -     Testosterone Total; Future  -     Hepatic Panel; Future  -     CBC with Diff Plt; Future  -     Lipid Cascade; Future  -     Glucose; Future        Today s visit included assessment of interventions to alleviate symptoms related to gender dysphoria, including     psychological suppor    medical treatment (hormones or blockers)    options for social support or changes in gender expression.   Educated about 3 parts of evaluation:    1. Medical safety for hormones :   Lacks contraindications to hormonal therapy at this time; follow up CBC showed hematocrit < 50%.   Cardiac and Lung work up for shortness of breath unremarkable.  Medication plan:   masculinizing hormone therapy with testosterone     Administration method: topical  Next labs and exam:   Follow up w/ Dr. Coronel in 1 month.  Educated pt about consultant role in a residency clinic.  Recommend monthly visits after dose change or initiation. Next dose increase likely in 1 month if labs and exam are normal.  Goal dose: likely 300-500  Counselled patient about controlled substances, never share, comes on paper prescription: Yes  Contraception:   Post-menopausal   Educated about testosterone as absolute contraindication in pregnancy: Yes  Fertility plan if starts cross-sex hormones: none needed      2. Informed consent process.   Consent  Yes Is able to provide informed consent   Yes Likely to take hormones in a responsible manner  Yes Discussed physical effects, benefits, and risk assessment & modification  Yes Discussed the clinical and biochemical monitoring of hormonal changes and the potential impact on reproductive health & fertility  We discussed thoroughly risks/benefits/alternatives of this  treatment. Questions elicited and answered. Pt is fully prepared to start hormones and is able to reason through risks and mgmt. Questions elicited and answered and they also consent, as is legally required. See scanned in media tab.     Susan Coronel MD

## 2020-09-02 NOTE — PATIENT INSTRUCTIONS
Here is the plan from today's visit    1. Gender dysphoria in adult  Get labs for hormones on 10/2/2020 and then make an appointment a week after labs are drawn.   - testosterone (ANDROGEL 1.62 % PUMP) 20.25 MG/ACT gel; Place 40.25 mg onto the skin daily  Dispense: 75 g; Refill: 0  - Testosterone Total; Future  - Hepatic Panel; Future  - CBC with Diff Plt; Future  - Lipid Cascade; Future  - Glucose; Future      Please call or return to clinic if your symptoms don't go away.    Follow up plan  Please make a clinic appointment for follow up with me (DANIEL PERSAUD) in 1 month for HRT.    Thank you for coming to [facility] today.  Lab Testing:  **If you had lab testing today and your results are reassuring or normal they will be mailed to you or sent through [employer] within 7 days.   **If the lab tests need quick action we will call you with the results.  The phone number we will call with results is # [phone] (home) . If this is not the best number please call our clinic and change the number.  Medication Refills:  If you need any refills please call your pharmacy and they will contact us.   If you need to  your refill at a new pharmacy, please contact the new pharmacy directly. The new pharmacy will help you get your medications transferred faster.   Scheduling:  If you have any concerns about today's visit or wish to schedule another appointment please call our office during normal business hours [phone] (8-5:00 M-F)  If a referral was made to a [facility] and you don't get a call from central scheduling please call [phone].  If a Mammogram was ordered for you at [facility] call [phone] to schedule or change your appointment.  If you had an XRay/CT/Ultrasound/MRI ordered the number is [phone] to schedule or change your radiology appointment.   Medical Concerns:  If you have urgent medical concerns please call [phone] at any time of the  day.    Susan Coronel MD

## 2020-09-02 NOTE — PROGRESS NOTES
Discharge Note    Progress reporting period is from last progress note on   to Jun 29, 2020.    Gregoria failed to follow up and current status is unknown.  Please see information below for last relevant information on current status.  Patient seen for 6 visits.    SUBJECTIVE  Subjective changes noted by patient:  see note  .  Current pain level is  .     Previous pain level was  0/10.   Changes in function:  Yes (See Goal flowsheet attached for changes in current functional level)  Adverse reaction to treatment or activity: None    OBJECTIVE  Changes noted in objective findings: see note     ASSESSMENT/PLAN  Diagnosis: pelvic floor dysfunction   Updated problem list and treatment plan:   Decreased function - HEP  Decreased strength - HEP  Impaired muscle performance - HEP  STG/LTGs have been met or progress has been made towards goals:  Yes, please see goal flowsheet for most current information  Assessment of Progress: current status is unknown.    Last current status:     Self Management Plans:  HEP  I have re-evaluated this patient and find that the nature, scope, duration and intensity of the therapy is appropriate for the medical condition of the patient.  Gregoria continues to require the following intervention to meet STG and LTG's:  HEP.    Recommendations:  Discharge with current home program.  Patient to follow up with MD as needed.    Please refer to the daily flowsheet for treatment today, total treatment time and time spent performing 1:1 timed codes.

## 2020-09-03 ENCOUNTER — TELEPHONE (OUTPATIENT)
Dept: FAMILY MEDICINE | Facility: CLINIC | Age: 65
End: 2020-09-03

## 2020-09-03 ASSESSMENT — ANXIETY QUESTIONNAIRES
GAD7 TOTAL SCORE: 11
7. FEELING AFRAID AS IF SOMETHING AWFUL MIGHT HAPPEN: MORE THAN HALF THE DAYS
GAD7 TOTAL SCORE: 11
3. WORRYING TOO MUCH ABOUT DIFFERENT THINGS: MORE THAN HALF THE DAYS
2. NOT BEING ABLE TO STOP OR CONTROL WORRYING: SEVERAL DAYS
5. BEING SO RESTLESS THAT IT IS HARD TO SIT STILL: SEVERAL DAYS
6. BECOMING EASILY ANNOYED OR IRRITABLE: SEVERAL DAYS
1. FEELING NERVOUS, ANXIOUS, OR ON EDGE: MORE THAN HALF THE DAYS

## 2020-09-03 ASSESSMENT — PATIENT HEALTH QUESTIONNAIRE - PHQ9
5. POOR APPETITE OR OVEREATING: MORE THAN HALF THE DAYS
SUM OF ALL RESPONSES TO PHQ QUESTIONS 1-9: 10

## 2020-09-03 NOTE — TELEPHONE ENCOUNTER
Prior Authorization Retail Medication Request    Medication/Dose: testosterone (ANDROGEL 1.62 % PUMP) 20.25 MG/ACT gel   ICD code (if different than what is on RX):  Gender dysphoria in adult [F64.0]  - Primary   Previously Tried and Failed:  See chart  Rationale:  See chart    Insurance Name:  medicatre  Insurance ID:  [id number]       Pharmacy Information (if different than what is on RX)  [facility]  Phone:  [phone]

## 2020-09-03 NOTE — TELEPHONE ENCOUNTER
Prior Authorization Approval    Authorization Effective Date: 9/3/2020  Authorization Expiration Date: 12/31/2020  Medication: testosterone (ANDROGEL 1.62 % PUMP) 20.25 MG/ACT gel   Approved Dose/Quantity:   Reference #: [id number]   Insurance Company: [employer] [phone] Fax [phone]  Expected CoPay:       CoPay Card Available:      Foundation Assistance Needed:    Which Pharmacy is filling the prescription (Not needed for infusion/clinic administered): [id number] [address]  Pharmacy Notified: Yes  Patient Notified: Yes, **Instructed pharmacy to notify patient when script is ready to /ship.**

## 2020-09-03 NOTE — TELEPHONE ENCOUNTER
PA Initiation    Medication: testosterone (ANDROGEL 1.62 % PUMP) 20.25 MG/ACT gel   Insurance Company: [employer] - Phone [phone] Fax [phone]  Pharmacy Filling the Rx: [id number] [address]  Filling Pharmacy Phone: [phone]  Filling Pharmacy Fax: [phone]  Start Date: 9/3/2020

## 2020-09-04 ENCOUNTER — TELEPHONE (OUTPATIENT)
Dept: SCHEDULING | Facility: CLINIC | Age: 65
End: 2020-09-04

## 2020-09-04 NOTE — TELEPHONE ENCOUNTER
A request to schedule an appointment has been received for Dr. Geraldo Barahona.   The patient has been notified that a member of the [facility] will contact the patient to schedule the appointment.      Also needs to get a sliding scale scheduled as well.     Please call patient back at [phone]

## 2020-09-09 NOTE — PROGRESS NOTES
Preceptor Attestation:    Patient seen and evaluated in person. I discussed the patient with the resident. I have verified the content of the note, which accurately reflects my assessment of the patient and the plan of care.   Supervising Physician:  Stacey Ly MD.

## 2020-09-11 ENCOUNTER — TELEPHONE (OUTPATIENT)
Dept: CARDIOLOGY | Facility: CLINIC | Age: 65
End: 2020-09-11

## 2020-09-11 ENCOUNTER — ANCILLARY PROCEDURE (OUTPATIENT)
Dept: CARDIOLOGY | Facility: CLINIC | Age: 65
End: 2020-09-11
Attending: INTERNAL MEDICINE
Payer: MEDICARE

## 2020-09-11 DIAGNOSIS — R06.09 DOE (DYSPNEA ON EXERTION): ICD-10-CM

## 2020-09-11 NOTE — TELEPHONE ENCOUNTER
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 She would like to know the exact values of her PA pressures that were seen on her ECHO.   Quoted Dr. Rosado's impression that her PA pressures were normal and that there was no pulmonary hypertension.   Patient states she is frustrated that no one is telling her the \"exact results\".  Will make an appointment for [place] with Dr. Rosado so they can discuss the results.  "

## 2020-09-11 NOTE — TELEPHONE ENCOUNTER
[facility] Call Center    Phone Message    May a detailed message be left on voicemail: no     Reason for Call: Other: Pt wants to discuss the results of her echo and CT angiogram regarding her pulmonary artery. Pt says they are concerned about their pulmonary artery since that is the area of concern and the recent echo couldn'te evaluate it. Please call Pt back.     Action Taken: Message routed to:  [facility]    Travel Screening: Not Applicable

## 2020-09-15 DIAGNOSIS — M17.11 PRIMARY OSTEOARTHRITIS OF RIGHT KNEE: ICD-10-CM

## 2020-09-15 DIAGNOSIS — R06.02 SHORTNESS OF BREATH: Primary | ICD-10-CM

## 2020-09-18 ENCOUNTER — TELEPHONE (OUTPATIENT)
Dept: SLEEP MEDICINE | Facility: CLINIC | Age: 65
End: 2020-09-18

## 2020-09-23 ENCOUNTER — OFFICE VISIT (OUTPATIENT)
Dept: FAMILY MEDICINE | Facility: CLINIC | Age: 65
End: 2020-09-23
Payer: MEDICARE

## 2020-09-23 VITALS
SYSTOLIC BLOOD PRESSURE: 137 MMHG | DIASTOLIC BLOOD PRESSURE: 82 MMHG | OXYGEN SATURATION: 97 % | WEIGHT: 211 LBS | TEMPERATURE: 98.2 F | BODY MASS INDEX: 34.06 KG/M2 | RESPIRATION RATE: 16 BRPM | HEART RATE: 77 BPM

## 2020-09-23 DIAGNOSIS — Z23 NEED FOR PROPHYLACTIC VACCINATION AND INOCULATION AGAINST INFLUENZA: ICD-10-CM

## 2020-09-23 DIAGNOSIS — R06.02 SHORTNESS OF BREATH: Primary | ICD-10-CM

## 2020-09-23 DIAGNOSIS — F64.0 GENDER DYSPHORIA IN ADULT: ICD-10-CM

## 2020-09-23 ASSESSMENT — PATIENT HEALTH QUESTIONNAIRE - PHQ9: SUM OF ALL RESPONSES TO PHQ QUESTIONS 1-9: 16

## 2020-09-23 NOTE — PATIENT INSTRUCTIONS
Here is the plan from today's visit    1. Shortness of breath  - CARDIOLOGY EVAL ADULT REFERRAL - EXTERNAL; Future    2. Gender dysphoria in adult  Follow up in 2 weeks and get labs as scheduled     [place] [facility] [place]  Focusing upon Trans health issues in the local community  [phone]    [facility] [place] [facility] ([facility] - [facility]  Provides LGBTQ support groups and counseling  [facility] GLBTQ Support Group: [phone]      [facility]  Provides counseling services and support groups for LGBT-identified youth  [phone]    [facility] - [place]  Open support group for LGBTQ youth  [phone]    [facility]* [facility] - [place]   A monthly gathering for people of all genders; a safe space to gather, network, share our resources and experiences, discuss local and national events in trans* activist communities, and celebrate all our fabulous gender identities and expressions  [phone], ext. [phone]  Email Carl Colin at [email or web address] for more information    Please call or return to clinic if your symptoms don't go away.    Follow up plan  Please make a clinic appointment for follow up with me (DANIEL PERSAUD) in 2  weeks for HRT.    Thank you for coming to [facility] today.  Lab Testing:  **If you had lab testing today and your results are reassuring or normal they will be mailed to you or sent through [employer] within 7 days.   **If the lab tests need quick action we will call you with the results.  The phone number we will call with results is # [phone] (home) . If this is not the best number please call our clinic and change the number.  Medication Refills:  If you need any refills please call your pharmacy and they will contact us.   If you need to  your refill at a new pharmacy, please contact the new pharmacy directly. The new pharmacy will help you get your medications transferred  faster.   Scheduling:  If you have any concerns about today's visit or wish to schedule another appointment please call our office during normal business hours [phone] (8-5:00 M-F)  If a referral was made to a [facility] and you don't get a call from central scheduling please call [phone].  If a Mammogram was ordered for you at [facility] call [phone] to schedule or change your appointment.  If you had an XRay/CT/Ultrasound/MRI ordered the number is [phone] to schedule or change your radiology appointment.   Medical Concerns:  If you have urgent medical concerns please call [phone] at any time of the day.    Susan Coronel MD    Cardiology referral    CARDIOLOGY EVAL ADULT REFERRAL - EXT* [#[id number]]        Priority: Routine  Class: External referral      Future Order Information        Expires on:09/23/2021            Expected by:09/23/2020                      Comment:Patient will  to schedule their own appointment                                 Referral to Dr. Noman Bhardwaj                                 At     this location [facility]                 Address: [address] [place] [address] [address], [place] [address]                 Hours:                 Closes soon   5PM   Opens 8AM Thu                 Phone: [phone]      Associated Diagnoses        R06.02 Shortness of breath

## 2020-09-23 NOTE — PROGRESS NOTES
HPI     Killian is a 65 year old individual that uses pronouns Killian that presents today for follow up of:  masculinizing hormone therapy.     Gender identity: Trans masculine    Any special concerns today?  concerns about how testosterone is making them feel. On 9/14 pt felt like woke up and didn't feel like a human being, but this has happened before being on testosterone. Didn't take T that day due to concern. [place] like couldn't do work around the house and still having the SOB that we've been working on. Felt confused and felt dizzy. Felt like dissociated and depersonalization.  Would happen with therapist. States current therapist found a gender therapist that she will refer to her. Of note 9/14 is when her dad day and 9/17 is when her therapist for 10 years had to leave. Feels like alone on this journey. Goes [facility], but feels like going through something different then they are due to generation difference. Shes a psychiatrist still, who upped Lamictal to 75 mg a week or two ago, has a appt in a few weeks. Has been still taking testosterone, missed a total 5 days.     On hormones?  YES ++ 2 pumps of Androgel     Gender affirmation is being sought in these other ways: HRT and maybe top surgery    ---    Past Surgical History:   Procedure Laterality Date     COLONOSCOPY       EXCISE MASS VAGINA Left 4/10/2015    Procedure: EXCISE MASS VAGINA;  Surgeon: Stanislav Byers MD;  Location: [facility] OR     GENITOURINARY SURGERY      Urethrial dilation     JOINT REPLACEMENT Right 07/2018     ORTHOPEDIC SURGERY      right rotator cuff, left achilles tendon repair, neuroma removed right foot, right knee arthroscopy,        Patient Active Problem List   Diagnosis     Vulvar irritation     CLAIRE (generalized anxiety disorder)     Mass     PTSD (post-traumatic stress disorder)     Other chronic pain     Restless leg syndrome     S/P arthroscopic surgery of right knee     Osteoarthritis of spine with radiculopathy, lumbar region      Chondromalacia of left patella     Chronic bilateral low back pain without sciatica     Chronic joint pain     Complex tear of medial meniscus of left knee as current injury     GERD (gastroesophageal reflux disease)     Glaucoma     IBS (irritable bowel syndrome)     Chronic pain of right knee     Lumbar radiculopathy     Major depressive disorder, recurrent severe without psychotic features (H)     Obesity     Postmenopausal     Primary osteoarthritis of right knee     MDD (major depressive disorder), recurrent severe, 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)       Current Outpatient Medications   Medication Sig Dispense Refill     Acetaminophen (TYLENOL PO) Take 1,000 mg by mouth every 8 hours as needed        cholecalciferol (VITAMIN D3) 5000 units (125 mcg) capsule Take by mouth daily       famotidine (PEPCID) 20 MG tablet Take 20 mg by mouth 2 times daily       gabapentin (NEURONTIN) 300 MG capsule Take 3 capsules (900 mg) by mouth At Bedtime 270 capsule 0     hydrOXYzine (VISTARIL) 50 MG capsule Take 1 capsule (50 mg) by mouth 3 times daily as needed for anxiety 90 capsule 3     lamoTRIgine (LAMICTAL) 25 MG tablet Take 50 mg by mouth daily        Omega-3 Fatty Acids (OMEGA-3 FISH OIL) 1200 MG CAPS        pramipexole (MIRAPEX) 0.25 MG tablet Take 3 tablets (0.75 mg) by mouth At Bedtime 90 tablet 3     testosterone (ANDROGEL 1.62 % PUMP) 20.25 MG/ACT gel Place 40.25 mg onto the skin daily 75 g 0       History   Smoking Status     Former Smoker     Packs/day: 0.00     Quit date: 9/9/1983   Smokeless Tobacco     Never Used          Allergies   Allergen Reactions     Penicillins Rash and Hives     Versed [Midazolam] Other (See Comments)     Stopped breathing  Over 10 years ago but possible sensitivity to too much of this medication  Became apnic       Problem, Medication and Allergy Lists were reviewed and are current..         Review of Systems:        General    Fat redistribution: yes    Weight change: no HEENT    Voice change:  no     Cardiovascular (CV)    Chest Pains: no    Shortness of breath: stable Chest    Decreased exercise tolerance:  no    Breast changes/development: not applicable     Gastrointestinal (GI)    Abdominal pain: no    Change in appetite: no Skin    Acne or oily skin: no    Change in hair: YES     Genitourinary ()    Abnormal vaginal bleeding: no     Decreased spontaneous erections: not applicable    Change in libido: yes    New sexual partners: no Musculoskeletal    Leg pain or swelling: no     Psychiatric (Psych)    Depression: YES    Anxiety/Panic: YES    Mood:  Better today; happy to see me                    Physical Exam:     Vitals:    09/23/20 1618 09/23/20 1621   BP: (!) 145/88 137/82   Pulse: 77    Resp: 16    Temp: 98.2  F (36.8  C)    TempSrc: Oral    SpO2: 97%    Weight: 95.7 kg (211 lb)      BMI= Body mass index is 34.06 kg/m .   Wt Readings from Last 10 Encounters:   09/23/20 95.7 kg (211 lb)   09/02/20 95.2 kg (209 lb 12.8 oz)   08/25/20 95.3 kg (210 lb 3.2 oz)   08/14/20 90.3 kg (199 lb)   08/10/20 97.6 kg (215 lb 3.2 oz)   08/05/20 97.6 kg (215 lb 3.2 oz)   08/04/20 98.4 kg (217 lb)   08/03/20 98.4 kg (217 lb)   07/30/20 97.9 kg (215 lb 12.8 oz)   07/10/20 99.3 kg (219 lb)     Appearance: Male appearance and dress    GENERAL:: healthy, alert and no distress  EYES: Eyes grossly normal to inspection, fundi benign and PERRL  HENT: ear canals normal, TM's normal, Nose normal, Mouth- no ulcers, no lesions  NECK: no adenopathy, no asymmetry, no masses,  and thyroid normal to palpation, supple  RESP: lungs clear to auscultation - no rales, no rhonchi, no wheezes  CV: regular rates and rhythm, normal S1 S2, no S3 or S4 and no murmur, no click or rub -  ABDOMEN: soft, no tenderness, no  hepatosplenomegaly, no masses, normal bowel sounds  SKIN: no suspicious lesions, no rashes  Psych: Alert and oriented times 3; coherent speech, normal rate and volume, able to articulate logical thoughts, able to abstract  "reason, no tangential thoughts, no hallucinations or delusions. Affect is \"nervous\".  Affect: Restricted and Anxious/Nervous           Labs:   Results from last visit:  Orders Only on 08/26/2020   Component Date Value Ref Range Status     FVC-Pred 08/26/2020 3.30  L Final     FVC-Pre 08/26/2020 2.73  L Final     FVC-%Pred-Pre 08/26/2020 82  % Final     FEV1-Pre 08/26/2020 2.38  L Final     FEV1-%Pred-Pre 08/26/2020 92  % Final     FEV1FVC-Pred 08/26/2020 79  % Final     FEV1FVC-Pre 08/26/2020 87  % Final     FEFMax-Pred 08/26/2020 6.43  L/sec Final     FEFMax-Pre 08/26/2020 6.58  L/sec Final     FEFMax-%Pred-Pre 08/26/2020 102  % Final     FEF2575-Pred 08/26/2020 2.24  L/sec Final     FEF2575-Pre 08/26/2020 2.96  L/sec Final     VHE9034-%Pred-Pre 08/26/2020 132  % Final     ExpTime-Pre 08/26/2020 4.66  sec Final     FIFMax-Pre 08/26/2020 6.26  L/sec Final     VC-Pred 08/26/2020 3.46  L Final     VC-Pre 08/26/2020 2.79  L Final     VC-%Pred-Pre 08/26/2020 80  % Final     IC-Pred 08/26/2020 2.95  L Final     IC-Pre 08/26/2020 2.68  L Final     IC-%Pred-Pre 08/26/2020 90  % Final     ERV-Pred 08/26/2020 0.51  L Final     ERV-Pre 08/26/2020 0.11  L Final     ERV-%Pred-Pre 08/26/2020 20  % Final     FEV1FEV6-Pred 08/26/2020 80  % Final     FEV1FEV6-Pre 08/26/2020 87  % Final     FRCPleth-Pred 08/26/2020 2.82  L Final     FRCPleth-Pre 08/26/2020 1.84  L Final     FRCPleth-%Pred-Pre 08/26/2020 65  % Final     RVPleth-Pred 08/26/2020 2.04  L Final     RVPleth-Pre 08/26/2020 1.74  L Final     RVPleth-%Pred-Pre 08/26/2020 84  % Final     TLCPleth-Pred 08/26/2020 5.27  L Final     TLCPleth-Pre 08/26/2020 4.52  L Final     TLCPleth-%Pred-Pre 08/26/2020 85  % Final     DLCOunc-Pred 08/26/2020 21.37  ml/min/mmHg Final     DLCOunc-Pre 08/26/2020 25.55  ml/min/mmHg Final     DLCOunc-%Pred-Pre 08/26/2020 119  % Final     DLCOcor-Pre 08/26/2020 24.13  ml/min/mmHg Final     DLCOcor-%Pred-Pre 08/26/2020 112  % Final     VA-Pre " 08/26/2020 4.07  L Final     VA-%Pred-Pre 08/26/2020 78  % Final     FEV1SVC-Pred 08/26/2020 74  % Final     FEV1SVC-Pre 08/26/2020 85  % Final       Assessment and Plan     1. Gender dysphoria, in an adult  - Cont testosterone at this time   - Check with NP about mood and lamotrigine increase timing   - Keep scheduled follow up for labs    2. SOB  - Referral placed to [facility] cardiologist per pt request  - Work up for this has been unremarkable thus far and may be more psychogenic in nature    Contraception:   abstinence  .     Counselled patient about controlled substances: Yes. Details: understands      Follow up:  Follow up in 2 weeks.  Results by mychart  Questions were elicited and answered.     Susan Coronel MD

## 2020-09-23 NOTE — PROGRESS NOTES
Preceptor Attestation:    Patient seen and evaluated in person. I discussed the patient with the resident. I have verified the content of the note, which accurately reflects my assessment of the patient and the plan of care.   Supervising Physician:  Kim Taylor MD.

## 2020-10-02 DIAGNOSIS — F64.0 GENDER DYSPHORIA IN ADULT: ICD-10-CM

## 2020-10-02 DIAGNOSIS — E83.19 OTHER DISORDERS OF IRON METABOLISM: ICD-10-CM

## 2020-10-02 LAB
% GRANULOCYTES: 61.5 %G (ref 40–75)
ALBUMIN SERPL-MCNC: 4.4 MG/DL (ref 3.5–4.7)
ALP SERPL-CCNC: 67.1 U/L (ref 31.7–110.5)
ALT SERPL-CCNC: 18.2 U/L (ref 0–45)
AST SERPL-CCNC: 20.6 U/L (ref 0–45)
BILIRUB SERPL-MCNC: 0.5 MG/DL (ref 0.2–1.3)
BILIRUBIN DIRECT: 0.2 MG/DL (ref 0.1–0.3)
CHOLEST SERPL-MCNC: 179.9 MG/DL (ref 0–200)
CHOLEST/HDLC SERPL: 4.3 {RATIO} (ref 0–5)
FERRITIN SERPL-MCNC: 28 NG/ML (ref 8–252)
GLUCOSE SERPL-MCNC: 111 MG'DL (ref 70–99)
GRANULOCYTES #: 2.8 K/UL (ref 1.6–8.3)
HCT VFR BLD AUTO: 43.5 % (ref 35–47)
HDLC SERPL-MCNC: 42 MG/DL
HEMOGLOBIN: 13.8 G/DL (ref 11.7–15.7)
LDLC SERPL CALC-MCNC: 115 MG/DL (ref 0–129)
LYMPHOCYTES # BLD AUTO: 1.5 K/UL (ref 0.8–5.3)
LYMPHOCYTES NFR BLD AUTO: 32 %L (ref 20–48)
MCH RBC QN AUTO: 28.6 PG (ref 26.5–35)
MCHC RBC AUTO-ENTMCNC: 31.7 G/DL (ref 32–36)
MCV RBC AUTO: 90.2 FL (ref 78–100)
MID #: 0.3 K/UL (ref 0–2.2)
MID %: 6.5 %M (ref 0–20)
PLATELET # BLD AUTO: 170 K/UL (ref 150–450)
PROT SERPL-MCNC: 6.6 G/DL (ref 6.8–8.8)
RBC # BLD AUTO: 4.82 M/UL (ref 3.8–5.2)
TRIGL SERPL-MCNC: 114.2 MG/DL (ref 0–150)
VLDL CHOLESTEROL: 22.8 MG/DL (ref 7–32)
WBC # BLD AUTO: 4.6 K/UL (ref 4–11)

## 2020-10-02 PROCEDURE — 85025 COMPLETE CBC W/AUTO DIFF WBC: CPT

## 2020-10-02 PROCEDURE — 82728 ASSAY OF FERRITIN: CPT | Performed by: FAMILY MEDICINE

## 2020-10-02 PROCEDURE — 36415 COLL VENOUS BLD VENIPUNCTURE: CPT

## 2020-10-02 PROCEDURE — 82947 ASSAY GLUCOSE BLOOD QUANT: CPT

## 2020-10-02 PROCEDURE — 80061 LIPID PANEL: CPT

## 2020-10-02 PROCEDURE — 80076 HEPATIC FUNCTION PANEL: CPT

## 2020-10-02 PROCEDURE — 84403 ASSAY OF TOTAL TESTOSTERONE: CPT | Performed by: FAMILY MEDICINE

## 2020-10-06 LAB — TESTOST SERPL-MCNC: 122 NG/DL (ref 8–60)

## 2020-10-09 ENCOUNTER — MYC REFILL (OUTPATIENT)
Dept: FAMILY MEDICINE | Facility: CLINIC | Age: 65
End: 2020-10-09

## 2020-10-09 DIAGNOSIS — F64.0 GENDER DYSPHORIA IN ADULT: ICD-10-CM

## 2020-10-09 NOTE — TELEPHONE ENCOUNTER

## 2020-10-12 ENCOUNTER — TELEPHONE (OUTPATIENT)
Dept: SLEEP MEDICINE | Facility: CLINIC | Age: 65
End: 2020-10-12

## 2020-10-12 NOTE — TELEPHONE ENCOUNTER
Pt late cxld psg scheduled on 10/9. Attempted to reschedule. LVM to call sleep lab. Will cancel results appt.

## 2020-10-13 ENCOUNTER — TRANSFERRED RECORDS (OUTPATIENT)
Dept: HEALTH INFORMATION MANAGEMENT | Facility: CLINIC | Age: 65
End: 2020-10-13

## 2020-10-13 ENCOUNTER — DOCUMENTATION ONLY (OUTPATIENT)
Dept: SLEEP MEDICINE | Facility: CLINIC | Age: 65
End: 2020-10-13

## 2020-10-13 ENCOUNTER — THERAPY VISIT (OUTPATIENT)
Dept: SLEEP MEDICINE | Facility: CLINIC | Age: 65
End: 2020-10-13
Payer: MEDICARE

## 2020-10-13 DIAGNOSIS — G47.9 SLEEP DISTURBANCE: ICD-10-CM

## 2020-10-13 LAB — SLPCOMP: NORMAL

## 2020-10-13 PROCEDURE — 95810 POLYSOM 6/> YRS 4/> PARAM: CPT | Performed by: INTERNAL MEDICINE

## 2020-10-14 RX ORDER — TESTOSTERONE 1.62 MG/G
40.25 GEL TRANSDERMAL DAILY
Qty: 75 G | Refills: 1 | Status: SHIPPED | OUTPATIENT
Start: 2020-10-14 | End: 2020-10-15

## 2020-10-14 RX ORDER — TESTOSTERONE 1.62 MG/G
40.25 GEL TRANSDERMAL DAILY
Qty: 75 G | Refills: 0 | Status: SHIPPED | OUTPATIENT
Start: 2020-10-14 | End: 2020-10-14

## 2020-10-14 NOTE — PATIENT INSTRUCTIONS
[place] [facility]    1. Your sleep study will be reviewed by a sleep physician within the next few days.     2. Please follow up in the sleep clinic as scheduled, or, make an appointment with your sleep provider to be seen within two weeks to discuss the results of the sleep study.    3. If you have any questions or problems with your treatment plan, please contact your sleep clinic provider at [phone] to further manage your condition.    4. Please review your attached medication list, and, at your follow-up appointment advise your sleep clinic provider about any changes.    5. Go to [email or web address] for more information about your sleep problems.    Arie Chavez, RPSGT  October 14, 2020

## 2020-10-15 RX ORDER — TESTOSTERONE 1.62 MG/G
40.25 GEL TRANSDERMAL DAILY
Qty: 75 G | Refills: 1 | Status: SHIPPED | OUTPATIENT
Start: 2020-10-15 | End: 2020-10-19

## 2020-10-15 NOTE — TELEPHONE ENCOUNTER
Med was local printed yesterday. Routing to preceptor dr. Land to [facility]    Sol Rivera RN

## 2020-10-19 ENCOUNTER — OFFICE VISIT (OUTPATIENT)
Dept: FAMILY MEDICINE | Facility: CLINIC | Age: 65
End: 2020-10-19
Payer: MEDICARE

## 2020-10-19 VITALS
HEART RATE: 68 BPM | WEIGHT: 212.6 LBS | RESPIRATION RATE: 16 BRPM | OXYGEN SATURATION: 95 % | TEMPERATURE: 98.3 F | DIASTOLIC BLOOD PRESSURE: 83 MMHG | BODY MASS INDEX: 34.31 KG/M2 | SYSTOLIC BLOOD PRESSURE: 121 MMHG

## 2020-10-19 DIAGNOSIS — F64.0 GENDER DYSPHORIA IN ADULT: Primary | ICD-10-CM

## 2020-10-19 DIAGNOSIS — E66.811 CLASS 1 OBESITY DUE TO EXCESS CALORIES WITHOUT SERIOUS COMORBIDITY WITH BODY MASS INDEX (BMI) OF 34.0 TO 34.9 IN ADULT: ICD-10-CM

## 2020-10-19 DIAGNOSIS — E66.09 CLASS 1 OBESITY DUE TO EXCESS CALORIES WITHOUT SERIOUS COMORBIDITY WITH BODY MASS INDEX (BMI) OF 34.0 TO 34.9 IN ADULT: ICD-10-CM

## 2020-10-19 PROCEDURE — 99214 OFFICE O/P EST MOD 30 MIN: CPT | Mod: GC | Performed by: STUDENT IN AN ORGANIZED HEALTH CARE EDUCATION/TRAINING PROGRAM

## 2020-10-19 RX ORDER — TESTOSTERONE 1.62 MG/G
3 GEL TRANSDERMAL DAILY
Qty: 75 G | Refills: 1 | COMMUNITY
Start: 2020-10-19 | End: 2022-10-04

## 2020-10-19 NOTE — PATIENT INSTRUCTIONS
Here is the plan from today's visit    1. Gender dysphoria in adult  Go up to 3 pumps daily  Follow up labs in 1 month  - testosterone (ANDROGEL 1.62 % PUMP) 20.25 MG/ACT gel; Place 3 Pump onto the skin daily  Dispense: 75 g; Refill: 1    2. Class 1 obesity due to excess calories without serious comorbidity with body mass index (BMI) of 34.0 to 34.9 in adult  - Take multivitamin daily now that you are vegan   [facility]      Please call or return to clinic if your symptoms don't go away.    Follow up plan  Please make a clinic appointment for follow up with me (DANIEL PERSAUD) in 1  month for HRT.    Thank you for coming to [facility] today.  Lab Testing:  **If you had lab testing today and your results are reassuring or normal they will be mailed to you or sent through kompany within 7 days.   **If the lab tests need quick action we will call you with the results.  The phone number we will call with results is # [phone] (home) . If this is not the best number please call our clinic and change the number.  Medication Refills:  If you need any refills please call your pharmacy and they will contact us.   If you need to  your refill at a new pharmacy, please contact the new pharmacy directly. The new pharmacy will help you get your medications transferred faster.   Scheduling:  If you have any concerns about today's visit or wish to schedule another appointment please call our office during normal business hours [phone] (8-5:00 M-F)  If a referral was made to a [facility] and you don't get a call from central scheduling please call [phone].  If a Mammogram was ordered for you at [facility] call [phone] to schedule or change your appointment.  If you had an XRay/CT/Ultrasound/MRI ordered the number is [phone] to schedule or change your radiology appointment.   Medical Concerns:  If you have urgent  medical concerns please call [phone] at any time of the day.    Susan Coronel MD

## 2020-10-19 NOTE — PROGRESS NOTES
CECELIA Daily is a 65 year old individual that uses pronouns They/Them/Their/Theirs that presents today for follow up of:  masculinizing hormone therapy.     Gender identity: Transmaculine    Any special concerns today?  Seeing a cardiologist at [facility] wanting on Sleep study to determine if needs to see Pulmonologist, referral placed.     On hormones?  YES +++ Androgel 2 pumps daily, leaves on 3-4 hours     Due for labs?  No      +++ Refills of meds needed?  No  No skin rashes; feels like this is a journey, still finding a community, seeing a new therapist at [facility] for gender care and hopefully CDPTSD. Feels they are playing into each other. Family seems to get it, but not wanting to hear about it. Her family doesn't like to talk about mental house. Feels like family is leaving them, but finding their own way to deal with it, like audio tapes. Trying to find self. Day treatment helps a lot, calls them by their pronouns. Wears a binder now and then, has some trouble getting it on and off. Feels better with it on. Being misgendered hurts.     Gender affirmation is being sought in these other ways: hormones, interested in Top Surgery    Enjoys snowshoeing and bare feet in the snow on their balcony and hiking in the storms.     Stresses out about weight, feels like eats a lot at night due to stress and self soothing, but knows shouldn't do it. Hasn't talked to her psychiatrist or therapist. OT is helping.   ---    Past Surgical History:   Procedure Laterality Date     COLONOSCOPY       EXCISE MASS VAGINA Left 4/10/2015    Procedure: EXCISE MASS VAGINA;  Surgeon: Stanislav Byers MD;  Location: [facility] OR     GENITOURINARY SURGERY      Urethrial dilation     JOINT REPLACEMENT Right 07/2018     ORTHOPEDIC SURGERY      right rotator cuff, left achilles tendon repair, neuroma removed right foot, right knee arthroscopy,        Patient Active Problem List   Diagnosis     Vulvar irritation     CLAIRE (generalized anxiety disorder)      Mass     PTSD (post-traumatic stress disorder)     Other chronic pain     Restless leg syndrome     S/P arthroscopic surgery of right knee     Osteoarthritis of spine with radiculopathy, lumbar region     Chondromalacia of left patella     Chronic bilateral low back pain without sciatica     Chronic joint pain     Complex tear of medial meniscus of left knee as current injury     GERD (gastroesophageal reflux disease)     Glaucoma     IBS (irritable bowel syndrome)     Chronic pain of right knee     Lumbar radiculopathy     Major depressive disorder, recurrent severe without psychotic features (H)     Obesity     Postmenopausal     Primary osteoarthritis of right knee     MDD (major depressive disorder), recurrent severe, 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)       Current Outpatient Medications   Medication Sig Dispense Refill     Acetaminophen (TYLENOL PO) Take 1,000 mg by mouth every 8 hours as needed        cholecalciferol (VITAMIN D3) 5000 units (125 mcg) capsule Take by mouth daily       famotidine (PEPCID) 20 MG tablet Take 20 mg by mouth 2 times daily       gabapentin (NEURONTIN) 300 MG capsule Take 3 capsules (900 mg) by mouth At Bedtime 270 capsule 0     hydrOXYzine (VISTARIL) 50 MG capsule Take 1 capsule (50 mg) by mouth 3 times daily as needed for anxiety 90 capsule 3     lamoTRIgine (LAMICTAL) 25 MG tablet Take 50 mg by mouth daily        Omega-3 Fatty Acids (OMEGA-3 FISH OIL) 1200 MG CAPS        pramipexole (MIRAPEX) 0.25 MG tablet Take 3 tablets (0.75 mg) by mouth At Bedtime 90 tablet 3     testosterone (ANDROGEL 1.62 % PUMP) 20.25 MG/ACT gel Place 3 Pump onto the skin daily 75 g 1       History   Smoking Status     Former Smoker     Packs/day: 0.00     Quit date: 9/9/1983   Smokeless Tobacco     Never Used          Allergies   Allergen Reactions     Penicillins Rash and Hives     Versed [Midazolam] Other (See Comments)     Stopped breathing  Over 10 years ago but possible sensitivity to too much of this  medication  Became apnic       Problem, Medication and Allergy Lists were reviewed and are current..         Review of Systems:        General    Fat redistribution: YES    Weight change: no HEENT    Voice change: YES- getting cracky     Cardiovascular (CV)    Chest Pains: no    Shortness of breath: no Chest    Decreased exercise tolerance:  no    Breast changes/development: no     Gastrointestinal (GI)    Abdominal pain: IBS symptoms     Change in appetite: YES- night time eating (not new) Skin    Acne or oily skin: no    Change in hair: hair dry     Genitourinary ()    Abnormal vaginal bleeding: no     Decreased spontaneous erections: not applicable    Change in libido: YES    New sexual partners: no Musculoskeletal    Leg pain or swelling: no    Feels gaining more muscle     Psychiatric (Psych)    Depression: YES    Anxiety/Panic: YES    Mood:  Pretty good                    Physical Exam:     Vitals:    10/19/20 1554   BP: 121/83   Pulse: 68   Resp: 16   Temp: 98.3  F (36.8  C)   TempSrc: Oral   SpO2: 95%   Weight: 96.4 kg (212 lb 9.6 oz)     BMI= Body mass index is 34.31 kg/m .   Wt Readings from Last 10 Encounters:   10/19/20 96.4 kg (212 lb 9.6 oz)   09/23/20 95.7 kg (211 lb)   09/02/20 95.2 kg (209 lb 12.8 oz)   08/25/20 95.3 kg (210 lb 3.2 oz)   08/14/20 90.3 kg (199 lb)   08/10/20 97.6 kg (215 lb 3.2 oz)   08/05/20 97.6 kg (215 lb 3.2 oz)   08/04/20 98.4 kg (217 lb)   08/03/20 98.4 kg (217 lb)   07/30/20 97.9 kg (215 lb 12.8 oz)     Appearance: Male appearance and dress    GENERAL:: healthy, alert and no distress  EYES: Eyes grossly normal to inspection, fundi benign and PERRL  RESP: lungs clear to auscultation - no rales, no rhonchi, no wheezes  CV: regular rates and rhythm, normal S1 S2, no S3 or S4 and no murmur, no click or rub -  ABDOMEN: soft, no tenderness, no  hepatosplenomegaly, no masses, normal bowel sounds  MS: extremities normal- no gross deformities noted, no edema  SKIN: no suspicious  lesions, no rashes  Psych: Alert and oriented times 3; coherent speech, normal rate and volume, able to articulate logical thoughts, able to abstract reason, no tangential thoughts, no hallucinations or delusions. Affect is pretty good.  Affect: Appropriate/mood-congruent           Labs:   Results from last visit:  Orders Only on 10/02/2020   Component Date Value Ref Range Status     Glucose 10/02/2020 111.0* 70.0 - 99.0 mg'dL Final     Cholesterol 10/02/2020 179.9  0.0 - 200.0 mg/dL Final     Cholesterol/HDL Ratio 10/02/2020 4.3  0.0 - 5.0 Final     HDL Cholesterol 10/02/2020 42.0  >40.0 mg/dL Final     Triglycerides 10/02/2020 114.2  0.0 - 150.0 mg/dL Final     VLDL Cholesterol 10/02/2020 22.8  7.0 - 32.0 mg/dL Final     LDL Cholesterol Calculated 10/02/2020 115  0 - 129 mg/dL Final     WBC 10/02/2020 4.6  4.0 - 11.0 K/uL Final     Lymphocytes # 10/02/2020 1.5  0.8 - 5.3 K/uL Final     % Lymphocytes 10/02/2020 32.0  20.0 - 48.0 %L Final     Mid # 10/02/2020 0.3  0.0 - 2.2 K/uL Final     Mid % 10/02/2020 6.5  0.0 - 20.0 %M Final     GRANULOCYTES # 10/02/2020 2.8  1.6 - 8.3 K/uL Final     % Granulocytes 10/02/2020 61.5  40.0 - 75.0 %G Final     RBC 10/02/2020 4.82  3.80 - 5.20 M/uL Final     Hemoglobin 10/02/2020 13.8  11.7 - 15.7 g/dL Final     Hematocrit 10/02/2020 43.5  35.0 - 47.0 % Final     MCV 10/02/2020 90.2  78.0 - 100.0 fL Final     MCH 10/02/2020 28.6  26.5 - 35.0 pg Final     MCHC 10/02/2020 31.7* 32.0 - 36.0 g/dL Final     Platelets 10/02/2020 170.0  150.0 - 450.0 K/uL Final     Protein Total 10/02/2020 6.6* 6.8 - 8.8 g/dL Final     Albumin 10/02/2020 4.4  3.5 - 4.7 mg/dL Final     Alkaline Phosphatase 10/02/2020 67.1  31.7 - 110.5 U/L Final     ALT 10/02/2020 18.2  0.0 - 45.0 U/L Final     AST 10/02/2020 20.6  0.0 - 45.0 U/L Final     Bilirubin Direct 10/02/2020 0.2  0.1 - 0.3 mg/dL Final     Bilirubin Total 10/02/2020 0.5  0.2 - 1.3 mg/dL Final     Testosterone Total 10/02/2020 122* 8 - 60 ng/dL Final     Comment: This test was developed and its performance characteristics determined by the   [facility].   It has not been cleared or approved by the FDA. The laboratory is regulated   under CLIA as qualified to perform high-complexity testing. This test is used   for clinical purposes. It should not be regarded as investigational or for   research.       Ferritin 10/02/2020 28  8 - 252 ng/mL Final       Assessment and Plan     1. Gender dysphoria in adult  Pt doing well with small changes and would like to keep increasing at a slow rate. Labs reassuring. Will get repeat labs in 1 month followed by visit.   - testosterone (ANDROGEL 1.62 % PUMP) 20.25 MG/ACT gel; Place 3 Pump onto the skin daily  Dispense: 75 g; Refill: 1  - Testosterone Total; Future  - Hepatic Panel; Future  - CBC with Diff Plt; Future  - Lipid Cascade; Future  - Glucose; Future    2. Class 1 obesity due to excess calories without serious comorbidity with body mass index (BMI) of 34.0 to 34.9 in adult  Pt has been struggling to lose weight and may help with anxiety/depression as well as concerns for ASHLYN.   - WEIGHT MANANGEMENT/[facility] LIFESTYLE PROGRAM REFERRAL - INTERNAL    Contraception:   abstinence     Counselled patient about controlled substances: Yes    Follow up:  Follow up in 1 month.  Results by mychart  Questions were elicited and answered.     Susan Coronel MD  PGY 2 FM

## 2020-10-24 NOTE — PROCEDURES
" SLEEP STUDY INTERPRETATION  DIAGNOSTIC POLYSOMNOGRAPHY REPORT      Patient: DONNIE MISHRA  YOB: 1955  Study Date: 10/13/2020  MRN: [id number]  Referring Provider: Self  Ordering Provider: Yeny Boyd MD    Indications for Polysomnography: The patient is a 65-year-old Female who is 5' 6\" and weighs 210.0 lbs. Her BMI is 34.2, Ogden sleepiness scale 2 and neck circumference is 38 cm. Relevant medical history includes major depression and anxiety. A diagnostic polysomnogram was performed to evaluate for sleep apnea/hypoxemia.    Polysomnogram Data: A full night polysomnogram recorded the standard physiologic parameters including EEG, EOG, EMG, ECG, nasal and oral airflow. Respiratory parameters of chest and abdominal movements were recorded with respiratory inductance plethysmography. Oxygen saturation was recorded by pulse oximetry. Hypopnea scoring rule used: 1B 4%.    Sleep Architecture: Sleep architecture was remarkable for prolonged initial sleep latency despite several sleep aids, sleep fragmentation and reduced sleep efficiency.  All sleep stages were observed.  Both stages N3 and REM were increased suggestive of possible sleep deprivation.    The total recording time of the polysomnogram was 449.3 minutes. The total sleep time was 359.5 minutes. Sleep latency was increased at 42.5 minutes with the use of a sleep aid (medical marijuana, gabapentin, hydroxyzine and Trazadone). REM latency was 145.5 minutes. Arousal index was increased at 30.4 arousals per hour. Sleep efficiency was decreased at 80.0%. Wake after sleep onset was 47.0 minutes. The patient spent 7.5% of total sleep time in Stage N1, 33.0% in Stage N2, 31.7% in Stage N3, and 27.8% in REM. Time in REM supine was - minutes.    Respiration: Moderate obstructive sleep apnea was present, with obstructive events during both supine and non-supine sleep, but pronounced during supine sleep.  Since REM sleep in supine position " was not observed during the study, it is plausible that the overall severity of the sleep-related breathing disorder may have been underestimated.  Sleep associated hypoxemia was present.    Events ? The polysomnogram revealed a presence of 6 obstructive, 6 centrals, and - mixed apneas resulting in an apnea index of 2.0 events per hour. There were 114 obstructive hypopneas and - central hypopneas resulting in an obstructive hypopnea index of 19.0 and central hypopnea index of - events per hour. The combined apnea/hypopnea index was 21.0 events per hour (central apnea/hypopnea index was 1.0 events per hour). The REM AHI was 11.4 events per hour. The supine AHI was 33.8 events per hour. The RERA index was 1.0 events per hour.  The RDI was 22.0 events per hour.    Snoring - was reported as moderate and intermittent.    Respiratory rate and pattern - was notable for normal respiratory rate and pattern.    Sustained Sleep Associated Hypoventilation - Transcutaneous carbon dioxide monitoring was not used.    Sleep Associated Hypoxemia - (Greater than 5 minutes O2 sat at or below 88%) was present. Baseline oxygen saturation was 91.2%. Lowest oxygen saturation was 79.1%. Time spent less than or equal to 88% was 20.9 minutes. Time spent less than or equal to 89% was 75.3 minutes.    Movement Activity: There were no significant limb movements during the study.  There were no abnormal sleep-related behaviors during the study.    Periodic Limb Activity - There were 36 PLMs during the entire study. The PLM index was 6.0 movements per hour. The PLM Arousal Index was 4.8 per hour.    REM EMG Activity - Excessive transient/sustained muscle activity was not present.    Nocturnal Behavior - Abnormal sleep related behaviors were not noted during/arising out of NREM / REM sleep.     Bruxism - None apparent.    Cardiac Summary: Normal sinus rhythm was noted.  The average pulse rate was 56.7 bpm. The minimum pulse rate during sleep was  49 bpm while the maximum pulse rate was 83.2 bpm.  Arrhythmias were not noted.      Assessment:     Moderate obstructive sleep apnea was present, with obstructive events during both supine and non-supine sleep, but pronounced during supine sleep.  Since REM sleep in supine position was not observed during the study, it is plausible that the overall severity of the sleep-related breathing disorder may have been underestimated.  Sleep associated hypoxemia was present.    Sleep architecture was remarkable for prolonged initial sleep latency despite several sleep aids, sleep fragmentation and reduced sleep efficiency.  All sleep stages were observed.  Both stages N3 and REM were increased suggestive of possible sleep deprivation.    There were no significant limb movements during the study.  There were no abnormal sleep-related behaviors during the study.        Normal sinus rhythm was noted.      Recommendations:    Based on the presence of moderate obstructive sleep apnea, treatment could be empirically initiated with Auto titrating CPAP therapy with a range of 5 to 15 cmH2O. Recommend clinical follow up with sleep management team.    Patient may be a candidate for combination treatment using positional therapy along with dental appliance through referral to Sleep Dentistry for the treatment of obstructive sleep apnea and/or socially disruptive snoring.    If devices are not acceptable or effective, patient may benefit from evaluation of possible surgical options. If she is interested, would recommend referral to specialized ENT-Sleep provider.    Advice regarding the risks of drowsy driving.    Suggest optimizing sleep schedule and avoiding sleep deprivation.    Weight management (if BMI > 30).    Pharmacologic therapy should be used for management of restless legs syndrome only if present and clinically indicated and not based on the presence of periodic limb movements alone.    Diagnostic Codes:   Obstructive Sleep  Apnea G47.33  Sleep Hypoxemia/Hypoventilation G47.36   Repetitive Intrusions Into Sleep F51.8    10/13/2020 [facility] Sleep Study (210.0 lbs) - AHI 21.0, RDI 22.0, Supine AHI 33.8, REM AHI 11.4, Low O2 79.1%, Time Spent ?88% 20.9 minutes / Time Spent ?89% 75.3 minutes.     _____________________________________   Electronically Signed By: (Yeny Boyd MD), 10/23/2020

## 2020-11-17 ENCOUNTER — DOCUMENTATION ONLY (OUTPATIENT)
Dept: FAMILY MEDICINE | Facility: CLINIC | Age: 65
End: 2020-11-17

## 2020-11-17 NOTE — PROGRESS NOTES
"When opening a documentation only encounter, be sure to enter in \"Chief Complaint\" Forms and in \" Comments\" Title of form, description if needed.    Killian is a 65 year old  adult  Form received via: Fax  Form now resides in: Provider Ready    Pat Bonilla MA                  "

## 2020-11-19 DIAGNOSIS — F64.0 GENDER DYSPHORIA IN ADULT: ICD-10-CM

## 2020-11-19 LAB
% GRANULOCYTES: 65.9 %G (ref 40–75)
ALBUMIN SERPL-MCNC: 4.6 MG/DL (ref 3.5–4.7)
ALP SERPL-CCNC: 71.8 U/L (ref 31.7–110.5)
ALT SERPL-CCNC: 21.6 U/L (ref 0–45)
AST SERPL-CCNC: 18.1 U/L (ref 0–45)
BILIRUB SERPL-MCNC: 0.6 MG/DL (ref 0.2–1.3)
BILIRUBIN DIRECT: 0.2 MG/DL (ref 0.1–0.3)
CHOLEST SERPL-MCNC: 217.3 MG/DL (ref 0–200)
CHOLEST/HDLC SERPL: 4.5 {RATIO} (ref 0–5)
GLUCOSE SERPL-MCNC: 93 MG'DL (ref 70–99)
GRANULOCYTES #: 3.9 K/UL (ref 1.6–8.3)
HCT VFR BLD AUTO: 45.3 % (ref 35–47)
HDLC SERPL-MCNC: 48 MG/DL
HEMOGLOBIN: 14.2 G/DL (ref 11.7–15.7)
LDLC SERPL CALC-MCNC: 138 MG/DL (ref 0–129)
LYMPHOCYTES # BLD AUTO: 1.7 K/UL (ref 0.8–5.3)
LYMPHOCYTES NFR BLD AUTO: 29.1 %L (ref 20–48)
MCH RBC QN AUTO: 28.7 PG (ref 26.5–35)
MCHC RBC AUTO-ENTMCNC: 31.3 G/DL (ref 32–36)
MCV RBC AUTO: 91.6 FL (ref 78–100)
MID #: 0.3 K/UL (ref 0–2.2)
MID %: 5 %M (ref 0–20)
PLATELET # BLD AUTO: 197 K/UL (ref 150–450)
PROT SERPL-MCNC: 7 G/DL (ref 6.8–8.8)
RBC # BLD AUTO: 4.94 M/UL (ref 3.8–5.2)
TRIGL SERPL-MCNC: 155.7 MG/DL (ref 0–150)
VLDL CHOLESTEROL: 31.1 MG/DL (ref 7–32)
WBC # BLD AUTO: 5.9 K/UL (ref 4–11)

## 2020-11-19 PROCEDURE — 82947 ASSAY GLUCOSE BLOOD QUANT: CPT

## 2020-11-19 PROCEDURE — 80076 HEPATIC FUNCTION PANEL: CPT

## 2020-11-19 PROCEDURE — 84403 ASSAY OF TOTAL TESTOSTERONE: CPT | Performed by: FAMILY MEDICINE

## 2020-11-19 PROCEDURE — 80061 LIPID PANEL: CPT

## 2020-11-19 PROCEDURE — 85025 COMPLETE CBC W/AUTO DIFF WBC: CPT

## 2020-11-19 PROCEDURE — 99000 SPECIMEN HANDLING OFFICE-LAB: CPT | Performed by: FAMILY MEDICINE

## 2020-11-19 PROCEDURE — 36415 COLL VENOUS BLD VENIPUNCTURE: CPT

## 2020-11-20 ENCOUNTER — DOCUMENTATION ONLY (OUTPATIENT)
Dept: FAMILY MEDICINE | Facility: CLINIC | Age: 65
End: 2020-11-20

## 2020-11-20 ENCOUNTER — VIRTUAL VISIT (OUTPATIENT)
Dept: SLEEP MEDICINE | Facility: CLINIC | Age: 65
End: 2020-11-20
Payer: MEDICARE

## 2020-11-20 DIAGNOSIS — G47.33 OSA (OBSTRUCTIVE SLEEP APNEA): Primary | ICD-10-CM

## 2020-11-20 PROCEDURE — 99213 OFFICE O/P EST LOW 20 MIN: CPT | Mod: 95 | Performed by: INTERNAL MEDICINE

## 2020-11-20 NOTE — PROGRESS NOTES
"When opening a documentation only encounter, be sure to enter in \"Chief Complaint\" Forms and in \" Comments\" Title of form, description if needed.    Killian is a 65 year old  adult  Form received via: Fax  Form now resides in: Provider Ready    Radha Moseley MA      Form has been completed by provider.     Form sent out via: Fax to Level of need Assessment form at Fax Number: [phone]  Patient informed: No, Reason:n/a  Output date: November 24, 2020    Radha Moseley MA      **Please close the encounter**                    "

## 2020-11-20 NOTE — PROGRESS NOTES
"Gregoria Stein is a 65 year old adult who is being evaluated via a billable video visit.      The patient has been notified of following:     \"This video visit will be conducted via a call between you and your physician/provider. We have found that certain health care needs can be provided without the need for an in-person physical exam.  This service lets us provide the care you need with a video conversation.  If a prescription is necessary we can send it directly to your pharmacy.  If lab work is needed we can place an order for that and you can then stop by our lab to have the test done at a later time.    Video visits are billed at different rates depending on your insurance coverage.  Please reach out to your insurance provider with any questions.    If during the course of the call the physician/provider feels a video visit is not appropriate, you will not be charged for this service.\"    Patient has given verbal consent for Video visit? Yes  How would you like to obtain your AVS? MyChart  If you are dropped from the video visit, the video invite should be resent to: Text to cell phone: [phone]  Will anyone else be joining your video visit? No        Video-Visit Details    Type of service:  Video Visit    Video Start Time: 1:10PM  Video End Time: 1:30 PM    Originating Location (pt. Location): Home    Distant Location (provider location):  [facility] SLEEP [facility]     Platform used for Video Visit: Kesha Boyd MD  [facility]   [address], [address]   [address] [place], [place] [address]. [place] [address]   Appointments: [phone]      Sleep clinic follow up visit note:    Date on this visit: 11/20/2020    Primary Physician: Susan Coronel     Chief complaint: review sleep study results    Gregoria Stein 65 year old with PMH of major depression and anxiety. A diagnostic polysomnogram was performed " on 10/13/2020  to evaluate for sleep apnea/hypoxemia.  Patient presents for virtual visit today to review the sleep study results.     Sleep study results:  Study Date: 10/13/2020  Polysomnogram Data: A full night polysomnogram recorded the standard physiologic parameters including EEG, EOG, EMG, ECG, nasal and oral airflow. Respiratory parameters of chest and abdominal movements were recorded with respiratory inductance plethysmography. Oxygen saturation was recorded by pulse oximetry. Hypopnea scoring rule used: 1B 4%.     Sleep Architecture: Sleep architecture was remarkable for prolonged initial sleep latency despite several sleep aids, sleep fragmentation and reduced sleep efficiency.  All sleep stages were observed.  Both stages N3 and REM were increased suggestive of possible sleep deprivation.    The total recording time of the polysomnogram was 449.3 minutes. The total sleep time was 359.5 minutes. Sleep latency was increased at 42.5 minutes with the use of a sleep aid (medical marijuana, gabapentin, hydroxyzine and Trazadone). REM latency was 145.5 minutes. Arousal index was increased at 30.4 arousals per hour. Sleep efficiency was decreased at 80.0%. Wake after sleep onset was 47.0 minutes. The patient spent 7.5% of total sleep time in Stage N1, 33.0% in Stage N2, 31.7% in Stage N3, and 27.8% in REM. Time in REM supine was - minutes.     Respiration: Moderate obstructive sleep apnea was present, with obstructive events during both supine and non-supine sleep, but pronounced during supine sleep.  Since REM sleep in supine position was not observed during the study, it is plausible that the overall severity of the sleep-related breathing disorder may have been underestimated.  Sleep associated hypoxemia was present.    Events ? The polysomnogram revealed a presence of 6 obstructive, 6 centrals, and - mixed apneas resulting in an apnea index of 2.0 events per hour. There were 114 obstructive hypopneas and -  central hypopneas resulting in an obstructive hypopnea index of 19.0 and central hypopnea index of - events per hour. The combined apnea/hypopnea index was 21.0 events per hour (central apnea/hypopnea index was 1.0 events per hour). The REM AHI was 11.4 events per hour. The supine AHI was 33.8 events per hour. The RERA index was 1.0 events per hour.  The RDI was 22.0 events per hour.    Snoring - was reported as moderate and intermittent.    Respiratory rate and pattern - was notable for normal respiratory rate and pattern.    Sustained Sleep Associated Hypoventilation - Transcutaneous carbon dioxide monitoring was not used.    Sleep Associated Hypoxemia - (Greater than 5 minutes O2 sat at or below 88%) was present. Baseline oxygen saturation was 91.2%. Lowest oxygen saturation was 79.1%. Time spent less than or equal to 88% was 20.9 minutes. Time spent less than or equal to 89% was 75.3 minutes.     Movement Activity: There were no significant limb movements during the study.  There were no abnormal sleep-related behaviors during the study.    Periodic Limb Activity - There were 36 PLMs during the entire study. The PLM index was 6.0 movements per hour. The PLM Arousal Index was 4.8 per hour.    REM EMG Activity - Excessive transient/sustained muscle activity was not present.    Nocturnal Behavior - Abnormal sleep related behaviors were not noted during/arising out of NREM / REM sleep.     Bruxism - None apparent.     Cardiac Summary: Normal sinus rhythm was noted.  The average pulse rate was 56.7 bpm. The minimum pulse rate during sleep was 49 bpm while the maximum pulse rate was 83.2 bpm.  Arrhythmias were not noted.     Assessment:     Moderate obstructive sleep apnea was present, with obstructive events during both supine and non-supine sleep, but pronounced during supine sleep.  Since REM sleep in supine position was not observed during the study, it is plausible that the overall severity of the sleep-related  breathing disorder may have been underestimated.  Sleep associated hypoxemia was present.    Sleep architecture was remarkable for prolonged initial sleep latency despite several sleep aids, sleep fragmentation and reduced sleep efficiency.  All sleep stages were observed.  Both stages N3 and REM were increased suggestive of possible sleep deprivation.    There were no significant limb movements during the study.  There were no abnormal sleep-related behaviors during the study.        Normal sinus rhythm was noted.     Test results were discussed with patient in detail.    Allergies:    Allergies   Allergen Reactions     Penicillins Rash and Hives     Versed [Midazolam] Other (See Comments)     Stopped breathing  Over 10 years ago but possible sensitivity to too much of this medication  Became apnic       Medications:    Current Outpatient Medications   Medication Sig Dispense Refill     Acetaminophen (TYLENOL PO) Take 1,000 mg by mouth every 8 hours as needed        cholecalciferol (VITAMIN D3) 5000 units (125 mcg) capsule Take by mouth daily       famotidine (PEPCID) 20 MG tablet Take 20 mg by mouth 2 times daily       gabapentin (NEURONTIN) 300 MG capsule Take 3 capsules (900 mg) by mouth At Bedtime 270 capsule 0     hydrOXYzine (VISTARIL) 50 MG capsule Take 1 capsule (50 mg) by mouth 3 times daily as needed for anxiety 90 capsule 3     lamoTRIgine (LAMICTAL) 25 MG tablet Take 50 mg by mouth daily        Omega-3 Fatty Acids (OMEGA-3 FISH OIL) 1200 MG CAPS        pramipexole (MIRAPEX) 0.25 MG tablet Take 3 tablets (0.75 mg) by mouth At Bedtime 90 tablet 3     testosterone (ANDROGEL 1.62 % PUMP) 20.25 MG/ACT gel Place 3 Pump onto the skin daily 75 g 1       Problem List:  Patient Active Problem List    Diagnosis Date Noted     MDD (major depressive disorder), recurrent severe, 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) 07/14/2020     Priority: Medium     S/P arthroscopic surgery of right knee 06/23/2020     Priority: Medium     Other  chronic pain      Priority: Medium     feet, knees, shoulders, full spine, thumbs       Restless leg syndrome      Priority: Medium     Chronic pain of right knee 11/14/2017     Priority: Medium     Primary osteoarthritis of right knee 11/14/2017     Priority: Medium     Lumbar radiculopathy 06/20/2017     Priority: Medium     Chondromalacia of left patella 01/25/2017     Priority: Medium     Complex tear of medial meniscus of left knee as current injury 01/25/2017     Priority: Medium     PTSD (post-traumatic stress disorder) 04/09/2015     Priority: Medium     GERD (gastroesophageal reflux disease) 04/08/2015     Priority: Medium     Glaucoma 04/08/2015     Priority: Medium     IBS (irritable bowel syndrome) 04/08/2015     Priority: Medium     Obesity 04/08/2015     Priority: Medium     Osteoarthritis of spine with radiculopathy, lumbar region 04/07/2015     Priority: Medium     Postmenopausal 04/07/2015     Priority: Medium     Mass 04/03/2015     Priority: Medium     IMO Regulatory Load OCT 2020       Major depressive disorder, recurrent severe without psychotic features (H) 11/29/2013     Priority: Medium     Major Depressive Disorder, Recurrent Episode, Severe Degree, without Mention of Psychotic Behavior  Major Depressive Disorder, Recurrent Episode, Severe Degree, without Mention of Psychotic Behavior       Vulvar irritation 09/17/2013     Priority: Medium     CLAIRE (generalized anxiety disorder) 09/17/2013     Priority: Medium     Chronic joint pain 01/01/2011     Priority: Medium     Chronic bilateral low back pain without sciatica 03/01/2006     Priority: Medium        Past Medical/Surgical History:  Past Medical History:   Diagnosis Date     Anxiety      Arthritis     C spine, thumbs bilateral, feet, shoulders, knees     Depressive disorder      Gastro-oesophageal reflux disease     intermittent     Labial irritation     labial mass     Other chronic pain     feet, knees, shoulders, full spine, thumbs      PTSD (post-traumatic stress disorder)      Restless leg syndrome      Past Surgical History:   Procedure Laterality Date     COLONOSCOPY       EXCISE MASS VAGINA Left 4/10/2015    Procedure: EXCISE MASS VAGINA;  Surgeon: Stanislav Byers MD;  Location: [facility] OR     GENITOURINARY SURGERY      Urethrial dilation     JOINT REPLACEMENT Right 2018     ORTHOPEDIC SURGERY      right rotator cuff, left achilles tendon repair, neuroma removed right foot, right knee arthroscopy,        Social History:  Social History     Socioeconomic History     Marital status: Single     Spouse name: Not on file     Number of children: Not on file     Years of education: Not on file     Highest education level: Not on file   Occupational History     Not on file   Social Needs     Financial resource strain: Not on file     Food insecurity     Worry: Not on file     Inability: Not on file     Transportation needs     Medical: Not on file     Non-medical: Not on file   Tobacco Use     Smoking status: Former Smoker     Packs/day: 0.00     Quit date: 1983     Years since quittin.2     Smokeless tobacco: Never Used   Substance and Sexual Activity     Alcohol use: Yes     Comment: occasional     Drug use: Yes     Types: Marijuana     Comment: daily only at night time for her medical conditions      Sexual activity: Not Currently   Lifestyle     Physical activity     Days per week: Not on file     Minutes per session: Not on file     Stress: Not on file   Relationships     Social connections     Talks on phone: Not on file     Gets together: Not on file     Attends [employer] service: Not on file     Active member of club or organization: Not on file     Attends meetings of clubs or organizations: Not on file     Relationship status: Not on file     Intimate partner violence     Fear of current or ex partner: Not on file     Emotionally abused: Not on file     Physically abused: Not on file     Forced sexual activity: Not on file   Other  Topics Concern     Parent/sibling w/ CABG, MI or angioplasty before 65F 55M? Not Asked   Social History Narrative     Not on file       Family History:  Family History   Problem Relation Age of Onset     Macular Degeneration Mother      Osteoporosis Mother      Heart Disease Father 49     Multiple Sclerosis Sister      Diabetes Paternal Uncle         heart issues     Cancer No family hx of      Coronary Artery Disease No family hx of          Physical Examination:  Vitals: LMP  (LMP Unknown)   BMI= There is no height or weight on file to calculate BMI.         [place] Total Score 8/24/2020   Total score - [place] 2     General: No apparent distress, appropriately groomed  Chest: No cough, no audible wheezing, able to talk in full sentences  Psych: coherent speech, normal rate and volume, able to articulate logical thoughts, able   to abstract reason, no tangential thoughts, no hallucinations   or delusions. Affect is normal  Neuro:  Mental status: Alert and  Oriented X 3  Speech: normal       Impression/Plan:  Moderate obstructive sleep apnea with sleep associated hypoxemia.(Obstructive events were noted during both supine and non-supine sleep, but pronounced during supine sleep.  Since REM sleep in supine position was not observed during the study, it is plausible that the overall severity of the sleep-related breathing disorder may have been underestimated).  We discussed the test results. We discussed the consequences of untreated ASHLYN. We discussed the  treatment options for ASHLYN. Patient was willing to try CPAP therapy. Prescription was provided for Auto CPAP device 5-15 cm water. Recommended to use the device regularly during sleep and asked to get back to us if any interface problems.  Patient will be followed through Sleep therapy management program.     We discussed weight management with healthy diet, and exercise.    Patient was strongly advised to avoid driving, operating any heavy machinery or other  hazardous situations while drowsy or sleepy.  Patient was counseled on the importance of driving while alert, to pull over if drowsy, or nap before getting into the vehicle if sleepy.      Plan is to follow up in 6-7 weeks after starting CPAP therapy.     The above note was dictated using voice recognition software. Although reviewed after completion, some word and grammatical error may remain . Please contact the author for any clarifications.     Unruly Boyd MD  [facility]   [address], [address]   [address] [place] [address]   Appointments: [phone]

## 2020-11-21 LAB — TESTOST SERPL-MCNC: 88 NG/DL (ref 8–60)

## 2020-11-23 ENCOUNTER — TELEPHONE (OUTPATIENT)
Dept: SLEEP MEDICINE | Facility: CLINIC | Age: 65
End: 2020-11-23

## 2020-11-23 NOTE — TELEPHONE ENCOUNTER
I called patient today 11/23/20 at 8:52 am to see if she would like to get the cpap machine. Per patient she would like to get the machine and would like to do a curbside pick. She was ok for me to email her the machine and mask info and she do some research on her end and will call me with her choice. She asked for me to email to the [email or web address].   Info sent to her today.

## 2020-12-03 ENCOUNTER — DOCUMENTATION ONLY (OUTPATIENT)
Dept: SLEEP MEDICINE | Facility: CLINIC | Age: 65
End: 2020-12-03
Payer: MEDICARE

## 2020-12-03 ENCOUNTER — VIRTUAL VISIT (OUTPATIENT)
Dept: SLEEP MEDICINE | Facility: CLINIC | Age: 65
End: 2020-12-03
Attending: INTERNAL MEDICINE
Payer: MEDICARE

## 2020-12-03 VITALS — HEIGHT: 66 IN | WEIGHT: 212 LBS | BODY MASS INDEX: 34.07 KG/M2

## 2020-12-03 DIAGNOSIS — F51.04 CHRONIC INSOMNIA: Primary | ICD-10-CM

## 2020-12-03 DIAGNOSIS — G47.33 OSA (OBSTRUCTIVE SLEEP APNEA): ICD-10-CM

## 2020-12-03 PROCEDURE — 90791 PSYCH DIAGNOSTIC EVALUATION: CPT | Mod: 95 | Performed by: PSYCHOLOGIST

## 2020-12-03 RX ORDER — TRAZODONE HYDROCHLORIDE 100 MG/1
100 TABLET ORAL
COMMUNITY
Start: 2020-11-19 | End: 2021-02-04

## 2020-12-03 ASSESSMENT — MIFFLIN-ST. JEOR: SCORE: 1533.38

## 2020-12-03 NOTE — PROGRESS NOTES
"Gregoria Stein is a 65 year old adult who is being evaluated via a billable video visit.      The patient has been notified of following:     \"This video visit will be conducted via a call between you and your physician/provider. We have found that certain health care needs can be provided without the need for an in-person physical exam.  This service lets us provide the care you need with a video conversation.  If a prescription is necessary we can send it directly to your pharmacy.  If lab work is needed we can place an order for that and you can then stop by our lab to have the test done at a later time.    Video visits are billed at different rates depending on your insurance coverage.  Please reach out to your insurance provider with any questions.    If during the course of the call the physician/provider feels a video visit is not appropriate, you will not be charged for this service.\"    Patient has given verbal consent for Video visit? Yes  How would you like to obtain your AVS? MyChart  If you are dropped from the video visit, the video invite should be resent to: Text to cell phone: [phone]  Will anyone else be joining your video visit? No      Video-Visit Details    Type of service:  Video Visit    Video Start Time: 9:33 AM  Video End Time: 10:10 AM    Originating Location (pt. Location): Home    Distant Location (provider location):  [facility]    Platform used for Video Visit: Fitbit    Emilia Thomson MA    SLEEP MEDICINE CONSULTATION  Sleep Psychology    Name: Gregoria Stein MRN# [id number]   Age: 65 year old YOB: 1955     Date of Consultation: Dec 3, 2020  Consultation is requested by: Unruly Boyd MD  [address] [address]  Primary care provider: Susan Coronel    Reason for Sleep Consultation     Gregoria Stein is a 65 year old adult seen today for a behavioral sleep medicine consultation because of associated with RLS, " ASHLYN    Assessment and Plan     Sleep Diagnoses/Recommendations:       Chronic insomnia  ASHLYN (obstructive sleep apnea)    Patient longstanding history of insomnia is multi factored, associated with onset of restless leg syndrome and a recent diagnosis of obstructive sleep apnea.  Overall patient is keeping a relatively stable sleep schedule with a mild delay in overall sleep phase.  Restless leg symptoms appear to be affecting sleep initiation.  This is resulting in inadequate stimulus control and extending time in bed while awake/uncomfortable.  We discussed additional behavioral strategies for managing restless legs including a trial of foot and lower limb stretching exercises.  She will continue to follow with Dr. Varner around management of RLS and ASHLYN.  She is new to CPAP and also requests behavioral assistance in adaptation to CPAP which we will discuss at follow-up again.    Patient was prescribed a sleep window of 12:30 AM-8:30 AM.  She will add exposure to ambient or bright light therapy for 30 minutes in the morning to help strengthen circadian timing of sleep and promote earlier sleep onset.  She will follow-up with her psychiatrist around nightly use of trazodone which she does find helpful in promoting sleep maintenance and sleep initiation.  Summary Counseling:      Gregoria was provided information about the pathophysiology of insomnia and psychophysiological factors contributing to the onset and maintenance of insomnia.  Treatment options were discussed including component of cognitive-behavioral therapy for insomnia. The benefits and potential early side effects of treatment including increased daytime sleepiness were discussed.       Patient was advised to consult with their prescribing provider around use of or changes to prescription sleep medication.  Patient was counseled on the importance of avoiding driving if drowsy.    See patient instructions for initial treatment recommendations and  behavioral sleep plan.     Follow-up: 4 weeks     History of Present Sleep Complaint     Gregoria Stein is a 65 year old year old adult with a relevant medical history of  ASHLYN, RLS, osteoarthritis, MDD, PTSSDand CLAIRE who presents with a 20 + year history of insomnia associated with RLS.  Currently taking pramipexole and gapantin.  Reports onset of insomnia due to RLS.    Not currently working.  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    Previous Medication/OTCs:  None    Behavioral Strategies  Takes a cold bath to quiet legs, will use heavy weighted blankets  Recently started yoga.    Sleep/Wake Pattern:     Shift Work - History of working night shifts as RN    Source of Sleep Estimates:  verbal self-report    Gregoria Stein       Describes themself as a night person;      On weekdays/workdays goes tobed at Midnight-12:30 PM (notes increased anxiety and RLS symptoms0  and gets up for the day at 8-830 AM  without an alarm;      Falls asleep in about 60-90 minutes and has difficulty falling asleep.        On weekends/days off keeps the same wake time      Awakens 2-4 times a night for 20 minutes before falling back to sleep        Awakens due to spontaneous arousal, and legs or need to go to bathroom with total estimated time awake in during the night of 30 minutes.      -Average time in bed estimated to be 8 hours.      Average total sleep time estmiated to be 6 hours.      Naps - occasional, 15-30 minutes    Sleep Habits and Behavior:    use electronics in bed, eating in bed, reading in bed, frustration about RLS, snacks to satisfy frustration of uncomfortable legs    Sleep Environment:    Dark, comfortable, some traffic noise    Substance Use:    Caffeine: 1-2 beverages, does not drink caffeine within 6 hours of bedtime  Alcohol: None reported  Nicotine:  "None  Recreational/Illicit Drugs: None reported      Previous Sleep Studies/Consultation:    Study Date: 10/13/2020     Indications for Polysomnography: The patient is a 65-year-old Female who is 5' 6\" and weighs 210.0 lbs. Her BMI is 34.2, [place] sleepiness scale 2 and neck circumference is 38 cm. Relevant medical history includes major depression and anxiety. A diagnostic polysomnogram was performed to evaluate for sleep apnea/hypoxemia.     Polysomnogram Data: A full night polysomnogram recorded the standard physiologic parameters including EEG, EOG, EMG, ECG, nasal and oral airflow. Respiratory parameters of chest and abdominal movements were recorded with respiratory inductance plethysmography. Oxygen saturation was recorded by pulse oximetry. Hypopnea scoring rule used: 1B 4%.     Sleep Architecture: Sleep architecture was remarkable for prolonged initial sleep latency despite several sleep aids, sleep fragmentation and reduced sleep efficiency.  All sleep stages were observed.  Both stages N3 and REM were increased suggestive of possible sleep deprivation.    The total recording time of the polysomnogram was 449.3 minutes. The total sleep time was 359.5 minutes. Sleep latency was increased at 42.5 minutes with the use of a sleep aid (medical marijuana, gabapentin, hydroxyzine and Trazadone). REM latency was 145.5 minutes. Arousal index was increased at 30.4 arousals per hour. Sleep efficiency was decreased at 80.0%. Wake after sleep onset was 47.0 minutes. The patient spent 7.5% of total sleep time in Stage N1, 33.0% in Stage N2, 31.7% in Stage N3, and 27.8% in REM. Time in REM supine was - minutes.     Respiration: Moderate obstructive sleep apnea was present, with obstructive events during both supine and non-supine sleep, but pronounced during supine sleep.  Since REM sleep in supine position was not observed during the study, it is plausible that the overall severity of the sleep-related breathing " disorder may have been underestimated.  Sleep associated hypoxemia was present.    Events ? The polysomnogram revealed a presence of 6 obstructive, 6 centrals, and - mixed apneas resulting in an apnea index of 2.0 events per hour. There were 114 obstructive hypopneas and - central hypopneas resulting in an obstructive hypopnea index of 19.0 and central hypopnea index of - events per hour. The combined apnea/hypopnea index was 21.0 events per hour (central apnea/hypopnea index was 1.0 events per hour). The REM AHI was 11.4 events per hour. The supine AHI was 33.8 events per hour. The RERA index was 1.0 events per hour.  The RDI was 22.0 events per hour.    Snoring - was reported as moderate and intermittent.    Respiratory rate and pattern - was notable for normal respiratory rate and pattern.    Sustained Sleep Associated Hypoventilation - Transcutaneous carbon dioxide monitoring was not used.    Sleep Associated Hypoxemia - (Greater than 5 minutes O2 sat at or below 88%) was present. Baseline oxygen saturation was 91.2%. Lowest oxygen saturation was 79.1%. Time spent less than or equal to 88% was 20.9 minutes. Time spent less than or equal to 89% was 75.3 minutes.     Movement Activity: There were no significant limb movements during the study.  There were no abnormal sleep-related behaviors during the study.    Periodic Limb Activity - There were 36 PLMs during the entire study. The PLM index was 6.0 movements per hour. The PLM Arousal Index was 4.8 per hour.    REM EMG Activity - Excessive transient/sustained muscle activity was not present.    Nocturnal Behavior - Abnormal sleep related behaviors were not noted during/arising out of NREM / REM sleep.     Bruxism - None apparent.     Cardiac Summary: Normal sinus rhythm was noted.  The average pulse rate was 56.7 bpm. The minimum pulse rate during sleep was 49 bpm while the maximum pulse rate was 83.2 bpm.  Arrhythmias were not noted.        Assessment:  "    Moderate obstructive sleep apnea was present, with obstructive events during both supine and non-supine sleep, but pronounced during supine sleep.  Since REM sleep in supine position was not observed during the study, it is plausible that the overall severity of the sleep-related breathing disorder may have been underestimated.  Sleep associated hypoxemia was present.    Sleep architecture was remarkable for prolonged initial sleep latency despite several sleep aids, sleep fragmentation and reduced sleep efficiency.  All sleep stages were observed.  Both stages N3 and REM were increased suggestive of possible sleep deprivation.    There were no significant limb movements during the study.  There were no abnormal sleep-related behaviors during the study.        Normal sinus rhythm was noted.       Screening          [place] Sleepiness Scale  Total score - [place]: 2 .    DARIELA Total Score: 23       PHQ-9 SCORE 9/23/2020   PHQ-9 Total Score 16   PHQ-A Total Score -   Some encounter information is confidential and restricted. Go to Review Flowsheets activity to see all data.       CLAIRE-7 SCORE 8/25/2020 9/2/2020 9/3/2020   Total Score 14 11 11   Some encounter information is confidential and restricted. Go to Review Flowsheets activity to see all data.         Vitals     Ht 1.676 m (5' 6\")   Wt 96.2 kg (212 lb)   LMP  (LMP Unknown)   BMI 34.22 kg/m       Medical History     Patient Active Problem List   Diagnosis     Vulvar irritation     CLAIRE (generalized anxiety disorder)     Mass     PTSD (post-traumatic stress disorder)     Other chronic pain     Restless leg syndrome     S/P arthroscopic surgery of right knee     Osteoarthritis of spine with radiculopathy, lumbar region     Chondromalacia of left patella     Chronic bilateral low back pain without sciatica     Chronic joint pain     Complex tear of medial meniscus of left knee as current injury     GERD (gastroesophageal reflux disease)     Glaucoma     IBS " (irritable bowel syndrome)     Chronic pain of right knee     Lumbar radiculopathy     Major depressive disorder, recurrent severe without psychotic features (H)     Obesity     Postmenopausal     Primary osteoarthritis of right knee     MDD (major depressive disorder), recurrent severe, 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)        Current Outpatient Medications   Medication Sig Dispense Refill     Acetaminophen (TYLENOL PO) Take 1,000 mg by mouth every 8 hours as needed        cholecalciferol (VITAMIN D3) 5000 units (125 mcg) capsule Take by mouth daily       famotidine (PEPCID) 20 MG tablet Take 20 mg by mouth 2 times daily       gabapentin (NEURONTIN) 300 MG capsule Take 3 capsules (900 mg) by mouth At Bedtime 270 capsule 0     hydrOXYzine (VISTARIL) 50 MG capsule Take 1 capsule (50 mg) by mouth 3 times daily as needed for anxiety 90 capsule 3     lamoTRIgine (LAMICTAL) 25 MG tablet Take 50 mg by mouth daily        Omega-3 Fatty Acids (OMEGA-3 FISH OIL) 1200 MG CAPS        pramipexole (MIRAPEX) 0.25 MG tablet Take 3 tablets (0.75 mg) by mouth At Bedtime 90 tablet 3     testosterone (ANDROGEL 1.62 % PUMP) 20.25 MG/ACT gel Place 3 Pump onto the skin daily 75 g 1     traZODone (DESYREL) 100 MG tablet Take 100 mg by mouth         Past Surgical History:   Procedure Laterality Date     COLONOSCOPY       EXCISE MASS VAGINA Left 4/10/2015    Procedure: EXCISE MASS VAGINA;  Surgeon: Stanislav Byers MD;  Location: [facility] OR     GENITOURINARY SURGERY      Urethrial dilation     JOINT REPLACEMENT Right 07/2018     ORTHOPEDIC SURGERY      right rotator cuff, left achilles tendon repair, neuroma removed right foot, right knee arthroscopy,           Allergies   Allergen Reactions     Penicillins Rash and Hives     Versed [Midazolam] Other (See Comments)     Stopped breathing  Over 10 years ago but possible sensitivity to too much of this medication  Became apnic         Mental Health History     Prior Mental Health Diagnosis: depression, anxiety  and PTSD    Current Mental Health Treatment: psychotherapy, psychiatric medication management    Prior Chemical Dependency Treatment:  None reported      Family History     Family History   Problem Relation Age of Onset     Macular Degeneration Mother      Osteoporosis Mother      Heart Disease Father 49     Multiple Sclerosis Sister      Diabetes Paternal Uncle         heart issues     Cancer No family hx of      Coronary Artery Disease No family hx of        Family History of Sleep Disorders: Mother with ASHLYN, Mother with RLS  Social History     Social History     Socioeconomic History     Marital status: Single     Spouse name: None     Number of children: None     Years of education: None     Highest education level: None   Occupational History     None   Social Needs     Financial resource strain: None     Food insecurity     Worry: None     Inability: None     Transportation needs     Medical: None     Non-medical: None   Tobacco Use     Smoking status: Former Smoker     Packs/day: 0.00     Quit date: 1983     Years since quittin.2     Smokeless tobacco: Never Used   Substance and Sexual Activity     Alcohol use: Yes     Comment: occasional     Drug use: Yes     Types: Marijuana     Comment: daily only at night time for her medical conditions      Sexual activity: Not Currently   Lifestyle     Physical activity     Days per week: None     Minutes per session: None     Stress: None   Relationships     Social connections     Talks on phone: None     Gets together: None     Attends Mandaen service: None     Active member of club or organization: None     Attends meetings of clubs or organizations: None     Relationship status: None     Intimate partner violence     Fear of current or ex partner: None     Emotionally abused: None     Physically abused: None     Forced sexual activity: None   Other Topics Concern     Parent/sibling w/ CABG, MI or angioplasty before 65F 55M? Not Asked   Social History  Narrative     None       Disabled, sleep alone     Mental Status Examination     Gregoria presented as appropriately dressed and groomed and was oriented X3 with speech language intact.  The patient was cooperative throughout the evaluation with no signs of hallucinations, delusions, loosening of associations or other thought disturbance.  Mood was normal Affect was congruent with mood. Insight and judgement were intact.  Memory appeared intact for recent and remote elements.  There was no report of suicidal ideation, intention or plan. Attention and concentration were within normal.      Copy:   Susan Coronel MD  [address] [facility] [address]  [place] [address]    Geraldo Barahona PsyD, LP, [facility]  Diplomate, Behavioral Sleep Medicine  [facility]    Note: This dictation was created using voice recognition software. This document may contain an error not identified before finalizing the document. If the error changes the accuracy of the document, I would appreciate it being brought to my attention.

## 2020-12-03 NOTE — PATIENT INSTRUCTIONS
Continue to follow with Dr. Varner for managing your gabapentin and pramipexole.    Continue to follow with your psychiatrist around managing your Trazodone.    1 cup coffee in AM OK if it does not aggravate your RLS or anxiety.  Confer with your psychiatrist or PCP if needed.    See instructions below around making some changes so you are not spending too much time in bed awake or uncomfortable.    Consider stretching exercises of your feet and lower legs as we discussed for managing restless legs. Continue to avoid caffeine and alcohol in the evenings.    Core Sleep Strengthening Strategies     You are now ready to start the process of strengthening your sleep. Much like physical therapy strengthens muscles, studies show these five sleep strategies are the key to achieving stronger, healthier sleep.     1. Reduce your  Time In Bed    Spending extra time in bed trying to get more sleep actually can cause more sleep disruption and weaken sleep efficiency. Sleep efficiency is simply the percentage of time a person spends asleep while in bed. Normal sleep efficiency is thought to be 85% or greater.  By reducing your time in bed, you will be awake longer leading to quicker and deeper sleep. This strategy does not reduce the amount of sleep you get, just the time you are awake in bed:                                       Your Sleep Schedule Prescription    During the first step of sleep strengthening, you will reduce your time in bed following a Sleep Schedule Prescription. Your prescription is determined by the average nightly amount of sleep you got over the past two weeks plus 30 minutes. It also takes into account the best time for you to sleep. The least amount of time prescribed is 6 hours in bed unless a sleep specialist recommends otherwise.     Total Time in Bed:  8 hours  Bedtime:  No earlier than  12:30 AM  Wake-up Time:  Out of bed every day by  8:30 AM with alarm.  Get 30 minutes of ambient light or bright  light exposure first thing in the morning.     2. Don't go to bed until you feel sleepy (not tired or fatigued)     This helps increase your sleep drive by keeping you awake longer.  If you go to bed when you're not sleepy, it gives your brain the wrong message and can lead to frustration.     If you feel sleepy before your prescribed bedtime, find activities that can help you stay awake until it is time for you to go to bed. Even brief naps in the evening can be very disruptive of sleep at night.     3. Don't stay in bed unless you are sleeping      If you are unable to fall asleep or return to sleep after about 30 minutes, get out of bed. This helps train your brain that the bed is for sleep. It is harmful to your sleep when you are worried or frustrated in bed. Do not read, eat, worry, think about sleep, use mobile phones or tablets, or watch TV in bed. Do not watch the clock during the night.     Go to another room and do something relaxing. Plan things you can do when you get out of bed. Avoid use of mobile devices or computers when out of bed.    Return to bed only when you feel sleepy again.  Repeat as often as needed throughout the night.     4. Get out of bed at the same time every day    Getting up at the same time helps set your biological clock. It is important to stick to your wake time no matter how much sleep you got the night before or how you feel in the morning.    Varying your wake can have the same effect as jet lag.  It disrupts your biological clock and makes you feel more tired and exhausted.  Trying to  catch up  on sleep on the weekends only makes things worse and sets you up for a cycle of poor sleep the following weekdays.      Make sure you set an alarm and keep it away from the bed to prevent looking at the clock during the night.      5. Avoid daytime napping    Avoid daytime napping if possible. Napping partially fulfills your need for sleep and weakens your sleep drive at  night.    However, if you find yourself sleepy (not just tired) you can take a brief 15-30 minute nap during the day if needed.  Naps within 7-8 hours of your prescribed wake time are less likely to affect your sleep the coming night.  Sleepy people make more mistakes and may hurt themselves or others. Therefore, safety trumps all other sleep prescriptions.  Never drive or operate equipment if drowsy or sleepy.     Changing Your Sleep Schedule Prescription    After one week, you can adjust your sleep schedule prescription based on how well you are sleeping. Use the following guidelines to change your prescribed time in bed based on information from your Sleep Diary, Mobile Sleep Diary Angle or Wearable Device:          Change is Challenging     Research shows that making significant changes in sleep habits improves sleep quality and how you feel. Improvement takes time and is not always steady. Change is challenging and can be stressful.  This is especially true if old habits - like spending more time in bed -- were a way to avoid worry about getting enough sleep.

## 2020-12-03 NOTE — PROGRESS NOTES
Patient was offered choice of vendor and chose [facility].  Patient Gregoria Stein was set up at virtual set up via telephone visit on December 3, 2020. Patient received a [employer] DreamStation Auto. Pressures were set at 5-15 cm H2O.   Patient s ramp is 5 cm H2O for 45 min and FLEX/EPR is A Flex, 2.  Patient received a [employer] Mask name: Dreamwear  Full Face mask size fit pack, heated tubing and heated humidifier.  Patient does need to meet compliance. Patient has a follow up on tbd with Dr. Boyd.    Nargis Izquierdo

## 2020-12-07 ENCOUNTER — DOCUMENTATION ONLY (OUTPATIENT)
Dept: SLEEP MEDICINE | Facility: CLINIC | Age: 65
End: 2020-12-07

## 2020-12-07 ENCOUNTER — TELEPHONE (OUTPATIENT)
Dept: SLEEP MEDICINE | Facility: CLINIC | Age: 65
End: 2020-12-07

## 2020-12-07 NOTE — TELEPHONE ENCOUNTER
CALLED PT AND SCHEDULED A IN-PERSON INSTRUCT APPT. FOR THE PT CPAP MACHINE 12/08/2020 1PM AT THE [facility].

## 2020-12-07 NOTE — PROGRESS NOTES
3 DAY STM VISIT    Diagnostic AHI: 21.0    PSG    Patient contacted for 3 day STM visit    Confirmed with patient at time of call- Yes Patient is still interested in STM service     Subjective measures:  Patient reports that they are not feeling well right now and using the machine has been very difficult.   She is concerned because of lack of instruction on how to use her device.      Replacement device: No  STM ordered by provider: Yes     Device type: Auto-CPAP  PAP settings from order::  CPAP min 5 cm  H20       CPAP max 15 cm  H20       Mask type:    Nasal Mask     Device settings from machine       Assessment: Nightly usage, most nights over four hours   Action plan: Patient to have 14 day STM visit. Patient has a follow up visit scheduled:   yes within 31-90 days of set up.     Total time spent on accessing and  interpreting remote patient PAP therapy data  10 minutes  Total time spent counseling, coaching  and reviewing PAP therapy data with patient  6 minutes  [id number] no

## 2020-12-08 ENCOUNTER — MYC MEDICAL ADVICE (OUTPATIENT)
Dept: FAMILY MEDICINE | Facility: CLINIC | Age: 65
End: 2020-12-08

## 2020-12-08 ENCOUNTER — TELEPHONE (OUTPATIENT)
Dept: FAMILY MEDICINE | Facility: CLINIC | Age: 65
End: 2020-12-08

## 2020-12-08 ENCOUNTER — DOCUMENTATION ONLY (OUTPATIENT)
Dept: SLEEP MEDICINE | Facility: CLINIC | Age: 65
End: 2020-12-08

## 2020-12-08 NOTE — PROGRESS NOTES
RACHEL CAME TO HER SCHEDULED APPOINTMENT TO HAVE HANDS ON INSTRUCTION WITH HER DREAMSTATION CPAP MACHINE. INSTRUCTED THE PT ON CARE AND USE OF MACHINE, COMPLIANCE AND STM WHICH SHE WAS FAMILIAR WITH. PT RECEIVED HER NEW FRAME FOR THE DREAMWEAR NASAL MASK WHICH WAS A SMALLER SIZE AND WAS ABLE TO GET A GOOD FIT WITH THE NEW FRAME. PT STATED THAT SHE WAS OVERWHELM WITH EVERYTHING AND NOW FEELS A LOT BETTER AFTER HAVING THIS APPOINTMENT. LET HER KNOW SHE CAN REACH OUT TO ME ANYTIME WITH QUESTIONS AND OR CONCERNS.

## 2020-12-08 NOTE — TELEPHONE ENCOUNTER
"RN reached out to patient based on reported symptoms on mychart. Patient denies active SOB and chest pain. States this \"is ongoing from my cardiology work up\". Patient denies any exposure to COVID- states \"I am very careful\". Denies any other symptoms no fever, loss of taste/smell, cough. States \"I biked to my CPAP provider 7 miles both ways and did well\". RN assisted with scheduling patient for 40 min appointment with Dr. Coronel next week 12/16 to discuss her ongoing SOB with exertion. Patient declined scheduling with different provider sooner. RN advised if anything changes with her symptoms before next week- she has difficulty breathing, chest pain, or fevers she should go to the ED. Patient verbalized understanding.     Patient also wanted Dr. Coronel to know she tried a new therapist but \"it wasn't a good fit\". She would like to have both her mental health services and medical care done at [facility] and is wondering if Dr. Coronel has recommendations around this. Patient states okay to respond via [employer].   Routing to PCP to advise.   Shayna Duggan RN    "

## 2020-12-11 NOTE — TELEPHONE ENCOUNTER
LVM letting her know that I was asked to reach out to her to see if there was anything I could do to assist her at this time. SW explained that Renuka is gone until Wednesday next week and that she will also call upon her return. SW left direct line for them to reach out to.    Candice Chandler, [facility]  Social Work Care Coordinator

## 2020-12-11 NOTE — TELEPHONE ENCOUNTER
Killian called and stated they want no more contact from [facility]. They feel that it is in the best interest for them to start fresh.     They would like records sent to [facility].  will send a [employer] Message with a choice of how they would like those records sent as a release of Information is needed.    Candice Chandler, [facility]  Social Work Care Coordinator

## 2020-12-18 ENCOUNTER — DOCUMENTATION ONLY (OUTPATIENT)
Dept: SLEEP MEDICINE | Facility: CLINIC | Age: 65
End: 2020-12-18

## 2021-01-06 ENCOUNTER — DOCUMENTATION ONLY (OUTPATIENT)
Dept: SLEEP MEDICINE | Facility: CLINIC | Age: 66
End: 2021-01-06
Payer: MEDICARE

## 2021-01-06 NOTE — PROGRESS NOTES
30 DAY [facility] VISIT    Diagnostic AHI: 21.0  PSG      Message left for patient to return call.     Assessment: Pt not meeting objective benchmarks for compliance     Action plan: waiting for patient to return call.   Patient has scheduled a follow up visit with Dr. Boyd on 1/27/2021  Device type: Auto-CPAP  PAP settings: CPAP min 5.0 cm  H20     CPAP max 15.0 cm  H20     90th % pressure 7.3  cm  H20    Mask type:  Nasal Mask    Objective measures: 14 day rolling measures         Compliance  64 %     % of night spent in large leak  239 % last  upload      AHI 4.16   last  upload      Average number of minutes 223          Objective measure goal  Compliance   Goal >70%  Leak   Goal < 10%  AHI  Goal < 5  Usage  Goal >240      Total time spent on accessing and interpreting remote patient PAP therapy data  10 minutes    Total time spent counseling, coaching  and reviewing PAP therapy data with patient  1 minutes    [id number] no this call  [id number]  at 3 or 14 day [facility]

## 2021-01-07 ENCOUNTER — VIRTUAL VISIT (OUTPATIENT)
Dept: SLEEP MEDICINE | Facility: CLINIC | Age: 66
End: 2021-01-07
Payer: MEDICARE

## 2021-01-07 DIAGNOSIS — F51.04 CHRONIC INSOMNIA: Primary | ICD-10-CM

## 2021-01-07 PROCEDURE — 90834 PSYTX W PT 45 MINUTES: CPT | Performed by: PSYCHOLOGIST

## 2021-01-07 RX ORDER — ALBUTEROL SULFATE 90 UG/1
2 AEROSOL, METERED RESPIRATORY (INHALATION) EVERY 4 HOURS PRN
Status: ON HOLD | COMMUNITY
Start: 2020-12-16 | End: 2024-05-29

## 2021-01-07 RX ORDER — DEXAMETHASONE 4 MG/1
TABLET ORAL
COMMUNITY
Start: 2020-12-21 | End: 2022-06-30

## 2021-01-07 NOTE — PROGRESS NOTES
Gregoria Stein is a 65 year old adult who is being evaluated via a billable video visit.      How would you like to obtain your AVS? MyChart  If the video visit is dropped, the invitation should be resent by: Send to e-mail at: [email or web address]  Will anyone else be joining your video visit? No    Video Start Time: 11:01 AM    Video-Visit Details    Type of service:  Video Visit    Video End Time:11:39 AM    Originating Location (pt. Location): Home    Distant Location (provider location):  [facility]     Platform used for Video Visit: [facility]     SLEEP [facility] VIRTUAL VIDEO FOLLOW-UP VISIT  Sleep Psychology    Patient Name: Gregoria Stein  MRN:  [id number]  Date of Service: Jan 7, 2021       Subjective Report     Gregoria Stein  returns for a telehealth video visit to discuss progress in implementing behavioral strategies for the management of insomnia.  Patient consent for initiation of video visit was obtained and documented prior to initiation of visit.     Gregoria reports she has been poorly lately.  She states that recommended changed helped for a while but she had difficulty maintaining due to her general anxiety.    She sees a primary therapist but has not occurred regularly as she is being transitioned.     Reports her RLS has been improved.  She takes a combination of gabapentin, pramipexole and medical marijuana taken about 9 PM along with trazodone.    Reports she has been staying up later than her bedtime due to disruption in the world.    .     .     Sleep Data:     Source of Sleep Estimates:  verbal self-report      Total score - [place]: 0 .    DARIELA Total Score: 23       Interventions     Strategies and recommendations including maintenance of stimulus control, relaxation, stress management and sleep and anxiety were discussed today.       Vital Signs     LMP  (LMP Unknown)      Mental Status     Appearance:  Well kept  Orientation:  X3  Mood:  normal  Affect:   Congruent with mood  Speech/Language:  Normal  Thought Process: Intact  Associations:  Normal  Thought Content: Normal  Patient does not report any suicidal ideation, intention or plan.    Diagnostic Impressions and Plan     Chronic insomnia    Plan:  continue current sleep schedule and plan and focus on consistent wake time in particular.    Follow-up: 6 weeks      Geraldo Barahona PsyD, ANITA, DBSM  Diplomate, Behavioral Sleep Medicine  [facility]      Note: This dictation was created using voice recognition software. This document may contain an error not identified before finalizing the document. If the error changes the accuracy of the document, I would appreciate it being brought to my attention.

## 2021-01-07 NOTE — PATIENT INSTRUCTIONS

## 2021-01-26 ENCOUNTER — TELEPHONE (OUTPATIENT)
Dept: SLEEP MEDICINE | Facility: CLINIC | Age: 66
End: 2021-01-26

## 2021-01-26 NOTE — TELEPHONE ENCOUNTER
RETURNED PT CALL REGARDING COMPLIANCE REQUIREMENTS FROM THE INSURANCE CARRIER WHEN IT COMES TO THE PT CPAP MACHINE. LET THE PT KNOW THE INSURANCE REQUIRES USAGE AND Follow-up VISIT. LET THE PT KNOW SHE HAS MET IT WITH USAGE AND ONCE THE Follow-up VISIT IS COMPLETED THE COMPLIANCE REQUIREMENT WILL HAVE BEEN MET. PT ALSO ORDERED TWO MORE NASAL CUSHION FOR CPAP MASK. SHIPPING TO THE PT VIA [employer].

## 2021-01-27 ENCOUNTER — VIRTUAL VISIT (OUTPATIENT)
Dept: SLEEP MEDICINE | Facility: CLINIC | Age: 66
End: 2021-01-27
Payer: MEDICARE

## 2021-01-27 DIAGNOSIS — G47.33 OSA ON CPAP: Primary | ICD-10-CM

## 2021-01-27 PROCEDURE — 99443 PR PHYSICIAN TELEPHONE EVALUATION 21-30 MIN: CPT | Performed by: INTERNAL MEDICINE

## 2021-01-27 NOTE — PROGRESS NOTES
Killian is a 65 year old who is being evaluated via a billable telephone visit.      What phone number would you like to be contacted at? [phone]   How would you like to obtain your AVS? [place]     Phone call duration: 15 minutes, total time spent during visit was 30 minutes (documeted towards the end of the visit note).      Sleep clinic follow up visit note:    Date on this visit: 1/27/2021    Primary Physician: Susan Coronel     Chief complaint: Follow-up of ASHLYN, review CPAP compliance    Gregoria Stein 65 year old with PMH of depression and anxiety, who presents for follow up of ASHLYN and to review CPAP compliance.    Patient  was set up at virtual set up via telephone visit on December 3, 2020. Patient received a [employer]ay[employer] Respironics DreamStation Auto. Pressures were set at 5-15 cm H2O. Patient s ramp is 5 cm H2O for 45 min and FLEX/EPR is A Flex, 2.  Patient received a Nayeli Respironics Mask name: Dreamwear  Full Face mask size fit pack, heated tubing and heated humidifier.  Patient does need to meet compliance.     Killian reports using the CPAP regularly during sleep.  Killian likes her mask and denies any interface concerns.  Patient  sleeps alone and there is no one to report snoring with the device.  There are no reports of  awakenings due to gasping for air with the device use.  Pressure settings feel comfortable.  Sleep quality has improved with CPAP use.  There has been some improvement in energy levels during the day.  Denies excessive daytime sleepiness.   [place] sleepiness score is 0.       Sleep study results:  Study Date: 10/13/2020    Sleep Architecture: Sleep architecture was remarkable for prolonged initial sleep latency despite several sleep aids, sleep fragmentation and reduced sleep efficiency.  All sleep stages were observed.  Both stages N3 and REM were increased suggestive of possible sleep deprivation.    The total recording time of the polysomnogram was 449.3 minutes. The total sleep  time was 359.5 minutes. Sleep latency was increased at 42.5 minutes with the use of a sleep aid (medical marijuana, gabapentin, hydroxyzine and Trazadone). REM latency was 145.5 minutes. Arousal index was increased at 30.4 arousals per hour. Sleep efficiency was decreased at 80.0%. Wake after sleep onset was 47.0 minutes. The patient spent 7.5% of total sleep time in Stage N1, 33.0% in Stage N2, 31.7% in Stage N3, and 27.8% in REM. Time in REM supine was - minutes.     Respiration: Moderate obstructive sleep apnea was present, with obstructive events during both supine and non-supine sleep, but pronounced during supine sleep.  Since REM sleep in supine position was not observed during the study, it is plausible that the overall severity of the sleep-related breathing disorder may have been underestimated.  Sleep associated hypoxemia was present.    Events ? The polysomnogram revealed a presence of 6 obstructive, 6 centrals, and - mixed apneas resulting in an apnea index of 2.0 events per hour. There were 114 obstructive hypopneas and - central hypopneas resulting in an obstructive hypopnea index of 19.0 and central hypopnea index of - events per hour. The combined apnea/hypopnea index was 21.0 events per hour (central apnea/hypopnea index was 1.0 events per hour). The REM AHI was 11.4 events per hour. The supine AHI was 33.8 events per hour. The RERA index was 1.0 events per hour.  The RDI was 22.0 events per hour.    Snoring - was reported as moderate and intermittent.    Respiratory rate and pattern - was notable for normal respiratory rate and pattern.    Sustained Sleep Associated Hypoventilation - Transcutaneous carbon dioxide monitoring was not used.    Sleep Associated Hypoxemia - (Greater than 5 minutes O2 sat at or below 88%) was present. Baseline oxygen saturation was 91.2%. Lowest oxygen saturation was 79.1%. Time spent less than or equal to 88% was 20.9 minutes. Time spent less than or equal to 89% was  75.3 minutes.     Movement Activity: There were no significant limb movements during the study.  There were no abnormal sleep-related behaviors during the study.    Periodic Limb Activity - There were 36 PLMs during the entire study. The PLM index was 6.0 movements per hour. The PLM Arousal Index was 4.8 per hour.    REM EMG Activity - Excessive transient/sustained muscle activity was not present.    Nocturnal Behavior - Abnormal sleep related behaviors were not noted during/arising out of NREM / REM sleep.     Bruxism - None apparent.     Cardiac Summary: Normal sinus rhythm was noted.  The average pulse rate was 56.7 bpm. The minimum pulse rate during sleep was 49 bpm while the maximum pulse rate was 83.2 bpm.  Arrhythmias were not noted.     Assessment:     Moderate obstructive sleep apnea was present, with obstructive events during both supine and non-supine sleep, but pronounced during supine sleep.  Since REM sleep in supine position was not observed during the study, it is plausible that the overall severity of the sleep-related breathing disorder may have been underestimated.  Sleep associated hypoxemia was present.    Sleep architecture was remarkable for prolonged initial sleep latency despite several sleep aids, sleep fragmentation and reduced sleep efficiency.  All sleep stages were observed.  Both stages N3 and REM were increased suggestive of possible sleep deprivation.    There were no significant limb movements during the study.  There were no abnormal sleep-related behaviors during the study.        Normal sinus rhythm was noted.    CPAP compliance download   Respironics  Auto-PAP 5.0 - 15.0 cmH2O 30 day usage data:    53% of days with > 4 hours of use. 1/30 days with no use.   Average use 227 minutes per day.   Average leak 27.23 LPM.       CPAP 90% pressure 5cm.   AHI 4.26 events per hour.    Allergies:    Allergies   Allergen Reactions     Penicillins Rash and Hives     Versed [Midazolam] Other  (See Comments)     Stopped breathing  Over 10 years ago but possible sensitivity to too much of this medication  Became apnic       Medications:    Current Outpatient Medications   Medication Sig Dispense Refill     Acetaminophen (TYLENOL PO) Take 1,000 mg by mouth every 8 hours as needed        albuterol (PROAIR HFA/PROVENTIL HFA/VENTOLIN HFA) 108 (90 Base) MCG/ACT inhaler Inhale 2 puffs into the lungs       cholecalciferol (VITAMIN D3) 5000 units (125 mcg) capsule Take by mouth daily       famotidine (PEPCID) 20 MG tablet Take 20 mg by mouth 2 times daily       FLOVENT  MCG/ACT inhaler INHALE 1 PUFF BY MOUTH TWICE DAILY       gabapentin (NEURONTIN) 300 MG capsule Take 3 capsules (900 mg) by mouth At Bedtime 270 capsule 0     hydrOXYzine (VISTARIL) 50 MG capsule Take 1 capsule (50 mg) by mouth 3 times daily as needed for anxiety 90 capsule 3     lamoTRIgine (LAMICTAL) 25 MG tablet Take 50 mg by mouth daily        Omega-3 Fatty Acids (OMEGA-3 FISH OIL) 1200 MG CAPS        pramipexole (MIRAPEX) 0.25 MG tablet Take 3 tablets (0.75 mg) by mouth At Bedtime 90 tablet 3     testosterone (ANDROGEL 1.62 % PUMP) 20.25 MG/ACT gel Place 3 Pump onto the skin daily 75 g 1     traZODone (DESYREL) 100 MG tablet Take 100 mg by mouth         Problem List:  Patient Active Problem List    Diagnosis Date Noted     MDD (major depressive disorder), recurrent severe, 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) 07/14/2020     Priority: Medium     S/P arthroscopic surgery of right knee 06/23/2020     Priority: Medium     Other chronic pain      Priority: Medium     feet, knees, shoulders, full spine, thumbs       Restless leg syndrome      Priority: Medium     Chronic pain of right knee 11/14/2017     Priority: Medium     Primary osteoarthritis of right knee 11/14/2017     Priority: Medium     Lumbar radiculopathy 06/20/2017     Priority: Medium     Chondromalacia of left patella 01/25/2017     Priority: Medium     Complex tear of medial meniscus of left  knee as current injury 01/25/2017     Priority: Medium     PTSD (post-traumatic stress disorder) 04/09/2015     Priority: Medium     GERD (gastroesophageal reflux disease) 04/08/2015     Priority: Medium     Glaucoma 04/08/2015     Priority: Medium     IBS (irritable bowel syndrome) 04/08/2015     Priority: Medium     Obesity 04/08/2015     Priority: Medium     Osteoarthritis of spine with radiculopathy, lumbar region 04/07/2015     Priority: Medium     Postmenopausal 04/07/2015     Priority: Medium     Mass 04/03/2015     Priority: Medium     IMO Regulatory Load OCT 2020       Major depressive disorder, recurrent severe without psychotic features (H) 11/29/2013     Priority: Medium     Major Depressive Disorder, Recurrent Episode, Severe Degree, without Mention of Psychotic Behavior  Major Depressive Disorder, Recurrent Episode, Severe Degree, without Mention of Psychotic Behavior       Vulvar irritation 09/17/2013     Priority: Medium     CLAIRE (generalized anxiety disorder) 09/17/2013     Priority: Medium     Chronic joint pain 01/01/2011     Priority: Medium     Chronic bilateral low back pain without sciatica 03/01/2006     Priority: Medium        Past Medical/Surgical History:  Past Medical History:   Diagnosis Date     Anxiety      Arthritis     C spine, thumbs bilateral, feet, shoulders, knees     Depressive disorder      Gastro-oesophageal reflux disease     intermittent     Labial irritation     labial mass     Other chronic pain     feet, knees, shoulders, full spine, thumbs     PTSD (post-traumatic stress disorder)      Restless leg syndrome      Past Surgical History:   Procedure Laterality Date     COLONOSCOPY       EXCISE MASS VAGINA Left 4/10/2015    Procedure: EXCISE MASS VAGINA;  Surgeon: Stanislav Byers MD;  Location: [facility] OR     GENITOURINARY SURGERY      Urethrial dilation     JOINT REPLACEMENT Right 07/2018     ORTHOPEDIC SURGERY      right rotator cuff, left achilles tendon repair, neuroma removed  right foot, right knee arthroscopy,        Social History:  Social History     Socioeconomic History     Marital status: Single     Spouse name: Not on file     Number of children: Not on file     Years of education: Not on file     Highest education level: Not on file   Occupational History     Not on file   Social Needs     Financial resource strain: Not on file     Food insecurity     Worry: Not on file     Inability: Not on file     Transportation needs     Medical: Not on file     Non-medical: Not on file   Tobacco Use     Smoking status: Former Smoker     Packs/day: 0.00     Quit date: 1983     Years since quittin.4     Smokeless tobacco: Never Used   Substance and Sexual Activity     Alcohol use: Yes     Comment: occasional     Drug use: Yes     Types: Marijuana     Comment: daily only at night time for her medical conditions      Sexual activity: Not Currently   Lifestyle     Physical activity     Days per week: Not on file     Minutes per session: Not on file     Stress: Not on file   Relationships     Social connections     Talks on phone: Not on file     Gets together: Not on file     Attends [employer]ism service: Not on file     Active member of club or organization: Not on file     Attends meetings of clubs or organizations: Not on file     Relationship status: Not on file     Intimate partner violence     Fear of current or ex partner: Not on file     Emotionally abused: Not on file     Physically abused: Not on file     Forced sexual activity: Not on file   Other Topics Concern     Parent/sibling w/ CABG, MI or angioplasty before 65F 55M? Not Asked   Social History Narrative     Not on file       Family History:  Family History   Problem Relation Age of Onset     Macular Degeneration Mother      Osteoporosis Mother      Heart Disease Father 49     Multiple Sclerosis Sister      Diabetes Paternal Uncle         heart issues     Cancer No family hx of      Coronary Artery Disease No family hx of           Physical Examination:  Vitals: LMP  (LMP Unknown)   BMI= There is no height or weight on file to calculate BMI.      Severance Total Score 1/27/2021   Total score - Severance 0     General: No apparent distress  Chest: No cough, no audible wheezing, able to talk in full sentences  Psych: coherent speech, normal rate and volume, able to articulate logical thoughts, able   to abstract reason, no tangential thoughts, no hallucinations   or delusions  Her affect is normal  Neuro:  Mental status: Alert and  Oriented X 3  Speech: normal       Impression/Plan:  Moderate obstructive sleep apnea without sleep associated hypoxemia: Pt reports using the CPAP therapy and reports positive benefits with PAP use.   Her compliance currently is at 53%, with device use for greater than or equal to 4 hours. We discussed the compliance goals and recommended   Increasing the duration of CPAP use.   ASHLYN is adequately controlled with Auto CPAP at the current settings per compliance DL.   Recommended to continue using the CPAP regularly during sleep and instructed to get the supplies for the PAP replaced regularly.    Recommended obtaining  WATCH PAT study with auto CPAP to check for resolution of the sleep associated hypoxemia that was observed during the sleep study.    Chronic insomnia: Recommended to implement the behavioral strategies for management of insomnia and continue follow-up with  sleep psychologist Dr. Barahona.     Obesity: We discussed weight management with healthy diet, and exercise.    Patient was strongly advised to avoid driving, operating any heavy machinery or other hazardous situations while drowsy or sleepy.  Patient was counseled on the importance of driving while alert, to pull over if drowsy, or nap before getting into the vehicle if sleepy.      Plan is to communicate the results of the Watch PAT study with the patient via [employer].  If the test results show resolution of hypoxemia with the CPAP treatment,   "will follow-up at sleep clinic in 1 year or sooner if any concerns.      The above note was dictated using voice recognition software. Although reviewed after completion, some word and grammatical error may remain . Please contact the author for any clarifications.    \"I spent a total of  30  minutes with Gregoria Stein during today's telephone visit, most of this time was spent counseling the patient and  coordinating care regarding  ASHLYN, CPAP treatment, compliance goals, HST, insomnia, weight management , going over PAPdownload/sleep study, chart review  including documentation and further activities as noted above.\"      Unruly Boyd MD  [facility] [address] [address] [phone]  Dept: [phone]                "

## 2021-01-28 ENCOUNTER — TELEPHONE (OUTPATIENT)
Dept: SLEEP MEDICINE | Facility: CLINIC | Age: 66
End: 2021-01-28

## 2021-01-28 NOTE — TELEPHONE ENCOUNTER
Left message for patient to contact sleep center to schedule watchpat testing per Dr. Varner.       Tawanna Peralta MA

## 2021-02-04 DIAGNOSIS — F51.04 PSYCHOPHYSIOLOGICAL INSOMNIA: ICD-10-CM

## 2021-02-04 DIAGNOSIS — F33.2 MDD (MAJOR DEPRESSIVE DISORDER), RECURRENT SEVERE, WITHOUT PSYCHOSIS (H): ICD-10-CM

## 2021-02-04 DIAGNOSIS — F41.1 GAD (GENERALIZED ANXIETY DISORDER): ICD-10-CM

## 2021-02-04 DIAGNOSIS — F43.10 PTSD (POST-TRAUMATIC STRESS DISORDER): Primary | ICD-10-CM

## 2021-02-04 NOTE — LETTER
February 5, 2021      Killian  [address]   [place] [address]    [phone]      Dear Gregoria,      A request for a refill on your trazodone prescription was sent to us from your pharmacy. I have notified the pharmacy to allow you one more refill.     It is time for your recheck at the clinic so we can monitor the medication and continue to prescribe it safely or have your new primary take over as you have stated you do not wish to continue here, but would be happy to have you back.  Please make clinic appointment with me or another provider at [facility] in the next one month. This visit could be virtual, by phone or video, or in-person.    Thank you for choosing us as your healthcare provider.      Sincerely,    Susan Coronel MD

## 2021-02-05 RX ORDER — TRAZODONE HYDROCHLORIDE 100 MG/1
100 TABLET ORAL AT BEDTIME
Qty: 30 TABLET | Refills: 0 | Status: SHIPPED | OUTPATIENT
Start: 2021-02-05 | End: 2021-02-25

## 2021-02-12 ENCOUNTER — OFFICE VISIT (OUTPATIENT)
Dept: SLEEP MEDICINE | Facility: CLINIC | Age: 66
End: 2021-02-12
Attending: INTERNAL MEDICINE
Payer: MEDICARE

## 2021-02-12 DIAGNOSIS — G47.33 OSA ON CPAP: Primary | ICD-10-CM

## 2021-02-16 NOTE — PROGRESS NOTES
Device has been registered and shipped via [employer] on 2/16/21. Patient was notified that package was mailed out.     INSTRUCTED TO USE CPAP.    Aurelia Sidhu MA on 2/16/2021 at 4:17 PM

## 2021-02-18 ENCOUNTER — VIRTUAL VISIT (OUTPATIENT)
Dept: SLEEP MEDICINE | Facility: CLINIC | Age: 66
End: 2021-02-18
Payer: MEDICARE

## 2021-02-18 VITALS — BODY MASS INDEX: 33.75 KG/M2 | HEIGHT: 66 IN | WEIGHT: 210 LBS

## 2021-02-18 DIAGNOSIS — G47.33 OSA ON CPAP: ICD-10-CM

## 2021-02-18 DIAGNOSIS — F51.04 CHRONIC INSOMNIA: Primary | ICD-10-CM

## 2021-02-18 PROCEDURE — 90832 PSYTX W PT 30 MINUTES: CPT | Mod: 95 | Performed by: PSYCHOLOGIST

## 2021-02-18 ASSESSMENT — MIFFLIN-ST. JEOR: SCORE: 1524.3

## 2021-02-18 NOTE — PATIENT INSTRUCTIONS
Your BMI is Body mass index is 33.89 kg/m .  Weight management is a personal decision.  If you are interested in exploring weight loss strategies, the following discussion covers the approaches that may be successful. Body mass index (BMI) is one way to tell whether you are at a healthy weight, overweight, or obese. It measures your weight in relation to your height.  A BMI of 18.5 to 24.9 is in the healthy range. A person with a BMI of 25 to 29.9 is considered overweight, and someone with a BMI of 30 or greater is considered obese. More than two-thirds of American adults are considered overweight or obese.  Being overweight or obese increases the risk for further weight gain. Excess weight may lead to heart disease and diabetes.  Creating and following plans for healthy eating and physical activity may help you improve your health.  Weight control is part of healthy lifestyle and includes exercise, emotional health, and healthy eating habits. Careful eating habits lifelong are the mainstay of weight control. Though there are significant health benefits from weight loss, long-term weight loss with diet alone may be very difficult to achieve- studies show long-term success with dietary management in less than 10% of people. Attaining a healthy weight may be especially difficult to achieve in those with severe obesity. In some cases, medications, devices and surgical management might be considered.  What can you do?  If you are overweight or obese and are interested in methods for weight loss, you should discuss this with your provider.     Consider reducing daily calorie intake by 500 calories.     Keep a food journal.     Avoiding skipping meals, consider cutting portions instead.    Diet combined with exercise helps maintain muscle while optimizing fat loss. Strength training is particularly important for building and maintaining muscle mass. Exercise helps reduce stress, increase energy, and improves fitness.  Increasing exercise without diet control, however, may not burn enough calories to loose weight.       Start walking three days a week 10-20 minutes at a time    Work towards walking thirty minutes five days a week     Eventually, increase the speed of your walking for 1-2 minutes at time    In addition, we recommend that you review healthy lifestyles and methods for weight loss available through the National Institutes of Health patient information sites:  http://win.niddk.nih.gov/publications/index.htm    And look into health and wellness programs that may be available through your health insurance provider, employer, local community center, or roselyn club.    Weight management plan: Patient was referred to their PCP to discuss a diet and exercise plan.

## 2021-02-18 NOTE — PROGRESS NOTES
Killian is a 65 year old who is being evaluated via a billable video visit.      How would you like to obtain your AVS? Mail a copy  If the video visit is dropped, the invitation should be resent by: Text to cell phone: [phone]   Will anyone else be joining your video visit? No       Yuli Benites, MAGUE on 2/18/2021 at 9:34 AM    Video Start Time: 10:37 AM  Video-Visit Details    Type of service:  Video Visit    Video End Time:10:57 AM    Originating Location (pt. Location): Home    Distant Location (provider location):  [facility]     Platform used for Video Visit: [facility]     SLEEP MEDICINE VIRTUAL VIDEO FOLLOW-UP VISIT  Sleep Psychology    Patient Name: Gregoria Stein  MRN:  [id number]  Date of Service: Feb 18, 2021       Subjective Report     Gregoria Stein  returns for a telehealth video visit to discuss progress in implementing behavioral strategies for the management of insomnia.  Patient consent for initiation of video visit was obtained and documented prior to initiation of visit.     Gregoria reports she is using CPAP more consistently.  She continues to experience some anxiety associated with the bed based on experience.  But with using CPAP.  She is using CPAP in the evening the living room.  She reports it helped her to adapt to use of CPAP.     Discussed stress and sleep. She reports some variability in sleep offset.  Varies between 830- 9 AM. - 11 AM.  Discussed importance of a consistent wake time.  She did watches TV in the evening on her couch instead of getting to bed.  She will usually be up until 11 PM-midnight.  As she has had been getting used to using CPAP but wearing it on the couch for a while while awake.  She then takes her machine into the bedroom where she has been using it for greater than 4 hours.  Patient feels overall her sleep is improving and that she feels somewhat better during the day.  We discussed the importance of regular use of CPAP  "and discussed attempting to stabilize her wake time.  Patient agreed to set a consistent wake time of 10 AM for now.     .     Sleep Data:     Source of Sleep Estimates:  verbal self-report    Total score - [place]: 1 .    DARIELA Total Score: 14       Interventions     Strategies and recommendations including implementation of stimulus control and reducing time in bed and advancing sleep phase were discussed today.       Vital Signs     Ht 1.676 m (5' 6\")   Wt 95.3 kg (210 lb)   LMP  (LMP Unknown)   BMI 33.89 kg/m       Mental Status     Orientation:  X3  Mood:  normal  Affect:  Congruent with mood  Speech/Language:  Normal  Thought Process: Intact  Associations:  Normal  Thought Content: Normal  Patient does not report any suicidal ideation, intention or plan.    Diagnostic Impressions and Plan        Chronic insomnia  ASHLYN on CPAP    Plan:  continue current sleep schedule and plan and adjust sleep schedule  to advance sleep phase from 11 am to 10 am per her goals while also working to keep TIB to less than 9 hours    Follow-up: as needed      Geraldo Barahona, Ovidio, LP, DBSM  Diplomate, Behavioral Sleep Medicine  [facility]      Note: This dictation was created using voice recognition software. This document may contain an error not identified before finalizing the document. If the error changes the accuracy of the document, I would appreciate it being brought to my attention.                               "

## 2021-02-22 ENCOUNTER — TELEPHONE (OUTPATIENT)
Dept: PLASTIC SURGERY | Facility: CLINIC | Age: 66
End: 2021-02-22

## 2021-02-22 ENCOUNTER — DOCUMENTATION ONLY (OUTPATIENT)
Dept: SLEEP MEDICINE | Facility: CLINIC | Age: 66
End: 2021-02-22
Payer: MEDICARE

## 2021-02-22 DIAGNOSIS — G47.33 OSA ON CPAP: ICD-10-CM

## 2021-02-22 PROCEDURE — 95800 SLP STDY UNATTENDED: CPT | Performed by: INTERNAL MEDICINE

## 2021-02-22 NOTE — TELEPHONE ENCOUNTER
Called pt regarding interest in top surgery. Pt asked to speak at another time. Writer to call pt tomorrow at 3pm.     Gian Green

## 2021-02-23 ENCOUNTER — TELEPHONE (OUTPATIENT)
Dept: PLASTIC SURGERY | Facility: CLINIC | Age: 66
End: 2021-02-23

## 2021-02-23 DIAGNOSIS — F64.0 GENDER DYSPHORIA IN ADOLESCENT AND ADULT: Primary | ICD-10-CM

## 2021-02-23 NOTE — PROGRESS NOTES
WATCHPAT was scored using 1B 4% hypopnea rule and it is ready for interpretation.     PAHI: 1.9.     Pt will follow up with sleep provider to determine appropriate therapy.     Ordering Provider, Unruly Boyd MD Charles O., BA, RPSGT, RST 02/23/2021

## 2021-02-23 NOTE — TELEPHONE ENCOUNTER
[facility] :  Care Coordination Note     SITUATION   Pt (Killian, they/them) is a 65 year old adult who is receiving support for:  Care Team  .    BACKGROUND     Called pt regarding interest in top surgery. Scheduled with Dr. Benavides 10/12/21.     ASSESSMENT     Surgery              [facility] Assessment  [facility] ([facility]) Enrollment: (P) Enrolled  Patient has a therapist: (P) Yes  Name of therapist: (P) Dorcas Cartwright at [facility]  Letter of support #1: (P) Requested  Surgery being considered: (P) Yes  Mastectomy: (P) Yes    Pt reports:    No smoking   No diabetes  HRT gel since September 2020  No previous gender affirming surgeries  Sees Dorcas Cartwright for therapy at [facility], who will write LOS      PLAN          Nursing Interventions:  [facility] assessment completed    Follow-up plan:    1. Obtain MATT Green

## 2021-02-25 DIAGNOSIS — F41.1 GAD (GENERALIZED ANXIETY DISORDER): ICD-10-CM

## 2021-02-25 DIAGNOSIS — F43.10 PTSD (POST-TRAUMATIC STRESS DISORDER): ICD-10-CM

## 2021-02-25 DIAGNOSIS — F51.04 PSYCHOPHYSIOLOGICAL INSOMNIA: ICD-10-CM

## 2021-02-25 DIAGNOSIS — F33.2 MDD (MAJOR DEPRESSIVE DISORDER), RECURRENT SEVERE, WITHOUT PSYCHOSIS (H): ICD-10-CM

## 2021-02-25 RX ORDER — TRAZODONE HYDROCHLORIDE 100 MG/1
100 TABLET ORAL AT BEDTIME
Qty: 30 TABLET | Refills: 3 | Status: SHIPPED | OUTPATIENT
Start: 2021-02-25 | End: 2023-01-11

## 2021-02-25 NOTE — TELEPHONE ENCOUNTER

## 2021-03-12 DIAGNOSIS — F51.04 PSYCHOPHYSIOLOGICAL INSOMNIA: ICD-10-CM

## 2021-03-12 DIAGNOSIS — F41.1 GAD (GENERALIZED ANXIETY DISORDER): ICD-10-CM

## 2021-03-12 DIAGNOSIS — F33.2 MDD (MAJOR DEPRESSIVE DISORDER), RECURRENT SEVERE, WITHOUT PSYCHOSIS (H): ICD-10-CM

## 2021-03-12 DIAGNOSIS — F43.10 PTSD (POST-TRAUMATIC STRESS DISORDER): ICD-10-CM

## 2021-03-12 RX ORDER — TRAZODONE HYDROCHLORIDE 100 MG/1
100 TABLET ORAL AT BEDTIME
Qty: 30 TABLET | Refills: 3 | OUTPATIENT
Start: 2021-03-12

## 2021-03-12 NOTE — TELEPHONE ENCOUNTER
Medication Refill Denied  Reason: Patient needs: provider visit and reestablish care (was told leaving)  Provider: I have not called the patient about the Rx denial, please call.  PCS: Please notify the pharmacy, Please contact the patient to explain reasoning provided above and to schedule the patient for a provider visit with me .    Once patient makes an appointment please send back to me to    order temporary refill so that the patient will not run out of medication prior to the scheduled visit.    Susan Coronel MD

## 2021-03-12 NOTE — TELEPHONE ENCOUNTER

## 2021-03-12 NOTE — LETTER
March 16, 2021      Gregoria Stein  [address]   [place] [address]        Dear  Sarita,    We are writing to inform you of your refill request.Doctor Homer at this time is unable to refill,until you make an appointment here at [facility] to reestablish care.   If you have any questions or concerns, please call the clinic at the number listed above.       Sincerely,    [facility] team

## 2021-03-16 NOTE — TELEPHONE ENCOUNTER
Patient notfied via detailed message to call our office back to set up an appointment,to reestablish care,per Doctor Coronel.    Letter sent to patient stating the above message.    Radha Moseley MA

## 2021-03-18 ENCOUNTER — TRANSFERRED RECORDS (OUTPATIENT)
Dept: HEALTH INFORMATION MANAGEMENT | Facility: CLINIC | Age: 66
End: 2021-03-18

## 2021-03-18 NOTE — PROCEDURES
"WatchPAT - HOME SLEEP STUDY INTERPRETATION    Patient: Gregoria Stein  MRN: [id number]  YOB: 1955  Study Date: 2/22/2021  Referring Provider: Liza Delgado  Ordering Provider: Unruly Boyd MD    Chain of custody patient verification was not enabled.  Chain of custody verification was not present throughout the entire study.     Indications for Home Study: Gregoria Stein is a 65 year old adult with recently diagnosed  moderate obstructive sleep apnea and sleep associated hypoxemia.  Home sleep study using Watch PATdevice is obtained to check for resolution of the sleep-related breathing disorder, particularly hypoxemia with the current CPAP treatment.    Estimated body mass index is 33.89 kg/m  as calculated from the following:    Height as of 2/18/21: 1.676 m (5' 6\").    Weight as of 2/18/21: 95.3 kg (210 lb).  Total score - [place]: 1 (2/18/2021  9:00 AM)       Data: A full night home sleep study was performed recording the standard physiologic parameters including peripheral arterial tonometry (PAT), sound/snoring, body position,  movement, sound, and oxygen saturation by pulse oximetry. Pulse rate was estimated by oximetry recording. Sleep staging (wake, REM, light, and deep sleep) was derived from PAT signal.  This study was considered adequate based on > 4 hours of quality oximetry and respiratory recording. As specified by the AASM Manual for the Scoring of Sleep and Associated events, version 2.3, Rule VIII.D 1B, 4% oxygen desaturation scoring for hypopneas is used as a standard of care on all home sleep apnea testing.    Total Recording Time: 7 hrs, 22 min  Total Sleep Time: 6 hrs, 17 min  % of Sleep Time REM: 21.9%    Respiratory:  Snoring: Snoring was present.  Respiratory events: The PAT respiratory disturbance index [pRDI] was 2.4 events per hour.  The PAT apnea/hypopnea index [pAHI] was 1.9 events per hour.  GOLDEN was 1.8 events per hour.  During REM sleep the pAHI " was 3.7.  Sleep Associated Hypoxemia: sustained hypoxemia was not present. Mean oxygen saturation was 93%.  Minimum was 87%.  Time with saturation at or less than 88% was 3.9 minutes.    Heart Rate: By pulse oximetry, the mean pulse rate was normal at 60 bpm.  The minimum rate was 43 bpm and the maximum rate was 122 bpm.    Position: Percent of time spent: supine -99.5%, prone -0%, on right -0%, on left -0%.  pAHI was 1.8 per hour supine, N/A  prone,  N/A on right side, and  N/A on left side.     Assessment:   With auto titrating CPAP at the current pressure settings, obstructive sleep apnea is optimally controlled.  The sleep associated hypoxemia that was noted during the baseline sleep study resolved with CPAP treatment.      Recommendations:  Suggest continuing the use of auto CPAP  at the current pressure settings, regularly during sleep.  Suggest optimizing sleep hygiene and avoiding sleep deprivation.  Weight management.    Diagnosis Code(s): Obstructive Sleep Apnea G47.33    Unruly Boyd MD, March 18, 2021   Diplomate, American Board of Internal Medicine, Sleep Medicine

## 2021-04-03 ENCOUNTER — HEALTH MAINTENANCE LETTER (OUTPATIENT)
Age: 66
End: 2021-04-03

## 2021-06-10 ENCOUNTER — DOCUMENTATION ONLY (OUTPATIENT)
Dept: SLEEP MEDICINE | Facility: CLINIC | Age: 66
End: 2021-06-10

## 2021-06-10 NOTE — PROGRESS NOTES
6 month [facility] Recheck Visit     Diagnostic AHI: 21.0  1.9 events per hour PSG    Subjective measures: Patient said she is having a lot of problems with sleep in general. Mostly related to RLS, anxiety and depression. She recently decided to add water to the humidifier. I turned the humidity setting to 2.     Assessment: Pt not meeting objective benchmarks for compliance  Patient failing following subjective benchmarks: dryness    Action plan: 2 week [facility] recheck appt scheduled  pt to follow up per provider request       Device type: Auto-CPAP  PAP settings: CPAP min 5.0 cm  H20     CPAP max 15.0 cm  H20    95th% pressure 10 cm  H20   Objective measures: 14 day rolling measures      Compliance  14 %    Average amount of time in large leak: 13 mins and 21 sec      Average number of minutes 125      Objective measure goal  Compliance   Goal >70%  Leak   Goal < 24 lpm  AHI  Goal < 5  Usage  Goal >240    Total time spent on accessing, reviewing and interpreting remote patient PAP therapy data:   2 minutes      Total time spent with direct patient communication :   5 minutes

## 2021-06-24 ENCOUNTER — DOCUMENTATION ONLY (OUTPATIENT)
Dept: SLEEP MEDICINE | Facility: CLINIC | Age: 66
End: 2021-06-24

## 2021-06-24 NOTE — PROGRESS NOTES
[facility] recheck     Diagnostic AHI: 21.0  1.9 events per hour PSG    Data only recheck patient did not restart using the machine after changed made to device      Assessment: Pt not meeting objective benchmarks for compliance       Action plan: follow up per provider request      Device type: Auto-CPAP    PAP settings: CPAP min 5.0 cm  H20       CPAP max 15.0 cm  H20           Mask type:  Nasal Mask           Total time spent on accessing and interpreting remote patient PAP therapy data  10 minutes    Total time spent counseling, coaching  and reviewing PAP therapy data with patient  0 minutes

## 2021-07-15 NOTE — TELEPHONE ENCOUNTER
FUTURE VISIT INFORMATION      FUTURE VISIT INFORMATION:    Date: 10/12/21    Time: 1:00pm    [place] [facility] INFORMATION:    Referring provider:  self    Referring providers clinic:  N/A    Reason for visit/diagnosis  top consult    RECORDS REQUESTED FROM:       No recs to collect

## 2021-07-16 ENCOUNTER — MYC MEDICAL ADVICE (OUTPATIENT)
Dept: SLEEP MEDICINE | Facility: CLINIC | Age: 66
End: 2021-07-16

## 2021-07-21 NOTE — TELEPHONE ENCOUNTER
Reason for call:  Other   Patient called regarding (reason for call): call back  Additional comments: Per patient: needs a call back to discuss next steps regarding cpap machine due to recall, might need to also discuss other machines*    Phone number to reach patient:  Cell number on file:    Telephone Information:   Mobile [phone]       Best Time:  Anytime    Can we leave a detailed message on this number?  YES    Travel screening: Not Applicable

## 2021-07-22 ENCOUNTER — DOCUMENTATION ONLY (OUTPATIENT)
Dept: SLEEP MEDICINE | Facility: CLINIC | Age: 66
End: 2021-07-22
Payer: MEDICARE

## 2021-07-22 NOTE — PROGRESS NOTES
Patient call regarding [employer] recall for their pap device.    Device type:: CPAP    Supplemental oxygen: No , if yes moved to advanced device workflow.     [facility]     Age of your current device:  less than 5 years old    History review:     Does the patient have the following?      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 Yes       Discontinuation of PAP therapy would lead to substantial deterioration of functional status or quality of life. Yes    If yes to any of the questions      Advised patient to continue using therapy until device is replaced or repaired.    Advised patient to avoid unapproved cleaning methods, such as ozone (see FDA safety communication on use of ozone ).    Has patient registered device? No Advised patient to register for repair or replacement on the [employer] website.  Patient can call [employer] at [phone] for additional support.    Consider use of bacterial filter with reassessment of pressure needs. You may need to get a new tube if your current tube is heated.  Sources for filters:  online sources or your DME. If you feel your symptoms are returning or you feel your pressures are insufficient , please change the filter and if issues continue reach out to your provider. Consider discontinuing using humidity with the filter.     Does the patient feel they still need to have further discussion with provider ?   Yes     Please contact your DME to see if you are eligible to receive  a new device if it is over 5 years old.  You may also choose to pay out of pocket for a new device, if your insurance does not cover a new device at this time.      Patient has current complaints of yes** recommending patient visit with primary care provider regarding symptoms.  If primary care provider is not able to determine cause of complaint patient instructed to discontinue use of device.     If no to all questions:     Advised patient to discontinue use of the device.     Has patient registered device? No Advised patient to register for repair or replacement on the [employer] website.  Patient can call [employer] at [phone] for additional support.    Get another device that is not impacted by recall if possible. Please contact your DME to see if you are eligible to receive a new device if it is over 5 years old.     Discuss alternative treatments, including positional therapy, oral appliance therapy, and surgery.       Discussed behavioral strategies such as weight loss, exercise, and avoidance of alcohol and sedatives before bedtime.    Does the patient have additional questions or concerns to be sent to the provider at this time?  No    Plan :     Patient chooses to discontinue therapy at this time     Schedule patient to see a sleep Provider next week.

## 2021-07-26 RX ORDER — FINASTERIDE 5 MG/1
5 TABLET, FILM COATED ORAL DAILY
COMMUNITY
Start: 2021-07-21 | End: 2023-08-09

## 2021-07-26 NOTE — PROGRESS NOTES
"Gregoria Stein is a 65 year old adult being evaluated via a billable telephone visit.     \"This telephone visit will be conducted via a call between you and your physician/provider. We have found that certain health care needs can be provided without the need for an in-person visit or physical exam.  This service lets us provide the care you need with a telephone conversation.  If a prescription is necessary we can send it directly to your pharmacy.  If lab work is needed we can place an order for that and you can then stop by our lab to have the test done at a later time.\"    Telephone visits are billed at different rates depending on your insurance coverage.  Please reach out to your insurance provider with any questions.    Patient has given verbal consent for  a Telephone visit? Yes      ADELAIDE CASTILLO MD    Telephone Visit Details:     Telephone Visit Start Time: 4:10 PM    Telephone Visit End Time:4:59 PM                 [facility]   Outpatient Sleep Medicine Follow-up Visit  July 27, 2021    Name: Gregoria Stein MRN# [id number]   Age: 65 year old YOB: 1955     Date of Consultation: July 27, 2021  Consultation is requested by: No referring provider defined for this encounter.  Primary care provider: Liza Delgado           Assessment and Plan:     Sleep Diagnoses:    Restless legs syndrome with ferritin of 28 currently on iron, 900 mg of gabapentin and 0.75 mg of pramipexole at night.    Summary Counseling:    Take vitamin C with iron    Plan schedule bedtime at midnight and set alarm at 9 AM to get up and remove mask and turn off CPAP    Contact regular medical for a inspiratory filter for your device.               History of Present Illness:     Gregoria Stein is a 65 year old adult with moderate obstructive sleep apnea and associated hypoxemia that was corrected on CPAP.  She has concomitant severe restless leg syndrome with low iron and continues to have substantial sleep " disruption that is evident in her CPAP usage patterns with some optimal adherence intermittently since October 2020 when her therapy was started.  Her device has been recalled for repair/replacement and she has registered for this process with [employer].             Most Recent SCALES:    EPWORTH SLEEPINESS SCALE WITHIN 1 YEAR WITHIN 10 DAYS   Sitting and reading 0 0   Watching TV 0 0   Sitting, inactive in a public place (theatre or mtg.) 0  0    As a passenger in a car 0 0   Lying down to rest in the afternoon when circumstance permit 1 1   Sitting and talking to someone 0 0   Sitting quietly after lunch without alcohol 0 0   In a car, while stopped for a few minutes in traffic 0 0   TOTAL SCORE 1 1       INSOMNIA SEVERITY INDEX WITHIN 1 YEAR   Difficulty falling asleep 3   Difficult staying asleep 2   Problems waking up to early 2   How SATISFIED/DISSATISFIED are you with your CURRENT sleep pattern? 2   How NOTICEABLE to others do you think your sleep pattern is in terms of your quality of life? 3   How WORRIED/DISTRESSED are you about your current sleep pattern? 2   To what extent do you consider your sleep problem to INTERFERE with your daily fuctioning(e.g. daytime fatigue, mood, ability to function at work/daily chores, concentration, mood,etc.) CURRENTLY? 3   INSOMNIA SEVERITY INDEX TOTAL SCORE 17    --absence of insomnia (0-7); sub-threshold insomnia (8-14); moderate insomnia (15-21); and severe insomnia (22-28)--        DARIELA Total Score: 17      Objective:  CPAP Compliance Targets:   >70% days > 4 hours AHI < 5   30 days ending July 27, 2021  Mask type:  Nasal Mask   Respironics    Auto-PAP 5.0 - 15.0 cmH2O 30 day usage data:    40% of days with > 4 hours of use. 3/30 days with no use.   Average use 191 minutes per day.   Average leak 33.68 LPM.  Average % of night in large leak 7%.    CPAP 90% pressure 10.1cm.   AHI 6.04 events per hour.           CPAP usage pattern continuous blower use on  without mask on face likely due to inattention to the device   and marked interruptions in usage likely related to restless leg syndrome.             Medications:     Current Outpatient Medications   Medication Sig     Acetaminophen (TYLENOL PO) Take 1,000 mg by mouth every 8 hours as needed      albuterol (PROAIR HFA/PROVENTIL HFA/VENTOLIN HFA) 108 (90 Base) MCG/ACT inhaler Inhale 2 puffs into the lungs     cholecalciferol (VITAMIN D3) 5000 units (125 mcg) capsule Take by mouth daily     famotidine (PEPCID) 20 MG tablet Take 20 mg by mouth 2 times daily     finasteride (PROSCAR) 5 MG tablet TAKE 1/2 TABLET BY MOUTH DAILY     FLOVENT  MCG/ACT inhaler INHALE 1 PUFF BY MOUTH TWICE DAILY     gabapentin (NEURONTIN) 300 MG capsule Take 3 capsules (900 mg) by mouth At Bedtime     hydrOXYzine (VISTARIL) 50 MG capsule Take 1 capsule (50 mg) by mouth 3 times daily as needed for anxiety     lamoTRIgine (LAMICTAL) 25 MG tablet Take 50 mg by mouth daily      Omega-3 Fatty Acids (OMEGA-3 FISH OIL) 1200 MG CAPS      pramipexole (MIRAPEX) 0.25 MG tablet Take 3 tablets (0.75 mg) by mouth At Bedtime     testosterone (ANDROGEL 1.62 % PUMP) 20.25 MG/ACT gel Place 3 Pump onto the skin daily     traZODone (DESYREL) 100 MG tablet Take 1 tablet (100 mg) by mouth At Bedtime     No current facility-administered medications for this visit.        Allergies   Allergen Reactions     Penicillins Rash and Hives     Versed [Midazolam] Other (See Comments)     Stopped breathing  Over 10 years ago but possible sensitivity to too much of this medication  Became apnic            Problem List:     Patient Active Problem List   Diagnosis     Vulvar irritation     CLAIRE (generalized anxiety disorder)     Mass     PTSD (post-traumatic stress disorder)     Other chronic pain     Restless leg syndrome     S/P arthroscopic surgery of right knee     Osteoarthritis of spine with radiculopathy, lumbar region     Chondromalacia of left patella     Chronic  bilateral low back pain without sciatica     Chronic joint pain     Complex tear of medial meniscus of left knee as current injury     GERD (gastroesophageal reflux disease)     Glaucoma     IBS (irritable bowel syndrome)     Chronic pain of right knee     Lumbar radiculopathy     Major depressive disorder, recurrent severe without psychotic features (H)     Obesity     Postmenopausal     Primary osteoarthritis of right knee     MDD (major depressive disorder), recurrent severe, 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)            Past Medical History:     Does not need 02 supplement at night   Past Medical History:   Diagnosis Date     Anxiety      Arthritis     C spine, thumbs bilateral, feet, shoulders, knees     Depressive disorder      Gastro-oesophageal reflux disease     intermittent     Labial irritation     labial mass     Other chronic pain     feet, knees, shoulders, full spine, thumbs     PTSD (post-traumatic stress disorder)      Restless leg syndrome              Past Surgical History:    No h/o  upper airway surgery  Past Surgical History:   Procedure Laterality Date     COLONOSCOPY       EXCISE MASS VAGINA Left 4/10/2015    Procedure: EXCISE MASS VAGINA;  Surgeon: Stanislav Byers MD;  Location: [facility] OR     GENITOURINARY SURGERY      Urethrial dilation     JOINT REPLACEMENT Right 07/2018     ORTHOPEDIC SURGERY      right rotator cuff, left achilles tendon repair, neuroma removed right foot, right knee arthroscopy,                Copy to: Liza Delgado MD 7/27/2021       Total time spent with patient: 25 min >50% counseling and 15minutes documenting and reviewing records

## 2021-07-27 ENCOUNTER — VIRTUAL VISIT (OUTPATIENT)
Dept: SLEEP MEDICINE | Facility: CLINIC | Age: 66
End: 2021-07-27
Payer: MEDICARE

## 2021-07-27 DIAGNOSIS — G47.33 OSA (OBSTRUCTIVE SLEEP APNEA): Primary | ICD-10-CM

## 2021-07-27 PROBLEM — N81.89 PELVIC FLOOR WEAKNESS: Status: ACTIVE | Noted: 2017-01-10

## 2021-07-27 PROBLEM — F64.9 GENDER IDENTITY DISORDER: Status: ACTIVE | Noted: 2020-11-13

## 2021-07-27 PROBLEM — Z96.651 STATUS POST TOTAL KNEE REPLACEMENT, RIGHT: Status: ACTIVE | Noted: 2018-07-19

## 2021-07-27 PROBLEM — Z98.890 S/P ARTHROSCOPIC SURGERY OF RIGHT KNEE: Status: RESOLVED | Noted: 2020-06-23 | Resolved: 2021-07-27

## 2021-07-27 PROCEDURE — 99443 PR PHYSICIAN TELEPHONE EVALUATION 21-30 MIN: CPT | Mod: 95 | Performed by: INTERNAL MEDICINE

## 2021-08-04 ENCOUNTER — TELEPHONE (OUTPATIENT)
Dept: PLASTIC SURGERY | Facility: CLINIC | Age: 66
End: 2021-08-04

## 2021-08-04 NOTE — TELEPHONE ENCOUNTER
LM for patient regarding appointment on 10/12 in person with  needing to be reschedule due to scheduling conflict.    Offered patient 10/8 instead.    Call back number was provided.

## 2021-08-05 ENCOUNTER — TELEPHONE (OUTPATIENT)
Dept: PLASTIC SURGERY | Facility: CLINIC | Age: 66
End: 2021-08-05

## 2021-08-05 NOTE — TELEPHONE ENCOUNTER
Called pat inert back in regards to appointment with  being changed from 10/12 at 11:00 to 10/8 at 10:30.    Patient stated that would work fine.    Patient is aware of appointment date, time, and location.    Patient had no further questions at this time.

## 2021-09-12 ENCOUNTER — HEALTH MAINTENANCE LETTER (OUTPATIENT)
Age: 66
End: 2021-09-12

## 2021-10-08 ENCOUNTER — OFFICE VISIT (OUTPATIENT)
Dept: PLASTIC SURGERY | Facility: CLINIC | Age: 66
End: 2021-10-08
Attending: SURGERY
Payer: MEDICARE

## 2021-10-08 VITALS
HEIGHT: 66 IN | OXYGEN SATURATION: 95 % | WEIGHT: 223 LBS | DIASTOLIC BLOOD PRESSURE: 67 MMHG | SYSTOLIC BLOOD PRESSURE: 130 MMHG | TEMPERATURE: 98.6 F | BODY MASS INDEX: 35.84 KG/M2 | HEART RATE: 88 BPM

## 2021-10-08 DIAGNOSIS — F64.0 GENDER DYSPHORIA IN ADOLESCENT AND ADULT: Primary | ICD-10-CM

## 2021-10-08 PROCEDURE — 99204 OFFICE O/P NEW MOD 45 MIN: CPT | Performed by: SURGERY

## 2021-10-08 RX ORDER — OMEPRAZOLE 40 MG/1
CAPSULE, DELAYED RELEASE ORAL
COMMUNITY
Start: 2021-08-27

## 2021-10-08 ASSESSMENT — PAIN SCALES - GENERAL: PAINLEVEL: MILD PAIN (3)

## 2021-10-08 ASSESSMENT — MIFFLIN-ST. JEOR: SCORE: 1578.27

## 2021-10-08 NOTE — LETTER
"10/8/2021       RE: Gregoria Stein  [address]  [place] [address]     Dear Colleague,    Thank you for referring your patient, Gregoria Stein, to the [facility] PLASTIC AND RECONSTRUCTIVE SURGERY CLINIC [place] [facility]. Please see a copy of my visit note below.    PLASTICS NEW TOP   HPI: This is a 66 year old biological female who identifies as transmasculine with a history of gender dysphoria and complex medical history who presents today by themself for a consultation for top surgery. Their pronouns are they/them or by name, and their preferred name is Killian. They were referred by online support groups and made their appointment 9 months ago. Their therapist is Giulia Lopez at the [facility]. Does sensory-motor psychotherapy. They do not have a LoS yet. They have been seeing Giulia for the past several months after previously seeing Asha Damon for many years. They have been on testosterone gel since 9/4/20, administered by Ervin Pichardo at [facility]. They have been living their chosen gender identity/role for 2 years, although they have \"known\" since childhood. They do not bind due to pain and shortness of breath. They have hypersensitivity of the L nipple, but otherwise no breast lumps, skin puckering, nipple drainage, or other breast problems. Had a negative mammogram 9 months ago at [facility] in [place].     Medical Hx: Gender dysphoria. No history of asthma, diabetes mellitus. No bleeding, clotting, healing or scarring problems.  Does have: incidental finding of AAA on CT scan, unsure of size. Just following with regular scanning. Complex PTSD from childhood gender issues. Anxiety and depression. GERD, ASHLYN, restless legs, vocal cord dysfunction. Chronic pain of lower back, feet and joints. Pinched R shoulder nerve. Some undetermined pulmonary condition(?). Obesity.    Surgical Hx: vulvar " "mass excision. R rotator cuff repair. L Achilles repair. R foot neuroma excision.    Family Hx: No family history of breast or ovarian cancer.dad  age 49 of MI. Brother had CVA. Sister has MS. Mom had Parkinsons.     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 On disability from PTSD. Waiting to get a small job at [employer].  Relationship status/family: lives alone, single. Has a CADI waiver so may be eligible for ILS and PCA services to help with postop cares.  Smoking status: quit 30 years ago.  Alcohol use: wince 2x/week.  Uses medical marijuana tincture for chronic pain. Diet: omnivore, mainly veggies. Caffeine: black tea 1-2 cups/day, no pop.  Exercise: bikes 10 miles 3x/week.  Sleep: 3-4 hours, uses DANAY and pramaperol (?)     PE: General: Height: 5' 6\" Weight: 223 lbs 0 oz   Chest:   Nice upper chest contour.   Grade 2.5 nipple ptosis.   R breast is slightly larger than the L, at least 500 gms+.   IMFs situated about 2-3 cms below the pec muscle.   R IMF situated about 1-2 cms lower than the L.   Moderate lateral thoracic rolls.   Minimal anterior axillary folds.     No lymphadenopathy or masses. Fibrofatty tissues on palpation. Minimal striae.  Photos taken with consent.     A&P: 66 year old biological female who identifies as transmasculine who is a good candidate for gender affirming top surgery with one of the following: bilateral simple mastectomy vs. breast reduction, +/- possible nipple graft reconstruction. They will most likely need a bilateral simple mastectomy with nipple graft reconstruction depending on intraoperative findings and the patient's desired outcome. The patient is interested in nipple grafts. The patient will not need a pre-op mammogram but will need an H&P from their PCP.      They did not meet with our Transgender Coordinator but they will be in contact via [employer] to discuss communications with staff and timeline. They did receive an " introductory folder of information and contact numbers/names. Any further discussion of risks and complications will be reviewed during the pre-op visit.    Patient accepts the risks of this procedure and would like to proceed with surgery. They are able to give informed consent for this medically necessary procedure.   Once we receive their therapist letter of support we will initiate the prior authorization process. They have [place]/Medicare so we will see if they will get a PA.    According to [place] Case Law and [facility] standards of care, with an appropriate letter of support from a mental health provider, top surgery/mastectomy is medically necessary for the treatment of gender dysphoria.     Total time = 45 minutes, spent on the date of encounter doing chart review, history and physical, dressing changes, documentation, patient education, and any further activity as noted above.             Again, thank you for allowing me to participate in the care of your patient.      Sincerely,    Miladys Benavides MD

## 2021-10-11 NOTE — PROGRESS NOTES
"PLASTICS NEW TOP   HPI: This is a 66 year old biological female who identifies as transmasculine with a history of gender dysphoria and complex medical history who presents today by themself for a consultation for top surgery. Their pronouns are they/them or by name, and their preferred name is Killian. They were referred by online support groups and made their appointment 9 months ago. Their therapist is Giulia Lopez at the [facility]. Does sensory-motor psychotherapy. They do not have a LoS yet. They have been seeing Giulia for the past several months after previously seeing Asha Damon for many years. They have been on testosterone gel since 20, administered by Ervin Pichardo at [facility]. They have been living their chosen gender identity/role for 2 years, although they have \"known\" since childhood. They do not bind due to pain and shortness of breath. They have hypersensitivity of the L nipple, but otherwise no breast lumps, skin puckering, nipple drainage, or other breast problems. Had a negative mammogram 9 months ago at [facility] in [place].     Medical Hx: Gender dysphoria. No history of asthma, diabetes mellitus. No bleeding, clotting, healing or scarring problems.  Does have: incidental finding of AAA on CT scan, unsure of size. Just following with regular scanning. Complex PTSD from childhood gender issues. Anxiety and depression. GERD, ASHLYN, restless legs, vocal cord dysfunction. Chronic pain of lower back, feet and joints. Pinched R shoulder nerve. Some undetermined pulmonary condition(?). Obesity.    Surgical Hx: vulvar mass excision. R rotator cuff repair. L Achilles repair. R foot neuroma excision.    Family Hx: No family history of breast or ovarian cancer.dad  age 49 of MI. Brother had CVA. Sister has MS. Mom had Parkinsons.     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  Relationship status/family: lives alone, single. Has a CADI waiver so may be eligible for ILS and PCA services to help with postop cares.  Smoking status: quit 30 years ago.  Alcohol use: wince 2x/week.  Uses medical marijuana tincture for chronic pain. Diet: omnivore, mainly veggies. Caffeine: black tea 1-2 cups/day, no pop.  Exercise: bikes 10 miles 3x/week.  Sleep: 3-4 hours, uses DANAY and pramaperol (?)     PE: General: Height: 5' 6\" Weight: 223 lbs 0 oz   Chest:   Nice upper chest contour.   Grade 2.5 nipple ptosis.   R breast is slightly larger than the L, at least 500 gms+.   IMFs situated about 2-3 cms below the pec muscle.   R IMF situated about 1-2 cms lower than the L.   Moderate lateral thoracic rolls.   Minimal anterior axillary folds.     No lymphadenopathy or masses. Fibrofatty tissues on palpation. Minimal striae.  Photos taken with consent.     A&P: 66 year old biological female who identifies as transmasculine who is a good candidate for gender affirming top surgery with one of the following: bilateral simple mastectomy vs. breast reduction, +/- possible nipple graft reconstruction. They will most likely need a bilateral simple mastectomy with nipple graft reconstruction depending on intraoperative findings and the patient's desired outcome. The patient is interested in nipple grafts. The patient will not need a pre-op mammogram but will need an H&P from their PCP.      They did not meet with our Transgender Coordinator but they will be in contact via [employer] to discuss communications with staff and timeline. They did receive an introductory folder of information and contact numbers/names. Any further discussion of risks and complications will be reviewed during the pre-op visit.    Patient accepts the risks of this procedure and would like to proceed with surgery. They are able to give informed consent for this medically necessary procedure.   Once we receive their therapist " letter of support we will initiate the prior authorization process. They have [place]/Medicare so we will see if they will get a PA.    According to [place] Case Law and [facility] standards of care, with an appropriate letter of support from a mental health provider, top surgery/mastectomy is medically necessary for the treatment of gender dysphoria.     Total time = 45 minutes, spent on the date of encounter doing chart review, history and physical, dressing changes, documentation, patient education, and any further activity as noted above.

## 2021-10-12 ENCOUNTER — PRE VISIT (OUTPATIENT)
Dept: SURGERY | Facility: CLINIC | Age: 66
End: 2021-10-12

## 2021-12-06 ENCOUNTER — TELEPHONE (OUTPATIENT)
Dept: PLASTIC SURGERY | Facility: CLINIC | Age: 66
End: 2021-12-06
Payer: MEDICARE

## 2021-12-06 NOTE — TELEPHONE ENCOUNTER
Writer received voicemail from [facility] at [facility] asking for care coordiantion services for pt. Pt is trying to understand next steps in care plan. Writer called pt back, no answer, LVM explaining that pt needs to send in letter of support before we can schedule surgery. Gilson Green, RNCC, also sent pt a [employer] message about this.     Gian Green

## 2022-01-05 ENCOUNTER — PATIENT OUTREACH (OUTPATIENT)
Dept: PLASTIC SURGERY | Facility: CLINIC | Age: 67
End: 2022-01-05
Payer: MEDICARE

## 2022-01-05 NOTE — PATIENT INSTRUCTIONS
Pt called me today to discuss requirements for top surgery with Dr Benavides. They updated me that their therapist faxed their letter of support over 2 weeks ago. I reviewed pt's chart and do not see the letter has been received. I updated pt that we will continue to look for it and in the meantime, I will request that Dr Benavides initiate case request for surgery as pt has fulfilled requirements and we are just waiting for fax to come through.  Gilson ROMAN RN

## 2022-01-06 DIAGNOSIS — F64.0 GENDER DYSPHORIA IN ADOLESCENT AND ADULT: Primary | ICD-10-CM

## 2022-01-06 RX ORDER — CLINDAMYCIN PHOSPHATE 900 MG/50ML
900 INJECTION, SOLUTION INTRAVENOUS
Status: CANCELLED | OUTPATIENT
Start: 2022-01-06

## 2022-01-06 RX ORDER — CLINDAMYCIN PHOSPHATE 900 MG/50ML
900 INJECTION, SOLUTION INTRAVENOUS SEE ADMIN INSTRUCTIONS
Status: CANCELLED | OUTPATIENT
Start: 2022-01-06

## 2022-01-10 ENCOUNTER — TELEPHONE (OUTPATIENT)
Dept: SURGERY | Facility: CLINIC | Age: 67
End: 2022-01-10
Payer: MEDICARE

## 2022-01-10 PROBLEM — F64.0 GENDER DYSPHORIA IN ADOLESCENT AND ADULT: Status: ACTIVE | Noted: 2022-01-10

## 2022-01-10 NOTE — TELEPHONE ENCOUNTER
Contacted the patient to confirm the scheduled dates and provide the following information:     Surgeon/surgery date/location:  Dr. Benavides on 7/29 at [facility].  Arrival:   5:45 AM   Pre-op consult:   Dr. Benavides on 7/19.   Pre-op physical with:   PCP. Patient is aware this needs to be completed within 30 days of surgery.  COVID-19 test:   7/25.   Post-op:   8/5, 9/6.    The surgery packet was provided via [employer].

## 2022-04-24 ENCOUNTER — HEALTH MAINTENANCE LETTER (OUTPATIENT)
Age: 67
End: 2022-04-24

## 2022-05-02 ENCOUNTER — VIRTUAL VISIT (OUTPATIENT)
Dept: SLEEP MEDICINE | Facility: CLINIC | Age: 67
End: 2022-05-02
Payer: MEDICARE

## 2022-05-02 VITALS — HEIGHT: 66 IN | WEIGHT: 220 LBS | BODY MASS INDEX: 35.36 KG/M2

## 2022-05-02 DIAGNOSIS — G47.33 OSA (OBSTRUCTIVE SLEEP APNEA): Primary | ICD-10-CM

## 2022-05-02 PROCEDURE — 99214 OFFICE O/P EST MOD 30 MIN: CPT | Mod: 95 | Performed by: PHYSICIAN ASSISTANT

## 2022-05-02 ASSESSMENT — SLEEP AND FATIGUE QUESTIONNAIRES
HOW LIKELY ARE YOU TO NOD OFF OR FALL ASLEEP WHILE SITTING QUIETLY AFTER LUNCH WITHOUT ALCOHOL: WOULD NEVER DOZE
HOW LIKELY ARE YOU TO NOD OFF OR FALL ASLEEP WHEN YOU ARE A PASSENGER IN A CAR FOR AN HOUR WITHOUT A BREAK: WOULD NEVER DOZE
HOW LIKELY ARE YOU TO NOD OFF OR FALL ASLEEP IN A CAR, WHILE STOPPED FOR A FEW MINUTES IN TRAFFIC: WOULD NEVER DOZE
HOW LIKELY ARE YOU TO NOD OFF OR FALL ASLEEP WHILE SITTING INACTIVE IN A PUBLIC PLACE: WOULD NEVER DOZE
HOW LIKELY ARE YOU TO NOD OFF OR FALL ASLEEP WHILE SITTING AND TALKING TO SOMEONE: WOULD NEVER DOZE
HOW LIKELY ARE YOU TO NOD OFF OR FALL ASLEEP WHILE LYING DOWN TO REST IN THE AFTERNOON WHEN CIRCUMSTANCES PERMIT: SLIGHT CHANCE OF DOZING
HOW LIKELY ARE YOU TO NOD OFF OR FALL ASLEEP WHILE SITTING AND READING: WOULD NEVER DOZE
HOW LIKELY ARE YOU TO NOD OFF OR FALL ASLEEP WHILE WATCHING TV: WOULD NEVER DOZE

## 2022-05-02 ASSESSMENT — PAIN SCALES - GENERAL: PAINLEVEL: MODERATE PAIN (4)

## 2022-05-02 NOTE — PROGRESS NOTES
"[facility]   Outpatient Sleep Medicine  May 2, 2022       Name: Gregoria Stein MRN# [id number]   Age: 66 year old YOB: 1955            Assessment and Plan:   1. ASHLYN (obstructive sleep apnea)  Patient's sleep apnea is currently untreated. Received replacement CPAP machine from [facility] about 6 months ago but not using with any regularity because concerned about continued risk with new machine. Discussed resuming regular use of CPAP is the best thing they can do to treat their sleep apnea however if unable to be compliant could also consider switching therapies. Mandibular advancement device discussed today and patient very interested in this option, reports they also spend a lot of time camping and believes compliance would be better with the oral appliance. Referral to sleep dentist placed today.   - Sleep Dental Referral; Future    Gregoria Stein will follow up after getting oral appliance made/fitted by sleep dentist. Likely will plan to pursue HST with oral appliance to verify efficacy.        Chief Complaint      Chief Complaint   Patient presents with     Video Visit          History of Present Illness:     Killian Stein is a 66 year old adult who presents to the clinic for follow-up of their moderate obstructive sleep apnea.     Originally diagnosed via PSG completed 10/13/2020 (210#, BMI 34.2) showing AHI 21.0, RDI 22.0, REM AHI 11.4, supine AHI 33.8. Oxygen tra 79.1% with 20.9 minutes <=88% and 75.3 minutes <=89%. PLM index 6 with arousal index 4.8.     Set up with [facility] [employer] [facility]Station Auto CPAP [id number] on 12/5/2020.     [employer] HST on autoCPAP 2/22/2021 showed resolution of hypoxemia with 3.9 minutes <=88% and minimum saturation 87%.     Their machine was affected by the recall. Got a replacement from [facility] maybe ~6 months ago but is not using with any regularity. Reports they have a \"fear\" of using the CPAP and concerned it may be causing more problems - " "read an online forum from [place] that reported a new problem with \"the silicone in the new machines breaking down too\". States \"I probably do need to use it though because I don't feel good and I know I feel better when I use it since I can get air in\".     Not discussed today - also has severe restless leg syndrome with ferritin of 24.6 (3/5/21) currently on 900 mg of gabapentin and 0.75 mg of pramipexole at night.    SCALES:   INSOMNIA: Insomnia Severity Score: 18   SLEEPINESS: [place] Sleepiness Score: 1    Past medical/surgical history, family history, social history, medications and allergies were reviewed.           Physical Examination:   Ht 1.676 m (5' 6\")   Wt 99.8 kg (220 lb)   LMP  (LMP Unknown)   BMI 35.51 kg/m    General appearance: Awake, alert, cooperative. Well groomed. Sitting comfortably in chair. In no apparent distress.  HEENT: Head: Normocephalic, atraumatic. Eyes:Conjunctiva clear. Sclera normal. Nose: External appearance without deformity.   Pulmonary:  Able to speak easily in full sentences. No cough or wheeze.   Skin:  No rashes or significant lesions on visible skin.   Neurologic: Alert, oriented x3.   Psychiatric: Mood euthymic. Affect congruent with full range and intensity.      CC:  Liza Delgado PA-C  May 2, 2022     [facility]  [address] [place] [address]     [facility] [place]  [address] [address]    Chart documentation was completed, in part, with [employer] voice-recognition software. Even though reviewed, some grammatical, spelling, and word errors may remain.      31 minutes spent on day of encounter doing chart review, history and exam, documentation, and further activities as noted above    "

## 2022-05-02 NOTE — PROGRESS NOTES
Killian is a 66 year old who is being evaluated via a billable video visit.      How would you like to obtain your AVS? MyChart  If the video visit is dropped, the invitation should be resent by: Text to cell phone: [phone]  Will anyone else be joining your video visit? Ese Moreno    Video-Visit Details    Type of service:  Video Visit    Video Start Time: 9:04AM    Video End Time:9:21AM    Originating Location (pt. Location): Home    Distant Location (provider location):  [facility]     Platform used for Video Visit: [employer]   Mary Severino PA-C

## 2022-05-02 NOTE — PATIENT INSTRUCTIONS
[facility]  Search engine: https://mms.aadsm.org/members/directory/search_bootstrap.php?org_id=[email or web address]&   Certified in Dental Sleep Medicine    Zach Whiting  Degree: DDS  [address]  [address]  [place] [address]  Professional Phone: [phone]  Website: [email or web address]    John Doyle  Degree: DDS  [facility]  [address]  [address]  [place] [address]  Professional Phone: [phone]: [phone]    Siena Whitlock  [facility]  [address]  [place] [address]  Professional Phone: [phone]  Website: [email or web address]      Junior Patel  Degree: DDS  [address]  [address]  [place] [address]  Professional Phone: [phone]  Fax: [phone]    Feliciano Walters  Degree: DDS  [facility]  [address]  [address]  [place] [address]  Professional Phone: [phone]  Website: [email or web address]      [facility]  [facility]  [address]  [address]  [place] [address]  Professional Phone: [phone]  Website: [email or web address]      Addie Chery  Degree: DDS  [facility]  [address]  [address]  [place] [address]  Professional Phone: [phone]     Coco Valente  Degree: DDS, PhD  [facility]  [address] [place] [address] [id number] [place] [address]   [address]   [address]   [place] [address]   Appointments: [phone]   Fax: [phone]   [facility]   [address]   [address]   [place] [address]   Appointments: [phone]   Fax: [phone]                Jose A Gaspar  Degree: DDS  [address] [place] [address]  Professional Phone: [phone]  Fax: [phone]  Website: [email or web address]      Winstonshayy Scott  Degree: KAYE  [facility]  [address] [place] [address]    Ana Paulaona Mulet Pradera  Degree: MS KAYE  [facility]  [address] [place] [address]  Professional Phone: [phone]      Stacey Ahn  Degree: MS KAYE  [facility]  [address]  [address]  [place] [address]  Professional Phone: [phone]    Aretha Asif  Degree: KAYE  [facility]  [address]  [address]  [place] [address]  [place] Office     Demetrius Murdock  Degree: KAYE  [facility]  [address] [place] [place] [address]  Professional Phone: [phone]  Website: [email or web address]      Dinesh Hudson  Degree: KAYE  [facility]  [address] [place] [address]  Professional Phone: [phone]  Fax: [phone]      Arie Sharma  Degree: KAYE [facility]  [address]  [address]  [place] [address]                 ACCEPT MEDICARE  Rui House DDS  [address], [place] [address]  [phone]; [phone] (fax)  CoreDial    Macr Hubbard DDS, MS   [facility]   [address]   [address]   [place] [address]   Appointments: [phone]   Fax: [phone]       ADDITIONAL PROVIDERS  Lucian Jasso DDS   [facility]   [address]   [address]   [place] [address]   Appointments: [phone]   Fax: [phone]       Syd Moreland DDS, MS   [facility]  [address]  [place] [address]   Appointments: [phone] Ext: [phone]  Fax: [phone]   [email or web address]

## 2022-05-16 DIAGNOSIS — J98.4 RESTRICTIVE LUNG DISEASE: Primary | ICD-10-CM

## 2022-06-06 ENCOUNTER — HOSPITAL ENCOUNTER (OUTPATIENT)
Dept: RESPIRATORY THERAPY | Facility: CLINIC | Age: 67
Discharge: HOME | End: 2022-06-06
Admitting: INTERNAL MEDICINE
Payer: MEDICARE

## 2022-06-06 DIAGNOSIS — J98.4 RESTRICTIVE LUNG DISEASE: ICD-10-CM

## 2022-06-06 LAB — HGB BLD-MCNC: 16.6 G/DL (ref 11.7–17.7)

## 2022-06-06 PROCEDURE — 85018 HEMOGLOBIN: CPT | Performed by: INTERNAL MEDICINE

## 2022-06-06 PROCEDURE — 999N000157 HC STATISTIC RCP TIME EA 10 MIN

## 2022-06-06 PROCEDURE — 94060 EVALUATION OF WHEEZING: CPT

## 2022-06-06 PROCEDURE — 94060 EVALUATION OF WHEEZING: CPT | Mod: 26

## 2022-06-06 PROCEDURE — 94726 PLETHYSMOGRAPHY LUNG VOLUMES: CPT

## 2022-06-06 PROCEDURE — 94729 DIFFUSING CAPACITY: CPT

## 2022-06-06 PROCEDURE — 94726 PLETHYSMOGRAPHY LUNG VOLUMES: CPT | Mod: 26

## 2022-06-06 PROCEDURE — 250N000009 HC RX 250: Performed by: INTERNAL MEDICINE

## 2022-06-06 PROCEDURE — 36415 COLL VENOUS BLD VENIPUNCTURE: CPT | Performed by: INTERNAL MEDICINE

## 2022-06-06 PROCEDURE — 94729 DIFFUSING CAPACITY: CPT | Mod: 26

## 2022-06-06 RX ORDER — ALBUTEROL SULFATE 0.83 MG/ML
2.5 SOLUTION RESPIRATORY (INHALATION) ONCE
Status: COMPLETED | OUTPATIENT
Start: 2022-06-06 | End: 2022-06-06

## 2022-06-06 RX ADMIN — ALBUTEROL SULFATE 2.5 MG: 2.5 SOLUTION RESPIRATORY (INHALATION) at 14:34

## 2022-06-06 NOTE — PROGRESS NOTES
Complete PFT done. The results of testing meet ATS criteria for acceptability & repeatability.  Good patient effort and cooperation.  Patient was given Albuterol 2.5 mg via nebulizer prior to Post-BD testing.  Patient left PFT lab without complaint and/or distress.    Harriet Bhagat, RT

## 2022-06-07 LAB
DLCOCOR-%PRED-PRE: 106 %
DLCOCOR-PRE: 22.65 ML/MIN/MMHG
DLCOUNC-%PRED-PRE: 115 %
DLCOUNC-PRE: 24.61 ML/MIN/MMHG
DLCOUNC-PRED: 21.29 ML/MIN/MMHG
ERV-%PRED-PRE: 2 %
ERV-PRE: 0.01 L
ERV-PRED: 0.44 L
EXPTIME-PRE: 6.76 SEC
FEF2575-%PRED-POST: 120 %
FEF2575-%PRED-PRE: 101 %
FEF2575-POST: 2.65 L/SEC
FEF2575-PRE: 2.23 L/SEC
FEF2575-PRED: 2.2 L/SEC
FEFMAX-%PRED-PRE: 93 %
FEFMAX-PRE: 5.93 L/SEC
FEFMAX-PRED: 6.37 L/SEC
FEV1-%PRED-PRE: 77 %
FEV1-PRE: 1.97 L
FEV1FEV6-PRE: 82 %
FEV1FEV6-PRED: 80 %
FEV1FVC-PRE: 84 %
FEV1FVC-PRED: 79 %
FEV1SVC-PRE: 83 %
FEV1SVC-PRED: 74 %
FIFMAX-PRE: 3.62 L/SEC
FRCPLETH-%PRED-PRE: 68 %
FRCPLETH-PRE: 1.94 L
FRCPLETH-PRED: 2.82 L
FVC-%PRED-PRE: 72 %
FVC-PRE: 2.35 L
FVC-PRED: 3.26 L
IC-%PRED-PRE: 78 %
IC-PRE: 2.35 L
IC-PRED: 3 L
RVPLETH-%PRED-PRE: 93 %
RVPLETH-PRE: 1.93 L
RVPLETH-PRED: 2.06 L
TLCPLETH-%PRED-PRE: 81 %
TLCPLETH-PRE: 4.29 L
TLCPLETH-PRED: 5.27 L
VA-%PRED-PRE: 79 %
VA-PRE: 4.12 L
VC-%PRED-PRE: 68 %
VC-PRE: 2.36 L
VC-PRED: 3.44 L

## 2022-06-07 NOTE — TELEPHONE ENCOUNTER
[facility] Call Center    Phone Message    May a detailed message be left on voicemail: yes     Reason for Call: Appointment Intake    Referring Provider Name: self, unknown  Diagnosis and/or Symptoms: FTM  desire top surgery    Patient sent in online appointment request for above. They can be reached at [phone]    Action Taken: Message routed to:  [facility] ([facility] Plastics / Gender Care    Travel Screening: Not Applicable                                                                         see A & I for report/yes

## 2022-06-30 ENCOUNTER — OFFICE VISIT (OUTPATIENT)
Dept: PULMONOLOGY | Facility: OTHER | Age: 67
End: 2022-06-30
Payer: MEDICARE

## 2022-06-30 VITALS
DIASTOLIC BLOOD PRESSURE: 79 MMHG | RESPIRATION RATE: 18 BRPM | OXYGEN SATURATION: 97 % | BODY MASS INDEX: 34.79 KG/M2 | WEIGHT: 216.5 LBS | HEIGHT: 66 IN | HEART RATE: 83 BPM | SYSTOLIC BLOOD PRESSURE: 137 MMHG

## 2022-06-30 DIAGNOSIS — J38.3 PARADOXICAL VOCAL CORD MOTION: ICD-10-CM

## 2022-06-30 DIAGNOSIS — R06.09 DYSPNEA ON EXERTION: Primary | ICD-10-CM

## 2022-06-30 PROCEDURE — 99205 OFFICE O/P NEW HI 60 MIN: CPT | Performed by: INTERNAL MEDICINE

## 2022-06-30 NOTE — PATIENT INSTRUCTIONS
It was good to meet you in clinic today. This is what we discussed:    There may be some small airways obstruction, and even asthma, though your overall lung function is normal.  Use the albuterol as needed.  We can consider a methacholine challenge test at some point to look for more evidence of asthma.  We will get you into pulmonary rehabilitation at [facility].  We will get you in to see Dr. Gao at the [facility] to evaluate the vocal cords.  I will see you in about 4 months.  Contact me with questions or concerning symptoms.    Daniel Greer MD  Pulmonary and Critical Care Medicine  [facility]  Office [phone]  (he/him/his)

## 2022-06-30 NOTE — LETTER
"    6/30/2022         RE: Gregoria Stein  [address]  [place] [address]        Dear Colleague,    Thank you for referring your patient, Killian Stein, to the [facility]. Please see a copy of my visit note below.    Pulmonary Clinic Outpatient Consultation    Assessment and Plan:   66 year old remote smoker with a history of inducible laryngeal obstruction, moderate obstructive sleep apnea, GERD, iron deficiency anemia, osteoarthritis s/p right TKA, and chronic exertional dyspnea, presenting for evaluation.    Chronic exertional dyspnea, inducible laryngeal obstruction: Likely multifactorial. They do have confirmed inducible laryngeal obstruction that leads to difficulty talking while walking, for example previously followed by SLP at [facility]. They find benefit from yawning (\"hot potato mouth\") exercises when an episode occurs. They note that emotional stress does also sometimes precipitate an episode and worsen breathing. They have no clear history of seasonal allergies or childhood asthma, though normal PFT does not rule out possible episodic bronchospasm; methacholine challenge could be considered; they would like to defer at this point given that they are having top surgery performed soon and makes sense to wait until after recovery from surgery. Very subtle (questionable) cylindrical bronchiectasis in RML, subtle expiratory subsegmental air trapping, but this can also be seen in normal lungs. We discussed an evaluation with Dr. Gao, laryngologist at the [facility], and they are interested in pursuing this while continuing the yawning breaths to abort acute episodes. Also discussed pulmonary rehab at [facility], which I think would be quite beneficial, and they are interested in pursuing this. They have moderate ASHLYN with AHI 21 with recalled device and ongoing concern regarding noise abatement material in the new device, so they are not on CPAP but having a " "mandibular advancement device made.    Plan:  - continue to use yawning (\"hot potato mouth\") breaths to abort acute episodes of inducible laryngeal obstruction  - albuterol HFA as needed  - referral to Dr. Yara Gao. laryngologist at the [facility]  - enrollment in pulmonary rehabilitation at [facility]  - can consider methacholine challenge test in future if needed  - will be receiving a MAD for moderate ASHLYN; works with [employer] [facility] [employer]  - annual influenza vaccination  - unclear pneumococcal vaccination status  - COVID-19 vaccination: Pfizer-BioNTech  - follow up in 4 months  - encouraged them to contact me with questions or concerning symptoms    Daniel Greer MD  [facility]  Office [phone]  Pager [phone]  (he/him/his)    CCx: inducible laryngeal obstruction, chronic exertional dyspnea    HPI: 66 year old remote smoker with a history of inducible laryngeal obstruction, moderate obstructive sleep apnea, GERD, iron deficiency anemia, osteoarthritis s/p right TKA, and chronic exertional dyspnea, presenting for evaluation. Dyspnea for years, worse over last couple of years. Occasional dry morning cough that has improved with PPI. They note that biking up hills is difficult but a bit better recently. Occasional minimal wheezing. Difficult to talk while walking, though yawning breaths (\"hot potato mouth\") techniques help to abort episodes; they were previously followed by SLP at [facility]. No known seasonal allergies; no conjunctival or nasal pruritus. Started smoking age 21, average of 0.25 ppd, and quit at age 34. No FHx of lung disease. 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    ROS:  A 12-system review was obtained and was negative with the exception of the symptoms endorsed in the history of present illness.    PMH:  remote smoker  inducible laryngeal obstruction  moderate obstructive sleep apnea with AHI 21; " recalled device and ongoing concern regarding noise abatement material in the new device, so they are not on CPAP but having a mandibular advancement device made  GERD  BMI 34.9  iron deficiency anemia  osteoarthritis s/p right TKA  chronic exertional dyspnea    PSH:  Past Surgical History:   Procedure Laterality Date     COLONOSCOPY       EXCISE MASS VAGINA Left 4/10/2015    Procedure: EXCISE MASS VAGINA;  Surgeon: Stanislav Byers MD;  Location: [facility] OR     GENITOURINARY SURGERY      Urethrial dilation     JOINT REPLACEMENT Right 2018     ORTHOPEDIC SURGERY      right rotator cuff, left achilles tendon repair, neuroma removed right foot, right knee arthroscopy,        Allergies:  Allergies   Allergen Reactions     Penicillins Rash and Hives     Versed [Midazolam] Other (See Comments)     Stopped breathing  Over 10 years ago but possible sensitivity to too much of this medication  Became apnic       Family HX:  Family History   Problem Relation Age of Onset     Macular Degeneration Mother      Osteoporosis Mother      Heart Disease Father 49     Multiple Sclerosis Sister      Diabetes Paternal Uncle         heart issues     Cancer No family hx of      Coronary Artery Disease No family hx of        Social Hx:  Social History     Socioeconomic History     Marital status: Single     Spouse name: Not on file     Number of children: Not on file     Years of education: Not on file     Highest education level: Not on file   Occupational History     Not on file   Tobacco Use     Smoking status: Former Smoker     Packs/day: 0.00     Quit date: 1983     Years since quittin.8     Smokeless tobacco: Never Used   Substance and Sexual Activity     Alcohol use: Yes     Comment: occasional     Drug use: Yes     Types: Marijuana     Comment: daily only at night time for medical conditions     Sexual activity: Not Currently   Other Topics Concern     Parent/sibling w/ CABG, MI or angioplasty before 65F 55M? Not Asked  "  Social History Narrative     Not on file     Social Determinants of Health     Financial Resource Strain: Not on file   Food Insecurity: Not on file   Transportation Needs: Not on file   Physical Activity: Not on file   Stress: Not on file   Social Connections: Not on file   Intimate Partner Violence: Not on file   Housing Stability: Not on file       Current Meds:  Current Outpatient Medications   Medication Sig Dispense Refill     albuterol (PROAIR HFA/PROVENTIL HFA/VENTOLIN HFA) 108 (90 Base) MCG/ACT inhaler Inhale 2 puffs into the lungs       cholecalciferol (VITAMIN D3) 5000 units (125 mcg) capsule Take by mouth daily       finasteride (PROSCAR) 5 MG tablet TAKE 1/2 TABLET BY MOUTH DAILY       gabapentin (NEURONTIN) 300 MG capsule Take 3 capsules (900 mg) by mouth At Bedtime 270 capsule 0     hydrOXYzine (VISTARIL) 50 MG capsule Take 1 capsule (50 mg) by mouth 3 times daily as needed for anxiety 90 capsule 3     Omega-3 Fatty Acids (OMEGA-3 FISH OIL) 1200 MG CAPS        omeprazole (PRILOSEC) 40 MG DR capsule TAKE 1 CAPSULE BY MOUTH EVERY DAY BEFORE A MEAL       pramipexole (MIRAPEX) 0.25 MG tablet Take 3 tablets (0.75 mg) by mouth At Bedtime 90 tablet 3     testosterone (ANDROGEL 1.62 % PUMP) 20.25 MG/ACT gel Place 3 Pump onto the skin daily 75 g 1     traZODone (DESYREL) 100 MG tablet Take 1 tablet (100 mg) by mouth At Bedtime 30 tablet 3     Acetaminophen (TYLENOL PO) Take 1,000 mg by mouth every 8 hours as needed  (Patient not taking: Reported on 6/30/2022)         Physical Exam:  /79   Pulse 83   Resp 18   Ht 1.676 m (5' 6\")   Wt 98.2 kg (216 lb 8 oz)   LMP  (LMP Unknown)   SpO2 97%   BMI 34.94 kg/m    Gen: alert, oriented, no distress  HEENT: no cervical or supraclavicular lymphadenopathy, no stridor on exam  CV: RRR, no M/G/R  Resp: CTAB, no focal crackles or wheezes  Skin: no apparent rashes  Ext: no cyanosis, clubbing or edema  Neuro: alert, nonfocal    Labs:  reviewed    Imaging " studies:  High-resolution chest CT (June 2021):  - images directly reviewed, formal interpretation follows:  FINDINGS:   LUNGS AND PLEURA: There has been interval resolution of the mosaic attenuation previously seen. There is mild bilateral tubular bronchiectasis most severe in the right middle lobe. Stable calcified granuloma right upper lobe. Minimal linear atelectasis or scarring in the lingula. No evidence for underlying interstitial lung disease. There is subsegmental air trapping on expiratory images. There is a tiny right pleural effusion.     MEDIASTINUM/AXILLAE: No adenopathy. Atherosclerotic change of the aorta with borderline aneurysmal dilatation of the ascending thoracic aorta measuring 4 cm. Heart size is normal. No pericardial effusion.     CORONARY ARTERY CALCIFICATION: Mild.     UPPER ABDOMEN: Stable small low-attenuation lesion in the right lobe of the liver likely a cyst. Tiny nonobstructing right renal stones. Partial visualization of presumed peripelvic cysts in the left kidney unchanged.     MUSCULOSKELETAL: There is degenerative change in the spine and both shoulders right greater than left.     IMPRESSION:   1.  Resolution of mosaic attenuation.   2.  Subsegmental air trapping remains visible on expiratory images.   3.  Bilateral bronchiectasis.   4.  Borderline aneurysmal dilatation of the ascending thoracic aorta measuring 4 cm.    Pulmonary Function Testing  June 2022:  FVC 2.35 (72%)  FEV1 1.97 (77%)  FEV1/FVC 0.84  No bronchodilator response  RV 1.93 (93%)  TLC 4.29 (81%)  DLCOc 106%  Flow-volume loop is normal with no evidence of upper airway obstruciton        Again, thank you for allowing me to participate in the care of your patient.        Sincerely,        Daniel Greer MD

## 2022-07-01 NOTE — PROGRESS NOTES
"Pulmonary Clinic Outpatient Consultation    Assessment and Plan:   66 year old remote smoker with a history of inducible laryngeal obstruction, moderate obstructive sleep apnea, GERD, iron deficiency anemia, osteoarthritis s/p right TKA, and chronic exertional dyspnea, presenting for evaluation.    Chronic exertional dyspnea, inducible laryngeal obstruction: Likely multifactorial. They do have confirmed inducible laryngeal obstruction that leads to difficulty talking while walking, for example previously followed by SLP at [facility]. They find benefit from yawning (\"hot potato mouth\") exercises when an episode occurs. They note that emotional stress does also sometimes precipitate an episode and worsen breathing. They have no clear history of seasonal allergies or childhood asthma, though normal PFT does not rule out possible episodic bronchospasm; methacholine challenge could be considered; they would like to defer at this point given that they are having top surgery performed soon and makes sense to wait until after recovery from surgery. Very subtle (questionable) cylindrical bronchiectasis in RML, subtle expiratory subsegmental air trapping, but this can also be seen in normal lungs. We discussed an evaluation with Dr. Gao, laryngologist at the [facility], and they are interested in pursuing this while continuing the yawning breaths to abort acute episodes. Also discussed pulmonary rehab at [facility], which I think would be quite beneficial, and they are interested in pursuing this. They have moderate ASHLYN with AHI 21 with recalled device and ongoing concern regarding noise abatement material in the new device, so they are not on CPAP but having a mandibular advancement device made.    Plan:  - continue to use yawning (\"hot potato mouth\") breaths to abort acute episodes of inducible laryngeal obstruction  - albuterol HFA as needed  - referral to Dr. Yara Gao. laryngologist at the [facility]  - " "enrollment in pulmonary rehabilitation at [facility]  - can consider methacholine challenge test in future if needed  - will be receiving a MAD for moderate ASHLYN; works with [employer]  - annual influenza vaccination  - unclear pneumococcal vaccination status  - COVID-19 vaccination: Pfizer-BioNTech  - follow up in 4 months  - encouraged them to contact me with questions or concerning symptoms    Daniel Greer MD  [facility]  Office [phone]  Pager [phone]  (he/him/his)    CCx: inducible laryngeal obstruction, chronic exertional dyspnea    HPI: 66 year old remote smoker with a history of inducible laryngeal obstruction, moderate obstructive sleep apnea, GERD, iron deficiency anemia, osteoarthritis s/p right TKA, and chronic exertional dyspnea, presenting for evaluation. Dyspnea for years, worse over last couple of years. Occasional dry morning cough that has improved with PPI. They note that biking up hills is difficult but a bit better recently. Occasional minimal wheezing. Difficult to talk while walking, though yawning breaths (\"hot potato mouth\") techniques help to abort episodes; they were previously followed by SLP at [facility]. No known seasonal allergies; no conjunctival or nasal pruritus. Started smoking age 21, average of 0.25 ppd, and quit at age 34. No FHx of lung disease. 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    ROS:  A 12-system review was obtained and was negative with the exception of the symptoms endorsed in the history of present illness.    PMH:  remote smoker  inducible laryngeal obstruction  moderate obstructive sleep apnea with AHI 21; recalled device and ongoing concern regarding noise abatement material in the new device, so they are not on CPAP but having a mandibular advancement device made  GERD  BMI 34.9  iron deficiency anemia  osteoarthritis s/p right TKA  chronic exertional " dyspnea    PSH:  Past Surgical History:   Procedure Laterality Date     COLONOSCOPY       EXCISE MASS VAGINA Left 4/10/2015    Procedure: EXCISE MASS VAGINA;  Surgeon: Stanislav Byers MD;  Location: [facility] OR     GENITOURINARY SURGERY      Urethrial dilation     JOINT REPLACEMENT Right 2018     ORTHOPEDIC SURGERY      right rotator cuff, left achilles tendon repair, neuroma removed right foot, right knee arthroscopy,        Allergies:  Allergies   Allergen Reactions     Penicillins Rash and Hives     Versed [Midazolam] Other (See Comments)     Stopped breathing  Over 10 years ago but possible sensitivity to too much of this medication  Became apnic       Family HX:  Family History   Problem Relation Age of Onset     Macular Degeneration Mother      Osteoporosis Mother      Heart Disease Father 49     Multiple Sclerosis Sister      Diabetes Paternal Uncle         heart issues     Cancer No family hx of      Coronary Artery Disease No family hx of        Social Hx:  Social History     Socioeconomic History     Marital status: Single     Spouse name: Not on file     Number of children: Not on file     Years of education: Not on file     Highest education level: Not on file   Occupational History     Not on file   Tobacco Use     Smoking status: Former Smoker     Packs/day: 0.00     Quit date: 1983     Years since quittin.8     Smokeless tobacco: Never Used   Substance and Sexual Activity     Alcohol use: Yes     Comment: occasional     Drug use: Yes     Types: Marijuana     Comment: daily only at night time for medical conditions     Sexual activity: Not Currently   Other Topics Concern     Parent/sibling w/ CABG, MI or angioplasty before 65F 55M? Not Asked   Social History Narrative     Not on file     Social Determinants of Health     Financial Resource Strain: Not on file   Food Insecurity: Not on file   Transportation Needs: Not on file   Physical Activity: Not on file   Stress: Not on file   Social  "Connections: Not on file   Intimate Partner Violence: Not on file   Housing Stability: Not on file       Current Meds:  Current Outpatient Medications   Medication Sig Dispense Refill     albuterol (PROAIR HFA/PROVENTIL HFA/VENTOLIN HFA) 108 (90 Base) MCG/ACT inhaler Inhale 2 puffs into the lungs       cholecalciferol (VITAMIN D3) 5000 units (125 mcg) capsule Take by mouth daily       finasteride (PROSCAR) 5 MG tablet TAKE 1/2 TABLET BY MOUTH DAILY       gabapentin (NEURONTIN) 300 MG capsule Take 3 capsules (900 mg) by mouth At Bedtime 270 capsule 0     hydrOXYzine (VISTARIL) 50 MG capsule Take 1 capsule (50 mg) by mouth 3 times daily as needed for anxiety 90 capsule 3     Omega-3 Fatty Acids (OMEGA-3 FISH OIL) 1200 MG CAPS        omeprazole (PRILOSEC) 40 MG DR capsule TAKE 1 CAPSULE BY MOUTH EVERY DAY BEFORE A MEAL       pramipexole (MIRAPEX) 0.25 MG tablet Take 3 tablets (0.75 mg) by mouth At Bedtime 90 tablet 3     testosterone (ANDROGEL 1.62 % PUMP) 20.25 MG/ACT gel Place 3 Pump onto the skin daily 75 g 1     traZODone (DESYREL) 100 MG tablet Take 1 tablet (100 mg) by mouth At Bedtime 30 tablet 3     Acetaminophen (TYLENOL PO) Take 1,000 mg by mouth every 8 hours as needed  (Patient not taking: Reported on 6/30/2022)         Physical Exam:  /79   Pulse 83   Resp 18   Ht 1.676 m (5' 6\")   Wt 98.2 kg (216 lb 8 oz)   LMP  (LMP Unknown)   SpO2 97%   BMI 34.94 kg/m    Gen: alert, oriented, no distress  HEENT: no cervical or supraclavicular lymphadenopathy, no stridor on exam  CV: RRR, no M/G/R  Resp: CTAB, no focal crackles or wheezes  Skin: no apparent rashes  Ext: no cyanosis, clubbing or edema  Neuro: alert, nonfocal    Labs:  reviewed    Imaging studies:  High-resolution chest CT (June 2021):  - images directly reviewed, formal interpretation follows:  FINDINGS:   LUNGS AND PLEURA: There has been interval resolution of the mosaic attenuation previously seen. There is mild bilateral tubular " bronchiectasis most severe in the right middle lobe. Stable calcified granuloma right upper lobe. Minimal linear atelectasis or scarring in the lingula. No evidence for underlying interstitial lung disease. There is subsegmental air trapping on expiratory images. There is a tiny right pleural effusion.     MEDIASTINUM/AXILLAE: No adenopathy. Atherosclerotic change of the aorta with borderline aneurysmal dilatation of the ascending thoracic aorta measuring 4 cm. Heart size is normal. No pericardial effusion.     CORONARY ARTERY CALCIFICATION: Mild.     UPPER ABDOMEN: Stable small low-attenuation lesion in the right lobe of the liver likely a cyst. Tiny nonobstructing right renal stones. Partial visualization of presumed peripelvic cysts in the left kidney unchanged.     MUSCULOSKELETAL: There is degenerative change in the spine and both shoulders right greater than left.     IMPRESSION:   1.  Resolution of mosaic attenuation.   2.  Subsegmental air trapping remains visible on expiratory images.   3.  Bilateral bronchiectasis.   4.  Borderline aneurysmal dilatation of the ascending thoracic aorta measuring 4 cm.    Pulmonary Function Testing  June 2022:  FVC 2.35 (72%)  FEV1 1.97 (77%)  FEV1/FVC 0.84  No bronchodilator response  RV 1.93 (93%)  TLC 4.29 (81%)  DLCOc 106%  Flow-volume loop is normal with no evidence of upper airway obstruciton

## 2022-07-14 NOTE — TELEPHONE ENCOUNTER
FUTURE VISIT INFORMATION      FUTURE VISIT INFORMATION:    Date: 11/7/22    Time: 3:00pm    [place] [facility] [place]  REFERRAL INFORMATION:    Referring provider:      Referring providers clinic:  [facility]    Reason for visit/diagnosis  Paradoxical vocal cord motion     RECORDS REQUESTED FROM:       Clinic name Comments Records Status Imaging Status   [facility] OV/referral 6/30/22 [facility] Ov/notes 7/15/21-6/1/21 Care Everywhere    [facility] Ov/notes 3/15/21-12/16/20 Care Everywhere

## 2022-07-18 ENCOUNTER — TELEPHONE (OUTPATIENT)
Dept: PLASTIC SURGERY | Facility: CLINIC | Age: 67
End: 2022-07-18

## 2022-07-18 DIAGNOSIS — F64.0 GENDER DYSPHORIA IN ADOLESCENT AND ADULT: Primary | ICD-10-CM

## 2022-07-18 NOTE — PATIENT INSTRUCTIONS
Bilateral Mastectomy   Pre and Post op Surgery Instructions    You are scheduled for top surgery with nipple grafts on 7/29/22 at 7:15 AM    You will need to arrive at 5:45 AM at the [facility] [address] [place] [address] Check in on the 5th Floor.  - Please make sure you have someone to drive you home after your surgery and you will need someone to stay with you at home 24 hours after your surgery.    The surgery will be about 3-4 hours long. You will be in recovery afterwards for about 1-2 hours.     Before Surgery:  - 8 hours prior to surgery stop eating solid food. You can continue to drink clear liquids (water, apple juice, Gatorade) up to 2 hours before surgery. If you have any medications that need to be taken in this timeframe, you can take them with a small sip of water    - No Ibuprofen, Advil, Aleve, Naproxen, Motrin, Aspirin for 7 days prior to surgery. If you are having pain in the week before surgery Tylenol is okay to take.    - Wear or bring a button up or zip up shirt with you to the hospital.    - Wash with surgical soap:    Showering with an antiseptic soap prior to an invasive procedure will decrease the  bacteria that is normally found on the skin.   Using 1/2 of the bottle for each application.  Be sure to use the whole bottle before coming to surgery.  The evening before surgery, shower or bathe as you normally would, using your preferred soap.  Give special attention to areas where the incisions will be made. Rinse thoroughly.  You may wash your hair with your regular shampoo.   Next wash your entire body, from the chin down, with the special antiseptic soap (full strength) using a freshly laundered clean washcloth, again giving special attention to areas where incisions will be made.  Rinse thoroughly and dry off using a freshly laundered clean towel.   Wear clean clothes/pajamas and sleep on clean sheets  Repeat steps 2 and 3 in the morning before coming to  surgery (using the rest of the bottle of soap).     **If you have a reaction to the surgical soap, let the nurse know.     Events of day:     -Check in on the 3rd floor of the hospital for your surgery.    Pre-op     - After you check in, you will meet with a pre-op nurse. They will go over your medications, review your H&P (pre-op physical), have you change into a paper gown, and your chest will be wiped again with soap.    - They will place compression boots on both legs to help decrease risk of blood clots.     - You may be asked to complete a pregnancy test. If you have had no exposure to sperm, you can decline.    - You will meet with the anesthesiologists and nurse anesthetist who will be keeping you asleep during surgery, they will be placing an IV, and will be monitoring you throughout the surgery.    - You will meet with the RN as you are being moved into operating room, they will be helping to position you and keep you comfortable during the surgery.     - Dr. Benavides will meet you in the pre-op area to discuss any questions about surgery, make markings on your chest for planning, and to sign your consent.     Intra-op (OR)    - A catheter will be placed in your bladder after you are sleeping and is typically removed before you wake up. The longest this would typically be in is in the post-op recovery room if needed.     - There will be straps and pillows to help position you and keep you in place during the surgery.    - The tissue that is removed will be sent to pathology to be analyzed and then discarded.     - ABHI drains will be placed (one in each armpit) and a pain catheter will be placed in the center of your chest.     - Closure is done with dissolvable sutures under the skin and is then sealed with glue, and tape.    Post-op/Recovery    - You can usually go home the same day so long as you are eating, drinking, voiding, and pain is well controlled.  You will be sent home with all needed supplies.  "    - Post-Op medications you will be given in addition to the anesthesia include: Zofran (nausea), Oxycodone (pain), Z-malachi (infection), and antipruritic (itching).     - You will leave the hospitals with an ace bandage wrapped around your chest for compression. You may keep the ace bandage on until you come back for your one week post op visit or you may adjust the wrap as needed if it is either too tight or too loose.     Post-Op Cares:     - No lifting over 5 lbs for 3 weeks after surgery    - No stretching arms out or above the head (\"modified T-matt\")    - Walk around frequently to help prevent blood clots    - Do deep breathing exercises to prevent pneumonia    - No sleeping on stomach x 3 weeks after surgery, if it is difficult not to roll over onto your stomach you can sleep in a recliner or use additional pillows    - Do not use any ice packs or heat packs    - Preventing constipation will be your responsibility. You can use whatever method works best for you such as; aloe, prune juice, Sennokot or Miralax.    No showering in the first week after your surgery.     What to expect at your 1st post op visit:    When you come in for your 1st post op visit, we will assess the output of your drains and remove the pain catheter (On-Q) that was placed on your chest.     -Please remember to bring the documentations of your output with you to your appointment so we can review how much your drains have been putting out since your surgery.     -We will remove the bolsters that was placed on your nipples and teach you how to perform nipple graft dressings.     -You will be given supplies to take home and will need to continue wearing the ace wrap compression for another week after the drains are removed.     Nipple Care:   Change nipple dressings daily.  Take dressings off prior to showering and reapply after showering.  No direct shower spray on nipple grafts for 4 weeks.     Cut vaseline gauze to nipple size and place " over nipple.  Place folded white gauze over vaseline gauze and cover with plastic dressing.  Do dressing changes for 1 more week.  If you continue to have drainage, continue the dressings until drainage stops.       Drain Care:  You will be discharged with Kamari-Jack (ABHI) drainage tubes.  These tubes drain fluid from your incision, helping to prevent swelling and reducing the risk for infection.  The tube is held in place by a few stitches. Pin your ABHI drain to your clothing by using a safety pin through the plastic loop on the top of the bulb. If the drain is not attached to your clothing, it may pull out from under your skin. Drains are uncomfortable!    Emptying the drain bulb: The measuring cup provided by the hospital or a measuring cup that is used only for the ABHI drain.  Wash your hands with soap and water.  Hold the bulb securely.  Remove the drainage plug from the emptying port.  Carefully turn the bulb upside down over the measuring cup, and gently squeeze all of the drainage into the measuring cup.  Squeeze the middle of the bulb firmly so that the bulb is as flat as possible.                                                                                                                                                    While still squeezing the bulb, replace the drainage plug. This step is important to keep the drain suction  Measure how much fluid you removed from the bulb.  Write down the amount and color of the fluid you removed from the bulb. If  you have more than one drain, keep a separate record for each one.  Empty the fluid into the toilet and flush.  Rinse the measuring cup, and wash your hands with soap and water.  You should empty the drain at least two times each day, in the morning and at bedtime.  Drainage tubes are removed when the output is less than 20ml in a 24 hour period for 2 consecutive days.  Output will be somewhere between 30 to 50 mLs per day, but don't be alarmed if it is  more or less. Output may not be equal between sides. Amounts will probably decrease over time.  The color coming from the drains may vary from deep red, light pink, or clear yellow.    Stripping the tube:  To prevent clots from blocking the drain, you will need to  strip  it. Stripping means that you use your fingers to squeeze along the length of the drain to help maintain the flow of drainage.  Wash your hands.  Using one hand, firmly hold the tubing near the insertion site (as close to your skin as the ace wrap and gauze dressing will allow). This will prevent the drain from being pulled out while you are stripping it and from pulling on skin and sutures.  Raymond upper/top fingers and milk with the bottom or lower fingers.  Using your index finger and thumb of the other hand, squeeze the tubing below the  first hand. You should squeeze it firmly enough so the tubing becomes flat.   Maintain pressure on the tube, as you are squeezing, slide your index finger and thumb down the tube about 4-6 inches toward the bulb. (use alcohol wipes or lotion to help).  Then, release the hand closest to where the tube is attached to the body (making sure to keep pressure with the other hand), and move upper/anchor hand down. Squeeze, Repeat in segments.  Do not release the pressure you are creating in the tubing until you reach the bulb.  The whole tube will be flat.   If at any time it feels like the tube is pulling away from the skin or starts to hurt STOP! Then start over again more gently.   Strip the drain each time you empty it.

## 2022-07-18 NOTE — TELEPHONE ENCOUNTER
Contacted Killian to discuss the need for pre-op H&P and to ask when and where this appointment is scheduled. Unable to reach, left voicemail with callback number.    Grover KAT RN

## 2022-07-19 ENCOUNTER — OFFICE VISIT (OUTPATIENT)
Dept: PLASTIC SURGERY | Facility: CLINIC | Age: 67
End: 2022-07-19
Payer: MEDICARE

## 2022-07-19 VITALS
BODY MASS INDEX: 35.52 KG/M2 | TEMPERATURE: 98.2 F | DIASTOLIC BLOOD PRESSURE: 70 MMHG | SYSTOLIC BLOOD PRESSURE: 136 MMHG | OXYGEN SATURATION: 96 % | WEIGHT: 221 LBS | HEIGHT: 66 IN | HEART RATE: 72 BPM

## 2022-07-19 DIAGNOSIS — F64.0 GENDER DYSPHORIA IN ADOLESCENT AND ADULT: Primary | ICD-10-CM

## 2022-07-19 PROCEDURE — 99212 OFFICE O/P EST SF 10 MIN: CPT | Performed by: SURGERY

## 2022-07-19 ASSESSMENT — PAIN SCALES - GENERAL: PAINLEVEL: MILD PAIN (3)

## 2022-07-19 NOTE — PROGRESS NOTES
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 He is here today by himself. The patient will not need mammogram, and his letter of support from Giulia Lopez at the [facility] has been received. History and physical were performed last week (received 4/29 via Ayo Davis). COVID test is scheduled for at-home the day prior to surgery.     Killian notes that prior to their Sleep Apnea diagnosis they desatted during their last anesthesia event. Following this they needed to be on supplemental oxygen for 12 hours. They are also seeing pulmonology and have been cleared for this surgery. They are also in the process of procuring a new CPAP machine as their previous one was recalled. They are informed that if a similar desatting event occurs they may need to be admitted overnight for oxygen and monitoring. If they are able to obtain one in time they will bring their new CPAP machine to surgery. Their sister will be taking them to [place], [place] for their recovery.     Currently they are working for [employer] and ask how long they will be prevented from driving secondary to healing (Minimum of 3 weeks). They are also made aware that they should not lift more than 5-10 pounds for the first 3-6 weeks.     Our LPN, Leida, discussed periop instructions with the patient including: not eating anything 8 hours prior to surgery, drinking clear liquids up to 2 hours before surgery except for morning medications with a sip of water, the preop shower with surgical soap which was given, and wearing a button- or zip-up shirt on the day of surgery. The patient was also instructed to avoid NSAIDs x 1 week both before AND after surgery, but he may take Tylenol post-op for pain as needed. The patient was provided with a folder of information  on geronimo-op topics, including where the surgery will be.     I discussed the following with the patient; preop, intraop and postop phases of care on the day of surgery, the placement of a bladder catheter during surgery that will likely be removed in recovery, postop cares and limitations with relation to home and work settings, 5-lb weight restriction for the first 3 weeks postop, and how long to maintain limited activities. We also discussed Zofran, oxycodone, Z-malachi, and antipruritics which will be prescribed. We discussed that preventing constipation will be their responsibility, and we discussed methods such as aloe, prune juice or Miralax.     In addition, we went over the possible risks and complications involved with this elective procedure. These include but are not limited to: infection, bleeding, hematoma/seroma formation, and poor healing (including dehiscence, nipple graft loss, or hypertrophic scarring). We also discussed the possibility of altered chest sensation (either hypo or hypersensitive), residual deformities and asymmetries, possible further surgical revisions, and possible injury to surrounding neurovascular and musculoskeletal structures, including intra-axillary or intra-thoracic. We lastly discussed anesthetic risks including DVT/PE or cardiopulmonary events.      All questions were answered. Killian will bring any other questions that arise to the day of surgery. Encouraged to bring any new CPAP available.    Total time = 15 minutes, spent on the date of encounter doing chart review, history and physical, dressing changes, documentation, patient education, and any further activity as noted above.     This note was prepared on behalf of Miladys Benavides MD by Ratna Del Rosario, a trained medical scribe, based on my observations and the provider's statements to me.

## 2022-07-19 NOTE — LETTER
7/19/2022       RE: Gregoria Stein  [address]  [place] [address]     Dear Colleague,    Thank you for referring your patient, Gregoria Stein, to the [facility] [place] [facility]. Please see a copy of my visit note below.    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 He is here today by himself. The patient will not need mammogram, and his letter of support from Giulia Lopez at the [facility] has been received. History and physical were performed last week (received 4/29 via Ayo Davis). COVID test is scheduled for at-home the day prior to surgery.     Killian notes that prior to their Sleep Apnea diagnosis they desatted during their last anesthesia event. Following this they needed to be on supplemental oxygen for 12 hours. They are also seeing pulmonology and have been cleared for this surgery. They are also in the process of procuring a new CPAP machine as their previous one was recalled. They are informed that if a similar desatting event occurs they may need to be admitted overnight for oxygen and monitoring. If they are able to obtain one in time they will bring their new CPAP machine to surgery. Their sister will be taking them to [place], [place] for their recovery.     Currently they are working for [employer] and ask how long they will be prevented from driving secondary to healing (Minimum of 3 weeks). They are also made aware that they should not lift more than 5-10 pounds for the first 3-6 weeks.     Our LPN, Leida, discussed periop instructions with the patient including: not eating anything 8 hours prior to surgery, drinking clear liquids up to 2 hours before surgery except for  morning medications with a sip of water, the preop shower with surgical soap which was given, and wearing a button- or zip-up shirt on the day of surgery. The patient was also instructed to avoid NSAIDs x 1 week both before AND after surgery, but he may take Tylenol post-op for pain as needed. The patient was provided with a folder of information on geronimo-op topics, including where the surgery will be.     I discussed the following with the patient; preop, intraop and postop phases of care on the day of surgery, the placement of a bladder catheter during surgery that will likely be removed in recovery, postop cares and limitations with relation to home and work settings, 5-lb weight restriction for the first 3 weeks postop, and how long to maintain limited activities. We also discussed Zofran, oxycodone, Z-malachi, and antipruritics which will be prescribed. We discussed that preventing constipation will be their responsibility, and we discussed methods such as aloe, prune juice or Miralax.     In addition, we went over the possible risks and complications involved with this elective procedure. These include but are not limited to: infection, bleeding, hematoma/seroma formation, and poor healing (including dehiscence, nipple graft loss, or hypertrophic scarring). We also discussed the possibility of altered chest sensation (either hypo or hypersensitive), residual deformities and asymmetries, possible further surgical revisions, and possible injury to surrounding neurovascular and musculoskeletal structures, including intra-axillary or intra-thoracic. We lastly discussed anesthetic risks including DVT/PE or cardiopulmonary events.      All questions were answered. Killian will bring any other questions that arise to the day of surgery. Encouraged to bring any new CPAP available.    Total time = 15 minutes, spent on the date of encounter doing chart review, history and physical, dressing changes, documentation, patient  education, and any further activity as noted above.     This note was prepared on behalf of Miladys Benavides MD by Ratna Del Rosario, a trained medical scribe, based on my observations and the provider's statements to me.       Sincerely,    Miladys Benavides MD

## 2022-07-19 NOTE — NURSING NOTE
"Chief Complaint   Patient presents with     SAUL Daily, is being seen today for a Pre-op DOS 7/29.       Vitals:    07/19/22 0952   BP: 136/70   BP Location: Left arm   Patient Position: Chair   Cuff Size: Adult Large   Pulse: 72   Temp: 98.2  F (36.8  C)   TempSrc: Oral   SpO2: 96%   Weight: 100.2 kg (221 lb)   Height: 1.676 m (5' 6\")       Body mass index is 35.67 kg/m .      Leida Suazo LPN    "

## 2022-07-20 ENCOUNTER — TELEPHONE (OUTPATIENT)
Dept: PLASTIC SURGERY | Facility: CLINIC | Age: 67
End: 2022-07-20

## 2022-07-20 NOTE — TELEPHONE ENCOUNTER
Called pt to follow up to discuss need for pre-op H&P within 30 days of surgery. Pt had a pre-op consult with Dr Benavides yesterday where it was reported that pt had pre-op H&P on file and clearance from pulmonology, but this documentation cannot be located. Unable to reach pt, left message with callback number requesting a return call.     Grover KAT RN

## 2022-07-21 ENCOUNTER — TELEPHONE (OUTPATIENT)
Dept: PLASTIC SURGERY | Facility: CLINIC | Age: 67
End: 2022-07-21

## 2022-07-21 NOTE — TELEPHONE ENCOUNTER
Called pt this morning to discuss need for pre-op H&P. Pt stated that they saw Dr. Ayo Davis on 7/8/22 for a pre-op H&P. Unable to locate documentation of this in pt's chart. Pt states they have a paper copy of the H&P, which they will upload to [employer] today. In pt's pre-op appointment with Dr Benavides on 7/19/22, Dr Benavides wrote that pt reported having clearance for surgery from their pulmonologist. Reviewed pt's chart, there is no documentation of this. Spoke with pt about this, who stated that their pulmonologist gave verbal clearance for surgery but did not write this down. Pt states that today they will request their pulmonologist write a letter giving them clearance for surgery. Pt is leaving town today and returning 7/27/22. Pt will not be accessible by telephone or [employer] during this time. Discussed with pt the importance of having pre-op H&P documentation on file before surgery, to which pt verbalized an understanding. Will look for pre-op H&P paperwork in pt's chart and follow up with pt on 7/27/22 when they are back in town, if needed.    Grover KAT RN

## 2022-07-22 ENCOUNTER — NURSE TRIAGE (OUTPATIENT)
Dept: NURSING | Facility: CLINIC | Age: 67
End: 2022-07-22

## 2022-07-22 ENCOUNTER — TELEPHONE (OUTPATIENT)
Dept: PLASTIC SURGERY | Facility: CLINIC | Age: 67
End: 2022-07-22

## 2022-07-22 NOTE — TELEPHONE ENCOUNTER
Pt called and stated they could not find paperwork from pre-op H&P on 7/8/22 from [facility] to upload to [employer]. Pt requested the clinic fax the paperwork to Dr Benavides's clinic.     Pt also shared that they sent a [employer] message to their [facility] pulmonologist requesting a letter of clearance for surgery. Shared with pt that it does not appear they sent this message, as we are able to view messages sent within the [facility] system. Pt states they will call the clinic to request this letter.    Will look for the faxed H&P.    Grover KAT RN

## 2022-07-22 NOTE — TELEPHONE ENCOUNTER
Patient is having surgery 1 week from today.    She needs a letter for clearance for her surgery.    From Dr. Greer    Fax# [phone]    Attn: Dr. Makayla Lovett, RN on 7/22/2022 at 1:44 PM      Additional Information    Information only question and nurse able to answer    Protocols used: INFORMATION ONLY CALL - NO TRIAGE-A-OH

## 2022-07-25 ENCOUNTER — TELEPHONE (OUTPATIENT)
Dept: PLASTIC SURGERY | Facility: CLINIC | Age: 67
End: 2022-07-25

## 2022-07-25 NOTE — TELEPHONE ENCOUNTER
Pt called asking if Dr Benavides's office received clearance for surgery from their pulmonologist and the pre-op H&P from their PCP. Confirmed that a letter was received on 7/22/22 from Dr. Greer giving pulmonology clearance to have surgery. Unable to view pt's records from [facility] from their 7/8/22 appointment with Dr. Ayo Davis for pre-op H&P. Pt gave verbal consent to view these records in Care Everywhere. After doing so, was able to locate pt's pre-op H&P from 7/8/22.    Grover KAT RN

## 2022-07-26 ENCOUNTER — TELEPHONE (OUTPATIENT)
Dept: PLASTIC SURGERY | Facility: CLINIC | Age: 67
End: 2022-07-26

## 2022-07-26 ENCOUNTER — DOCUMENTATION ONLY (OUTPATIENT)
Dept: PLASTIC SURGERY | Facility: CLINIC | Age: 67
End: 2022-07-26

## 2022-07-26 NOTE — PROGRESS NOTES
Spoke with RNCC, Grover, regarding cancelling surgery with Dr. Benavides on 7/29.    Patient had cancelled this morning due to no support after surgery, but then later requested to keep surgery scheduled as other family could help. Patient then notified RNCC that surgery does need to be cancelled due to support issues after surgery.    Surgery removed from the schedule, orders saved.     Post-op appointments cancelled.    RNCC will communicate when surgery can be rescheduled, once aftercare is planned and patient is prepared.

## 2022-07-26 NOTE — TELEPHONE ENCOUNTER
Pt called writer back requesting to move forward with surgery rather than cancel. Pt described that their sister did not say they needed to cancel surgery. Confirmed pt's support system after surgery to include their sister and sister's brother. Killian called back approximately 30 minutes later stating they need to cancel surgery and stated that the people living with pt's sister all have COVID. Discussed with pt that surgery will be cancelled and the surgery scheduler will reach out to them to reschedule.    Grover KAT RN

## 2022-07-26 NOTE — TELEPHONE ENCOUNTER
Pt called and left a voicemail requesting a return call. Called pt, who stated that they need to reschedule surgery with Dr Benavides that is scheduled for this Friday on 7/29/22. Pt states that their sister was going to take care of them after surgery but is now unable to due to her pregnant daughter-in-law being hospitalized with COVID. Pt also cites feeling emotionally and physically unable to go through surgery at this point because of this. Pt reports feeling defeated. Offered reassurance to pt that we will help reschedule their surgery when their circumstances have changed and they have support in place. Pt shared that they have a support system in people they have met through the [facility] and intend to rely on them for emotional support at this time. Discussed with pt that the surgery scheduler will be informed of their need to reschedule surgery and encouraged them to reach out when ready to reschedule surgery. Pt verbalized an understanding and expressed appreciation. Will route this message to the surgery scheduler (high priority) to cancel surgery on 7/29/22.    Grover KAT RN

## 2022-07-28 ENCOUNTER — MYC MEDICAL ADVICE (OUTPATIENT)
Dept: ONCOLOGY | Facility: CLINIC | Age: 67
End: 2022-07-28

## 2022-07-28 ENCOUNTER — TELEPHONE (OUTPATIENT)
Dept: PLASTIC SURGERY | Facility: CLINIC | Age: 67
End: 2022-07-28

## 2022-07-28 NOTE — TELEPHONE ENCOUNTER
Received voicemail from Killian requesting to reschedule surgery. Called pt, unable to reach. Left a voicemail for pt stating that this RN will ask our surgery scheduler to call Killian to reschedule surgery.     Grover KAT RN

## 2022-08-15 ENCOUNTER — PATIENT OUTREACH (OUTPATIENT)
Dept: CARE COORDINATION | Facility: CLINIC | Age: 67
End: 2022-08-15

## 2022-08-15 NOTE — PROGRESS NOTES
Social Work Intervention  [facility]    Data/Intervention:    Patient Name:  Gregoria Stein  /Age:  1955 (67 year old)    Visit Type: telephone  Referral Source: na  Reason for Referral:  Care planning for after surgery    Collaborated With:    -Chevy- Pt's  from [facility] [phone]    Psychosocial Information/Concerns:  Chevy is a contracted  for Killian with [facility] services. He indicates that the top surgery to be performed by Dr Benavides 10/7 is of utmost importance for Pt's mental health.  He reports pretty severe mental health issues and no support system. He relays that Killian would not be able to manage their own care- emptying the drains and doing any wraps or dressing changes. He asked if Pt could go to a TCU post procedure because they doesn't have a support system to help at home.   Cheyv would be able to get PCA services in place for ADLs but they cannot empty drains or do any wraps or dressings. He can look for a skilled home care agency for RN visits but they may only visit 1x day at the most and based on the pre op education, it would need to be more often then that.     Intervention/Education/Resources Provided:  Discussed that to go to a TCU, they would generally need to have skilled care. It's a day procedure and it would all need to be lined up ahead of time. It would be best if there is a facility that can accept Killian, that they be admitted a few days prior to the procedure. Chevy would like to see Killian stay for 2 weeks in a facility. Killian may be able to be admitted to a LTC facility for a short term stay.   Suggested trying some facilities that have a mental health focus. There is [facility] next to where Killian lives. Chevy will reach out to them.  Reviewed the admission process from the community of getting an H & P by the PCP and admission orders. Dr Benavides would write orders after the procedure.      Assessment/Plan:  Pt with no support to help her manage her care at home after the procedure and due to mental health issues wouldn't be able to care for self.  will contact some nursing home facilities and let me know if further assistance is needed.     Provided patient/family with contact information and availability.    DARRICK Hernandez, [facility]    [facility] [place]  [phone]

## 2022-08-18 NOTE — PROGRESS NOTES
Behavioral Health Progress Note    Client Legal Name: Gregoria Stein   Client Preferred Name: Gregoria   Service Type: Video Visit, the sound was being odd on the video, so we eventually stayed on the video but each muted the video and I just called her and talked by phone while we could see each other on the video.  Length of Visit: 30 minutes  Attendees: patient   Telemedicine Visit: The patient's condition can be safely assessed and treated via synchronous audio and visual telemedicine encounter.      Reason for Telemedicine Visit: Services only offered telehealth    Originating Site (Patient Location): Patient's home    Distant Site (Provider Location): [facility] Outpatient Setting: [facility]    Consent:  The patient/guardian has verbally consented to: the potential risks and benefits of telemedicine (video visit) versus in person care; bill my insurance or make self-payment for services provided; and responsibility for payment of non-covered services.     Mode of Communication:  Video Conference via [employer]    As the provider I attest to compliance with applicable laws and regulations related to telemedicine.    Emergency contact: Ely rausch [phone]  Closest Emergency Room: [facility]   Location at time of call: at home    Identifying Information and Presenting Problem:    The patient is a 64 year old American female who is being seen for problematic symptoms of anxiety.    Treatment Objective(s) Addressed in This Session:  No treatment plan as Gregoria is transferring to a community therapist in a couple of weeks.  Assisting with bridging right now.    Progress on / Status of Treatment Objective(s) / Homework:  Gregoria reports ongoing anxiety.     PHQ-9 SCORE 1/3/2020 2/5/2020 3/25/2020   PHQ-9 Total Score 13 21 19   PHQ-A Total Score 0 - -       CLAIRE-7 SCORE 11/22/2019 3/25/2020   Total Score 13 20       Topics Discussed/Interventions Provided:  Gregoria reports anxiety has been higher, but  Identified pt with two pt identifiers(name and ). Reviewed record in preparation for visit and have obtained necessary documentation. Chief Complaint   Patient presents with    Blood Pressure Check    Ear Pain    Nausea     X 2 days        Health Maintenance Due   Topic    Breast Cancer Screen Mammogram     Bone Densitometry (Dexa) Screening     COVID-19 Vaccine (4 - Booster for Moderna series)       Visit Vitals  BP (!) 166/79 (BP 1 Location: Left upper arm, BP Patient Position: Sitting, BP Cuff Size: Adult)   Pulse (!) 52   Temp 97.7 °F (36.5 °C) (Oral)   Resp 18   Ht 5' 8\" (1.727 m)   Wt 213 lb (96.6 kg)   SpO2 99%   BMI 32.39 kg/m²         Coordination of Care Questionnaire:  :   1) Have you been to an emergency room, urgent care, or hospitalized since your last visit? If yes, where when, and reason for visit? no       2. Have seen or consulted any other health care provider since your last visit? If yes, where when, and reason for visit? NO        Patient is accompanied by self I have received verbal consent from Bjorn Vivar to discuss any/all medical information while they are present in the room. Physician notified of elevated BP and remains with elevated BP at recheck. she has been finding ways to cope. She continues to use deep breathing, music, and bike riding to help.  She showed me the bike she has set up in living room. She has been focusing on trying to get a little time on her bike each day to keep herself moving. Notices she feels better at least for a short period of time after she is on the bike.    Gregoria asked her supported work  to call into the meeting Gregoria had scheduled with her manager. It was unclear to me from her description all that happened when she asked, but Gregoria's perception was that the  was very angry with Gregoria and Gregoria says she was accused of putting the case workers safety in jeopardy.  Gregoria was shaken by this interaction. She did call the supervisor and discussed what happened with the supervisor.  Gregoria states she is not feeling supported in her supported work program.  She did have a conversation with her boss and understands a bit more about him now which has been helpful.  She continues to be very anxious about going to work when she is scheduled, but also very anxious that they will cut her hours which will lead to financial strain.  Provided positive reinforcement for reaching out to the supervisor and talking to them.      Has an appt with her new therapist on 4/21.  Decided that we will not schedule a f/u at this time.  Let her know that I am available if there is any difficulties in connecting with the new provider. Gregoria was grateful for that information.     Assessment: The patient appeared to be active and engaged in today's session and was receptive to feedback.     Mental Status: Gregoria appeared alert and oriented. Eye contact was good and she was well-dressed and well-groomed. Speech was a bit more rapid today, not pressured.  It was normal volume and rate and was clear, coherent, and relevant. Mood was anxious with congruent affect. Thought processes were less rapid and more organized today.  Thought content was WNL with no evidence of psychotic or paranoid features. No evidence of SI/HI or self-harm, intent, or plans. Memory appeared grossly intact. Insight and judgment appeared good and patient exhibited good impulse control during the appointment.     Does the patient appear to be at imminent risk of harm to self/others at this time? No    The session was necessary to address significant anxiety that have been interfering with patient's ability to function at home and work.  This is our last session today as she is meeting with her therapist next week.  She is aware she can contact me in the future if she has difficulty establishing care with the therapist or needs assistance with other referrals.      Diagnosis (DSM-5):  PTSD, MDD, severe w/o psychosis, CLAIRE    Plan:  1. Follow up as needed.      NOTE: Treatment plan update due n/a.  Did not complete treatment plan as I was bridging Gregoria for a limited number of sessions until she established with her community therapist.  Diagnostic assessment update due n/a.

## 2022-09-02 DIAGNOSIS — G47.33 OSA (OBSTRUCTIVE SLEEP APNEA): Primary | ICD-10-CM

## 2022-09-09 ENCOUNTER — TELEPHONE (OUTPATIENT)
Dept: PLASTIC SURGERY | Facility: CLINIC | Age: 67
End: 2022-09-09

## 2022-09-09 NOTE — TELEPHONE ENCOUNTER
Patient called to schedule surgery with Dr. Benavides. Apologized for the delay in scheduling/confirming surgery date with Dr. Benavides. Reassured patient that they are scheduled for surgery on 10[facility]7/2022 at [facility]     Date of Surgery: 10/7/2022  Approximate arrival time given:  No  Location of surgery: [facility]   Pre-Op H&P: PCP - patient states pre-op is scheduled   Post-Op Appt Date: Message to Shanice SHELTON    Imaging needed:  No  Discussed COVID-19 Testing: Yes - pt will take a home test 1-2 days prior     Patient aware that pre-op RN will call 2-3 days prior to surgery with arrival time and instructions Yes    Packet sent out: Yes 09/09/22 via mail.     Additional Comments:   Patient reports that they have a  and a cadi waiver. Pt states that they are working on going to TCU. Informed patient writer will update RN regarding this and confirmation of surgical date. RN to reach out for follow up.       All patients questions were answered and was instructed to review surgical packet and call back with any questions or concerns.   Patient was appreciative of the call.     Shanice Amador on 9/9/2022 at 2:57 PM

## 2022-09-09 NOTE — TELEPHONE ENCOUNTER
Pt has been scheduled for surgery with Dr Benavides. The surgery scheduler spoke with this RN about how pt stated they will try to work with  to arrange TCU after surgery. Called pt to discuss. Unable to reach, pt's voicemail is full. Will send pt a [employer] message.    NEGAR Ness

## 2022-09-09 NOTE — TELEPHONE ENCOUNTER
Pt left a voicemail for this RN stating that the surgery scheduler previously told pt that surgery with Dr Benavides will be scheduled on 10/7/22. Pt shared that they have been trying to get in contact with the surgery scheduler for weeks and that they need to have a surgery date solidified, as it is not currently scheduled. Pt shared that they need a formalized surgery date in order to make arrangements for accommodations with their . Called pt to validate and share that this message will be sent to the surgery scheduler and a supervisor in the same department to ensure pt receives a phone call as soon as possible. Unable to reach, left detailed message for pt. Will send this message to surgery schedulers as a high priority message to respond to.    NEGAR Ness

## 2022-09-09 NOTE — TELEPHONE ENCOUNTER
Left message regarding scheduling surgery/procedure with Dr. Benavides. Writer left direct call back number on the patients voicemail. Informed patient that writers direct number is for ENT clinic, but that is the number writer can be reached.     Shanice Amador on 9/9/2022 at 2:47 PM   P: [phone]

## 2022-09-09 NOTE — TELEPHONE ENCOUNTER
Received a return call from Killian, who states that they are very frustrated about being told they'd have surgery on October 7th but not having received confirmation of this. Pt was tearful and shared that they feel like just a name on a list. Validated pt's feelings and shared that this RN will ensure they receive a phone call to schedule by Monday at the latest. Provided patient with the phone number to patient relations. Pt accepting to this. Discussed the patient scenario with surgery scheduling supervisor, who will contact the patient to schedule surgery.    NEGAR Ness

## 2022-09-12 ENCOUNTER — TELEPHONE (OUTPATIENT)
Dept: PLASTIC SURGERY | Facility: CLINIC | Age: 67
End: 2022-09-12

## 2022-09-12 NOTE — TELEPHONE ENCOUNTER
Last week, pt was scheduled for surgery with Dr Benavides on 10/7/22. The surgery scheduler mentioned that the pt is going to work with their  to arrange TCU admission after surgery.     Called pt to ask if they have talked to their  about possible arrangements after surgery. It is required for pt to have someone drive them home after surgery and stay with them for 24 hours.     Pt did not answer, the telephone is not in service. Checked to ensure the number is correct and called twice more but received the same message. Will contact patient again later this week.    NEGAR Ness

## 2022-09-13 ENCOUNTER — TELEPHONE (OUTPATIENT)
Dept: PLASTIC SURGERY | Facility: CLINIC | Age: 67
End: 2022-09-13

## 2022-09-13 NOTE — TELEPHONE ENCOUNTER
"Pt left voicemail for writer stating that at a meeting with their  they learned they have someone arranged to drive them after surgery to take them to a TCU where they will be admitted for postop cares. Pt also shared in this voicemail that they have a pre-op H&P scheduled for 10/25/22 and will take an at-home COVID test 1-2 days before surgery.    Pt's  Chevy Valle left a voicemail for writer with a callback number. Earlier in the day, pt gave verbal consent for writer to talk with Chevy Valle to discuss and coordinate postop needs and scheduling. Called Chevy who stated that pt has a \"peer to peer\" person named Jennifer Fleming who will drive pt after surgery. Chevy also stated that pt is set up to go to a TCU after surgery. He spoke with two TCUs about admitting pt after surgery, and both stated that they will have a bed for pt after surgery. Chevy shared that he was told by the TCUs to call the day before surgery to verify where pt will need to go. Chevy verified that pt has everything set up and in place after surgery to go to TCU.     Tried calling pt back but was unable to get through. Will contact pt again this week.    NEGAR Ness  "

## 2022-09-13 NOTE — TELEPHONE ENCOUNTER
Called pt to ask about postop plans after surgery on 10/7/22. Pt shared that they have  and case workers who are helping them figure out how to get postop needs met. Discussed with pt that they will need someone to drive them home after surgery and to stay with them for 24 hours thereafter. Pt will also need to be able to strip and empty their drains twice daily in addition to changing nipple dressings when bolsters are removed.    Pt shared that their case workers said they will qualify for TCU admission after surgery. Discussed with pt that it would be best for [facility] to talk with their  and workers to discuss how this would be coordinated. There is currently no consent to communicate on file for the workers pt mentioned. Pt offered verbal consent for this RN to speak with Chevy Escobarharjinder about scheduling and medical information surrounding their postop needs. Shared with pt that this verbal consent is acceptable for today, but that if continued conversations are needed, we would need consent to communicate form signed from them. Pt verbalized an understanding and shared that they are still camping and cannot print and documents at this time.    Pt has a meeting with  today at 11:30am. Asked that pt discuss with workers what is needed from [facility] to assist in facilitating pt's TCU admission after surgery. Pt agreeable and will call this RN after the meeting.    Spoke with Dr Benavides about pt's situation. Dr Benavides recommended that pt's CADI  talk with Giulia Brasher, a  at [facility] to coordinate. Will need to ask pt for CADI 's contact information and ask Giulia Brasher to connect with them.

## 2022-09-19 ENCOUNTER — TELEPHONE (OUTPATIENT)
Dept: PLASTIC SURGERY | Facility: CLINIC | Age: 67
End: 2022-09-19

## 2022-09-19 NOTE — TELEPHONE ENCOUNTER
Called Killian to clarify details about upcoming surgery. Pt shared that a friend named Ratna will now be driving them to TCU after surgery. Encouraged the pt to discuss with  Chevy, as there was previously a [id number] person named Jennifer who was going to transport pt to TCU. Pt stated they already emailed chevy about this. Discussed with pt that since they already had a pre-op surgical consult with Dr Benavides, she would not need to see them again in clinic unless they have questions. Pt does not have questions and does not need to see Dr Benavides again in clinic before surgery. Reviewed the need for pre-op shower x2 with hibiclens, taking an at-home COVID test 1-2 days before surgery, and having a pre-op H&P within 30 days of surgery. Pt verbalized an understanding of all and clarified that their pre-op H&P is on 9/23/22. Answered pt questions and encouraged them to reach out with questions before surgery.    NEGAR Ness

## 2022-10-04 ENCOUNTER — TELEPHONE (OUTPATIENT)
Dept: BEHAVIORAL HEALTH | Facility: CLINIC | Age: 67
End: 2022-10-04

## 2022-10-04 ENCOUNTER — HOSPITAL ENCOUNTER (EMERGENCY)
Facility: CLINIC | Age: 67
Discharge: HOME | End: 2022-10-05
Attending: EMERGENCY MEDICINE | Admitting: EMERGENCY MEDICINE
Payer: MEDICARE

## 2022-10-04 DIAGNOSIS — F33.2 SEVERE EPISODE OF RECURRENT MAJOR DEPRESSIVE DISORDER, WITHOUT PSYCHOTIC FEATURES (H): ICD-10-CM

## 2022-10-04 LAB
ALBUMIN SERPL-MCNC: 3.8 G/DL (ref 3.4–5)
ALP SERPL-CCNC: 79 U/L (ref 40–150)
ALT SERPL W P-5'-P-CCNC: 30 U/L (ref 0–70)
ANION GAP SERPL CALCULATED.3IONS-SCNC: 4 MMOL/L (ref 3–14)
AST SERPL W P-5'-P-CCNC: 18 U/L (ref 0–45)
BASOPHILS # BLD AUTO: 0 10E3/UL (ref 0–0.2)
BASOPHILS NFR BLD AUTO: 0 %
BILIRUB SERPL-MCNC: 0.4 MG/DL (ref 0.2–1.3)
BUN SERPL-MCNC: 14 MG/DL (ref 7–30)
CALCIUM SERPL-MCNC: 8.9 MG/DL (ref 8.5–10.1)
CHLORIDE BLD-SCNC: 108 MMOL/L (ref 94–109)
CO2 SERPL-SCNC: 29 MMOL/L (ref 20–32)
CREAT SERPL-MCNC: 0.81 MG/DL (ref 0.52–1.25)
EOSINOPHIL # BLD AUTO: 0.2 10E3/UL (ref 0–0.7)
EOSINOPHIL NFR BLD AUTO: 3 %
ERYTHROCYTE [DISTWIDTH] IN BLOOD BY AUTOMATED COUNT: 13.1 % (ref 10–15)
GFR SERPL CREATININE-BSD FRML MDRD: 80 ML/MIN/1.73M2
GLUCOSE BLD-MCNC: 123 MG/DL (ref 70–99)
HCT VFR BLD AUTO: 50.6 % (ref 35–53)
HGB BLD-MCNC: 16.7 G/DL (ref 11.7–17.7)
IMM GRANULOCYTES # BLD: 0 10E3/UL
IMM GRANULOCYTES NFR BLD: 0 %
LYMPHOCYTES # BLD AUTO: 1.3 10E3/UL (ref 0.8–5.3)
LYMPHOCYTES NFR BLD AUTO: 18 %
MCH RBC QN AUTO: 29.1 PG (ref 26.5–33)
MCHC RBC AUTO-ENTMCNC: 33 G/DL (ref 31.5–36.5)
MCV RBC AUTO: 88 FL (ref 78–100)
MONOCYTES # BLD AUTO: 0.3 10E3/UL (ref 0–1.3)
MONOCYTES NFR BLD AUTO: 5 %
NEUTROPHILS # BLD AUTO: 5.3 10E3/UL (ref 1.6–8.3)
NEUTROPHILS NFR BLD AUTO: 74 %
NRBC # BLD AUTO: 0 10E3/UL
NRBC BLD AUTO-RTO: 0 /100
PLATELET # BLD AUTO: 207 10E3/UL (ref 150–450)
POTASSIUM BLD-SCNC: 4.4 MMOL/L (ref 3.4–5.3)
PROT SERPL-MCNC: 7.5 G/DL (ref 6.8–8.8)
RBC # BLD AUTO: 5.74 10E6/UL (ref 3.8–5.9)
SARS-COV-2 RNA RESP QL NAA+PROBE: NEGATIVE
SODIUM SERPL-SCNC: 141 MMOL/L (ref 133–144)
WBC # BLD AUTO: 7.2 10E3/UL (ref 4–11)

## 2022-10-04 PROCEDURE — 250N000013 HC RX MED GY IP 250 OP 250 PS 637: Performed by: EMERGENCY MEDICINE

## 2022-10-04 PROCEDURE — 36415 COLL VENOUS BLD VENIPUNCTURE: CPT | Performed by: EMERGENCY MEDICINE

## 2022-10-04 PROCEDURE — 99285 EMERGENCY DEPT VISIT HI MDM: CPT | Mod: 25 | Performed by: EMERGENCY MEDICINE

## 2022-10-04 PROCEDURE — C9803 HOPD COVID-19 SPEC COLLECT: HCPCS | Performed by: EMERGENCY MEDICINE

## 2022-10-04 PROCEDURE — 85025 COMPLETE CBC W/AUTO DIFF WBC: CPT | Performed by: EMERGENCY MEDICINE

## 2022-10-04 PROCEDURE — 250N000013 HC RX MED GY IP 250 OP 250 PS 637: Performed by: FAMILY MEDICINE

## 2022-10-04 PROCEDURE — U0003 INFECTIOUS AGENT DETECTION BY NUCLEIC ACID (DNA OR RNA); SEVERE ACUTE RESPIRATORY SYNDROME CORONAVIRUS 2 (SARS-COV-2) (CORONAVIRUS DISEASE [COVID-19]), AMPLIFIED PROBE TECHNIQUE, MAKING USE OF HIGH THROUGHPUT TECHNOLOGIES AS DESCRIBED BY CMS-2020-01-R: HCPCS | Performed by: EMERGENCY MEDICINE

## 2022-10-04 PROCEDURE — 80053 COMPREHEN METABOLIC PANEL: CPT | Performed by: EMERGENCY MEDICINE

## 2022-10-04 PROCEDURE — 99285 EMERGENCY DEPT VISIT HI MDM: CPT | Performed by: EMERGENCY MEDICINE

## 2022-10-04 RX ORDER — GABAPENTIN 300 MG/1
300 CAPSULE ORAL 2 TIMES DAILY PRN
Status: DISCONTINUED | OUTPATIENT
Start: 2022-10-04 | End: 2022-10-05 | Stop reason: HOSPADM

## 2022-10-04 RX ORDER — FINASTERIDE 5 MG/1
5 TABLET, FILM COATED ORAL DAILY
Status: DISCONTINUED | OUTPATIENT
Start: 2022-10-05 | End: 2022-10-05 | Stop reason: HOSPADM

## 2022-10-04 RX ORDER — HYDROXYZINE HYDROCHLORIDE 50 MG/1
50 TABLET, FILM COATED ORAL 3 TIMES DAILY PRN
Status: DISCONTINUED | OUTPATIENT
Start: 2022-10-04 | End: 2022-10-05 | Stop reason: HOSPADM

## 2022-10-04 RX ORDER — OLANZAPINE 5 MG/1
5 TABLET, ORALLY DISINTEGRATING ORAL ONCE
Status: COMPLETED | OUTPATIENT
Start: 2022-10-04 | End: 2022-10-04

## 2022-10-04 RX ORDER — CLONAZEPAM 0.5 MG/1
0.5 TABLET ORAL 2 TIMES DAILY PRN
Status: DISCONTINUED | OUTPATIENT
Start: 2022-10-04 | End: 2022-10-05 | Stop reason: HOSPADM

## 2022-10-04 RX ORDER — GABAPENTIN 300 MG/1
900 CAPSULE ORAL AT BEDTIME
Status: DISCONTINUED | OUTPATIENT
Start: 2022-10-04 | End: 2022-10-05 | Stop reason: HOSPADM

## 2022-10-04 RX ORDER — TESTOSTERONE CYPIONATE 200 MG/ML
70 INJECTION, SOLUTION INTRAMUSCULAR WEEKLY
COMMUNITY

## 2022-10-04 RX ORDER — TRAZODONE HYDROCHLORIDE 50 MG/1
100 TABLET, FILM COATED ORAL AT BEDTIME
Status: DISCONTINUED | OUTPATIENT
Start: 2022-10-04 | End: 2022-10-05 | Stop reason: HOSPADM

## 2022-10-04 RX ORDER — ALBUTEROL SULFATE 90 UG/1
2 AEROSOL, METERED RESPIRATORY (INHALATION) EVERY 6 HOURS PRN
Status: DISCONTINUED | OUTPATIENT
Start: 2022-10-04 | End: 2022-10-05 | Stop reason: HOSPADM

## 2022-10-04 RX ORDER — PANTOPRAZOLE SODIUM 40 MG/1
40 TABLET, DELAYED RELEASE ORAL
Status: DISCONTINUED | OUTPATIENT
Start: 2022-10-04 | End: 2022-10-05 | Stop reason: HOSPADM

## 2022-10-04 RX ORDER — FINASTERIDE 5 MG/1
2.5 TABLET, FILM COATED ORAL DAILY
Status: DISCONTINUED | OUTPATIENT
Start: 2022-10-04 | End: 2022-10-04

## 2022-10-04 RX ORDER — ACETAMINOPHEN 325 MG/1
650 TABLET ORAL EVERY 6 HOURS PRN
Status: DISCONTINUED | OUTPATIENT
Start: 2022-10-04 | End: 2022-10-05 | Stop reason: HOSPADM

## 2022-10-04 RX ORDER — ACETAMINOPHEN 500 MG
500-1000 TABLET ORAL EVERY 6 HOURS PRN
COMMUNITY
End: 2023-01-11

## 2022-10-04 RX ORDER — IBUPROFEN 200 MG
400 TABLET ORAL EVERY 6 HOURS PRN
Status: DISCONTINUED | OUTPATIENT
Start: 2022-10-04 | End: 2022-10-05 | Stop reason: HOSPADM

## 2022-10-04 RX ORDER — CLONAZEPAM 0.5 MG/1
0.5 TABLET ORAL 2 TIMES DAILY PRN
COMMUNITY
Start: 2022-09-23 | End: 2024-05-18

## 2022-10-04 RX ADMIN — PRAMIPEXOLE DIHYDROCHLORIDE 0.75 MG: 0.5 TABLET ORAL at 22:14

## 2022-10-04 RX ADMIN — GABAPENTIN 900 MG: 300 CAPSULE ORAL at 22:17

## 2022-10-04 RX ADMIN — OLANZAPINE 5 MG: 5 TABLET, ORALLY DISINTEGRATING ORAL at 22:12

## 2022-10-04 RX ADMIN — CLONAZEPAM 0.5 MG: 0.5 TABLET ORAL at 20:29

## 2022-10-04 RX ADMIN — ACETAMINOPHEN 650 MG: 325 TABLET, FILM COATED ORAL at 17:37

## 2022-10-04 ASSESSMENT — ENCOUNTER SYMPTOMS
SLEEP DISTURBANCE: 1
DYSPHORIC MOOD: 1
NERVOUS/ANXIOUS: 1
HYPERACTIVE: 0
HALLUCINATIONS: 0

## 2022-10-04 ASSESSMENT — ACTIVITIES OF DAILY LIVING (ADL)
ADLS_ACUITY_SCORE: 35
ADLS_ACUITY_SCORE: 33
ADLS_ACUITY_SCORE: 35
ADLS_ACUITY_SCORE: 35

## 2022-10-04 NOTE — TELEPHONE ENCOUNTER
S: 2:03pm Jennifer w/ DEC called Intake w/ clinical on a 67/FtM present in the [place] ER w/ SI and a plan.    B:  The pt is currently at the [facility] unit. Pt presenting after going to their primary clinic while being seen for a pre-op physical before transitional surgery Friday. Pt reported poor physical and mental health symptoms. Pt endorsed SI with [place]. Stressors: Life, and lack of support. Pt makes multiple references with letting their family relationship die. SIB: hitting themselves, last time was a year ago. Denies: AH/VH, HI, kirby. Decrease in sleep, eating, and self-cares. Hx of SA while at [facility] via tieing a sheet around neck and hanging it on door.     Guardian: Self    The pts MH Hx is as follows: complex PTSD, depression, anxiety.     The pt endorse using any substances. - Medical THC, drinks socially.     The pt has been/not been IP for MH before.    The pt is Rx MH medications: Unsure. The pt is complaint.     There is no concern for aggression this visit. There is no concern for HI.     Per  the pt can ambulate independently.     Per  the pt is indep with ADLs.    The pt does not have any known medical concerns. - Sleep apnea (uses a CPAP), Has had multiple ortho surgeries,     Covid In process  Utox needs to be collected.    A: Vol; [facility] Only    R: The pt is currently in the [place] ER awaiting bed placement.    2:07pm Pt has been added to the work-list. Intake waiting labs for bed placement. Intake working on identifying appropriate bed placement.
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
## 2022-10-04 NOTE — PLAN OF CARE
Gregoria Stein  October 4, 2022  Plan of Care Hand-off Note     Patient Care Path: Inpatient Mental Health    Plan for Care:     Inpatient mental health.  Patient is reporting suicidal ideation with plan to jump from [place].  Patient is voluntary.    Critical Safety Issues: suicidal ideation with plan.  Hx of attempts    Overview:  This patient is a child/adolescent: No    This patient has additional special visitor precautions: No    Legal Status: Voluntary, but holdable due to suicide plan    Contacts:   Primary Care Provider: Dr Ayo Davis, [facility]  Psychiatrist: No,  Reports primary is working on getting them connected to   Therapist: Nicole Lopez PsyD, LP  Therapist: Poncho Velázquez    : Jose Alberto SHEN, Mental Health Resources [phone]  CAD :  Chevy Valle, [phone]  Other: Jennifer Fleming, Peer , [phone]  Other: Raine Travis, Supported Employment, [phone]       Psychiatry Consult:  No    Updated Attending Provider regarding plan of care.    Jennifer Thompson, LICSW

## 2022-10-04 NOTE — ED TRIAGE NOTES
Pt coming primary clinic being seen for pre-op physical before transitional surgery Friday. Pt reports mental health is not in the right place, PTSD history and family is not supportive and was a trigger for pt. Pt reports plan of going up to [place] and jumping but no intention right now, previous attempt 1 year ago in [place] attempted to hang self.     Pt reports THEY/THEM/HE PRONOUNS OR RACHEL.     Triage Assessment     Row Name 10/04/22 1048       Triage Assessment (Adult)    Airway WDL WDL       Respiratory WDL    Respiratory WDL X;expansion/retractions  pt reports chronic SOB with sleep apnea    Expansion/Accessory Muscles/Retractions no use of accessory muscles;no retractions       Skin Circulation/Temperature WDL    Skin Circulation/Temperature WDL WDL       Cardiac WDL    Cardiac WDL WDL       Peripheral/Neurovascular WDL    Peripheral Neurovascular WDL WDL       Cognitive/Neuro/Behavioral WDL    Cognitive/Neuro/Behavioral WDL WDL       Whitley Coma Scale    Best Eye Response 4-->(E4) spontaneous    Best Motor Response 6-->(M6) obeys commands    Best Verbal Response 5-->(V5) oriented    Whitley Coma Scale Score 15
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
## 2022-10-05 ENCOUNTER — TELEPHONE (OUTPATIENT)
Dept: PLASTIC SURGERY | Facility: CLINIC | Age: 67
End: 2022-10-05

## 2022-10-05 ENCOUNTER — TELEPHONE (OUTPATIENT)
Dept: BEHAVIORAL HEALTH | Facility: CLINIC | Age: 67
End: 2022-10-05

## 2022-10-05 VITALS
RESPIRATION RATE: 16 BRPM | DIASTOLIC BLOOD PRESSURE: 91 MMHG | TEMPERATURE: 97.7 F | SYSTOLIC BLOOD PRESSURE: 158 MMHG | HEART RATE: 78 BPM | OXYGEN SATURATION: 98 %

## 2022-10-05 LAB
AMPHETAMINES UR QL SCN: ABNORMAL
BARBITURATES UR QL: ABNORMAL
BENZODIAZ UR QL: ABNORMAL
CANNABINOIDS UR QL SCN: ABNORMAL
COCAINE UR QL: ABNORMAL
OPIATES UR QL SCN: ABNORMAL

## 2022-10-05 PROCEDURE — 999N000157 HC STATISTIC RCP TIME EA 10 MIN

## 2022-10-05 PROCEDURE — 250N000013 HC RX MED GY IP 250 OP 250 PS 637: Performed by: EMERGENCY MEDICINE

## 2022-10-05 PROCEDURE — 80307 DRUG TEST PRSMV CHEM ANLYZR: CPT | Performed by: EMERGENCY MEDICINE

## 2022-10-05 PROCEDURE — 94660 CPAP INITIATION&MGMT: CPT

## 2022-10-05 PROCEDURE — 99284 EMERGENCY DEPT VISIT MOD MDM: CPT | Mod: 25

## 2022-10-05 PROCEDURE — 90791 PSYCH DIAGNOSTIC EVALUATION: CPT

## 2022-10-05 PROCEDURE — 250N000013 HC RX MED GY IP 250 OP 250 PS 637: Performed by: FAMILY MEDICINE

## 2022-10-05 PROCEDURE — 272N000064 HC CIRCUIT HUMIDITY W/CPAP BIPAP

## 2022-10-05 RX ORDER — OLANZAPINE 5 MG/1
5 TABLET, ORALLY DISINTEGRATING ORAL AT BEDTIME
Qty: 30 TABLET | Refills: 0 | Status: SHIPPED | OUTPATIENT
Start: 2022-10-05 | End: 2023-01-11

## 2022-10-05 RX ADMIN — PANTOPRAZOLE SODIUM 40 MG: 40 TABLET, DELAYED RELEASE ORAL at 12:37

## 2022-10-05 RX ADMIN — TRAZODONE HYDROCHLORIDE 100 MG: 50 TABLET ORAL at 00:21

## 2022-10-05 RX ADMIN — CLONAZEPAM 0.5 MG: 0.5 TABLET ORAL at 12:43

## 2022-10-05 RX ADMIN — FINASTERIDE 5 MG: 5 TABLET, FILM COATED ORAL at 12:37

## 2022-10-05 RX ADMIN — ACETAMINOPHEN 650 MG: 325 TABLET, FILM COATED ORAL at 12:43

## 2022-10-05 RX ADMIN — CAMPHOR AND MENTHOL: 5; 5 LOTION TOPICAL at 06:20

## 2022-10-05 ASSESSMENT — ACTIVITIES OF DAILY LIVING (ADL)
ADLS_ACUITY_SCORE: 35

## 2022-10-05 NOTE — TELEPHONE ENCOUNTER
R:  Bed search update ([facility] only) @ 01:15:    No appropriate beds available at [facility]    Pt remains on work list until appropriate placement is available

## 2022-10-05 NOTE — PHARMACY-ADMISSION MEDICATION HISTORY
Admission Medication History Completed by Pharmacy    See [facility] Admission Navigator for allergy information, preferred outpatient pharmacy, prior to admission medications and immunization status.     Medication History Sources:     Per patient and dispense report    Changes made to PTA medication list (reason):    Added: None    Deleted: None    Changed: albuterol 108 (90 base) MCG/ACT inhaler, inhale 2 puffs into lungs ---> albuterol 108 (90 base) MCG/ACT inhaler, inhale 2 puffs into lungs every 4 hours as needed (per dispense report)  o Clonazepam 0.5 mg tabet, no SIG ---> Clonazepam 0.5 mg tablet, take 1 tablet PO BID PRN (per patient and dispense report 60tab/30day)  o Finasteride 5 mg tablet, take 1/2 tablet by mouth daily ---> finasteride 5 mg tablet, take 1 tablet (5mg) PO once daily (per patient and dispense report 90tab/90day)  o Testosterone (Androgel 1.62% PUMP) 20.25 mg/ACT gel, place 3 pump onto the skin daily ---> Testosterone  mg/mL SDV 1 mL, inject 0.35 mL (70mg) subcutaneous once a week on Fridays (per patient and dispense report) (dose only per patient)    Additional Information:    Patient reports rarely taking acetaminophen due to the use of medical mariajuana for pain.     Patient reports not taking vitamin D3 and omega-3 caps due to them being out of pocket.    Prior to Admission medications    Medication Sig Last Dose Taking? Auth Provider Long Term End Date   albuterol (PROAIR HFA/PROVENTIL HFA/VENTOLIN HFA) 108 (90 Base) MCG/ACT inhaler Inhale 2 puffs into the lungs every 4 hours as needed Unknown Yes Reported, Patient Yes    clonazePAM (KLONOPIN) 0.5 MG tablet Take 0.5 mg by mouth 2 times daily as needed 10/3/2022 at PM Yes Reported, Patient No    finasteride (PROSCAR) 5 MG tablet Take 5 mg by mouth daily 10/3/2022 at PM Yes Reported, Patient     gabapentin (NEURONTIN) 300 MG capsule Take 3 capsules (900 mg) by mouth At Bedtime 10/3/2022 at PM Yes Amalia Landon MD Yes    omeprazole  (PRILOSEC) 40 MG DR capsule TAKE 1 CAPSULE BY MOUTH EVERY DAY BEFORE A MEAL 10/4/2022 at AM Yes Reported, Patient     pramipexole (MIRAPEX) 0.25 MG tablet Take 3 tablets (0.75 mg) by mouth At Bedtime 10/3/2022 at PM Yes Jaqueline Garcia DO Yes    testosterone cypionate (DEPOTESTOSTERONE) 200 MG/ML injection Inject 70 mg Subcutaneous once a week on Fridays Past Week at 9/30 Yes Unknown, Entered By History Yes    traZODone (DESYREL) 100 MG tablet Take 1 tablet (100 mg) by mouth At Bedtime 10/3/2022 at PM Yes Amalia Landon MD Yes    acetaminophen (TYLENOL) 500 MG tablet Take 500-1,000 mg by mouth every 6 hours as needed for mild pain   Unknown, Entered By History     cholecalciferol (VITAMIN D3) 5000 units (125 mcg) capsule Take by mouth daily  Patient not taking: Reported on 10/4/2022 Not Taking at Unknown time  Reported, Patient     hydrOXYzine (VISTARIL) 50 MG capsule Take 1 capsule (50 mg) by mouth 3 times daily as needed for anxiety  Patient not taking: Reported on 10/4/2022 Not Taking at Unknown time  Amalia Landon MD     Omega-3 Fatty Acids (OMEGA-3 FISH OIL) 1200 MG CAPS  Not Taking at Unknown time  Reported, Patient         Date completed: 10/04/22    Medication history completed by: Ayo Sosa, Pharmacy Student Year 2

## 2022-10-05 NOTE — ED NOTES
Received shift report. Writer checked on patient for an introduction and 1:1 time. However patient is sleeping. Per report patient had been awake most of the night. Per report anxiety and restless legs. Continue to monitor.
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
## 2022-10-05 NOTE — ED NOTES
"Data: Writer met with the patient for 1:1 time. Patient (they)  presents with brighter affect. Mood improved from last evening per patient. Pt states was very anxious last evening banging on the walls and so forth. Pt reports was in a different state of mind. Pt reports feels much more comfortable now. Pleasant. Cooperative. Patient talked about the [place] in [place]. Pt talked about the time when up north and was passing a kidney stone, the pain was terrible. They was camping in the woods but was able to get to hospital in [place], [place]. Pt went on to talk of PTSD. 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 Pt states had worked in [place] and [place]. AIDS patients abandoned by families. Pt stated it effects the health care workers as well.   Action: Development of a therapeutic relationship based on trust.   Active listening, supportive presence, validation, behavioral assessment, pain assessment. Medication review. Clonopin administered PRN per patient request. Tylenol for back pain.   Response: Pt reports mood improved since yesterday. Less anxious. Staff arranged for patient to take a shower at 1 PM.    Addendum: Pt reports the suicidal ideation has lessened today. Pt reports realizes the importance of making appointments with providers. Pt states a main trigger to this ER visit is the lack of family support. Coming closer to the date of they's surgery the PTSD resurfaces in regard to they's \"toxic\" family; members of family say \"they'll be there, but never do.\" Pt states goal is to process this through providers.   "

## 2022-10-05 NOTE — TELEPHONE ENCOUNTER
Writer has fwd medication management referral only to TC RN pool as next level of care set and replied to referral source. Will wait to hear if referral is appropriate or inappropriate.     Annika So  Care Coordinator  10.5    ----- Message from Irene Montgomery sent at 10/5/2022  2:55 PM CDT -----  Regarding: Med management/therapy appt  Transition Clinic Referral   [place] (Limited)        Please Check Type of Referral Requested:       XX THERAPY: The Transition clinic is able to schedule patients without current medical insurance; these patient will be referred to our Social Work Care Coordinator for Medical Insurance              Assistance. We are open for referral for psychotherapy. Patient is referred from:  Extended Care      XX MEDICATION:  Referrals for Medication are ONLY accepted from the following areas (select): Emergency Department/Urgent Care                                       Suboxone and Opioid Management Referrals are automatically denied. TC Psychiatry cannot see patient without active medical insurance.         Referring Provider Contact Name: Genoveva BANUELOS [facility], Irene BANUELOS [facility]; Phone Number: [phone]    Reason for Transition Clinic Referral: Bridge gap until appts see below    Next Level of Care Patient Will Be Transitioned To:   Date: Friday, 12/30/2022  Time: 11:00 am - 12:00 pm  Provider: Martin Watson  PhD  LP  Location: [facility], [address], [address], [place] [address]  Phone: [phone]  Type: Teletherapy    Date: Wednesday, 11/16/2022  Time: 12:30 pm - 1:30 pm  Provider: Geraldo CORRAL  CNP,RN  Location: [facility], [address] [place] [address]  Phone: [phone]  Type: Telepsychiatry    What Would Be Helpful from the Transition Clinic: Bridge gap until appts above     Needs: NO    Does Patient Have Access to Technology: Yes    Patient E-mail  Address: [email or web address], another email on file under demographics, please confirm w/ pt     Current Patient Phone Number: [phone];     Clinician Gender Preference (if applicable): CHASE Montgomery

## 2022-10-05 NOTE — ED NOTES
Patient upset about not having showering facility here now and they were told earlier we would put them on a list for a shower tomorrow because the unit that we would need to take them to the showers weren't working. Patient was given wipes, toothbrush and toothpaste; offered showering wipes and declined everything offered. Stated they were incontinent at times and leaking bowel; was offered clean underwear or depends briefs, declined. Stated they wanted to leave, and would be calling the  and the news as this is inhumane. WAnts to speak to the MD about leaving.

## 2022-10-05 NOTE — TELEPHONE ENCOUNTER
R:  10/5/22 3 PM  Patient in  ED awaiting IP MH placement.  Currently no appropriate beds available in [facility].  Patient will remain on work list until placement found.

## 2022-10-05 NOTE — ED NOTES
"Data: Writer met with the patient to attempt an introduction and 1:1 time. Pt presented laying on the mattress. Writer introduced self to patient. Pt affect blunted. Pt looked at writer and stated politely, \"I am talking to my kids\" Pt asked writer to give her some space. Pt resumed a conversation. Writer respected patient request. Will re-attempt a 1:1 time and medication administration later.     Addendum: writer revisited patient. This time writer observed the phone. Pt asked writer to come back in 15 minutes and bring medications.  Writer arrived as planned, but patient snoring and sleeping. Allowed patient to rest. --- Singh Patterson RN  "
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
## 2022-10-05 NOTE — DISCHARGE INSTRUCTIONS
Appointment Information:  Date: Wednesday, 11/16/2022  Time: 12:30 pm - 1:30 pm  Provider: Geraldo CORRAL  CNP,RN  Location: [facility], [address], [place] [address]  Phone: [phone]  Type: Telepsychiatry    Date: Friday, 12/30/2022  Time: 11:00 am - 12:00 pm  Provider: Martin Watson PhD  LP  Location: [facility], [address], [address], [place] [address]  Phone: [phone]  Type: Teletherapy    Also made referral to the Transition Clinic who will see patient for Teletherapy and Telepsychiatry prior to appointments above. Transition Clinic will call patient in the next 24 hrs to schedule.   Please reach out to Extended Care with questions- [phone]    Aftercare Plan  If I am feeling unsafe or I am in a crisis, I will:   Contact my established care providers   Call the National Suicide Prevention Lifeline: 988  Go to the nearest emergency room   Call 911     Warning signs that I or other people might notice when a crisis is developing for me: Feeling tired, crying, difficulty sleeping.      Things I am able to do on my own to cope or help me feel better: Camping, drumming, cold plunges, photography.       Things that I am able to do with others to cope or help me better: Talk to peer .       Changes I can make to support my mental health and wellness: Limit time spent with unsupportive people.  Increase positive, healthy supports.      People in my life that I can ask for help: Peer FIFI CM.       Your [facility] has a mental health crisis team you can call 24/7: [facility] Mobile Crisis  [phone] (adults)  [phone] (children)        Additional resources and information:     [place] Transgender Health Org - Support Group:  175.974.1428  https://www.[place]transgenderhealth.org/support-groups        Crisis Lines  Crisis Text Line  Text 636710  You will be connected with a trained live  "crisis counselor to provide support.    Por josephthomas, texto  HASMUKH a 209343 o texto a 442-AYUDAME en WhatsApp    The [place] (LGBTQ Youth Crisis Line)  3.072.343.1112  text START to 537-290      Community Resources  Fast Tracker  Linking people to mental health and substance use disorder resources  Yon.Geofusion     [place] Mental [facility] Warm Line  Peer to peer support  Monday thru Saturday, 12 pm to 10 pm  591.644.0943 or 1.879.796.6644  Text \"Support\" to 41289    National [place] on Mental Illness (SUGAR)  173.931.8342 or 1.888.SUGAR.HELPS      Mental Health Apps  My3  https://120 Sports.org/    VirtualHopeBox  https://Samfind/apps/virtual-hope-box/      Additional Information  Today you were seen by a licensed mental health professional through Triage and Transition services, Behavioral Healthcare Providers ([facility])  for a crisis assessment in the Emergency Department at [facility].  It is recommended that you follow up with your established providers (psychiatrist, mental health therapist, and/or primary care doctor - as relevant) as soon as possible. Coordinators from [facility] will be calling you in the next 24-48 hours to ensure that you have the resources you need.  You can also contact [facility] coordinators directly at [phone]. You may have been scheduled for or offered an appointment with a mental health provider. [facility] maintains an extensive network of licensed behavioral health providers to connect patients with the services they need.  We do not charge providers a fee to participate in our referral network.  We match patients with providers based on a patient's specific needs, insurance coverage, and location.  Our first effort will be to refer you to a provider within your care system, and will utilize providers outside your care system as needed.        "

## 2022-10-05 NOTE — TELEPHONE ENCOUNTER
Received voicemail on 10/4from Asa, PCP at [facility] regardinga surgery with Dr. Benavides 10/7 that needs to be cancelled. Provided number to discuss and get patient information.     10/4: Tried calling the number but it was incorrect. Searched both patients scheduled on 10/7 with Dr. Benavides and noticed that Killian is currently inpatient.    Called [facility] back on via the number that Asa called me from and spoke with a member of the PCP staff.    Asa (Ayo Davis) is OOO but the coordinator was able to share that Killian was transported to the ED at their H&P visit yesterday for depression and SI.     The coordinator stated Dr. Davis's note stated to plan for top surgery next year.     [facility]: [phone] option 0     Spoke with LAY Ness - who noted that the , Chevy Valle, needs to be notified as TCU was being planned for after surgery. Per Gilson Ness and Yamila have Chevy's contact information.     Routing to Grover Ivory Kris, and Colleen (RNCCs), and Rafael Napoles ().  __    Shanice Torres on 10/5/2022  P: [phone]

## 2022-10-05 NOTE — PROGRESS NOTES
Patient placed on CPAP +5 21% with hospital machine. No outlets in patient room. RN staff made aware and is ok with having an extension cord from outlet outside of room. Patient with sitter.

## 2022-10-05 NOTE — ED NOTES
"Triage & Transition Services, Extended Care   Risk Assessment  ESS-6  1.a. Over the past 2 weeks, have you had thoughts of killing yourself? Yes  1.b. Have you ever attempted to kill yourself and, if yes, when did this last happen? Yes Yes x2, last year.  Intent to jump off a bridge last year, EMS intervened.  Patient states they were not brought to the hospital and they were surprised to not be.  Patient reports a year ago while in the hospital they tied a sheet around their neck and hung it over the door.  Patient states they were discharged the next day.   Patient references unsatisfactory treatment.    2. Recent or current suicide plan? Yes Reported at intake plan to jump off [place].  Denies current plan or intent.   3. Recent or current intent to act on ideation? No States he feels he can be safe.  4. Lifetime psychiatric hospitalization? Yes  5. Pattern of excessive substance use? No  6. Current irritability, agitation, or aggression? No  Scoring note: BOTH 1a and 1b must be yes for it to score 1 point, if both are not yes it is zero. All others are 1 point per number. If all questions 1a/1b - 6 are no, risk is negligible. If one of 1a/1b is yes, then risk is mild. If either question 2 or 3, but not both, is yes, then risk is automatically moderate regardless of total score. If both 2 and 3 are yes, risk is automatically high regardless of total score.      Score: 3, moderate risk              Therapy Progress Note    Patient: Killian goes by \"Killian,\" uses he/him pronouns  Date of Service: October 5, 2022  Site of Service: [facility]  Patient was seen in-person.     Presenting problem:   Killian is followed related to Boarding Status. Please see initial DEC/[facility] Crisis Assessment for complete assessment information. Notable concerns include SI.     Individuals Present: Killian & ABIMAEL JALLOH    Session start: 2:45pm  Session end: 3:06pm   Session duration in minutes: 21  Session number: 1  Anticipated number of sessions " or this episode of care: 1-2  CPT utilized: 03967 - Psychotherapy (with patient) - 30 (16-37*) min    Current Presentation:   Pt presented talkative, discussing events that lead to this episode.  He was able to identify factors that lead to increased feelings of depression including physical health, trauma, and rejection by family. Pt verbalized coping strategies including camping, photography, cold plunges, drumming and reaching out for support.       Mental Status Exam:   Appearance: awake, alert  Attitude: cooperative  Eye Contact: good  Mood: better  Affect: mood congruent  Speech: pressured speech  Psychomotor Behavior: no evidence of tardive dyskinesia, dystonia, or tics  Thought Process:  logical  Associations: No loose associations  Thought Content: no evidence of suicidal ideation or homicidal ideation, no evidence of psychotic thought, no auditory hallucinations present and no visual hallucinations present  Insight: fair  Judgement: fair  Oriented to: time, person, and place  Attention Span and Concentration: intact  Recent and Remote Memory: intact    Diagnosis:   296.30 (F33.9) Major Depressive Disorder, Recurrent Episode, Unspecified _   309.81 (F43.10) Posttraumatic Stress Disorder (includes Posttraumatic Stress Disorder for Children 6 Years and Younger)  - by history   300.00 (F41.9) Unspecified Anxiety Disorder     Therapeutic Intervention(s):   Provided active listening, unconditional positive regard, and validation. Engaged in safety planning.  Coached on coping techniques/relaxation skills to help improve distress tolerance and managing intense emotions. Identified and practiced coping skills.    Treatment Objective(s) Addressed:   The focus of this session was on rapport building, safety planning, identifying an appropriate aftercare plan, assessing safety and identifying additional supports.     Progress Towards Goals:   Patient reports improving symptoms. Patient is making progress towards  treatment goals as evidenced by reported absence of suicidal ideation and agreement to follow up with therapeutic services.       General Recommendations:   Continue to monitor for harm. Consider: Use a positive, direct and calm approach. Pt's tend to match the energy/mood of the staff. Keep focus positive and upbeat, Provide the pt with options to provide a sense of control. Try to tell the pt what they can do instead of what they can't do, Listen in a neutral, non-judgmental way. Offer reassurance and Be mindful of your nonverbal cues (body language, facial expressions)    Plan:   Pt is recommended for OP services for individual therapy and psychiatry.  Pt is also prefers and is recommended to engage in supportive services for transgender individuals.      Aftercare Plan  If I am feeling unsafe or I am in a crisis, I will:   Contact my established care providers   Call the National Suicide Prevention Lifeline: 988  Go to the nearest emergency room   Call 911     Warning signs that I or other people might notice when a crisis is developing for me: Feeling tired, crying, difficulty sleeping.      Things I am able to do on my own to cope or help me feel better: Camping, drumming, cold plunges, photography.       Things that I am able to do with others to cope or help me better: Talk to peer .       Changes I can make to support my mental health and wellness: Limit time spent with unsupportive people.  Increase positive, healthy supports.      People in my life that I can ask for help: Peer , FIFI PEDRAZA.       Your [facility] has a mental health crisis team you can call 24/7: [facility] Mobile Crisis  [phone] (adults)  [phone] (children)        Additional resources and information:     [place] Transgender Health Org - Support Group:  337.253.1488  https://www.[place]transgenderhealth.org/support-groups        Crisis Lines  Crisis Text Line  Text 777325  You will be connected with a trained  "live crisis counselor to provide support.    Por rigo, texto  HASMUKH a 365131 o texto a 442-AYUDAME en WhatsApp    The [place] [employer] (LGBTQ Youth Crisis Line)  0.123.050.6365  text START to 304-959      Community Resources  Fast Tracker  Linking people to mental health and substance use disorder resources  [employer].Nordic Neurostim     [place] Mental Health Warm Line  Peer to peer support  Monday thru Saturday, 12 pm to 10 pm  084.223.3872 or 6.059.759.7739  Text \"Support\" to 21297    National [place] on Mental Illness (SUGAR)  837.650.7702 or 1.888.SUGAR.HELPS      Mental Health Apps  My3  https://iJento.org/    VirtualHopeBox  https://Cloud Dynamics/apps/virtual-hope-box/      Additional Information  Today you were seen by a licensed mental health professional through Triage and Transition services, Behavioral Healthcare Providers ([facility])  for a crisis assessment in the Emergency Department at [facility].  It is recommended that you follow up with your established providers (psychiatrist, mental health therapist, and/or primary care doctor - as relevant) as soon as possible. Coordinators from [facility] will be calling you in the next 24-48 hours to ensure that you have the resources you need.  You can also contact [facility] coordinators directly at [phone]. You may have been scheduled for or offered an appointment with a mental health provider. [facility] maintains an extensive network of licensed behavioral health providers to connect patients with the services they need.  We do not charge providers a fee to participate in our referral network.  We match patients with providers based on a patient's specific needs, insurance coverage, and location.  Our first effort will be to refer you to a provider within your care system, and will utilize providers outside your care system as needed.        Appointment Information:  Date: Wednesday, 11/16/2022  Time: 12:30 pm - 1:30 pm  Provider: Geraldo CORRAL  " CNP,RN  Location: [facility], [address] [place] [address]  Phone: [phone]  Type: Telepsychiatry    Date: Friday, 12/30/2022  Time: 11:00 am - 12:00 pm  Provider: Martin Watson  PhD  LP  Location: [facility], [address], [address], [place] [address]  Phone: [phone]  Type: Teletherapy        Also made referral to the Transition Clinic who will see patient for Teletherapy and Telepsychiatry prior to appointments above. Transition Clinic will call patient in the next 24 hrs to schedule.   Please reach out to [facility] with questions- [phone]       Plan for Care reviewed with Assigned Medical Provider? Yes. Provider, Dr. Mchugh, response: Acknowledged and in agreement.       ABIMAEL JALLOH   Licensed Mental Health Professional (LMHP), [facility]  [phone]

## 2022-10-05 NOTE — ED PROVIDER NOTES
[facility] ED Mental Health Handoff Note:       Brief HPI:  This is a 67 year old adult signed out to me by Dr. Zheng.  See initial ED Provider note for full details of the presentation. Interval history is pertinent for no acute events.    Home meds reviewed and ordered/administered: Yes    Medically stable for inpatient mental health admission: Yes.    Evaluated by mental health: Yes. The recommendation is for inpatient mental health treatment. Bed search in process    Safety concerns: At the time I received sign out, there were no safety concerns.    Hold Status:  Active Orders   N/A            Exam:   Patient Vitals for the past 24 hrs:   BP Temp Temp src Pulse Resp SpO2   10/04/22 1729 (!) 164/96 98.4  F (36.9  C) Oral 75 -- 95 %   10/04/22 1040 (!) 163/84 97.4  F (36.3  C) Tympanic 80 18 94 %           ED Course:    Medications   albuterol (PROVENTIL HFA/VENTOLIN HFA) inhaler (has no administration in time range)   clonazePAM (klonoPIN) tablet 0.5 mg (0.5 mg Oral Given 10/4/22 2029)   gabapentin (NEURONTIN) capsule 900 mg (900 mg Oral Given 10/4/22 2217)   hydrOXYzine (ATARAX) tablet 50 mg (has no administration in time range)   pramipexole (MIRAPEX) tablet 0.75 mg (0.75 mg Oral Given 10/4/22 2214)   traZODone (DESYREL) tablet 100 mg (100 mg Oral Given 10/5/22 0021)   pantoprazole (PROTONIX) EC tablet 40 mg (40 mg Oral Not Given 10/4/22 1730)   gabapentin (NEURONTIN) capsule 300 mg (has no administration in time range)   acetaminophen (TYLENOL) tablet 650 mg (650 mg Oral Given 10/4/22 1737)   ibuprofen (ADVIL/MOTRIN) tablet 400 mg (has no administration in time range)   finasteride (PROSCAR) tablet 5 mg (has no administration in time range)   camphor-menthol (DERMASARRA) lotion ( Topical Given 10/5/22 0620)   OLANZapine zydis (zyPREXA) ODT tab 5 mg (5 mg Oral Given 10/4/22 2212)            There were no significant events during my shift.    Patient was signed out to the oncoming  provider      Impression:    ICD-10-CM    1. Severe episode of recurrent major depressive disorder, without psychotic features (H)  F33.2        Plan:    1. Awaiting inpatient mental health admission/transfer.      RESULTS:   Results for orders placed or performed during the hospital encounter of 10/04/22 (from the past 24 hour(s))   Comprehensive metabolic panel     Status: Abnormal    Collection Time: 10/04/22  1:16 PM   Result Value Ref Range    Sodium 141 133 - 144 mmol/L    Potassium 4.4 3.4 - 5.3 mmol/L    Chloride 108 94 - 109 mmol/L    Carbon Dioxide (CO2) 29 20 - 32 mmol/L    Anion Gap 4 3 - 14 mmol/L    Urea Nitrogen 14 7 - 30 mg/dL    Creatinine 0.81 0.52 - 1.25 mg/dL    Calcium 8.9 8.5 - 10.1 mg/dL    Glucose 123 (H) 70 - 99 mg/dL    Alkaline Phosphatase 79 40 - 150 U/L    AST 18 0 - 45 U/L    ALT 30 0 - 70 U/L    Protein Total 7.5 6.8 - 8.8 g/dL    Albumin 3.8 3.4 - 5.0 g/dL    Bilirubin Total 0.4 0.2 - 1.3 mg/dL    GFR Estimate 80 >60 mL/min/1.73m2    Narrative    The sex of this patient cannot be reliably determined based on discrepancies in demographics (legal sex, sex assigned at birth, gender identity).  Both male and female reference ranges are provided where applicable.  Careful evaluation of the patient s results as compared to the gender specific reference intervals is required in this setting.    CBC with platelets differential     Status: None    Collection Time: 10/04/22  1:16 PM    Narrative    The following orders were created for panel order CBC with platelets differential.  Procedure                               Abnormality         Status                     ---------                               -----------         ------                     CBC with platelets and d...[[id number]]                      Final result                 Please view results for these tests on the individual orders.   Asymptomatic COVID-19 Virus (Coronavirus) by PCR Nasopharyngeal     Status: Normal     Collection Time: 10/04/22  1:16 PM    Specimen: Nasopharyngeal; Swab   Result Value Ref Range    SARS CoV2 PCR Negative Negative    Narrative    Testing was performed using the Xpert Xpress SARS-CoV-2 Assay on the   Cepheid Gene-Xpert Instrument Systems. Additional information about   this Emergency Use Authorization (EUA) assay can be found via the Lab   Guide. This test should be ordered for the detection of SARS-CoV-2 in   individuals who meet SARS-CoV-2 clinical and/or epidemiological   criteria. Test performance is unknown in asymptomatic patients. This   test is for in vitro diagnostic use under the FDA EUA for   laboratories certified under CLIA to perform high complexity testing.   This test has not been FDA cleared or approved. A negative result   does not rule out the presence of PCR inhibitors in the specimen or   target RNA in concentration below the limit of detection for the   assay. The possibility of a false negative should be considered if   the patient's recent exposure or clinical presentation suggests   COVID-19. This test was validated by the [facility]. This laboratory is certified under the Clinical Laboratory Improvement Amendments of 1988 (CLIA-88) as qualified to perform high complexity laboratory testing.     CBC with platelets and differential     Status: None    Collection Time: 10/04/22  1:16 PM   Result Value Ref Range    WBC Count 7.2 4.0 - 11.0 10e3/uL    RBC Count 5.74 3.80 - 5.90 10e6/uL    Hemoglobin 16.7 11.7 - 17.7 g/dL    Hematocrit 50.6 35.0 - 53.0 %    MCV 88 78 - 100 fL    MCH 29.1 26.5 - 33.0 pg    MCHC 33.0 31.5 - 36.5 g/dL    RDW 13.1 10.0 - 15.0 %    Platelet Count 207 150 - 450 10e3/uL    % Neutrophils 74 %    % Lymphocytes 18 %    % Monocytes 5 %    % Eosinophils 3 %    % Basophils 0 %    % Immature Granulocytes 0 %    NRBCs per 100 WBC 0 <1 /100    Absolute Neutrophils 5.3 1.6 - 8.3 10e3/uL    Absolute Lymphocytes 1.3 0.8 - 5.3  10e3/uL    Absolute Monocytes 0.3 0.0 - 1.3 10e3/uL    Absolute Eosinophils 0.2 0.0 - 0.7 10e3/uL    Absolute Basophils 0.0 0.0 - 0.2 10e3/uL    Absolute Immature Granulocytes 0.0 <=0.4 10e3/uL    Absolute NRBCs 0.0 10e3/uL    Narrative    The sex of this patient cannot be reliably determined based on discrepancies in demographics (legal sex, sex assigned at birth, gender identity).  Both male and female reference ranges are provided where applicable.  Careful evaluation of the patient s results as compared to the gender specific reference intervals is required in this setting.    Urine Drugs of Abuse Screen     Status: Abnormal    Collection Time: 10/05/22 12:17 AM    Narrative    The following orders were created for panel order Urine Drugs of Abuse Screen.  Procedure                               Abnormality         Status                     ---------                               -----------         ------                     Drug abuse screen 1 urin...[[id number]]  Abnormal            Final result                 Please view results for these tests on the individual orders.   Drug abuse screen 1 urine (ED)     Status: Abnormal    Collection Time: 10/05/22 12:17 AM   Result Value Ref Range    Amphetamines Urine Screen Negative Screen Negative    Barbiturates Urine Screen Negative Screen Negative    Benzodiazepines Urine Screen Negative Screen Negative    Cannabinoids Urine Screen Positive (A) Screen Negative    Cocaine Urine Screen Negative Screen Negative    Opiates Urine Screen Negative Screen Negative             MD Isabelle Bellamy David, MD  10/05/22 0758

## 2022-10-05 NOTE — ED NOTES
"Remains on 1:1 staff monitoring. No wink of sleep. Awake almost all night long. Trazodone offered just before CPAP per patient request. Reports chronic restless legs that \"gets worse every four days\". Multiple requests over the night, pounding on the mattress, screaming, pacing on and off, ice packs and snacks x3 per request, change of scrubs x3, pillows here and there etc. Patient demanding to go home. ED informed. Staff encouraged them to stay and talk to MD and DEC . The patient was receptive and agreed to stay.  "

## 2022-10-05 NOTE — ED NOTES
"Per security, patient took several cups of water and stated that he was going to take a shower as he went back to his room. Patient disrobed and poured water on the blanket and used that to give self \"a shower.\" MD aware that they wanted to speak to him.  "

## 2022-10-06 ENCOUNTER — TELEPHONE (OUTPATIENT)
Dept: PLASTIC SURGERY | Facility: CLINIC | Age: 67
End: 2022-10-06

## 2022-10-06 ENCOUNTER — TELEPHONE (OUTPATIENT)
Dept: BEHAVIORAL HEALTH | Facility: CLINIC | Age: 67
End: 2022-10-06

## 2022-10-06 NOTE — TELEPHONE ENCOUNTER
"Received voicemail from patient on TC voicemail 10/5/2022 at 10:56pm. Patient stated \"I'm not doing well at all really distressed and feel like don't know where to go for help anymore.\"     Coordinator returned call and left voicemail asking for call back at .     Susan Gaona  Transition Clinic Coordinator  Date and Time: 10/06/22 7:33 AM    "

## 2022-10-06 NOTE — TELEPHONE ENCOUNTER
Received call from Dr. Davis regarding rescheduling surgery with Dr. Benavides.    Per Asa (Dr. Davis), best to reschedule in January. I did inquire if patient can have surgery at [facility] or if a hospital setting is recommended, per Dr. Davis, ok for [facility].     Patient meets with  provider on 10/11 so best to contact patient AFTER 10/11 to discuss rescheduling and requirements, etc.     Will notify care team.  Abdulaziz Torres on 10/6/2022  P: [phone]

## 2022-10-07 ENCOUNTER — HOSPITAL ENCOUNTER (OUTPATIENT)
Facility: AMBULATORY SURGERY CENTER | Age: 67
Discharge: HOME | End: 2022-10-07
Attending: SURGERY
Payer: MEDICARE

## 2022-10-07 ENCOUNTER — TELEPHONE (OUTPATIENT)
Dept: PLASTIC SURGERY | Facility: CLINIC | Age: 67
End: 2022-10-07

## 2022-10-07 ENCOUNTER — TELEPHONE (OUTPATIENT)
Dept: BEHAVIORAL HEALTH | Facility: CLINIC | Age: 67
End: 2022-10-07

## 2022-10-07 DIAGNOSIS — F64.0 GENDER DYSPHORIA IN ADOLESCENT AND ADULT: ICD-10-CM

## 2022-10-07 NOTE — TELEPHONE ENCOUNTER
"Killian reported feeling drained physically and mentally, especially regarding familial rejection. Killian felt like they didn't have enough energy to go through with surgery at this time.     They've got a therapist who they report made mistakes around gender which led to a break in trust, and that they haven't worked with a psychiatrist in \"a while\" either.They report they will work with a psychiatrist starting on Tuesday, and the therapist found Killian someone who can discuss gender concerns with them- WALT Bolanos.    Killian still wants to go through with surgery, and feels urgency about being able to move forward with resolving the dysphoria created by their chest,  but does think a pause is necessary in order to be more stable and build up emotional and physical health. They think a few months of work with the psychiatrist and therapist might suffice.     Killian will need a TCU again after surgery, as was arranged prior to cancellation this week.     The psychiatrist is Dr. Hogan at [facility]. 10/11/22            "

## 2022-10-07 NOTE — TELEPHONE ENCOUNTER
Writer spoke with pt and scheduled initial TC therapy appointment on 10/10/22 @  3:00 pm as patient wanted male therapist first thing next week. Writer sent intake documents via [employer]. Writer will reply to referral source. Patient stated they got psychiatry services with PCP.Tracker completed.    Kyleigh Freed  10/07/22  339

## 2022-10-10 ENCOUNTER — VIRTUAL VISIT (OUTPATIENT)
Dept: BEHAVIORAL HEALTH | Facility: CLINIC | Age: 67
End: 2022-10-10
Payer: MEDICARE

## 2022-10-10 DIAGNOSIS — F33.2 MAJOR DEPRESSIVE DISORDER, RECURRENT SEVERE WITHOUT PSYCHOTIC FEATURES (H): ICD-10-CM

## 2022-10-10 DIAGNOSIS — F43.10 PTSD (POST-TRAUMATIC STRESS DISORDER): Primary | ICD-10-CM

## 2022-10-10 NOTE — PROGRESS NOTES
[facility]   Mental Health & Addiction Services     Progress Note - Initial Visit    Patient  Name:  Gregoria Stein Date: 10.10.2022         Service Type: Individual     Visit Start Time:   Visit End Time:     Visit #: 1    Attendees: Client attended alone    Service Modality:  Video Visit:      Provider verified identity through the following two step process.  Patient provided:  Patient photo and Patient     Telemedicine Visit: The patient's condition can be safely assessed and treated via synchronous audio and visual telemedicine encounter.      Reason for Telemedicine Visit: Patient has requested telehealth visit and Patient unable to travel    Originating Site (Patient Location): Patient's home    Distant Site (Provider Location): [facility]: [facility]    Consent:  The patient/guardian has verbally consented to: the potential risks and benefits of telemedicine (video visit) versus in person care; bill my insurance or make self-payment for services provided; and responsibility for payment of non-covered services.     Patient would like the video invitation sent by:  My Chart    Mode of Communication:  Video Conference via [employer]    As the provider I attest to compliance with applicable laws and regulations related to telemedicine.       DATA:   Interactive Complexity: No   Crisis: No     Presenting Concerns/  Current Stressors:   PT referred for bridging services correlated with wait for next LOC. Chart review and initial referral suggest pt struggles with depressive spectrum sx up to and including SI w/ plan which recently necessitated ED admission. In session, pt echoes these concerns, citing a complex trauma hx correlated with family relational strain. Describes some previously effective coping strategies learned via previous counseling; is currently seeking a new provider following breach of trust with existing clinician. Pt does identify natural  support systems and is receptive to initial therapeutic efforts. Expresses comprehension and acceptance of scope and purpose of TC as a bridging service.      ASSESSMENT:  Mental Status Assessment:  Appearance:   Appropriate   Eye Contact:   Good   Psychomotor Behavior: Normal   Attitude:   Cooperative   Orientation:   All  Speech   Rate / Production: Normal/ Responsive   Volume:  Normal   Mood:    Anxious  Depressed   Affect:    Appropriate   Thought Content:  Clear   Thought Form:  Coherent  Goal Directed  Logical   Insight:    Good       Safety Issues and Plan for Safety and Risk Management:     [place] Suicide Severity Rating Scale (Lifetime/Recent)  [place] Suicide Severity Rating (Lifetime/Recent) 7/9/2020 7/15/2020 7/22/2020 10/4/2022   Q1 Wished to be Dead (Past Month) yes yes yes yes   Q2 Suicidal Thoughts (Past Month) yes yes yes yes   Q3 Suicidal Thought Method yes yes yes yes   Q4 Suicidal Intent without Specific Plan no no no no   Q5 Suicide Intent with Specific Plan yes yes yes no   Q6 Suicide Behavior (Lifetime) yes yes yes yes   Within the Past 3 Months? - - - no   Level of Risk per Screen - - - moderate risk     Patient denies current fears or concerns for personal safety.  Patient denies current or recent suicidal ideation or behaviors.  Patient denies current or recent homicidal ideation or behaviors.  Patient denies current or recent self injurious behavior or ideation.  Patient denies other safety concerns.  Recommended that patient call 911 or go to the local ED should there be a change in any of these risk factors.  Patient reports there are no firearms in the house.     Diagnostic Criteria:  Refer to recent ED evaluation      DSM5 Diagnoses: (Sustained by DSM5 Criteria Listed Above)  Diagnoses: 296.33 (F33.2) Major Depressive Disorder, Recurrent Episode, Severe _  309.81 (F43.10) Posttraumatic Stress Disorder (includes Posttraumatic Stress Disorder for Children 6 Years and Younger)  With  dissociative symptoms  Psychosocial & Contextual Factors: Economic strain, social isolation, trauma hx, at-risk / marginalized population   WHODAS 2.0 (12 item):   WHODAS 2.0 Total Score 11/22/2019 7/9/2020   Total Score 41 28     Intervention:   Psychodynamic- Patient processed internal experiences , Completed through review of safety issues and safety interventions and Educated on treatment planning and started identifying goals and interventions for treatment plan  Collateral Reports Completed:  Not Applicable      PLAN: (Homework, other):  1. Provider will continue Diagnostic Assessment.  Patient was given the following to do until next session:  Follow-up with additional referrals provided previously; reorient to previously effective coping strategies    2. Provider recommended the following referrals: TBD pending pt need and availability.      3.  Suicide Risk and Safety Concerns were assessed for Gregoria Stein.    Patient meets the following risk assessment and triage: Patient has no change in safety concerns. Committed to safety and agreed to follow previously developed safety plan.      ROBERT PARNELL, [facility]  October 10, 2022

## 2022-10-13 ENCOUNTER — TELEPHONE (OUTPATIENT)
Dept: PLASTIC SURGERY | Facility: CLINIC | Age: 67
End: 2022-10-13

## 2022-10-13 NOTE — TELEPHONE ENCOUNTER
Killian called to discuss recent sessions with Dr. Elena Hogan at [facility] (psychiatrist). Killian will start a new treatment for depression (the treatment is 5 days a week for a month) as soon as it's approved and called to talk about scheduling surgery again.     Killian is working on stabilizing CPTSD with some therapists including WALT Bolanos. Killian will meet with Dr. Bolanos on 10/25 and see him every other week. Killian terminated therapy with Giulia today and feels confident of plan for support moving forward.     [place]illian is also attending [place] meetings to get some support from transgender community. Killian has also been meeting people locally who feel supportive, including someone willing to drive them where they need to go to get surgery.    Killian's vision for surgery is to be able to reschedule within 4-5 months.     This writer explained Dr. Benavides would likely want Killian's psychiatrist and therapist to verify stability and the treatment for depression would need to be completed before scheduling again. Killian agreed with that plan.     Called Dr. Hogan and left message to discuss plan.

## 2022-10-14 ENCOUNTER — VIRTUAL VISIT (OUTPATIENT)
Dept: PULMONOLOGY | Facility: OTHER | Age: 67
End: 2022-10-14
Payer: MEDICARE

## 2022-10-14 DIAGNOSIS — R06.09 DYSPNEA ON EXERTION: Primary | ICD-10-CM

## 2022-10-14 DIAGNOSIS — J38.3 PARADOXICAL VOCAL CORD MOTION: ICD-10-CM

## 2022-10-14 DIAGNOSIS — J98.4 RESTRICTIVE LUNG DISEASE: ICD-10-CM

## 2022-10-14 PROCEDURE — 99442 PR PHYSICIAN TELEPHONE EVALUATION 11-20 MIN: CPT | Performed by: INTERNAL MEDICINE

## 2022-10-14 NOTE — LETTER
"    10/14/2022         RE: Gregoria Stein  [address]  [place] [address]        Dear Colleague,    Thank you for referring your patient, Gregoria Stein, to the [facility]. Please see a copy of my visit note below.    Killian is a 67 year old who is being evaluated via a billable telephone visit.      How would you like to obtain your AVS? MyChart  If the video visit is dropped, the invitation should be resent by: Text to cell phone: [phone]  Will anyone else be joining your video visit? No      Telephone visit start time: 1335  Telephone visit end time: 1355    Pulmonary Clinic Follow-up Visit    Assessment and Plan:   67 year old remote smoker with a history of inducible laryngeal obstruction, moderate obstructive sleep apnea, GERD, iron deficiency anemia, osteoarthritis s/p right TKA, and chronic exertional dyspnea, presenting for follow-up.     Chronic exertional dyspnea, inducible laryngeal obstruction, obstructive sleep apnea: Canceled pulmonary rehab due to mental health problems, was hospitalized. Interested in a new referral for pulmonary rehabilitation. They are pursuing transcranial magnetic stimulation for refractory depression. They postponed top surgery, though they are still hoping to have this done in 2023. They have a stationary bicycle that they will start using for exercise. In AM has issues with reflux and wheezing, and the albuterol helps. Also received new CPAP after the recall that they are trying to use regularly. Recall from prior visits that Killian likely has multifactorial dypspnea. They do have confirmed inducible laryngeal obstruction that leads to difficulty talking while walking, for example previously followed by SLP at [facility]. They find benefit from yawning (\"hot potato mouth\") exercises when an episode occurs. They note that emotional stress does also sometimes precipitate an episode and worsen breathing. They have no clear history of " "seasonal allergies or childhood asthma, though normal PFT does not rule out possible episodic bronchospasm; methacholine challenge could be considered at some point. Very subtle (questionable) cylindrical bronchiectasis in RML, subtle expiratory subsegmental air trapping, but this can also be seen in normal lungs. They have moderate ASHLYN with AHI 21 and received a new device after the recall.     Plan:  - previously wrote a letter clearing Killian for top surgery from a pulmonary standpoint; this has been rescheduled but happy to provide a letter in future when this is rescheduled  - continue to use yawning (\"hot potato mouth\") breaths to abort acute episodes of inducible laryngeal obstruction  - albuterol HFA as needed  - ENT visit pending  - enrollment in pulmonary rehabilitation at [facility]  - can consider methacholine challenge test in future if needed  - continue CPAP for ASHLYN  - annual influenza vaccination  - unclear pneumococcal vaccination status  - COVID-19 vaccination: Pfizer-DvineWave  - follow up in 3 months  - encouraged them to contact me with questions or concerning symptoms    Daniel Greer MD  Pulmonary and Critical Care Medicine  [facility]  Office [phone]  Pager [phone]  (he/him/his)    CCx: chronic exertional dyspnea, inducible laryngeal obstruction, obstructive sleep apnea    HPI: 67 year old remote smoker with a history of inducible laryngeal obstruction, moderate obstructive sleep apnea, GERD, iron deficiency anemia, osteoarthritis s/p right TKA, and chronic exertional dyspnea, presenting for follow-up. Canceled pulmonary rehab due to mental health problems, was hospitalized. Interested in a new referral for pulmonary rehabilitation. They are pursuing transcranial magnetic stimulation for refractory depression. They postponed top surgery, though they are still hoping to have this done in 2023. They have a stationary bicycle that they will start using for " "exercise. In AM has issues with reflux and wheezing, and the albuterol helps. Also received new CPAP after the recall that they are trying to use regularly. Recall from prior visits that Killian likely has multifactorial dypspnea. They do have confirmed inducible laryngeal obstruction that leads to difficulty talking while walking, for example previously followed by SLP at [facility]. They find benefit from yawning (\"hot potato mouth\") exercises when an episode occurs. They note that emotional stress does also sometimes precipitate an episode and worsen breathing. They have no clear history of seasonal allergies or childhood asthma, though normal PFT does not rule out possible episodic bronchospasm; methacholine challenge could be considered at some point. Very subtle (questionable) cylindrical bronchiectasis in RML, subtle expiratory subsegmental air trapping, but this can also be seen in normal lungs. They have moderate ASHLYN with AHI 21 and received a new device after the recall.    ROS:  A 12-system review was obtained and was negative with the exception of the symptoms endorsed in the history of present illness.    PMH:  remote smoker  inducible laryngeal obstruction  moderate obstructive sleep apnea with AHI 21; recalled device and ongoing concern regarding noise abatement material in the new device, so they are not on CPAP but having a mandibular advancement device made  GERD  BMI 34.9  iron deficiency anemia  osteoarthritis s/p right TKA  chronic exertional dyspnea    PSH:  Past Surgical History:   Procedure Laterality Date     COLONOSCOPY       EXCISE MASS VAGINA Left 4/10/2015    Procedure: EXCISE MASS VAGINA;  Surgeon: Stanislav Byers MD;  Location: [facility] OR     GENITOURINARY SURGERY      Urethrial dilation     JOINT REPLACEMENT Right 07/2018     ORTHOPEDIC SURGERY      right rotator cuff, left achilles tendon repair, neuroma removed right foot, right knee arthroscopy,        Allergies:  Allergies   Allergen " Reactions     Penicillins Rash and Hives     Versed [Midazolam] Other (See Comments)     Stopped breathing  Over 10 years ago but possible sensitivity to too much of this medication  Became apnic       Family HX:  Family History   Problem Relation Age of Onset     Macular Degeneration Mother      Osteoporosis Mother      Heart Disease Father 49     Multiple Sclerosis Sister      Diabetes Paternal Uncle         heart issues     Cancer No family hx of      Coronary Artery Disease No family hx of        Social Hx:  Social History     Socioeconomic History     Marital status: Single     Spouse name: Not on file     Number of children: Not on file     Years of education: Not on file     Highest education level: Not on file   Occupational History     Not on file   Tobacco Use     Smoking status: Former     Packs/day: 0.00     Types: Cigarettes     Quit date: 1983     Years since quittin.1     Smokeless tobacco: Never   Substance and Sexual Activity     Alcohol use: Yes     Comment: occasional     Drug use: Yes     Types: Marijuana     Comment: daily only at night time for medical conditions     Sexual activity: Not Currently   Other Topics Concern     Parent/sibling w/ CABG, MI or angioplasty before 65F 55M? Not Asked   Social History Narrative     Not on file     Social Determinants of Health     Financial Resource Strain: Not on file   Food Insecurity: Not on file   Transportation Needs: Not on file   Physical Activity: Not on file   Stress: Not on file   Social Connections: Not on file   Intimate Partner Violence: Not on file   Housing Stability: Not on file       Current Meds:  Current Outpatient Medications   Medication Sig Dispense Refill     acetaminophen (TYLENOL) 500 MG tablet Take 500-1,000 mg by mouth every 6 hours as needed for mild pain       albuterol (PROAIR HFA/PROVENTIL HFA/VENTOLIN HFA) 108 (90 Base) MCG/ACT inhaler Inhale 2 puffs into the lungs every 4 hours as needed       clonazePAM  (KLONOPIN) 0.5 MG tablet Take 0.5 mg by mouth 2 times daily as needed       finasteride (PROSCAR) 5 MG tablet Take 5 mg by mouth daily       gabapentin (NEURONTIN) 300 MG capsule Take 3 capsules (900 mg) by mouth At Bedtime 270 capsule 0     omeprazole (PRILOSEC) 40 MG DR capsule TAKE 1 CAPSULE BY MOUTH EVERY DAY BEFORE A MEAL       pramipexole (MIRAPEX) 0.25 MG tablet Take 3 tablets (0.75 mg) by mouth At Bedtime 90 tablet 3     testosterone cypionate (DEPOTESTOSTERONE) 200 MG/ML injection Inject 70 mg Subcutaneous once a week on Fridays       traZODone (DESYREL) 100 MG tablet Take 1 tablet (100 mg) by mouth At Bedtime 30 tablet 3     cholecalciferol (VITAMIN D3) 5000 units (125 mcg) capsule Take by mouth daily (Patient not taking: Reported on 10/14/2022)       hydrOXYzine (VISTARIL) 50 MG capsule Take 1 capsule (50 mg) by mouth 3 times daily as needed for anxiety (Patient not taking: No sig reported) 90 capsule 3     OLANZapine zydis (ZYPREXA) 5 MG ODT Take 1 tablet (5 mg) by mouth At Bedtime (Patient not taking: Reported on 10/14/2022) 30 tablet 0     Omega-3 Fatty Acids (OMEGA-3 FISH OIL) 1200 MG CAPS  (Patient not taking: No sig reported)         Physical Exam:  no exam due to virtual visit    Labs:  reviewed    Imaging studies:  High-resolution chest CT (June 2021):  - images directly reviewed, formal interpretation follows:  FINDINGS:   LUNGS AND PLEURA: There has been interval resolution of the mosaic attenuation previously seen. There is mild bilateral tubular bronchiectasis most severe in the right middle lobe. Stable calcified granuloma right upper lobe. Minimal linear atelectasis or scarring in the lingula. No evidence for underlying interstitial lung disease. There is subsegmental air trapping on expiratory images. There is a tiny right pleural effusion.     MEDIASTINUM/AXILLAE: No adenopathy. Atherosclerotic change of the aorta with borderline aneurysmal dilatation of the ascending thoracic aorta  measuring 4 cm. Heart size is normal. No pericardial effusion.     CORONARY ARTERY CALCIFICATION: Mild.     UPPER ABDOMEN: Stable small low-attenuation lesion in the right lobe of the liver likely a cyst. Tiny nonobstructing right renal stones. Partial visualization of presumed peripelvic cysts in the left kidney unchanged.     MUSCULOSKELETAL: There is degenerative change in the spine and both shoulders right greater than left.     IMPRESSION:   1.  Resolution of mosaic attenuation.   2.  Subsegmental air trapping remains visible on expiratory images.   3.  Bilateral bronchiectasis.   4.  Borderline aneurysmal dilatation of the ascending thoracic aorta measuring 4 cm.     Pulmonary Function Testing  June 2022:  FVC 2.35 (72%)  FEV1 1.97 (77%)  FEV1/FVC 0.84  No bronchodilator response  RV 1.93 (93%)  TLC 4.29 (81%)  DLCOc 106%  Flow-volume loop is normal with no evidence of upper airway obstruciton      Again, thank you for allowing me to participate in the care of your patient.        Sincerely,        Daniel Greer MD

## 2022-10-14 NOTE — PATIENT INSTRUCTIONS
It was good to talk to you today, Killian. This is what we discussed:    We will get you into pulmonary rehabilitation at [facility].  Continue breathing exercises and the stationary bicycle.  Use albuterol as needed.  Continue the CPAP  Hopefully the ENT visit will be helpful.  I will see you in about 3 months.  Contact me with questions or concerns.    Daniel Greer MD  Pulmonary and Critical Care Medicine  [facility]  Office [phone]  he/him/his

## 2022-10-14 NOTE — PROGRESS NOTES
"Killian is a 67 year old who is being evaluated via a billable telephone visit.      How would you like to obtain your AVS? MyChart  If the video visit is dropped, the invitation should be resent by: Text to cell phone: [phone]  Will anyone else be joining your video visit? No      Telephone visit start time: 1335  Telephone visit end time: 1355    Pulmonary Clinic Follow-up Visit    Assessment and Plan:   67 year old remote smoker with a history of inducible laryngeal obstruction, moderate obstructive sleep apnea, GERD, iron deficiency anemia, osteoarthritis s/p right TKA, and chronic exertional dyspnea, presenting for follow-up.     Chronic exertional dyspnea, inducible laryngeal obstruction, obstructive sleep apnea: Canceled pulmonary rehab due to mental health problems, was hospitalized. Interested in a new referral for pulmonary rehabilitation. They are pursuing transcranial magnetic stimulation for refractory depression. They postponed top surgery, though they are still hoping to have this done in 2023. They have a stationary bicycle that they will start using for exercise. In AM has issues with reflux and wheezing, and the albuterol helps. Also received new CPAP after the recall that they are trying to use regularly. Recall from prior visits that Killian likely has multifactorial dypspnea. They do have confirmed inducible laryngeal obstruction that leads to difficulty talking while walking, for example previously followed by SLP at [facility]. They find benefit from yawning (\"hot potato mouth\") exercises when an episode occurs. They note that emotional stress does also sometimes precipitate an episode and worsen breathing. They have no clear history of seasonal allergies or childhood asthma, though normal PFT does not rule out possible episodic bronchospasm; methacholine challenge could be considered at some point. Very subtle (questionable) cylindrical bronchiectasis in RML, subtle expiratory subsegmental " "air trapping, but this can also be seen in normal lungs. They have moderate ASHLYN with AHI 21 and received a new device after the recall.     Plan:  - previously wrote a letter clearing Killian for top surgery from a pulmonary standpoint; this has been rescheduled but happy to provide a letter in future when this is rescheduled  - continue to use yawning (\"hot potato mouth\") breaths to abort acute episodes of inducible laryngeal obstruction  - albuterol HFA as needed  - ENT visit pending  - enrollment in pulmonary rehabilitation at [facility]  - can consider methacholine challenge test in future if needed  - continue CPAP for ASHLYN  - annual influenza vaccination  - unclear pneumococcal vaccination status  - COVID-19 vaccination: Pfizer-BioNTech  - follow up in 3 months  - encouraged them to contact me with questions or concerning symptoms    Daniel Greer MD  Pulmonary and Critical Care Medicine  [facility]  Office [phone]  Pager [phone]  (he/him/his)    CCx: chronic exertional dyspnea, inducible laryngeal obstruction, obstructive sleep apnea    HPI: 67 year old remote smoker with a history of inducible laryngeal obstruction, moderate obstructive sleep apnea, GERD, iron deficiency anemia, osteoarthritis s/p right TKA, and chronic exertional dyspnea, presenting for follow-up. Canceled pulmonary rehab due to mental health problems, was hospitalized. Interested in a new referral for pulmonary rehabilitation. They are pursuing transcranial magnetic stimulation for refractory depression. They postponed top surgery, though they are still hoping to have this done in 2023. They have a stationary bicycle that they will start using for exercise. In AM has issues with reflux and wheezing, and the albuterol helps. Also received new CPAP after the recall that they are trying to use regularly. Recall from prior visits that Killian likely has multifactorial dypspnea. They do have confirmed inducible " "laryngeal obstruction that leads to difficulty talking while walking, for example previously followed by SLP at [facility]. They find benefit from yawning (\"hot potato mouth\") exercises when an episode occurs. They note that emotional stress does also sometimes precipitate an episode and worsen breathing. They have no clear history of seasonal allergies or childhood asthma, though normal PFT does not rule out possible episodic bronchospasm; methacholine challenge could be considered at some point. Very subtle (questionable) cylindrical bronchiectasis in RML, subtle expiratory subsegmental air trapping, but this can also be seen in normal lungs. They have moderate ASHLYN with AHI 21 and received a new device after the recall.    ROS:  A 12-system review was obtained and was negative with the exception of the symptoms endorsed in the history of present illness.    PMH:  remote smoker  inducible laryngeal obstruction  moderate obstructive sleep apnea with AHI 21; recalled device and ongoing concern regarding noise abatement material in the new device, so they are not on CPAP but having a mandibular advancement device made  GERD  BMI 34.9  iron deficiency anemia  osteoarthritis s/p right TKA  chronic exertional dyspnea    PSH:  Past Surgical History:   Procedure Laterality Date     COLONOSCOPY       EXCISE MASS VAGINA Left 4/10/2015    Procedure: EXCISE MASS VAGINA;  Surgeon: Stanislav Byers MD;  Location: [facility] OR     GENITOURINARY SURGERY      Urethrial dilation     JOINT REPLACEMENT Right 07/2018     ORTHOPEDIC SURGERY      right rotator cuff, left achilles tendon repair, neuroma removed right foot, right knee arthroscopy,        Allergies:  Allergies   Allergen Reactions     Penicillins Rash and Hives     Versed [Midazolam] Other (See Comments)     Stopped breathing  Over 10 years ago but possible sensitivity to too much of this medication  Became apnic       Family HX:  Family History   Problem Relation Age of Onset "     Macular Degeneration Mother      Osteoporosis Mother      Heart Disease Father 49     Multiple Sclerosis Sister      Diabetes Paternal Uncle         heart issues     Cancer No family hx of      Coronary Artery Disease No family hx of        Social Hx:  Social History     Socioeconomic History     Marital status: Single     Spouse name: Not on file     Number of children: Not on file     Years of education: Not on file     Highest education level: Not on file   Occupational History     Not on file   Tobacco Use     Smoking status: Former     Packs/day: 0.00     Types: Cigarettes     Quit date: 1983     Years since quittin.1     Smokeless tobacco: Never   Substance and Sexual Activity     Alcohol use: Yes     Comment: occasional     Drug use: Yes     Types: Marijuana     Comment: daily only at night time for medical conditions     Sexual activity: Not Currently   Other Topics Concern     Parent/sibling w/ CABG, MI or angioplasty before 65F 55M? Not Asked   Social History Narrative     Not on file     Social Determinants of Health     Financial Resource Strain: Not on file   Food Insecurity: Not on file   Transportation Needs: Not on file   Physical Activity: Not on file   Stress: Not on file   Social Connections: Not on file   Intimate Partner Violence: Not on file   Housing Stability: Not on file       Current Meds:  Current Outpatient Medications   Medication Sig Dispense Refill     acetaminophen (TYLENOL) 500 MG tablet Take 500-1,000 mg by mouth every 6 hours as needed for mild pain       albuterol (PROAIR HFA/PROVENTIL HFA/VENTOLIN HFA) 108 (90 Base) MCG/ACT inhaler Inhale 2 puffs into the lungs every 4 hours as needed       clonazePAM (KLONOPIN) 0.5 MG tablet Take 0.5 mg by mouth 2 times daily as needed       finasteride (PROSCAR) 5 MG tablet Take 5 mg by mouth daily       gabapentin (NEURONTIN) 300 MG capsule Take 3 capsules (900 mg) by mouth At Bedtime 270 capsule 0     omeprazole (PRILOSEC) 40 MG  DR capsule TAKE 1 CAPSULE BY MOUTH EVERY DAY BEFORE A MEAL       pramipexole (MIRAPEX) 0.25 MG tablet Take 3 tablets (0.75 mg) by mouth At Bedtime 90 tablet 3     testosterone cypionate (DEPOTESTOSTERONE) 200 MG/ML injection Inject 70 mg Subcutaneous once a week on Fridays       traZODone (DESYREL) 100 MG tablet Take 1 tablet (100 mg) by mouth At Bedtime 30 tablet 3     cholecalciferol (VITAMIN D3) 5000 units (125 mcg) capsule Take by mouth daily (Patient not taking: Reported on 10/14/2022)       hydrOXYzine (VISTARIL) 50 MG capsule Take 1 capsule (50 mg) by mouth 3 times daily as needed for anxiety (Patient not taking: No sig reported) 90 capsule 3     OLANZapine zydis (ZYPREXA) 5 MG ODT Take 1 tablet (5 mg) by mouth At Bedtime (Patient not taking: Reported on 10/14/2022) 30 tablet 0     Omega-3 Fatty Acids (OMEGA-3 FISH OIL) 1200 MG CAPS  (Patient not taking: No sig reported)         Physical Exam:  no exam due to virtual visit    Labs:  reviewed    Imaging studies:  High-resolution chest CT (June 2021):  - images directly reviewed, formal interpretation follows:  FINDINGS:   LUNGS AND PLEURA: There has been interval resolution of the mosaic attenuation previously seen. There is mild bilateral tubular bronchiectasis most severe in the right middle lobe. Stable calcified granuloma right upper lobe. Minimal linear atelectasis or scarring in the lingula. No evidence for underlying interstitial lung disease. There is subsegmental air trapping on expiratory images. There is a tiny right pleural effusion.     MEDIASTINUM/AXILLAE: No adenopathy. Atherosclerotic change of the aorta with borderline aneurysmal dilatation of the ascending thoracic aorta measuring 4 cm. Heart size is normal. No pericardial effusion.     CORONARY ARTERY CALCIFICATION: Mild.     UPPER ABDOMEN: Stable small low-attenuation lesion in the right lobe of the liver likely a cyst. Tiny nonobstructing right renal stones. Partial visualization of  presumed peripelvic cysts in the left kidney unchanged.     MUSCULOSKELETAL: There is degenerative change in the spine and both shoulders right greater than left.     IMPRESSION:   1.  Resolution of mosaic attenuation.   2.  Subsegmental air trapping remains visible on expiratory images.   3.  Bilateral bronchiectasis.   4.  Borderline aneurysmal dilatation of the ascending thoracic aorta measuring 4 cm.     Pulmonary Function Testing  June 2022:  FVC 2.35 (72%)  FEV1 1.97 (77%)  FEV1/FVC 0.84  No bronchodilator response  RV 1.93 (93%)  TLC 4.29 (81%)  DLCOc 106%  Flow-volume loop is normal with no evidence of upper airway obstruciton

## 2022-10-17 ENCOUNTER — VIRTUAL VISIT (OUTPATIENT)
Dept: BEHAVIORAL HEALTH | Facility: CLINIC | Age: 67
End: 2022-10-17
Payer: MEDICARE

## 2022-10-17 DIAGNOSIS — F43.10 PTSD (POST-TRAUMATIC STRESS DISORDER): Primary | ICD-10-CM

## 2022-10-17 DIAGNOSIS — F33.2 MAJOR DEPRESSIVE DISORDER, RECURRENT SEVERE WITHOUT PSYCHOTIC FEATURES (H): ICD-10-CM

## 2022-10-17 NOTE — PROGRESS NOTES
"      [facility]                                    Progress Note    Patient Name: Gregoria Stein  Date: 10.17.2022         Service Type: Individual      Session Start Time: 1445  Session End Time: 1540     Session Length: 55m    Session #: 002    Attendees: Client attended alone    Service Modality:  Phone Visit:      Provider verified identity through the following two step process.  Patient provided:  Patient is known previously to provider    The patient has been notified of the following:      \"We have found that certain health care needs can be provided without the need for a face to face visit.  This service lets us provide the care you need with a phone conversation.       I will have full access to your [facility] medical record during this entire phone call.   I will be taking notes for your medical record.      Since this is like an office visit, we will bill your insurance company for this service.       There are potential benefits and risks of telephone visits (e.g. limits to patient confidentiality) that differ from in-person visits.?Confidentiality still applies for telephone services, and nobody will record the visit.  It is important to be in a quiet, private space that is free of distractions (including cell phone or other devices) during the visit.??      If during the course of the call I believe a telephone visit is not appropriate, you will not be charged for this service\"     Consent has been obtained for this service by care team member: Yes     DATA  Interactive Complexity: No  Crisis: No        Progress Since Last Session (Related to Symptoms / Goals / Homework):   Symptoms: Worsening - Pt endorses sporadic intensification of depressive spectrum sx, specifically hopelessness in relation to long-term potential for change and current environmental stressors    Homework: Did not complete      Episode of Care Goals: Satisfactory progress - CONTEMPLATION " (Considering change and yet undecided); Intervened by assessing the negative and positive thinking (ambivalence) about behavior change     Current / Ongoing Stressors and Concerns:   PT referred for bridging services correlated with wait for next LOC. Chart review and initial referral suggest pt struggles with depressive spectrum sx up to and including SI w/ plan which recently necessitated ED admission.      Pt presents to session reporting increased frustration and depressive sx correlated with perceived environmental stressors and difficulty consistently connecting with supporting providers (e.g. county services). Demonstrates consistent negativity bias cognitively, and often focuses on perceived barriers to change. With prompting and therapeutic support, pt does identify protective factors and natural support systems, as well as potential benefit to implementation of previously effective coping strategies / grounding tools prior to her friend's planned arrival ~1630 today. Pt does verbally indicate planned safety and willingness to utilize crisis / emergency services as appropriate, while demonstrating present/future-oriented thinking by end of session.     Treatment Objective(s) Addressed in This Session:   increase length and frequency of contact with others    Increase interest, engagement, and pleasure in doing things  Identify negative self-talk and behaviors: challenge core beliefs, myths, and actions  Improve concentration, focus, and mindfulness in daily activities        Intervention:   Motivational Interviewing: Identified and processed pt's perceived barriers to change; reoriented pt to actionable strategies to moderate sx       ASSESSMENT: Current Emotional / Mental Status (status of significant symptoms):   Risk status (Self / Other harm or suicidal ideation)   Patient denies current fears or concerns for personal safety.   Patient reports the following current or recent suicidal ideation or  behaviors: Sporadic morbid thinking correlated with perceived hopelessness at times.   Patient denies current or recent homicidal ideation or behaviors.   Patient denies current or recent self injurious behavior or ideation.   Patient denies other safety concerns.   Patient reports there has been no change in risk factors since their last session.     Patient reports there has been no change in protective factors since their last session.     Recommended that patient call 911 or go to the local ED should there be a change in any of these risk factors.     Appearance:   Disheveled    Eye Contact:   Poor   Psychomotor Behavior: Normal    Attitude:   Cooperative    Orientation:   All   Speech    Rate / Production: Normal     Volume:  Normal    Mood:    Depressed    Affect:    Blunted    Thought Content:  Clear    Thought Form:  Coherent  Logical    Insight:    Fair      Medication Review:   No changes to current psychiatric medication(s)     Medication Compliance:   Yes     Changes in Health Issues:   None reported     Chemical Use Review:   Substance Use: Chemical use reviewed, no active concerns identified      Tobacco Use: No current tobacco use.      Diagnosis:  1. F43.12 - PTSD (post-traumatic stress disorder)    2. 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        ROBERT PARNELL [facility]                                                         ______________________________________________________________________    Individual Treatment Plan    Patient's Name: Gregoria Stein  YOB: 1955    Date of Creation: 10.17.2022  Date Treatment Plan Last Reviewed/Revised: 10.17.2022    DSM5 Diagnoses:   F43.12 - PTSD (post-traumatic stress disorder)    F33.2 - Major depressive disorder, recurrent  severe without psychotic features (H)      Psychosocial / Contextual Factors: Economic strain, social isolation, trauma hx, at-risk / marginalized population   PROMIS (reviewed every 90 days): pending    Referral / Collaboration:  In place prior to TC engagement.    Anticipated number of session for this episode of care: TBD pending waitlist  Anticipation frequency of session: Weekly  Anticipated Duration of each session: 53 or more minutes  Treatment plan will be reviewed in 90 days or when goals have been changed.       MeasurableTreatment Goal(s) related to diagnosis / functional impairment(s)  Goal 1: Patient will effectively utilize learned coping strategies to moderate anxious/depressive spectrum sx at 6/10 opportunities, and self-report improved subjective QoL.    Objective #A (Patient Action)    Patient will increase length and frequency of contact with others  .  Status: New - Date: 10.17.2022     Intervention(s)  Therapist will teach assertiveness skills.  .    Objective #B  Patient will use cognitive strategies identified in therapy to challenge anxious thoughts.  Status: New - Date: 10.17.2022     Intervention(s)  Therapist will teach emotional recognition/identification.  .    Objective #C  Patient will Identify negative self-talk and behaviors: challenge core beliefs, myths, and actions  Improve concentration, focus, and mindfulness in daily activities .  Status: New - Date: 10.17.2022     Intervention(s)  Therapist will teach emotional regulation skills.  .        Patient has reviewed and agreed to the above plan.      ROBERT PARNELL, [facility]  October 17, 2022

## 2022-10-24 ENCOUNTER — VIRTUAL VISIT (OUTPATIENT)
Dept: BEHAVIORAL HEALTH | Facility: CLINIC | Age: 67
End: 2022-10-24
Payer: MEDICARE

## 2022-10-24 ENCOUNTER — TELEPHONE (OUTPATIENT)
Dept: BEHAVIORAL HEALTH | Facility: CLINIC | Age: 67
End: 2022-10-24

## 2022-10-24 DIAGNOSIS — F43.10 PTSD (POST-TRAUMATIC STRESS DISORDER): Primary | ICD-10-CM

## 2022-10-24 NOTE — TELEPHONE ENCOUNTER
Writer spoke with patient on que and moved appointment to for today to 5 pm as patient had in person appointment at 2:30 pm.    Kyleigh Freed  10/24/22  [phone]

## 2022-10-24 NOTE — PROGRESS NOTES
[facility]                                    Progress Note    Patient Name: Gregoria Stein  Date: 10.24.2022         Service Type: Individual      Session Start Time: 1700  Session End Time: 1745     Session Length: 45m    Session #: 003    Attendees: Client attended alone    Service Modality:  Video Visit:      Provider verified identity through the following two step process.  Patient provided:  Patient is known previously to provider    Telemedicine Visit: The patient's condition can be safely assessed and treated via synchronous audio and visual telemedicine encounter.      Reason for Telemedicine Visit: Patient has requested telehealth visit and Patient unable to travel    Originating Site (Patient Location): Patient's home    Distant Site (Provider Location): [facility]: Transition Clinic    Consent:  The patient/guardian has verbally consented to: the potential risks and benefits of telemedicine (video visit) versus in person care; bill my insurance or make self-payment for services provided; and responsibility for payment of non-covered services.     Patient would like the video invitation sent by:  My Chart    Mode of Communication:  Video Conference via [employer]    Distant Location (Provider):  Off-site    As the provider I attest to compliance with applicable laws and regulations related to telemedicine.    DATA  Interactive Complexity: No  Crisis: No        Progress Since Last Session (Related to Symptoms / Goals / Homework):   Symptoms: Improving - Pt notes some improvements to overall insight and receptivity to therapeutic tools/strategies. Does endorse some increased hope for positive change.    Homework: Achieved / completed to satisfaction      Episode of Care Goals: Achieved / completed to satisfaction - ACTION (Actively working towards change); Intervened by reinforcing change plan / affirming steps taken     Current / Ongoing Stressors and  Concerns:   PT referred for bridging services correlated with wait for next LOC. Chart review and initial referral suggest pt struggles with depressive spectrum sx up to and including SI w/ plan which recently necessitated ED admission.      Pt presents to session reporting some reduction to perceived environmental stressors as they are securing economic/material assistance to reduce overall systemic restrictions on overall fx. Will be connecting with OP providers later this week, and has been attempting to more consistently apply previously effective coping strategies for mood-related and anxious spectrum sx. Does report some intrusive rumination at times, although they feel increasingly able to challenge and reframe these sx in the moment. Pt receptive to therapeutic concepts/tools presented in session.     Treatment Objective(s) Addressed in This Session:   use cognitive strategies identified in therapy to challenge anxious thoughts  Increase interest, engagement, and pleasure in doing things  Identify negative self-talk and behaviors: challenge core beliefs, myths, and actions  Improve concentration, focus, and mindfulness in daily activities        Intervention:   Solution Focused: Reinforced pt's efforts to consistently apply coping strategies, as well as periods of improved fx       ASSESSMENT: Current Emotional / Mental Status (status of significant symptoms):   Risk status (Self / Other harm or suicidal ideation)   Patient denies current fears or concerns for personal safety.   Patient denies current or recent suicidal ideation or behaviors.   Patient denies current or recent homicidal ideation or behaviors.   Patient denies current or recent self injurious behavior or ideation.   Patient denies other safety concerns.   Patient reports there has been no change in risk factors since their last session.     Patient reports there has been no change in protective factors since their last session.      Recommended that patient call 911 or go to the local ED should there be a change in any of these risk factors.     Appearance:   Appropriate    Eye Contact:   Fair    Psychomotor Behavior: Normal    Attitude:   Cooperative    Orientation:   All   Speech    Rate / Production: Normal     Volume:  Normal    Mood:    Anxious  Sad    Affect:    Appropriate    Thought Content:  Clear    Thought Form:  Coherent  Logical    Insight:    Good      Medication Review:   No changes to current psychiatric medication(s)     Medication Compliance:   Yes     Changes in Health Issues:   None reported     Chemical Use Review:   Substance Use: Chemical use reviewed, no active concerns identified      Tobacco Use: No current tobacco use.      Diagnosis:  No diagnosis found.    Collateral Reports Completed:   Not Applicable    PLAN: (Patient Tasks / Therapist Tasks / Other)  Pt to follow-up on assigned HW; continue TC engagement pending pt need.        ROBERT PARNELL, [facility]  10.26.2022

## 2022-10-27 ENCOUNTER — TELEPHONE (OUTPATIENT)
Dept: BEHAVIORAL HEALTH | Facility: CLINIC | Age: 67
End: 2022-10-27

## 2022-10-27 NOTE — TELEPHONE ENCOUNTER
Patient left a voicemail for TC at 10:58am.    Coordinator returned call to patient and left VM stating returning call and can call TC back, provided number.    Susan Gaona  Transition Clinic Coordinator  Date and Time: 10/27/22 12:45 PM

## 2022-10-28 ENCOUNTER — TELEPHONE (OUTPATIENT)
Dept: BEHAVIORAL HEALTH | Facility: CLINIC | Age: 67
End: 2022-10-28

## 2022-10-28 NOTE — TELEPHONE ENCOUNTER
This writer returned call to pt re: VM left on TC mailbox. No answer, LVM w/ TC phone number.     Fidel GALLO   10.28.2022  [phone]

## 2022-11-03 ENCOUNTER — TELEPHONE (OUTPATIENT)
Dept: BEHAVIORAL HEALTH | Facility: CLINIC | Age: 67
End: 2022-11-03

## 2022-11-03 NOTE — TELEPHONE ENCOUNTER
Patient LVM at [facility] has seen Jose Alberto for therapy and is wanting to get in as needs an appointment.     Coordinator returned call to patient. Patient stated need to see someone in interim while Jose Alberto is out as not doing well. Had a worker there with them during the call and had CM on other line. Patient scheduled next day appointment with Quentin SHELTON 11/4/2022.    Susan Gaona  Transition Clinic Coordinator  Date and Time: 11/03/22 10:24 AM

## 2022-11-04 ENCOUNTER — VIRTUAL VISIT (OUTPATIENT)
Dept: BEHAVIORAL HEALTH | Facility: CLINIC | Age: 67
End: 2022-11-04
Payer: MEDICARE

## 2022-11-04 DIAGNOSIS — F43.10 PTSD (POST-TRAUMATIC STRESS DISORDER): ICD-10-CM

## 2022-11-04 DIAGNOSIS — F33.2 MAJOR DEPRESSIVE DISORDER, RECURRENT SEVERE WITHOUT PSYCHOTIC FEATURES (H): Primary | ICD-10-CM

## 2022-11-04 NOTE — PROGRESS NOTES
[facility] Transition [facility]                                    Progress Note    Patient Name: Gregoria Stein  Date: 11/4/2022         Service Type: Individual      Session Start Time: 1132  Session End Time: 1222     Session Length: 50m    Session #: 004    Attendees: Client attended alone    Service Modality:  Video Visit:      Provider verified identity through the following two step process.  Patient provided:  Patient is known previously to provider    Telemedicine Visit: The patient's condition can be safely assessed and treated via synchronous audio and visual telemedicine encounter.      Reason for Telemedicine Visit: Patient has requested telehealth visit and Patient unable to travel    Originating Site (Patient Location): Patient's home    Distant Site (Provider Location): [facility]: Transition Clinic    Consent:  The patient/guardian has verbally consented to: the potential risks and benefits of telemedicine (video visit) versus in person care; bill my insurance or make self-payment for services provided; and responsibility for payment of non-covered services.     Patient would like the video invitation sent by:  My Chart    Mode of Communication:  Video Conference via [employer]    Distant Location (Provider):  Off-site    As the provider I attest to compliance with applicable laws and regulations related to telemedicine.    Psychotherapist Informed Consent:  This writer and patient contracted for therapy: discussed HIPAA, and the limits of confidentiality; mandated reporting, possibility of collaborative discussions with patients primary care provider and the multidisciplinary team in the  clinic during consultation. Disclosed therapist remote status. Discussed the no show policy the benefits and possible consequences of therapy.  Discussed preliminary goals for Transition Care.  Patient verbally indicated agreement to the informed consent and contract for  "therapy.      DATA  Interactive Complexity: No  Crisis: No        Progress Since Last Session (Related to Symptoms / Goals / Homework):   Symptoms: Improving - Pt notes some improvements to overall insight and receptivity to therapeutic tools/strategies. Does endorse some increased hope for positive change.    Homework: Achieved / completed to satisfaction      Episode of Care Goals: Achieved / completed to satisfaction - ACTION (Actively working towards change); Intervened by reinforcing change plan / affirming steps taken     Current / Ongoing Stressors and Concerns:   PT referred for bridging services correlated with wait for next LOC. Chart review and initial referral suggest pt struggles with depressive spectrum sx up to and including SI w/ plan which recently necessitated ED admission.     Today 11/4/2022   Patient contacted the Transition Clinic stating she was very very depressed and wanted to see a therapist. Her psychotherapist, Jose Alberto Conde is out of the clinic. This provider was asked to see the patient today to help her depression. Shaquille presented via [employer] video.  Patient shared her current situation, thoughts and emotions.  She states the coping strategies for mood-related symptoms and anxious spectrum sx have not helped.  Patient states she sees Jose Alberto every week. She states she is \"struggling with a lot of depression\".  Patient states nothing in particular has happened to increase depression.  She becomes afraid of her depressive symptoms. She has thoughts of not wanting to live but clearly states she had no intent or plan.        Treatment Objective(s) Addressed in This Session:   use cognitive strategies identified in therapy to challenge anxious thoughts  Increase interest, engagement, and pleasure in doing things  Identify negative self-talk and behaviors: challenge core beliefs, myths, and actions  Improve concentration, focus, and mindfulness in daily activities "        Intervention:   Solution Focused: Reinforced pt's efforts to consistently apply coping strategies, as well as periods of improved fx   Reviewed coping strategies patient has worked on in therapy.    Provided patient validation of her concerns and encouragement to continue using the skills learned in therapy that  have worked for her in the past.      ASSESSMENT: Current Emotional / Mental Status (status of significant symptoms):   Risk status (Self / Other harm or suicidal ideation)   Patient denies current fears or concerns for personal safety.   Patient denies current or recent suicidal ideation or behaviors.   Patient denies current or recent homicidal ideation or behaviors.   Patient denies current or recent self injurious behavior or ideation.   Patient denies other safety concerns.   Patient reports there has been no change in risk factors since their last session.     Patient reports there has been no change in protective factors since their last session.     Recommended that patient call 911 or go to the local ED should there be a change in any of these risk factors.     Appearance:   Appropriate    Eye Contact:   Fair    Psychomotor Behavior: Normal    Attitude:   Cooperative    Orientation:   All   Speech    Rate / Production: Normal     Volume:  Normal    Mood:    Anxious  Sad    Affect:    Appropriate    Thought Content:  Clear    Thought Form:  Coherent  Logical    Insight:    Good      Medication Review:   No changes to current psychiatric medication(s)     Medication Compliance:   Yes     Changes in Health Issues:   None reported     Chemical Use Review:   Substance Use: Chemical use reviewed, no active concerns identified      Tobacco Use: No current tobacco use.      Diagnosis:  No diagnosis found.    Collateral Reports Completed:   Not Applicable    PLAN: (Patient Tasks / Therapist Tasks / Other)  Pt to follow-up on assigned HW; continue TC engagement pending pt need.  Follow-up with Jose Alberto  [facility], Transition [facility]      Bouchra White, [facility]

## 2022-11-07 ENCOUNTER — OFFICE VISIT (OUTPATIENT)
Dept: OTOLARYNGOLOGY | Facility: CLINIC | Age: 67
End: 2022-11-07
Attending: INTERNAL MEDICINE
Payer: MEDICARE

## 2022-11-07 ENCOUNTER — PRE VISIT (OUTPATIENT)
Dept: OTOLARYNGOLOGY | Facility: CLINIC | Age: 67
End: 2022-11-07

## 2022-11-07 DIAGNOSIS — J38.3 PARADOXICAL VOCAL CORD MOTION: ICD-10-CM

## 2022-11-07 PROCEDURE — 92524 BEHAVRAL QUALIT ANALYS VOICE: CPT | Mod: GN | Performed by: SPEECH-LANGUAGE PATHOLOGIST

## 2022-11-07 PROCEDURE — 31579 LARYNGOSCOPY TELESCOPIC: CPT | Mod: 52 | Performed by: SPEECH-LANGUAGE PATHOLOGIST

## 2022-11-07 PROCEDURE — 92507 TX SP LANG VOICE COMM INDIV: CPT | Mod: GN | Performed by: SPEECH-LANGUAGE PATHOLOGIST

## 2022-11-07 NOTE — PROGRESS NOTES
Outpatient Speech Language Therapy Evaluation  PLAN OF TREATMENT FOR OUTPATIENT REHABILITATION  (COMPLETE FOR INITIAL CLAIMS ONLY)  Patient's Last Name, First Name, M.I.  YOB: 1955  Gregoria Stein (Killian)                           Provider's Name  Zeb Medina, SLP Medical Record No.  [id number]                               Onset Date:  11/07/22   Start of Care Date: 11/07/22     Type: Speech Language Therapy Medical Diagnosis: No primary diagnosis found.                        Therapy Diagnosis:  No primary diagnosis found.   Visits from SOC:  1   _________________________________________________________________________________  Plan of Treatment:   Speech therapy    DURATION/FREQUENCY OF TREATMENT  Six weekly, one-hour sessions, with two monthly one-hour follow-up sessions    PROGNOSIS  Excellent prognosis for voice improvement with speech therapy and regular practice of therapeutic activities.    BARRIERS TO LEARNING/TEACHING AND LEARNING NEEDS  None/Unremarkable    Goals:  Patient goal:   1. To breathe normally and comfortably in all situations    Short-term goal(s): Within the first 4 sessions,   Sarita will:  2. Report a week of typical activities, in which breathing problem does not exceed a level of 5 out of 10, 80% of the time    Long-term goal(s): In 3 months,   Sarita will:  3. Report a week of typical activities, in which breathing problem does not exceed a level of 3 out of 10, 80% of the time  _________________________________________________________________________________    I CERTIFY THE NEED FOR THESE SERVICES FURNISHED UNDER        THIS PLAN OF TREATMENT AND WHILE UNDER MY CARE     (Physician attestation of this document indicates review and certification of the therapy plan).     Certification date from: 11/07/22  Certification date to: 2/5/23    Referring Provider: Daniel Greer

## 2022-11-07 NOTE — LETTER
11/7/2022       RE: Gregoria Stein  [address]  [place] [address]     Dear Colleague,    Thank you for referring your patient, Gregoria Stein, to the [facility] VOICE CLINIC [place] at [facility]. Please see a copy of my visit note below.    [facility] VOICE [facility]  BREATHING DISORDER EVALUATION AND TREATMENT REPORT    Patient: Killian Stein  Date of Service: 11/7/2022    HISTORY OF PRESENT ILLNESS  Killian Stein was seen evaluation and treatment for Vocal Cord Dysfunction (VCD), also known as Paradoxical Vocal Fold Motion (PVFM), today.  Killian Stein was referred for this visit by Dr. Greer.    The patient reports a history of dyspnea on exertion that began gradually and without an obvious inciting event approximately 2 to 3 years ago.  Symptoms have been grossly stable over time.  The patient describes a sensation of tightness in their chest that begins within moments of initiating exertion, which ultimately progresses to a sensation of tightness in the throat, and occasional inspiratory stridor.  Symptoms are exacerbated when they talk during exertion.  They report a history of previous laryngeal exam that was suggestive of laryngeal hypersensitivity.  They underwent a course of therapy with Dara Knapp CCC-SLP, which they report was helpful for reducing symptoms, but this did not eliminate symptoms entirely.  The patient has undergone extensive work-up with pulmonary medicine.  Findings of normal pulmonary function tests and minimal findings on visualization have suggested that episodic inducible laryngeal obstruction is more likely to account for patient's symptoms then an underlying pulmonary process.    In addition to exertion, the patient reports that stress and/or anxiety can exacerbate breathing difficulties.    In addition to breathing concerns, the patient states that they often experience a slightly raspy  "voice quality, and the sensation of a lump in the throat.  This tends to worsen after doses of testosterone.  The patient indicates that they are interested in pursuing voice therapy to address this at some point in the future, but have a full plate due to other medical concerns at this time.    Regarding swallowing, the patient reports that they occasionally cough with thin liquids, but that they are usually able to avoid this if they are careful.  They reports that they have not had any pneumonias in the last 6 months and deny unintended recent weight loss.  They have a history of reflux, which is treated with omeprazole.  They deny frequent breakthrough symptoms.    Long-term goals for this patient include the ability to return to exertional activities that previously gave them a substantial pleasure, including biking, rockclimbing, and snowboarding.    Other medical history is significant for behavioral health concerns, for which they were hospitalized within the last 6 months.  The patient is under active care for this.    PATIENT REPORTED MEASURES:  Dyspnea Index Questionnaire:  No flowsheet data found.    Patient Specific Metrics:  No flowsheet data found.    PERCEPTUAL EVALUATION (CPT 30919)  At rest: normal    When asked to take a volitional \"deep\" breath:    Increased upper thoracic muscle recruitment    Clavicular elevation during inspiration    During exertion on treadmill, demonstrated:    a lack of abdominal or ribcage movement while running    elevated and tense shoulders    clavicular elevation for inspiration    reported tightness in chest within 4 minutes    Reported tightness in throat within 5 minutes    reported inability to inhale fully within 5 minutes    Ziggy stridor was appreciated.     Voice Quality    Mild to moderate and intermittent strain    Cough    Not observed    LARYNGEAL EXAMINATION (CPT 59936)  Endoscopic laryngeal exam while symptomatic: (exam performed by flexible endoscopy " "through the left nostril, following topical anesthesia with 3% lidocaine, 0.25% phenylephrine).  Verbal consent was obtained and witnessed prior to this procedure.     While symptomatic:    true paradoxical movement of the vocal folds during inspiration    Use of oral configurations (\"rescue breathing strategies\") were effective at promoting and maintaining a fully abducted vocal fold position.    After resolution of symptoms the larynx was fully evaluated for structure and function:    Essentially healthy laryngeal and vocal fold mucosa    No significant pooling of secretions, nor signs of thickened mucus    Proximal airway was widely patent with no visible obstruction    Vocal folds demonstrate normal mobility in adduction, abduction, lengthening and contracture. Exam is neurologically normal    THERAPEUTIC ACTIVITIES (CPT 53460)  Prior to eliciting symptoms on the treadmill and the laryngeal exam, Killian learned:    Techniques for oral configurations to use during inspiration, to provide better abduction and arrest inhalatory stridor; these included: inhaling through rounded lips; inhaling through the nose with closed lips; and short, repeated sniffs    Techniques for abdominal relaxation during inhalation, to allow for maximum diaphragmatic descent    Techniques for improved contraction of the external intercostals during inhalation, to allow for improved ribcage expansion    During the laryngeal exam, Killian learned:    Which techniques for oral configuration during inspiration provide the most open airway for Killian Stein; Killian Stein was most helped by inhalation through a straw followed by exhalation on \"SH\"    The improvement in glottic configuration by using abdominal breathing techniques    After the laryngeal exam, more in-depth training in optimal respiratory mechanics was initiated.  During this process, Killian learned:    To use abdominal relaxation and contraction of the external intercostals during inhalation, " to allow for maximum diaphragmatic descent    To maintain a high chest posture without shoulder or clavicular elevation during inhalation; Killian Stein became aware of Killian Stein's propensity to use clavicular muscles, which increases the propensity for paradoxical vocal fold motion; Killian Stein was able to reduce this propensity with practice today    To use oral configurations to improve the sensation of an open airway      IMPRESSIONS AND PLAN  Based on today s evaluation, laryngeal examination, and treatment,  Everardos dyspnea on exertion is  due to J38.3 (Vocal Cord Dysfunction) in conjunction with non-optimal respiratory mechanics.  With training of techniques for laryngeal respiratory mechanics, Killian Stein was able to reduce symptoms.  Killian Stein will continue practicing these techniques at home.  Killian Stein will return to clinic for speech therapy focusing on respiratory retraining. 3 sessions, 2 weeks apart will be scheduled.     GOALS  Patient goal: To understand the problem and fix it as much as possible.    Long-term goal:  Within two months, Killian will participate in an entire week of sport activities with no report of any difficulties breathing.      PRIMARY ICD-10 code: J38.3 (Vocal Cord Dysfunction)  SECONDARY ICD-10 code: R06.02 (Shortness of Breath on Exertion)    TOTAL SERVICE TIME: 75 minutes  EVALUATION OF VOICE AND RESONANCE: (77238): 30 minutes;   TREATMENT (41690): 30 minutes;   ENDOSCOPIC LARYNGEAL EXAMINATION (50857): 15 minutes  NO CHARGE FACILITY FEE (69100)    Zeb Medina, Ph.D., [facility], [place]-SLP  Speech-Language Pathologist-[facility]  [phone]  he/him/his    Portions of this documentation were written using dictation software, and I have edited to the best of my ability.                                                                                           Outpatient Speech Language Therapy Evaluation  PLAN OF TREATMENT FOR OUTPATIENT REHABILITATION  (COMPLETE FOR INITIAL CLAIMS  ONLY)  Patient's Last Name, First Name, M.I.  YOB: 1955  Gregoria Stein (Killian)                           Provider's Name  Zebbright Medina, SLP Medical Record No.  [id number]                               Onset Date:  11/07/22   Start of Care Date: 11/07/22     Type: Speech Language Therapy Medical Diagnosis: No primary diagnosis found.                        Therapy Diagnosis:  No primary diagnosis found.   Visits from SOC:  1   _________________________________________________________________________________  Plan of Treatment:   Speech therapy    DURATION/FREQUENCY OF TREATMENT  Six weekly, one-hour sessions, with two monthly one-hour follow-up sessions    PROGNOSIS  Excellent prognosis for voice improvement with speech therapy and regular practice of therapeutic activities.    BARRIERS TO LEARNING/TEACHING AND LEARNING NEEDS  None/Unremarkable    Goals:  Patient goal:   1. To breathe normally and comfortably in all situations    Short-term goal(s): Within the first 4 sessions,   Sarita will:  2. Report a week of typical activities, in which breathing problem does not exceed a level of 5 out of 10, 80% of the time    Long-term goal(s): In 3 months,   Sarita will:  3. Report a week of typical activities, in which breathing problem does not exceed a level of 3 out of 10, 80% of the time  _________________________________________________________________________________    I CERTIFY THE NEED FOR THESE SERVICES FURNISHED UNDER        THIS PLAN OF TREATMENT AND WHILE UNDER MY CARE     (Physician attestation of this document indicates review and certification of the therapy plan).     Certification date from: 11/07/22  Certification date to: 2/5/23    Referring Provider: Daniel Greer         Again, thank you for allowing me to participate in the care of your patient.      Sincerely,    Zeb Medina, SLP

## 2022-11-07 NOTE — PATIENT INSTRUCTIONS
"Home Practice Instructions:    For When you have the Noisy Breathing and difficulty inhaling:  Inhale through a straw or with \"straw lips\"   Then Exhale on a long \"shhhhhhhhhhhhh\"  Try not to hold your breath after the inhale  Focus on letting your belly muscles relax as you breathe in  It can also help to focus on the sound of the air passing your lips on the way in, and the sound of the \"shhh\" on the way out.  Do these exercises until your breathing feels comfortable  If you get \"stuck,\" exhale very forcefully on \"shhhhhhhhh\"      "

## 2022-11-07 NOTE — PROGRESS NOTES
[facility] VOICE [facility] VOICE [facility]  BREATHING DISORDER EVALUATION AND TREATMENT REPORT    Patient: Killian Stein  Date of Service: 11/7/2022    HISTORY OF PRESENT ILLNESS  Killian Stein was seen evaluation and treatment for Vocal Cord Dysfunction (VCD), also known as Paradoxical Vocal Fold Motion (PVFM), today.  Killian Stein was referred for this visit by Dr. Greer.    The patient reports a history of dyspnea on exertion that began gradually and without an obvious inciting event approximately 2 to 3 years ago.  Symptoms have been grossly stable over time.  The patient describes a sensation of tightness in their chest that begins within moments of initiating exertion, which ultimately progresses to a sensation of tightness in the throat, and occasional inspiratory stridor.  Symptoms are exacerbated when they talk during exertion.  They report a history of previous laryngeal exam that was suggestive of laryngeal hypersensitivity.  They underwent a course of therapy with Dara Knapp CCC-SLP, which they report was helpful for reducing symptoms, but this did not eliminate symptoms entirely.  The patient has undergone extensive work-up with pulmonary medicine.  Findings of normal pulmonary function tests and minimal findings on visualization have suggested that episodic inducible laryngeal obstruction is more likely to account for patient's symptoms then an underlying pulmonary process.    In addition to exertion, the patient reports that stress and/or anxiety can exacerbate breathing difficulties.    In addition to breathing concerns, the patient states that they often experience a slightly raspy voice quality, and the sensation of a lump in the throat.  This tends to worsen after doses of testosterone.  The patient indicates that they are interested in pursuing voice therapy to address this at some point in the future, but have a full plate due to other medical concerns at this time.    Regarding swallowing, the  "patient reports that they occasionally cough with thin liquids, but that they are usually able to avoid this if they are careful.  They reports that they have not had any pneumonias in the last 6 months and deny unintended recent weight loss.  They have a history of reflux, which is treated with omeprazole.  They deny frequent breakthrough symptoms.    Long-term goals for this patient include the ability to return to exertional activities that previously gave them a substantial pleasure, including biking, rockclimbing, and snowboarding.    Other medical history is significant for behavioral health concerns, for which they were hospitalized within the last 6 months.  The patient is under active care for this.    PATIENT REPORTED MEASURES:  Dyspnea Index Questionnaire:  No flowsheet data found.    Patient Specific Metrics:  No flowsheet data found.    PERCEPTUAL EVALUATION (CPT 53778)  At rest: normal    When asked to take a volitional \"deep\" breath:    Increased upper thoracic muscle recruitment    Clavicular elevation during inspiration    During exertion on treadmill, demonstrated:    a lack of abdominal or ribcage movement while running    elevated and tense shoulders    clavicular elevation for inspiration    reported tightness in chest within 4 minutes    Reported tightness in throat within 5 minutes    reported inability to inhale fully within 5 minutes    Ziggy stridor was appreciated.     Voice Quality    Mild to moderate and intermittent strain    Cough    Not observed    LARYNGEAL EXAMINATION (CPT 08097)  Endoscopic laryngeal exam while symptomatic: (exam performed by flexible endoscopy through the left nostril, following topical anesthesia with 3% lidocaine, 0.25% phenylephrine).  Verbal consent was obtained and witnessed prior to this procedure.     While symptomatic:    true paradoxical movement of the vocal folds during inspiration    Use of oral configurations (\"rescue breathing strategies\") were " "effective at promoting and maintaining a fully abducted vocal fold position.    After resolution of symptoms the larynx was fully evaluated for structure and function:    Essentially healthy laryngeal and vocal fold mucosa    No significant pooling of secretions, nor signs of thickened mucus    Proximal airway was widely patent with no visible obstruction    Vocal folds demonstrate normal mobility in adduction, abduction, lengthening and contracture. Exam is neurologically normal    THERAPEUTIC ACTIVITIES (CPT 12393)  Prior to eliciting symptoms on the treadmill and the laryngeal exam, Killian learned:    Techniques for oral configurations to use during inspiration, to provide better abduction and arrest inhalatory stridor; these included: inhaling through rounded lips; inhaling through the nose with closed lips; and short, repeated sniffs    Techniques for abdominal relaxation during inhalation, to allow for maximum diaphragmatic descent    Techniques for improved contraction of the external intercostals during inhalation, to allow for improved ribcage expansion    During the laryngeal exam, Killian learned:    Which techniques for oral configuration during inspiration provide the most open airway for Killian Riberaan; Killian Stein was most helped by inhalation through a straw followed by exhalation on \"SH\"    The improvement in glottic configuration by using abdominal breathing techniques    After the laryngeal exam, more in-depth training in optimal respiratory mechanics was initiated.  During this process, Killian learned:    To use abdominal relaxation and contraction of the external intercostals during inhalation, to allow for maximum diaphragmatic descent    To maintain a high chest posture without shoulder or clavicular elevation during inhalation; Killian Stein became aware of Killian Stein's propensity to use clavicular muscles, which increases the propensity for paradoxical vocal fold motion; Killian Stein was able to reduce this " propensity with practice today    To use oral configurations to improve the sensation of an open airway      IMPRESSIONS AND PLAN  Based on today s evaluation, laryngeal examination, and treatment,  Everardos dyspnea on exertion is  due to J38.3 (Vocal Cord Dysfunction) in conjunction with non-optimal respiratory mechanics.  With training of techniques for laryngeal respiratory mechanics, Killian Stein was able to reduce symptoms.  Killian Stein will continue practicing these techniques at home.  Killian Stein will return to clinic for speech therapy focusing on respiratory retraining. 3 sessions, 2 weeks apart will be scheduled.     GOALS  Patient goal: To understand the problem and fix it as much as possible.    Long-term goal:  Within two months, Killian will participate in an entire week of sport activities with no report of any difficulties breathing.      PRIMARY ICD-10 code: J38.3 (Vocal Cord Dysfunction)  SECONDARY ICD-10 code: R06.02 (Shortness of Breath on Exertion)    TOTAL SERVICE TIME: 75 minutes  EVALUATION OF VOICE AND RESONANCE: (20751): 30 minutes;   TREATMENT (26370): 30 minutes;   ENDOSCOPIC LARYNGEAL EXAMINATION (96118): 15 minutes  NO CHARGE FACILITY FEE (52088)    Zeb Medina, Ph.D., [facility]-SLP  Speech-Language Pathologist-[facility]  [phone]  he/him/his    Portions of this documentation were written using dictation software, and I have edited to the best of my ability.

## 2022-11-08 ENCOUNTER — TELEPHONE (OUTPATIENT)
Dept: PLASTIC SURGERY | Facility: CLINIC | Age: 67
End: 2022-11-08

## 2022-11-08 NOTE — TELEPHONE ENCOUNTER
Killian called to see if he could schedule surgery. This writer called back and left a message stating Dr. Benavides wants to see a letter of support from new therapist and/or psychiatrist to verify stability and treatment before moving forward with scheduling again.

## 2022-11-09 NOTE — TELEPHONE ENCOUNTER
Killian returned the call and reportedly talked with the new therapist, Kevin Bolanos, about the need for a new letter of support. Killian will ask for a letter in two weeks or message him to request a letter to get a new surgery date.     This writer called back to clarify this letter should address plan for support leading up to and after surgery, as the surgeon wants to make sure a solid plan is in place for Killian to be well leading up to surgery and in recovery.

## 2022-11-14 ENCOUNTER — VIRTUAL VISIT (OUTPATIENT)
Dept: BEHAVIORAL HEALTH | Facility: CLINIC | Age: 67
End: 2022-11-14
Payer: MEDICARE

## 2022-11-14 DIAGNOSIS — F43.10 PTSD (POST-TRAUMATIC STRESS DISORDER): Primary | ICD-10-CM

## 2022-11-14 NOTE — TELEPHONE ENCOUNTER
Killian called back to discuss scheduling surgery. This writer reminded them of the plan to get established with the therapist and psychiatrist prior to scheduling a date.     Killian will have therapy next Tuesday to talk about getting a new letter of support that addresses the level of support Killian plans to have leading up to and following surgery.

## 2022-11-14 NOTE — PROGRESS NOTES
[facility]                                    Progress Note    Patient Name: Gregoria Stein  Date: 11.14.2022         Service Type: Individual      Session Start Time: 1500  Session End Time: 1539     Session Length: 39m    Session #: 005    Attendees: Client attended alone    Service Modality:  Video Visit:      Provider verified identity through the following two step process.  Patient provided:  Patient is known previously to provider    Telemedicine Visit: The patient's condition can be safely assessed and treated via synchronous audio and visual telemedicine encounter.      Reason for Telemedicine Visit: Patient has requested telehealth visit    Originating Site (Patient Location): Patient's home    Distant Site (Provider Location): [facility]: Transition Clinic    Consent:  The patient/guardian has verbally consented to: the potential risks and benefits of telemedicine (video visit) versus in person care; bill my insurance or make self-payment for services provided; and responsibility for payment of non-covered services.     Patient would like the video invitation sent by:  My Chart    Mode of Communication:  Video Conference via [employer]    Distant Location (Provider):  Off-site    As the provider I attest to compliance with applicable laws and regulations related to telemedicine.    DATA  Interactive Complexity: No  Crisis: No        Progress Since Last Session (Related to Symptoms / Goals / Homework):   Symptoms: Improving - Pt notes increased goal-directed activity and more consistent use of effective coping strategies    Homework: Achieved / completed to satisfaction      Episode of Care Goals: Achieved / completed to satisfaction - ACTION (Actively working towards change); Intervened by reinforcing change plan / affirming steps taken     Current / Ongoing Stressors and Concerns:   PT referred for bridging services correlated with wait for next LOC. Chart  review and initial referral suggest pt struggles with depressive spectrum sx up to and including SI w/ plan which recently necessitated ED admission.      Pt presents to session reporting some meaningful reduction of environmental stressors, citing effective utilization of supporting services via county / CADI waiver. Does express some frustration with process of finding a long-term provider, and is considering resuming services with previous clinician. States that they feel as though it can be possible to move past previous negative experiences with this clinician, and are uncertain at times if they can effectively express these feelings to adequately advocate for own wants / needs. PT receptive to therapeutic tools and concepts presented in session.     Treatment Objective(s) Addressed in This Session:   use cognitive strategies identified in therapy to challenge anxious thoughts  Increase interest, engagement, and pleasure in doing things  Decrease frequency and intensity of feeling down, depressed, hopeless  Improve concentration, focus, and mindfulness in daily activities        Intervention:   Solution Focused: Reinforced pt's independent use of previously effective coping strategies and ongoing initiative to advocate for effective tx which meets own wants / needs       ASSESSMENT: Current Emotional / Mental Status (status of significant symptoms):   Risk status (Self / Other harm or suicidal ideation)   Patient denies current fears or concerns for personal safety.   Patient denies current or recent suicidal ideation or behaviors.   Patient denies current or recent homicidal ideation or behaviors.   Patient denies current or recent self injurious behavior or ideation.   Patient denies other safety concerns.   Patient reports there has been no change in risk factors since their last session.     Patient reports there has been no change in protective factors since their last session.     Recommended that patient  call 911 or go to the local ED should there be a change in any of these risk factors.     Appearance:   Appropriate    Eye Contact:   Good    Psychomotor Behavior: Normal    Attitude:   Cooperative    Orientation:   All   Speech    Rate / Production: Normal     Volume:  Normal    Mood:    Normal   Affect:    Appropriate    Thought Content:  Clear    Thought Form:  Coherent  Logical    Insight:    Good      Medication Review:   No changes to current psychiatric medication(s)     Medication Compliance:   Yes     Changes in Health Issues:   None reported     Chemical Use Review:   Substance Use: Chemical use reviewed, no active concerns identified      Tobacco Use: No current tobacco use.      Diagnosis:  1. F43.12 - PTSD (post-traumatic stress disorder)        Collateral Reports Completed:   Not Applicable    PLAN: (Patient Tasks / Therapist Tasks / Other)  Pt to follow-up on assigned HW; continue TC engagement pending pt need.        ROBERT PARNELL, [facility]  11.14.2022

## 2022-11-18 ENCOUNTER — DOCUMENTATION ONLY (OUTPATIENT)
Dept: PLASTIC SURGERY | Facility: CLINIC | Age: 67
End: 2022-11-18

## 2022-11-18 ENCOUNTER — TELEPHONE (OUTPATIENT)
Dept: PLASTIC SURGERY | Facility: CLINIC | Age: 67
End: 2022-11-18

## 2022-11-18 NOTE — TELEPHONE ENCOUNTER
Killian has a CADI waiver and will be able to go to a TCU after surgery.     They confirmed they are receiving more support at home and have found more stability in their life recently.     They're working with a psychiatrist and therapist and called to confirm what new information we need to have in an updated letter to schedule surgery.     This writer sent a message to Dr. Benavides asking to confirm information needed. Will send a consent form to Killian via mail to allow communication with this office, their psychiatrist, therapist, and  to confirm plan for support and coordinate care.

## 2022-11-19 ENCOUNTER — HEALTH MAINTENANCE LETTER (OUTPATIENT)
Age: 67
End: 2022-11-19

## 2022-11-21 ENCOUNTER — VIRTUAL VISIT (OUTPATIENT)
Dept: OTOLARYNGOLOGY | Facility: CLINIC | Age: 67
End: 2022-11-21
Payer: MEDICARE

## 2022-11-21 DIAGNOSIS — J38.3 PARADOXICAL VOCAL CORD MOTION: Primary | ICD-10-CM

## 2022-11-21 PROCEDURE — 92507 TX SP LANG VOICE COMM INDIV: CPT | Mod: GN | Performed by: SPEECH-LANGUAGE PATHOLOGIST

## 2022-11-21 NOTE — LETTER
11/21/2022       RE: Gregoria Stein  [address]  [place] [address]     Dear Colleague,    Thank you for referring your patient, Gregoria Stein, to the [facility] VOICE CLINIC [place] [facility]. Please see a copy of my visit note below.    Killian Stein is a 67 year old adult who is being seen via a billable video visit.      The patient has been notified and verbally consented to the following:     This video visit will be conducted between you and your provider.    Patient has opted to conduct today's video visit vs an in-person appointment, and is not able to attend due to possible exposure to COVID-19.      If during the course of the call the provider feels a video visit is not appropriate, you will not be charged for this service.    Call initiated at: 3:15pm  Type of Video Platform Used: [employer]  Location of provider: [facility]  Location of patient: Residence      THERAPY NOTE (CPT 47432)  Date of Service: 11/21/2022    Sarita was seent today, 11/21/2022, for speech therapy session number 1 for treatment of dyspnea on exertion related to paradoxical vocal fold motion disorder.    SUBJECTIVE / OBJECTIVE:  Since our most-recent session, Sarita reports that they have been using rescue breathing strategies learned during our initial evaluation, and have been able to reduce the sensation of shortness of breath when doing so.  In addition, they feel that part of the improvement may be due to the colder, less humid air that has accompanied the recent seasonal change.    THERAPEUTIC ACTIVITIES  Counseling and Education    Asked many questions about the nature of Killian Stein's symptoms, and I answered all of these thoroughly.  THERAPEUTIC ACTIVITIES (CPT 42764)  In today's session, Killian learned:    Techniques for oral configurations to use during inspiration, to provide better abduction and arrest inhalatory stridor;  "these included: inhaling through rounded lips and a straw, followed by exhalation on \"SH\".    Techniques for abdominal relaxation during inhalation, to allow for maximum diaphragmatic descent  o In particular, that he benefited from cues to allow stomach muscles to release during inhalation.  This was associated with biofeedback of stomach contacting upper thighs when bent forward on a stationary bicycle.    To concentrate on respiratory timing to ensure adequate inhalation and exhalation  o They learned to alternate between a combination of 2 pedal strokes of inhale, followed by 2-4 abdominals of exhale and or counting, depending upon level of exertion.    To use a mental checklist for self-monitoring Killian Stein's posture, muscle use, and breathing technique      With implementation of the above techniques, the patient was able to avoid inspiratory stridor and sensation of shortness of breath during exertion on their stationary bicycle for up to 2 minutes at a time, for a total of approximately 12 minutes of exercise during today's session.    Concepts of an optimal regimen for practice were instructed.  o Killian Stein should use an interval schedule of practice, with brief periods of practice frequently throughout each day  o [place] concepts of volitional practice to facilitate motor learning.    ASSESSMENT/PLAN  PROGRESS TOWARD LONG TERM GOALS:   Adequate progress; please see above    IMPRESSIONS: Dyspnea On Exertion (R06.09) in the context of Vocal Cord Dysfunction (VCD)/Paradoxical Vocal Fold Motion (PVFM) (J38.3).  The patient demonstrated excellent maintenance of previously learned rescue breathing strategies, and was able to apply these to moderate levels of physical exertion when provided with moderate cues in today's therapy session.  Ongoing skilled intervention is required in order to advance them toward the long-term treatment goals.    PLAN: I will see   Sarita in 2 weeks, at which time we will meet in " person and continue to work toward the plan of care.     Zeb Medina, Ph.D., [facility]-SLP  Speech-Language Pathologist-[facility]  [phone]  he/him/his    Speech recognition software may have been used in the above documentation; input is reviewed before signature to the best of my ability.

## 2022-11-21 NOTE — PROGRESS NOTES
"Killian Stein is a 67 year old adult who is being seen via a billable video visit.      The patient has been notified and verbally consented to the following:     This video visit will be conducted between you and your provider.    Patient has opted to conduct today's video visit vs an in-person appointment, and is not able to attend due to possible exposure to COVID-19.      If during the course of the call the provider feels a video visit is not appropriate, you will not be charged for this service.    Call initiated at: 3:15pm  Type of Video Platform Used: [employer]  Location of provider: [facility]  Location of patient: Residence      THERAPY NOTE (CPT 09651)  Date of Service: 11/21/2022    Sarita was seent today, 11/21/2022, for speech therapy session number 1 for treatment of dyspnea on exertion related to paradoxical vocal fold motion disorder.    SUBJECTIVE / OBJECTIVE:  Since our most-recent session, Sarita reports that they have been using rescue breathing strategies learned during our initial evaluation, and have been able to reduce the sensation of shortness of breath when doing so.  In addition, they feel that part of the improvement may be due to the colder, less humid air that has accompanied the recent seasonal change.    THERAPEUTIC ACTIVITIES  Counseling and Education    Asked many questions about the nature of Killian Stein's symptoms, and I answered all of these thoroughly.  THERAPEUTIC ACTIVITIES (CPT 57246)  In today's session, Killian learned:    Techniques for oral configurations to use during inspiration, to provide better abduction and arrest inhalatory stridor; these included: inhaling through rounded lips and a straw, followed by exhalation on \"SH\".    Techniques for abdominal relaxation during inhalation, to allow for maximum diaphragmatic descent  o In particular, that he benefited from cues to allow stomach muscles to release during inhalation.  This was associated with " biofeedback of stomach contacting upper thighs when bent forward on a stationary bicycle.    To concentrate on respiratory timing to ensure adequate inhalation and exhalation  o They learned to alternate between a combination of 2 pedal strokes of inhale, followed by 2-4 abdominals of exhale and or counting, depending upon level of exertion.    To use a mental checklist for self-monitoring Killian Stein's posture, muscle use, and breathing technique      With implementation of the above techniques, the patient was able to avoid inspiratory stridor and sensation of shortness of breath during exertion on their stationary bicycle for up to 2 minutes at a time, for a total of approximately 12 minutes of exercise during today's session.    Concepts of an optimal regimen for practice were instructed.  o Killian Stein should use an interval schedule of practice, with brief periods of practice frequently throughout each day  o [place] concepts of volitional practice to facilitate motor learning.    ASSESSMENT/PLAN  PROGRESS TOWARD LONG TERM GOALS:   Adequate progress; please see above    IMPRESSIONS: Dyspnea On Exertion (R06.09) in the context of Vocal Cord Dysfunction (VCD)/Paradoxical Vocal Fold Motion (PVFM) (J38.3).  The patient demonstrated excellent maintenance of previously learned rescue breathing strategies, and was able to apply these to moderate levels of physical exertion when provided with moderate cues in today's therapy session.  Ongoing skilled intervention is required in order to advance them toward the long-term treatment goals.    PLAN: I will see   Sarita in 2 weeks, at which time we will meet in person and continue to work toward the plan of care.     Zeb Medina, Ph.D., [facility]-SLP  Speech-Language Pathologist-[facility]  [phone]  he/him/his    Speech recognition software may have been used in the above documentation; input is reviewed before signature to the best of my ability.

## 2022-11-28 ENCOUNTER — VIRTUAL VISIT (OUTPATIENT)
Dept: BEHAVIORAL HEALTH | Facility: CLINIC | Age: 67
End: 2022-11-28
Payer: MEDICARE

## 2022-11-28 DIAGNOSIS — F43.10 PTSD (POST-TRAUMATIC STRESS DISORDER): Primary | ICD-10-CM

## 2022-11-28 DIAGNOSIS — F33.2 MAJOR DEPRESSIVE DISORDER, RECURRENT SEVERE WITHOUT PSYCHOTIC FEATURES (H): ICD-10-CM

## 2022-11-28 NOTE — PROGRESS NOTES
[facility]                                    Progress Note    Patient Name: Gregoria Stein  Date: 11.28.2022         Service Type: Individual      Session Start Time: 1431  Session End Time: 1517     Session Length: 46m    Session #: 006    Attendees: Client attended alone    Service Modality:  Video Visit:      Provider verified identity through the following two step process.  Patient provided:  Patient is known previously to provider    Telemedicine Visit: The patient's condition can be safely assessed and treated via synchronous audio and visual telemedicine encounter.      Reason for Telemedicine Visit: Patient has requested telehealth visit    Originating Site (Patient Location): Patient's home    Distant Site (Provider Location): [facility]: [facility]    Consent:  The patient/guardian has verbally consented to: the potential risks and benefits of telemedicine (video visit) versus in person care; bill my insurance or make self-payment for services provided; and responsibility for payment of non-covered services.     Patient would like the video invitation sent by:  My Chart    Mode of Communication:  Video Conference via Amwell    Distant Location (Provider):  Off-site    As the provider I attest to compliance with applicable laws and regulations related to telemedicine.    DATA  Interactive Complexity: No  Crisis: No        Progress Since Last Session (Related to Symptoms / Goals / Homework):   Symptoms: Worsening - Pt reports increased baseline frustration correlated with service acquisition and tx process when contrasted with previous session    Homework: Partially completed      Episode of Care Goals: Satisfactory progress - ACTION (Actively working towards change); Intervened by reinforcing change plan / affirming steps taken     Current / Ongoing Stressors and Concerns:   PT referred for bridging services correlated with wait for next LOC. Chart  review and initial referral suggest pt struggles with depressive spectrum sx up to and including SI w/ plan which recently necessitated ED admission.      Pt presents to session reporting increased perceived environmental stressor severity, specifically in re: navigating mental health and gender healthcare providers. Indicates they don't feel an effective connection with one clinician working specifically with gender issues, but have not yet expressed their desire to cease engagement with that provider. Demonstrates some patterns of catastrophization at times, but was appropriately receptive to reorientation and therapeutic concepts presented.     Treatment Objective(s) Addressed in This Session:   use cognitive strategies identified in therapy to challenge anxious thoughts  Increase interest, engagement, and pleasure in doing things  Identify negative self-talk and behaviors: challenge core beliefs, myths, and actions  Improve concentration, focus, and mindfulness in daily activities        Intervention:   DBT: Reoriented pt to distress tolerance skills       ASSESSMENT: Current Emotional / Mental Status (status of significant symptoms):   Risk status (Self / Other harm or suicidal ideation)   Patient denies current fears or concerns for personal safety.   Patient denies current or recent suicidal ideation or behaviors.   Patient denies current or recent homicidal ideation or behaviors.   Patient denies current or recent self injurious behavior or ideation.   Patient denies other safety concerns.   Patient reports there has been no change in risk factors since their last session.     Patient reports there has been no change in protective factors since their last session.     Recommended that patient call 911 or go to the local ED should there be a change in any of these risk factors.     Appearance:   Appropriate    Eye Contact:   Poor   Psychomotor Behavior: Normal    Attitude:   Cooperative     Orientation:   All   Speech    Rate / Production: Normal     Volume:  Normal    Mood:    Anxious  Depressed  Irritable    Affect:    Appropriate    Thought Content:  Clear    Thought Form:  Coherent  Logical    Insight:    Good      Medication Review:   No changes to current psychiatric medication(s)     Medication Compliance:   Yes     Changes in Health Issues:   None reported     Chemical Use Review:   Substance Use: Chemical use reviewed, no active concerns identified      Tobacco Use: No current tobacco use.      Diagnosis:  1. F43.10 - PTSD (post-traumatic stress disorder)    2. 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        ROBERT PARNELL, [facility]  11.28.2022

## 2022-12-05 ENCOUNTER — OFFICE VISIT (OUTPATIENT)
Dept: OTOLARYNGOLOGY | Facility: CLINIC | Age: 67
End: 2022-12-05
Payer: MEDICARE

## 2022-12-05 ENCOUNTER — HOSPITAL ENCOUNTER (OUTPATIENT)
Dept: CARDIAC REHAB | Facility: HOSPITAL | Age: 67
Discharge: HOME | End: 2022-12-05
Attending: INTERNAL MEDICINE
Payer: MEDICARE

## 2022-12-05 DIAGNOSIS — J38.3 PARADOXICAL VOCAL CORD MOTION: Primary | ICD-10-CM

## 2022-12-05 DIAGNOSIS — R06.09 DYSPNEA ON EXERTION: ICD-10-CM

## 2022-12-05 DIAGNOSIS — J38.3 PARADOXICAL VOCAL CORD MOTION: ICD-10-CM

## 2022-12-05 DIAGNOSIS — J98.4 RESTRICTIVE LUNG DISEASE: ICD-10-CM

## 2022-12-05 PROCEDURE — 92507 TX SP LANG VOICE COMM INDIV: CPT | Mod: GN | Performed by: SPEECH-LANGUAGE PATHOLOGIST

## 2022-12-05 PROCEDURE — G0238 OTH RESP PROC, INDIV: HCPCS

## 2022-12-05 NOTE — PROGRESS NOTES
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      SUBJECTIVE / OBJECTIVE:  Since our most-recent session,   Sarita reports that they have been doing well. They have been going for walks of around 45 minutes and up to 2.5 miles. They report that they have been implementing rescue breathing strategies during these walks, and have not had any episodes of inspiratory stridor    THERAPEUTIC ACTIVITIES  Counseling and Education    Asked many questions about the nature of Killian Stein's symptoms, and I answered all of these thoroughly.  THERAPEUTIC ACTIVITIES (CPT 66097)  Killian learned:    Techniques for oral configurations to use during inspiration, to provide better abduction and arrest inhalatory stridor; these included: inhaling through rounded lips; inhaling through the nose with closed lips; and short, repeated sniffs  o Cues to increase loudness of both inhalation and exhalation were helpful    Techniques for abdominal relaxation during inhalation, to allow for maximum diaphragmatic descent  o Awareness of passive recoil was trained  - Abdominal contraction during max exhale followed by     Techniques for improved contraction of the external intercostals during inhalation, to allow for improved ribcage expansion    To concentrate on respiratory timing to ensure adequate inhalation and exhalation    To use a mental checklist for self-monitoring Killina Stein's posture, muscle use, and breathing technique    The learned techniques were then put into practice, returning to the treadmill.      Intensity gradually increased from walking at 1.9 mph and 0% incline to 2.5mph at 6% incline for up to 10 minutes at a time for a total of 26 minutes. No instances of stridor or difficulty inhaling occurred, although Killian reported that they felt winded due to recent  deconditioning.     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  Killian demonstrates excellent progress toward exerting themselves without inspiratory stridor or sensation of breathing restriction. Cues to increase loudness of rescue breathing exercises (more turbulent airflow at level of lips) was the most facilitating in today's session.    PLAN: I will see   Sarita in 2 weeks, at which time we will continue to work toward the plan of care.     Zeb Medina, Ph.D., [facility]-SLP  Speech-Language Pathologist-[facility]  [phone]  he/him/his    Speech recognition software may have been used in the above documentation; input is reviewed before signature to the best of my ability.

## 2022-12-05 NOTE — LETTER
12/5/2022       RE: Gregoria Stein  [address]  [place] [address]     Dear Colleague,    Thank you for referring your patient, Gregoria Stein, to the [facility] [place] at [facility]. Please see a copy of my visit note below.    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      SUBJECTIVE / OBJECTIVE:  Since our most-recent session,   Sarita reports that they have been doing well. They have been going for walks of around 45 minutes and up to 2.5 miles. They report that they have been implementing rescue breathing strategies during these walks, and have not had any episodes of inspiratory stridor    THERAPEUTIC ACTIVITIES  Counseling and Education    Asked many questions about the nature of Killian Stein's symptoms, and I answered all of these thoroughly.  THERAPEUTIC ACTIVITIES (CPT 83165)  Killian learned:    Techniques for oral configurations to use during inspiration, to provide better abduction and arrest inhalatory stridor; these included: inhaling through rounded lips; inhaling through the nose with closed lips; and short, repeated sniffs  o Cues to increase loudness of both inhalation and exhalation were helpful    Techniques for abdominal relaxation during inhalation, to allow for maximum diaphragmatic descent  o Awareness of passive recoil was trained  - Abdominal contraction during max exhale followed by     Techniques for improved contraction of the external intercostals during inhalation, to allow for improved ribcage expansion    To concentrate on respiratory timing to ensure adequate inhalation and exhalation    To use a mental checklist for self-monitoring Killian Stein's posture, muscle use, and breathing technique    The learned techniques were then put  into practice, returning to the treadmill.      Intensity gradually increased from walking at 1.9 mph and 0% incline to 2.5mph at 6% incline for up to 10 minutes at a time for a total of 26 minutes. No instances of stridor or difficulty inhaling occurred, although Killian reported that they felt winded due to recent deconditioning.     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  Killian demonstrates excellent progress toward exerting themselves without inspiratory stridor or sensation of breathing restriction. Cues to increase loudness of rescue breathing exercises (more turbulent airflow at level of lips) was the most facilitating in today's session.    PLAN: I will see   Sarita in 2 weeks, at which time we will continue to work toward the plan of care.     Zeb Medina, Ph.D., [facility]-SLP  Speech-Language Pathologist-[facility]  [phone]  he/him/his    Speech recognition software may have been used in the above documentation; input is reviewed before signature to the best of my ability.

## 2022-12-08 ENCOUNTER — HOSPITAL ENCOUNTER (OUTPATIENT)
Dept: CARDIAC REHAB | Facility: HOSPITAL | Age: 67
Discharge: HOME | End: 2022-12-08
Attending: INTERNAL MEDICINE
Payer: MEDICARE

## 2022-12-08 PROCEDURE — G0238 OTH RESP PROC, INDIV: HCPCS

## 2022-12-13 ENCOUNTER — VIRTUAL VISIT (OUTPATIENT)
Dept: BEHAVIORAL HEALTH | Facility: CLINIC | Age: 67
End: 2022-12-13
Payer: MEDICARE

## 2022-12-13 ENCOUNTER — HOSPITAL ENCOUNTER (OUTPATIENT)
Dept: CARDIAC REHAB | Facility: HOSPITAL | Age: 67
Discharge: HOME | End: 2022-12-13
Attending: INTERNAL MEDICINE
Payer: MEDICARE

## 2022-12-13 ENCOUNTER — TRANSFERRED RECORDS (OUTPATIENT)
Dept: HEALTH INFORMATION MANAGEMENT | Facility: CLINIC | Age: 67
End: 2022-12-13

## 2022-12-13 DIAGNOSIS — F43.10 PTSD (POST-TRAUMATIC STRESS DISORDER): Primary | ICD-10-CM

## 2022-12-13 PROCEDURE — G0239 OTH RESP PROC, GROUP: HCPCS

## 2022-12-13 NOTE — PROGRESS NOTES
[facility]                                    Progress Note    Patient Name: Gregoria Stein  Date: 12.13.2022         Service Type: Individual      Session Start Time: 1425  Session End Time: 1323     Session Length: 58m    Session #: 007    Attendees: Client attended alone    Service Modality:  Video Visit:      Provider verified identity through the following two step process.  Patient provided:  Patient is known previously to provider    Telemedicine Visit: The patient's condition can be safely assessed and treated via synchronous audio and visual telemedicine encounter.      Reason for Telemedicine Visit: Patient has requested telehealth visit    Originating Site (Patient Location): Patient's home    Distant Site (Provider Location): [facility]: [facility]    Consent:  The patient/guardian has verbally consented to: the potential risks and benefits of telemedicine (video visit) versus in person care; bill my insurance or make self-payment for services provided; and responsibility for payment of non-covered services.     Patient would like the video invitation sent by:  My Chart    Mode of Communication:  Video Conference via Amwell    Distant Location (Provider):  Off-site    As the provider I attest to compliance with applicable laws and regulations related to telemedicine.    DATA  Interactive Complexity: No  Crisis: No        Progress Since Last Session (Related to Symptoms / Goals / Homework):   Symptoms: Improving - Pt reports marked improvements to mood and stress / distress tolerance when contrasted with revious session    Homework: Achieved / completed to satisfaction      Episode of Care Goals: Achieved / completed to satisfaction - ACTION (Actively working towards change); Intervened by reinforcing change plan / affirming steps taken     Current / Ongoing Stressors and Concerns:   PT referred for bridging services correlated with wait for next LOC.  Chart review and initial referral suggest pt struggles with depressive spectrum sx up to and including SI w/ plan which recently necessitated ED admission.      Pt presents to session reporting marked reduction of perceived environmental stressor severity as more effective connection with available supports is established. Continues to endorse frustration with perceived moving goalposts in re: surgery, but was receptive to processing and problemsolving these feelings and identifying potential means of navigating related processes. Plans to resume counseling with previous provider 12.15.2022; pt receptive to therapeutic tools and concepts presented.     Treatment Objective(s) Addressed in This Session:   use cognitive strategies identified in therapy to challenge anxious thoughts  Improve concentration, focus, and mindfulness in daily activities        Intervention:   DBT: Continued development of emotion recognition and stress/distress tolerance strategies       ASSESSMENT: Current Emotional / Mental Status (status of significant symptoms):   Risk status (Self / Other harm or suicidal ideation)   Patient denies current fears or concerns for personal safety.   Patient denies current or recent suicidal ideation or behaviors.   Patient denies current or recent homicidal ideation or behaviors.   Patient denies current or recent self injurious behavior or ideation.   Patient denies other safety concerns.   Patient reports there has been no change in risk factors since their last session.     Patient reports there has been no change in protective factors since their last session.     Recommended that patient call 911 or go to the local ED should there be a change in any of these risk factors.     Appearance:   Appropriate    Eye Contact:   Fair    Psychomotor Behavior: Normal    Attitude:   Cooperative    Orientation:   All   Speech    Rate / Production: Normal     Volume:  Normal     Mood:    Normal   Affect:    Appropriate    Thought Content:  Clear    Thought Form:  Coherent  Logical    Insight:    Good      Medication Review:   No changes to current psychiatric medication(s)     Medication Compliance:   Yes     Changes in Health Issues:   None reported     Chemical Use Review:   Substance Use: Chemical use reviewed, no active concerns identified      Tobacco Use: No current tobacco use.      Diagnosis:  1. F43.10 - PTSD (post-traumatic stress disorder)        Collateral Reports Completed:   Not Applicable    PLAN: (Patient Tasks / Therapist Tasks / Other)  Pt to follow-up on assigned HW; continue TC engagement pending pt need. Next session anticipated to be termination pending reconnection with previous provider.        ROBERT PARNELL [facility]  12.13.2022

## 2022-12-15 ENCOUNTER — HOSPITAL ENCOUNTER (OUTPATIENT)
Dept: CARDIAC REHAB | Facility: HOSPITAL | Age: 67
Discharge: HOME | End: 2022-12-15
Admitting: INTERNAL MEDICINE
Payer: MEDICARE

## 2022-12-15 PROCEDURE — G0239 OTH RESP PROC, GROUP: HCPCS

## 2022-12-19 ENCOUNTER — VIRTUAL VISIT (OUTPATIENT)
Dept: BEHAVIORAL HEALTH | Facility: CLINIC | Age: 67
End: 2022-12-19
Payer: MEDICARE

## 2022-12-19 ENCOUNTER — TELEPHONE (OUTPATIENT)
Dept: BEHAVIORAL HEALTH | Facility: CLINIC | Age: 67
End: 2022-12-19

## 2022-12-19 DIAGNOSIS — F43.10 PTSD (POST-TRAUMATIC STRESS DISORDER): Primary | ICD-10-CM

## 2022-12-19 ASSESSMENT — COLUMBIA-SUICIDE SEVERITY RATING SCALE - C-SSRS
1. SINCE LAST CONTACT, HAVE YOU WISHED YOU WERE DEAD OR WISHED YOU COULD GO TO SLEEP AND NOT WAKE UP?: NO
2. HAVE YOU ACTUALLY HAD ANY THOUGHTS OF KILLING YOURSELF?: NO
6. HAVE YOU EVER DONE ANYTHING, STARTED TO DO ANYTHING, OR PREPARED TO DO ANYTHING TO END YOUR LIFE?: NO

## 2022-12-19 NOTE — TELEPHONE ENCOUNTER
This writer received call from pt who requested to speak w/ TC therapist. This writer scheduled pt w/ TC therapist for this day, 12/19/2022 at 1pm. Pt is also scheduled to meet w/ therapist on 12/20/2022 and requested to keep appointment scheduled. Pt reported that therapist and pt will determine if appointment needs to be canceled.     Fidel GALLO   12/19/2022  0942

## 2022-12-19 NOTE — PROGRESS NOTES
[facility]                                    Progress Note    Patient Name: Gregoria Stein  Date: 12.19.2022         Service Type: Individual      Session Start Time: 1300  Session End Time: 1352     Session Length: 52m    Session #: 008    Attendees: Client attended alone    Service Modality:  Video Visit:      Provider verified identity through the following two step process.  Patient provided:  Patient is known previously to provider    Telemedicine Visit: The patient's condition can be safely assessed and treated via synchronous audio and visual telemedicine encounter.      Reason for Telemedicine Visit: Patient has requested telehealth visit    Originating Site (Patient Location): Patient's home    Distant Site (Provider Location): [facility]: Transition Clinic    Consent:  The patient/guardian has verbally consented to: the potential risks and benefits of telemedicine (video visit) versus in person care; bill my insurance or make self-payment for services provided; and responsibility for payment of non-covered services.     Patient would like the video invitation sent by:  Text to cell phone: [phone]     Mode of Communication:  Video Conference via Amwell    Distant Location (Provider):  Off-site    As the provider I attest to compliance with applicable laws and regulations related to telemedicine.    DATA  Interactive Complexity: No  Crisis: No        Progress Since Last Session (Related to Symptoms / Goals / Homework):   Symptoms: Worsening - Pt describes increased anxious spectrum sx and feelings of frustration / confusion since previous session    Homework: Partially completed      Episode of Care Goals: Achieved / completed to satisfaction - ACTION (Actively working towards change); Intervened by reinforcing change plan / affirming steps taken     Current / Ongoing Stressors and Concerns:   PT referred for bridging services correlated with wait for  next LOC. Chart review and initial referral suggest pt struggles with depressive spectrum sx up to and including SI w/ plan which recently necessitated ED admission.      Pt presents to session expressing marked increase to perceived stressor severity vs previous. They note markedly increased frustration with perceptions of inconsistency from other providers and support services, as well as feelings of confusion regarding potential courses of action to advocate for own care. Pt requested this writer participate in 3 way call to this other provider to assist in gaining clarity of request and overall direction. Other provider did not answer; pt left voicemail requesting callback. Pt further requested contact information for [facility] due to their perceived treatment and lack of contact, which this writer provided. Pt did reorient to positive exceptions to environmental stressors; identified realistic/obtainable short term focal areas for everyday life and stress management. Pt receptive to therapeutic tools and concepts presented.     Treatment Objective(s) Addressed in This Session:   use cognitive strategies identified in therapy to challenge anxious thoughts  Decrease frequency and intensity of feeling down, depressed, hopeless  Identify negative self-talk and behaviors: challenge core beliefs, myths, and actions  Improve concentration, focus, and mindfulness in daily activities        Intervention:   CBT: Identified and reframed maladaptive cognitions contributing to overall distress; reoriented to exceptions    Assessments completed prior to visit:  The following assessments were completed by patient for this visit:  Tillman Suicide Severity Rating Scale (Short Version)  Tillman Suicide Severity Rating (Short Version) 7/22/2020 7/24/2020 7/28/2020 7/29/2020 8/14/2020 10/4/2022 12/19/2022   Over the past 2 weeks have you felt down, depressed, or hopeless? yes yes yes yes yes yes -   Over the past 2  weeks have you had thoughts of killing yourself? yes yes yes yes no yes -   Have you ever attempted to kill yourself? yes yes yes yes no yes -   When did this last happen? within the last month (but not today) between 1 and 6 months ago between 1 and 6 months ago between 1 and 6 months ago - more than 6 months ago -   Q1 Wished to be Dead (Past Month) yes - - - - yes -   Q2 Suicidal Thoughts (Past Month) yes - - - - yes -   Q3 Suicidal Thought Method yes - - - - yes -   Q4 Suicidal Intent without Specific Plan no - - - - no -   Q5 Suicide Intent with Specific Plan yes - - - - no -   Q6 Suicide Behavior (Lifetime) yes - - - - yes -   Within the Past 3 Months? - - - - - no -   Level of Risk per Screen - - - - - moderate risk -   High Risk Required Interventions - - - - - On continuous in person observation -   Required Interventions - - - - - Provider notified;Room searched;Room made safe;Belongings removed;Patient searched -   Interventions - - - - - Monitored via video;DEC consulted -   1. Wish to be Dead (Since Last Contact) - - - - - - 0   2. Non-Specific Active Suicidal Thoughts (Since Last Contact) - - - - - - 0   Calculated C-SSRS Risk Score (Since Last Contact) - - - - - - No Risk Indicated      Floyd Suicide Severity Rating Scale Since Last Contact:   Suicidal Ideation (Since Last Contact)  1. Wish to be Dead (Since Last Contact): No  2. Non-Specific Active Suicidal Thoughts (Since Last Contact): No        C-SSRS Risk (Since Last Contact)  Calculated C-SSRS Risk Score (Since Last Contact): No Risk Indicated    Validity of evaluation is not impacted by presenting factors during interview.   Comments regarding subjective versus objective responses to Floyd tool: subjective/objective appear consistent  Environmental or Psychosocial Events: public humiliation, challenging interpersonal relationships, barriers to accessing healthcare, helplessness/hopelessness, unemployment/underemployment, excessive debt,  poor finances, other life stressors and social isolation  Chronic Risk Factors: history of suicide attempts ( ), history of abuse or neglect, chronic health problems, LGBTQ+ orientation  and parental mental health issue   Warning Signs: seeking access to means to hurt or kill self, talking or writing about death, dying, or suicide, hopelessness, rage, anger, seeking revenge, feeling trapped, like there is no way out, anxiety, agitation, unable to sleep, sleeping all the time, dramatic changes in mood and no reason for living, no sense of purpose in life  Protective Factors: lives in a responsibly safe and stable environment, good treatment engagement, sense of importance of health and wellness, supportive ongoing medical and mental health care relationships, help seeking and optimistic outlook - identification of future goals  Interpretation of Risk Scoring, Risk Mitigation Interventions and Safety Plan: Ongoing screening recommended         ASSESSMENT: Current Emotional / Mental Status (status of significant symptoms):   Risk status (Self / Other harm or suicidal ideation)   Patient denies current fears or concerns for personal safety.   Patient denies current or recent suicidal ideation or behaviors.   Patient denies current or recent homicidal ideation or behaviors.   Patient denies current or recent self injurious behavior or ideation.   Patient denies other safety concerns.   Patient reports there has been no change in risk factors since their last session.     Patient reports there has been no change in protective factors since their last session.     Recommended that patient call 911 or go to the local ED should there be a change in any of these risk factors.     Appearance:   Appropriate    Eye Contact:   Good    Psychomotor Behavior: Normal    Attitude:   Cooperative    Orientation:   All   Speech    Rate / Production: Normal     Volume:  Normal    Mood:    Anxious  Depressed  Irritable    Affect:    Labile     Thought Content:  Clear    Thought Form:  Coherent  Logical    Insight:    Good      Medication Review:   No changes to current psychiatric medication(s)     Medication Compliance:   Yes     Changes in Health Issues:   None reported     Chemical Use Review:   Substance Use: Chemical use reviewed, no active concerns identified      Tobacco Use: No current tobacco use.      Diagnosis:  1. F43.10 - PTSD (post-traumatic stress disorder)        Collateral Reports Completed:   Not Applicable    PLAN: (Patient Tasks / Therapist Tasks / Other)  Pt to follow-up on assigned HW; continue TC engagement pending need.        ROBERT PARNELL, [facility]  12.19.2022

## 2022-12-20 ENCOUNTER — HOSPITAL ENCOUNTER (OUTPATIENT)
Dept: CARDIAC REHAB | Facility: HOSPITAL | Age: 67
Discharge: HOME | End: 2022-12-20
Attending: INTERNAL MEDICINE | Admitting: INTERNAL MEDICINE
Payer: MEDICARE

## 2022-12-20 ENCOUNTER — TELEPHONE (OUTPATIENT)
Dept: BEHAVIORAL HEALTH | Facility: CLINIC | Age: 67
End: 2022-12-20

## 2022-12-20 PROCEDURE — G0239 OTH RESP PROC, GROUP: HCPCS

## 2022-12-20 NOTE — TELEPHONE ENCOUNTER
"This writer returned voicemail received at 2221 12.19.2022 in which pt was endorsing increased depressive spectrum sx (notably feelings of hopelessness/helplessness in re: navigation of the process to gender affirming surgery). Pt denied any immediate BEKAH in voicemail, stating \"...I'll be here tomorrow to suffer more.\"    Pt did not answer this writer's call, left VM requesting callback. Chart review indicates pt did attend medical appt 12.20.2022. Leadership team made aware of sequence of events and consulted.    Ralph Conde, [facility]  12.20.2022  1241  "

## 2022-12-21 ENCOUNTER — MYC MEDICAL ADVICE (OUTPATIENT)
Dept: PLASTIC SURGERY | Facility: CLINIC | Age: 67
End: 2022-12-21

## 2022-12-21 ENCOUNTER — TELEPHONE (OUTPATIENT)
Dept: PLASTIC SURGERY | Facility: CLINIC | Age: 67
End: 2022-12-21

## 2022-12-21 NOTE — TELEPHONE ENCOUNTER
Called pt to follow up on pt's request to receive a call from the team. Pt was supposed to have received a consent to communicate form, which was documented to have been mailed on 11/18/22. Called pt to discuss. Unable to reach, left voicemail and left a callback number. Will sent pt a [employer] message and send a consent to communicate form in the mail today.    Grover Lennon RN

## 2022-12-21 NOTE — TELEPHONE ENCOUNTER
Pt called this RN back. Pt stated that they received the consent to communicate form but need a new one. Pt shared that they have good support from their therapist and psychiatrist and are feeling better prepared to have surgery. They are currently in pulmonary rehab and stated the soonest they could schedule surgery would be May 2023. Shared with pt that we want to assist them in finding a time that works best for them and can schedule when it works for them. Will await pt's consent to communicate form for [facility]  to connect with pt's team to ensure they are ready for surgery.    Grover Lennon RN

## 2022-12-27 ENCOUNTER — HOSPITAL ENCOUNTER (OUTPATIENT)
Dept: CARDIAC REHAB | Facility: HOSPITAL | Age: 67
Discharge: HOME | End: 2022-12-27
Attending: INTERNAL MEDICINE | Admitting: INTERNAL MEDICINE
Payer: MEDICARE

## 2022-12-27 PROCEDURE — G0239 OTH RESP PROC, GROUP: HCPCS

## 2022-12-29 ENCOUNTER — TELEPHONE (OUTPATIENT)
Dept: PLASTIC SURGERY | Facility: CLINIC | Age: 67
End: 2022-12-29

## 2022-12-29 ENCOUNTER — HOSPITAL ENCOUNTER (OUTPATIENT)
Dept: CARDIAC REHAB | Facility: HOSPITAL | Age: 67
Discharge: HOME | End: 2022-12-29
Attending: INTERNAL MEDICINE | Admitting: INTERNAL MEDICINE
Payer: MEDICARE

## 2022-12-29 PROCEDURE — G0239 OTH RESP PROC, GROUP: HCPCS

## 2022-12-29 NOTE — TELEPHONE ENCOUNTER
Pt called with questions about how to upload consent to communicate form. Explained how to do so, step by step. Pt appreciative, will upload consent forms shortly. [facility]  to connect with pt's care team to coordinate care before surgery will be rescheduled. Pt understands that  has been out of office for two weeks and it may take longer to follow up with them because of this. Pt thanked writer and Dr Benavides's team.    Grover Lennon RN

## 2023-01-03 ENCOUNTER — VIRTUAL VISIT (OUTPATIENT)
Dept: BEHAVIORAL HEALTH | Facility: CLINIC | Age: 68
End: 2023-01-03
Payer: MEDICARE

## 2023-01-03 DIAGNOSIS — F43.10 PTSD (POST-TRAUMATIC STRESS DISORDER): Primary | ICD-10-CM

## 2023-01-03 ASSESSMENT — COLUMBIA-SUICIDE SEVERITY RATING SCALE - C-SSRS
2. HAVE YOU ACTUALLY HAD ANY THOUGHTS OF KILLING YOURSELF?: NO
6. HAVE YOU EVER DONE ANYTHING, STARTED TO DO ANYTHING, OR PREPARED TO DO ANYTHING TO END YOUR LIFE?: NO
1. SINCE LAST CONTACT, HAVE YOU WISHED YOU WERE DEAD OR WISHED YOU COULD GO TO SLEEP AND NOT WAKE UP?: NO

## 2023-01-03 NOTE — PROGRESS NOTES
[facility]                                    Progress Note    Patient Name: Gregoria Stein  Date: 01.03.2022         Service Type: Individual      Session Start Time: 1430  Session End Time: 1509     Session Length: 39m    Session #: 009    Attendees: Client attended alone    Service Modality:  Video Visit:      Provider verified identity through the following two step process.  Patient provided:  Patient is known previously to provider    Telemedicine Visit: The patient's condition can be safely assessed and treated via synchronous audio and visual telemedicine encounter.      Reason for Telemedicine Visit: Patient has requested telehealth visit    Originating Site (Patient Location): Patient's home    Distant Site (Provider Location): [facility]: Transition Clinic    Consent:  The patient/guardian has verbally consented to: the potential risks and benefits of telemedicine (video visit) versus in person care; bill my insurance or make self-payment for services provided; and responsibility for payment of non-covered services.     Patient would like the video invitation sent by:  My Chart    Mode of Communication:  Video Conference via Amwell    Distant Location (Provider):  Off-site    As the provider I attest to compliance with applicable laws and regulations related to telemedicine.    DATA  Interactive Complexity: No  Crisis: No        Progress Since Last Session (Related to Symptoms / Goals / Homework):   Symptoms: Improving - Pt describes improved mood, insight, and hopefulness for positive growth / overall future when contrasted with previous session    Homework: Achieved / completed to satisfaction      Episode of Care Goals: Achieved / completed to satisfaction - ACTION (Actively working towards change); Intervened by reinforcing change plan / affirming steps taken     Current / Ongoing Stressors and Concerns:    PT referred for bridging services correlated  with wait for next LOC. Chart review and initial referral suggest pt struggles with depressive spectrum sx up to and including SI w/ plan which recently necessitated ED admission.      Pt presents to session reporting some meaningful reduction of perceived stressor severity, which they correlate with some progress towards stated goals and improved confidence in reparative work with previous long-term provider. Pt does note some increased family relational strain due to increased contact with bio mother over recent weeks, and that they plan to examine whether continued contact can be productive / meaningful in light of trauma hx. Pt receptive to therapeutic tools and concepts presented.     Treatment Objective(s) Addressed in This Session:   use cognitive strategies identified in therapy to challenge anxious thoughts  Increase interest, engagement, and pleasure in doing things  Decrease frequency and intensity of feeling down, depressed, hopeless  Identify negative self-talk and behaviors: challenge core beliefs, myths, and actions  Improve concentration, focus, and mindfulness in daily activities        Intervention:   Solution Focused: Reviewed pt's movement towards stated goals; reinforced actions more consistently implement therapeutic tools and concepts    Assessments completed prior to visit:  The following assessments were completed by patient for this visit:  Mitchell Suicide Severity Rating Scale (Short Version)  Mitchell Suicide Severity Rating (Short Version) 7/24/2020 7/28/2020 7/29/2020 8/14/2020 10/4/2022 12/19/2022 1/3/2023   Over the past 2 weeks have you felt down, depressed, or hopeless? yes yes yes yes yes - -   Over the past 2 weeks have you had thoughts of killing yourself? yes yes yes no yes - -   Have you ever attempted to kill yourself? yes yes yes no yes - -   When did this last happen? between 1 and 6 months ago between 1 and 6 months ago between 1 and 6 months ago - more than 6 months ago -  -   Q1 Wished to be Dead (Past Month) - - - - yes - -   Q2 Suicidal Thoughts (Past Month) - - - - yes - -   Q3 Suicidal Thought Method - - - - yes - -   Q4 Suicidal Intent without Specific Plan - - - - no - -   Q5 Suicide Intent with Specific Plan - - - - no - -   Q6 Suicide Behavior (Lifetime) - - - - yes - -   Within the Past 3 Months? - - - - no - -   Level of Risk per Screen - - - - moderate risk - -   High Risk Required Interventions - - - - On continuous in person observation - -   Required Interventions - - - - Provider notified;Room searched;Room made safe;Belongings removed;Patient searched - -   Interventions - - - - Monitored via video;DEC consulted - -   1. Wish to be Dead (Since Last Contact) - - - - - 0 0   2. Non-Specific Active Suicidal Thoughts (Since Last Contact) - - - - - 0 0   Calculated C-SSRS Risk Score (Since Last Contact) - - - - - No Risk Indicated No Risk Indicated      Vendor Suicide Severity Rating Scale Since Last Contact:   Suicidal Ideation (Since Last Contact)  1. Wish to be Dead (Since Last Contact): No  2. Non-Specific Active Suicidal Thoughts (Since Last Contact): No        C-SSRS Risk (Since Last Contact)  Calculated C-SSRS Risk Score (Since Last Contact): No Risk Indicated     Validity of evaluation is not impacted by presenting factors during interview.   Comments regarding subjective versus objective responses to Vendor tool: subjective/objective appear consistent  Environmental or Psychosocial Events: public humiliation, challenging interpersonal relationships, barriers to accessing healthcare, helplessness/hopelessness, unemployment/underemployment, excessive debt, poor finances, other life stressors and social isolation  Chronic Risk Factors: history of suicide attempts ( ), history of abuse or neglect, chronic health problems, LGBTQ+ orientation  and parental mental health issue   Warning Signs: seeking access to means to hurt or kill self, talking or writing about  death, dying, or suicide, hopelessness, rage, anger, seeking revenge, feeling trapped, like there is no way out, anxiety, agitation, unable to sleep, sleeping all the time, dramatic changes in mood and no reason for living, no sense of purpose in life  Protective Factors: lives in a responsibly safe and stable environment, good treatment engagement, sense of importance of health and wellness, supportive ongoing medical and mental health care relationships, help seeking and optimistic outlook - identification of future goals  Interpretation of Risk Scoring, Risk Mitigation Interventions and Safety Plan: Ongoing screening recommended        ASSESSMENT: Current Emotional / Mental Status (status of significant symptoms):   Risk status (Self / Other harm or suicidal ideation)   Patient denies current fears or concerns for personal safety.   Patient denies current or recent suicidal ideation or behaviors.   Patient denies current or recent homicidal ideation or behaviors.   Patient denies current or recent self injurious behavior or ideation.   Patient denies other safety concerns.   Patient reports there has been no change in risk factors since their last session.     Patient reports there has been no change in protective factors since their last session.     Recommended that patient call 911 or go to the local ED should there be a change in any of these risk factors.     Appearance:   Appropriate    Eye Contact:   Fair    Psychomotor Behavior: Normal    Attitude:   Cooperative    Orientation:   All   Speech    Rate / Production: Normal     Volume:  Normal    Mood:    Normal   Affect:    Appropriate    Thought Content:  Clear    Thought Form:  Coherent  Logical    Insight:    Good      Medication Review:   No changes to current psychiatric medication(s)     Medication Compliance:   Yes     Changes in Health Issues:   None reported     Chemical Use Review:   Substance Use: Chemical use reviewed, no active concerns  identified      Tobacco Use: No current tobacco use.      Diagnosis:  1. F43.12 - PTSD (post-traumatic stress disorder)        Collateral Reports Completed:   Not Applicable    PLAN: (Patient Tasks / Therapist Tasks / Other)  Pt to follow-up in 2wks, likely termination session as they have been able to reconnect with previous long-term provider.        ROBERT PARNELL, [facility]  01.03.2023

## 2023-01-05 ENCOUNTER — HOSPITAL ENCOUNTER (OUTPATIENT)
Dept: CARDIAC REHAB | Facility: HOSPITAL | Age: 68
Discharge: HOME | End: 2023-01-05
Attending: INTERNAL MEDICINE | Admitting: INTERNAL MEDICINE
Payer: MEDICARE

## 2023-01-05 PROCEDURE — G0239 OTH RESP PROC, GROUP: HCPCS

## 2023-01-10 ENCOUNTER — HOSPITAL ENCOUNTER (OUTPATIENT)
Dept: CARDIAC REHAB | Facility: HOSPITAL | Age: 68
Discharge: HOME | End: 2023-01-10
Attending: INTERNAL MEDICINE
Payer: MEDICARE

## 2023-01-10 PROCEDURE — G0239 OTH RESP PROC, GROUP: HCPCS

## 2023-01-11 ENCOUNTER — OFFICE VISIT (OUTPATIENT)
Dept: PULMONOLOGY | Facility: CLINIC | Age: 68
End: 2023-01-11
Payer: MEDICARE

## 2023-01-11 VITALS
HEART RATE: 70 BPM | BODY MASS INDEX: 35.77 KG/M2 | OXYGEN SATURATION: 96 % | SYSTOLIC BLOOD PRESSURE: 138 MMHG | WEIGHT: 221.6 LBS | DIASTOLIC BLOOD PRESSURE: 86 MMHG

## 2023-01-11 DIAGNOSIS — J38.3 PARADOXICAL VOCAL CORD MOTION: ICD-10-CM

## 2023-01-11 DIAGNOSIS — R06.09 DYSPNEA ON EXERTION: Primary | ICD-10-CM

## 2023-01-11 PROCEDURE — 99214 OFFICE O/P EST MOD 30 MIN: CPT | Performed by: INTERNAL MEDICINE

## 2023-01-11 NOTE — LETTER
"    1/11/2023         RE: Gregoria Stein  [address]  [place] [address]        Dear Colleague,    Thank you for referring your patient, Gregoria Stein, to the [facility]. Please see a copy of my visit note below.    Pulmonary Clinic Follow-up Visit    Assessment and Plan:   67 year old with a history of remote tobacco use, inducible laryngeal obstruction, moderate obstructive sleep apnea, GERD, iron deficiency anemia, osteoarthritis s/p right TKA, and chronic exertional dyspnea, presenting for follow-up.     Chronic exertional dyspnea, inducible laryngeal obstruction, obstructive sleep apnea: Killian has felt that their breathing is better overall. They are able to walk up to 2.5 miles and tolerating this better, can now walk while conversing. They have been enrolled in pulmonary rehab at [facility], which has helped quite a bit. They have been working with a speech therapist who has helped a lot with inducible laryngeal obstruction. They set up a stationary bicycle. Tolerating CPAP okay, but with some difficulty initiating sleep due to anxiety and racing thoughts. RLS is under control. Has auto-CPAP 5-15 cmH2O via nasal pillows. They are savvy with adjusting the temperature and humidification as needed. Used the device 25 out of the last 30 nights, with usage for at least 4 hours on one third of nights. When used, mean pressure is 8 cmH2O with residual AHI only 3 events/hr. Recall from prior visits that Killian likely has multifactorial dypspnea. They do have confirmed inducible laryngeal obstruction that leads to difficulty talking while walking, for example previously followed by SLP at [facility]. They have found benefit from yawning (\"hot potato mouth\") exercises when an episode occurs and note that emotional stress does also sometimes precipitate an episode and worsen breathing. They have no clear history of seasonal allergies or childhood asthma, though normal PFT " "does not rule out possible episodic bronchospasm; methacholine challenge could be considered at some point. Very subtle (questionable) cylindrical bronchiectasis in RML, subtle expiratory subsegmental air trapping, but this can also be seen in normal lungs. They have moderate ASHLYN with AHI 21 and received a new device after the recall, auto-CPAP 5-15 cmH2O via nasal pillows.     Plan:  - continue auto-CPAP as detailed above  - continue to use yawning (\"hot potato mouth\") breaths to abort acute episodes of inducible laryngeal obstruction; now also working closely with a speech therapist, who has been quite helpful  - enrolled in pulmonary rehabilitation at [facility]  - albuterol HFA as needed  - annual influenza vaccination  - keep up to date on pneumococcal vaccination  - keep up to date on COVID-19 vaccination  - follow up in 6 months  - encouraged them to contact me with questions or concerning symptoms    Daniel Greer MD  Pulmonary and Critical Care Medicine  [facility]  Office [phone]  Pager [phone]  he/him    CCx: chronic exertional dyspnea, inducible laryngeal obstruction, obstructive sleep apnea    HPI: 67 year old with a history of remote tobacco use, inducible laryngeal obstruction, moderate obstructive sleep apnea, GERD, iron deficiency anemia, osteoarthritis s/p right TKA, and chronic exertional dyspnea, presenting for follow-up. Killian has felt that their breathing is better overall. They are able to walk up to 2.5 miles and tolerating this better, can now walk while conversing. They have been enrolled in pulmonary rehab at [facility], which has helped quite a bit. They have been working with a speech therapist who has helped a lot with inducible laryngeal obstruction. They set up a stationary bicycle. Tolerating CPAP okay, but with some difficulty initiating sleep due to anxiety and racing thoughts. RLS is under control. Has auto-CPAP 5-15 cmH2O via nasal pillows. They " "are savvy with adjusting the temperature and humidification as needed. Used the device 25 out of the last 30 nights, with usage for at least 4 hours on one third of nights. When used, mean pressure is 8 cmH2O with residual AHI only 3 events/hr. Recall from prior visits that Killian likely has multifactorial dypspnea. They do have confirmed inducible laryngeal obstruction that leads to difficulty talking while walking, for example previously followed by SLP at [facility]. They have found benefit from yawning (\"hot potato mouth\") exercises when an episode occurs and note that emotional stress does also sometimes precipitate an episode and worsen breathing. They have no clear history of seasonal allergies or childhood asthma, though normal PFT does not rule out possible episodic bronchospasm; methacholine challenge could be considered at some point. Very subtle (questionable) cylindrical bronchiectasis in RML, subtle expiratory subsegmental air trapping, but this can also be seen in normal lungs. They have moderate ASHLYN with AHI 21 and received a new device after the recall, auto-CPAP 5-15 cmH2O via nasal pillows.    ROS:  A 12-system review was obtained and was negative with the exception of the symptoms endorsed in the history of present illness.    PMH:  remote smoker  inducible laryngeal obstruction  moderate obstructive sleep apnea with AHI 21 on auto-CPAP 5-15 cmH2O via nasal pillows  GERD  BMI 35.8  iron deficiency anemia  osteoarthritis s/p right TKA  chronic exertional dyspnea    PSH:  Past Surgical History:   Procedure Laterality Date     COLONOSCOPY       EXCISE MASS VAGINA Left 4/10/2015    Procedure: EXCISE MASS VAGINA;  Surgeon: Stanislav Byers MD;  Location: [facility] OR     GENITOURINARY SURGERY      Urethrial dilation     JOINT REPLACEMENT Right 07/2018     ORTHOPEDIC SURGERY      right rotator cuff, left achilles tendon repair, neuroma removed right foot, right knee arthroscopy,        Allergies:  Allergies "   Allergen Reactions     Penicillins Rash and Hives     Versed [Midazolam] Other (See Comments)     Stopped breathing         Family HX:  Family History   Problem Relation Age of Onset     Macular Degeneration Mother      Osteoporosis Mother      Heart Disease Father 49     Multiple Sclerosis Sister      Diabetes Paternal Uncle         heart issues     Cancer No family hx of      Coronary Artery Disease No family hx of        Social Hx:  Social History     Socioeconomic History     Marital status: Single     Spouse name: Not on file     Number of children: Not on file     Years of education: Not on file     Highest education level: Not on file   Occupational History     Not on file   Tobacco Use     Smoking status: Former     Packs/day: 0.00     Types: Cigarettes     Quit date: 1983     Years since quittin.3     Smokeless tobacco: Never   Vaping Use     Vaping Use: Never used   Substance and Sexual Activity     Alcohol use: Yes     Comment: occasional     Drug use: Yes     Types: Marijuana     Comment: daily only at night time for medical conditions     Sexual activity: Not Currently   Other Topics Concern     Parent/sibling w/ CABG, MI or angioplasty before 65F 55M? Not Asked   Social History Narrative     Not on file     Social Determinants of Health     Financial Resource Strain: Not on file   Food Insecurity: Not on file   Transportation Needs: Not on file   Physical Activity: Not on file   Stress: Not on file   Social Connections: Not on file   Intimate Partner Violence: Not on file   Housing Stability: Not on file       Current Meds:  Current Outpatient Medications   Medication Sig Dispense Refill     albuterol (PROAIR HFA/PROVENTIL HFA/VENTOLIN HFA) 108 (90 Base) MCG/ACT inhaler Inhale 2 puffs into the lungs every 4 hours as needed       cholecalciferol (VITAMIN D3) 5000 units (125 mcg) capsule Take 125 mcg by mouth daily       clonazePAM (KLONOPIN) 0.5 MG tablet Take 0.5 mg by mouth 2 times daily as  needed       finasteride (PROSCAR) 5 MG tablet Take 5 mg by mouth daily       gabapentin (NEURONTIN) 300 MG capsule Take 3 capsules (900 mg) by mouth At Bedtime 270 capsule 0     Omega-3 Fatty Acids (OMEGA-3 FISH OIL) 1200 MG CAPS Take 1 capsule by mouth daily       omeprazole (PRILOSEC) 40 MG DR capsule TAKE 1 CAPSULE BY MOUTH EVERY DAY BEFORE A MEAL       pramipexole (MIRAPEX) 0.25 MG tablet Take 3 tablets (0.75 mg) by mouth At Bedtime 90 tablet 3     testosterone cypionate (DEPOTESTOSTERONE) 200 MG/ML injection Inject 70 mg Subcutaneous once a week on Fridays       UNABLE TO FIND Pt is using Medical cannabis         Physical Exam:  /86 (BP Location: Right arm, Patient Position: Chair, Cuff Size: Adult Large)   Pulse 70   Wt 100.5 kg (221 lb 9.6 oz)   LMP  (LMP Unknown)   SpO2 96%   BMI 35.77 kg/m    Gen: alert, oriented, no distress  HEENT: no cervical or supraclavicular lymphadenopathy  CV: RRR, no M/G/R  Resp: CTAB, no focal crackles or wheezes  Skin: no apparent rashes  Ext: no cyanosis, clubbing or edema  Neuro: alert, nonfocal    Labs:  reviewed    Imaging studies:  High-resolution chest CT (June 2021):  - images directly reviewed, formal interpretation follows:  FINDINGS:   LUNGS AND PLEURA: There has been interval resolution of the mosaic attenuation previously seen. There is mild bilateral tubular bronchiectasis most severe in the right middle lobe. Stable calcified granuloma right upper lobe. Minimal linear atelectasis or scarring in the lingula. No evidence for underlying interstitial lung disease. There is subsegmental air trapping on expiratory images. There is a tiny right pleural effusion.     MEDIASTINUM/AXILLAE: No adenopathy. Atherosclerotic change of the aorta with borderline aneurysmal dilatation of the ascending thoracic aorta measuring 4 cm. Heart size is normal. No pericardial effusion.     CORONARY ARTERY CALCIFICATION: Mild.     UPPER ABDOMEN: Stable small low-attenuation lesion  in the right lobe of the liver likely a cyst. Tiny nonobstructing right renal stones. Partial visualization of presumed peripelvic cysts in the left kidney unchanged.     MUSCULOSKELETAL: There is degenerative change in the spine and both shoulders right greater than left.     IMPRESSION:   1.  Resolution of mosaic attenuation.   2.  Subsegmental air trapping remains visible on expiratory images.   3.  Bilateral bronchiectasis.   4.  Borderline aneurysmal dilatation of the ascending thoracic aorta measuring 4 cm.     Pulmonary Function Testing  June 2022:  FVC 2.35 (72%)  FEV1 1.97 (77%)  FEV1/FVC 0.84  No bronchodilator response  RV 1.93 (93%)  TLC 4.29 (81%)  DLCOc 106%  Flow-volume loop is normal with no evidence of upper airway obstruciton      Again, thank you for allowing me to participate in the care of your patient.        Sincerely,        Daniel Greer MD

## 2023-01-12 ENCOUNTER — HOSPITAL ENCOUNTER (OUTPATIENT)
Dept: CARDIAC REHAB | Facility: HOSPITAL | Age: 68
Discharge: HOME | End: 2023-01-12
Attending: INTERNAL MEDICINE
Payer: MEDICARE

## 2023-01-12 PROCEDURE — G0239 OTH RESP PROC, GROUP: HCPCS

## 2023-01-12 NOTE — PROGRESS NOTES
"Pulmonary Clinic Follow-up Visit    Assessment and Plan:   67 year old with a history of remote tobacco use, inducible laryngeal obstruction, moderate obstructive sleep apnea, GERD, iron deficiency anemia, osteoarthritis s/p right TKA, and chronic exertional dyspnea, presenting for follow-up.     Chronic exertional dyspnea, inducible laryngeal obstruction, obstructive sleep apnea: Killian has felt that their breathing is better overall. They are able to walk up to 2.5 miles and tolerating this better, can now walk while conversing. They have been enrolled in pulmonary rehab at [facility], which has helped quite a bit. They have been working with a speech therapist who has helped a lot with inducible laryngeal obstruction. They set up a stationary bicycle. Tolerating CPAP okay, but with some difficulty initiating sleep due to anxiety and racing thoughts. RLS is under control. Has auto-CPAP 5-15 cmH2O via nasal pillows. They are savvy with adjusting the temperature and humidification as needed. Used the device 25 out of the last 30 nights, with usage for at least 4 hours on one third of nights. When used, mean pressure is 8 cmH2O with residual AHI only 3 events/hr. Recall from prior visits that Killian likely has multifactorial dypspnea. They do have confirmed inducible laryngeal obstruction that leads to difficulty talking while walking, for example previously followed by SLP at [facility]. They have found benefit from yawning (\"hot potato mouth\") exercises when an episode occurs and note that emotional stress does also sometimes precipitate an episode and worsen breathing. They have no clear history of seasonal allergies or childhood asthma, though normal PFT does not rule out possible episodic bronchospasm; methacholine challenge could be considered at some point. Very subtle (questionable) cylindrical bronchiectasis in RML, subtle expiratory subsegmental air trapping, but this can also be seen in normal lungs. They have " "moderate ASHLYN with AHI 21 and received a new device after the recall, auto-CPAP 5-15 cmH2O via nasal pillows.     Plan:  - continue auto-CPAP as detailed above  - continue to use yawning (\"hot potato mouth\") breaths to abort acute episodes of inducible laryngeal obstruction; now also working closely with a speech therapist, who has been quite helpful  - enrolled in pulmonary rehabilitation at [facility]  - albuterol HFA as needed  - annual influenza vaccination  - keep up to date on pneumococcal vaccination  - keep up to date on COVID-19 vaccination  - follow up in 6 months  - encouraged them to contact me with questions or concerning symptoms    Daniel Greer MD  Pulmonary and Critical Care Medicine  [facility]  Office [phone]  Pager [phone]  he/him    CCx: chronic exertional dyspnea, inducible laryngeal obstruction, obstructive sleep apnea    HPI: 67 year old with a history of remote tobacco use, inducible laryngeal obstruction, moderate obstructive sleep apnea, GERD, iron deficiency anemia, osteoarthritis s/p right TKA, and chronic exertional dyspnea, presenting for follow-up. Killian has felt that their breathing is better overall. They are able to walk up to 2.5 miles and tolerating this better, can now walk while conversing. They have been enrolled in pulmonary rehab at [facility], which has helped quite a bit. They have been working with a speech therapist who has helped a lot with inducible laryngeal obstruction. They set up a stationary bicycle. Tolerating CPAP okay, but with some difficulty initiating sleep due to anxiety and racing thoughts. RLS is under control. Has auto-CPAP 5-15 cmH2O via nasal pillows. They are savvy with adjusting the temperature and humidification as needed. Used the device 25 out of the last 30 nights, with usage for at least 4 hours on one third of nights. When used, mean pressure is 8 cmH2O with residual AHI only 3 events/hr. Recall from prior " "visits that Killian likely has multifactorial dypspnea. They do have confirmed inducible laryngeal obstruction that leads to difficulty talking while walking, for example previously followed by SLP at [facility]. They have found benefit from yawning (\"hot potato mouth\") exercises when an episode occurs and note that emotional stress does also sometimes precipitate an episode and worsen breathing. They have no clear history of seasonal allergies or childhood asthma, though normal PFT does not rule out possible episodic bronchospasm; methacholine challenge could be considered at some point. Very subtle (questionable) cylindrical bronchiectasis in RML, subtle expiratory subsegmental air trapping, but this can also be seen in normal lungs. They have moderate ASHLYN with AHI 21 and received a new device after the recall, auto-CPAP 5-15 cmH2O via nasal pillows.    ROS:  A 12-system review was obtained and was negative with the exception of the symptoms endorsed in the history of present illness.    PMH:  remote smoker  inducible laryngeal obstruction  moderate obstructive sleep apnea with AHI 21 on auto-CPAP 5-15 cmH2O via nasal pillows  GERD  BMI 35.8  iron deficiency anemia  osteoarthritis s/p right TKA  chronic exertional dyspnea    PSH:  Past Surgical History:   Procedure Laterality Date     COLONOSCOPY       EXCISE MASS VAGINA Left 4/10/2015    Procedure: EXCISE MASS VAGINA;  Surgeon: Stanislav Byers MD;  Location: [facility] OR     GENITOURINARY SURGERY      Urethrial dilation     JOINT REPLACEMENT Right 07/2018     ORTHOPEDIC SURGERY      right rotator cuff, left achilles tendon repair, neuroma removed right foot, right knee arthroscopy,        Allergies:  Allergies   Allergen Reactions     Penicillins Rash and Hives     Versed [Midazolam] Other (See Comments)     Stopped breathing         Family HX:  Family History   Problem Relation Age of Onset     Macular Degeneration Mother      Osteoporosis Mother      Heart Disease " Father 49     Multiple Sclerosis Sister      Diabetes Paternal Uncle         heart issues     Cancer No family hx of      Coronary Artery Disease No family hx of        Social Hx:  Social History     Socioeconomic History     Marital status: Single     Spouse name: Not on file     Number of children: Not on file     Years of education: Not on file     Highest education level: Not on file   Occupational History     Not on file   Tobacco Use     Smoking status: Former     Packs/day: 0.00     Types: Cigarettes     Quit date: 1983     Years since quittin.3     Smokeless tobacco: Never   Vaping Use     Vaping Use: Never used   Substance and Sexual Activity     Alcohol use: Yes     Comment: occasional     Drug use: Yes     Types: Marijuana     Comment: daily only at night time for medical conditions     Sexual activity: Not Currently   Other Topics Concern     Parent/sibling w/ CABG, MI or angioplasty before 65F 55M? Not Asked   Social History Narrative     Not on file     Social Determinants of Health     Financial Resource Strain: Not on file   Food Insecurity: Not on file   Transportation Needs: Not on file   Physical Activity: Not on file   Stress: Not on file   Social Connections: Not on file   Intimate Partner Violence: Not on file   Housing Stability: Not on file       Current Meds:  Current Outpatient Medications   Medication Sig Dispense Refill     albuterol (PROAIR HFA/PROVENTIL HFA/VENTOLIN HFA) 108 (90 Base) MCG/ACT inhaler Inhale 2 puffs into the lungs every 4 hours as needed       cholecalciferol (VITAMIN D3) 5000 units (125 mcg) capsule Take 125 mcg by mouth daily       clonazePAM (KLONOPIN) 0.5 MG tablet Take 0.5 mg by mouth 2 times daily as needed       finasteride (PROSCAR) 5 MG tablet Take 5 mg by mouth daily       gabapentin (NEURONTIN) 300 MG capsule Take 3 capsules (900 mg) by mouth At Bedtime 270 capsule 0     Omega-3 Fatty Acids (OMEGA-3 FISH OIL) 1200 MG CAPS Take 1 capsule by mouth daily        omeprazole (PRILOSEC) 40 MG DR capsule TAKE 1 CAPSULE BY MOUTH EVERY DAY BEFORE A MEAL       pramipexole (MIRAPEX) 0.25 MG tablet Take 3 tablets (0.75 mg) by mouth At Bedtime 90 tablet 3     testosterone cypionate (DEPOTESTOSTERONE) 200 MG/ML injection Inject 70 mg Subcutaneous once a week on Fridays       UNABLE TO FIND Pt is using Medical cannabis         Physical Exam:  /86 (BP Location: Right arm, Patient Position: Chair, Cuff Size: Adult Large)   Pulse 70   Wt 100.5 kg (221 lb 9.6 oz)   LMP  (LMP Unknown)   SpO2 96%   BMI 35.77 kg/m    Gen: alert, oriented, no distress  HEENT: no cervical or supraclavicular lymphadenopathy  CV: RRR, no M/G/R  Resp: CTAB, no focal crackles or wheezes  Skin: no apparent rashes  Ext: no cyanosis, clubbing or edema  Neuro: alert, nonfocal    Labs:  reviewed    Imaging studies:  High-resolution chest CT (June 2021):  - images directly reviewed, formal interpretation follows:  FINDINGS:   LUNGS AND PLEURA: There has been interval resolution of the mosaic attenuation previously seen. There is mild bilateral tubular bronchiectasis most severe in the right middle lobe. Stable calcified granuloma right upper lobe. Minimal linear atelectasis or scarring in the lingula. No evidence for underlying interstitial lung disease. There is subsegmental air trapping on expiratory images. There is a tiny right pleural effusion.     MEDIASTINUM/AXILLAE: No adenopathy. Atherosclerotic change of the aorta with borderline aneurysmal dilatation of the ascending thoracic aorta measuring 4 cm. Heart size is normal. No pericardial effusion.     CORONARY ARTERY CALCIFICATION: Mild.     UPPER ABDOMEN: Stable small low-attenuation lesion in the right lobe of the liver likely a cyst. Tiny nonobstructing right renal stones. Partial visualization of presumed peripelvic cysts in the left kidney unchanged.     MUSCULOSKELETAL: There is degenerative change in the spine and both shoulders right  greater than left.     IMPRESSION:   1.  Resolution of mosaic attenuation.   2.  Subsegmental air trapping remains visible on expiratory images.   3.  Bilateral bronchiectasis.   4.  Borderline aneurysmal dilatation of the ascending thoracic aorta measuring 4 cm.     Pulmonary Function Testing  June 2022:  FVC 2.35 (72%)  FEV1 1.97 (77%)  FEV1/FVC 0.84  No bronchodilator response  RV 1.93 (93%)  TLC 4.29 (81%)  DLCOc 106%  Flow-volume loop is normal with no evidence of upper airway obstruciton

## 2023-01-17 ENCOUNTER — TELEPHONE (OUTPATIENT)
Dept: BEHAVIORAL HEALTH | Facility: CLINIC | Age: 68
End: 2023-01-17
Payer: MEDICARE

## 2023-01-17 ENCOUNTER — HOSPITAL ENCOUNTER (OUTPATIENT)
Dept: CARDIAC REHAB | Facility: HOSPITAL | Age: 68
Discharge: HOME | End: 2023-01-17
Attending: INTERNAL MEDICINE
Payer: MEDICARE

## 2023-01-17 PROCEDURE — G0239 OTH RESP PROC, GROUP: HCPCS

## 2023-01-17 NOTE — TELEPHONE ENCOUNTER
Writer left another voicemail encouraging a callback to reschedule today's 230pm appt. Pt also left voicemail wanting to contact Jose Alberto. Writer stated that pt can reach back out via mychart or phone call to schedule and potentially discuss reaching out to Jose Alberto once he is well.    Annika So  Care Coordinator  1.17

## 2023-01-19 ENCOUNTER — HOSPITAL ENCOUNTER (OUTPATIENT)
Dept: CARDIAC REHAB | Facility: HOSPITAL | Age: 68
Discharge: HOME | End: 2023-01-19
Attending: INTERNAL MEDICINE
Payer: MEDICARE

## 2023-01-19 PROCEDURE — G0239 OTH RESP PROC, GROUP: HCPCS

## 2023-01-24 ENCOUNTER — HOSPITAL ENCOUNTER (OUTPATIENT)
Dept: CARDIAC REHAB | Facility: HOSPITAL | Age: 68
Discharge: HOME | End: 2023-01-24
Attending: INTERNAL MEDICINE
Payer: MEDICARE

## 2023-01-24 PROCEDURE — G0239 OTH RESP PROC, GROUP: HCPCS

## 2023-01-31 ENCOUNTER — HOSPITAL ENCOUNTER (OUTPATIENT)
Dept: CARDIAC REHAB | Facility: HOSPITAL | Age: 68
Discharge: HOME | End: 2023-01-31
Attending: INTERNAL MEDICINE
Payer: MEDICARE

## 2023-01-31 PROCEDURE — G0239 OTH RESP PROC, GROUP: HCPCS

## 2023-02-02 ENCOUNTER — HOSPITAL ENCOUNTER (OUTPATIENT)
Dept: CARDIAC REHAB | Facility: HOSPITAL | Age: 68
Discharge: HOME | End: 2023-02-02
Attending: INTERNAL MEDICINE
Payer: MEDICARE

## 2023-02-02 PROCEDURE — G0239 OTH RESP PROC, GROUP: HCPCS

## 2023-02-07 ENCOUNTER — HOSPITAL ENCOUNTER (OUTPATIENT)
Dept: CARDIAC REHAB | Facility: HOSPITAL | Age: 68
Discharge: HOME | End: 2023-02-07
Attending: INTERNAL MEDICINE
Payer: MEDICARE

## 2023-02-07 PROCEDURE — 999N000104 HC STATISTIC NO CHARGE

## 2023-06-01 ENCOUNTER — HEALTH MAINTENANCE LETTER (OUTPATIENT)
Age: 68
End: 2023-06-01

## 2023-08-09 ENCOUNTER — OFFICE VISIT (OUTPATIENT)
Dept: PULMONOLOGY | Facility: CLINIC | Age: 68
End: 2023-08-09
Payer: MEDICARE

## 2023-08-09 VITALS
OXYGEN SATURATION: 93 % | SYSTOLIC BLOOD PRESSURE: 138 MMHG | DIASTOLIC BLOOD PRESSURE: 86 MMHG | HEART RATE: 84 BPM | WEIGHT: 217 LBS | BODY MASS INDEX: 35.02 KG/M2

## 2023-08-09 DIAGNOSIS — R91.1 SOLITARY PULMONARY NODULE: Primary | ICD-10-CM

## 2023-08-09 PROCEDURE — 99214 OFFICE O/P EST MOD 30 MIN: CPT | Performed by: INTERNAL MEDICINE

## 2023-08-09 NOTE — LETTER
"    8/9/2023         RE: Gregoria Stein  [address]  [place] [address]        Dear Colleague,    Thank you for referring your patient, Gregoria Stein, to the [facility]. Please see a copy of my visit note below.    Pulmonary Clinic Follow-up Visit    Assessment and Plan:   68 year old with a history of remote tobacco use, inducible laryngeal obstruction, moderate obstructive sleep apnea on APAP, GERD, iron deficiency anemia, osteoarthritis s/p right TKA, left shoulder and left knee injury/pain, and chronic exertional dyspnea, presenting for follow-up.     Chronic exertional dyspnea, inducible laryngeal obstruction, obstructive sleep apnea, solitary pulmonary nodule: Killian is struggling with a lot of anxiety and depression, including in relation to recent health issues, physical limitations due to left shoulder and left knee pain. [place]; of course they cannot use APAP while doing this, but I that is okay. Otherwise the APAP is going well. Chronic dyspnea but no recent worsening. Had some chest tightness with elevated D-dimer, with chest CTA at [facility] on 7/22/23 showing new 7-mm subpleural RLL nodule. Pulmonary rehab at [facility] seems to have been beneficial. APAP download: DreamStation 2, 5-15 cmH20, average pressure 8, residual AHI 4. Only used 28% of days last month due to [place]. Recall from prior visits that Killian likely has multifactorial dypspnea. They do have confirmed inducible laryngeal obstruction that leads to difficulty talking while walking, for example previously followed by SLP at [facility]. They have found benefit from yawning (\"hot potato mouth\") exercises when an episode occurs and note that emotional stress does also sometimes precipitate an episode and worsen breathing. They have no clear history of seasonal allergies or childhood asthma, though normal PFT does not rule out possible episodic " "bronchospasm; methacholine challenge could be considered at some point but not needed currently. Very subtle (questionable) cylindrical bronchiectasis in RML, subtle expiratory subsegmental air trapping, but this can also be seen in normal lungs. They have moderate ASHLYN with AHI 21 and received a new device after the recall, auto-CPAP 5-15 cmH2O via nasal pillows.     Plan:  - continue APAP via nasal pillows as detailed above  - continue to use yawning (\"hot potato mouth\") breaths to abort acute episodes of inducible laryngeal obstruction and has worked with SLP  - completed pulmonary rehabilitation at [facility]  - albuterol HFA as needed  - recommend remaining up to date with respiratory vaccinations  - follow up in 6 months with repeat chest CT to follow up 7-mm RLL nodule  - encouraged them to contact me with questions or concerning symptoms    Daniel Greer MD  Pulmonary and Critical Care Medicine  [facility]  Office [phone]  Pager [phone]  he/him    CCx: chronic exertional dyspnea, inducible laryngeal obstruction, obstructive sleep apnea, solitary pulmonary nodule    HPI: 68 year old with a history of remote tobacco use, inducible laryngeal obstruction, moderate obstructive sleep apnea on APAP, GERD, iron deficiency anemia, osteoarthritis s/p right TKA, left shoulder and left knee injury/pain, and chronic exertional dyspnea, presenting for follow-up. Killian is struggling with a lot of anxiety and depression, including in relation to recent health issues, physical limitations due to left shoulder and left knee pain. [place] are [place]; of course they cannot use APAP while doing this, but I that is okay. Otherwise the APAP is going well. Chronic dyspnea but no recent worsening. Had some chest tightness with elevated D-dimer, with chest CTA at [facility] on 7/22/23 showing new 7-mm subpleural RLL nodule. Pulmonary rehab at [facility] seems to have been " "beneficial. APAP download: DreamStation 2, 5-15 cmH20, average pressure 8, residual AHI 4. Only used 28% of days last month due to [place]. Recall from prior visits that Killian likely has multifactorial dypspnea. They do have confirmed inducible laryngeal obstruction that leads to difficulty talking while walking, for example previously followed by SLP at [facility]. They have found benefit from yawning (\"hot potato mouth\") exercises when an episode occurs and note that emotional stress does also sometimes precipitate an episode and worsen breathing. They have no clear history of seasonal allergies or childhood asthma, though normal PFT does not rule out possible episodic bronchospasm; methacholine challenge could be considered at some point but not needed currently. Very subtle (questionable) cylindrical bronchiectasis in RML, subtle expiratory subsegmental air trapping, but this can also be seen in normal lungs. They have moderate ASHLYN with AHI 21 and received a new device after the recall, auto-CPAP 5-15 cmH2O via nasal pillows.    ROS:  A 12-system review was obtained and was negative with the exception of the symptoms endorsed in the history of present illness.    PMH:  remote smoker  inducible laryngeal obstruction  moderate obstructive sleep apnea with AHI 21 on auto-CPAP 5-15 cmH2O via nasal pillows  GERD  BMI 35.0  iron deficiency anemia  osteoarthritis s/p right TKA  Left shoulder and left knee pain  chronic exertional dyspnea    PSH:  Past Surgical History:   Procedure Laterality Date     COLONOSCOPY       EXCISE MASS VAGINA Left 4/10/2015    Procedure: EXCISE MASS VAGINA;  Surgeon: Stanislav Byers MD;  Location: [facility] OR     GENITOURINARY SURGERY      Urethrial dilation     JOINT REPLACEMENT Right 07/2018     ORTHOPEDIC SURGERY      right rotator cuff, left achilles tendon repair, neuroma removed right foot, right knee arthroscopy,        Allergies:  Allergies   Allergen Reactions     Penicillins " Rash and Hives     Versed [Midazolam] Other (See Comments)     Stopped breathing         Family HX:  Family History   Problem Relation Age of Onset     Macular Degeneration Mother      Osteoporosis Mother      Heart Disease Father 49     Multiple Sclerosis Sister      Diabetes Paternal Uncle         heart issues     Cancer No family hx of      Coronary Artery Disease No family hx of        Social Hx:  Social History     Socioeconomic History     Marital status: Single     Spouse name: Not on file     Number of children: Not on file     Years of education: Not on file     Highest education level: Not on file   Occupational History     Not on file   Tobacco Use     Smoking status: Former     Packs/day: 0.00     Types: Cigarettes     Quit date: 1983     Years since quittin.9     Smokeless tobacco: Never   Vaping Use     Vaping Use: Never used   Substance and Sexual Activity     Alcohol use: Yes     Comment: occasional     Drug use: Yes     Types: Marijuana     Comment: daily only at night time for medical conditions     Sexual activity: Not Currently   Other Topics Concern     Parent/sibling w/ CABG, MI or angioplasty before 65F 55M? Not Asked   Social History Narrative     Not on file     Social Determinants of Health     Financial Resource Strain: Not on file   Food Insecurity: Not on file   Transportation Needs: Not on file   Physical Activity: Not on file   Stress: Not on file   Social Connections: Not on file   Intimate Partner Violence: Not on file   Housing Stability: Not on file       Current Meds:  Current Outpatient Medications   Medication Sig Dispense Refill     albuterol (PROAIR HFA/PROVENTIL HFA/VENTOLIN HFA) 108 (90 Base) MCG/ACT inhaler Inhale 2 puffs into the lungs every 4 hours as needed       clonazePAM (KLONOPIN) 0.5 MG tablet Take 0.5 mg by mouth 2 times daily as needed       gabapentin (NEURONTIN) 300 MG capsule Take 3 capsules (900 mg) by mouth At Bedtime 270 capsule 0     omeprazole  (PRILOSEC) 40 MG DR capsule TAKE 1 CAPSULE BY MOUTH EVERY DAY BEFORE A MEAL       pramipexole (MIRAPEX) 0.25 MG tablet Take 3 tablets (0.75 mg) by mouth At Bedtime 90 tablet 3     testosterone cypionate (DEPOTESTOSTERONE) 200 MG/ML injection Inject 70 mg Subcutaneous once a week on Fridays       UNABLE TO FIND Pt is using Medical cannabis         Physical Exam:  /86 (BP Location: Right arm, Patient Position: Chair, Cuff Size: Adult Large)   Pulse 84   Wt 98.4 kg (217 lb)   LMP  (LMP Unknown)   SpO2 93%   BMI 35.02 kg/m    Gen: alert, oriented, no distress  HEENT: no cervical or supraclavicular lymphadenopathy  CV: RRR, no M/G/R  Resp: CTAB, no focal crackles or wheezes  Skin: no apparent rashes  Ext: no cyanosis, clubbing or edema  Neuro: alert, nonfocal    Labs:  reviewed    Imaging studies:  Chest CTA (July 2023):  - images directly reviewed, formal interpretation follows:  FINDINGS: Patient was imaged with arm(s) at side, limiting study quality.     ANGIOGRAM CHEST: No acute pulmonary embolism.     Ectasia of the ascending thoracic aorta, measuring 3.5 cm, when measured in the orthogonal axis. Mild atheromatous disease.     LUNGS AND PLEURA: Trachea and large airways are patent. Mild mosaic attenuation of the pulmonary parenchyma, compatible with small vessels or small airways disease.     New, subpleural 7 mm nodule within the right lower lobe, axial image 244.     No pneumothorax.     No pleural effusion.     MEDIASTINUM/AXILLAE: No mediastinal/hilar adenopathy.     Thoracic esophagus is unremarkable.      No axillary lymphadenopathy.     Chest wall is unremarkable.     Mild cardiomegaly. Trace pericardial fluid.     CORONARY ARTERY CALCIFICATION: Mild.     UPPER ABDOMEN: Diffuse hepatic steatosis.     MUSCULOSKELETAL: No suspicious abnormality.     OTHER: No additionally suspicious abnormality.     IMPRESSION:   1. No acute pulmonary embolism.   2.  Mild mosaic attenuation of the pulmonary  parenchyma, compatible with small vessels or small airways disease.   3.  Mild cardiomegaly.   4.  Hepatic steatosis.   5.  New 7 mm right lower lobe pulmonary nodule. Follow-up is recommended according to Fleischner criteria, as detailed below.     TTE (September 2020):  Global and regional left ventricular function is normal with an EF of 60-65%.  Left ventricular diastolic function is normal.  Right ventricular function, chamber size, wall motion, and thickness are  normal.  Both atria appear normal.  Pulmonary artery systolic pressure is normal.  The inferior vena cava is normal.  No pericardial effusion is present.  No structural cause for symptom identified.    Pulmonary Function Testing  June 2022:  FVC 2.35 (72%)  FEV1 1.97 (77%)  FEV1/FVC 0.84  No bronchodilator response  RV 1.93 (93%)  TLC 4.29 (81%)  DLCOc 106%  Flow-volume loop is normal with no evidence of upper airway obstruciton    Time spent on chart and image review, meeting with the patient to obtain history, perform physical exam, discuss test results, diagnostic possibilities, further testing options, treatment plan options, and care coordination: 30 minutes      Again, thank you for allowing me to participate in the care of your patient.        Sincerely,        Daniel Greer MD

## 2023-08-09 NOTE — PROGRESS NOTES
"Pulmonary Clinic Follow-up Visit    Assessment and Plan:   68 year old with a history of remote tobacco use, inducible laryngeal obstruction, moderate obstructive sleep apnea on APAP, GERD, iron deficiency anemia, osteoarthritis s/p right TKA, left shoulder and left knee injury/pain, and chronic exertional dyspnea, presenting for follow-up.     Chronic exertional dyspnea, inducible laryngeal obstruction, obstructive sleep apnea, solitary pulmonary nodule: Killian is struggling with a lot of anxiety and depression, including in relation to recent health issues, physical limitations due to left shoulder and left knee pain. [place] are [place]; of course they cannot use APAP while doing this, but I that is okay. Otherwise the APAP is going well. Chronic dyspnea but no recent worsening. Had some chest tightness with elevated D-dimer, with chest CTA at [facility] on 7/22/23 showing new 7-mm subpleural RLL nodule. Pulmonary rehab at [facility] seems to have been beneficial. APAP download: DreamStation 2, 5-15 cmH20, average pressure 8, residual AHI 4. Only used 28% of days last month due to [place]. Recall from prior visits that Killian likely has multifactorial dypspnea. They do have confirmed inducible laryngeal obstruction that leads to difficulty talking while walking, for example previously followed by SLP at [facility]. They have found benefit from yawning (\"hot potato mouth\") exercises when an episode occurs and note that emotional stress does also sometimes precipitate an episode and worsen breathing. They have no clear history of seasonal allergies or childhood asthma, though normal PFT does not rule out possible episodic bronchospasm; methacholine challenge could be considered at some point but not needed currently. Very subtle (questionable) cylindrical bronchiectasis in RML, subtle expiratory subsegmental air trapping, but this can also be seen in normal lungs. They have moderate ASHLYN " "with AHI 21 and received a new device after the recall, auto-CPAP 5-15 cmH2O via nasal pillows.     Plan:  - continue APAP via nasal pillows as detailed above  - continue to use yawning (\"hot potato mouth\") breaths to abort acute episodes of inducible laryngeal obstruction and has worked with SLP  - completed pulmonary rehabilitation at [facility]  - albuterol HFA as needed  - recommend remaining up to date with respiratory vaccinations  - follow up in 6 months with repeat chest CT to follow up 7-mm RLL nodule  - encouraged them to contact me with questions or concerning symptoms    Daniel Greer MD  Pulmonary and Critical Care Medicine  [facility]  Office [phone]  Pager [phone]  he/him    CCx: chronic exertional dyspnea, inducible laryngeal obstruction, obstructive sleep apnea, solitary pulmonary nodule    HPI: 68 year old with a history of remote tobacco use, inducible laryngeal obstruction, moderate obstructive sleep apnea on APAP, GERD, iron deficiency anemia, osteoarthritis s/p right TKA, left shoulder and left knee injury/pain, and chronic exertional dyspnea, presenting for follow-up. Killian is struggling with a lot of anxiety and depression, including in relation to recent health issues, physical limitations due to left shoulder and left knee pain. They are [place]; of course they cannot use APAP while doing this, but I that is okay. Otherwise the APAP is going well. Chronic dyspnea but no recent worsening. Had some chest tightness with elevated D-dimer, with chest CTA at [facility] on 7/22/23 showing new 7-mm subpleural RLL nodule. Pulmonary rehab at [facility] seems to have been beneficial. APAP download: DreamStation 2, 5-15 cmH20, average pressure 8, residual AHI 4. Only used 28% of days last month due to [place]. Recall from prior visits that Killian likely has multifactorial dypspnea. They do have confirmed inducible laryngeal obstruction " "that leads to difficulty talking while walking, for example previously followed by SLP at [facility]. They have found benefit from yawning (\"hot potato mouth\") exercises when an episode occurs and note that emotional stress does also sometimes precipitate an episode and worsen breathing. They have no clear history of seasonal allergies or childhood asthma, though normal PFT does not rule out possible episodic bronchospasm; methacholine challenge could be considered at some point but not needed currently. Very subtle (questionable) cylindrical bronchiectasis in RML, subtle expiratory subsegmental air trapping, but this can also be seen in normal lungs. They have moderate ASHLYN with AHI 21 and received a new device after the recall, auto-CPAP 5-15 cmH2O via nasal pillows.    ROS:  A 12-system review was obtained and was negative with the exception of the symptoms endorsed in the history of present illness.    PMH:  remote smoker  inducible laryngeal obstruction  moderate obstructive sleep apnea with AHI 21 on auto-CPAP 5-15 cmH2O via nasal pillows  GERD  BMI 35.0  iron deficiency anemia  osteoarthritis s/p right TKA  Left shoulder and left knee pain  chronic exertional dyspnea    PSH:  Past Surgical History:   Procedure Laterality Date    COLONOSCOPY      EXCISE MASS VAGINA Left 4/10/2015    Procedure: EXCISE MASS VAGINA;  Surgeon: Stanislav Byers MD;  Location: [facility] OR    GENITOURINARY SURGERY      Urethrial dilation    JOINT REPLACEMENT Right 07/2018    ORTHOPEDIC SURGERY      right rotator cuff, left achilles tendon repair, neuroma removed right foot, right knee arthroscopy,        Allergies:  Allergies   Allergen Reactions    Penicillins Rash and Hives    Versed [Midazolam] Other (See Comments)     Stopped breathing         Family HX:  Family History   Problem Relation Age of Onset    Macular Degeneration Mother     Osteoporosis Mother     Heart Disease Father 49    Multiple Sclerosis Sister     Diabetes Paternal " Uncle         heart issues    Cancer No family hx of     Coronary Artery Disease No family hx of        Social Hx:  Social History     Socioeconomic History    Marital status: Single     Spouse name: Not on file    Number of children: Not on file    Years of education: Not on file    Highest education level: Not on file   Occupational History    Not on file   Tobacco Use    Smoking status: Former     Packs/day: 0.00     Types: Cigarettes     Quit date: 1983     Years since quittin.9    Smokeless tobacco: Never   Vaping Use    Vaping Use: Never used   Substance and Sexual Activity    Alcohol use: Yes     Comment: occasional    Drug use: Yes     Types: Marijuana     Comment: daily only at night time for medical conditions    Sexual activity: Not Currently   Other Topics Concern    Parent/sibling w/ CABG, MI or angioplasty before 65F 55M? Not Asked   Social History Narrative    Not on file     Social Determinants of Health     Financial Resource Strain: Not on file   Food Insecurity: Not on file   Transportation Needs: Not on file   Physical Activity: Not on file   Stress: Not on file   Social Connections: Not on file   Intimate Partner Violence: Not on file   Housing Stability: Not on file       Current Meds:  Current Outpatient Medications   Medication Sig Dispense Refill    albuterol (PROAIR HFA/PROVENTIL HFA/VENTOLIN HFA) 108 (90 Base) MCG/ACT inhaler Inhale 2 puffs into the lungs every 4 hours as needed      clonazePAM (KLONOPIN) 0.5 MG tablet Take 0.5 mg by mouth 2 times daily as needed      gabapentin (NEURONTIN) 300 MG capsule Take 3 capsules (900 mg) by mouth At Bedtime 270 capsule 0    omeprazole (PRILOSEC) 40 MG DR capsule TAKE 1 CAPSULE BY MOUTH EVERY DAY BEFORE A MEAL      pramipexole (MIRAPEX) 0.25 MG tablet Take 3 tablets (0.75 mg) by mouth At Bedtime 90 tablet 3    testosterone cypionate (DEPOTESTOSTERONE) 200 MG/ML injection Inject 70 mg Subcutaneous once a week on       UNABLE TO FIND  Pt is using Medical cannabis         Physical Exam:  /86 (BP Location: Right arm, Patient Position: Chair, Cuff Size: Adult Large)   Pulse 84   Wt 98.4 kg (217 lb)   LMP  (LMP Unknown)   SpO2 93%   BMI 35.02 kg/m    Gen: alert, oriented, no distress  HEENT: no cervical or supraclavicular lymphadenopathy  CV: RRR, no M/G/R  Resp: CTAB, no focal crackles or wheezes  Skin: no apparent rashes  Ext: no cyanosis, clubbing or edema  Neuro: alert, nonfocal    Labs:  reviewed    Imaging studies:  Chest CTA (July 2023):  - images directly reviewed, formal interpretation follows:  FINDINGS: Patient was imaged with arm(s) at side, limiting study quality.     ANGIOGRAM CHEST: No acute pulmonary embolism.     Ectasia of the ascending thoracic aorta, measuring 3.5 cm, when measured in the orthogonal axis. Mild atheromatous disease.     LUNGS AND PLEURA: Trachea and large airways are patent. Mild mosaic attenuation of the pulmonary parenchyma, compatible with small vessels or small airways disease.     New, subpleural 7 mm nodule within the right lower lobe, axial image 244.     No pneumothorax.     No pleural effusion.     MEDIASTINUM/AXILLAE: No mediastinal/hilar adenopathy.     Thoracic esophagus is unremarkable.      No axillary lymphadenopathy.     Chest wall is unremarkable.     Mild cardiomegaly. Trace pericardial fluid.     CORONARY ARTERY CALCIFICATION: Mild.     UPPER ABDOMEN: Diffuse hepatic steatosis.     MUSCULOSKELETAL: No suspicious abnormality.     OTHER: No additionally suspicious abnormality.     IMPRESSION:   1. No acute pulmonary embolism.   2.  Mild mosaic attenuation of the pulmonary parenchyma, compatible with small vessels or small airways disease.   3.  Mild cardiomegaly.   4.  Hepatic steatosis.   5.  New 7 mm right lower lobe pulmonary nodule. Follow-up is recommended according to Fleischner criteria, as detailed below.     TTE (September 2020):  Global and regional left ventricular function is  normal with an EF of 60-65%.  Left ventricular diastolic function is normal.  Right ventricular function, chamber size, wall motion, and thickness are  normal.  Both atria appear normal.  Pulmonary artery systolic pressure is normal.  The inferior vena cava is normal.  No pericardial effusion is present.  No structural cause for symptom identified.    Pulmonary Function Testing  June 2022:  FVC 2.35 (72%)  FEV1 1.97 (77%)  FEV1/FVC 0.84  No bronchodilator response  RV 1.93 (93%)  TLC 4.29 (81%)  DLCOc 106%  Flow-volume loop is normal with no evidence of upper airway obstruciton    Time spent on chart and image review, meeting with the patient to obtain history, perform physical exam, discuss test results, diagnostic possibilities, further testing options, treatment plan options, and care coordination: 30 minutes

## 2023-08-09 NOTE — PATIENT INSTRUCTIONS
It was good to see you in clinic today. This is what we discussed:    Continue CPAP as much as possible.  Stay active as much as possible.  I will see you in 6 months with repeat chest CT.  Contact me with questions or concerns.    Daniel Greer MD  Pulmonary and Critical Care Medicine  [facility]  Office [phone]  He/him

## 2023-09-14 NOTE — GROUP NOTE
BRAT diet=simple non-spicy foods.    Zofran for nausea and vomiting sent to pharmacy   Process Group Note    PATIENT'S NAME: Gregoria Stein  MRN:   [id number]  :   1955  ACCT. NUMBER: [id number]  DATE OF SERVICE: 20  START TIME:  1:00 PM  END TIME:  1:50 PM  FACILITATOR: Gabbie Davidson LPCC  TOPIC:  Process Group    Diagnoses:  309.81 (F43.10) Posttraumatic Stress Disorder (includes Posttraumatic Stress Disorder for Children 6 Years and Younger)  Without dissociative symptoms. Per client report that she has past diagnosis of this per her therapist who is now retired.  4. Other Diagnoses that is relevant to services:   296.33 (F33.2) Major Depressive Disorder, Recurrent Episode, Severe _ and With anxious distress.  5. Provisional Diagnosis:  309.81 (F43.10) Posttraumatic Stress Disorder (includes Posttraumatic Stress Disorder for Children 6 Years and Younger)  Without dissociative symptoms as evidenced by client reported hypervigilence, hyper arousal, decreased functioning and past history of trauma and diagnosis, she did not wish to disclose trauma at this time       55+ Program  TRACK: B-2    NUMBER OF PARTICIPANTS: 4    Telemedicine Visit: The patient's condition can be safely assessed and treated via synchronous audio and visual telemedicine encounter.      Reason for Telemedicine Visit: Services only offered telehealth and due to COVID-19    Originating Site (Patient Location): Patient's home    Distant Site (Provider Location): Provider Remote Setting    Consent:  The patient/guardian has verbally consented to: the potential risks and benefits of telemedicine (video visit) versus in person care; bill my insurance or make self-payment for services provided; and responsibility for payment of non-covered services.     Mode of Communication:  Video Conference via Localize Direct    As the provider I attest to compliance with applicable laws and regulations related to telemedicine.            Data:    Session content: At the start of this group, patients were invited to check in by  "identifying themselves, describing their current emotional status, and identifying issues to address in this group.   Area(s) of treatment focus addressed in this session included Symptom Management, Personal Safety and Community Resources/Discharge Planning.    Patient reported feeling \"really sad and disappointed about a medical appointment.\" Patient discussed working toward contacting their primary care provider. Patient identified taking each moment at a time as skills they will use to address their goal(s). Patient reported physical health may be a barrier to working toward their goal(s) and/or addressing mental health symptoms. Patient reported no safety concerns and/or self-injurious behaviors. Patient reported no substance use. Patient reported they are taking their medications as prescribed. Patient reported feeling proud that they shared their feelings with the group. Patient discussed not being able to finish the stress test on Monday with the treatment group.     Therapeutic Interventions/Treatment Strategies:  Psychotherapist offered support, feedback and validation and reinforced use of skills. Treatment modalities used include Motivational Interviewing and Cognitive Behavioral Therapy. Interventions include Coping Skills: Discussed use of self-soothe skills to decrease distress in the body and Assisted patient in identifying 1-2 healthy distraction skills to reduce overall distress, Mindfulness: Facilitated discussion of when/how to use mindfulness skills to benefit general health, mental health symptoms, and stressors and Symptoms Management: Promoted understanding of their diagnoses and how it impacts their functioning.    Assessment:    Patient response:   Patient responded to session by accepting feedback, listening, focusing on goals and being attentive    Possible barriers to participation / learning include: and no barriers identified    Health Issues:   Yes: Pain, Associated Psychological " Distress       Substance Use Review:   Substance Use: No active concerns identified.    Mental Status/Behavioral Observations  Appearance:   Appropriate   Eye Contact:   Good   Psychomotor Behavior: Normal   Attitude:   Cooperative   Orientation:   All  Speech   Rate / Production: Normal/ Responsive Normal    Volume:  Normal   Mood:    Anxious  Depressed  Normal Sad   Affect:    Appropriate   Thought Content:   Clear and Safety denies any current safety concerns including suicidal ideation, self-harm, and homicidal ideation  Thought Form:  Coherent  Logical     Insight:    Good     Plan:     Safety Plan: No current safety concerns identified.  Recommended that patient call 911 or go to the local ED should there be a change in any of these risk factors.     Barriers to treatment: None identified    Patient Contracts (see media tab):  None    Substance Use: Provided encouragement towards sobriety     Continue or Discharge: Patient will continue in 55+ Program (55+) as planned. Patient is likely to benefit from learning and using skills as they work toward the goals identified in their treatment plan.      Gabbie Davidson, [facility]  July 29, 2020

## 2024-01-27 ENCOUNTER — HEALTH MAINTENANCE LETTER (OUTPATIENT)
Age: 69
End: 2024-01-27

## 2024-01-31 ENCOUNTER — HOSPITAL ENCOUNTER (OUTPATIENT)
Dept: CT IMAGING | Facility: HOSPITAL | Age: 69
Discharge: HOME | End: 2024-01-31
Attending: INTERNAL MEDICINE | Admitting: INTERNAL MEDICINE
Payer: MEDICARE

## 2024-01-31 DIAGNOSIS — R91.1 SOLITARY PULMONARY NODULE: ICD-10-CM

## 2024-01-31 PROCEDURE — 71250 CT THORAX DX C-: CPT | Mod: ME

## 2024-02-01 ENCOUNTER — DOCUMENTATION ONLY (OUTPATIENT)
Dept: PULMONOLOGY | Facility: CLINIC | Age: 69
End: 2024-02-01
Payer: MEDICARE

## 2024-02-01 NOTE — PROGRESS NOTES
"Pulmonary Documentation    Reviewed the chest CT images and interpretation and sent Killian the following [email or web address] message:    Vince Daily,    I am writing to inform you of results of your chest CT. Fortunately, the tiny spot (\"nodule\") in the lower part of the right lung has completely resolved, suggesting that this was a small area of inflammation or mucus. Good news! Please feel free to reach out to me if you have questions or concerns.    Sincerely,  Berto Greer MD  Pulmonary and Critical Care Medicine  [facility]  Office [phone]  he/him  "

## 2024-02-01 NOTE — LETTER
"          2024    Dear Dr. Davis,    See below for documentation regarding Killian Stein ( 1955). Please feel free to call me at any time if needed.    Sincerely,    Daniel Greer MD  Pulmonary and Critical Care Medicine  [facility]  Cell [phone]  Office [phone]  Pager [phone]  he/him    Pulmonary Documentation    Reviewed the chest CT images and interpretation and sent Killian the following [employer] message:    Vince Daily,    I am writing to inform you of results of your chest CT. Fortunately, the tiny spot (\"nodule\") in the lower part of the right lung has completely resolved, suggesting that this was a small area of inflammation or mucus. Good news! Please feel free to reach out to me if you have questions or concerns.    Sincerely,  Berto Greer MD  Pulmonary and Critical Care Medicine  [facility]  Office [phone]  he/him        "

## 2024-02-05 ENCOUNTER — OFFICE VISIT (OUTPATIENT)
Dept: PULMONOLOGY | Facility: CLINIC | Age: 69
End: 2024-02-05
Attending: INTERNAL MEDICINE
Payer: MEDICARE

## 2024-02-05 VITALS
BODY MASS INDEX: 37.12 KG/M2 | OXYGEN SATURATION: 97 % | HEART RATE: 87 BPM | SYSTOLIC BLOOD PRESSURE: 134 MMHG | DIASTOLIC BLOOD PRESSURE: 88 MMHG | WEIGHT: 230 LBS

## 2024-02-05 DIAGNOSIS — J98.4 RESTRICTIVE LUNG DISEASE: ICD-10-CM

## 2024-02-05 DIAGNOSIS — J38.3 PARADOXICAL VOCAL CORD MOTION: Primary | ICD-10-CM

## 2024-02-05 DIAGNOSIS — G47.33 OSA (OBSTRUCTIVE SLEEP APNEA): ICD-10-CM

## 2024-02-05 PROCEDURE — 99214 OFFICE O/P EST MOD 30 MIN: CPT | Performed by: INTERNAL MEDICINE

## 2024-02-05 RX ORDER — NEEDLES, DISPOSABLE 25GX5/8"
NEEDLE, DISPOSABLE MISCELLANEOUS
COMMUNITY
Start: 2024-01-06 | End: 2024-05-18

## 2024-02-05 RX ORDER — HYDROCHLOROTHIAZIDE 12.5 MG/1
12.5 TABLET ORAL DAILY
Status: ON HOLD | COMMUNITY
End: 2024-05-29

## 2024-02-05 RX ORDER — CLONAZEPAM 1 MG/1
1 TABLET ORAL 2 TIMES DAILY PRN
COMMUNITY
Start: 2024-01-06

## 2024-02-05 NOTE — LETTER
"    2/5/2024         RE: Gregoria Stein  [address]  [place] [address]        Dear Colleague,    Thank you for referring your patient, Gregoria Stein, to the [facility]. Please see a copy of my visit note below.    Pulmonary Clinic Follow-up Visit    Assessment and Plan:   68 year old with a history of remote tobacco use, inducible laryngeal obstruction, moderate obstructive sleep apnea on APAP, GERD, iron deficiency anemia, osteoarthritis s/p right TKA, left shoulder and left knee injury/pain, and chronic exertional dyspnea, presenting for follow-up.     Chronic exertional dyspnea, inducible laryngeal obstruction, obstructive sleep apnea: The pulmonary nodule completely resolved on follow-up imaging, which is good news. Overall, Killian has remained stable, though following left shoulder surgery they were unable to use CPAP due to limited left arm mobility and inability to clean and apply CPAP, so they have not used CPAP since September. In that time they have noted worsening daytime fatigue and sleepiness. They are interested in discussing possible mandibular advancement device because they are considering transitioning to living in an  and being on the road more. I do not know which dental practices provide Susan or if it would be an appropriate therapeutic modality for Killian, so I will contact sleep medicine colleagues to get further input. Could also have them seen in sleep medicine clinic for discussion if needed. Recall from prior visits that Killian likely has multifactorial dypspnea. They do have confirmed inducible laryngeal obstruction that leads to difficulty talking while walking, for example previously followed by SLP at [facility]. They have found benefit from yawning (\"hot potato mouth\") exercises when an episode occurs and note that emotional stress does also sometimes precipitate an episode and worsen breathing. They have no clear history of seasonal " "allergies or childhood asthma, though normal PFT does not rule out possible episodic bronchospasm; methacholine challenge could be considered at some point but not needed currently. Very subtle (questionable) cylindrical bronchiectasis in RML, subtle expiratory subsegmental air trapping, but this can also be seen in normal lungs. They have moderate ASHLYN with AHI 21 and received a new device after the recall, auto-CPAP 5-15 cmH2O via nasal pillows.     Plan:  - restart use of APAP via nasal pillows as detailed above; they have been off of it since September following left shoulder surgery as detailed above  - I will contact sleep medicine to inquire about possible dental practices for MAD; this may require a sleep medicine visit for further discussion  - continue to use yawning (\"hot potato mouth\") breaths to abort acute episodes of inducible laryngeal obstruction and they have worked with SLP on this  - completed pulmonary rehabilitation at [facility]  - albuterol HFA as needed  - recommend remaining up to date with respiratory vaccinations  - follow up in 6 months  - encouraged them to contact me with questions or concerning symptoms    Daniel Greer MD  Pulmonary and Critical Care Medicine  [facility]  Office [phone]  Pager [phone]  he/him    CCx: Chronic exertional dyspnea, inducible laryngeal obstruction, obstructive sleep apnea    HPI: 68 year old with a history of remote tobacco use, inducible laryngeal obstruction, moderate obstructive sleep apnea on APAP, GERD, iron deficiency anemia, osteoarthritis s/p right TKA, left shoulder and left knee injury/pain, and chronic exertional dyspnea, presenting for follow-up. The pulmonary nodule completely resolved on follow-up imaging, which is good news. Overall, Killian has remained stable, though following left shoulder surgery they were unable to use CPAP due to limited left arm mobility and inability to clean and apply CPAP, so they " "have not used CPAP since September. In that time they have noted worsening daytime fatigue and sleepiness. They are interested in discussing possible mandibular advancement device because they are considering transitioning to living in an  and being on the road more. I do not know which dental practices provide Susan or if it would be an appropriate therapeutic modality for Killian, so I will contact sleep medicine colleagues to get further input. Could also have them seen in sleep medicine clinic for discussion if needed. Recall from prior visits that Killian likely has multifactorial dypspnea. They do have confirmed inducible laryngeal obstruction that leads to difficulty talking while walking, for example previously followed by SLP at [facility]. They have found benefit from yawning (\"hot potato mouth\") exercises when an episode occurs and note that emotional stress does also sometimes precipitate an episode and worsen breathing. They have no clear history of seasonal allergies or childhood asthma, though normal PFT does not rule out possible episodic bronchospasm; methacholine challenge could be considered at some point but not needed currently. Very subtle (questionable) cylindrical bronchiectasis in RML, subtle expiratory subsegmental air trapping, but this can also be seen in normal lungs. They have moderate ASHLYN with AHI 21 and received a new device after the recall, auto-CPAP 5-15 cmH2O via nasal pillows.    ROS:  A 12-system review was obtained and was negative with the exception of the symptoms endorsed in the history of present illness.    PMH:  remote tobacco use, inducible laryngeal obstruction, moderate obstructive sleep apnea on APAP, GERD, iron deficiency anemia, osteoarthritis s/p right TKA, left shoulder and left knee injury/pain, and chronic exertional dyspnea    PSH:  Past Surgical History:   Procedure Laterality Date     COLONOSCOPY       EXCISE MASS VAGINA Left 4/10/2015    Procedure: EXCISE MASS " VAGINA;  Surgeon: Stanislav Byers MD;  Location: [facility] OR     GENITOURINARY SURGERY      Urethrial dilation     JOINT REPLACEMENT Right 2018     ORTHOPEDIC SURGERY      right rotator cuff, left achilles tendon repair, neuroma removed right foot, right knee arthroscopy,        Allergies:  Allergies   Allergen Reactions     Midazolam Other (See Comments)     Stopped breathing    Other reaction(s): Apnea   Stopped breathing   Stopped breathing     Penicillins Rash and Hives       Family HX:  Family History   Problem Relation Age of Onset     Macular Degeneration Mother      Osteoporosis Mother      Heart Disease Father 49     Multiple Sclerosis Sister      Diabetes Paternal Uncle         heart issues     Cancer No family hx of      Coronary Artery Disease No family hx of        Social Hx:  Social History     Socioeconomic History     Marital status: Single     Spouse name: Not on file     Number of children: Not on file     Years of education: Not on file     Highest education level: Not on file   Occupational History     Not on file   Tobacco Use     Smoking status: Former     Packs/day: 0     Types: Cigarettes     Quit date: 1983     Years since quittin.4     Smokeless tobacco: Never   Vaping Use     Vaping Use: Never used   Substance and Sexual Activity     Alcohol use: Yes     Comment: occasional     Drug use: Yes     Types: Marijuana     Comment: daily only at night time for medical conditions     Sexual activity: Not Currently   Other Topics Concern     Parent/sibling w/ CABG, MI or angioplasty before 65F 55M? Not Asked   Social History Narrative     Not on file     Social Determinants of Health     Financial Resource Strain: Not on file   Food Insecurity: Not on file   Transportation Needs: Not on file   Physical Activity: Not on file   Stress: Not on file   Social Connections: Not on file   Interpersonal Safety: Not on file   Housing Stability: Not on file       Current Meds:  Current Outpatient  "Medications   Medication Sig Dispense Refill     albuterol (PROAIR HFA/PROVENTIL HFA/VENTOLIN HFA) 108 (90 Base) MCG/ACT inhaler Inhale 2 puffs into the lungs every 4 hours as needed       BD HYPODERMIC NEEDLE 18G X 1\" MISC INJECT ONCE PER WEEK       clonazePAM (KLONOPIN) 0.5 MG tablet Take 0.5 mg by mouth 2 times daily as needed       clonazePAM (KLONOPIN) 1 MG tablet        gabapentin (NEURONTIN) 300 MG capsule Take 3 capsules (900 mg) by mouth At Bedtime 270 capsule 0     hydrochlorothiazide (HYDRODIURIL) 12.5 MG tablet Take 12.5 mg by mouth daily       omeprazole (PRILOSEC) 40 MG DR capsule TAKE 1 CAPSULE BY MOUTH EVERY DAY BEFORE A MEAL       pramipexole (MIRAPEX) 0.25 MG tablet Take 3 tablets (0.75 mg) by mouth At Bedtime 90 tablet 3     testosterone cypionate (DEPOTESTOSTERONE) 200 MG/ML injection Inject 70 mg Subcutaneous once a week on Fridays       UNABLE TO FIND Pt is using Medical cannabis         Physical Exam:  /88 (BP Location: Left arm, Patient Position: Sitting, Cuff Size: Adult Regular)   Pulse 87   Wt 104.3 kg (230 lb)   LMP  (LMP Unknown)   SpO2 97%   BMI 37.12 kg/m    Gen: alert, oriented, no distress  HEENT: no cervical or supraclavicular lymphadenopathy  CV: RRR, no M/G/R  Resp: CTAB, no focal crackles or wheezes  Skin: no apparent rashes  Ext: no cyanosis, clubbing or edema  Neuro: alert, nonfocal    Labs:  reviewed    Imaging studies:  Chest CT (January 2024):  - images directly reviewed, formal interpretation follows:  FINDINGS:   LUNGS AND PLEURA: Previously seen nodule that was new in the right lower lobe has resolved. No new nodules. No infiltrates or effusions.     MEDIASTINUM/AXILLAE: No adenopathy or aneurysm.     CORONARY ARTERY CALCIFICATION: Trace.     UPPER ABDOMEN: No acute findings.     MUSCULOSKELETAL: No frankly destructive bony lesions.                                                                   IMPRESSION:   1.  Resolved right lower lobe nodule.     TTE " (September 2020):  Global and regional left ventricular function is normal with an EF of 60-65%.  Left ventricular diastolic function is normal.  Right ventricular function, chamber size, wall motion, and thickness are  normal.  Both atria appear normal.  Pulmonary artery systolic pressure is normal.  The inferior vena cava is normal.  No pericardial effusion is present.  No structural cause for symptom identified.     Pulmonary Function Testing  June 2022:  FVC 2.35 (72%)  FEV1 1.97 (77%)  FEV1/FVC 0.84  No bronchodilator response  RV 1.93 (93%)  TLC 4.29 (81%)  DLCOc 106%  Flow-volume loop is normal with no evidence of upper airway obstruciton    Time spent on chart and image review, meeting with the patient to obtain history, perform physical exam, discuss test results, diagnostic possibilities, further testing options, treatment plan options, and care coordination: 35 minutes      Again, thank you for allowing me to participate in the care of your patient.        Sincerely,        Daniel Greer MD

## 2024-02-05 NOTE — PATIENT INSTRUCTIONS
It was good to see you in clinic today. This is what we discussed:    Continue the CPAP.  I will contact sleep medicine about a possible dental practice for a mandibular advancement device (oral appliance).  I will see you in 6 months.  Contact me with questions or concerns.    Daniel Greer MD  Pulmonary and Critical Care Medicine  [facility]  Office [phone]  he/him

## 2024-02-05 NOTE — PROGRESS NOTES
"Pulmonary Clinic Follow-up Visit    Assessment and Plan:   68 year old with a history of remote tobacco use, inducible laryngeal obstruction, moderate obstructive sleep apnea on APAP, GERD, iron deficiency anemia, osteoarthritis s/p right TKA, left shoulder and left knee injury/pain, and chronic exertional dyspnea, presenting for follow-up.     Chronic exertional dyspnea, inducible laryngeal obstruction, obstructive sleep apnea: The pulmonary nodule completely resolved on follow-up imaging, which is good news. Overall, Killian has remained stable, though following left shoulder surgery they were unable to use CPAP due to limited left arm mobility and inability to clean and apply CPAP, so they have not used CPAP since September. In that time they have noted worsening daytime fatigue and sleepiness. They are interested in discussing possible mandibular advancement device because they are considering transitioning to living in an  and being on the road more. I do not know which dental practices provide Susan or if it would be an appropriate therapeutic modality for Killian, so I will contact sleep medicine colleagues to get further input. Could also have them seen in sleep medicine clinic for discussion if needed. Recall from prior visits that Killian likely has multifactorial dypspnea. They do have confirmed inducible laryngeal obstruction that leads to difficulty talking while walking, for example previously followed by SLP at [facility]. They have found benefit from yawning (\"hot potato mouth\") exercises when an episode occurs and note that emotional stress does also sometimes precipitate an episode and worsen breathing. They have no clear history of seasonal allergies or childhood asthma, though normal PFT does not rule out possible episodic bronchospasm; methacholine challenge could be considered at some point but not needed currently. Very subtle (questionable) cylindrical bronchiectasis in RML, subtle expiratory " "subsegmental air trapping, but this can also be seen in normal lungs. They have moderate ASHLYN with AHI 21 and received a new device after the recall, auto-CPAP 5-15 cmH2O via nasal pillows.     Plan:  - restart use of APAP via nasal pillows as detailed above; they have been off of it since September following left shoulder surgery as detailed above  - I will contact sleep medicine to inquire about possible dental practices for MAD; this may require a sleep medicine visit for further discussion  - continue to use yawning (\"hot potato mouth\") breaths to abort acute episodes of inducible laryngeal obstruction and they have worked with SLP on this  - completed pulmonary rehabilitation at [facility]  - albuterol HFA as needed  - recommend remaining up to date with respiratory vaccinations  - follow up in 6 months  - encouraged them to contact me with questions or concerning symptoms    Daniel Greer MD  Pulmonary and Critical Care Medicine  [facility]  Office [phone]  Pager [phone]  he/him    CCx: Chronic exertional dyspnea, inducible laryngeal obstruction, obstructive sleep apnea    HPI: 68 year old with a history of remote tobacco use, inducible laryngeal obstruction, moderate obstructive sleep apnea on APAP, GERD, iron deficiency anemia, osteoarthritis s/p right TKA, left shoulder and left knee injury/pain, and chronic exertional dyspnea, presenting for follow-up. The pulmonary nodule completely resolved on follow-up imaging, which is good news. Overall, Killian has remained stable, though following left shoulder surgery they were unable to use CPAP due to limited left arm mobility and inability to clean and apply CPAP, so they have not used CPAP since September. In that time they have noted worsening daytime fatigue and sleepiness. They are interested in discussing possible mandibular advancement device because they are considering transitioning to living in an  and being on the " "road more. I do not know which dental practices provide Susan or if it would be an appropriate therapeutic modality for Killian, so I will contact sleep medicine colleagues to get further input. Could also have them seen in sleep medicine clinic for discussion if needed. Recall from prior visits that Killian likely has multifactorial dypspnea. They do have confirmed inducible laryngeal obstruction that leads to difficulty talking while walking, for example previously followed by SLP at [facility]. They have found benefit from yawning (\"hot potato mouth\") exercises when an episode occurs and note that emotional stress does also sometimes precipitate an episode and worsen breathing. They have no clear history of seasonal allergies or childhood asthma, though normal PFT does not rule out possible episodic bronchospasm; methacholine challenge could be considered at some point but not needed currently. Very subtle (questionable) cylindrical bronchiectasis in RML, subtle expiratory subsegmental air trapping, but this can also be seen in normal lungs. They have moderate ASHLYN with AHI 21 and received a new device after the recall, auto-CPAP 5-15 cmH2O via nasal pillows.    ROS:  A 12-system review was obtained and was negative with the exception of the symptoms endorsed in the history of present illness.    PMH:  remote tobacco use, inducible laryngeal obstruction, moderate obstructive sleep apnea on APAP, GERD, iron deficiency anemia, osteoarthritis s/p right TKA, left shoulder and left knee injury/pain, and chronic exertional dyspnea    PSH:  Past Surgical History:   Procedure Laterality Date    COLONOSCOPY      EXCISE MASS VAGINA Left 4/10/2015    Procedure: EXCISE MASS VAGINA;  Surgeon: Stanislav Byers MD;  Location: [facility] OR    GENITOURINARY SURGERY      Urethrial dilation    JOINT REPLACEMENT Right 07/2018    ORTHOPEDIC SURGERY      right rotator cuff, left achilles tendon repair, neuroma removed right foot, right knee " "arthroscopy,        Allergies:  Allergies   Allergen Reactions    Midazolam Other (See Comments)     Stopped breathing    Other reaction(s): Apnea   Stopped breathing   Stopped breathing    Penicillins Rash and Hives       Family HX:  Family History   Problem Relation Age of Onset    Macular Degeneration Mother     Osteoporosis Mother     Heart Disease Father 49    Multiple Sclerosis Sister     Diabetes Paternal Uncle         heart issues    Cancer No family hx of     Coronary Artery Disease No family hx of        Social Hx:  Social History     Socioeconomic History    Marital status: Single     Spouse name: Not on file    Number of children: Not on file    Years of education: Not on file    Highest education level: Not on file   Occupational History    Not on file   Tobacco Use    Smoking status: Former     Packs/day: 0     Types: Cigarettes     Quit date: 1983     Years since quittin.4    Smokeless tobacco: Never   Vaping Use    Vaping Use: Never used   Substance and Sexual Activity    Alcohol use: Yes     Comment: occasional    Drug use: Yes     Types: Marijuana     Comment: daily only at night time for medical conditions    Sexual activity: Not Currently   Other Topics Concern    Parent/sibling w/ CABG, MI or angioplasty before 65F 55M? Not Asked   Social History Narrative    Not on file     Social Determinants of Health     Financial Resource Strain: Not on file   Food Insecurity: Not on file   Transportation Needs: Not on file   Physical Activity: Not on file   Stress: Not on file   Social Connections: Not on file   Interpersonal Safety: Not on file   Housing Stability: Not on file       Current Meds:  Current Outpatient Medications   Medication Sig Dispense Refill    albuterol (PROAIR HFA/PROVENTIL HFA/VENTOLIN HFA) 108 (90 Base) MCG/ACT inhaler Inhale 2 puffs into the lungs every 4 hours as needed      BD HYPODERMIC NEEDLE 18G X 1\" MISC INJECT ONCE PER WEEK      clonazePAM (KLONOPIN) 0.5 MG tablet " Take 0.5 mg by mouth 2 times daily as needed      clonazePAM (KLONOPIN) 1 MG tablet       gabapentin (NEURONTIN) 300 MG capsule Take 3 capsules (900 mg) by mouth At Bedtime 270 capsule 0    hydrochlorothiazide (HYDRODIURIL) 12.5 MG tablet Take 12.5 mg by mouth daily      omeprazole (PRILOSEC) 40 MG DR capsule TAKE 1 CAPSULE BY MOUTH EVERY DAY BEFORE A MEAL      pramipexole (MIRAPEX) 0.25 MG tablet Take 3 tablets (0.75 mg) by mouth At Bedtime 90 tablet 3    testosterone cypionate (DEPOTESTOSTERONE) 200 MG/ML injection Inject 70 mg Subcutaneous once a week on Fridays      UNABLE TO FIND Pt is using Medical cannabis         Physical Exam:  /88 (BP Location: Left arm, Patient Position: Sitting, Cuff Size: Adult Regular)   Pulse 87   Wt 104.3 kg (230 lb)   LMP  (LMP Unknown)   SpO2 97%   BMI 37.12 kg/m    Gen: alert, oriented, no distress  HEENT: no cervical or supraclavicular lymphadenopathy  CV: RRR, no M/G/R  Resp: CTAB, no focal crackles or wheezes  Skin: no apparent rashes  Ext: no cyanosis, clubbing or edema  Neuro: alert, nonfocal    Labs:  reviewed    Imaging studies:  Chest CT (January 2024):  - images directly reviewed, formal interpretation follows:  FINDINGS:   LUNGS AND PLEURA: Previously seen nodule that was new in the right lower lobe has resolved. No new nodules. No infiltrates or effusions.     MEDIASTINUM/AXILLAE: No adenopathy or aneurysm.     CORONARY ARTERY CALCIFICATION: Trace.     UPPER ABDOMEN: No acute findings.     MUSCULOSKELETAL: No frankly destructive bony lesions.                                                                   IMPRESSION:   1.  Resolved right lower lobe nodule.     TTE (September 2020):  Global and regional left ventricular function is normal with an EF of 60-65%.  Left ventricular diastolic function is normal.  Right ventricular function, chamber size, wall motion, and thickness are  normal.  Both atria appear normal.  Pulmonary artery systolic pressure is  normal.  The inferior vena cava is normal.  No pericardial effusion is present.  No structural cause for symptom identified.     Pulmonary Function Testing  June 2022:  FVC 2.35 (72%)  FEV1 1.97 (77%)  FEV1/FVC 0.84  No bronchodilator response  RV 1.93 (93%)  TLC 4.29 (81%)  DLCOc 106%  Flow-volume loop is normal with no evidence of upper airway obstruciton    Time spent on chart and image review, meeting with the patient to obtain history, perform physical exam, discuss test results, diagnostic possibilities, further testing options, treatment plan options, and care coordination: 35 minutes

## 2024-04-19 ENCOUNTER — PATIENT OUTREACH (OUTPATIENT)
Dept: ONCOLOGY | Facility: CLINIC | Age: 69
End: 2024-04-19
Payer: MEDICARE

## 2024-04-19 ENCOUNTER — TRANSCRIBE ORDERS (OUTPATIENT)
Dept: OTHER | Age: 69
End: 2024-04-19

## 2024-04-19 DIAGNOSIS — C50.919 BREAST CANCER (H): Primary | ICD-10-CM

## 2024-04-19 NOTE — PROGRESS NOTES
New Patient Oncology Nurse Navigator Note     Referring provider: Self     Referred to (specialty:) Medical Oncology     Requested provider (if applicable): Dr. Jeaneth Linares     Date Referral Received: April 19, 2024     Evaluation for:  C50.919 (ICD-10-CM) - Breast cancer (H)     Clinical History (per Nurse review of records provided):      Patient identifies as transgender male and has done so for 50+ years. He met with Dr. Martin Ruano at [facility] and was scheduled for top surgery. During the workup for that Killian had bilateral screening mammograms on 3/13/24 and an architectural distortion was identified in the left breast on the CC view laterally at middle depth.     Diagnostic imaging followed on 3/13/24.  MAMMOGRAPHIC FINDINGS: Left full-field digital diagnostic mammogram performed. There are scattered areas of fibroglandular density. Images evaluated with the assistance of CAD. Breast tomosynthesis was used in interpretation. On additional views, the architectural distortion in the left breast persists and is associated with an irregular shape, spiculated margin, equal density mass in the left breast 1:00 position at posterior depth.   ULTRASOUND FINDINGS: Targeted sonographic images of the left breast 1:00 position 6 cm from nipple demonstrates a 1.3 x 1.1 x 1.3 cm irregular shape, spiculated margin, parallel, hypoechoic mass with posterior shadowing and adjacent vascularity. This corresponds the left breast mammographic mass with architectural distortion. Targeted sonographic images of the left axilla demonstrates normal morphology, nonenlarged left axillary lymph nodes.    3/13/24 - Case: [id number]  A.  Breast, left, 1:00, 6cm FN, needle core biopsy:    Invasive ductal carcinoma, Tomasz grade 1  Estrogen Receptor (ER): Positive (80%, moderate)  Progesterone Receptor (ID): Positive (10% moderate)  HER2 by Immunohistochemistry - Negative    The patient had top surgery scheduled with Dr. Ruano at [facility] for gender dysphoria. This has been cancelled.     3/26/24 - Consult with Dr. Markos Wang for top surgery due to breast cancer.    4/15/24 - Dr. Anuja Vernon consult 2nd opinion reconstruction   Of note, please see Anayeli Benavides, note from 4/18/24 regarding patient's mental health.    4/19 14:08 - Telephoned and spoke with Killian. He would like to specifically see Dr. Jeaneth Linares. He will keep his breast and plastic surgery with prior providers. Writer will watch for something sooner with Dr. Linares, but call transferred to New Patient Scheduling to finalize 5/13 at 1:00 with Dr. Linares.    Writer will watch for something sooner. Patient may benefit from endocrine therapy prior to surgery.    Patient resides in [place].     Records Location: Care Everywhere, Media, and See Bookmarked material     Records Needed:   Breast imaging for past 5 years

## 2024-04-25 DIAGNOSIS — G47.33 OSA (OBSTRUCTIVE SLEEP APNEA): Primary | ICD-10-CM

## 2024-05-05 NOTE — TELEPHONE ENCOUNTER
Imaging Received  May 9, 2024 11:11 AM ABT   Action: Breast Images from  received and email sent to  Imaging team to resolve breast images to PACS.     Action May 9, 2024 11:12 AM ABT   Action Taken Response from , no GC test  records found     RECORDS STATUS - BREAST    RECORDS REQUESTED FROM:    DATE REQUESTED:    NOTES DETAILS STATUS   OFFICE NOTE from referring provider SELF    OFFICE NOTE from medical oncologist [facility] 07/09/15: Dr. Stanislav Byers   OFFICE NOTE from surgeon [facility] 04/15/24: Dr. Anuja Vernon     MEDICATION LIST [facility]    LABS     PATHOLOGY REPORTS  (Tissue diagnosis, Stage, ER/MS percentage positive and intensity of staining, HER2 IHC, FISH, and all biopsies from breast and any distant metastasis)                 Re  - [facility]  [employer] [address]: [id number]   GENONOMIC TESTING     TYPE:   (Next Generation Sequencing, including Foundation One testing, and Oncotype score) [facility]  -     IMAGING (NEED IMAGES & REPORT)     MRI Req  - HP HP:  24: MR Breast   MAMMO PACS/Req  - HP HP:  23    PACS:  24-16   ULTRASOUND Req  - [facility]:  23: US Breast

## 2024-05-09 ENCOUNTER — LAB REQUISITION (OUTPATIENT)
Dept: LAB | Facility: CLINIC | Age: 69
End: 2024-05-09
Payer: MEDICARE

## 2024-05-09 PROCEDURE — 88321 CONSLTJ&REPRT SLD PREP ELSWR: CPT | Performed by: PATHOLOGY

## 2024-05-13 ENCOUNTER — PATIENT OUTREACH (OUTPATIENT)
Dept: ONCOLOGY | Facility: CLINIC | Age: 69
End: 2024-05-13

## 2024-05-13 ENCOUNTER — ONCOLOGY VISIT (OUTPATIENT)
Dept: ONCOLOGY | Facility: CLINIC | Age: 69
End: 2024-05-13
Attending: INTERNAL MEDICINE
Payer: MEDICARE

## 2024-05-13 ENCOUNTER — PRE VISIT (OUTPATIENT)
Dept: ONCOLOGY | Facility: CLINIC | Age: 69
End: 2024-05-13
Payer: MEDICARE

## 2024-05-13 VITALS
HEIGHT: 66 IN | SYSTOLIC BLOOD PRESSURE: 163 MMHG | WEIGHT: 222.4 LBS | BODY MASS INDEX: 35.74 KG/M2 | DIASTOLIC BLOOD PRESSURE: 97 MMHG | RESPIRATION RATE: 16 BRPM | OXYGEN SATURATION: 94 % | HEART RATE: 79 BPM

## 2024-05-13 DIAGNOSIS — Z17.0 MALIGNANT NEOPLASM OF OVERLAPPING SITES OF LEFT BREAST IN FEMALE, ESTROGEN RECEPTOR POSITIVE (H): Primary | ICD-10-CM

## 2024-05-13 DIAGNOSIS — C50.812 MALIGNANT NEOPLASM OF OVERLAPPING SITES OF LEFT BREAST IN FEMALE, ESTROGEN RECEPTOR POSITIVE (H): Primary | ICD-10-CM

## 2024-05-13 PROCEDURE — G0463 HOSPITAL OUTPT CLINIC VISIT: HCPCS | Performed by: INTERNAL MEDICINE

## 2024-05-13 PROCEDURE — 99205 OFFICE O/P NEW HI 60 MIN: CPT | Performed by: INTERNAL MEDICINE

## 2024-05-13 RX ORDER — ANASTROZOLE 1 MG/1
1 TABLET ORAL DAILY
COMMUNITY
Start: 2024-04-30 | End: 2025-04-30

## 2024-05-13 ASSESSMENT — PAIN SCALES - GENERAL: PAINLEVEL: MILD PAIN (3)

## 2024-05-13 NOTE — LETTER
5/13/2024         RE: Gregoria Stein  [address]  [place] [address]        Dear Colleague,    Thank you for referring your patient, Gregoria Stein, to the [facility]. Please see a copy of my visit note below.    Oncology Consultation and Second Opinion:      Reason:  new left breast cancer    HPI:    Clinical/anatomic stage IIA (cT2, cN0, cM0) invasive ductal carcinoma of the upper-outer quadrant of the left breast  - Diagnosed 3/13/2024 via left breast biopsy  - ER (positive - 80%), WA (positive - 10%), HER2 (low, score of 1+ by IHC)  - Tomasz grade I histology on biopsy   - Tumor size: 2.7 cm by MRI    HISTORY OF PRESENT ILLNESS:  Killian (formerly Gregoria at birth) Sarita is a very pleasant gentleman who is evaluated as a new consultation for LEFT breast cancer.     Patient was evaluated for  top  surgery and as part of the evaluation a left breast abnormality was found. Screening mammogram in March 2024 revealed an area of architectural distortion in the left breast 6 oclock position.  Diagnostic mammogram revealed 1.3 cm mass.  A biopsy revealed invasive breast cancer ER ++ WA + HER2 negative.  MRI imaging revealed no other specific foci of disease and there was no clinical or radiographic evidence of lymphadenopathy.  He met with Dr. Martin Ruano at [facility] and was scheduled for top surgery.  By breast MRI, this measured 2.7 cm.  Lymph nodes appeared normal.      Killian comes in today for consultation.  He is scheduled for bilateral mastectomy in June 2024.  He recently started anastrazole.  He is tolerating it reasonably well.  Occasional hot flashes.  He has ongoing athralgias, myalgias.      Overall, Killian has been feeling very overwhelmed.  He went out on diability to a work related injury last Sept; this is when his first top surgery had been planned.  This was delayed and not rescheduled.  With the new diagnosis of left breast cancer, Killian has been  feeling more overwhelmed, which has brought up other layers of his prior PTSD.  The recent diagnosis has contributed to significant turmoil in his life. He has some support in terms of  but not much in terms of family or friend support for post surgery recovery. He describes one set of trans-individuals who he meets with regularly that have provided some support.    Has not noticed other new lumps or bumps on his body. No previous history of cancer. No unintentional weight loss.     ROS: 10 point ROS neg other than the symptoms noted above in the HPI.       PMH:       Pelvic floor weakness   Status post total knee replacement, right   Thoracic aortic aneurysm without rupture (HRC)    Controlled substance agreement signed   Gender dysphoria   Generalized anxiety disorder (HRC)   PTSD (post-traumatic stress disorder) (HRC)   RLS (restless legs syndrome)   ASHLYN (obstructive sleep apnea)   Severe episode of recurrent major depressive disorder, without psychotic features (HRC)   Financial difficulties  Primary hypertension (HRC)   Malignant neoplasm of upper-outer quadrant of left female breast (HRC)   Gender dysphoria in adult   Invasive ductal carcinoma of breast, stage 1, left (HRC)   Malignant neoplasm of female breast (HRC)     Past Surgical History: eye surgery, R knee surgery s/p total knee arthroplasty 7/19/18     FAMILY HISTORY:  His mother is living without any history of malignancy. His father passed away with a history of myocardial infarction. He is one brother and two sisters, no history of cancer. There is a maternal uncle that passed away with a history of lung cancer. He does not have children.  Has not seen genetics or had testing yet.      SOCIAL HISTORY:  Retired registered nurse.   Also has worked at [employer].   Quit smoking greater than 30 years ago.   Denies any frequent/recent history of ethanol overuse.    Current Outpatient Medications   Medication Sig Dispense Refill  "    albuterol (PROAIR HFA/PROVENTIL HFA/VENTOLIN HFA) 108 (90 Base) MCG/ACT inhaler Inhale 2 puffs into the lungs every 4 hours as needed       BD HYPODERMIC NEEDLE 18G X 1\" MISC INJECT ONCE PER WEEK       clonazePAM (KLONOPIN) 0.5 MG tablet Take 0.5 mg by mouth 2 times daily as needed       clonazePAM (KLONOPIN) 1 MG tablet        gabapentin (NEURONTIN) 300 MG capsule Take 3 capsules (900 mg) by mouth At Bedtime 270 capsule 0     hydrochlorothiazide (HYDRODIURIL) 12.5 MG tablet Take 12.5 mg by mouth daily       omeprazole (PRILOSEC) 40 MG DR capsule TAKE 1 CAPSULE BY MOUTH EVERY DAY BEFORE A MEAL       pramipexole (MIRAPEX) 0.25 MG tablet Take 3 tablets (0.75 mg) by mouth At Bedtime 90 tablet 3     testosterone cypionate (DEPOTESTOSTERONE) 200 MG/ML injection Inject 70 mg Subcutaneous once a week on Fridays       UNABLE TO FIND Pt is using Medical cannabis       No current facility-administered medications for this visit.     PE:   Gen : well appearing  HEENT:  no icterus  NECK: supple  CV :rrr   Lungs: clear  Chest: symmetric breasts with ecchymoses over left breast, I am unable to appreciate discrete mass, no axillary adenopathy appreciated  Abd: soft, nt nd + bs  Ext : no edema    Reviewed labs, imaging and pathology as outlined below.      Diagnostic imaging followed on 3/13/24.  MAMMOGRAPHIC FINDINGS: Left full-field digital diagnostic mammogram performed. There are scattered areas of fibroglandular density. Images evaluated with the assistance of CAD. Breast tomosynthesis was used in interpretation. On additional views, the architectural distortion in the left breast persists and is associated with an irregular shape, spiculated margin, equal density mass in the left breast 1:00 position at posterior depth.   ULTRASOUND FINDINGS: Targeted sonographic images of the left breast 1:00 position 6 cm from nipple demonstrates a 1.3 x 1.1 x 1.3 cm irregular shape, spiculated margin, parallel, hypoechoic mass with " posterior shadowing and adjacent vascularity. This corresponds the left breast mammographic mass with architectural distortion. Targeted sonographic images of the left axilla demonstrates normal morphology, nonenlarged left axillary lymph nodes.     3/13/24 - Case: [id number]  A.  Breast, left, 1:00, 6cm FN, needle core biopsy:   Invasive ductal carcinoma, Warren grade 1  Estrogen Receptor (ER): Positive (80%, moderate)  Progesterone Receptor (WI): Positive (10% moderate)  HER2 by Immunohistochemistry - Negative     BREAST MRI:    RIGHT BREAST: There is scattered fibroglandular tissue.  There is minimal background parenchymal enhancement. No suspicious enhancing mass or nonmass enhancement. No enlarged lymph nodes identified in the right axilla. There is no right internal mammary chain lymphadenopathy.     LEFT BREAST: There is scattered fibroglandular tissue.  There is minimal background parenchymal enhancement. In the left breast 1:00 position at middle to posterior depth, there is a 1.4 x 2.7 x 2.6 (transverse by AP by cc) enhancing mass with surrounding nonmass enhancement with biopsy clip internally in the superior portion (axial image 58 of 128 and sagittal image 47 of 180). No enlarged lymph nodes identified in the left axilla. There is no left internal mammary chain lymphadenopathy.     A/P:  69 y/o transmale here with a new LEFT breast cancer clinical T2N0 Er + WI + HER2 negative, prognostic staging 1A, scheduled for upcoming surgery.      Breast cancer - I reviewed with Killian that all of our breast cancer treatment is curative in intent.  We discussed by older staging classifications, he technically has a stage 2 breast cancer.  Prognostic staging is 1A.      I discussed with him that the typical plan is surgical resection with lumpectomy and radiation vs mastectomy.  He is opting for bilateral mastectomy, and will need SNLB on the left.  I do not anticipate he will need radiation.  Surgery is planned in  early June.    We discussed that I think it is unlikely he would need chemotherapy.  We discussed using genomic testing with Oncotype Dx to determine risk of recurrence.  Given delays in surgery, I advised this be sent off of his biopsy.      He is on anastrazole as an antiestrogen.  I discussed with him the role of antiestrogen therapy and agree with anastrazole for five years.  We reviewed the side effects.      2. Bone health - given use of anastrazole, he will eventually need baseline dexa scan.    3. Transmasculine - discussed his use of testosterone and reviewed plan.      4. Coping - he is having a lot of anxiety.  He is working with multiple healthcare providers.  He met with [facility] to discuss ongoing support.      5. He would benefit from genetics evaluation.  We will rediscuss this at our next visit.    6. HTN - he works closely with Dr Davis.  Will follow .      Jeaneth Linares MD     60 min were spent in reviewing records, counseling and coordinating care.

## 2024-05-13 NOTE — NURSING NOTE
"Oncology Rooming Note    May 13, 2024 1:21 PM   Gregoria Stein is a 68 year old adult who presents for:    Chief Complaint   Patient presents with    Breast Cancer     New Eval     Initial Vitals: BP (!) 163/97   Pulse 79   Resp 16   Ht 1.676 m (5' 6\")   Wt 100.9 kg (222 lb 6.4 oz)   LMP  (LMP Unknown)   SpO2 94%   BMI 35.90 kg/m   Estimated body mass index is 35.9 kg/m  as calculated from the following:    Height as of this encounter: 1.676 m (5' 6\").    Weight as of this encounter: 100.9 kg (222 lb 6.4 oz). Body surface area is 2.17 meters squared.  Mild Pain (3) Comment: Data Unavailable   No LMP recorded (lmp unknown). Patient is postmenopausal.  Allergies reviewed: Yes  Medications reviewed: Yes    Medications: Medication refills not needed today.  Pharmacy name entered into Pager: [phone] [address]    Frailty Screening:   Is the patient here for a new oncology consult visit in cancer care? 2. No      Clinical concerns: none       Guadalupe West MA             "

## 2024-05-14 ENCOUNTER — PATIENT OUTREACH (OUTPATIENT)
Dept: ONCOLOGY | Facility: CLINIC | Age: 69
End: 2024-05-14
Payer: MEDICARE

## 2024-05-14 NOTE — PROGRESS NOTES
"[facility]: Cancer Care                                                                                          Spoke louise Daily to apologize for a scheduling error where he was told Dr. Linares had requested that he see Dr. Parks for his next appointment in June.   \"It was confusing because it takes a lot to find someone you feel safe with and I thought maybe this wasn't the place anymore\". Explained how the error occur and writer was able to have it corrected immediately. Reassured him that Dr. Linares does plan to provide his care if he is still comfortable with that plan.   He was very appreciative of the call and confirms he does want to continue at [facility] and Dr. Linares.     Letty Garcia MSN, RN ,OCN  Lead RN Care Coordinator - [facility]  [phone]       "

## 2024-05-16 NOTE — PROGRESS NOTES
"[facility]: Cancer Care Initial Note                                    Discussion with Patient:                                                      Met with Killian to introduce role of RNCC, provide contact information and answer questions.   At the time of visit Killian was emotional and discussed the stress of finding breast cancer prior to his \"top surgery\". He has little support outside of his , Chevy, and Mental Health agent, Mahogany Sharma. He is overwhelmed at the though of future Mammograms, these have not been \"on my radar and it will be traumatic for me to have them\" Reviewed his plan to have bilateral mastectomy /top surgery, he will not need Mammograms if he goes this route. He will likely only need future imaging if new symptoms arise, however this will be determined after surgery and final pathology.   Most of visit entailed active listening and support.   Killian agrees to have Oncotype sent on his tissue, he is unsure if he would pursue chemotherapy if recommended. He has asked that results not be shared with him until his follow-up appointment with Dr. Linares.   Killian expressed relief at \"finding Dr. Linares\" , he describes great difficulty in finding medical care/providers he feels safe with. \"Coming here its like it's not a second thought to anyone that I am different\". Stressed that he should always feel safe in our clinic and if something changes he should notify writer or Dr. Linares in order to address the situation.   Authorization to discuss PHI for filled out, he does not have friend or family support therefore only listed his Mental Health agent and Cadi Waver case working as contacts:    Mental Health - Mahogany Sharma [phone]  Cadi Waiver  - Chevy [phone]    New patient folder provided.    Surgery scheduled in June, plan for follow-up with Oncotype after surgery       Assessment:                                                      Initial  Current living arrangement:: " I live alone  Informal Support system:: Other ( and Mental Health agent)  Equipment Currently Used at Home: none  Bed or wheelchair confined:: No  Mobility Status: Independent  Transportation means:: Regular car  Medication adherence problem (GOAL):: No  Knowledgeable about how to use meds:: No  Medication side effects suspected:: No  Referrals Placed: None  Pain Score: No Pain (0)    Plan of Care Education Review:   Assessment completed with:: Patient    Plan of Care Education   Yearly learning assessment completed?: Yes (see Education tab)  Diagnosis:: Breast Cancer  Does patient understand diagnosis?: Yes  Tx plan/regimen:: Surgery - send Oncotype - TBD  Does patient understand treatment plan/regimen?: Yes  Plan of Care:: MD follow-up appointment    Evaluation of Learning  Patient Education Provided: Yes  Readiness:: Acceptance  Method:: Booklet/Handout  Response:: Verbalizes understanding           Intervention/Education provided during outreach:                                                     Answered questions to their stated satisfaction  Patient to follow up as scheduled at next appt  Patient to call/[employer] message with updates  Confirmed patient has clinic and triage numbers    Letty Garcia MSN, RN ,OCN  Lead RN Care Coordinator - [facility]  [phone]

## 2024-05-17 ENCOUNTER — HOSPITAL ENCOUNTER (EMERGENCY)
Facility: CLINIC | Age: 69
Discharge: HOME | End: 2024-05-18
Attending: PSYCHIATRY & NEUROLOGY | Admitting: PSYCHIATRY & NEUROLOGY
Payer: MEDICARE

## 2024-05-17 DIAGNOSIS — F64.9 GENDER DYSPHORIA: ICD-10-CM

## 2024-05-17 DIAGNOSIS — R45.851: ICD-10-CM

## 2024-05-17 DIAGNOSIS — F43.0 ACUTE REACTION TO SITUATIONAL STRESS: ICD-10-CM

## 2024-05-17 PROBLEM — F33.9 RECURRENT MAJOR DEPRESSION (H): Status: ACTIVE | Noted: 2024-05-17

## 2024-05-17 LAB
ALBUMIN SERPL BCG-MCNC: 4.5 G/DL (ref 3.5–5.2)
ALP SERPL-CCNC: 74 U/L (ref 40–150)
ALT SERPL W P-5'-P-CCNC: 27 U/L (ref 0–70)
AMPHETAMINES UR QL SCN: ABNORMAL
ANION GAP SERPL CALCULATED.3IONS-SCNC: 11 MMOL/L (ref 7–15)
AST SERPL W P-5'-P-CCNC: 23 U/L (ref 0–45)
BARBITURATES UR QL SCN: ABNORMAL
BASOPHILS # BLD AUTO: 0 10E3/UL (ref 0–0.2)
BASOPHILS NFR BLD AUTO: 0 %
BENZODIAZ UR QL SCN: ABNORMAL
BILIRUB SERPL-MCNC: 0.5 MG/DL
BUN SERPL-MCNC: 14.6 MG/DL (ref 8–23)
BZE UR QL SCN: ABNORMAL
CALCIUM SERPL-MCNC: 8.7 MG/DL (ref 8.8–10.2)
CANNABINOIDS UR QL SCN: ABNORMAL
CHLORIDE SERPL-SCNC: 103 MMOL/L (ref 98–107)
CREAT SERPL-MCNC: 1.16 MG/DL (ref 0.51–1.17)
DEPRECATED HCO3 PLAS-SCNC: 25 MMOL/L (ref 22–29)
EGFRCR SERPLBLD CKD-EPI 2021: 52 ML/MIN/1.73M2
EOSINOPHIL # BLD AUTO: 0.3 10E3/UL (ref 0–0.7)
EOSINOPHIL NFR BLD AUTO: 4 %
ERYTHROCYTE [DISTWIDTH] IN BLOOD BY AUTOMATED COUNT: 13.1 % (ref 10–15)
FENTANYL UR QL: ABNORMAL
GLUCOSE SERPL-MCNC: 105 MG/DL (ref 70–99)
HCT VFR BLD AUTO: 53.9 % (ref 35–53)
HGB BLD-MCNC: 18.5 G/DL (ref 13.3–17.7)
IMM GRANULOCYTES # BLD: 0 10E3/UL
IMM GRANULOCYTES NFR BLD: 0 %
LYMPHOCYTES # BLD AUTO: 1.7 10E3/UL (ref 0.8–5.3)
LYMPHOCYTES NFR BLD AUTO: 23 %
MCH RBC QN AUTO: 30 PG (ref 26.5–33)
MCHC RBC AUTO-ENTMCNC: 34.3 G/DL (ref 31.5–36.5)
MCV RBC AUTO: 88 FL (ref 78–100)
MONOCYTES # BLD AUTO: 0.5 10E3/UL (ref 0–1.3)
MONOCYTES NFR BLD AUTO: 7 %
NEUTROPHILS # BLD AUTO: 4.9 10E3/UL (ref 1.6–8.3)
NEUTROPHILS NFR BLD AUTO: 66 %
NRBC # BLD AUTO: 0 10E3/UL
NRBC BLD AUTO-RTO: 0 /100
OPIATES UR QL SCN: ABNORMAL
PCP QUAL URINE (ROCHE): ABNORMAL
PLATELET # BLD AUTO: 202 10E3/UL (ref 150–450)
POTASSIUM SERPL-SCNC: 4 MMOL/L (ref 3.4–5.3)
PROT SERPL-MCNC: 7 G/DL (ref 6.4–8.3)
RBC # BLD AUTO: 6.16 10E6/UL (ref 3.8–5.9)
SODIUM SERPL-SCNC: 139 MMOL/L (ref 135–145)
TSH SERPL DL<=0.005 MIU/L-ACNC: 4.7 UIU/ML (ref 0.3–4.2)
WBC # BLD AUTO: 7.4 10E3/UL (ref 4–11)

## 2024-05-17 PROCEDURE — 80307 DRUG TEST PRSMV CHEM ANLYZR: CPT | Mod: GZ | Performed by: PSYCHIATRY & NEUROLOGY

## 2024-05-17 PROCEDURE — 99285 EMERGENCY DEPT VISIT HI MDM: CPT | Performed by: PSYCHIATRY & NEUROLOGY

## 2024-05-17 PROCEDURE — 84443 ASSAY THYROID STIM HORMONE: CPT | Performed by: PSYCHIATRY & NEUROLOGY

## 2024-05-17 PROCEDURE — 36415 COLL VENOUS BLD VENIPUNCTURE: CPT | Performed by: PSYCHIATRY & NEUROLOGY

## 2024-05-17 PROCEDURE — 84439 ASSAY OF FREE THYROXINE: CPT | Performed by: PSYCHIATRY & NEUROLOGY

## 2024-05-17 PROCEDURE — 87635 SARS-COV-2 COVID-19 AMP PRB: CPT | Performed by: PSYCHIATRY & NEUROLOGY

## 2024-05-17 PROCEDURE — 82040 ASSAY OF SERUM ALBUMIN: CPT | Performed by: PSYCHIATRY & NEUROLOGY

## 2024-05-17 PROCEDURE — 85025 COMPLETE CBC W/AUTO DIFF WBC: CPT | Performed by: PSYCHIATRY & NEUROLOGY

## 2024-05-17 RX ORDER — ANASTROZOLE 1 MG/1
1 TABLET ORAL DAILY
Status: DISCONTINUED | OUTPATIENT
Start: 2024-05-18 | End: 2024-05-18

## 2024-05-17 RX ORDER — GABAPENTIN 300 MG/1
900 CAPSULE ORAL AT BEDTIME
Status: DISCONTINUED | OUTPATIENT
Start: 2024-05-17 | End: 2024-05-18 | Stop reason: HOSPADM

## 2024-05-17 RX ORDER — HYDROCHLOROTHIAZIDE 12.5 MG/1
12.5 TABLET ORAL AT BEDTIME
Status: DISCONTINUED | OUTPATIENT
Start: 2024-05-17 | End: 2024-05-18 | Stop reason: HOSPADM

## 2024-05-17 RX ORDER — HYDROCHLOROTHIAZIDE 12.5 MG/1
12.5 TABLET ORAL DAILY
Status: DISCONTINUED | OUTPATIENT
Start: 2024-05-18 | End: 2024-05-17

## 2024-05-17 RX ORDER — CLONAZEPAM 1 MG/1
1 TABLET ORAL 2 TIMES DAILY
Status: DISCONTINUED | OUTPATIENT
Start: 2024-05-17 | End: 2024-05-18

## 2024-05-17 ASSESSMENT — COLUMBIA-SUICIDE SEVERITY RATING SCALE - C-SSRS
3. HAVE YOU BEEN THINKING ABOUT HOW YOU MIGHT KILL YOURSELF?: YES
2. HAVE YOU ACTUALLY HAD ANY THOUGHTS OF KILLING YOURSELF IN THE PAST MONTH?: YES
4. HAVE YOU HAD THESE THOUGHTS AND HAD SOME INTENTION OF ACTING ON THEM?: YES
1. IN THE PAST MONTH, HAVE YOU WISHED YOU WERE DEAD OR WISHED YOU COULD GO TO SLEEP AND NOT WAKE UP?: YES

## 2024-05-17 ASSESSMENT — ACTIVITIES OF DAILY LIVING (ADL)
ADLS_ACUITY_SCORE: 35
ADLS_ACUITY_SCORE: 35

## 2024-05-18 ENCOUNTER — TELEPHONE (OUTPATIENT)
Dept: BEHAVIORAL HEALTH | Facility: CLINIC | Age: 69
End: 2024-05-18
Payer: MEDICARE

## 2024-05-18 VITALS
DIASTOLIC BLOOD PRESSURE: 80 MMHG | SYSTOLIC BLOOD PRESSURE: 148 MMHG | RESPIRATION RATE: 18 BRPM | OXYGEN SATURATION: 94 % | TEMPERATURE: 98.2 F | HEART RATE: 76 BPM

## 2024-05-18 LAB
SARS-COV-2 RNA RESP QL NAA+PROBE: NEGATIVE
T4 FREE SERPL-MCNC: 1.27 NG/DL (ref 0.9–1.7)

## 2024-05-18 PROCEDURE — 250N000013 HC RX MED GY IP 250 OP 250 PS 637: Performed by: EMERGENCY MEDICINE

## 2024-05-18 PROCEDURE — 99205 OFFICE O/P NEW HI 60 MIN: CPT | Performed by: CLINICAL NURSE SPECIALIST

## 2024-05-18 PROCEDURE — 99417 PROLNG OP E/M EACH 15 MIN: CPT | Performed by: CLINICAL NURSE SPECIALIST

## 2024-05-18 PROCEDURE — 250N000013 HC RX MED GY IP 250 OP 250 PS 637: Performed by: STUDENT IN AN ORGANIZED HEALTH CARE EDUCATION/TRAINING PROGRAM

## 2024-05-18 PROCEDURE — 250N000013 HC RX MED GY IP 250 OP 250 PS 637: Performed by: PSYCHIATRY & NEUROLOGY

## 2024-05-18 RX ORDER — ACETAMINOPHEN 500 MG
1000 TABLET ORAL ONCE
Status: COMPLETED | OUTPATIENT
Start: 2024-05-18 | End: 2024-05-18

## 2024-05-18 RX ORDER — ACETAMINOPHEN 325 MG/1
975 TABLET ORAL ONCE
Status: COMPLETED | OUTPATIENT
Start: 2024-05-18 | End: 2024-05-18

## 2024-05-18 RX ORDER — CLONAZEPAM 1 MG/1
1-2 TABLET ORAL 2 TIMES DAILY PRN
Status: DISCONTINUED | OUTPATIENT
Start: 2024-05-18 | End: 2024-05-18 | Stop reason: HOSPADM

## 2024-05-18 RX ORDER — ANASTROZOLE 1 MG/1
1 TABLET ORAL DAILY
Status: DISCONTINUED | OUTPATIENT
Start: 2024-05-18 | End: 2024-05-18

## 2024-05-18 RX ORDER — HYDROXYZINE HYDROCHLORIDE 25 MG/1
25 TABLET, FILM COATED ORAL 3 TIMES DAILY PRN
Status: DISCONTINUED | OUTPATIENT
Start: 2024-05-18 | End: 2024-05-18 | Stop reason: HOSPADM

## 2024-05-18 RX ORDER — CLONAZEPAM 1 MG/1
1 TABLET ORAL ONCE
Status: COMPLETED | OUTPATIENT
Start: 2024-05-18 | End: 2024-05-18

## 2024-05-18 RX ORDER — CLONAZEPAM 1 MG/1
1 TABLET ORAL 2 TIMES DAILY PRN
Status: DISCONTINUED | OUTPATIENT
Start: 2024-05-18 | End: 2024-05-18

## 2024-05-18 RX ORDER — ANASTROZOLE 1 MG/1
1 TABLET ORAL DAILY
Status: DISCONTINUED | OUTPATIENT
Start: 2024-05-19 | End: 2024-05-18 | Stop reason: HOSPADM

## 2024-05-18 RX ORDER — PANTOPRAZOLE SODIUM 40 MG/1
40 TABLET, DELAYED RELEASE ORAL
Status: DISCONTINUED | OUTPATIENT
Start: 2024-05-18 | End: 2024-05-18 | Stop reason: HOSPADM

## 2024-05-18 RX ADMIN — ACETAMINOPHEN 1000 MG: 500 TABLET ORAL at 17:49

## 2024-05-18 RX ADMIN — GABAPENTIN 900 MG: 300 CAPSULE ORAL at 00:29

## 2024-05-18 RX ADMIN — PANTOPRAZOLE SODIUM 40 MG: 40 TABLET, DELAYED RELEASE ORAL at 17:52

## 2024-05-18 RX ADMIN — ANASTROZOLE 1 MG: 1 TABLET, COATED ORAL at 17:50

## 2024-05-18 RX ADMIN — HYDROCHLOROTHIAZIDE 12.5 MG: 12.5 TABLET ORAL at 00:30

## 2024-05-18 RX ADMIN — PRAMIPEXOLE DIHYDROCHLORIDE 0.75 MG: 0.25 TABLET ORAL at 00:30

## 2024-05-18 RX ADMIN — PANTOPRAZOLE SODIUM 40 MG: 40 TABLET, DELAYED RELEASE ORAL at 08:42

## 2024-05-18 RX ADMIN — ACETAMINOPHEN 975 MG: 325 TABLET, FILM COATED ORAL at 08:42

## 2024-05-18 RX ADMIN — CLONAZEPAM 1 MG: 1 TABLET ORAL at 09:14

## 2024-05-18 RX ADMIN — CLONAZEPAM 1 MG: 1 TABLET ORAL at 08:21

## 2024-05-18 ASSESSMENT — ACTIVITIES OF DAILY LIVING (ADL)
ADLS_ACUITY_SCORE: 35

## 2024-05-18 ASSESSMENT — COLUMBIA-SUICIDE SEVERITY RATING SCALE - C-SSRS
ATTEMPT SINCE LAST CONTACT: NO
SUICIDE, SINCE LAST CONTACT: NO
TOTAL  NUMBER OF ABORTED OR SELF INTERRUPTED ATTEMPTS SINCE LAST CONTACT: NO
TOTAL  NUMBER OF INTERRUPTED ATTEMPTS SINCE LAST CONTACT: NO
LETHALITY/MEDICAL DAMAGE CODE MOST LETHAL ACTUAL ATTEMPT: NO PHYSICAL DAMAGE OR VERY MINOR PHYSICAL DAMAGE
6. HAVE YOU EVER DONE ANYTHING, STARTED TO DO ANYTHING, OR PREPARED TO DO ANYTHING TO END YOUR LIFE?: NO
LETHALITY/MEDICAL DAMAGE CODE MOST LETHAL POTENTIAL ATTEMPT: BEHAVIOR NOT LIKELY TO RESULT IN INJURY
1. SINCE LAST CONTACT, HAVE YOU WISHED YOU WERE DEAD OR WISHED YOU COULD GO TO SLEEP AND NOT WAKE UP?: NO
2. HAVE YOU ACTUALLY HAD ANY THOUGHTS OF KILLING YOURSELF?: NO

## 2024-05-18 NOTE — ED TRIAGE NOTES
Reporting increase anxiety and depression, diagnosed with cancer two months ago    Suicidal thoughts, wanted to walk to the bridge last night. Patient tangential and highly anxious in triage.     Patient got a termination of her MA today which is also causing a significant amount of anxiety
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
## 2024-05-18 NOTE — PLAN OF CARE
Gregoria Stein  May 17, 2024  Plan of Care Hand-off Note     Patient Care Path: inpatient mental health    Plan for Care:   Pt presents to ED for worsening suicidal thoughts with a plan to jump from the [address] bridge. Pt did not act on these thoughts but notes concerns for their safety at this time. See initial assessment section for further details regarding pt presentation and concerns. Pt agreeable to admission at this time. Pt may be a candidate for discharge prior to hospitilization if symptoms stabilize and SI is mitigated with planning for a safe discharge home.    Identified Goals and Safety Issues: IPMH unless symptoms are mitigated in ED. Pt hopeful they will feel better in Ed over the weekend. In 2021, pt tied bed sheet around neck while on unit at [facility].    Overview:  n/a n/a          Legal Status: Legal Status at Admission: Voluntary/Patient has signed consent for treatment    Psychiatry Consult: ordered       Updated rn, attending  regarding plan of care.           JEFF Marin

## 2024-05-18 NOTE — TELEPHONE ENCOUNTER
*NO REGIONS*    [place]:  [place]  Access Inpatient Bed Call Log 5/18/2024 @7:07 AM   Intake has called facilities that have not updated their bed status within the last 12 hours.     ADULTS:     [facility] is posting 0 beds.               [facility] is posting 0 beds. [phone]; per call at 7:07 am to Monique, they are at cap.    [facility] is posting 0 beds. [phone]               [facility] is posting 0 beds. [phone]; per call at 7:09 am to [place], she will ask her charge nurse to call intake back re: bed avail.    [facility] is posting 0 beds.    [facility] is posting 0 beds. [phone]    [facility] (These are Young Adult beds, 18-28) is posting 3 beds. Negative COVID test required, no recent or significant aggression, violence, or sexual assault. [phone]; Per call at 7:11 am, left vm asking for call back re: bed avail.  Pt not age appropriate.   [facility] is posting 0 beds. [phone]             [facility] is posting 1 bed. [phone]   12:07 AM Per Antonio @ capacity until 830 AM tomorrow.       Pt remains on work list pending appropriate bed availability.      12:17 PM Called [facility] for an update on pts review. RN states all open beds have been filled by their ED.
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
## 2024-05-18 NOTE — PROGRESS NOTES
The patient slept 3 to 4 hours. PT had a hard time falling asleep. A safety check is completed every 15 minutes. No respiratory distress was noted or observed. The patient is still in bed, sleeping.will continue to monitor PT.

## 2024-05-18 NOTE — MEDICATION SCRIBE - ADMISSION MEDICATION HISTORY
"Medication Scribe Admission Medication History    Admission medication history is complete. The information provided in this note is only as accurate as the sources available at the time of the update.    Information Source(s): Patient, Hospital records, and [facility] via in-person    Pertinent Information: Patient reports he takes Cannabis as a tincture, he has not taken testosterone in 8 days.    Changes made to PTA medication list:  Added: None  Deleted: Clonazepam 0.5 mg BID PRN.  Changed: Clonazepam to 1 mg BID PRN    Allergies reviewed with patient and updates made in EHR: yes    Medication History Completed By: Gigi Brown MD 5/18/2024 8:21 AM    PTA Med List   Medication Sig Last Dose    albuterol (PROAIR HFA/PROVENTIL HFA/VENTOLIN HFA) 108 (90 Base) MCG/ACT inhaler Inhale 2 puffs into the lungs every 4 hours as needed Past Week at unknown    anastrozole (ARIMIDEX) 1 MG tablet Take 1 tablet by mouth daily 5/17/2024 at am    BD HYPODERMIC NEEDLE 18G X 1\" MISC INJECT ONCE PER WEEK Unknown at unknown    clonazePAM (KLONOPIN) 1 MG tablet Take 1 mg by mouth 2 times daily as needed for anxiety May take up to 3 mg 5/17/2024 at pm    gabapentin (NEURONTIN) 300 MG capsule Take 3 capsules (900 mg) by mouth At Bedtime 5/17/2024 at pm    hydrochlorothiazide (HYDRODIURIL) 12.5 MG tablet Take 12.5 mg by mouth daily 5/17/2024 at am    omeprazole (PRILOSEC) 40 MG DR capsule TAKE 1 CAPSULE BY MOUTH EVERY DAY BEFORE A MEAL Past Week at unknown    pramipexole (MIRAPEX) 0.25 MG tablet Take 3 tablets (0.75 mg) by mouth At Bedtime 5/17/2024 at pm    testosterone cypionate (DEPOTESTOSTERONE) 200 MG/ML injection Inject 70 mg Subcutaneous once a week on Fridays Past Week at unknown    UNABLE TO FIND Pt is using Medical cannabis Past Week at unknown      "

## 2024-05-18 NOTE — PROGRESS NOTES
IP MH Referral Acuity Rating Score (RARS)    LMHP complete at referral to IP MH, with DEC; and, daily while awaiting IP MH placement. Call score to PPS.  CRITERIA SCORING   New 74 HH and Involuntary for IP MH (not adolescent) 0/1   Boarding over 24 hours 0/1   Vulnerable adult at least 55+ with multiple co morbidities; or, Patient age 11 or under 1/1   Suicide ideation without relief of precipitating factors 1/1   Current plan for suicide 1/1   Current plan for homicide 0/1   Imminent risk or actual attempt to seriously harm another without relief of factors precipitating the attempt 0/1   Severe dysfunction in daily living (ex: complete neglect for self care, extreme disruption in vegetative function, extreme deterioration in social interactions) 1/1   Recent (last 2 weeks) or current physical aggression in the ED 0/1   Restraints or seclusion episode in ED 0/1   Verbal aggression, agitation, yelling, etc., while in the ED 0/1   Active psychosis with psychomotor agitation or catatonia 0/1   Need for constant or near constant redirection (from leaving, from others, etc).  0/1   Intrusive or disruptive behaviors 0/1   TOTAL Acuity Total Score: 4

## 2024-05-18 NOTE — TELEPHONE ENCOUNTER
R:  [place]  Access Inpatient Bed Call Log 5/18/2024 12:00 AM   Intake has called facilities that have not updated their bed status within the last 12 hours.       [facility] is posting 0 beds.               [facility] is posting 0 beds. [phone]. Called @ 12:12AM, no answer and call ended in busy tone/unable to leave    [facility] is posting 0 beds. [phone]               [facility] is posting 0 beds. [phone]. Per Luis Mukherjee @ 12:18AM, low acuity bed - Faxed clinical @  12:50AM   [facility] is posting 0 beds. [phone]    [facility] (These are Young Adult beds, 18-28) is posting 3 beds. Negative COVID test required, no recent or significant aggression, violence, or sexual assault. [phone]  [facility] is posting 0 beds. [phone]             [facility] through [facility] is posting 1 bed. [phone]. Per Lu @ 12:19AM, they are at capacity            Awaiting callback from [facility]

## 2024-05-18 NOTE — ED NOTES
Pt was noted to be anxious and in pain. Tylenol and Klonopin administered. He was observed crying and feeling bad about his life. Reports he is going through a lot in his life. Some of the stressors identified were, lack of family support, medical diagnosis of cancer, not sure about his insurance situation, unemployment and many more. He is asking if he can be discharged. SW updated. Currently pt denies SI/HI, A/V hallucinations. He is med compliant. No behavior concerns.

## 2024-05-18 NOTE — ED PROVIDER NOTES
"ED Provider Note  [facility]      History     Chief Complaint   Patient presents with    Anxiety    Suicidal     HPI  Gregoria Stein is a 68 year old trans masculine adult who     Per DEC :    Patient presents to the emergency department at the recommendation from their primary care provider.  Patient has been having worsening suicidal ideation, with thoughts of jumping off the [place] bridge.  Patient did not act on these thoughts, and instead sought out the emergency department for safety.  Patient continues to endorse suicidal ideation.  Patient's primary stressor is a recent breast cancer diagnosis, and also receiving a letter in the mail that their MA is being canceled.  Since, patient has been very depressed, has not been eating or sleeping well, and has been isolating in their apartment.  Patient is taking their medication as prescribed.  There are no substance use concerns at this time.  Patient is recommended for admission, and will refer to mental health unit.    Past Medical History  Past Medical History:   Diagnosis Date    Anxiety     Arthritis     C spine, thumbs bilateral, feet, shoulders, knees    Depressive disorder     Gastro-oesophageal reflux disease     intermittent    Labial irritation     labial mass    Other chronic pain     feet, knees, shoulders, full spine, thumbs    PTSD (post-traumatic stress disorder)     Restless leg syndrome      Past Surgical History:   Procedure Laterality Date    COLONOSCOPY      EXCISE MASS VAGINA Left 4/10/2015    Procedure: EXCISE MASS VAGINA;  Surgeon: Stanislav Byers MD;  Location: [facility] OR    GENITOURINARY SURGERY      Urethrial dilation    JOINT REPLACEMENT Right 07/2018    ORTHOPEDIC SURGERY      right rotator cuff, left achilles tendon repair, neuroma removed right foot, right knee arthroscopy,      anastrozole (ARIMIDEX) 1 MG tablet  BD HYPODERMIC NEEDLE 18G X 1\" MISC  clonazePAM (KLONOPIN) 0.5 MG tablet  clonazePAM " (KLONOPIN) 1 MG tablet  gabapentin (NEURONTIN) 300 MG capsule  hydrochlorothiazide (HYDRODIURIL) 12.5 MG tablet  omeprazole (PRILOSEC) 40 MG DR capsule  pramipexole (MIRAPEX) 0.25 MG tablet  testosterone cypionate (DEPOTESTOSTERONE) 200 MG/ML injection  albuterol (PROAIR HFA/PROVENTIL HFA/VENTOLIN HFA) 108 (90 Base) MCG/ACT inhaler  UNABLE TO FIND      Allergies   Allergen Reactions    Midazolam Other (See Comments)     Stopped breathing    Other reaction(s): Apnea   Stopped breathing   Stopped breathing    Penicillins Rash and Hives     Family History  Family History   Problem Relation Age of Onset    Macular Degeneration Mother     Osteoporosis Mother     Heart Disease Father 49    Multiple Sclerosis Sister     Diabetes Paternal Uncle         heart issues    Cancer No family hx of     Coronary Artery Disease No family hx of      Social History   Social History     Tobacco Use    Smoking status: Former     Current packs/day: 0.00     Types: Cigarettes     Quit date: 1983     Years since quittin.7    Smokeless tobacco: Never   Vaping Use    Vaping status: Never Used   Substance Use Topics    Alcohol use: Yes     Comment: occasional    Drug use: Yes     Types: Marijuana     Comment: daily only at night time for medical conditions         A medically appropriate review of systems was performed with pertinent positives and negatives noted in the HPI, and all other systems negative.    Physical Exam   BP: (!) 176/86  Pulse: 89  Temp: 98.6  F (37  C)  Resp: 22  SpO2: 98 %  Physical Exam  Vitals and nursing note reviewed.   HENT:      Head: Normocephalic.   Eyes:      Pupils: Pupils are equal, round, and reactive to light.   Pulmonary:      Effort: Pulmonary effort is normal.   Musculoskeletal:         General: Normal range of motion.      Cervical back: Normal range of motion.   Neurological:      General: No focal deficit present.      Mental Status: He is alert.   Psychiatric:         Attention and Perception:  Attention and perception normal. He does not perceive auditory or visual hallucinations.         Mood and Affect: Affect normal. Mood is depressed.         Speech: Speech normal.         Behavior: Behavior normal. Behavior is not agitated, slowed, aggressive or hyperactive. Behavior is cooperative.         Thought Content: Thought content is not paranoid or delusional. Thought content includes suicidal ideation. Thought content does not include homicidal ideation. Thought content includes suicidal plan.         Cognition and Memory: Cognition and memory normal.         Judgment: Judgment normal.           ED Course, Procedures, & Data      Procedures       A consult was attained from the [facility] service. The case was discussed with the  from that service. The consulting service's recommendations were provided at 10:30 PM. 10 minutes spent reviewing prior records and intervention. 10 minutes spent discussing case, care and disposition.      Results for orders placed or performed during the hospital encounter of 05/17/24   Urine Drug Screen     Status: None ()    Narrative    The following orders were created for panel order Urine Drug Screen.  Procedure                               Abnormality         Status                     ---------                               -----------         ------                     Urine Drug Screen Panel[[id number]]                                                       Please view results for these tests on the individual orders.   CBC with platelets differential     Status: None ()    Narrative    The following orders were created for panel order CBC with platelets differential.  Procedure                               Abnormality         Status                     ---------                               -----------         ------                     CBC with platelets and d...[[id number]]                                                   Please view results for these tests on  the individual orders.     Medications   clonazePAM (klonoPIN) tablet 1 mg (has no administration in time range)     Labs Ordered and Resulted from Time of ED Arrival to Time of ED Departure - No data to display  No orders to display          Critical care was not performed.     Medical Decision Making  The patient's presentation was of high complexity (an acute health issue posing potential threat to life or bodily function).    The patient's evaluation involved:  an assessment requiring an independent historian (see separate area of note for details)  review of external note(s) from 3+ sources (see separate area of note for details)  review of 2 test result(s) ordered prior to this encounter (see separate area of note for details)  ordering and/or review of 3+ test(s) in this encounter (see separate area of note for details)    The patient's management necessitated high risk (a decision regarding hospitalization).    Assessment & Plan    Patient is here as he talked to his primary care provider earlier today, admitting feeling overwhelmed and suicidal since learning that he may lose his insurance. He is currently in distress as he has chest cancer and is currently undergoing treatment. Patient reported feeling hopeless and suicidal yesterday and entertained thoughts of going to a bridge to jump or to buy a gun and kill himself.     Patient has history of suicide attempt while inpatient at [facility]. He cites feeling mistreated by staff and disrespected as contributing to his attempt. He feels safe here and is open to being admitted. He has no acute medical concerns. He is currently taking klonopin to manage his anxiety and mood. He cites lack of benefit from psychotropics. Patient has a medical marijuana prescription and denies other drug use.    Due to his recent and ongoing SI, he is offered an admission. He is voluntary. He is medically cleared and referred.    I have reviewed the nursing notes. I have reviewed  the findings, diagnosis, plan and need for follow up with the patient.    New Prescriptions    No medications on file       Final diagnoses:   Feeling like committing suicide   Gender dysphoria   Acute reaction to situational stress       Hardeep Alan MD  [facility] EMERGENCY DEPARTMENT  5/17/2024     Hardeep Alan MD  05/17/24 [id number]

## 2024-05-18 NOTE — PROGRESS NOTES
Patient came from ED to [facility]. Appears pleasant and cooperative. Took schedule medications and went to sleep. Pt contracted for safety. Reported pain on knees 7/10. Denied SI/HI, A/V hallucinations.Will continue to monitor pt.

## 2024-05-19 NOTE — PROGRESS NOTES
The patient is pleasant and cooperative with the care and staff. PT contracted for safety. Reported generalized pain was 7/10. Tylenol, 1000 mg, was given. The patient denied SI/HI and V/A hallucinations. Pt took Lyft to home, and Pt was escorted to the exit door. Discharged paper was given to pt.
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
## 2024-05-19 NOTE — TELEPHONE ENCOUNTER
R: 7:36pm- Dewey, Extended Care called to request taking Pt off the worklist.  Per note, Pt is discharging.  Pt is removed from mh worklist.

## 2024-05-20 ENCOUNTER — PATIENT OUTREACH (OUTPATIENT)
Dept: ONCOLOGY | Facility: CLINIC | Age: 69
End: 2024-05-20
Payer: MEDICARE

## 2024-05-21 ENCOUNTER — TELEPHONE (OUTPATIENT)
Dept: BEHAVIORAL HEALTH | Facility: CLINIC | Age: 69
End: 2024-05-21
Payer: MEDICARE

## 2024-05-21 ENCOUNTER — HOSPITAL ENCOUNTER (OUTPATIENT)
Facility: CLINIC | Age: 69
Setting detail: OBSERVATION
Discharge: STILL ADMITTED OR ADMINISTRATIVE | DRG: 885 | End: 2024-05-22
Attending: EMERGENCY MEDICINE | Admitting: NURSE PRACTITIONER
Payer: MEDICARE

## 2024-05-21 DIAGNOSIS — F43.10 PTSD (POST-TRAUMATIC STRESS DISORDER): ICD-10-CM

## 2024-05-21 DIAGNOSIS — F41.1 GAD (GENERALIZED ANXIETY DISORDER): ICD-10-CM

## 2024-05-21 DIAGNOSIS — F33.2 MAJOR DEPRESSIVE DISORDER, RECURRENT SEVERE WITHOUT PSYCHOTIC FEATURES (H): ICD-10-CM

## 2024-05-21 DIAGNOSIS — R45.851 SUICIDAL IDEATION: ICD-10-CM

## 2024-05-21 PROCEDURE — 99285 EMERGENCY DEPT VISIT HI MDM: CPT | Mod: 25

## 2024-05-21 PROCEDURE — G0378 HOSPITAL OBSERVATION PER HR: HCPCS

## 2024-05-21 PROCEDURE — 250N000013 HC RX MED GY IP 250 OP 250 PS 637: Performed by: NURSE PRACTITIONER

## 2024-05-21 RX ORDER — ANASTROZOLE 1 MG/1
1 TABLET ORAL DAILY
Status: DISCONTINUED | OUTPATIENT
Start: 2024-05-22 | End: 2024-05-22 | Stop reason: HOSPADM

## 2024-05-21 RX ORDER — CLONAZEPAM 1 MG/1
1 TABLET ORAL 2 TIMES DAILY PRN
Status: DISCONTINUED | OUTPATIENT
Start: 2024-05-21 | End: 2024-05-22 | Stop reason: HOSPADM

## 2024-05-21 RX ORDER — ACETAMINOPHEN 325 MG/1
650 TABLET ORAL EVERY 6 HOURS PRN
Status: DISCONTINUED | OUTPATIENT
Start: 2024-05-21 | End: 2024-05-22 | Stop reason: HOSPADM

## 2024-05-21 RX ORDER — GABAPENTIN 300 MG/1
900 CAPSULE ORAL AT BEDTIME
Status: DISCONTINUED | OUTPATIENT
Start: 2024-05-21 | End: 2024-05-22 | Stop reason: HOSPADM

## 2024-05-21 RX ORDER — HYDROCHLOROTHIAZIDE 12.5 MG/1
12.5 TABLET ORAL DAILY
Status: DISCONTINUED | OUTPATIENT
Start: 2024-05-22 | End: 2024-05-22 | Stop reason: HOSPADM

## 2024-05-21 RX ORDER — PANTOPRAZOLE SODIUM 40 MG/1
40 TABLET, DELAYED RELEASE ORAL
Status: DISCONTINUED | OUTPATIENT
Start: 2024-05-22 | End: 2024-05-22 | Stop reason: HOSPADM

## 2024-05-21 RX ADMIN — ACETAMINOPHEN 650 MG: 325 TABLET, FILM COATED ORAL at 22:24

## 2024-05-21 RX ADMIN — CLONAZEPAM 1 MG: 1 TABLET ORAL at 22:27

## 2024-05-21 RX ADMIN — GABAPENTIN 900 MG: 300 CAPSULE ORAL at 22:24

## 2024-05-21 RX ADMIN — PRAMIPEXOLE DIHYDROCHLORIDE 0.75 MG: 0.5 TABLET ORAL at 22:27

## 2024-05-21 ASSESSMENT — ACTIVITIES OF DAILY LIVING (ADL)
ADLS_ACUITY_SCORE: 35

## 2024-05-21 ASSESSMENT — COLUMBIA-SUICIDE SEVERITY RATING SCALE - C-SSRS
4. HAVE YOU HAD THESE THOUGHTS AND HAD SOME INTENTION OF ACTING ON THEM?: NO
1. IN THE PAST MONTH, HAVE YOU WISHED YOU WERE DEAD OR WISHED YOU COULD GO TO SLEEP AND NOT WAKE UP?: YES
2. HAVE YOU ACTUALLY HAD ANY THOUGHTS OF KILLING YOURSELF IN THE PAST MONTH?: YES
3. HAVE YOU BEEN THINKING ABOUT HOW YOU MIGHT KILL YOURSELF?: NO
6. HAVE YOU EVER DONE ANYTHING, STARTED TO DO ANYTHING, OR PREPARED TO DO ANYTHING TO END YOUR LIFE?: NO
5. HAVE YOU STARTED TO WORK OUT OR WORKED OUT THE DETAILS OF HOW TO KILL YOURSELF? DO YOU INTEND TO CARRY OUT THIS PLAN?: NO

## 2024-05-21 NOTE — ED PROVIDER NOTES
"  Emergency Department Note      History of Present Illness     Chief Complaint  Mental Health Problem and Anxiety    HPI  Gregoria Stein is a 68 year old adult who presents with feeling tired from life and increased anxiety and trouble focusing. Pt repots that their PTSD is flaring. Pt attributes workers comp case and breast cancer dx and financial issues as recent stressors. No recent illnesses or medical concerns aside from some increased diarrhea since starting an estrogen inhibitor. No drugs or etoh recently as from cannabis for pain issues. No DM dx.       Past Medical History   Medical History and Problem List  Past Medical History:   Diagnosis Date    Anxiety     Arthritis     Depressive disorder     Gastro-oesophageal reflux disease     Labial irritation     Other chronic pain     PTSD (post-traumatic stress disorder)     Restless leg syndrome        Medications  albuterol (PROAIR HFA/PROVENTIL HFA/VENTOLIN HFA) 108 (90 Base) MCG/ACT inhaler  anastrozole (ARIMIDEX) 1 MG tablet  clonazePAM (KLONOPIN) 1 MG tablet  gabapentin (NEURONTIN) 300 MG capsule  hydrochlorothiazide (HYDRODIURIL) 12.5 MG tablet  omeprazole (PRILOSEC) 40 MG DR capsule  pramipexole (MIRAPEX) 0.25 MG tablet  testosterone cypionate (DEPOTESTOSTERONE) 200 MG/ML injection  UNABLE TO FIND        Surgical History   Past Surgical History:   Procedure Laterality Date    COLONOSCOPY      EXCISE MASS VAGINA Left 4/10/2015    Procedure: EXCISE MASS VAGINA;  Surgeon: Stanislav Byers MD;  Location: [facility] OR    GENITOURINARY SURGERY      Urethrial dilation    JOINT REPLACEMENT Right 07/2018    ORTHOPEDIC SURGERY      right rotator cuff, left achilles tendon repair, neuroma removed right foot, right knee arthroscopy,      Physical Exam   Patient Vitals for the past 24 hrs:   BP Temp Temp src Pulse Resp SpO2 Height Weight   05/21/24 1910 (!) 140/88 97.8  F (36.6  C) Oral 101 -- 96 % -- --   05/21/24 1903 -- -- -- -- -- -- 1.651 m (5' 5\") 96.8 kg (213 lb 6.4 " oz)   05/21/24 1739 (!) 150/74 98.3  F (36.8  C) Temporal 97 20 96 % -- 97.1 kg (214 lb)     Physical Exam  VS: Reviewed per above  HENT: Mucous membranes moist  EYES: sclera anicteric  CV: Rate as noted,  regular rhythm.   RESP: Effort normal. Breath sounds are normal bilaterally.  PSYCH: +anxiety, denies SI with plan.   NEURO: Alert, moving all extremities  MSK: No deformity of the extremities  SKIN: Warm and dry        ED Course    Medications Administered  Medications   anastrozole (ARIMIDEX) tablet 1 mg (has no administration in time range)   clonazePAM (klonoPIN) tablet 1 mg (1 mg Oral $Given 5/21/24 2227)   gabapentin (NEURONTIN) capsule 900 mg (900 mg Oral $Given 5/21/24 2224)   hydroCHLOROthiazide tablet 12.5 mg (has no administration in time range)   pantoprazole (PROTONIX) EC tablet 40 mg (has no administration in time range)   pramipexole (MIRAPEX) tablet 0.75 mg (0.75 mg Oral $Given 5/21/24 2227)   acetaminophen (TYLENOL) tablet 650 mg (650 mg Oral $Given 5/21/24 2224)              Medical Decision Making / Diagnosis       KAJAL Stein is a 68 year old adult who presents to the ER with concern for increased anxiety and trouble focusing and PTSD flare.  Vital signs are reassuring.  Patient denies acute medical concern.  No emergent medical pathology detected on initial encounter.  Plan for transfer to empath unit for further psychiatric evaluation and management.  Patient agreeable.    Disposition  The patient was transferred to [facility].     ICD-10 Codes:    ICD-10-CM    1. Anxiety  F41.9       2. PTSD (post-traumatic stress disorder)  F43.10            Discharge Medications  New Prescriptions    No medications on file          Cayetano Akhtar MD  05/21/24 9236

## 2024-05-21 NOTE — PROGRESS NOTES
[facility]: Transitions of Care Note  Chief Complaint   Patient presents with    Clinic Care Coordination - Follow-up    Clinic Care Coordination - Post Hospital       Most Recent Admission Date: 5/17/2024   Most Recent Admission Diagnosis:      Most Recent Discharge Date: 5/18/2024   Most Recent Discharge Diagnosis: Feeling like committing suicide - R45.851  Gender dysphoria - F64.9  Acute reaction to situational stress - F43.0     Transitions of Care Assessment    Discharge Assessment  How are you doing now that you are home?: I'm feeling more hopeful  How are your symptoms? (Red Flag symptoms escalate to triage hotline per guidelines): Improved  Do you know how to contact your clinic care team if you have future questions or changes to your health status? : Yes  Does the patient have their discharge instructions? : Yes  Does the patient have questions regarding their discharge instructions? : No  Were you started on any new medications or were there changes to any of your previous medications? : No         Follow up Plan     Discharge Follow-Up  Discharge follow up appointment scheduled in alignment with recommended follow up timeframe or Transitions of Risk Category? (Low = within 30 days; Moderate= within 14 days; High= within 7 days): No (N/A He has visit scheduled with [facility] agent outside the organization)    Future Appointments   Date Time Provider Department Center   6/24/2024  3:00 PM Jeaneth Linares MD [facility]   9/11/2024  3:30 PM Daniel Greer MD [facility]       Outpatient Plan as outlined on AVS reviewed with patient.    For any urgent concerns, please contact our 24 hour clinic line:   [place]: [phone]       Letty Garcia MSN, RN ,OCN  Lead RN Care Coordinator - [facility]  [phone]

## 2024-05-21 NOTE — ED TRIAGE NOTES
Pt just recently at [facility] for mental health, increased depression and anxiety     Triage Assessment (Adult)       Row Name 05/21/24 1739          Triage Assessment    Airway WDL WDL        Respiratory WDL    Respiratory WDL WDL        Cardiac WDL    Cardiac WDL WDL        Cognitive/Neuro/Behavioral WDL    Cognitive/Neuro/Behavioral WDL WDL

## 2024-05-21 NOTE — ED NOTES
[facility]  ED to EMPATH Checklist:      Goal for EMPATH: Depression management and Anxiety management    Current Behavior: Sad    Safety Concerns: None    Legal Hold Status: Voluntary    Medically Cleared by ED provider: Yes    Patient Therapeutically Searched: Therapeutic search by ED staff (strings, belts, shoes, pockets, electronics, etc.)    Belongings: Remain with patient    Independent Ambulation at Baseline: Yes/No: Yes    Participates in Care/Conversation: Yes/No: Yes    Patient Informed about EMPATH: Yes/No: Yes    DEC: Ordered and pending    Patient Ready to be Transferred to EMPATH? Yes/No: Yes

## 2024-05-22 ENCOUNTER — TELEPHONE (OUTPATIENT)
Dept: BEHAVIORAL HEALTH | Facility: CLINIC | Age: 69
End: 2024-05-22
Payer: MEDICARE

## 2024-05-22 ENCOUNTER — HOSPITAL ENCOUNTER (INPATIENT)
Facility: CLINIC | Age: 69
LOS: 8 days | Discharge: HOME | DRG: 885 | End: 2024-05-30
Attending: PSYCHIATRY & NEUROLOGY | Admitting: PSYCHIATRY & NEUROLOGY
Payer: MEDICARE

## 2024-05-22 VITALS
RESPIRATION RATE: 18 BRPM | TEMPERATURE: 98 F | HEIGHT: 65 IN | WEIGHT: 213.4 LBS | BODY MASS INDEX: 35.56 KG/M2 | DIASTOLIC BLOOD PRESSURE: 90 MMHG | OXYGEN SATURATION: 92 % | SYSTOLIC BLOOD PRESSURE: 141 MMHG | HEART RATE: 92 BPM

## 2024-05-22 DIAGNOSIS — H40.9 GLAUCOMA, UNSPECIFIED GLAUCOMA TYPE, UNSPECIFIED LATERALITY: Primary | ICD-10-CM

## 2024-05-22 DIAGNOSIS — D75.1 ERYTHROCYTOSIS: ICD-10-CM

## 2024-05-22 DIAGNOSIS — F33.2 SEVERE EPISODE OF RECURRENT MAJOR DEPRESSIVE DISORDER, WITHOUT PSYCHOTIC FEATURES (H): ICD-10-CM

## 2024-05-22 PROBLEM — R45.851 SUICIDAL IDEATION: Status: ACTIVE | Noted: 2024-05-22

## 2024-05-22 LAB
AMPHETAMINES UR QL SCN: ABNORMAL
BARBITURATES UR QL SCN: ABNORMAL
BENZODIAZ UR QL SCN: ABNORMAL
BZE UR QL SCN: ABNORMAL
CANNABINOIDS UR QL SCN: ABNORMAL
FENTANYL UR QL: ABNORMAL
OPIATES UR QL SCN: ABNORMAL
PCP QUAL URINE (ROCHE): ABNORMAL

## 2024-05-22 PROCEDURE — 80307 DRUG TEST PRSMV CHEM ANLYZR: CPT | Mod: GZ | Performed by: PSYCHIATRY & NEUROLOGY

## 2024-05-22 PROCEDURE — 99222 1ST HOSP IP/OBS MODERATE 55: CPT | Performed by: PSYCHIATRY & NEUROLOGY

## 2024-05-22 PROCEDURE — 124N000002 HC R&B MH UMMC

## 2024-05-22 PROCEDURE — 250N000013 HC RX MED GY IP 250 OP 250 PS 637: Performed by: NURSE PRACTITIONER

## 2024-05-22 PROCEDURE — G0378 HOSPITAL OBSERVATION PER HR: HCPCS

## 2024-05-22 PROCEDURE — 250N000013 HC RX MED GY IP 250 OP 250 PS 637: Performed by: PSYCHIATRY & NEUROLOGY

## 2024-05-22 RX ORDER — ALBUTEROL SULFATE 90 UG/1
2 AEROSOL, METERED RESPIRATORY (INHALATION) EVERY 4 HOURS PRN
Status: DISCONTINUED | OUTPATIENT
Start: 2024-05-22 | End: 2024-05-30 | Stop reason: HOSPADM

## 2024-05-22 RX ORDER — MAGNESIUM HYDROXIDE/ALUMINUM HYDROXICE/SIMETHICONE 120; 1200; 1200 MG/30ML; MG/30ML; MG/30ML
30 SUSPENSION ORAL EVERY 4 HOURS PRN
Status: DISCONTINUED | OUTPATIENT
Start: 2024-05-22 | End: 2024-05-30 | Stop reason: HOSPADM

## 2024-05-22 RX ORDER — PRAMIPEXOLE DIHYDROCHLORIDE 0.25 MG/1
0.75 TABLET ORAL AT BEDTIME
Status: DISCONTINUED | OUTPATIENT
Start: 2024-05-22 | End: 2024-05-30 | Stop reason: HOSPADM

## 2024-05-22 RX ORDER — GABAPENTIN 300 MG/1
900 CAPSULE ORAL AT BEDTIME
Status: DISCONTINUED | OUTPATIENT
Start: 2024-05-22 | End: 2024-05-30 | Stop reason: HOSPADM

## 2024-05-22 RX ORDER — CLONAZEPAM 1 MG/1
1 TABLET ORAL 2 TIMES DAILY PRN
Status: DISCONTINUED | OUTPATIENT
Start: 2024-05-22 | End: 2024-05-30 | Stop reason: HOSPADM

## 2024-05-22 RX ORDER — AMOXICILLIN 250 MG
1 CAPSULE ORAL 2 TIMES DAILY PRN
Status: DISCONTINUED | OUTPATIENT
Start: 2024-05-22 | End: 2024-05-24

## 2024-05-22 RX ORDER — PANTOPRAZOLE SODIUM 40 MG/1
40 TABLET, DELAYED RELEASE ORAL
Status: DISCONTINUED | OUTPATIENT
Start: 2024-05-23 | End: 2024-05-30 | Stop reason: HOSPADM

## 2024-05-22 RX ORDER — LANOLIN ALCOHOL/MO/W.PET/CERES
3 CREAM (GRAM) TOPICAL
Status: DISCONTINUED | OUTPATIENT
Start: 2024-05-22 | End: 2024-05-30 | Stop reason: HOSPADM

## 2024-05-22 RX ORDER — ACETAMINOPHEN 325 MG/1
650 TABLET ORAL EVERY 4 HOURS PRN
Status: DISCONTINUED | OUTPATIENT
Start: 2024-05-22 | End: 2024-05-30 | Stop reason: HOSPADM

## 2024-05-22 RX ORDER — DESVENLAFAXINE 25 MG/1
25 TABLET, EXTENDED RELEASE ORAL DAILY
Status: DISCONTINUED | OUTPATIENT
Start: 2024-05-23 | End: 2024-05-22 | Stop reason: HOSPADM

## 2024-05-22 RX ORDER — HYDROCHLOROTHIAZIDE 12.5 MG/1
12.5 TABLET ORAL DAILY
Status: DISCONTINUED | OUTPATIENT
Start: 2024-05-23 | End: 2024-05-30 | Stop reason: HOSPADM

## 2024-05-22 RX ORDER — TRAZODONE HYDROCHLORIDE 50 MG/1
50 TABLET, FILM COATED ORAL
Status: DISCONTINUED | OUTPATIENT
Start: 2024-05-22 | End: 2024-05-22 | Stop reason: HOSPADM

## 2024-05-22 RX ORDER — HYDROXYZINE HYDROCHLORIDE 25 MG/1
25 TABLET, FILM COATED ORAL 3 TIMES DAILY PRN
Status: DISCONTINUED | OUTPATIENT
Start: 2024-05-22 | End: 2024-05-22 | Stop reason: HOSPADM

## 2024-05-22 RX ORDER — ANASTROZOLE 1 MG/1
1 TABLET ORAL DAILY
Status: DISCONTINUED | OUTPATIENT
Start: 2024-05-23 | End: 2024-05-30 | Stop reason: HOSPADM

## 2024-05-22 RX ORDER — TRAZODONE HYDROCHLORIDE 50 MG/1
50 TABLET, FILM COATED ORAL AT BEDTIME
Status: DISCONTINUED | OUTPATIENT
Start: 2024-05-22 | End: 2024-05-22

## 2024-05-22 RX ORDER — GABAPENTIN 100 MG/1
100 CAPSULE ORAL EVERY 6 HOURS PRN
Status: DISCONTINUED | OUTPATIENT
Start: 2024-05-22 | End: 2024-05-30 | Stop reason: HOSPADM

## 2024-05-22 RX ADMIN — GABAPENTIN 900 MG: 300 CAPSULE ORAL at 21:24

## 2024-05-22 RX ADMIN — HYDROCHLOROTHIAZIDE 12.5 MG: 12.5 TABLET ORAL at 08:06

## 2024-05-22 RX ADMIN — ANASTROZOLE 1 MG: 1 TABLET, COATED ORAL at 08:06

## 2024-05-22 RX ADMIN — CLONAZEPAM 1 MG: 1 TABLET ORAL at 08:06

## 2024-05-22 RX ADMIN — PRAMIPEXOLE DIHYDROCHLORIDE 0.75 MG: 0.5 TABLET ORAL at 21:59

## 2024-05-22 RX ADMIN — CLONAZEPAM 1 MG: 1 TABLET ORAL at 19:36

## 2024-05-22 RX ADMIN — HYDROXYZINE HYDROCHLORIDE 25 MG: 25 TABLET ORAL at 17:57

## 2024-05-22 RX ADMIN — PANTOPRAZOLE SODIUM 40 MG: 40 TABLET, DELAYED RELEASE ORAL at 08:06

## 2024-05-22 ASSESSMENT — ACTIVITIES OF DAILY LIVING (ADL)
ADLS_ACUITY_SCORE: 35

## 2024-05-22 NOTE — ED NOTES
Pt's MA appeal paperwork was faxed to [facility]. Copy has been placed in patient hard chart along with additional copy of appeal if patient wants additional copy.

## 2024-05-22 NOTE — ED NOTES
"Pt reports being very anxious that they are going to be \"trapped\" on the unit. Pt was reminded they are here voluntarily and nothing has changed that. Pt reports they wish they could run away to the [facility] where they feel most at peace. Pt was showed the relaxation channel of the [facility] which they reported helped with anxiety. Pt is quick to tears when talking about their current situation and states \"I want to feel better, I want to heal\". Pt took HS medications and was reminded that they would be taking their morning medications of hydrochlorothiazide and their cancer medication as patient is forgetful about medications.   "

## 2024-05-22 NOTE — TELEPHONE ENCOUNTER
R:  3B posted a F shared bed became available.       Intake called Dr Gamez to present pt.   declined stating the room the pt would go into - that pt would not be agreeable to a trans roommate.   Pt to remain on worklist until an appropriate bed available.     NO [facility] Beds available and pt does not want placement @ [facility] or [facility].      [facility] is posting 0 beds. [phone]   [facility] is posting 0 beds. Negative covid required             [facility] is posting 0 beds. Neg covid. No high [facility]. [phone] Per call @8:22am  [facility] is posting 0 beds. [phone]  [facility] is posting 0 beds. [phone]   [facility] is posting 1 bed. Negative covid. [phone] Per call @7:45am  [facility] is posting 0 beds [phone]        Pt to remain on worklist pending appropriate bed availability for review.
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
## 2024-05-22 NOTE — ED NOTES
IP MH Referral Acuity Rating Score (RARS)    LMHP complete at referral to IP MH, with [facility]; and, daily while awaiting IP MH placement. Call score to PPS.  CRITERIA SCORING   New 74 HH and Involuntary for IP MH (not adolescent) 0/1   Boarding over 24 hours 0/1   Vulnerable adult at least 55+ with multiple co morbidities; or, Patient age 11 or under 1/1   Suicide ideation without relief of precipitating factors 1/1   Current plan for suicide 0/1   Current plan for homicide 0/1   Imminent risk or actual attempt to seriously harm another without relief of factors precipitating the attempt 0/1   Severe dysfunction in daily living (ex: complete neglect for self care, extreme disruption in vegetative function, extreme deterioration in social interactions) 1/1   Recent (last 2 weeks) or current physical aggression in the ED 0/1   Restraints or seclusion episode in ED 0/1   Verbal aggression, agitation, yelling, etc., while in the ED 0/1   Active psychosis with psychomotor agitation or catatonia 0/1   Need for constant or near constant redirection (from leaving, from others, etc).  1/1   Intrusive or disruptive behaviors 0/1   TOTAL Acuity Total Score: 4

## 2024-05-22 NOTE — ED PROVIDER NOTES
EmPATH Unit - Initial Psychiatric Observation Note  [facility] Emergency Department  Observation Initiation Date: May 21, 2024    Gregoria Stein MRN: [id number]   Age: 68 year old YOB: 1955     History     Chief Complaint   Patient presents with    Mental Health Problem    Anxiety     HPI  Gregoria Stein is a 68 year old adult with a past history notable for major depressive disorder, PTSD, and a generalized anxiety disorder who presents to the emergency department for evaluation of depressed mood and suicidal ideation.  The patient was determined to be medically stable and transferred to the EmPATH unit for psychiatric assessment.  He is now approaching 17 hours in the emergency department.  Overnight, there were no acute issues although nursing staff note that the patient had difficulty sleeping in a recliner due to chronic pain.  On examination, the patient notes numerous psychosocial stressors that have been contributory.  In the midst of these ongoing stressors over the past 4 months, depressive symptoms have reemerged in addition to heightened anxiety and panic symptoms.  The patient reports prominent vegetative symptoms, anhedonia, and active suicidal ideation.  This led to contemplation of jumping off of a bridge which led to evaluation in the different emergency room a few days ago.  Symptoms improved by the end of that stay however quickly reemerged as he faced additional stressors upon leaving.  He is currently not taking an antidepressant medication due to numerous past medication trials that have not been very beneficial.  He notes [facility] testing and numerous medication classes being trialed, noting Emsam patch, augmentation with second generation antipsychotic medications, mood stabilizers, and stimulants, in addition to SSRIs and SNRIs.  He often has difficulty tolerating the medications due to side effects or gains no meaningful response.  He was recently referred to a new therapist  and is awaiting his first session in July.  Given ongoing symptom intensity despite attempts to self-regulate, he is open to another trial of an antidepressant medication today.  There is no indication of psychosis or homicidal thoughts.    Past Medical History  Past Medical History:   Diagnosis Date    Anxiety     Arthritis     C spine, thumbs bilateral, feet, shoulders, knees    Depressive disorder     Gastro-oesophageal reflux disease     intermittent    Labial irritation     labial mass    Other chronic pain     feet, knees, shoulders, full spine, thumbs    PTSD (post-traumatic stress disorder)     Restless leg syndrome      Past Surgical History:   Procedure Laterality Date    COLONOSCOPY      EXCISE MASS VAGINA Left 4/10/2015    Procedure: EXCISE MASS VAGINA;  Surgeon: Stanislav Byers MD;  Location: [facility] OR    GENITOURINARY SURGERY      Urethrial dilation    JOINT REPLACEMENT Right 07/2018    ORTHOPEDIC SURGERY      right rotator cuff, left achilles tendon repair, neuroma removed right foot, right knee arthroscopy,      albuterol (PROAIR HFA/PROVENTIL HFA/VENTOLIN HFA) 108 (90 Base) MCG/ACT inhaler  anastrozole (ARIMIDEX) 1 MG tablet  clonazePAM (KLONOPIN) 1 MG tablet  gabapentin (NEURONTIN) 300 MG capsule  hydrochlorothiazide (HYDRODIURIL) 12.5 MG tablet  omeprazole (PRILOSEC) 40 MG DR capsule  pramipexole (MIRAPEX) 0.25 MG tablet  testosterone cypionate (DEPOTESTOSTERONE) 200 MG/ML injection  UNABLE TO FIND      Allergies   Allergen Reactions    Midazolam Other (See Comments)     Stopped breathing    Other reaction(s): Apnea   Stopped breathing   Stopped breathing    Penicillins Rash and Hives     Family History  Family History   Problem Relation Age of Onset    Macular Degeneration Mother     Osteoporosis Mother     Heart Disease Father 49    Multiple Sclerosis Sister     Diabetes Paternal Uncle         heart issues    Cancer No family hx of     Coronary Artery Disease No family hx of      Social History  "  Social History     Tobacco Use    Smoking status: Former     Current packs/day: 0.00     Types: Cigarettes     Quit date: 1983     Years since quittin.7    Smokeless tobacco: Never   Vaping Use    Vaping status: Never Used   Substance Use Topics    Alcohol use: Yes     Comment: occasional    Drug use: Yes     Types: Marijuana     Comment: daily only at night time for medical conditions          Review of Systems  A medically appropriate review of systems was performed with pertinent positives and negatives noted in the HPI, and all other systems negative.    Physical Examination   BP: (!) 150/74  Pulse: 97  Temp: 98.3  F (36.8  C)  Resp: 20  Height: 165.1 cm (5' 5\")  Weight: 97.1 kg (214 lb)  SpO2: 96 %    Physical Exam  General: Appears stated age.   Neuro: Alert and fully oriented. Extremities appear to demonstrate normal strength on visual inspection.   Integumentary/Skin: no rash visualized, normal color    Psychiatric Examination   Appearance: awake, alert  Attitude:  cooperative  Eye Contact:  fair  Mood:  anxious and depressed  Affect:  intensity is blunted  Speech:  clear, coherent  Psychomotor Behavior:  no evidence of tardive dyskinesia, dystonia, or tics  Thought Process:  logical and linear  Associations:  no loose associations  Thought Content:  no evidence of psychotic thought and active suicidal ideation present  Insight:  fair  Judgement:  fair  Oriented to:  time, person, and place  Attention Span and Concentration:  fair  Recent and Remote Memory:  fair  Language: able to name/identify objects without impairment  Fund of Knowledge: intact with awareness of current and past events    ED Course        Labs Ordered and Resulted from Time of ED Arrival to Time of ED Departure - No data to display    Assessments & Plan (with Medical Decision Making)   Patient presenting with worsening depressed mood and suicidal thoughts in the context of numerous psychosocial and medical stressors.  He has " tried numerous antidepressant medications in the past, citing various SSRIs, SNRIs, atypical antidepressant medications, and augmentation with various medication classes leading to either side effects or no meaningful response.  His treatment plan focused on finding a new medication to trial in hopes of gaining a more ideal response.  His treatment plan is focused on. Nursing notes reviewed noting no acute issues.     I have reviewed the assessment completed by the [facility].     During the observation period, the patient did not require medications for agitation, and did not require restraints/seclusion for patient and/or provider safety.     The patient was found to have a psychiatric condition that would benefit from an observation stay in the emergency department for further psychiatric stabilization and/or coordination of a safe disposition. The observation plan includes serial assessments of psychiatric condition, potential administration of medications if indicated, further disposition pending the patient's psychiatric course during the monitoring period.     Preliminary diagnosis:    ICD-10-CM    1. PTSD (post-traumatic stress disorder)  F43.10       2. CLAIRE (generalized anxiety disorder)  F41.1       3. Major depressive disorder, recurrent severe without psychotic features (H)  F33.2       4. Suicidal ideation  R45.851            Treatment Plan:  -Begin Pristiq at a low dose of 25 mg daily for antianxiety and antidepressant treatment.  Risks and benefits were reviewed.  -Trazodone 50 mg nightly for insomnia with an additional 50 mg as needed for breakthrough insomnia.  -Resume clonazepam 1 mg twice a day as needed for reduction of anxiety  -Urine drug screen has been ordered  -Enter to observation status as we await bed availability to transfer to inpatient psychiatry voluntarily for treatment of his depressive symptoms and suicidal ideation.    --  Diogo George MD   [facility]  DEPT  EmPATH Unit       Diogo George MD  05/22/24 [id number]

## 2024-05-22 NOTE — ED NOTES
Pt slept on and off through the night, not getting the full 8 hours of sleep. No signs of distress noted.

## 2024-05-22 NOTE — ED NOTES
68 year old trans male with history of anxiety and PTSD received from ED due to increased suicidal ideation with a plan. Reports that they recently have been diagnosed with breast cancer when they were being evaluated for top surgery. Pt reports they also have been overwhelmed with workman comp issues and their MA insurance is being appealed. Endorses SI but reports that while at the hospital they feel safe, denies HI.   Pt presents as tangential, hyperverbal and overshares trauma history, needs to be redirected frequently but is cooperative. Pt is very worried that they will be held involuntarily and given ECT as this was their previous mental health experience.         Nursing and risk assessments completed. Assessments reviewed with LMHP and physician. Admission information reviewed with patient. Patient given a tour of [facility] and instructions on using the facility. Questions regarding [facility] addressed. Pt safety search completed.

## 2024-05-22 NOTE — PROGRESS NOTES
Pt calm, happy to have a Sensory room to sleep in tonight, and looking forward to watching the BB Game later.

## 2024-05-22 NOTE — TELEPHONE ENCOUNTER
3:02 PM: Intake paged Fredo to present Pt for station 10.    3:28 PM: Fredo informed Writer that Pt is being reviewed.

## 2024-05-22 NOTE — PLAN OF CARE
Gregoria Stein  May 22, 2024  Plan of Care Hand-off Note     Patient Care Path: inpatient mental health    Plan for Care:   It is the recommendation of this clinician that pt admit to [facility] for safety and stabilization. Pt displays the following risk factors that support IP admission: Recent interrupted suicide attempt and intrusive suicidal ideations worsening, poor appetite and sleep, and isolating. Pt is unable to engage in safety planning to mitigate risk level in a non-secure setting. Lower levels of care have not been successful in mitigating risk. Due to this IP is the least restrictive option of care for pt. Pt should remain in IP until deemed safe to return to the community and engage in OP MH supports. Patient has breast cancer/mastectomy top surgery scheduled on 6/13 and is adamant a [facility] admission does not impact being able to attend this. Pt will be able to resume work with established providers upon discharge.     Identified Goals and Safety Issues:   Safety and stabilization- [facility] - pt has not been able to function at community level of care.    [facility]. In 2021, pt tied bed sheet around neck while on unit at [facility].   Suicide precautions.    Overview: Suicidal precautions. Safety and stabilization. Acceptance to IP unit.     Conversations with patient's family, specifically sister or mother, are triggering for the patient. For staff, be mindful that if patient speaks to family members, that staff support and/or intervention may be necessary.     Legal Status: Legal Status at Admission: Voluntary/Patient has signed consent for treatment    Psychiatry Consult: No       Updated  Provider regarding plan of care.           Edmund Rosado

## 2024-05-22 NOTE — PROGRESS NOTES
"Pt was highly anxious at start of shift, he was pacing, crying, and requesting to discharge. \"I feel trapped, I can't sleep here, you are going to make me miss my surgery.\" He is talking fast and is fixated on all his medical issues and needing to leave. He agreed to a PRN dose of klonopin po. After writer, LMHP, and Provider have met with them they have calmed down immensely, and have been able to have better insight into the situation. They are sitting clam working on a Puzzle. They continue to endorse SI.They are \"not sure\" if can contract for safety if left here. They did agree to remain here and wait for Inpt stay.    "

## 2024-05-22 NOTE — TELEPHONE ENCOUNTER
R: [place]  Access Inpatient Bed Call Log  5/22/2024 12:08 AM  Intake has called facilities that have not updated their bed status within the last 12 hours.??      ADULTS:     *[facility]  [place] -- [facility]: @ cap per website.  [place] -- [facility]:  @ cap per website.   [place] -- [facility]: @ Cap per website.  [place] -- [facility]: @ cap per website.   [place] -- [facility]: @ Cap per website.  [facility] -- [facility]: @ cap per website.   [place] -- [facility]/ beds @Cap per website. Ages 18-28, Voluntary only, COVID test req'd, NO aggression, physical or sexual assault, violence hx or drug abuse, or psychosis   [place] -- [facility]: @ Cap per website.  [place] -- [facility]: @ cap per website.  [place] -- [facility]:  @ Cap per website      Pt remains on waitlist pending appropriate placement availability.

## 2024-05-22 NOTE — TELEPHONE ENCOUNTER
R: Provider Fredo accepts pt for unit 10/[place].    4:00 PM pt added to unit queue, unit called with disposition    CRN reports unit is not staffed for an admit.  ANS paged regarding staffing    ED updated with pt placement
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
## 2024-05-22 NOTE — TELEPHONE ENCOUNTER
S: JEY Pierce  Edmund  calling at 10:32 about a 68 year old/Female presenting with increase SA and anxiety.  Pt is a transmasculine, using he/they pronouns.      B: Pt arrived via Self . Presenting problem, stressors: Pt was discharged, use he/they pronouns.  They have multiple psycho stressors. Pt has breast cancer and workers comp stress.  They went on Sunday to a bridge with a plan to jump off  but someone interrupted them.  They are struggling with very strong anxiety, PTSD and suicidal ideation.     Pt affect in ED: Depressed and Flat  Pt Dx: Major Depressive Disorder, Generalized Anxiety Disorder, and PTSD  Previous IPMH hx? Yes: 2, 1 at [facility] in 2019 and 1 in 2021 at [facility].   Pt endorses SI, no plan   Hx of suicide attempt? Yes: They had SA at [facility] while inpatient for tying a bed sheet for hanging.   Pt denies SIB  Pt denies HI   Pt denies hallucinations .   Pt RARS Score: 4    Hx of aggression/violence, sexual offenses, legal concerns, Epic care plan? describe: No  Current concerns for aggression this visit? No  Does pt have a history of Civil Commitment? No  Is Pt their own guardian? Yes    Pt is prescribed medication. Is patient medication compliant? Yes  Pt endorses OP services: Psychiatrist, Therapist, , and Primary Care physician  CD concerns: None  Acute or chronic medical concerns: Pt was diagnosed with cancer 2 months ago so undergoing cancer treatments.   Does Pt present with specific needs, assistive devices, or exclusionary criteria? None      Pt is ambulatory  Pt is able to perform ADLs independently      A: Pt to be reviewed for [facility] admission. Pt is Voluntary  Preferred placement: [facility] ONLY.  D/t previous experiences they do not want to go [facility] or [facility].  [facility] will be considered.      COVID Symptoms: No  If yes, COVID test required   Utox: Not ordered, intake to request lab    CMP: Not ordered, intake requested lab  CBC: Not ordered, intake requested  lab  HCG: N/A    R: Patient cleared and ready for behavioral bed placement: Yes  Pt placed on IPMH worklist? Yes    Does Patient need a Transfer Center request created? Yes, writer completed Transfer Center request at:      Pt will need labs to look outside [facility].     R:  [place] [facility] [place] Call Log 5/21/2024  @10:43PM:   Intake has called facilities that have not updated their bed status within the last 12 hours.      Pt wants [facility] only.      [facility] is posting 0 beds.              [facility] is posting 0 beds. [phone]    [facility] is posting 0 beds. [phone]              [facility] is posting 0 beds. [phone]   [facility] is posting 0 beds. [phone]   [facility] (These are Young Adult beds, 18-28) is posting 0 beds. Negative COVID test required, no recent or significant aggression, violence, or sexual assault. [phone]   [facility] is posting 0 beds. [phone]            [facility] is posting 0 beds. [phone]     [facility] through [facility] is posting 0 beds. [phone]         Pt remains on work list pending appropriate bed availability.

## 2024-05-23 LAB
ALBUMIN SERPL BCG-MCNC: 4.8 G/DL (ref 3.5–5.2)
ALP SERPL-CCNC: 79 U/L (ref 40–150)
ALT SERPL W P-5'-P-CCNC: 38 U/L (ref 0–70)
ANION GAP SERPL CALCULATED.3IONS-SCNC: 11 MMOL/L (ref 7–15)
AST SERPL W P-5'-P-CCNC: 30 U/L (ref 0–45)
BASOPHILS # BLD AUTO: 0 10E3/UL (ref 0–0.2)
BASOPHILS NFR BLD AUTO: 0 %
BILIRUB SERPL-MCNC: 1 MG/DL
BUN SERPL-MCNC: 19.6 MG/DL (ref 8–23)
CALCIUM SERPL-MCNC: 9.7 MG/DL (ref 8.8–10.2)
CHLORIDE SERPL-SCNC: 98 MMOL/L (ref 98–107)
CHOLEST SERPL-MCNC: 214 MG/DL
CREAT SERPL-MCNC: 1.06 MG/DL (ref 0.51–1.17)
DEPRECATED HCO3 PLAS-SCNC: 31 MMOL/L (ref 22–29)
EGFRCR SERPLBLD CKD-EPI 2021: 58 ML/MIN/1.73M2
EOSINOPHIL # BLD AUTO: 0.3 10E3/UL (ref 0–0.7)
EOSINOPHIL NFR BLD AUTO: 5 %
ERYTHROCYTE [DISTWIDTH] IN BLOOD BY AUTOMATED COUNT: 13.1 % (ref 10–15)
FOLATE SERPL-MCNC: 10.9 NG/ML (ref 4.6–34.8)
GLUCOSE SERPL-MCNC: 207 MG/DL (ref 70–99)
HBA1C MFR BLD: 6.6 %
HCT VFR BLD AUTO: 57.1 % (ref 35–53)
HDLC SERPL-MCNC: 31 MG/DL
HGB BLD-MCNC: 19 G/DL (ref 13.3–17.7)
IMM GRANULOCYTES # BLD: 0 10E3/UL
IMM GRANULOCYTES NFR BLD: 0 %
LDLC SERPL CALC-MCNC: 132 MG/DL
LYMPHOCYTES # BLD AUTO: 1.5 10E3/UL (ref 0.8–5.3)
LYMPHOCYTES NFR BLD AUTO: 26 %
MCH RBC QN AUTO: 29 PG (ref 26.5–33)
MCHC RBC AUTO-ENTMCNC: 33.3 G/DL (ref 31.5–36.5)
MCV RBC AUTO: 87 FL (ref 78–100)
MONOCYTES # BLD AUTO: 0.3 10E3/UL (ref 0–1.3)
MONOCYTES NFR BLD AUTO: 6 %
NEUTROPHILS # BLD AUTO: 3.7 10E3/UL (ref 1.6–8.3)
NEUTROPHILS NFR BLD AUTO: 63 %
NONHDLC SERPL-MCNC: 183 MG/DL
NRBC # BLD AUTO: 0 10E3/UL
NRBC BLD AUTO-RTO: 0 /100
PLATELET # BLD AUTO: 214 10E3/UL (ref 150–450)
POTASSIUM SERPL-SCNC: 4.3 MMOL/L (ref 3.4–5.3)
PROT SERPL-MCNC: 7.5 G/DL (ref 6.4–8.3)
RBC # BLD AUTO: 6.55 10E6/UL (ref 3.8–5.9)
SODIUM SERPL-SCNC: 140 MMOL/L (ref 135–145)
TRIGL SERPL-MCNC: 253 MG/DL
VIT B12 SERPL-MCNC: 330 PG/ML (ref 232–1245)
WBC # BLD AUTO: 5.8 10E3/UL (ref 4–11)

## 2024-05-23 PROCEDURE — 82746 ASSAY OF FOLIC ACID SERUM: CPT

## 2024-05-23 PROCEDURE — 83036 HEMOGLOBIN GLYCOSYLATED A1C: CPT

## 2024-05-23 PROCEDURE — 36415 COLL VENOUS BLD VENIPUNCTURE: CPT

## 2024-05-23 PROCEDURE — G0177 OPPS/PHP; TRAIN & EDUC SERV: HCPCS

## 2024-05-23 PROCEDURE — 85025 COMPLETE CBC W/AUTO DIFF WBC: CPT

## 2024-05-23 PROCEDURE — 90853 GROUP PSYCHOTHERAPY: CPT

## 2024-05-23 PROCEDURE — 124N000002 HC R&B MH UMMC

## 2024-05-23 PROCEDURE — 82607 VITAMIN B-12: CPT

## 2024-05-23 PROCEDURE — 82040 ASSAY OF SERUM ALBUMIN: CPT

## 2024-05-23 PROCEDURE — 99222 1ST HOSP IP/OBS MODERATE 55: CPT | Mod: AI

## 2024-05-23 PROCEDURE — 90832 PSYTX W PT 30 MINUTES: CPT

## 2024-05-23 PROCEDURE — 80061 LIPID PANEL: CPT

## 2024-05-23 PROCEDURE — 250N000013 HC RX MED GY IP 250 OP 250 PS 637

## 2024-05-23 RX ORDER — TRAZODONE HYDROCHLORIDE 50 MG/1
50 TABLET, FILM COATED ORAL AT BEDTIME
Status: DISCONTINUED | OUTPATIENT
Start: 2024-05-23 | End: 2024-05-23

## 2024-05-23 RX ORDER — TRAZODONE HYDROCHLORIDE 50 MG/1
50 TABLET, FILM COATED ORAL
Status: DISCONTINUED | OUTPATIENT
Start: 2024-05-23 | End: 2024-05-30 | Stop reason: HOSPADM

## 2024-05-23 RX ORDER — POLYETHYLENE GLYCOL 3350 17 G/17G
17 POWDER, FOR SOLUTION ORAL DAILY PRN
Status: DISCONTINUED | OUTPATIENT
Start: 2024-05-23 | End: 2024-05-30 | Stop reason: HOSPADM

## 2024-05-23 RX ORDER — DESVENLAFAXINE 25 MG/1
25 TABLET, EXTENDED RELEASE ORAL DAILY
Status: DISCONTINUED | OUTPATIENT
Start: 2024-05-23 | End: 2024-05-25

## 2024-05-23 RX ADMIN — HYDROCHLOROTHIAZIDE 12.5 MG: 12.5 TABLET ORAL at 09:20

## 2024-05-23 RX ADMIN — PANTOPRAZOLE SODIUM 40 MG: 40 TABLET, DELAYED RELEASE ORAL at 09:19

## 2024-05-23 RX ADMIN — PRAMIPEXOLE DIHYDROCHLORIDE 0.75 MG: 0.25 TABLET ORAL at 21:53

## 2024-05-23 RX ADMIN — GABAPENTIN 100 MG: 100 CAPSULE ORAL at 23:53

## 2024-05-23 RX ADMIN — CLONAZEPAM 1 MG: 1 TABLET ORAL at 22:34

## 2024-05-23 RX ADMIN — Medication 3 MG: at 00:18

## 2024-05-23 RX ADMIN — POLYETHYLENE GLYCOL 3350 17 G: 17 POWDER, FOR SOLUTION ORAL at 23:47

## 2024-05-23 RX ADMIN — GABAPENTIN 900 MG: 300 CAPSULE ORAL at 21:53

## 2024-05-23 RX ADMIN — ACETAMINOPHEN 650 MG: 325 TABLET, FILM COATED ORAL at 17:11

## 2024-05-23 RX ADMIN — Medication 3 MG: at 21:55

## 2024-05-23 RX ADMIN — CLONAZEPAM 1 MG: 1 TABLET ORAL at 09:19

## 2024-05-23 RX ADMIN — ANASTROZOLE 1 MG: 1 TABLET, COATED ORAL at 09:20

## 2024-05-23 RX ADMIN — DESVENLAFAXINE 25 MG: 25 TABLET, EXTENDED RELEASE ORAL at 10:52

## 2024-05-23 RX ADMIN — Medication: at 20:39

## 2024-05-23 RX ADMIN — ALBUTEROL SULFATE 2 PUFF: 90 AEROSOL, METERED RESPIRATORY (INHALATION) at 08:06

## 2024-05-23 RX ADMIN — ACETAMINOPHEN 650 MG: 325 TABLET, FILM COATED ORAL at 06:25

## 2024-05-23 ASSESSMENT — ACTIVITIES OF DAILY LIVING (ADL)
ADLS_ACUITY_SCORE: 31
ORAL_HYGIENE: INDEPENDENT
LAUNDRY: WITH SUPERVISION
ADLS_ACUITY_SCORE: 31
DRESS: INDEPENDENT
ADLS_ACUITY_SCORE: 31
HYGIENE/GROOMING: INDEPENDENT
ADLS_ACUITY_SCORE: 31
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
## 2024-05-23 NOTE — PLAN OF CARE
"Individual Therapy Note      Date of Service: May 23, 2024    Patient: Killian goes by \"Killian,\" uses he/him pronouns    Individuals Present: Killian & [facility]    Session start: 1245  Session end: 1320  Session duration in minutes: 35      Modality Used: Person Centered and Brief Therapy    Goals: Verbal processing    Patient Description of current symptoms: a lot of anxiety around health/future, some grief/depression     Mental Status Exam:   Attitude: cooperative  Eye Contact: good  Mood: anxious and sad   Affect: mood congruent  Speech: clear, coherent  Psychomotor Behavior: no evidence of tardive dyskinesia, dystonia, or tics  Thought Process:  logical and linear  Associations: no loose associations  Thought Content: no evidence of suicidal ideation or homicidal ideation, no evidence of psychotic thought, no auditory hallucinations present, and no visual hallucinations present  Insight: good  Judgement: fair  Attention Span and Concentration: intact    Pt progress: Met pt in room. Writer introduced self and sat down on bed across from pt to check in. Pt shared his story about the struggles that have led him to being in the hospital, including trying for years to get a top surgery, the issues that have gotten in the way of that, then getting a cancer dx recently in preparation for the surgery. Pt is still struggling with that dx and the ramifications of that. Pt is also worried about the status of their insurance and benefits through [facility] and getting the care and services he needs. Pt was pleasant, open, and insightful to their place and how they got to here. Pt also shared about some of his struggles with his family who is not consistently there for him Pt did point to supports they have that help them manage tasks and life with all of the stress and struggles they have been navigating.    Treatment Objective(s) Addressed:   The focus of this session was on rapport building and processing feelings " related to personal struggles      Progress Towards Goals and Assessment of Patient:   Patient is making progress towards treatment goals as evidenced by good insight and engagement.       Therapeutic Intervention(s):   Provided active listening, unconditional positive regard, and validation.   Engaged in guided discovery, explored patient's perspectives and helped expand them through socratic dialogue      Plan/next step: Engaged in unit programming  Check in with therapist as needed      79705 - Psychotherapy (with patient) - 30 (16-37*) min    Patient Active Problem List   Diagnosis    CLAIRE (generalized anxiety disorder)    Mass    PTSD (post-traumatic stress disorder)    Other chronic pain    Restless leg syndrome    Osteoarthritis of spine with radiculopathy, lumbar region    Chondromalacia of left patella    Chronic bilateral low back pain without sciatica    Chronic joint pain    Complex tear of medial meniscus of left knee as current injury    GERD (gastroesophageal reflux disease)    Glaucoma    IBS (irritable bowel syndrome)    Chronic pain of right knee    Lumbar radiculopathy    Major depressive disorder, recurrent severe without psychotic features (H)    Morbid obesity (H)    Postmenopausal    Primary osteoarthritis of right knee    MDD (major depressive disorder), recurrent severe, 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)    Anxiety    Pelvic floor weakness    Gender identity disorder    Primary osteoarthritis involving multiple joints    Status post total knee replacement, right    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

## 2024-05-23 NOTE — PLAN OF CARE
-Attending Provider: JODI Cantu CNP  -Voicemail Code: [id number]#  -Team Note Due: Tuesday  -Next Steps:    Connect with community providers        Assessment/Intervention/Current Symtoms and Care Coordination:  Chart review and met with team, discussed pt progress, symptomology, and response to treatment.  Discussed the discharge plan and any potential impediments to discharge.  [facility] met with Killian to completed initial assessment. [facility] contracted plan to look into insurance situation. Killian informed [facility] that he has [place]-Employed Persons with Disabilities, and stated he is worried about losing insurance since he is no longer working. Not working due to falling while at work, and issues going on with workman's comp. Killian reported having an active CADI, but not being able to get ahold of his worker for months.        Discharge Plan or Goal:  Pending further stabilization, continued compliance with medications, continued activities of daily living, and development of a safe discharge plan.   Considerations:     Barriers to Discharge:  Impact of mental health symptoms on well being   Impact of mental health symptoms on activities of daily living  Need for medication monitoring and medication management     Referral Status:       Legal Status:   Voluntary  County:   File Number:   Start and expiration date of commitment:     Contacts:  CADI: [facility]  TCM: Svetlana Jimenez       Upcoming Meetings and Dates/Important Information:      -Still Needed on AVS:

## 2024-05-23 NOTE — PLAN OF CARE
Group Attendance:  attended full group    Time session began: 1430  Time session ended: 1500  Patient's total time in group: 30    Total # Attendees   5   Group Type psychotherapeutic     Group Topic Covered insight improvement        Group Session Detail Group members took turns selecting a random card from a stack of cards with self-reflection questions, answer the question, then the group would also take a few minutes to answer and reflect on the questions     Patient's response to the group topic/interactions:  Able to recall/repeat information presented, Cooperative with task, Positive Affect, Listened actively, Expressed understanding of topic, Organized, Attentive, and Actively engaged     Patient Details: Pt attended group and was engaged in discussion. Pt was very open to sharing their story and ended up sharing with the group about their cancer dx, them being transgender, and the struggles with their family. Pt was open and received positive response from peers.           [id number] - Group psychotherapy - 1 Session    Patient Active Problem List   Diagnosis    CLAIRE (generalized anxiety disorder)    Mass    PTSD (post-traumatic stress disorder)    Other chronic pain    Restless leg syndrome    Osteoarthritis of spine with radiculopathy, lumbar region    Chondromalacia of left patella    Chronic bilateral low back pain without sciatica    Chronic joint pain    Complex tear of medial meniscus of left knee as current injury    GERD (gastroesophageal reflux disease)    Glaucoma    IBS (irritable bowel syndrome)    Chronic pain of right knee    Lumbar radiculopathy    Major depressive disorder, recurrent severe without psychotic features (H)    Morbid obesity (H)    Postmenopausal    Primary osteoarthritis of right knee    MDD (major depressive disorder), recurrent severe, 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)    Anxiety    Pelvic floor weakness    Gender identity disorder    Primary osteoarthritis involving multiple joints     Status post total knee replacement, right    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

## 2024-05-23 NOTE — PHARMACY-ADMISSION MEDICATION HISTORY
Pharmacist Admission Medication History    Admission medication history is complete. The information provided in this note is only as accurate as the sources available at the time of the update.    Information Source(s): Clinic records, Hospital records, Facility (U/NH/) medication list/MAR, and CareEverywhere/SureScripts, Medication History completed by med scribe 5/18, last doses completed by Manuel BILLS 5/21/24    Changes made to PTA medication list: none     Allergies reviewed with patient and updates made in EHR: no    Medication History Completed By: CHARMAINE BIRD [facility] 5/23/2024 10:59 AM    Prior to Admission medications    Medication Sig Last Dose Taking? Auth Provider Long Term End Date   anastrozole (ARIMIDEX) 1 MG tablet Take 1 tablet by mouth daily 5/22/2024 at 0806 Yes Reported, Patient Yes 4/30/25   clonazePAM (KLONOPIN) 1 MG tablet Take 1 mg by mouth 2 times daily as needed for anxiety 5/22/2024 at 1936 Yes Reported, Patient No    gabapentin (NEURONTIN) 300 MG capsule Take 3 capsules (900 mg) by mouth At Bedtime 5/22/2024 at 2124 Yes Amalia Landon MD Yes    hydrochlorothiazide (HYDRODIURIL) 12.5 MG tablet Take 12.5 mg by mouth daily 5/22/2024 at 0806 Yes Reported, Patient Yes    pramipexole (MIRAPEX) 0.25 MG tablet Take 3 tablets (0.75 mg) by mouth At Bedtime 5/22/2024 at 2159 Yes Jaqueline Garcia,  Yes    testosterone cypionate (DEPOTESTOSTERONE) 200 MG/ML injection Inject 70 mg Subcutaneous once a week on Fridays 5/10/2024 Yes Unknown, Entered By History Yes    albuterol (PROAIR HFA/PROVENTIL HFA/VENTOLIN HFA) 108 (90 Base) MCG/ACT inhaler Inhale 2 puffs into the lungs every 4 hours as needed   Reported, Patient Yes    omeprazole (PRILOSEC) 40 MG DR capsule TAKE 1 CAPSULE BY MOUTH EVERY DAY BEFORE A MEAL 5/20/2024  Reported, Patient     UNABLE TO FIND Pt is using Medical cannabis   Reported, Patient

## 2024-05-23 NOTE — PROGRESS NOTES
BP elevated on RA, no pain, was having second thoughts on going as IP since they are concerned that they will  not be able to push through with their scheduled breast surgery this coming 6/13/24. Patient encouraged to focus on his mental health right now so that they are  are more stable mentally during their procedure    Patient received PRN medication Klonopin and Atarax  for anxiety.

## 2024-05-23 NOTE — PLAN OF CARE
"  Problem: Suicide Risk  Goal: Absence of Self-Harm  Outcome: Progressing   Goal Outcome Evaluation:       Pt was withdrawn and isolative, Spending most of the in the room. Pt was pleasant, calm and cooperative. Pt denies SI/SIB/HI.  Denies any form of hallucinations. Reported anxiety. Pt reported the course of anxiety as being round a lot of people. Pt said he is not used being around a lot of people. Pt prefers to stay in the room. Another trigger of anxiety is what he called \"serena up\". Pt stated that he gets nervous when he remembers that he doesn't have his freedom. Pt also stated that he has \"a quarrel family\" which he did not what to talk more about.     Patient reported back pain. Requested PRN tylenol and PRN pain cream (Bengay). PRN tylenol was administered. Bengay was not yet to be delivered from the pharmacy. Pt was informed that we needed to wait Bengay to be delivered from he pharmacy. Pt was irritable, went the room and cover himself with a blanket. Pt declined to engage with the writer.      This writer went back to the room to persuade the patient to come out for dinner, patient was agitated, teary and reported his frustration. Pt declined to come out for dinner.  Pt reported that he feels that nobody cares about him in this hospital. Pt reported that he wanted to go home right away. Patient was given 12 hour intent to discharge form. Pt declined to sign the form stating that \"I know your plan, you want me to sign the form, then put me on commitment. Im not signing the form\".    Pt reported his frustrations to one of the staff. Pt stated that he feels that there is no need for him to live and wants to end his life. When asked if he had any plans, pt declined to answer (See PA note for more details).  Pt also used CPAP at night.  Charge nurse updated. SIO order was obtained for self harm risk and equipment use(CPAP).     Bengay was administered as as soon as it was delivered from  the pharmacy. Pt " also requested PRN melatonin and klonopin at bed time.

## 2024-05-23 NOTE — PLAN OF CARE
"Problem: Suicide Risk  Goal: Absence of Self-Harm  Outcome: Progressing  Intervention: Promote Psychosocial Wellbeing    Plan of Care Reviewed With: patient           Patient is a 68 year old transgender female to male. (They/them, he/him/his). Patient goes by \"Killian\". Patient was admitted from [facility], arrived station 10[facility] on voluntary basis at approximately 2300. Patient has a history of MDD, PTSD, CLAIRE. Patient was admitted due to worsening psychosocial stress, lack of support from family and active suicidal ideation. This led to contemplation of jumping off of a bridge. Other worsening psychosocial stress include include delayed transgender top surgery and breast cancer diagnosis. Per patient report, patient is having double mastectomy on 6/13/24. Patient was compliant with safety check. Patient signed consent to treatment. Admission packet was given and discussed with patient. Patient verbalized understanding. Patient reported multiple inpatient mental health hospitalization.     On assessment, patient is alert and oriented to self, place, time and situation. Patient presents with labile affect. Mood is calm. Patient reported anxiety and depression at 3/10. Patient denied SI/SIB/HI, A/V hallucination. Patient also reported right knee pain of 3/10, received prn Tylenol en route to station 10. 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 Speech is hyper. Thought process is tangential. Fund of knowledge is appropriate with good eye contact. Hygiene and grooming are appropriate. Plan of care initiated and ongoing.          "

## 2024-05-23 NOTE — PROGRESS NOTES
Report provided to transporting team and receiving team. Called receiving team that patient is on their way.

## 2024-05-23 NOTE — PLAN OF CARE
05/22/24 2781   Patient Belongings   Did you bring any home meds/supplements to the hospital?  Yes   Disposition of meds  Sent to security/pharmacy per site process   Patient Belongings locker;sent to security per site process   Patient Belongings Put in Hospital Secure Location (Security or Locker, etc.) cash/credit card;clothing;keys;medication(s);money (see comment);wallet;plastic bag;body jewelry   Belongings Search Yes   Clothing Search Yes     Goal Outcome Evaluation:    Locker: Iphone with Otterbox case, winter cap, pair of vans, 1 patagonia short sleeve button up shirt, 1 patagonia long sleeve button up shirt, 1 black pair of socks, 1 black jeans, 1 underwear, 1 red sweater, 1 black shirt, pink bin with toiletries, wallet, 's license, library card, [employer] gift card, social security card, [employer] gift card, [place] insurance card, Medicare Health Insurance, MHCP card, [employer] gift card, 5 $1 dollar bills    Security: MasterCard Debit [phone]    Pharmacy:  Clonazepam 1mg, Tiger balm ointment, pain relief cream      A               Admission:  I am responsible for any personal items that are not sent to the safe or pharmacy.  [facility] is not responsible for loss, theft or damage of any property in my possession.    Signature:  _________________________________ Date: _______  Time: _____                                              Staff Signature:  ____________________________ Date: ________  Time: _____      2nd Staff person, if patient is unable/unwilling to sign:    Signature: ________________________________ Date: ________  Time: _____     Discharge:  [facility] has returned all of my personal belongings:    Signature: _________________________________ Date: ________  Time: _____                                          Staff Signature:  ____________________________ Date: ________  Time: _____

## 2024-05-23 NOTE — H&P
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 process: circumstantial, tangential, and difficult to follow   Thought content: significant for preoccupations.   Fund of Knowledge: Average  Attention/Concentration: Poor  Language ability:  Intact  Memory:  Immediate recall intact, Short-term memory intact, and Long-term memory intact  Insight: Fair  Judgement: fair  Orientation: Yes, x4  Psychomotor Behavior: normal or unremarkable    Muscle Strength and Tone: MuscleStrength: Normal  Gait and Station: Normal       PHYSICAL EXAM   Vitals: BP (!) 165/87 (BP Location: Left arm, Patient Position: Chair, Cuff Size: Adult Large)   Pulse 107   Temp 98.2  F (36.8  C) (Oral)   Resp 16   Wt 96.7 kg (213 lb 3.2 oz)   LMP  (LMP Unknown)   SpO2 94%   BMI 35.48 kg/m      Physical exam as per Cayetano Akhtar MD on 05/21/24:    Physical Exam  VS: Reviewed per above  HENT: Mucous membranes moist  EYES: sclera  anicteric  CV: Rate as noted,  regular rhythm.   RESP: Effort normal. Breath sounds are normal bilaterally.  PSYCH: +anxiety, denies SI with plan.   NEURO: Alert, moving all extremities  MSK: No deformity of the extremities  SKIN: Warm and dry        LABS   personally reviewed.      Latest Reference Range & Units 05/17/24 23:13 05/17/24 23:38 05/21/24 09:13 05/22/24 12:46 05/23/24 09:29   Sodium 135 - 145 mmol/L 139    140   Potassium 3.4 - 5.3 mmol/L 4.0    4.3   Chloride 98 - 107 mmol/L 103    98   Carbon Dioxide (CO2) 22 - 29 mmol/L 25    31 (H)   Urea Nitrogen 8.0 - 23.0 mg/dL 14.6    19.6   Creatinine 0.51 - 1.17 mg/dL 1.16    1.06   GFR Estimate >60 mL/min/1.73m2 52 (L)    58 (L)   Calcium 8.8 - 10.2 mg/dL 8.7 (L)    9.7   Anion Gap 7 - 15 mmol/L 11    11   Albumin 3.5 - 5.2 g/dL 4.5    4.8   Protein Total 6.4 - 8.3 g/dL 7.0    7.5   Alkaline Phosphatase 40 - 150 U/L 74    79   ALT 0 - 70 U/L 27    38   AST 0 - 45 U/L 23    30   Bilirubin Total <=1.2 mg/dL 0.5    1.0   Cholesterol <200 mg/dL     214 (H)   Folate 4.6 - 34.8 ng/mL     10.9   Glucose 70 - 99 mg/dL 105 (H)    207 (H)   HDL Cholesterol >=40 mg/dL     31 (L)   Hemoglobin A1C <5.7 %     6.6 (H)   LDL Cholesterol Calculated <=100 mg/dL     132 (H)   Non HDL Cholesterol <130 mg/dL     183 (H)   T4 Free 0.90 - 1.70 ng/dL 1.27       Triglycerides <150 mg/dL     253 (H)   TSH 0.30 - 4.20 uIU/mL 4.70 (H)       Vitamin B12 232 - 1,245 pg/mL     330   WBC 4.0 - 11.0 10e3/uL 7.4    5.8   Hemoglobin 13.3 - 17.7 g/dL 18.5 (H)    19.0 (H)   Hematocrit 35.0 - 53.0 % 53.9 (H)    57.1 (H)   Platelet Count 150 - 450 10e3/uL 202    214   RBC Count 3.80 - 5.90 10e6/uL 6.16 (H)    6.55 (H)   MCV 78 - 100 fL 88    87   MCH 26.5 - 33.0 pg 30.0    29.0   MCHC 31.5 - 36.5 g/dL 34.3    33.3   RDW 10.0 - 15.0 % 13.1    13.1   % Neutrophils % 66    63   % Lymphocytes % 23    26   % Monocytes % 7    6   % Eosinophils % 4    5   % Basophils % 0    0   Absolute Basophils 0.0 -  "0.2 10e3/uL 0.0    0.0   Absolute Eosinophils 0.0 - 0.7 10e3/uL 0.3    0.3   Absolute Immature Granulocytes <=0.4 10e3/uL 0.0    0.0   Absolute Lymphocytes 0.8 - 5.3 10e3/uL 1.7    1.5   Absolute Monocytes 0.0 - 1.3 10e3/uL 0.5    0.3   % Immature Granulocytes % 0    0   Absolute Neutrophils 1.6 - 8.3 10e3/uL 4.9    3.7   Absolute NRBCs 10e3/uL 0.0    0.0   NRBCs per 100 WBC <1 /100 0    0   SARS CoV2 PCR Negative   Negative      Amphetamine Qual Urine Screen Negative  Screen Negative   Screen Negative    Fentanyl Qual Urine Screen Negative  Screen Negative   Screen Negative    Cocaine Urine Screen Negative  Screen Negative   Screen Negative    Benzodiazepine Urine Screen Negative  Screen Negative   Screen Positive !    Opiates Qualitative Urine Screen Negative  Screen Negative   Screen Negative    PCP Urine Screen Negative  Screen Negative   Screen Negative    Cannabinoids Qual Urine Screen Negative  Screen Positive !   Screen Positive !    Barbiturates Qual Urine Screen Negative  Screen Negative   Screen Negative    UNMAPPED IMAGING RESULT    Rpt (E)     (H): Data is abnormally high  (L): Data is abnormally low  !: Data is abnormal  (E): External lab result  Rpt: View report in Results Review for more information    No results found for: \"PHENYTOIN\", \"PHENOBARB\", \"VALPROATE\", \"CBMZ\"       ASSESSMENT   This is a 68 year old adult with history of major depressive disorder, PTSD, and generalized anxiety disorder.  Patient presented to the ED on 5/21/2024 due to report of feeling \"tired from life\" and increased anxiety with trouble focusing.  Patient also reported increased PTSD symptoms.  Patient reported experiencing increasing psychosocial stressors leading up to current hospitalization including ongoing worker's comp case, recently diagnosed breast cancer, and financial strain.  Patient was evaluated by provider in the ED and determined to be medically stable.  Patient subsequently transferred to [facility] unit in " the ED for further psychiatric evaluation and management.  While on the [facility] unit patient was initiated on Pristiq to target anxiety and depression symptoms.  Trazodone was also utilized to target insomnia symptoms and PTA clonazepam was continued to target anxiety.  Patient patient then admitted voluntarily to inpatient psychiatry unit 10 [address] with attending Dr. Nichols for further psychiatric stabilization.      Patient's PTA medications were continued.  Patient is agreeable to trial of Pristiq to target depression and anxiety symptoms.  Plan will be to titrate Pristiq to therapeutic dose while monitoring for side effects.  Patient reports they hope to discharge soon as they are scheduled to complete mastectomy to address newly diagnosed breast cancer in June 2024.       DIAGNOSIS   Principal Problem:    PTSD  Generalized anxiety disorder  Major depressive disorder, recurrent, severe without psychotic features  Suicidal ideation    Active Problem List:  Patient Active Problem List   Diagnosis    CLAIRE (generalized anxiety disorder)    Mass    PTSD (post-traumatic stress disorder)    Other chronic pain    Restless leg syndrome    Osteoarthritis of spine with radiculopathy, lumbar region    Chondromalacia of left patella    Chronic bilateral low back pain without sciatica    Chronic joint pain    Complex tear of medial meniscus of left knee as current injury    GERD (gastroesophageal reflux disease)    Glaucoma    IBS (irritable bowel syndrome)    Chronic pain of right knee    Lumbar radiculopathy    Major depressive disorder, recurrent severe without psychotic features (H)    Morbid obesity (H)    Postmenopausal    Primary osteoarthritis of right knee    MDD (major depressive disorder), recurrent severe, 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)    Anxiety    Pelvic floor weakness    Gender identity disorder    Primary osteoarthritis involving multiple joints    Status post total knee replacement, right    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 Education given regarding diagnostic and treatment options with risks, benefits and alternatives and adequate verbalization of understanding.  2. Admitted 5/22/2024.  Precautions placed: Suicide precautions.  3. Medications: 5/23/2024: PTA medications reviewed.  Continue Arimidex tablet 1 mg PO capsule daily  Continue gabapentin capsule 900 mg PO capsule at bedtime  Continue hydrochlorothiazide 12.5 mg PO daily  Continue pantoprazole EC tablet 400 mg PO every morning before breakfast  Continue Mirapex tablet 0.75 mg PO capsule at bedtime  PRN albuterol inhaler 2 puffs inhaled every 4 hours as needed for shortness of breath, wheezing  PRN clonazepam tablet 1 mg PO 2 times daily as needed for anxiety  4. Medications:  Hospital  Initiate Pristiq 24-hour tablet 25 mg PO daily to target depression and anxiety symptoms.  PRN gabapentin capsule 100 mg PO capsule every 6 hours as needed for anxiety  PRN trazodone tablet 50 mg B capsule at bedtime as needed for sleep, may repeat dose after 60 minutes  PRN Bengay 2.5% topical gel, apply topical every 6 hours as needed for muscle soreness, moderate pain  5. Consultations:  Hospitalist to follow as needed.  Respiratory consult for CPAP to treat stable sleep apnea with home equipment  Spiritual health services were consulted for emotional support  6. Structure and Supervision  Unit 10N.  7.   is following in regards to collecting and reviewing collateral information, referrals and disposition planning.  Legal: Voluntary  Referrals: TBD  Care Coordination: Per unit CTC  Placement: TBD  Anticipated Discharge: 7-10 days     Further treatment programming to be determined throughout the hospital course.        [facility]: Patient able to contract for safety  and Patient on precautions    This note was created with help of Dragon dictation system. Grammatical / typing errors are not intentional.    JODI Roldan CNP       CERTIFICATION   Initial Certification I certify that the inpatient psychiatric facility admission was medically necessary for treatment which could   reasonably be expected to improve the patient s condition.                                       I estimate 7-10 days of hospitalization is necessary for proper treatment of the patient. My plans for post-hospital care for this patient are  TBD .                                       JODI Roldan CNP     -     5/23/2024     -     10:00 AM

## 2024-05-23 NOTE — PROGRESS NOTES
NOC Shift Report     Pt is a new admit from last evening at 2300. Pt slept on and off through the shift for 5.75 hours.   Reported pain to hand bilateral, PRN Tylenol given and ice pack applied with relief of pain. Will continue to monitor.

## 2024-05-23 NOTE — PROGRESS NOTES
"CLINICAL NUTRITION SERVICES - ASSESSMENT NOTE     Nutrition Prescription    RECOMMENDATIONS FOR MDs/PROVIDERS TO ORDER:  None at this time    Malnutrition Status:    Patient does not meet two of the established criteria necessary for diagnosing malnutrition    Recommendations already ordered by Registered Dietitian (RD):  -Cottage cheese w/ fruit @ 10am, veggies + hummus @ 2pm, HB eggs @ 8pm  -Write-in: green salad w/ HB eggs + black beans    Future/Additional Recommendations:  Monitor PO intake, GI sx, wt trends.      REASON FOR ASSESSMENT  Gregoria Stein is a/an 68 year old adult assessed by the dietitian for Admission Nutrition Risk Screen for positive decreased appetite and weight loss.     CLINICAL HISTORY  PMH significant for MDD, PTSD, and CLAIRE. Admitted from ED 5/22/24 due to concern for depression and suicidal ideation.     NUTRITION HISTORY  RD met with Killian at bedside who was tearful and expressed high level of anxiety and stressors r/t health conditions and financial burdens. Pt reports decreased appetite 2/2 anxiety. Pt is agreeable to scheduled snacks and write-in for green salad. Writer encouraged pt to drinks plenty of fluids, consume fiber-rich foods and get physical activity as able to address constipation.     GI: constipation - LBM x 4 days ago    CURRENT NUTRITION ORDERS  Diet: Regular  Supplement: prune juice BID  Intake/Tolerance: eating and drinking adequately per RN notes    LABS  Reviewed    MEDICATIONS  Arimidex, protonix, senokot-s prn    ANTHROPOMETRICS  Height: 165.1 cm (5' 5\")  Most Recent Weight: 96.7 kg (213 lb 3.2 oz)    IBW: 56.8 kg   BMI: Obesity Grade II BMI 35-39.9  Weight History:   Wt Readings from Last 15 Encounters:   05/23/24 96.7 kg (213 lb 3.2 oz)   05/21/24 96.8 kg (213 lb 6.4 oz)   05/13/24 100.9 kg (222 lb 6.4 oz)   02/05/24 104.3 kg (230 lb)   08/09/23 98.4 kg (217 lb)   01/11/23 100.5 kg (221 lb 9.6 oz)   07/19/22 100.2 kg (221 lb)   06/30/22 98.2 kg (216 lb 8 oz) "   05/02/22 99.8 kg (220 lb)   10/08/21 101.2 kg (223 lb)   02/18/21 95.3 kg (210 lb)   12/03/20 96.2 kg (212 lb)   10/19/20 96.4 kg (212 lb 9.6 oz)   09/23/20 95.7 kg (211 lb)   09/02/20 95.2 kg (209 lb 12.8 oz)   4.1% wt loss in <1 month    Dosing Weight: 66 kg (adjusted)    ASSESSED NUTRITION NEEDS  Estimated Energy Needs: 1650 - 1980 kcals/day (25 - 30 kcals/kg)  Justification: Maintenance  Estimated Protein Needs: 76 - 96 grams protein/day (0.8 - 1 grams of pro/kg)  Justification: Obesity guidelines  Estimated Fluid Needs: 1 mL/kcal  Justification: Maintenance or Per Provider    PHYSICAL FINDINGS  See malnutrition section below.  Hans: 22  No abnormal nutrition-related physical findings observed.     MALNUTRITION  % Intake: No decreased intake noted  % Weight Loss: Weight loss does not meet criteria  Subcutaneous Fat Loss: None observed  Muscle Loss: None observed  Fluid Accumulation/Edema: None noted  Malnutrition Diagnosis: Patient does not meet two of the established criteria necessary for diagnosing malnutrition    NUTRITION DIAGNOSIS  Predicted inadequate nutrient intake (kcal/protein) related to poor appetite    INTERVENTIONS  Implementation  Nutrition Education: RD role in care   Modify composition of meals/snacks     Goals  Patient to consume % of nutritionally adequate meal trays TID, or the equivalent with supplements/snacks.     Monitoring/Evaluation  Progress toward goals will be monitored and evaluated per protocol.    Jyotsna Yang, MPH, RDN, LD  Behavioral Health Adult & Pediatric Dietitian  Contact via [employer] or Desk Phone: [phone]

## 2024-05-23 NOTE — PLAN OF CARE
"RN Assessment    SI: denies at this time but states he has intrusive thoughts of suicide intermittently. Pt contracts for safety in the hospital  SIB:denies  HI: denies  AVH: denies, none observed  Thought process: tangential, disorganized, reports racing thoughts  Anxiety: pt endorses high anxiety and feelings of being overwhelmed. Pt speaks of multiple stressors he has experienced recently including, losing therapy dog a few years ago, car breaking down multiple times, feeling family is \"cruel\". Pt states she does not have much of a support system outside of a  named [place].   Depression: pt endorses feeling depressed   Sleep: pt reports he slept well  Affect & Mood: labile affect and mood- flat, tearful  Behavior: Pt is compliant with medications. Pt had first dose of Pristiq today and some education was provided. Pt is somewhat pressured/hyperverbal. Pt states they are \"exhausted\" mentally and physically. Pt attended afternoon group and is more social with peers this afternoon. Pt received order to wear own clothing and per provider, able to wear knit cap in his room only, not out in the milieu.     Medication SE: none reported.   Hygiene: adequate   VS: BP elevated-- asymptomatic  Medical Concerns:current breast cancer dx. New order for CPAP for previously diagnosed ASHLYN  Appetite & Fluid intake: adequate   Bowel & Bladder: no issues reported    PRN medications utilized this shift include:  Clonazepam 1 mg- anxiety- somewhat effective  "

## 2024-05-24 LAB
HOLD SPECIMEN: NORMAL
HOLD SPECIMEN: NORMAL

## 2024-05-24 PROCEDURE — 84403 ASSAY OF TOTAL TESTOSTERONE: CPT | Performed by: PHYSICIAN ASSISTANT

## 2024-05-24 PROCEDURE — 250N000011 HC RX IP 250 OP 636: Mod: JZ | Performed by: PHYSICIAN ASSISTANT

## 2024-05-24 PROCEDURE — 250N000013 HC RX MED GY IP 250 OP 250 PS 637

## 2024-05-24 PROCEDURE — 124N000002 HC R&B MH UMMC

## 2024-05-24 PROCEDURE — 99232 SBSQ HOSP IP/OBS MODERATE 35: CPT

## 2024-05-24 PROCEDURE — 36415 COLL VENOUS BLD VENIPUNCTURE: CPT | Performed by: PHYSICIAN ASSISTANT

## 2024-05-24 PROCEDURE — 99222 1ST HOSP IP/OBS MODERATE 55: CPT | Performed by: PHYSICIAN ASSISTANT

## 2024-05-24 RX ORDER — AMOXICILLIN 250 MG
1-2 CAPSULE ORAL DAILY
Status: DISCONTINUED | OUTPATIENT
Start: 2024-05-24 | End: 2024-05-29

## 2024-05-24 RX ORDER — TESTOSTERONE CYPIONATE 200 MG/ML
70 INJECTION, SOLUTION INTRAMUSCULAR WEEKLY
Status: DISCONTINUED | OUTPATIENT
Start: 2024-05-24 | End: 2024-05-30 | Stop reason: HOSPADM

## 2024-05-24 RX ORDER — AMOXICILLIN 250 MG
1-2 CAPSULE ORAL DAILY PRN
Status: DISCONTINUED | OUTPATIENT
Start: 2024-05-24 | End: 2024-05-29

## 2024-05-24 RX ADMIN — PANTOPRAZOLE SODIUM 40 MG: 40 TABLET, DELAYED RELEASE ORAL at 08:06

## 2024-05-24 RX ADMIN — Medication: at 20:47

## 2024-05-24 RX ADMIN — Medication 3 MG: at 21:56

## 2024-05-24 RX ADMIN — MAGNESIUM HYDROXIDE 30 ML: 400 SUSPENSION ORAL at 12:01

## 2024-05-24 RX ADMIN — HYDROCHLOROTHIAZIDE 12.5 MG: 12.5 TABLET ORAL at 08:06

## 2024-05-24 RX ADMIN — TESTOSTERONE CYPIONATE 70 MG: 200 INJECTION INTRAMUSCULAR at 18:42

## 2024-05-24 RX ADMIN — ACETAMINOPHEN 650 MG: 325 TABLET, FILM COATED ORAL at 20:46

## 2024-05-24 RX ADMIN — SENNOSIDES AND DOCUSATE SODIUM 1 TABLET: 8.6; 5 TABLET ORAL at 08:07

## 2024-05-24 RX ADMIN — ANASTROZOLE 1 MG: 1 TABLET, COATED ORAL at 08:06

## 2024-05-24 RX ADMIN — DESVENLAFAXINE 25 MG: 25 TABLET, EXTENDED RELEASE ORAL at 08:06

## 2024-05-24 RX ADMIN — Medication: at 05:37

## 2024-05-24 RX ADMIN — POLYETHYLENE GLYCOL 3350 17 G: 17 POWDER, FOR SOLUTION ORAL at 09:45

## 2024-05-24 RX ADMIN — Medication 57 G: at 23:54

## 2024-05-24 RX ADMIN — GABAPENTIN 900 MG: 300 CAPSULE ORAL at 20:21

## 2024-05-24 RX ADMIN — CLONAZEPAM 1 MG: 1 TABLET ORAL at 14:58

## 2024-05-24 RX ADMIN — PRAMIPEXOLE DIHYDROCHLORIDE 0.75 MG: 0.25 TABLET ORAL at 20:22

## 2024-05-24 RX ADMIN — ACETAMINOPHEN 650 MG: 325 TABLET, FILM COATED ORAL at 05:37

## 2024-05-24 ASSESSMENT — ACTIVITIES OF DAILY LIVING (ADL)
LAUNDRY: WITH SUPERVISION
ADLS_ACUITY_SCORE: 31
DRESS: INDEPENDENT
ADLS_ACUITY_SCORE: 31
HYGIENE/GROOMING: INDEPENDENT
ADLS_ACUITY_SCORE: 31
ORAL_HYGIENE: INDEPENDENT
ADLS_ACUITY_SCORE: 31

## 2024-05-24 NOTE — PROGRESS NOTES
"This writer observed pt pacing around the wasserman and greeted him but didn't get a response. Pt expressed that he doesn't want to be here and wanted to call a number to get out of the hospital. Also expressed that they didn't feel safe here and his needs being met. He asked a staff member for weighted blanket but was told we don't have any on the unit. Writer spoke to another staff member to go to another floor to ask for a weighted blanket. Writer and pt proceeded to his room and spoke about how he wants to end his life because nothing is going his way. Pt mentioned this out of emotion but did not mention plan or method of ending his life. Writer listened about how pt wanted another nurse for the rest of the night and to administer medication. Writer informed pt that we need to make small steps for the moment and, \"How I can help?\" Lowered the temp in his room, got him a weighted blanket and headphones. When writer left the room, pt felt a lot better that we were able to meet his needs.      RN informed  "

## 2024-05-24 NOTE — PROGRESS NOTES
NOC Shift Report     Pt awake most of the shift and only  slept for 4 hours. Pt complain of bilateral shoulder pain. PRN Tylenol given and pain cream applied with relief of pain. Remains on SIO for SI. C-PAP machine used this shift. Will continue to monitor.

## 2024-05-24 NOTE — PLAN OF CARE
-Attending Provider: JODI Cantu CNP  -Voicemail Code: [id number]  -Team Note Due: Tuesday  -Next Steps:    Connect with community providers        Assessment/Intervention/Current Symtoms and Care Coordination:  Chart review and met with team, discussed pt progress, symptomology, and response to treatment.  Discussed the discharge plan and any potential impediments to discharge.  CTC and TCM exchanged several missed calls, and ultimately connected over email. [facility] asked for clarification regarding insurance, CADI, housing. TCM confirmed that Killian has an active CADI, Killian has regualr communication with his CADI worker and TCM, and Killian recently received a letter stating his MA-EPD was abruptly being terminated despite his financial at the [facility] telling Killian and his care team that his MA-EPD will be extended until Oct. 2024. Killian is asked to provide paystubs for last 30 days, he has not been working due to a fall while at work and workman's comp claim.   [facility] met with Killian per his request. Killian asked if [facility] has heard anything. [facility] informed him that CTC had spoke with his TCM and that she said he needs to get paystubs to his financial worker to recertify his MA-EPD and that is something he will have to work with his care team on clearing up.        Discharge Plan or Goal:  Pending further stabilization, continued compliance with medications, continued activities of daily living, and development of a safe discharge plan.   Considerations:     Barriers to Discharge:  Impact of mental health symptoms on well being   Impact of mental health symptoms on activities of daily living  Need for medication monitoring and medication management     Referral Status:       Legal Status:   Voluntary  County:   File Number:   Start and expiration date of commitment:     Contacts:  CADI: [facility]  TCM: Svetlana Jimenez       Upcoming Meetings and Dates/Important Information:      -Still Needed on AVS:

## 2024-05-24 NOTE — CONSULTS
"   SPIRITUAL HEALTH SERVICES consult Note     ([facility]) 10n - On-call     Referral Source/Reason for Visit: routine consult              Summary and Recommendations -   Killian refers to the [place] as his \"medicine\" and that being connected with nature is an important part of his mental health.   He is connected with a community [employer] and the founders have become part of his support system.   He values hope and stephen, though has not had that in many years, he's looking forward to discharge and though there are hardships awaiting him, he feels stephen.   Killian shared that he was raised mennonite \"not the horse and buggy kind\" and that now he is more generally \"spiritual\".     PLAN: Will continue to follow per Killian's request and communicate care to unit  Lisa. Spiritual health services remains available for any follow-up or requests. For further visits please place spiritual health consults.    Sailaja Young [facility]   Pager: [phone]     Spiritual Health Services is available  for emergent requests and consults, either by paging the on-call  or by entering an ASAP/STAT consult in [employer], which will also page the on-call .        Assessment     Saw pt Gregoria Stein (Killian) in his room.       Patient Understanding of Illness and Goals of Care - Killian shared that his trauma and upcoming cancer operation have been weighing on him and that he's facing a lot of uncertainty right now with his top surgery as well. He shared that today is \"improving\" and that he appreciates \"humans with heart\" that he has met here.      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 He is currently \"no contact\" with his family due to complex dynamics. He shared how his father  when Killian was 23yrs old, and that \"my dad was the only one in my family who accepted me\".      Strengths, Coping, " "and Resources - Killian finds peace in visiting the [facility], he has recently become involved with a community garden and hopes to continue to contribute to that experience and community. We discussed being on the precipice of stephen in his life and how he is looking forward to giving back to people who have helped him so much.     We also discussed how [id number] youth have given him the strength to be out and to use his pronouns.      Meaning, Beliefs, and Spirituality - Killian was raised mennonite \"but not the horse and buggy kind\" and is currently \"generally spiritual\". He finds his spirituality in the [place] and the [facility], and [facility].       "

## 2024-05-24 NOTE — PROGRESS NOTES
Rehab Group    Start time: 11:15  End time: 11:25  Patient time total: 10 minutes    attended partial group    #5 attended   Group Type: general health and coping   Group Topic Covered: self-esteem     Group Session Detail:  OT facilitated a dice game to practice self-esteem exercises. Various questions were posed to patients to promote self confidence as a coping tool for overall wellbeing. Questions included topics such as gratitude, defining personal happiness, the importance of vulnerability, and naming positive characteristics about the self.      Patient Response/Contribution:  Cooperative with task and Listened actively     Patient Detail:    Patient arrived at group room right as OT clinic was finishing and was redirected to join for second group of the day. Pt indicated wanting to join OT clinic another day and would like to work on a duct tape wallet. Pt joined for part of self esteem group and listened actively. Positive affect/attitude. Shared that it's important for him to be vulnerable but it's difficult to ask for help or trust others. Was pulled out by a provider.         No Charge    Patient Active Problem List   Diagnosis    CLAIRE (generalized anxiety disorder)    Mass    PTSD (post-traumatic stress disorder)    Other chronic pain    Restless leg syndrome    Osteoarthritis of spine with radiculopathy, lumbar region    Chondromalacia of left patella    Chronic bilateral low back pain without sciatica    Chronic joint pain    Complex tear of medial meniscus of left knee as current injury    GERD (gastroesophageal reflux disease)    Glaucoma    IBS (irritable bowel syndrome)    Chronic pain of right knee    Lumbar radiculopathy    Major depressive disorder, recurrent severe without psychotic features (H)    Morbid obesity (H)    Postmenopausal    Primary osteoarthritis of right knee    MDD (major depressive disorder), recurrent severe, 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)    Anxiety    Pelvic floor weakness     Gender identity disorder    Primary osteoarthritis involving multiple joints    Status post total knee replacement, right    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

## 2024-05-24 NOTE — PROGRESS NOTES
"PSYCHIATRY  PROGRESS NOTE     DATE OF SERVICE   05/24/24          CHIEF COMPLAINT   \" Much better.\"       SUBJECTIVE   Per nursing documentation:       Pt was withdrawn and isolative, Spending most of the in the room. Pt was pleasant, calm and cooperative. Pt denies SI/SIB/HI.  Denies any form of hallucinations. Reported anxiety. Pt reported the course of anxiety as being round a lot of people. Pt said he is not used being around a lot of people. Pt prefers to stay in the room. Another trigger of anxiety is what he called \"serena up\". Pt stated that he gets nervous when he remembers that he doesn't have his freedom. Pt also stated that he has \"a quarrel family\" which he did not what to talk more about.      Patient reported back pain. Requested PRN tylenol and PRN pain cream (Bengay). PRN tylenol was administered. Bengay was not yet to be delivered from the pharmacy. Pt was informed that we needed to wait Bengay to be delivered from he pharmacy. Pt was irritable, went the room and cover himself with a blanket. Pt declined to engage with the writer.       This writer went back to the room to persuade the patient to come out for dinner, patient was agitated, teary and reported his frustration. Pt declined to come out for dinner.  Pt reported that he feels that nobody cares about him in this hospital. Pt reported that he wanted to go home right away. Patient was given 12 hour intent to discharge form. Pt declined to sign the form stating that \"I know your plan, you want me to sign the form, then put me on commitment. Im not signing the form\".     Pt reported his frustrations to one of the staff. Pt stated that he feels that there is no need for him to live and wants to end his life. When asked if he had any plans, pt declined to answer (See PA note for more details).  Pt also used CPAP at night.  Charge nurse updated. SIO order was obtained for self harm risk and equipment use(CPAP).      Bengay was administered as as " "soon as it was delivered from  the pharmacy. Pt also requested PRN melatonin and klonopin at bed time.        This writer observed pt pacing around the wasserman and greeted him but didn't get a response. Pt expressed that he doesn't want to be here and wanted to call a number to get out of the hospital. Also expressed that they didn't feel safe here and his needs being met. He asked a staff member for weighted blanket but was told we don't have any on the unit. Writer spoke to another staff member to go to another floor to ask for a weighted blanket. Writer and pt proceeded to his room and spoke about how he wants to end his life because nothing is going his way. Pt mentioned this out of emotion but did not mention plan or method of ending his life. Writer listened about how pt wanted another nurse for the rest of the night and to administer medication. Writer informed pt that we need to make small steps for the moment and, \"How I can help?\" Lowered the temp in his room, got him a weighted blanket and headphones. When writer left the room, pt felt a lot better that we were able to meet his needs.          NOC Shift Report     Pt awake most of the shift and only  slept for 4 hours. Pt complain of bilateral shoulder pain. PRN Tylenol given and pain cream applied with relief of pain. Remains on SIO for SI. C-PAP machine used this shift. Will continue to monitor.           Per unit CTC documentation:    -Attending Provider: JODI Cantu CNP  -Voicemail Code: [id number]  -Team Note Due: Tuesday  -Next Steps:     Connect with community providers           Assessment/Intervention/Current Symtoms and Care Coordination:  Chart review and met with team, discussed pt progress, symptomology, and response to treatment.  Discussed the discharge plan and any potential impediments to discharge.  CTC and TCM exchanged several missed calls, and ultimately connected over email. CTC asked for clarification regarding insurance, CADI, " "housing. TCM confirmed that Killian has an active CADI, Killian has regualr communication with his CADI worker and TCM, and Killian recently received a letter stating his MA-EPD was abruptly being terminated despite his financial at the [facility] telling Killian and his care team that his MA-EPD will be extended until Oct. 2024. Killian is asked to provide paystubs for last 30 days, he has not been working due to a fall while at work and workman's comp claim.   [facility] met with Killian per his request. Killian asked if [facility] has heard anything. [facility] informed him that CTC had spoke with his TCM and that she said he needs to get paystubs to his financial worker to recertify his MA-EPD and that is something he will have to work with his care team on clearing up.         Discharge Plan or Goal:  Pending further stabilization, continued compliance with medications, continued activities of daily living, and development of a safe discharge plan.   Considerations:      Barriers to Discharge:  Impact of mental health symptoms on well being   Impact of mental health symptoms on activities of daily living  Need for medication monitoring and medication management     Referral Status:        Legal Status:   Voluntary  County:   File Number:   Start and expiration date of commitment:      Contacts:  CADI: [facility]  TCM: Nolan Jimenez        Upcoming Meetings and Dates/Important Information:        -Still Needed on AVS:                OBJECTIVE   Met with patient in hospital room of unit 10N. Upon patient interview, patient reports their mood as, \"Much better.\"  SIO 1:1 staff were also present.  Patient's affect appears blunted and labile.  Patient reports anxiety symptoms which are triggered by, \"Feeling nervous about losing my benefits.\"  Patient rates intensity of anxiety symptoms at 2-3/10  (0=none, 10=severe).  Patient also reports ongoing depression symptoms however reports they are minimal currently. Patient also reports they are benefiting from " the milieu setting and feels that they have connected with certain staff members who they find to be supportive and trusting.  Patient denies any side effects of medication Pristiq.  Provider discussed that plan will be to continue medication Pristiq 25 mg PO daily with plan to titrate up to 50 mg PO daily starting 5/27/2024 to further target depression and anxiety symptoms.  Patient verbalizes understanding and agreement with this plan.  Patient denies any thoughts of SI, SIB, SA, intent or plan. Denies HI. Patient able to contract for safety.  Provider discussed with patient that as they are able to contract for safety and have no active thoughts of SI that SIO monitoring will be discontinued.  Patient verbalizes understanding and agreement with this.  Patient denies any AH or VH. Patient denies any paranoid thoughts or delusions. Patient endorses sleeping better overnight since initiating CPAP to treat sleep apnea. Patient reports appetite is decreased however reports they met with the dietitian and found this helpful.  Patient reports despite decreased appetite they are eating and as well as drinking fluids adequately.  Patient reports some ongoing constipation with last bowel movement approximately 4 days ago.  Patient is requesting for prune juice to be delivered with all meals and provider subsequently updated nutrition order to reflect this.  Provider also discussed option of scheduling stool softeners senna-docusate 8.6/50 mg 1-2 tablets PO daily to further treat constipation symptoms.  Patient verbalizes agreement with this.  Provider also discussed that PRN MiraLAX 17 GPO daily and Milk of Magnesia 30 mL PO daily will both be available for patient to utilize as needed.  Patient verbalized understanding.  Patient does not endorse any issues with bladder. Patient vital signs reviewed and patient noted to be experiencing some hypertension.  Plan will be to monitor this closely for now.  Provider reviewed  pertinent lab testing; patient noted to have multiple abnormal lab results.  See details of abnormal lab results below.  Internal medicine was consulted to address restarting PTA testosterone injection as well as patient's multiple medical comorbidities including recently diagnosed breast cancer.  See internal medicine provider documentation below for details.  Patient also reports moderate dental pain which they rate and severity of 5-7/10   (0=none, 10=severe).  Patient reports dental pain is related to having an untreated cavity.  Provider discussed the plan will be to order a dental hygiene consult to address patient's dental pain further.  Patient verbalizes understanding of this and in agreement with this plan.  Provider discussed discharge plan and patient reports they currently live in a senior independent living apartment in [place], [place].  Patient reports that he feels safe in this environment and can return once psychiatrically stabilized.  Patient does express concern due to recently finding out that their CADI waiver has ended.  Patient reports they plan to discuss this with unit CTC.  Patient also reports that after upcoming scheduled mastectomy surgery on 6/12/2024 they have discussed with their  a plan to go to a TCU to recover however this plan is still in process.  Patient reports insurance has been a barrier to determining plan for after surgery.  Provider updated patient that they did speak with patient's surgeon Dr. Benavides yesterday and relayed the message that their surgery is still scheduled with no changes to current plan at this time.  Also, discussed with patient that per their surgeon if patient's mental health is not stable patient does have the option to consider postponing the surgery a month or 2 until psychiatrically stabilized.  Patient's surgeon also reports that the patient may speak with them directly through the clinic even while patient is admitted to the hospital;  "this conversation could be facilitated by scheduling an appointment through the clinic by calling #[phone].  Patient verbalizes understanding of this information and reports interest in scheduling appointment to speak directly with their surgeon.  Plan will be to follow-up with this next week and facilitate appointment for patient to speak directly with the surgeon.  Patient denies any medical concerns today. Patient has no other questions or concerns.     Internal medicine team was consulted to address restarting PTA testosterone injection as well as due to patient having multiple comorbidities including recently diagnosed breast cancer.  Per internal medicine provider documentation from 5/24/2024:    [facility]  Consult Note - Hospitalist Service  Date of Admission:  5/22/2024  Consult Requested by: Psychiatry   Reason for Consult: \"New admit 05/23. Address restarting testosterone injection and also patient has multiple medical comorbidities including recently diagnosed cancer\"         Assessment & Plan  \"Killian\" Sarita is a 68 year old adult with a PMH significant for breast cancer, HTN, MDD, RLS, ASHLYN, thoracic aortic aneurysm anxiety, depression who was admitted on 5/22/2024 to inpatient Psychiatry for increased anxiety, feeling tired from life and trouble focusing. Medicine was consulted for restarting testosterone injection and to manage comorbidities.      Anxiety   Depression   Management per Psychiatry team      Inducible laryngeal obstruction   ASHLYN: Follows with Pulmonary here. No acute concerns or symptoms. Is working on getting CPAP for at home as he is having a challenging time cleaning this since rotator cuff surgery. Continue CPAP here      Invasive ductal carcinoma of left breast, stage 1   Newly diagnosed 03/2024 on routine mammogram found to have a 1.3cm mass of left breast, biopsy consistent with invascie ductal carcinoma grade 1, estrogen positive, " progesterone positive HER-2 negative. Was seen by General Surgery with [facility] with plan for mastectomy, but patient would opt to under bilateral mastectomies and was referred to plastic surgery, procedure is planned for mid June (06/12). Med with Oncology 04/30 at [facility] and recommended starting anastrozole at that time. Has also been seen by Oncology here on 05/13 who agreed with above plan.     -Continue PTA anastrozole 1mg daily   -Follow up with Oncology as OP and General Surg/Plastics for planned bilateral mastectomies      Gender dysphoria  Continue testosterone injection weekly (receives this on Fridays), no contraindication from medicine standpoint for holding. Hasn't had a recent testosterone level, last checked 635 in 09/2023, will recheck here. Per PCP pre-op from 05/21, no mention of holding this prior to planned procedure.  -Continue weekly testosterone (receives on Fridays)      Erythrocytosis   Noted on admission elevated to 18.5 with recheck 19.0. Appears to have had slightly elevated RBCs and hematocrit previously, but appear to be increased from previous. Patient reports poor oral intake therefore possible hemoconcentration. Question if testosterone dose playing a role as well   -CBC and peripheral smear ordered   -Testosterone level as mentioned above      Elevated creatinine   Was previously ~0.8, slightly up to 1.16 on 05/17, down to 1.06 on admission. Possibly in the setting of poor PO intake.   -BMP in AM     GERD: continue PTA PPI   HTN: PTA hydrochlorothiazide 12.5mg daily  RLS: PTA pramipexole at bedtime    Diabetes mellitus, type 1 A1c 6.6% 05/2024: recommend lifestyle modifications, recheck as OP with PCP  HLD: appears these have been mildly elevated in the past. Recommend lifestyle modifications and recheck as OP with PCP can discuss if initiation of statin indicated at that time   History of glaucoma: denies any current vision changes, but notes he will need new  "Ophthalmologist as OP, referral placed for you         The patient's care was discussed with the Bedside Nurse, Patient, and Primary team.     Medicine team will continue to follow recheck labs.         Clinically Significant Risk Factors                        # DMII: A1C = 6.6 % (Ref range: <5.7 %) within past 6 months, PRESENT ON ADMISSION  # Obesity: Estimated body mass index is 35.48 kg/m  as calculated from the following:    Height as of 5/21/24: 1.651 m (5' 5\").    Weight as of this encounter: 96.7 kg (213 lb 3.2 oz)., PRESENT ON ADMISSION     # Financial/Environmental Concerns:               Samantha Carver PA-C  Hospitalist Service  Securely message with [employer] (more info)  Text page via [facility] Paging/Directory   ______________________________________________________________________        Chief Complaint  \"I've been better\"     History is obtained from the patient           History of Present Illness  Gregoria Stein is a 68 year old adult with a PMH significant for breast cancer, HTN, MDD, RLS, ASHLYN, thoracic aortic aneurysm anxiety, depression who was admitted on 5/22/2024 to inpatient Psychiatry for increased anxiety, feeling tired from life and trouble focusing. Medicine was consulted for restarting testosterone injection and to manage comorbidities.      Feeling okay.  Denies any chest pain, dyspnea, cough, fever, chills.  No abdominal pain, nausea, vomiting. Was having constipation, but notes this is now resolved.  Notes that in light of everything going on has been recently feeling overwhelmed and unable to remember if he was taking his medications or not.  Thinks he might of missed a couple doses of anastrozole.  Knows he missed his dose of testosterone last Friday.  Otherwise no other missed doses that he can remember at this time.  Notes history of glaucoma and needs a referral for a new ophthalmologist as outpatient.  Discussed lab results with patient, asked about GFR.  Notes that prior had been " having poor p.o. intake and feeling dehydrated.  Now feels he is eating and drinking better.       MEDICATIONS   Medications:  Scheduled Meds:  Current Facility-Administered Medications   Medication Dose Route Frequency Provider Last Rate Last Admin    anastrozole (ARIMIDEX) tablet 1 mg  1 mg Oral Daily Regla Yoo APRN CNP   1 mg at 05/24/24 0806    desvenlafaxine succinate (PRISTIQ) 24 hr tablet 25 mg  25 mg Oral Daily Regla oYo APRN CNP   25 mg at 05/24/24 0806    gabapentin (NEURONTIN) capsule 900 mg  900 mg Oral At Bedtime Regla Yoo APRN CNP   900 mg at 05/23/24 2153    hydroCHLOROthiazide tablet 12.5 mg  12.5 mg Oral Daily Regla Yoo APRN CNP   12.5 mg at 05/24/24 0806    pantoprazole (PROTONIX) EC tablet 40 mg  40 mg Oral QAM AC Regla Yoo APRN CNP   40 mg at 05/24/24 0806    pramipexole (MIRAPEX) tablet 0.75 mg  0.75 mg Oral At Bedtime Regla Yoo APRN CNP   0.75 mg at 05/23/24 2153    senna-docusate (SENOKOT-S/PERICOLACE) 8.6-50 MG per tablet 1-2 tablet  1-2 tablet Oral Daily Regla Yoo APRN CNP        testosterone cypionate (DEPOTESTOSTERONE) injection 70 mg  70 mg Subcutaneous Weekly Samantha Lamar, BAILEY         Continuous Infusions:  Current Facility-Administered Medications   Medication Dose Route Frequency Provider Last Rate Last Admin     PRN Meds:.  Current Facility-Administered Medications   Medication Dose Route Frequency Provider Last Rate Last Admin    acetaminophen (TYLENOL) tablet 650 mg  650 mg Oral Q4H PRN Regla Yoo APRN CNP   650 mg at 05/24/24 0537    albuterol (PROVENTIL HFA/VENTOLIN HFA) inhaler  2 puff Inhalation Q4H PRN Regla Yoo APRN CNP   2 puff at 05/23/24 0806    alum & mag hydroxide-simethicone (MAALOX) suspension 30 mL  30 mL Oral Q4H PRN Felling, Regla L, APRN CNP        clonazePAM (klonoPIN) tablet 1 mg  1 mg Oral BID PRN Regla Yoo, JODI CNP   1 mg at 05/24/24 7168    gabapentin  (NEURONTIN) capsule 100 mg  100 mg Oral Q6H PRN Regla Yoo APRJUAN RAMON CNP   100 mg at 05/23/24 2353    magnesium hydroxide (MILK OF MAGNESIA) suspension 30 mL  30 mL Oral Daily PRN Regla Yoo APRN CNP   30 mL at 05/24/24 1201    melatonin tablet 3 mg  3 mg Oral At Bedtime PRN FellingRegla APRN CNP   3 mg at 05/23/24 2155    menthol (Topical Analgesic) 2.5% (BENGAY VANISHING SCENT) 2.5 % topical gel   Topical Q6H PRN FellingRegla APRJUAN RAMON CNP   Given at 05/24/24 0537    polyethylene glycol (MIRALAX) Packet 17 g  17 g Oral Daily PRN Regla Yoo APRN CNP   17 g at 05/24/24 0945    senna-docusate (SENOKOT-S/PERICOLACE) 8.6-50 MG per tablet 1-2 tablet  1-2 tablet Oral Daily PRN Fredo Reglafabi TUCKER APRN CNP        traZODone (DESYREL) tablet 50 mg  50 mg Oral At Bedtime PRN Regla Yoo GARRETT, APRJUAN RAMON CNP           Medication adherence issues: MS Med Adherence Y/N: No  Medication side effects: MEDICATION SIDE EFFECTS: no side effects reported  Benefit: Yes / No: Yes       ROS   Pertinent items are noted in HPI.       MENTAL STATUS EXAM   Vitals: BP (!) 146/98 (BP Location: Left arm, Patient Position: Sitting, Cuff Size: Adult Large)   Pulse 80   Temp 97.4  F (36.3  C) (Oral)   Resp 16   Wt 96.7 kg (213 lb 3.2 oz)   LMP  (LMP Unknown)   SpO2 94%   BMI 35.48 kg/m      Appearance:  Distressed  Mood:  {Mood: Anxious  and Depressed   Affect: blunted, labile was congruent to speech  Suicidal Ideation: PRESENT / ABSENT: absent   Homicidal Ideation: PRESENT / ABSENT: absent   Thought process: circumstantial, tangential  Thought content: significant for preoccupations.   Fund of Knowledge: Average  Attention/Concentration: Poor  Language ability:  Intact  Memory:  Immediate recall intact, Short-term memory intact, and Long-term memory intact  Insight: Fair  Judgement: fair  Orientation: Yes, x4  Psychomotor Behavior: normal or unremarkable    Muscle Strength and Tone: MuscleStrength: Normal  Gait  and Station: Normal       LABS   personally reviewed.    Latest Reference Range & Units 05/09/24 16:14 05/17/24 23:13 05/17/24 23:38 05/21/24 09:13 05/22/24 12:46 05/23/24 09:29 05/25/24 08:00   Sodium 135 - 145 mmol/L  139    140 137   Potassium 3.4 - 5.3 mmol/L  4.0    4.3 3.8   Chloride 98 - 107 mmol/L  103    98 98   Carbon Dioxide (CO2) 22 - 29 mmol/L  25    31 (H) 28   Urea Nitrogen 8.0 - 23.0 mg/dL  14.6    19.6 16.0   Creatinine 0.51 - 1.17 mg/dL  1.16    1.06 1.04   GFR Estimate >60 mL/min/1.73m2  52 (L)    58 (L) 59 (L)   Calcium 8.8 - 10.2 mg/dL  8.7 (L)    9.7 9.2   Anion Gap 7 - 15 mmol/L  11    11 11   Albumin 3.5 - 5.2 g/dL  4.5    4.8    Protein Total 6.4 - 8.3 g/dL  7.0    7.5    Alkaline Phosphatase 40 - 150 U/L  74    79    ALT 0 - 70 U/L  27    38    AST 0 - 45 U/L  23    30    Bilirubin Total <=1.2 mg/dL  0.5    1.0    Cholesterol <200 mg/dL      214 (H)    Folate 4.6 - 34.8 ng/mL      10.9    Glucose 70 - 99 mg/dL  105 (H)    207 (H) 117 (H)   HDL Cholesterol >=40 mg/dL      31 (L)    Hemoglobin A1C <5.7 %      6.6 (H)    LDL Cholesterol Calculated <=100 mg/dL      132 (H)    Non HDL Cholesterol <130 mg/dL      183 (H)    T4 Free 0.90 - 1.70 ng/dL  1.27        Triglycerides <150 mg/dL      253 (H)    TSH 0.30 - 4.20 uIU/mL  4.70 (H)        Vitamin B12 232 - 1,245 pg/mL      330    WBC 4.0 - 11.0 10e3/uL  7.4    5.8 5.8   Hemoglobin 13.3 - 17.7 g/dL  18.5 (H)    19.0 (H) 18.8 (H)   Hematocrit 35.0 - 53.0 %  53.9 (H)    57.1 (H) 54.9 (H)   Platelet Count 150 - 450 10e3/uL  202    214 197   RBC Count 3.80 - 5.90 10e6/uL  6.16 (H)    6.55 (H) 6.40 (H)   MCV 78 - 100 fL  88    87 86   MCH 26.5 - 33.0 pg  30.0    29.0 29.4   MCHC 31.5 - 36.5 g/dL  34.3    33.3 34.2   RDW 10.0 - 15.0 %  13.1    13.1 12.8   % Neutrophils %  66    63 58   % Lymphocytes %  23    26 29   % Monocytes %  7    6 8   % Eosinophils %  4    5 5   % Basophils %  0    0 0   Absolute Basophils 0.0 - 0.2 10e3/uL  0.0    0.0 0.0  "  Absolute Eosinophils 0.0 - 0.7 10e3/uL  0.3    0.3 0.3   Absolute Immature Granulocytes <=0.4 10e3/uL  0.0    0.0 0.0   Absolute Lymphocytes 0.8 - 5.3 10e3/uL  1.7    1.5 1.7   Absolute Monocytes 0.0 - 1.3 10e3/uL  0.5    0.3 0.5   % Immature Granulocytes %  0    0 0   Absolute Neutrophils 1.6 - 8.3 10e3/uL  4.9    3.7 3.3   Absolute NRBCs 10e3/uL  0.0    0.0 0.0   NRBCs per 100 WBC <1 /100  0    0 0   % Retic 0.5 - 2.0 %       0.9   Absolute Retic 0.025 - 0.095 10e6/uL       0.060   SARS CoV2 PCR Negative    Negative       Amphetamine Qual Urine Screen Negative   Screen Negative   Screen Negative     Fentanyl Qual Urine Screen Negative   Screen Negative   Screen Negative     Cocaine Urine Screen Negative   Screen Negative   Screen Negative     Benzodiazepine Urine Screen Negative   Screen Negative   Screen Positive !     Opiates Qualitative Urine Screen Negative   Screen Negative   Screen Negative     PCP Urine Screen Negative   Screen Negative   Screen Negative     Cannabinoids Qual Urine Screen Negative   Screen Positive !   Screen Positive !     Barbiturates Qual Urine Screen Negative   Screen Negative   Screen Negative     BLOOD MORPHOLOGY PATHOLOGIST REVIEW        Rpt (IP)   PATHOLOGY CONSULT  Rpt (IP)         LAB BLOOD MORPHOLOGY PATHOLOGIST REVIEW        Rpt ! (IP)   UNMAPPED IMAGING RESULT     Rpt (E)      (H): Data is abnormally high  (L): Data is abnormally low  !: Data is abnormal  (IP): In Process  Rpt: View report in Results Review for more information  (E): External lab result  No results found for: \"PHENYTOIN\", \"PHENOBARB\", \"VALPROATE\", \"CBMZ\"       DIAGNOSIS   Principal Problem:    PTSD  Generalized anxiety disorder  Major depressive disorder, recurrent, severe without psychotic features  Suicidal ideation    Clinically Significant Risk Factors                        # DMII: A1C = 6.6 % (Ref range: <5.7 %) within past 6 months, PRESENT ON ADMISSION  # Obesity: Estimated body mass index is 35.48 " "kg/m  as calculated from the following:    Height as of 5/21/24: 1.651 m (5' 5\").    Weight as of this encounter: 96.7 kg (213 lb 3.2 oz)., PRESENT ON ADMISSION     # Financial/Environmental Concerns:              Active Problem List:  Patient Active Problem List   Diagnosis    CLAIRE (generalized anxiety disorder)    Mass    PTSD (post-traumatic stress disorder)    Other chronic pain    Restless leg syndrome    Osteoarthritis of spine with radiculopathy, lumbar region    Chondromalacia of left patella    Chronic bilateral low back pain without sciatica    Chronic joint pain    Complex tear of medial meniscus of left knee as current injury    GERD (gastroesophageal reflux disease)    Glaucoma    IBS (irritable bowel syndrome)    Chronic pain of right knee    Lumbar radiculopathy    Major depressive disorder, recurrent severe without psychotic features (H)    Morbid obesity (H)    Postmenopausal    Primary osteoarthritis of right knee    MDD (major depressive disorder), recurrent severe, 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)    Anxiety    Pelvic floor weakness    Gender identity disorder    Primary osteoarthritis involving multiple joints    Status post total knee replacement, right    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          HOSPITAL COURSE AND ASSESSMENT   This is a 68 year old adult with history of major depressive disorder, PTSD, and generalized anxiety disorder.  Patient presented to the ED on 5/21/2024 due to report of feeling \"tired from life\" and increased anxiety with trouble focusing.  Patient also reported increased PTSD symptoms.  Patient reported experiencing increasing psychosocial stressors leading up to current hospitalization including ongoing worker's comp case, recently diagnosed breast cancer, and financial strain.  Patient was evaluated by provider in " the ED and determined to be medically stable.  Patient subsequently transferred to [facility] unit in the ED for further psychiatric evaluation and management.  While on the [facility] unit patient was initiated on Pristiq to target anxiety and depression symptoms.  Trazodone was also utilized to target insomnia symptoms and PTA clonazepam was continued to target anxiety.  Patient patient then admitted voluntarily to inpatient psychiatry unit 10 [address] with attending Dr. Nichols for further psychiatric stabilization.       Patient's PTA medications were continued.  Patient is agreeable to trial of Pristiq to target depression and anxiety symptoms.  Plan will be to titrate Pristiq to therapeutic dose while monitoring for side effects.  Patient reports they hope to discharge soon as they are scheduled to complete double mastectomy to address recently diagnosed breast cancer on 6/12/2024.     PLAN   1. Ongoing education given regarding diagnostic and treatment options with risks, benefits and alternatives and adequate verbalization of understanding.  2.  Medications:  Continue Pristiq 24-hour tablet 25 mg PO daily with last dose on 5/26/2024.  Then increase Pristiq 24-hour tablet to 50 mg PO daily to further target depression and anxiety symptoms.  Initiate senna-docusate 8.6-50 mg tablet, 1-2 tablets PO daily to target constipation symptoms  Continue Arimidex tablet 1 mg PO capsule daily  Continue gabapentin capsule 900 mg PO capsule at bedtime  Continue hydrochlorothiazide 12.5 mg PO daily  Continue pantoprazole EC tablet 400 mg PO every morning before breakfast  Continue Mirapex tablet 0.75 mg PO capsule at bedtime  PRN albuterol inhaler 2 puffs inhaled every 4 hours as needed for shortness of breath, wheezing  PRN clonazepam tablet 1 mg PO 2 times daily as needed for anxiety  PRN gabapentin capsule 100 mg PO capsule every 6 hours as needed for anxiety  PRN trazodone tablet 50 mg B capsule at bedtime as needed for sleep, may  repeat dose after 60 minutes  PRN Bengay 2.5% topical gel, apply topical every 6 hours as needed for muscle soreness, moderate pain    3.  Labs/Imaging:  -Per internal medicine provider: CBC, blood morphology pathologist review, BMP  -Vitamin D deficiency ordered as add on    4. Consults:  -Hospitalist to be consulted as needed.    5. Precautions:  -Suicide precautions    6. Legal:  -Voluntary    7. Discharge planning:  -Per unit CTC        Risk Assessment: IP AC RISK ASSESSMENT: Patient able to contract for safety and Patient on precautions    Coordination of Care:   Treatment Plan reviewed and physician signed, Care discussed with Care/Treatment Team Members, Chart reviewed and Patient seen      Re-Certification I certify that the inpatient psychiatric facility services furnished since the previous certification were, and continue to be, medically necessary for, either, treatment which could reasonably be expected to improve the patient s condition or diagnostic study and that the hospital records indicate that the services furnished were, either, intensive treatment services, admission and related services necessary for diagnostic study, or equivalent services.     I certify that the patient continues to need, on a daily basis, active treatment furnished directly by or requiring the supervision of inpatient psychiatric facility personnel.   I estimate 7-10 days of hospitalization is necessary for proper treatment of the patient. My plans for post-hospital care for this patient are   TBD     JODI Roldan CNP    -     05/24/24       -     10:30 AM      Total time  35 minutes with > 50%spent on coordination of cares and psycho-education.    This note was created with help of Dragon dictation system. Grammatical / typing errors are not intentional.    JODI Roldan CNP

## 2024-05-24 NOTE — PLAN OF CARE
"Individual Therapy Note      Date of Service: May 24, 2024    Patient: Killian goes by \"Killian,\" uses he/him pronouns    Individuals Present: Killian & [facility]    Session start: 1345   Session end: 1355  Session duration in minutes: 10      Modality Used: Person Centered and Brief Therapy    Goals: Verbal processing    Patient Description of current symptoms: Feeling better today     Mental Status Exam:   Attitude: cooperative  Eye Contact: good  Mood: anxious and better  Affect: mood congruent  Speech: clear, coherent  Psychomotor Behavior: no evidence of tardive dyskinesia, dystonia, or tics  Thought Process:  logical and linear  Associations: no loose associations  Thought Content: no evidence of suicidal ideation or homicidal ideation, no evidence of psychotic thought, no auditory hallucinations present, and no visual hallucinations present  Insight: fair  Judgement: intact  Attention Span and Concentration: intact    Pt progress: Met pt in hallway. Pt checked in that they had a rough night but found some comfort with a weighted blanket. Pt shared some of what brought him to the hospital but was feeling better today. Pt seemed less tense today and expressed appreciation for the compassion that he has received from the hospital staff. Pt also shared that he wants to connect more with the LGBTQ community.     Treatment Objective(s) Addressed:   The focus of this session was on processing feelings related to hospital stay, trauma and identifying additional supports     Progress Towards Goals and Assessment of Patient:   Patient is making progress towards treatment goals as evidenced by insight and mood stability.       Therapeutic Intervention(s):   Provided active listening, unconditional positive regard, and validation.   Engaged in guided discovery, explored patient's perspectives and helped expand them through socratic dialogue      Plan/next step: Engaged in unit programming  Check in with therapist as " needed      No Charge (0-15 min)    Patient Active Problem List   Diagnosis    CLAIRE (generalized anxiety disorder)    Mass    PTSD (post-traumatic stress disorder)    Other chronic pain    Restless leg syndrome    Osteoarthritis of spine with radiculopathy, lumbar region    Chondromalacia of left patella    Chronic bilateral low back pain without sciatica    Chronic joint pain    Complex tear of medial meniscus of left knee as current injury    GERD (gastroesophageal reflux disease)    Glaucoma    IBS (irritable bowel syndrome)    Chronic pain of right knee    Lumbar radiculopathy    Major depressive disorder, recurrent severe without psychotic features (H)    Morbid obesity (H)    Postmenopausal    Primary osteoarthritis of right knee    MDD (major depressive disorder), recurrent severe, 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)    Anxiety    Pelvic floor weakness    Gender identity disorder    Primary osteoarthritis involving multiple joints    Status post total knee replacement, right    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

## 2024-05-24 NOTE — PLAN OF CARE
"RN Shift Summary     Patient presented with a calm/depressed affect. He reported feeling \"better\" and \"calmer\" today. He stated that having a weighted blanket helped a lot with his anxiety and sleep. He was worried about constipation today, stating that he hasn't had a bowel movement since Tuesday. He had two prune juices, Senna, Miralax, and Milk of Magnesia today. He reported having a bowel movement mid-day today and was happy that he was starting to having results. Internal medicine is also following patient to follow up on cancer dx, abnormal labs, and testosterone dosing. Patient reports that he takes testosterone on Fridays and that he missed his dose last Friday. Patient denies SI today. SIO 1:1 observation discontinued today, and he was happy about this change. He said he feels supported and comfortable asking for help if he needs it. He agreed to notify writer and other staff if his suicidal thoughts resurface or worsen. He looks forward to speaking with the  today. He attended some groups and spent time resting in his room. No behavioral outbursts. Patient said he is happy with his choice to get help.    Patient became tearful and very upset in the afternoon, stating that he has been unable to reach his financial counselor that has been helping him with MA. He is worried that he will lose MA and be unable to have the surgery and medical treatments he has planned. Writer provided emotional support and assistance in problem-solving. Patient made phone calls and got phone numbers from his phone. He requested PRN clonazepam for high anxiety, which was provided.    Vitals: B/P: 146/98, T: 97.4, P: 80, R: 16    "

## 2024-05-24 NOTE — CONSULTS
"[facility]  Consult Note - Hospitalist Service  Date of Admission:  5/22/2024  Consult Requested by: Psychiatry   Reason for Consult: \"New admit 05/23. Address restarting testosterone injection and also patient has multiple medical comorbidities including recently diagnosed cancer\"     Assessment & Plan   \"Killian\" Sarita is a 68 year old adult with a PMH significant for breast cancer, HTN, MDD, RLS, ASHLYN, thoracic aortic aneurysm anxiety, depression who was admitted on 5/22/2024 to inpatient Psychiatry for increased anxiety, feeling tired from life and trouble focusing. Medicine was consulted for restarting testosterone injection and to manage comorbidities.     Anxiety   Depression   Management per Psychiatry team     Inducible laryngeal obstruction   ASHLYN: Follows with Pulmonary here. No acute concerns or symptoms. Is working on getting CPAP for at home as he is having a challenging time cleaning this since rotator cuff surgery. Continue CPAP here     Invasive ductal carcinoma of left breast, stage 1   Newly diagnosed 03/2024 on routine mammogram found to have a 1.3cm mass of left breast, biopsy consistent with invascie ductal carcinoma grade 1, estrogen positive, progesterone positive HER-2 negative. Was seen by General Surgery with [facility] with plan for mastectomy, but patient would opt to under bilateral mastectomies and was referred to plastic surgery, procedure is planned for mid June (06/12). Med with Oncology 04/30 at [facility] and recommended starting anastrozole at that time. Has also been seen by Oncology here on 05/13 who agreed with above plan.     -Continue PTA anastrozole 1mg daily   -Follow up with Oncology as OP and General Surg/Plastics for planned bilateral mastectomies     Gender dysphoria  Continue testosterone injection weekly (receives this on Fridays), no contraindication from medicine standpoint for holding. Hasn't had a recent " "testosterone level, last checked 635 in 09/2023, will recheck here. Per PCP pre-op from 05/21, no mention of holding this prior to planned procedure.  -Continue weekly testosterone (receives on Fridays)     Erythrocytosis   Noted on admission elevated to 18.5 with recheck 19.0. Appears to have had slightly elevated RBCs and hematocrit previously, but increase noted on 05/17 for preop labs and continues to be elevated. Patient reports poor oral intake therefore possible hemoconcentration. Question if testosterone dose playing a role as well   -CBC and peripheral smear ordered   -Testosterone level as mentioned above     Elevated creatinine   Was previously ~0.8, slightly up to 1.16 on 05/17, down to 1.06 on admission. Possibly in the setting of poor PO intake.   -BMP in AM    GERD: continue PTA PPI   HTN: PTA hydrochlorothiazide 12.5mg daily  RLS: PTA pramipexole at bedtime    Diabetes mellitus, type 1 A1c 6.6% 05/2024: recommend lifestyle modifications, recheck as OP with PCP  HLD: appears these have been mildly elevated in the past. Recommend lifestyle modifications and recheck as OP with PCP can discuss if initiation of statin indicated at that time   History of glaucoma: denies any current vision changes, but notes he will need new Ophthalmologist as OP, referral placed for you   Thoracic aortic aneurysm: followed by PCP, measured at 3.5cm on Ct chest from 07/2023     The patient's care was discussed with the Bedside Nurse, Patient, and Primary team.    Medicine team will continue to follow recheck labs.     Clinically Significant Risk Factors                        # DMII: A1C = 6.6 % (Ref range: <5.7 %) within past 6 months, PRESENT ON ADMISSION  # Obesity: Estimated body mass index is 35.48 kg/m  as calculated from the following:    Height as of 5/21/24: 1.651 m (5' 5\").    Weight as of this encounter: 96.7 kg (213 lb 3.2 oz)., PRESENT ON ADMISSION     # Financial/Environmental Concerns:           Samantha TRUJLILO" "BAILEY Carver  Hospitalist Service  Securely message with [employer] (more info)  Text page via [facility] Paging/Directory   ______________________________________________________________________    Chief Complaint   \"I've been better\"    History is obtained from the patient    History of Present Illness   Gregoria Stein is a 68 year old adult with a PMH significant for breast cancer, HTN, MDD, RLS, ASHLYN, thoracic aortic aneurysm anxiety, depression who was admitted on 5/22/2024 to inpatient Psychiatry for increased anxiety, feeling tired from life and trouble focusing. Medicine was consulted for restarting testosterone injection and to manage comorbidities.     Feeling okay.  Denies any chest pain, dyspnea, cough, fever, chills.  No abdominal pain, nausea, vomiting. Was having constipation, but notes this is now resolved.  Notes that in light of everything going on has been recently feeling overwhelmed and unable to remember if he was taking his medications or not.  Thinks he might of missed a couple doses of anastrozole.  Knows he missed his dose of testosterone last Friday.  Otherwise no other missed doses that he can remember at this time.  Notes history of glaucoma and needs a referral for a new ophthalmologist as outpatient.  Discussed lab results with patient, asked about GFR.  Notes that prior had been having poor p.o. intake and feeling dehydrated.  Now feels he is eating and drinking better.      Past Medical History    Past Medical History:   Diagnosis Date    Anxiety     Arthritis     C spine, thumbs bilateral, feet, shoulders, knees    Depressive disorder     Gastro-oesophageal reflux disease     intermittent    Labial irritation     labial mass    Other chronic pain     feet, knees, shoulders, full spine, thumbs    PTSD (post-traumatic stress disorder)     Restless leg syndrome        Past Surgical History   Past Surgical History:   Procedure Laterality Date    COLONOSCOPY      EXCISE MASS VAGINA Left 4/10/2015 "    Procedure: EXCISE MASS VAGINA;  Surgeon: Stanislav Byers MD;  Location: [facility] OR    GENITOURINARY SURGERY      Urethrial dilation    JOINT REPLACEMENT Right 2018    ORTHOPEDIC SURGERY      right rotator cuff, left achilles tendon repair, neuroma removed right foot, right knee arthroscopy,        Medications   I have reviewed this patient's current medications       Review of Systems    The 10 point Review of Systems is negative other than noted in the HPI or here.     Social History   I have reviewed this patient's social history and updated it with pertinent information if needed.  Social History     Tobacco Use    Smoking status: Former     Current packs/day: 0.00     Types: Cigarettes     Quit date: 1983     Years since quittin.7    Smokeless tobacco: Never   Vaping Use    Vaping status: Never Used   Substance Use Topics    Alcohol use: Yes     Comment: occasional    Drug use: Yes     Types: Marijuana     Comment: daily only at night time for medical conditions         Family History   I have reviewed this patient's family history and updated it with pertinent information if needed.  Family History   Problem Relation Age of Onset    Macular Degeneration Mother     Osteoporosis Mother     Heart Disease Father 49    Multiple Sclerosis Sister     Diabetes Paternal Uncle         heart issues    Cancer No family hx of     Coronary Artery Disease No family hx of          Allergies   Allergies   Allergen Reactions    Midazolam Other (See Comments)     Stopped breathing    Other reaction(s): Apnea   Stopped breathing   Stopped breathing    Penicillins Rash and Hives        Physical Exam   Vital Signs: Temp: 97.4  F (36.3  C) Temp src: Oral BP: (!) 146/98 Pulse: 80     SpO2: 94 % O2 Device: None (Room air)    Weight: 213 lbs 3.2 oz    General: sitting up in bed, alert, cooperative, awake, in no acute distress   HEENT: normocephalic, atraumatic, anicteric sclera, moist mucus membranes, no lymphadenopathy  appreciated, PERRLA, EOM wnl   Respiratory: breathing comfortably on room air, clear to auscultation bilaterally without wheezing, crackles, or rhonchi appreciated  Cardiac: regular rate and rhythm with normal S1/S2 without murmurs, clicks, rubs or gallops, 2+ radial pulse on LUE, no signs of peripheral edema bilaterally  GI: soft, non-distended, normoactive bowel sounds, non-tender per palpation  Neuro: grossly non-focal, alert and oriented, normal speech  MSK: no bony deformities, moving all extremities independently, steady gait  Skin: no rashes or lesions on uncovered surfaces, no jaundice       Medical Decision Making       60 MINUTES SPENT BY ME on the date of service doing chart review, history, exam, documentation & further activities per the note.      Data   NOTE: Data reviewed over the past 24 hrs contributes toward MDM complexity

## 2024-05-24 NOTE — PLAN OF CARE
[facility] IP Unit Acuity Rating Score (UARS)  Patient is given one point for every criteria they meet.    CRITERIA SCORING   On a 72 hour hold, court hold, committed, stay of commitment, or revocation. 0    Patient LOS on [facility] unit exceeds 20 days. 0  LOS: 2   Patient under guardianship, 55+, otherwise medically complex, or under age 11. 1   Suicide ideation without relief of precipitating factors. 0   Current plan for suicide. 0   Current plan for homicide. 0   Imminent risk or actual attempt to seriously harm another without relief of factors precipitating the attempt. 0   Severe dysfunction in daily living (ex: complete neglect for self care, extreme disruption in vegetative function, extreme deterioration in social interactions). 1   Recent (last 7 days) or current physical aggression in the ED or on unit. 0   Restraints or seclusion episode in past 72 hours. 0   Recent (last 7 days) or current verbal aggression, agitation, yelling, etc., while in the ED or unit. 0   Active psychosis. 1   Need for constant or near constant redirection (from leaving, from others, etc).  0   Intrusive or disruptive behaviors. 1   Patient requires 3 or more hours of individualized nursing care per 8-hour shift (i.e. for ADLs, meds, therapeutic interventions). 0   TOTAL 4

## 2024-05-25 LAB
ANION GAP SERPL CALCULATED.3IONS-SCNC: 11 MMOL/L (ref 7–15)
BASOPHILS # BLD AUTO: 0 10E3/UL (ref 0–0.2)
BASOPHILS NFR BLD AUTO: 0 %
BUN SERPL-MCNC: 16 MG/DL (ref 8–23)
CALCIUM SERPL-MCNC: 9.2 MG/DL (ref 8.8–10.2)
CHLORIDE SERPL-SCNC: 98 MMOL/L (ref 98–107)
CREAT SERPL-MCNC: 1.04 MG/DL (ref 0.51–1.17)
DEPRECATED HCO3 PLAS-SCNC: 28 MMOL/L (ref 22–29)
EGFRCR SERPLBLD CKD-EPI 2021: 59 ML/MIN/1.73M2
EOSINOPHIL # BLD AUTO: 0.3 10E3/UL (ref 0–0.7)
EOSINOPHIL NFR BLD AUTO: 5 %
ERYTHROCYTE [DISTWIDTH] IN BLOOD BY AUTOMATED COUNT: 12.8 % (ref 10–15)
GLUCOSE SERPL-MCNC: 117 MG/DL (ref 70–99)
HCT VFR BLD AUTO: 54.9 % (ref 35–53)
HGB BLD-MCNC: 18.8 G/DL (ref 13.3–17.7)
IMM GRANULOCYTES # BLD: 0 10E3/UL
IMM GRANULOCYTES NFR BLD: 0 %
LYMPHOCYTES # BLD AUTO: 1.7 10E3/UL (ref 0.8–5.3)
LYMPHOCYTES NFR BLD AUTO: 29 %
MCH RBC QN AUTO: 29.4 PG (ref 26.5–33)
MCHC RBC AUTO-ENTMCNC: 34.2 G/DL (ref 31.5–36.5)
MCV RBC AUTO: 86 FL (ref 78–100)
MONOCYTES # BLD AUTO: 0.5 10E3/UL (ref 0–1.3)
MONOCYTES NFR BLD AUTO: 8 %
NEUTROPHILS # BLD AUTO: 3.3 10E3/UL (ref 1.6–8.3)
NEUTROPHILS NFR BLD AUTO: 58 %
NRBC # BLD AUTO: 0 10E3/UL
NRBC BLD AUTO-RTO: 0 /100
PLATELET # BLD AUTO: 197 10E3/UL (ref 150–450)
POTASSIUM SERPL-SCNC: 3.8 MMOL/L (ref 3.4–5.3)
RBC # BLD AUTO: 6.4 10E6/UL (ref 3.8–5.9)
RETICS # AUTO: 0.06 10E6/UL (ref 0.03–0.1)
RETICS/RBC NFR AUTO: 0.9 % (ref 0.5–2)
SODIUM SERPL-SCNC: 137 MMOL/L (ref 135–145)
VIT D+METAB SERPL-MCNC: 25 NG/ML (ref 20–50)
WBC # BLD AUTO: 5.8 10E3/UL (ref 4–11)

## 2024-05-25 PROCEDURE — 85025 COMPLETE CBC W/AUTO DIFF WBC: CPT | Performed by: PHYSICIAN ASSISTANT

## 2024-05-25 PROCEDURE — 82306 VITAMIN D 25 HYDROXY: CPT

## 2024-05-25 PROCEDURE — 36415 COLL VENOUS BLD VENIPUNCTURE: CPT | Performed by: PHYSICIAN ASSISTANT

## 2024-05-25 PROCEDURE — 80048 BASIC METABOLIC PNL TOTAL CA: CPT | Performed by: PHYSICIAN ASSISTANT

## 2024-05-25 PROCEDURE — 85060 BLOOD SMEAR INTERPRETATION: CPT | Performed by: PATHOLOGY

## 2024-05-25 PROCEDURE — 124N000002 HC R&B MH UMMC

## 2024-05-25 PROCEDURE — 85045 AUTOMATED RETICULOCYTE COUNT: CPT | Performed by: PHYSICIAN ASSISTANT

## 2024-05-25 PROCEDURE — 250N000013 HC RX MED GY IP 250 OP 250 PS 637

## 2024-05-25 RX ORDER — DESVENLAFAXINE 25 MG/1
25 TABLET, EXTENDED RELEASE ORAL DAILY
Status: COMPLETED | OUTPATIENT
Start: 2024-05-26 | End: 2024-05-26

## 2024-05-25 RX ORDER — DESVENLAFAXINE 50 MG/1
50 TABLET, FILM COATED, EXTENDED RELEASE ORAL DAILY
Status: DISCONTINUED | OUTPATIENT
Start: 2024-05-27 | End: 2024-05-30 | Stop reason: HOSPADM

## 2024-05-25 RX ADMIN — ANASTROZOLE 1 MG: 1 TABLET, COATED ORAL at 09:15

## 2024-05-25 RX ADMIN — PRAMIPEXOLE DIHYDROCHLORIDE 0.75 MG: 0.25 TABLET ORAL at 20:34

## 2024-05-25 RX ADMIN — CLONAZEPAM 1 MG: 1 TABLET ORAL at 09:16

## 2024-05-25 RX ADMIN — Medication: at 19:47

## 2024-05-25 RX ADMIN — ACETAMINOPHEN 650 MG: 325 TABLET, FILM COATED ORAL at 23:43

## 2024-05-25 RX ADMIN — ACETAMINOPHEN 650 MG: 325 TABLET, FILM COATED ORAL at 14:41

## 2024-05-25 RX ADMIN — Medication 3 MG: at 21:38

## 2024-05-25 RX ADMIN — PANTOPRAZOLE SODIUM 40 MG: 40 TABLET, DELAYED RELEASE ORAL at 09:15

## 2024-05-25 RX ADMIN — ACETAMINOPHEN 650 MG: 325 TABLET, FILM COATED ORAL at 05:52

## 2024-05-25 RX ADMIN — GABAPENTIN 900 MG: 300 CAPSULE ORAL at 20:34

## 2024-05-25 RX ADMIN — Medication: at 05:56

## 2024-05-25 RX ADMIN — CLONAZEPAM 1 MG: 1 TABLET ORAL at 19:46

## 2024-05-25 RX ADMIN — DESVENLAFAXINE 25 MG: 25 TABLET, EXTENDED RELEASE ORAL at 09:15

## 2024-05-25 RX ADMIN — HYDROCHLOROTHIAZIDE 12.5 MG: 12.5 TABLET ORAL at 09:15

## 2024-05-25 RX ADMIN — GABAPENTIN 100 MG: 100 CAPSULE ORAL at 05:54

## 2024-05-25 ASSESSMENT — ACTIVITIES OF DAILY LIVING (ADL)
DRESS: INDEPENDENT
ADLS_ACUITY_SCORE: 31
ORAL_HYGIENE: INDEPENDENT
ADLS_ACUITY_SCORE: 31
HYGIENE/GROOMING: INDEPENDENT
ADLS_ACUITY_SCORE: 31
LAUNDRY: WITH SUPERVISION

## 2024-05-25 NOTE — PLAN OF CARE
"RN Shift Summary     Patient presented with a labile affect today. He was upset and somewhat agitated at the start of the shift. He was lying between his bed and the edge of the wall and crying. He was he was having a hard time. He said he kept thinking about how he was going to lose his insurance, which would lead to him not being able to have top surgery or cancer treatment, which would lead to further dysmorphia and poor mental health, and he said his mental pain was not going to be something he could live with forever. He said he couldn't stop the spiral of his thoughts and kept thinking that the only way out if things went that way would be to \"cut them off myself\" or \"kill myself.\" Writer provided validation and emotional support and assisted patient in slowing his thoughts and focusing on the present. Patient expressed that he would be more comfortable at home but acknowledged that he was in the right place because of the direction his thoughts were trending. Patient does not have any immediate intention or plan regarding SI but he worries about how things will go if he is not able to keep his insurance or get his surgeries. He was able to talk through various coping skills. He chose to work through a [employer] prayer independently, socialized with some peers after calming, and went to the [employer] today. He said it helps to reach out to social supports as well. Patient also utilized pharmacologic intervention in the form of PRN clonazepam to calm when agitated. Patient was calmer for the remainder of the shift. Some friends visited in the afternoon, and he reported feeling happy about this. He reported some pain around the left breast area and dysmorphia associated with it in the afternoon and requested PRN Tylenol, which was provided.    Vitals: B/P: 140/81, T: 98.2, P: 83, R: 18    "

## 2024-05-25 NOTE — PLAN OF CARE
Rehab Group    Start time: 1030  End time: 1120  Patient time total: 15 minutes    attended partial group    #6 attended   Group Type: occupational therapy   Group Topic Covered: coping skills, problem solving, social skills, healthy leisure time, and cognitive activities     Group Session Detail:Patient engaged in a leisure activity with a visuospatial and cognitive component in order to promote: problem solving skills, improve attention, emphasize new learning, exercise cognitive skills, and foster leisure and relaxation.    Patient Response/Contribution:  Cooperative with task and Actively engaged     Patient Detail:pt arrived late for group. Pt polite and pleasant during interaction with therapist. Pt provided with individual instructions and demonstration for cognitive task. Pt worked quietly and remained focused during last round of activity. Pt endorsed positive response to activity and thanked therapist.           No Charge    Patient Active Problem List   Diagnosis    CLAIRE (generalized anxiety disorder)    Mass    PTSD (post-traumatic stress disorder)    Other chronic pain    Restless leg syndrome    Osteoarthritis of spine with radiculopathy, lumbar region    Chondromalacia of left patella    Chronic bilateral low back pain without sciatica    Chronic joint pain    Complex tear of medial meniscus of left knee as current injury    GERD (gastroesophageal reflux disease)    Glaucoma    IBS (irritable bowel syndrome)    Chronic pain of right knee    Lumbar radiculopathy    Major depressive disorder, recurrent severe without psychotic features (H)    Morbid obesity (H)    Postmenopausal    Primary osteoarthritis of right knee    MDD (major depressive disorder), recurrent severe, 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)    Anxiety    Pelvic floor weakness    Gender identity disorder    Primary osteoarthritis involving multiple joints    Status post total knee replacement, right    Gender dysphoria in adolescent and adult     Malignant neoplasm of overlapping sites of left breast in female, estrogen receptor positive (H)    Recurrent major depression (H24)    Suicidal ideation
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
## 2024-05-25 NOTE — PROGRESS NOTES
NOC Shift Report     Pt awake and sitting at the lounge at the beginning of the shift. C/o pain to lower back rated 5/10. Pain cream applied with relief of pain and pt slept through shift for 5.75 hours. At 0550, pt complain of generalized pain and PRN Tylenol, Gabapentin given and Bengay cream applied with relief of pain. Will continue to monitor.

## 2024-05-26 LAB
BASE EXCESS BLDV CALC-SCNC: 6 MMOL/L (ref -3–3)
ERYTHROCYTE [DISTWIDTH] IN BLOOD BY AUTOMATED COUNT: 12.5 % (ref 10–15)
HCO3 BLDV-SCNC: 32 MMOL/L (ref 21–28)
HCT VFR BLD AUTO: 54.8 % (ref 35–53)
HGB BLD-MCNC: 19 G/DL (ref 13.3–17.7)
MCH RBC QN AUTO: 29.6 PG (ref 26.5–33)
MCHC RBC AUTO-ENTMCNC: 34.7 G/DL (ref 31.5–36.5)
MCV RBC AUTO: 85 FL (ref 78–100)
O2/TOTAL GAS SETTING VFR VENT: 21 %
OXYHGB MFR BLDV: 90 % (ref 70–75)
PCO2 BLDV: 46 MM HG (ref 40–50)
PH BLDV: 7.44 [PH] (ref 7.32–7.43)
PLATELET # BLD AUTO: 218 10E3/UL (ref 150–450)
PO2 BLDV: 59 MM HG (ref 25–47)
RBC # BLD AUTO: 6.42 10E6/UL (ref 3.8–5.9)
SAO2 % BLDV: 91.5 % (ref 70–75)
WBC # BLD AUTO: 6.6 10E3/UL (ref 4–11)

## 2024-05-26 PROCEDURE — 99231 SBSQ HOSP IP/OBS SF/LOW 25: CPT | Performed by: PHYSICIAN ASSISTANT

## 2024-05-26 PROCEDURE — 250N000013 HC RX MED GY IP 250 OP 250 PS 637

## 2024-05-26 PROCEDURE — 36415 COLL VENOUS BLD VENIPUNCTURE: CPT | Performed by: PHYSICIAN ASSISTANT

## 2024-05-26 PROCEDURE — 250N000013 HC RX MED GY IP 250 OP 250 PS 637: Performed by: PHYSICIAN ASSISTANT

## 2024-05-26 PROCEDURE — 81270 JAK2 GENE: CPT | Performed by: PHYSICIAN ASSISTANT

## 2024-05-26 PROCEDURE — 85027 COMPLETE CBC AUTOMATED: CPT | Performed by: PHYSICIAN ASSISTANT

## 2024-05-26 PROCEDURE — 124N000002 HC R&B MH UMMC

## 2024-05-26 PROCEDURE — 82668 ASSAY OF ERYTHROPOIETIN: CPT | Performed by: PHYSICIAN ASSISTANT

## 2024-05-26 PROCEDURE — 81338 MPL GENE COMMON VARIANTS: CPT | Performed by: PHYSICIAN ASSISTANT

## 2024-05-26 PROCEDURE — 99207 MPL CALR NGS REFLEX PANEL: CPT | Performed by: MEDICAL GENETICS, PH.D. MEDICAL GENETICS

## 2024-05-26 PROCEDURE — 82805 BLOOD GASES W/O2 SATURATION: CPT | Performed by: PHYSICIAN ASSISTANT

## 2024-05-26 PROCEDURE — 99207 JAK2 WITH REFLEX TO MPL CALR MPN FOC NGS: CPT | Performed by: MEDICAL GENETICS, PH.D. MEDICAL GENETICS

## 2024-05-26 RX ORDER — LIDOCAINE 4 G/G
1 PATCH TOPICAL
Status: DISCONTINUED | OUTPATIENT
Start: 2024-05-26 | End: 2024-05-30 | Stop reason: HOSPADM

## 2024-05-26 RX ORDER — CYCLOBENZAPRINE HCL 5 MG
5 TABLET ORAL EVERY 8 HOURS PRN
Status: DISCONTINUED | OUTPATIENT
Start: 2024-05-26 | End: 2024-05-30 | Stop reason: HOSPADM

## 2024-05-26 RX ADMIN — CLONAZEPAM 1 MG: 1 TABLET ORAL at 23:35

## 2024-05-26 RX ADMIN — Medication: at 06:27

## 2024-05-26 RX ADMIN — DICLOFENAC SODIUM TOPICAL GEL, 1% 4 G: 10 GEL TOPICAL at 21:34

## 2024-05-26 RX ADMIN — Medication 3 MG: at 21:33

## 2024-05-26 RX ADMIN — ACETAMINOPHEN 650 MG: 325 TABLET, FILM COATED ORAL at 14:34

## 2024-05-26 RX ADMIN — LIDOCAINE 1 PATCH: 4 PATCH TOPICAL at 21:28

## 2024-05-26 RX ADMIN — Medication: at 08:29

## 2024-05-26 RX ADMIN — DESVENLAFAXINE 25 MG: 25 TABLET, EXTENDED RELEASE ORAL at 08:29

## 2024-05-26 RX ADMIN — PRAMIPEXOLE DIHYDROCHLORIDE 0.75 MG: 0.25 TABLET ORAL at 21:44

## 2024-05-26 RX ADMIN — PANTOPRAZOLE SODIUM 40 MG: 40 TABLET, DELAYED RELEASE ORAL at 08:29

## 2024-05-26 RX ADMIN — GABAPENTIN 900 MG: 300 CAPSULE ORAL at 21:28

## 2024-05-26 RX ADMIN — CLONAZEPAM 1 MG: 1 TABLET ORAL at 08:29

## 2024-05-26 RX ADMIN — ANASTROZOLE 1 MG: 1 TABLET, COATED ORAL at 08:29

## 2024-05-26 RX ADMIN — DICLOFENAC SODIUM TOPICAL GEL, 1% 2 G: 10 GEL TOPICAL at 18:06

## 2024-05-26 RX ADMIN — HYDROCHLOROTHIAZIDE 12.5 MG: 12.5 TABLET ORAL at 08:29

## 2024-05-26 RX ADMIN — GABAPENTIN 100 MG: 100 CAPSULE ORAL at 14:34

## 2024-05-26 RX ADMIN — SENNOSIDES AND DOCUSATE SODIUM 1 TABLET: 50; 8.6 TABLET ORAL at 08:30

## 2024-05-26 ASSESSMENT — ACTIVITIES OF DAILY LIVING (ADL)
ADLS_ACUITY_SCORE: 31
HYGIENE/GROOMING: INDEPENDENT
ADLS_ACUITY_SCORE: 31
ORAL_HYGIENE: INDEPENDENT
ADLS_ACUITY_SCORE: 31
ADLS_ACUITY_SCORE: 31
LAUNDRY: WITH SUPERVISION
ADLS_ACUITY_SCORE: 31
DRESS: INDEPENDENT
ADLS_ACUITY_SCORE: 31

## 2024-05-26 NOTE — PLAN OF CARE
"RN Shift Summary     Patient presented with a labile affect. He was agitated at the start of the shift. He was lying between his bed and the wall and found yelling and crying. He was stating, \"I can't do this!\" and \"I need to get these off of me,\" referring to his breasts. He showed writer superficial scratches on his chest that he had done with his finger nails. He said he wants to discharge and use a knife or chainsaw to cut his breasts off if he can't have top surgery. He also said that he want to \"suicide\" if he can't have surgery. Patient stated that he admires celebrities that have completed suicide, stating that they were \"brave\" for having the courage to do it. Writer provided emotional support and assisted patient in de-escalating. Patient was able to calm and come up with some coping skills that he plans to use throughout the day, including rest, meditation, and doing a puzzle in his room. He also requested PRN clonazepam, which was provided along with his morning medications. SIB precautions initiated. Patient declined his morning Senna, stating that he has been having consistent bowel movements. Patient stated that his water intake has been higher than his urinary output, and he plans to discuss this with internal medicine. Patient isolated to his room for much of the day, stating that it was too overwhelming to be around people today.    In the afternoon, patient was yelling and escalated in the hallway. He was yelling, \"I can't do this!\" Writer walked with patient to his room. He proceeded to scream and pound on his bed. He said he has been through too much pain and wants to be euthanized. He said he wants to discharge so he can use more coping skills but also made numerous statements about wanting to complete suicide at home. Patient also stated, \"this place is hell\" and \"I'm going to find a way to do it here\" after discussing completing suicide. Due to the aforementioned statement, and patient's " "inability to agree to maintain safety in the hospital, patient was placed on SIO 1:1 10 feet for safety due to SI/SIB behaviors and statements. Patient was informed, and he stated, \"I understand, but I don't like it.\" He proceeded to be tearful and elevated and make suicidal statements. He also reported having unmanageable pain. PRNs Tylenol and Gabapentin provided. Additional pain medications ordered by internal medicine and patient informed. Spiritual care order placed, and  stated they will visit the unit tomorrow.     Vitals: B/P: 132/87, T: 97.8, P: 75, R: 18  PRNs: clonazepam, Gabapentin, Tylenol    "

## 2024-05-26 NOTE — PROGRESS NOTES
"Brief Hospital Medicine Note:     \"Killian\" Sarita is a 68 year old adult with a PMH significant for breast cancer, HTN, MDD, RLS, ASHLYN, thoracic aortic aneurysm anxiety, depression who was admitted on 5/22/2024 to inpatient Psychiatry for increased anxiety, feeling tired from life and trouble focusing. Medicine was consulted for restarting testosterone injection and to manage comorbidities. Medicine team following up on erythrocytosis. Nursing notes, vitals, and labs reviewed.     Met with patient at bedside to review labwork and plan for further testing for erythrocytosis. Patient then become quite tearful and said \"I would like euthanasia\" and \"didn't feel like he could go on\". \"Will you let me discharge so I can go home to die\"     /87 (BP Location: Right arm, Patient Position: Sitting, Cuff Size: Adult Regular)   Pulse 75   Temp 97.8  F (36.6  C) (Oral)   Resp 18   Wt 96.7 kg (213 lb 3.2 oz)   LMP  (LMP Unknown)   SpO2 94%   BMI 35.48 kg/m      General: sitting up in bed, alert, cooperative, awake, tearful  HEENT: normocephalic, atraumatic, anicteric sclera  Respiratory: breathing comfortably on room air  Neuro: grossly non-focal, alert and oriented, normal speech   Psych: very tearful, actively making suicidal statements   MSK: no bony deformities, moving all extremities independently  Skin: no rashes or lesions on uncovered surfaces, no jaundice    Erythrocytosis   Recheck showed hematocrit stable at ~19.0. Discussed with Hematology. Agreed with following up on testosterone level and obtaining JAK2 and EPO with persistent elevation. They also recommended obtaining VBG with underlying ASHLYN (hasn't been wearing CPAP PTA, wearing here) which has also been ordered. Unclear if this is in the setting of medication related with testosterone vs starting anastrozole (started ~ 1 month ago), MPN is also on the differential therefore will need to pursue work up with JAK2 and EPO. Also has underlying ASHLYN which " could be contributing as well.   -Daily CBC  -JAK2 and EPO ordered   -VBG ordered   -Follow up testosterone level   -Follow up peripheral smear   -Encourage continued CPAP use   -Pending work up, may need formal Hematology consult    Medicine will continue to follow up erythrocytosis work up.     Samantha Singer PA-C  Hospitalist Service  Contact information available via [facility] Paging/Directory

## 2024-05-26 NOTE — PLAN OF CARE
Goal Outcome Evaluation:       Pt was up at the beginning of the shift.Pt complained of lower back pain 6-7/10 Prn Tylenol was administered. Pt slept for 4.5 hours. Pt was on SIO for Cpap use. No other concerns noted during this shift.Will continue to monitor.      PRN -Tylenol,              -Bengay for lower back and shoulder pain

## 2024-05-26 NOTE — PROGRESS NOTES
"Writer met with patient per patient request. Patient voiced frustration with insurance and medical issues. Stated that if he loses his MA he will \"leave this planet.'  Encouraged patient to continue to work with his TCM and CADI worker in regards to insurance issues.     Writer validated patient and encouraged to continue to use coping skills to manage anxiety and suicidal ideation. RN updated.      Alivia Epps, [facility], [facility]  Clinical Treatment Coordinator    "

## 2024-05-26 NOTE — PLAN OF CARE
"Goal Outcome Evaluation:    Plan of Care Reviewed With: patient    Patient was isolative and withdrawn to their room. Patient was out for dinner. Patient ate 40% of their dinner. Patient was weepy when they were approached for mental health assessment. Patient perseverates on insurance and lack of family support after surgery. Patient repeats the same story over and over and blames health care system over what is happening to them. Patient rated anxiety and depression high related to \"what is going on in their mind\". Patient says \"when I discharge from here I will have courage to kill myself, I can't live this way\" patient stated. Patient asked to speak to therapist but there was none on call. Patient asked to discharge. Patient later changed their mind. Patient stated that they were feeling suicidal. Patient would not contract for safety. Patient denies intent to harm themselves here. Patient agreed to take prn Clonazepam for high anxiety. Patient also came out to do puzzles. Patient socialized with select peers. Patient reported some effectiveness. On call MD was updated regarding patient presentation. SIO order for CPAP at  was obtained. Patient was med compliant.Vitals were within normal limit (see MAR).Patient requested prn Melatonin at hs.               "

## 2024-05-27 LAB
ERYTHROCYTE [DISTWIDTH] IN BLOOD BY AUTOMATED COUNT: 13 % (ref 10–15)
HCT VFR BLD AUTO: 54.6 % (ref 35–53)
HGB BLD-MCNC: 18.9 G/DL (ref 13.3–17.7)
MCH RBC QN AUTO: 29.4 PG (ref 26.5–33)
MCHC RBC AUTO-ENTMCNC: 34.6 G/DL (ref 31.5–36.5)
MCV RBC AUTO: 85 FL (ref 78–100)
PLATELET # BLD AUTO: 207 10E3/UL (ref 150–450)
RBC # BLD AUTO: 6.42 10E6/UL (ref 3.8–5.9)
WBC # BLD AUTO: 6.7 10E3/UL (ref 4–11)

## 2024-05-27 PROCEDURE — 250N000013 HC RX MED GY IP 250 OP 250 PS 637

## 2024-05-27 PROCEDURE — 124N000002 HC R&B MH UMMC

## 2024-05-27 PROCEDURE — 36415 COLL VENOUS BLD VENIPUNCTURE: CPT | Performed by: PHYSICIAN ASSISTANT

## 2024-05-27 PROCEDURE — 85027 COMPLETE CBC AUTOMATED: CPT | Performed by: PHYSICIAN ASSISTANT

## 2024-05-27 PROCEDURE — 250N000013 HC RX MED GY IP 250 OP 250 PS 637: Performed by: PHYSICIAN ASSISTANT

## 2024-05-27 RX ADMIN — PRAMIPEXOLE DIHYDROCHLORIDE 0.75 MG: 0.25 TABLET ORAL at 21:00

## 2024-05-27 RX ADMIN — HYDROCHLOROTHIAZIDE 12.5 MG: 12.5 TABLET ORAL at 08:59

## 2024-05-27 RX ADMIN — DESVENLAFAXINE 50 MG: 50 TABLET, FILM COATED, EXTENDED RELEASE ORAL at 08:59

## 2024-05-27 RX ADMIN — GABAPENTIN 900 MG: 300 CAPSULE ORAL at 20:59

## 2024-05-27 RX ADMIN — ACETAMINOPHEN 650 MG: 325 TABLET, FILM COATED ORAL at 03:31

## 2024-05-27 RX ADMIN — DICLOFENAC SODIUM TOPICAL GEL, 1% 4 G: 10 GEL TOPICAL at 09:00

## 2024-05-27 RX ADMIN — PANTOPRAZOLE SODIUM 40 MG: 40 TABLET, DELAYED RELEASE ORAL at 08:59

## 2024-05-27 RX ADMIN — LIDOCAINE 1 PATCH: 4 PATCH TOPICAL at 21:00

## 2024-05-27 RX ADMIN — Medication 3 MG: at 22:23

## 2024-05-27 RX ADMIN — CLONAZEPAM 1 MG: 1 TABLET ORAL at 12:10

## 2024-05-27 RX ADMIN — DICLOFENAC SODIUM TOPICAL GEL, 1% 4 G: 10 GEL TOPICAL at 14:05

## 2024-05-27 RX ADMIN — CLONAZEPAM 1 MG: 1 TABLET ORAL at 23:46

## 2024-05-27 RX ADMIN — ANASTROZOLE 1 MG: 1 TABLET, COATED ORAL at 08:59

## 2024-05-27 RX ADMIN — DICLOFENAC SODIUM TOPICAL GEL, 1% 4 G: 10 GEL TOPICAL at 21:02

## 2024-05-27 RX ADMIN — CYCLOBENZAPRINE 5 MG: 5 TABLET, FILM COATED ORAL at 12:10

## 2024-05-27 ASSESSMENT — ACTIVITIES OF DAILY LIVING (ADL)
ADLS_ACUITY_SCORE: 31
ORAL_HYGIENE: INDEPENDENT
ADLS_ACUITY_SCORE: 31
DRESS: INDEPENDENT
ADLS_ACUITY_SCORE: 31
HYGIENE/GROOMING: INDEPENDENT
ADLS_ACUITY_SCORE: 31
LAUNDRY: WITH SUPERVISION
ADLS_ACUITY_SCORE: 31

## 2024-05-27 NOTE — CONSULTS
"   SPIRITUAL HEALTH SERVICES consult Note     [place] [place]-call     Referral Source/Reason for Visit: routine consult              Summary and Recommendations -   Killian was tearful as he shared that \"they're drawing a lot of blood right now because I might have bone marrow cancer\". And that the combined weight of all the bad news had driven him to ask for \"euthanasia\" and to try and escape the unit.   We discussed how flight has been an important part of his survival and that it's hard for him to let go of that reaction when he's feeling trapped and cannot escape bad news.   Killian shared how the financial stress of Aid not being renewed has meant that he \"was white-knuckling it through life lately\" and that now he feels like he can't bear that stress anymore and is leading to him feeling unsafe.     PLAN: I encouraged Killian to keep reaching out to him [employer] community and his  and his  for support in emotional and financial matters and encouraged his use of coping skills to try and make the waiting easier.     I also offered to bring Advent texts and prayers for him since he cannot watch his usual prayers and chants on youtube right now.     Spiritual health services remains available for any follow-up or requests. For further visits please place spiritual health consults    Sailaja Young [facility]  Associate   Pager: [phone]     Spiritual Health Services is available 24/7 for emergent requests and consults, either by paging the on-call  or by entering an ASAP/STAT consult in [facility], which will also page the on-call .        Assessment     Saw pt Killian Stein in his room with the sitter.      Patient Understanding of Illness and Goals of Care - Killian was tearful as he expressed how scared he was that \"what if they just find cancer everywhere? I can't take care of myself all by myself without home help.\" We discussed how even one of the things that Killian was going through " "would be a lot, and that right now it felt like too much.      Distress and Loss - Killian expressed fears of financial distress with the discontinuing of the Aid and uncertainty if it would be renewed and how he feels like there isn't anything more he can do but wait. He also shared that having the aid renewed or figured out before he discharges would make him feel more safe at home.      Strengths, Coping, and Resources - Killian shared that the couple that run his [employer] visited him while he was here and how helpful and heartening that was for him, and that an old co-worker called him and how much that helped as well because \"we've had a lot of parallel losses\" and how that understanding helped him.      Meaning, Beliefs, and Spirituality - Killian shared that the chants and prayers of Ramonita Thomas helped him and I assured him I could print off some resources for him.       "

## 2024-05-27 NOTE — CONSULTS
SPIRITUAL HEALTH SERVICES  SPIRITUAL ASSESSMENT consult Note  [facility] ([facility]) 10n  On-call     REFERRAL SOURCE: routine consult    Killian was asleep when I visited, so I left the requested quotes and the copy of [employer] on the other bed next to his lunch tray.     PLAN: Spiritual health services remains available for any follow-up or requests. For further visits please place spiritual health consults.      Sailaja Young, [facility]  Associate   Pager: [phone]

## 2024-05-27 NOTE — PLAN OF CARE
"Goal Outcome Evaluation:    Plan of Care Reviewed With: patient        Patient continues to be on SIO for safety. Patient endorses SI thoughts but contracts for safety with SIO staff present. Patient reports his anxiety and depression to be high, states there is a lot going on in his life and would like \"euthanasia\" Writer acknowledged patient's feeling and encouraged patient to focus on his coping skills. Patient states SIO staff is helping in distracting him from having SIB thoughts. He reports his pain is at 6 , patient states the diclofenac gel and Lidocaine patch are helping manage his pain. He took his medications as scheduled and requested for melatonin at bedtime.Patient uses a C-Pap at night, to continue monitoring.           "

## 2024-05-27 NOTE — PLAN OF CARE
Goal Outcome Evaluation:       Pt was up at the beginning of the shift.Pt complained of being restless, anxious with restless legs.Pt requested Clonazepam for anxiety. Pt slept for 4.25 hours.Pt is requesting to watch you tube channel which will help him calm.Pt remains on SIO for CPAP use and safety concerns. Will continue to monitor.    PRN: Clonazepam.            Tylenol

## 2024-05-28 ENCOUNTER — PATIENT OUTREACH (OUTPATIENT)
Dept: ONCOLOGY | Facility: CLINIC | Age: 69
End: 2024-05-28
Payer: MEDICARE

## 2024-05-28 LAB
EPO SERPL-ACNC: 5 MU/ML
ERYTHROCYTE [DISTWIDTH] IN BLOOD BY AUTOMATED COUNT: 13 % (ref 10–15)
HCT VFR BLD AUTO: 56.2 % (ref 35–53)
HGB BLD-MCNC: 19.3 G/DL (ref 13.3–17.7)
HOLD SPECIMEN: NORMAL
MCH RBC QN AUTO: 29.4 PG (ref 26.5–33)
MCHC RBC AUTO-ENTMCNC: 34.3 G/DL (ref 31.5–36.5)
MCV RBC AUTO: 86 FL (ref 78–100)
PATH REPORT.COMMENTS IMP SPEC: NORMAL
PATH REPORT.COMMENTS IMP SPEC: NORMAL
PATH REPORT.FINAL DX SPEC: NORMAL
PATH REPORT.MICROSCOPIC SPEC OTHER STN: NORMAL
PATH REPORT.MICROSCOPIC SPEC OTHER STN: NORMAL
PATH REPORT.RELEVANT HX SPEC: NORMAL
PLATELET # BLD AUTO: 214 10E3/UL (ref 150–450)
RBC # BLD AUTO: 6.57 10E6/UL (ref 3.8–5.9)
WBC # BLD AUTO: 7.4 10E3/UL (ref 4–11)

## 2024-05-28 PROCEDURE — 85027 COMPLETE CBC AUTOMATED: CPT | Performed by: PHYSICIAN ASSISTANT

## 2024-05-28 PROCEDURE — G0177 OPPS/PHP; TRAIN & EDUC SERV: HCPCS

## 2024-05-28 PROCEDURE — 250N000013 HC RX MED GY IP 250 OP 250 PS 637

## 2024-05-28 PROCEDURE — 124N000002 HC R&B MH UMMC

## 2024-05-28 PROCEDURE — 99232 SBSQ HOSP IP/OBS MODERATE 35: CPT

## 2024-05-28 PROCEDURE — 250N000013 HC RX MED GY IP 250 OP 250 PS 637: Performed by: PHYSICIAN ASSISTANT

## 2024-05-28 PROCEDURE — 36415 COLL VENOUS BLD VENIPUNCTURE: CPT | Performed by: PHYSICIAN ASSISTANT

## 2024-05-28 RX ORDER — CLONIDINE HYDROCHLORIDE 0.1 MG/1
0.1 TABLET ORAL 2 TIMES DAILY
Status: DISCONTINUED | OUTPATIENT
Start: 2024-05-28 | End: 2024-05-30 | Stop reason: HOSPADM

## 2024-05-28 RX ADMIN — CLONIDINE HYDROCHLORIDE 0.1 MG: 0.1 TABLET ORAL at 12:28

## 2024-05-28 RX ADMIN — DICLOFENAC SODIUM TOPICAL GEL, 1% 4 G: 10 GEL TOPICAL at 22:32

## 2024-05-28 RX ADMIN — CYCLOBENZAPRINE 5 MG: 5 TABLET, FILM COATED ORAL at 03:46

## 2024-05-28 RX ADMIN — DICLOFENAC SODIUM TOPICAL GEL, 1% 4 G: 10 GEL TOPICAL at 12:28

## 2024-05-28 RX ADMIN — PRAMIPEXOLE DIHYDROCHLORIDE 0.75 MG: 0.25 TABLET ORAL at 22:34

## 2024-05-28 RX ADMIN — Medication 3 MG: at 22:35

## 2024-05-28 RX ADMIN — CLONIDINE HYDROCHLORIDE 0.1 MG: 0.1 TABLET ORAL at 22:35

## 2024-05-28 RX ADMIN — DESVENLAFAXINE 50 MG: 50 TABLET, FILM COATED, EXTENDED RELEASE ORAL at 08:33

## 2024-05-28 RX ADMIN — LIDOCAINE 1 PATCH: 4 PATCH TOPICAL at 22:33

## 2024-05-28 RX ADMIN — DICLOFENAC SODIUM TOPICAL GEL, 1% 4 G: 10 GEL TOPICAL at 08:33

## 2024-05-28 RX ADMIN — HYDROCHLOROTHIAZIDE 12.5 MG: 12.5 TABLET ORAL at 08:33

## 2024-05-28 RX ADMIN — PANTOPRAZOLE SODIUM 40 MG: 40 TABLET, DELAYED RELEASE ORAL at 08:33

## 2024-05-28 RX ADMIN — GABAPENTIN 100 MG: 100 CAPSULE ORAL at 08:41

## 2024-05-28 RX ADMIN — ACETAMINOPHEN 650 MG: 325 TABLET, FILM COATED ORAL at 03:45

## 2024-05-28 RX ADMIN — GABAPENTIN 900 MG: 300 CAPSULE ORAL at 22:33

## 2024-05-28 RX ADMIN — ANASTROZOLE 1 MG: 1 TABLET, COATED ORAL at 08:33

## 2024-05-28 RX ADMIN — DICLOFENAC SODIUM TOPICAL GEL, 1% 4 G: 10 GEL TOPICAL at 16:10

## 2024-05-28 ASSESSMENT — ACTIVITIES OF DAILY LIVING (ADL)
ADLS_ACUITY_SCORE: 31
ORAL_HYGIENE: INDEPENDENT
ADLS_ACUITY_SCORE: 31
DRESS: INDEPENDENT
ADLS_ACUITY_SCORE: 31
HYGIENE/GROOMING: INDEPENDENT
ADLS_ACUITY_SCORE: 31

## 2024-05-28 NOTE — PROGRESS NOTES
"[facility]: Cancer Care                                                                                          Received voicemail from Killian who is asking for a return call to the mental health unit at [place], [phone]. He mentioned that he is being screened for \"blood cancer\": and would like to speak to writer.     Returned call and was able to speak with Killian who reports today being a \"relief\" his MA has been re-instated, this was a significant weight driving his currant mental health situation. Reviewed the labs that are being drawn for Erythrocytosis Discussed this can be related to several different things, not necessarily cancer. Encouraged him to use his CPAP regularly and to try not focus on the labs being drawn until he is given a diagnosis.     He reports feeling more hopeful today and wants to be discharged asap to avoid delays in surgery.      Letty Garcia MSN, RN ,OCN  Lead RN Care Coordinator - [facility]  [phone]          "

## 2024-05-28 NOTE — PLAN OF CARE
BEH IP Unit Acuity Rating Score (UARS)  Patient is given one point for every criteria they meet.    CRITERIA SCORING   On a 72 hour hold, court hold, committed, stay of commitment, or revocation. 0    Patient LOS on BEH unit exceeds 20 days. 0  LOS: 6   Patient under guardianship, 55+, otherwise medically complex, or under age 11. 1   Suicide ideation without relief of precipitating factors. 0   Current plan for suicide. 0   Current plan for homicide. 0   Imminent risk or actual attempt to seriously harm another without relief of factors precipitating the attempt. 0   Severe dysfunction in daily living (ex: complete neglect for self care, extreme disruption in vegetative function, extreme deterioration in social interactions). 1   Recent (last 7 days) or current physical aggression in the ED or on unit. 0   Restraints or seclusion episode in past 72 hours. 0   Recent (last 7 days) or current verbal aggression, agitation, yelling, etc., while in the ED or unit. 0   Active psychosis. 1   Need for constant or near constant redirection (from leaving, from others, etc).  0   Intrusive or disruptive behaviors. 1   Patient requires 3 or more hours of individualized nursing care per 8-hour shift (i.e. for ADLs, meds, therapeutic interventions). 0   TOTAL 4

## 2024-05-28 NOTE — PROGRESS NOTES
"PSYCHIATRY  PROGRESS NOTE     DATE OF SERVICE   05/28/24          CHIEF COMPLAINT   \"I heard that I have blood cancer and then I tried to elope.\"       SUBJECTIVE   Per nursing documentation:  RN Shift Summary     Patient presented with an anxious/depressed affect. He remained isolated to his room for most of the shift. He was much calmer than noted on previous shift. He was intermittently talkative with SIO staff, resting, and engaging in independent activities in his room, including meditation and puzzles. He took all scheduled medications without issue. He reported waking up with anxiety in the middle of the night but sleeping well before and after this incident. Patient continues to endorse prominent pain in right shoulder, lower back, and bilateral knees. Writer assisted patient in applying Voltaren gel to shoulder and lower back upon request. PRN Flexeril also provided midday to good effect. Patient requested PRN clonazepam for severe anxiety midday as well. Patient continues to endorse SI, stating he still thinks about euthanasia and thinks about ending his life if he gets any more bad news. Patient also shared another form of SIB that he sometimes engages in overnight, stating that his restless legs become so severe that he punches his legs until he gets bruises. SIO 1:1 5 feet observation remains for patient safety.      Vitals: B/P: 158/89, T: 97.6, P: 76, R: 18            Goal Outcome Evaluation:          Pt was in bed at the beginning of the shift.Pt woke up complained of anxiety and pain on his lower back,shoulders rating it a 7/10. Prn  Tylenol /Clonazepam administered.Pt slept for 5.25 hours tonight.Pt remains on SIO for SI and CPAP precautions. Will continue to monitor.     PRN: Clonazepam          : Tylenol       Per unit CTC documentation:    -Attending Provider: JODI Cantu, CNP  -Voicemail Code: [id number]  -Team Note Due: Tuesday  -Next Steps:     Connect with community providers         " "  Assessment/Intervention/Current Symtoms and Care Coordination:  Chart review and met with team, discussed pt progress, symptomology, and response to treatment.  Discussed the discharge plan and any potential impediments to discharge.  [facility] exchanged phone calls and emails with Mahogany (CARLI). Mahogany informed [facility] that [employer] health insurance has been reviewed and is renewed for the next year. [facility] was informed that [facility] [employer] worker sent an email to Killian, and asked that CTC provide copy so Killian is updated.   [facility] printed out copy of the email sent to him regarding insurance. He stated he had access to his phone earlier and was able to see the email. He was very pleasant and smiling.         Discharge Plan or Goal:  Pending further stabilization, continued compliance with medications, continued activities of daily living, and development of a safe discharge plan.   Considerations:      Barriers to Discharge:  Impact of mental health symptoms on well being   Impact of mental health symptoms on activities of daily living  Need for medication monitoring and medication management     Referral Status:        Legal Status:   Voluntary  County:   File Number:   Start and expiration date of commitment:      Contacts:  CADI: [facility]  TCM: [facility]Mahogany Jimenez        Upcoming Meetings and Dates/Important Information:        -Still Needed on AVS:             OBJECTIVE   Met with patient in hospital room of unit 10N. SIO 1:1 staff were also present. Patient's affect appears blunted and labile.  Patient's mood appears irritable and patient becomes angry during psychiatric interview.  Patient's speech is rapid, tangential, and circumstantial.  Upon patient interview, patient reports , \"I heard I had blood cancer and then I tried to elope.\"  Patient reports feeling extremely upset because an internal medicine provider told them they had blood cancer over the weekend which triggered a, \"fight or flight response.\"  Patient " "reports they tried to elope and also developed SI.  Patient reports that they requested euthanasia at that time.  Today, patient is unable to contract for safety and endorses intermittent fleeting thoughts of SI.  Patient also reports ongoing severe anxiety levels rated 6-7/10  (0=none, 10=severe).  Patient reports anxiety levels are not well managed with current medication regimen.  Subsequently provider discussed option of trialing medication clonidine tablet 0.1 mg PO 2 times daily to target anxiety symptoms.  Also discussed with patient that this medication will require blood pressure monitoring and will have blood pressure parameters to hold if blood pressure less than 90/60 or pulse less than 60.  Patient in agreement with this. Patient reports anxiety symptoms have been triggered by, \"hearing that I have blood cancer.\"  Patient also reports concern regarding losing his benefits and becomes frustrated when discussing plan to coordinate with outpatient oncologist and surgeon who will complete patient's double mastectomy which is scheduled for June 12, 2024.  Patient states, \"I feel like you are not listening and I need you to stop talking and listen.\"  Patient discussed extensively frustrations with coordinating their care in the community.  After extensive discussion plan will be for patient to independently call his surgeon and oncologist in the community in order to set up phone calls with his providers to discuss treatment of breast cancer as well as make a plan to monitor for \"blood cancer.\"  Patient care order also placed for patient to access their cell phone for 15-20 minutes/day in order to communicate with their care team as needed.  Patient care order was also placed for patient to access their cell phone for 15-20 minutes/day to listen to call making therapeutic relaxation sounds which is helpful for them to manage their anxiety levels.  Patient then reports they have been feeling increasing " "irritability and due to their history of complex PTSD they experience, \"Young thoughts and difficulty expressing myself.\"  Patient reports ongoing depression symptoms however reports they are minimal currently.  Patient denies any side effects of medication Pristiq.  Provider discussed that plan will be to continue 50 mg PO daily to target depression and anxiety symptoms.  Patient verbalizes understanding and agreement with this plan.  Due to patient unable to contract for safety plan will be to continue SIO 1:1 monitoring today.   Patient verbalizes understanding and agreement with this.  Patient denies any AH or VH. Patient denies any paranoid thoughts or delusions. Patient endorses sleeping better overnight since initiating CPAP to treat sleep apnea however reports sleep was disturbed last night by waking up with a panic attack. Patient reports appetite continues to be decreased however reports they are eating more in the last few days.  Patient does express frustration with not receiving the food date order on their menu.  Patient reports despite decreased appetite they are eating and as well as drinking fluids adequately.  Patient reports some ongoing constipation however that they did have a bowel movement yesterday.  Provider discussed the plan will be to continue to monitor this.  Today patient reports that they are drinking adequate amount of fluids per day however, \"not putting out much urine.\"  Patient reports urine is normal in color however they are concerned regarding their kidney function.  Subsequently provider ordered intake of fluids and output urine monitoring for 24 hours and patient in agreement with this.  Patient vital signs reviewed and patient noted to be experiencing some hypertension.  Plan will be to monitor this closely for now.  Provider reviewed pertinent lab testing; patient noted to have multiple abnormal lab results.  See details of abnormal lab results below.  Internal medicine has " "been following patient to address abnormal labs.  See internal medicine provider documentation below for details.  Last week patient reported moderate dental pain related to having an untreated cavity.  Provider subsequently ordered a dental hygiene consult to address patient's dental pain further.  Today patient does not endorse dental pain.  Plan will be to follow up with dental team regarding if dental hygiene consult will be completed during patient's hospitalization.  Patient also wonders if they have the option of meeting with their therapist from the community while inpatient and plan will be to follow up with unit CTC regarding this.  Patient reports despite feeling intermittent SI with hopelessness they feel that they are symptoms will improve later this week and that they hope to discharge by Thursday or Friday.  Provider discussed realistic expectations for initiating an antidepressant medication including that full effect for treating symptoms of depression and anxiety  can take 4-6 weeks and that goal for discharge would be for patient to be able to exhibit safe behavior as well as contract for safety in the community.  Patient verbalizes understanding and reports that there plan continues to be to get double mastectomy which is scheduled in June and that they hope to discharge later this week.  Patient denies any medical concerns today. Patient has no other questions or concerns.     Per internal medicine team documentation from 5/26/2024:      Brief Hospital Medicine Note:      \"Killian\" Sarita is a 68 year old adult with a PMH significant for breast cancer, HTN, MDD, RLS, ASHLYN, thoracic aortic aneurysm anxiety, depression who was admitted on 5/22/2024 to inpatient Psychiatry for increased anxiety, feeling tired from life and trouble focusing. Medicine was consulted for restarting testosterone injection and to manage comorbidities. Medicine team following up on erythrocytosis. Nursing notes, vitals, and " "labs reviewed.      Met with patient at bedside to review labwork and plan for further testing for erythrocytosis. Patient then become quite tearful and said \"I would like euthanasia\" and \"didn't feel like he could go on\". \"Will you let me discharge so I can go home to die\"      /87 (BP Location: Right arm, Patient Position: Sitting, Cuff Size: Adult Regular)   Pulse 75   Temp 97.8  F (36.6  C) (Oral)   Resp 18   Wt 96.7 kg (213 lb 3.2 oz)   LMP  (LMP Unknown)   SpO2 94%   BMI 35.48 kg/m       General: sitting up in bed, alert, cooperative, awake, tearful  HEENT: normocephalic, atraumatic, anicteric sclera  Respiratory: breathing comfortably on room air  Neuro: grossly non-focal, alert and oriented, normal speech   Psych: very tearful, actively making suicidal statements   MSK: no bony deformities, moving all extremities independently  Skin: no rashes or lesions on uncovered surfaces, no jaundice     Erythrocytosis   Recheck showed hematocrit stable at ~19.0. Discussed with Hematology. Agreed with following up on testosterone level and obtaining JAK2 and EPO with persistent elevation. They also recommended obtaining VBG with underlying ASHLYN (hasn't been wearing CPAP PTA, wearing here) which has also been ordered. Unclear if this is in the setting of medication related with testosterone vs starting anastrozole (started ~ 1 month ago), MPN is also on the differential therefore will need to pursue work up with JAK2 and EPO. Also has underlying ASHLYN which could be contributing as well.   -Daily CBC  -JAK2 and EPO ordered   -VBG ordered   -Follow up testosterone level   -Follow up peripheral smear   -Encourage continued CPAP use   -Pending work up, may need formal Hematology consult     Medicine will continue to follow up erythrocytosis work up.      Samantha Singer PA-C  Hospitalist Service       Internal medicine team was consulted to address restarting PTA testosterone injection as well as due to patient " "having multiple comorbidities including recently diagnosed breast cancer.  Per internal medicine provider documentation from 5/24/2024:    [facility]  Consult Note - Hospitalist Service  Date of Admission:  5/22/2024  Consult Requested by: Psychiatry   Reason for Consult: \"New admit 05/23. Address restarting testosterone injection and also patient has multiple medical comorbidities including recently diagnosed cancer\"         Assessment & Plan  \"Killian\" Sarita is a 68 year old adult with a PMH significant for breast cancer, HTN, MDD, RLS, ASHLYN, thoracic aortic aneurysm anxiety, depression who was admitted on 5/22/2024 to inpatient Psychiatry for increased anxiety, feeling tired from life and trouble focusing. Medicine was consulted for restarting testosterone injection and to manage comorbidities.      Anxiety   Depression   Management per Psychiatry team      Inducible laryngeal obstruction   ASHLYN: Follows with Pulmonary here. No acute concerns or symptoms. Is working on getting CPAP for at home as he is having a challenging time cleaning this since rotator cuff surgery. Continue CPAP here      Invasive ductal carcinoma of left breast, stage 1   Newly diagnosed 03/2024 on routine mammogram found to have a 1.3cm mass of left breast, biopsy consistent with invascie ductal carcinoma grade 1, estrogen positive, progesterone positive HER-2 negative. Was seen by General Surgery with [facility] with plan for mastectomy, but patient would opt to under bilateral mastectomies and was referred to plastic surgery, procedure is planned for mid June (06/12). Med with Oncology 04/30 at [facility] and recommended starting anastrozole at that time. Has also been seen by Oncology here on 05/13 who agreed with above plan.     -Continue PTA anastrozole 1mg daily   -Follow up with Oncology as OP and General Surg/Plastics for planned bilateral mastectomies      Gender dysphoria  Continue " "testosterone injection weekly (receives this on Fridays), no contraindication from medicine standpoint for holding. Hasn't had a recent testosterone level, last checked 635 in 09/2023, will recheck here. Per PCP pre-op from 05/21, no mention of holding this prior to planned procedure.  -Continue weekly testosterone (receives on Fridays)      Erythrocytosis   Noted on admission elevated to 18.5 with recheck 19.0. Appears to have had slightly elevated RBCs and hematocrit previously, but appear to be increased from previous. Patient reports poor oral intake therefore possible hemoconcentration. Question if testosterone dose playing a role as well   -CBC and peripheral smear ordered   -Testosterone level as mentioned above      Elevated creatinine   Was previously ~0.8, slightly up to 1.16 on 05/17, down to 1.06 on admission. Possibly in the setting of poor PO intake.   -BMP in AM     GERD: continue PTA PPI   HTN: PTA hydrochlorothiazide 12.5mg daily  RLS: PTA pramipexole at bedtime    Diabetes mellitus, type 1 A1c 6.6% 05/2024: recommend lifestyle modifications, recheck as OP with PCP  HLD: appears these have been mildly elevated in the past. Recommend lifestyle modifications and recheck as OP with PCP can discuss if initiation of statin indicated at that time   History of glaucoma: denies any current vision changes, but notes he will need new Ophthalmologist as OP, referral placed for you         The patient's care was discussed with the Bedside Nurse, Patient, and Primary team.     Medicine team will continue to follow recheck labs.         Clinically Significant Risk Factors                        # DMII: A1C = 6.6 % (Ref range: <5.7 %) within past 6 months, PRESENT ON ADMISSION  # Obesity: Estimated body mass index is 35.48 kg/m  as calculated from the following:    Height as of 5/21/24: 1.651 m (5' 5\").    Weight as of this encounter: 96.7 kg (213 lb 3.2 oz)., PRESENT ON ADMISSION     # Financial/Environmental " "Concerns:               Samantha Carver PA-C  Hospitalist Service  Securely message with [employer] (more info)  Text page via [facility] Paging/Directory   ______________________________________________________________________        Chief Complaint  \"I've been better\"     History is obtained from the patient           History of Present Illness  Gregoria Stein is a 68 year old adult with a PMH significant for breast cancer, HTN, MDD, RLS, ASHLYN, thoracic aortic aneurysm anxiety, depression who was admitted on 5/22/2024 to inpatient Psychiatry for increased anxiety, feeling tired from life and trouble focusing. Medicine was consulted for restarting testosterone injection and to manage comorbidities.      Feeling okay.  Denies any chest pain, dyspnea, cough, fever, chills.  No abdominal pain, nausea, vomiting. Was having constipation, but notes this is now resolved.  Notes that in light of everything going on has been recently feeling overwhelmed and unable to remember if he was taking his medications or not.  Thinks he might of missed a couple doses of anastrozole.  Knows he missed his dose of testosterone last Friday.  Otherwise no other missed doses that he can remember at this time.  Notes history of glaucoma and needs a referral for a new ophthalmologist as outpatient.  Discussed lab results with patient, asked about GFR.  Notes that prior had been having poor p.o. intake and feeling dehydrated.  Now feels he is eating and drinking better.       MEDICATIONS   Medications:  Scheduled Meds:  Current Facility-Administered Medications   Medication Dose Route Frequency Provider Last Rate Last Admin    anastrozole (ARIMIDEX) tablet 1 mg  1 mg Oral Daily Regla Yoo APRN CNP   1 mg at 05/28/24 0833    desvenlafaxine (PRISTIQ) 24 hr tablet 50 mg  50 mg Oral Daily Regla Yoo APRN CNP   50 mg at 05/28/24 0833    diclofenac (VOLTAREN) 1 % topical gel 2-4 g  2-4 g Topical 4x Daily Samantha Lamar PA-C   4 g at " 05/28/24 0833    gabapentin (NEURONTIN) capsule 900 mg  900 mg Oral At Bedtime CharlesRegla westbrook APRN CNP   900 mg at 05/27/24 2059    hydroCHLOROthiazide tablet 12.5 mg  12.5 mg Oral Daily FellRegla westbrook APRN CNP   12.5 mg at 05/28/24 0833    Lidocaine (LIDOCARE) 4 % Patch 1 patch  1 patch Transdermal Q24h Samantha Lamar PA-C   1 patch at 05/27/24 2100    pantoprazole (PROTONIX) EC tablet 40 mg  40 mg Oral QAM AC FellRegla westbrook APRN CNP   40 mg at 05/28/24 0833    pramipexole (MIRAPEX) tablet 0.75 mg  0.75 mg Oral At Bedtime Regla Yoo APRN CNP   0.75 mg at 05/27/24 2100    senna-docusate (SENOKOT-S/PERICOLACE) 8.6-50 MG per tablet 1-2 tablet  1-2 tablet Oral Daily FellRegla westbrook APRN CNP   1 tablet at 05/26/24 0830    testosterone cypionate (DEPOTESTOSTERONE) injection 70 mg  70 mg Subcutaneous Weekly Samantha Lamar PA-C   70 mg at 05/24/24 1842     Continuous Infusions:  Current Facility-Administered Medications   Medication Dose Route Frequency Provider Last Rate Last Admin     PRN Meds:.  Current Facility-Administered Medications   Medication Dose Route Frequency Provider Last Rate Last Admin    acetaminophen (TYLENOL) tablet 650 mg  650 mg Oral Q4H PRN Regla Yoo APRN CNP   650 mg at 05/28/24 0345    albuterol (PROVENTIL HFA/VENTOLIN HFA) inhaler  2 puff Inhalation Q4H PRN Regla Yoo APRN CNP   2 puff at 05/23/24 0806    alum & mag hydroxide-simethicone (MAALOX) suspension 30 mL  30 mL Oral Q4H PRN Regla Yoo APRN CNP        clonazePAM (klonoPIN) tablet 1 mg  1 mg Oral BID PRN Regla Yoo APRN CNP   1 mg at 05/27/24 2346    cyclobenzaprine (FLEXERIL) tablet 5 mg  5 mg Oral Q8H PRN Samantha Lamar PA-C   5 mg at 05/28/24 0346    gabapentin (NEURONTIN) capsule 100 mg  100 mg Oral Q6H PRN Regla Yoo APRN CNP   100 mg at 05/28/24 0841    magnesium hydroxide (MILK OF MAGNESIA) suspension 30 mL  30 mL Oral Daily PRN Regla Yoo  JODI CNP   30 mL at 05/24/24 1201    melatonin tablet 3 mg  3 mg Oral At Bedtime PRN FellAdam westbrookadora GARRETT JODI CNP   3 mg at 05/27/24 2223    menthol (Topical Analgesic) 2.5% (BENGAY VANISHING SCENT) 2.5 % topical gel   Topical Q6H PRN FellingRegla APRN CNP   Given at 05/26/24 0829    polyethylene glycol (MIRALAX) Packet 17 g  17 g Oral Daily PRN FelldarianaRegla APRN CNP   17 g at 05/24/24 0945    senna-docusate (SENOKOT-S/PERICOLACE) 8.6-50 MG per tablet 1-2 tablet  1-2 tablet Oral Daily PRN CharlesRegla westbrook APRN CNP        traZODone (DESYREL) tablet 50 mg  50 mg Oral At Bedtime PRN FellingRegla APRN CNP           Medication adherence issues: MS Med Adherence Y/N: No  Medication side effects: MEDICATION SIDE EFFECTS: no side effects reported  Benefit: Yes / No: Yes       ROS   Pertinent items are noted in HPI.       MENTAL STATUS EXAM   Vitals: BP (!) 158/89 (BP Location: Right arm)   Pulse 84   Temp 98.3  F (36.8  C) (Oral)   Resp 16   Wt 94.3 kg (207 lb 12.8 oz)   LMP  (LMP Unknown)   SpO2 95%   BMI 34.58 kg/m      Appearance:  Distressed  Mood:  {Mood: Anxious  and Depressed   Affect: blunted, labile was congruent to speech  Suicidal Ideation: PRESENT / ABSENT: absent   Homicidal Ideation: PRESENT / ABSENT: absent   Thought process: circumstantial, tangential  Thought content: significant for preoccupations.   Fund of Knowledge: Average  Attention/Concentration: Poor  Language ability:  Intact, slightly rapid  Memory:  Immediate recall intact, Short-term memory intact, and Long-term memory intact  Insight: Fair  Judgement: fair  Orientation: Yes, x4  Psychomotor Behavior: normal or unremarkable    Muscle Strength and Tone: MuscleStrength: Normal  Gait and Station: Normal       LABS   personally reviewed.    Latest Reference Range & Units 05/09/24 16:14 05/17/24 23:13 05/17/24 23:38 05/21/24 09:13 05/22/24 12:46 05/23/24 09:29 05/25/24 08:00 05/26/24 08:17 05/26/24 12:57 05/27/24 08:18  05/28/24 08:06   Sodium 135 - 145 mmol/L  139    140 137       Potassium 3.4 - 5.3 mmol/L  4.0    4.3 3.8       Chloride 98 - 107 mmol/L  103    98 98       Carbon Dioxide (CO2) 22 - 29 mmol/L  25    31 (H) 28       Urea Nitrogen 8.0 - 23.0 mg/dL  14.6    19.6 16.0       Creatinine 0.51 - 1.17 mg/dL  1.16    1.06 1.04       GFR Estimate >60 mL/min/1.73m2  52 (L)    58 (L) 59 (L)       Calcium 8.8 - 10.2 mg/dL  8.7 (L)    9.7 9.2       Anion Gap 7 - 15 mmol/L  11    11 11       Albumin 3.5 - 5.2 g/dL  4.5    4.8        Protein Total 6.4 - 8.3 g/dL  7.0    7.5        Alkaline Phosphatase 40 - 150 U/L  74    79        ALT 0 - 70 U/L  27    38        AST 0 - 45 U/L  23    30        Bilirubin Total <=1.2 mg/dL  0.5    1.0        Cholesterol <200 mg/dL      214 (H)        Erythropoietin 4 - 27 mU/mL         5     Folate 4.6 - 34.8 ng/mL      10.9        Glucose 70 - 99 mg/dL  105 (H)    207 (H) 117 (H)       HDL Cholesterol >=40 mg/dL      31 (L)        Hemoglobin A1C <5.7 %      6.6 (H)        LDL Cholesterol Calculated <=100 mg/dL      132 (H)        Non HDL Cholesterol <130 mg/dL      183 (H)        T4 Free 0.90 - 1.70 ng/dL  1.27            Triglycerides <150 mg/dL      253 (H)        TSH 0.30 - 4.20 uIU/mL  4.70 (H)            Vitamin B12 232 - 1,245 pg/mL      330        Vitamin D, Total (25-Hydroxy) 20 - 50 ng/mL       25       FIO2          21     Ph Venous 7.32 - 7.43          7.44 (H)     PCO2 Venous 40 - 50 mm Hg         46     PO2 Venous 25 - 47 mm Hg         59 (H)     O2 Sat, Venous 70.0 - 75.0 %         91.5 (H)     Bicarbonate Venous 21 - 28 mmol/L         32 (H)     Base Excess Venous -3.0 - 3.0 mmol/L         6.0 (H)     Oxyhemoglobin Venous 70 - 75 %         90 (H)     WBC 4.0 - 11.0 10e3/uL  7.4    5.8 5.8 6.6  6.7 7.4   Hemoglobin 13.3 - 17.7 g/dL  18.5 (H)    19.0 (H) 18.8 (H) 19.0 (H)  18.9 (H) 19.3 (H)   Hematocrit 35.0 - 53.0 %  53.9 (H)    57.1 (H) 54.9 (H) 54.8 (H)  54.6 (H) 56.2 (H)   Platelet  Count 150 - 450 10e3/uL  202    214 197 218  207 214   RBC Count 3.80 - 5.90 10e6/uL  6.16 (H)    6.55 (H) 6.40 (H) 6.42 (H)  6.42 (H) 6.57 (H)   MCV 78 - 100 fL  88    87 86 85  85 86   MCH 26.5 - 33.0 pg  30.0    29.0 29.4 29.6  29.4 29.4   MCHC 31.5 - 36.5 g/dL  34.3    33.3 34.2 34.7  34.6 34.3   RDW 10.0 - 15.0 %  13.1    13.1 12.8 12.5  13.0 13.0   % Neutrophils %  66    63 58       % Lymphocytes %  23    26 29       % Monocytes %  7    6 8       % Eosinophils %  4    5 5       % Basophils %  0    0 0       Absolute Basophils 0.0 - 0.2 10e3/uL  0.0    0.0 0.0       Absolute Eosinophils 0.0 - 0.7 10e3/uL  0.3    0.3 0.3       Absolute Immature Granulocytes <=0.4 10e3/uL  0.0    0.0 0.0       Absolute Lymphocytes 0.8 - 5.3 10e3/uL  1.7    1.5 1.7       Absolute Monocytes 0.0 - 1.3 10e3/uL  0.5    0.3 0.5       % Immature Granulocytes %  0    0 0       Absolute Neutrophils 1.6 - 8.3 10e3/uL  4.9    3.7 3.3       Absolute NRBCs 10e3/uL  0.0    0.0 0.0       NRBCs per 100 WBC <1 /100  0    0 0       % Retic 0.5 - 2.0 %       0.9       Absolute Retic 0.025 - 0.095 10e6/uL       0.060       SARS CoV2 PCR Negative    Negative           Amphetamine Qual Urine Screen Negative   Screen Negative   Screen Negative         Fentanyl Qual Urine Screen Negative   Screen Negative   Screen Negative         Cocaine Urine Screen Negative   Screen Negative   Screen Negative         Benzodiazepine Urine Screen Negative   Screen Negative   Screen Positive !         Opiates Qualitative Urine Screen Negative   Screen Negative   Screen Negative         PCP Urine Screen Negative   Screen Negative   Screen Negative         Cannabinoids Qual Urine Screen Negative   Screen Positive !   Screen Positive !         Barbiturates Qual Urine Screen Negative   Screen Negative   Screen Negative         JAK2 WITH REFLEX TO MPL CALR MPN FOC NGS          Rpt (IP)     MPL CALR NGS REFLEX PANEL          Rpt (IP)     BLOOD MORPHOLOGY PATHOLOGIST REVIEW     "    Rpt       PATHOLOGY CONSULT  Rpt (IP)             LAB BLOOD MORPHOLOGY PATHOLOGIST REVIEW        Rpt !       UNMAPPED IMAGING RESULT     Rpt (E)          (H): Data is abnormally high  (L): Data is abnormally low  !: Data is abnormal  (IP): In Process  Rpt: View report in Results Review for more information  (E): External lab result  No results found for: \"PHENYTOIN\", \"PHENOBARB\", \"VALPROATE\", \"CBMZ\"       DIAGNOSIS   Principal Problem:    PTSD  Generalized anxiety disorder  Major depressive disorder, recurrent, severe without psychotic features  Suicidal ideation    Clinically Significant Risk Factors                        # DMII: A1C = 6.6 % (Ref range: <5.7 %) within past 6 months   # Obesity: Estimated body mass index is 34.58 kg/m  as calculated from the following:    Height as of 5/21/24: 1.651 m (5' 5\").    Weight as of this encounter: 94.3 kg (207 lb 12.8 oz).        # Financial/Environmental Concerns:              Active Problem List:  Patient Active Problem List   Diagnosis    CLAIRE (generalized anxiety disorder)    Mass    PTSD (post-traumatic stress disorder)    Other chronic pain    Restless leg syndrome    Osteoarthritis of spine with radiculopathy, lumbar region    Chondromalacia of left patella    Chronic bilateral low back pain without sciatica    Chronic joint pain    Complex tear of medial meniscus of left knee as current injury    GERD (gastroesophageal reflux disease)    Glaucoma    IBS (irritable bowel syndrome)    Chronic pain of right knee    Lumbar radiculopathy    Major depressive disorder, recurrent severe without psychotic features (H)    Morbid obesity (H)    Postmenopausal    Primary osteoarthritis of right knee    MDD (major depressive disorder), recurrent severe, 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)    Anxiety    Pelvic floor weakness    Gender identity disorder    Primary osteoarthritis involving multiple joints    Status post total knee replacement, right    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  Patient presented to the ED on 5/21/2024 due to report of feeling \"tired from life\" and increased anxiety with trouble focusing.  Patient also reported increased PTSD symptoms.  Patient reported experiencing increasing psychosocial stressors leading up to current hospitalization including ongoing worker's comp case, recently diagnosed breast cancer, and financial strain.  Patient was evaluated by provider in the ED and determined to be medically stable.  Patient subsequently transferred to [facility] unit in the ED for further psychiatric evaluation and management.  While on the [facility] unit patient was initiated on Pristiq to target anxiety and depression symptoms.  Trazodone was also utilized to target insomnia symptoms and PTA clonazepam was continued to target anxiety.  Patient patient then admitted voluntarily to inpatient psychiatry unit 10 [address] with attending Dr. Nichols for further psychiatric stabilization.       Patient's PTA medications were continued.  Patient is agreeable to trial of Pristiq to target depression and anxiety symptoms.  Plan will be to titrate Pristiq to therapeutic dose while monitoring for side effects.  Patient reports they hope to discharge soon as they are scheduled to complete double mastectomy to address recently diagnosed breast cancer on 6/12/2024.  Patient reported anxiety symptoms are not well managed with current medication regimen and was agreeable to trialing medication clonidine to target anxiety symptoms.     PLAN   1. Ongoing education given regarding diagnostic and treatment options with risks, benefits and alternatives and adequate verbalization of understanding.  2. "  Medications:  Continue Pristiq 24-hour 50 mg PO daily to further target depression and anxiety symptoms.  Initiate clonidine tablet 0.1 mg PO2 times daily to target anxiety symptoms, hold if blood pressure less than 90/60 or pulse less than 60  Continue senna-docusate 8.6-50 mg tablet, 1-2 tablets PO daily to target constipation symptoms  Continue Arimidex tablet 1 mg PO capsule daily  Continue gabapentin capsule 900 mg PO capsule at bedtime  Continue hydrochlorothiazide 12.5 mg PO daily  Continue pantoprazole EC tablet 400 mg PO every morning before breakfast  Continue Mirapex tablet 0.75 mg PO capsule at bedtime  PRN albuterol inhaler 2 puffs inhaled every 4 hours as needed for shortness of breath, wheezing  PRN clonazepam tablet 1 mg PO 2 times daily as needed for anxiety  PRN gabapentin capsule 100 mg PO capsule every 6 hours as needed for anxiety  PRN trazodone tablet 50 mg B capsule at bedtime as needed for sleep, may repeat dose after 60 minutes  PRN Bengay 2.5% topical gel, apply topical every 6 hours as needed for muscle soreness, moderate pain    3.  Labs/Imaging:  -Per internal medicine provider: CBC, blood morphology pathologist review, BMP  -Daily CBC with platelets ordered by internal medicine provider  -Vitamin D deficiency ordered as add on    4. Consults:  -Internal medicine following    5. Precautions:  -Suicide precautions    6. Legal:  -Voluntary    7. Discharge planning:  -Per unit CTC        Risk Assessment: IP MHAC RISK ASSESSMENT: Patient able to contract for safety and Patient on precautions    Coordination of Care:   Treatment Plan reviewed and physician signed, Care discussed with Care/Treatment Team Members, Chart reviewed and Patient seen      Re-Certification I certify that the inpatient psychiatric facility services furnished since the previous certification were, and continue to be, medically necessary for, either, treatment which could reasonably be expected to improve the patient s  condition or diagnostic study and that the hospital records indicate that the services furnished were, either, intensive treatment services, admission and related services necessary for diagnostic study, or equivalent services.     I certify that the patient continues to need, on a daily basis, active treatment furnished directly by or requiring the supervision of inpatient psychiatric facility personnel.   I estimate 7-10 days of hospitalization is necessary for proper treatment of the patient. My plans for post-hospital care for this patient are   TBD     JODI Roldan CNP    -     05/28/24       -     11:00 AM      Total time  35 minutes with > 50%spent on coordination of cares and psycho-education.    This note was created with help of Dragon dictation system. Grammatical / typing errors are not intentional.    JODI Roldan CNP

## 2024-05-28 NOTE — PROVIDER NOTIFICATION
05/28/24 1129   Individualization/Patient Specific Goals   Patient Personal Strengths community support;coping skills;expressive of emotions;expressive of needs;family/social support;interests/hobbies;insight into illness/situation;independent living skills;positive vocational history   Patient Vulnerabilities adverse childhood experience(s);family/relationship conflict;history of unsuccessful treatment;recent loss;poor physical health   Anxieties, Fears or Concerns Anxiety around his insurance situation, fear of not being able to complete his top surgery.   Interprofessional Rounds   Summary Eating well, sleeping well, taking medications. Was frustrated over the weekend regarding insurance, expressed SI with plan and intent.   Participants advanced practice nurse;nursing;CTC   Behavioral Team Discussion   Participants Regla Yoo APRN, CNP, Patricia RN, Edwardo CTC   Progress Had a tough weekend, increased SI with plan and intent.   Anticipated length of stay 2-7 days   Continued Stay Criteria/Rationale mental health observation, medication management, safe discharge plant   Plan Return home.   Anticipated Discharge Disposition home or self-care     PRECAUTIONS AND SAFETY    Behavioral Orders   Procedures    Code 1 - Restrict to Unit    Code 3     To [place]    Routine Programming     As clinically indicated    Self Injury Precaution     Non-life-threatening self-injurious behavior in the form of scratching chest with fingernails    Status 15     Every 15 minutes.    Status Individual Observation     Patient SIO status reviewed with team/RN.  Please also refer to RN/team documentation for add'l detail.    -SIO staff to monitor following which have contributed to patient being on SIO:CPAP unable to contract for safety  -Possible interventions SIO staff could use to support patient's treatment progress:  monitor  -When following observed, team will review discontinuation of SIO:  Resolution of SI  Continuous  8 hours/day/5 feet  Indications for SIO: Medical equipment/ligature risk     Order Specific Question:   CONTINUOUS 24 hours / day     Answer:   5 feet     Order Specific Question:   Indications for SIO     Answer:   Suicide risk    Status Individual Observation     Patient SIO status reviewed with team/RN.  Please also refer to RN/team documentation for add'l detail.    -SIO staff to monitor following which have contributed to patient being on SIO:  Suicidal ideation, unable to contract for safety    -Possible interventions SIO staff could use to support patient's treatment progress:  Encourage use of coping skills, de-escalation    -When following observed, team will review discontinuation of SIO:  Absence of suicidal behaviors, able to be safe on the unit     Order Specific Question:   CONTINUOUS 24 hours / day     Answer:   5 feet     Order Specific Question:   Indications for SIO     Answer:   Suicide risk    Suicide precautions: Suicide Risk: MODERATE; Clinical rationale to override score: Collateral information supporting Suicidal/self-harm behaviors     Patients on Suicide Precautions should have a Combination Diet ordered that includes a Diet selection(s) AND a Behavioral Tray selection for Safe Tray - with utensils, or Safe Tray - NO utensils       Order Specific Question:   Suicide Risk     Answer:   MODERATE     Order Specific Question:   Clinical rationale to override score:     Answer:   Collateral information supporting Suicidal/self-harm behaviors       Safety  Safety WDL: WDL  Safety Measures: environmental rounds completed, safety rounds completed, self-directed behavior promoted, suicide assessment completed, suicide check-in completed, 1:1 observation maintained  Suicidality: SIO (Status Individual Observation)  (NOTE - order will specify distance, Behavioral scrubs (pajamas), Promote patient engagement with treatment process

## 2024-05-28 NOTE — PROGRESS NOTES
Rehab Group    Start time: 1315  End time: 1415  Patient time total: 50 minutes    attended full group    #7 attended   Group Type: occupational therapy   Group Topic Covered: social skills and self-esteem     Group Session Detail:  Self-esteem group game     Patient Response/Contribution:  Cooperative with task, Listened actively, Expressed understanding of topic, Attentive, and Actively engaged     Patient Detail:    Pt actively participated in a structured occupational therapy group with a focus on facilitating self-esteem via self-reflection questions. Slightly impulsive in his task approach, though receptive to feedback from peers. Pt shared thoughtful responses regarding past achievements, positive social supports, and hobbies/skills. 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 Expressed significant relief over issues with MA being sorted out and shared about upcoming surgery. Politely thanked writer for group at the end.        Train & Education Service Per Session 45 + Minutes () OT Group code    Patient Active Problem List   Diagnosis    CLAIRE (generalized anxiety disorder)    Mass    PTSD (post-traumatic stress disorder)    Other chronic pain    Restless leg syndrome    Osteoarthritis of spine with radiculopathy, lumbar region    Chondromalacia of left patella    Chronic bilateral low back pain without sciatica    Chronic joint pain    Complex tear of medial meniscus of left knee as current injury    GERD (gastroesophageal reflux disease)    Glaucoma    IBS (irritable bowel syndrome)    Chronic pain of right knee    Lumbar radiculopathy    Major depressive disorder, recurrent severe without psychotic features (H)    Morbid obesity (H)    Postmenopausal    Primary osteoarthritis of right knee    MDD (major depressive disorder), recurrent severe, 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)    Anxiety    Pelvic floor weakness    Gender identity disorder    Primary osteoarthritis involving  multiple joints    Status post total knee replacement, right    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

## 2024-05-28 NOTE — PLAN OF CARE
"RN Shift Summary     Patient presented with an anxious affect. He reported high anxiety and \"shakiness.\" He took PRN Gabapentin for anxiety in the morning. He reported pain in shoulder, back, and knees; Voltaren applied to knees and back with RN assistance. He said that he was thinking about bills various things he was going to keep organized with his insurance. Patient was able to use his coping skills throughout the morning and completed a puzzle. He said he had various friends he was going to call and/or meet up with during the week. Patient was able to eat meals in the lounge and attended some groups. He is very focused on doing what he needs to do to discharge. He continues to endorse passive SI. He started I&O today, and he said he prefers to keep track himself in his room. He also is able to use his phone to check medical records and listen to/watch [employer] Youtube videos as a coping skills. He used his phone for 15 minutes today. SIO 1:1 remains for safety; per provider, patient is unable to contract for safety on the unit.    Vitals: B/P: 158/89, T: 98.3, P: 84, R: 16  Intake: 860 mL, Output: 400 mL (since order placed)    "

## 2024-05-28 NOTE — PLAN OF CARE
Goal Outcome Evaluation:             Pt was in bed at the beginning of the shift.Pt woke up complained of anxiety and pain on his lower back,shoulders rating it a 7/10. Prn  Tylenol /Clonazepam administered.Pt slept for 5.25 hours tonight.Pt remains on SIO for SI and CPAP precautions. Will continue to monitor.    PRN: Clonazepam          : Tylenol

## 2024-05-28 NOTE — PLAN OF CARE
-Attending Provider: JODI Cantu CNP  -Voicemail Code: [phone]  -Team Note Due: Tuesday  -Next Steps:    Connect with community providers        Assessment/Intervention/Current Symtoms and Care Coordination:  Chart review and met with team, discussed pt progress, symptomology, and response to treatment.  Discussed the discharge plan and any potential impediments to discharge.  [facility] exchanged phone calls and emails with [employer]any ([facility]). [employer]ogany informed [facility] that [employer] health insurance has been reviewed and is renewed for the next year. [facility] was informed that [facility] and Financial worker sent an email to Killian, and asked that [facility] provide copy so Killian is updated.   [facility] printed out copy of the email sent to him regarding insurance. He stated he had access to his phone earlier and was able to see the email. He was very pleasant and smiling.        Discharge Plan or Goal:  Pending further stabilization, continued compliance with medications, continued activities of daily living, and development of a safe discharge plan.   Considerations:     Barriers to Discharge:  Impact of mental health symptoms on well being   Impact of mental health symptoms on activities of daily living  Need for medication monitoring and medication management     Referral Status:       Legal Status:   Voluntary  County:   File Number:   Start and expiration date of commitment:     Contacts:  CADI: [facility]  TCM: [facility]       Upcoming Meetings and Dates/Important Information:      -Still Needed on AVS:

## 2024-05-29 ENCOUNTER — PATIENT OUTREACH (OUTPATIENT)
Dept: ONCOLOGY | Facility: CLINIC | Age: 69
End: 2024-05-29
Payer: MEDICARE

## 2024-05-29 LAB
ERYTHROCYTE [DISTWIDTH] IN BLOOD BY AUTOMATED COUNT: 13.2 % (ref 10–15)
HCT VFR BLD AUTO: 56.3 % (ref 35–53)
HGB BLD-MCNC: 19.1 G/DL (ref 13.3–17.7)
MCH RBC QN AUTO: 29.1 PG (ref 26.5–33)
MCHC RBC AUTO-ENTMCNC: 33.9 G/DL (ref 31.5–36.5)
MCV RBC AUTO: 86 FL (ref 78–100)
PLATELET # BLD AUTO: 212 10E3/UL (ref 150–450)
RBC # BLD AUTO: 6.57 10E6/UL (ref 3.8–5.9)
TESTOST SERPL-MCNC: 495 NG/DL (ref 8–950)
WBC # BLD AUTO: 6.2 10E3/UL (ref 4–11)

## 2024-05-29 PROCEDURE — 36415 COLL VENOUS BLD VENIPUNCTURE: CPT | Performed by: PHYSICIAN ASSISTANT

## 2024-05-29 PROCEDURE — 90832 PSYTX W PT 30 MINUTES: CPT

## 2024-05-29 PROCEDURE — 250N000013 HC RX MED GY IP 250 OP 250 PS 637: Performed by: PHYSICIAN ASSISTANT

## 2024-05-29 PROCEDURE — 250N000013 HC RX MED GY IP 250 OP 250 PS 637

## 2024-05-29 PROCEDURE — 85048 AUTOMATED LEUKOCYTE COUNT: CPT | Performed by: PHYSICIAN ASSISTANT

## 2024-05-29 PROCEDURE — 124N000002 HC R&B MH UMMC

## 2024-05-29 PROCEDURE — 99232 SBSQ HOSP IP/OBS MODERATE 35: CPT

## 2024-05-29 RX ORDER — CLONIDINE HYDROCHLORIDE 0.1 MG/1
0.1 TABLET ORAL 2 TIMES DAILY
Qty: 60 TABLET | Refills: 0 | Status: SHIPPED | OUTPATIENT
Start: 2024-05-29

## 2024-05-29 RX ORDER — ALBUTEROL SULFATE 90 UG/1
2 AEROSOL, METERED RESPIRATORY (INHALATION) EVERY 4 HOURS PRN
Qty: 18 G | Refills: 0 | Status: SHIPPED | OUTPATIENT
Start: 2024-05-29 | End: 2024-09-11

## 2024-05-29 RX ORDER — POLYETHYLENE GLYCOL 3350 17 G/17G
17 POWDER, FOR SOLUTION ORAL DAILY
Qty: 510 G | Refills: 0 | Status: SHIPPED | OUTPATIENT
Start: 2024-05-29 | End: 2024-09-11

## 2024-05-29 RX ORDER — DESVENLAFAXINE 50 MG/1
50 TABLET, FILM COATED, EXTENDED RELEASE ORAL DAILY
Qty: 30 TABLET | Refills: 0 | Status: SHIPPED | OUTPATIENT
Start: 2024-05-30

## 2024-05-29 RX ORDER — CYCLOBENZAPRINE HCL 5 MG
5 TABLET ORAL EVERY 8 HOURS PRN
Qty: 90 TABLET | Refills: 0 | Status: SHIPPED | OUTPATIENT
Start: 2024-05-29

## 2024-05-29 RX ORDER — LANOLIN ALCOHOL/MO/W.PET/CERES
3 CREAM (GRAM) TOPICAL
Qty: 30 TABLET | Refills: 0 | Status: SHIPPED | OUTPATIENT
Start: 2024-05-29 | End: 2024-09-11

## 2024-05-29 RX ORDER — AMOXICILLIN 250 MG
1-2 CAPSULE ORAL 2 TIMES DAILY PRN
Status: DISCONTINUED | OUTPATIENT
Start: 2024-05-29 | End: 2024-05-30 | Stop reason: HOSPADM

## 2024-05-29 RX ORDER — HYDROCHLOROTHIAZIDE 12.5 MG/1
12.5 TABLET ORAL DAILY
Qty: 30 TABLET | Refills: 0 | Status: SHIPPED | OUTPATIENT
Start: 2024-05-29

## 2024-05-29 RX ORDER — SALIVA STIMULANT COMB. NO.3
2 SPRAY, NON-AEROSOL (ML) MUCOUS MEMBRANE 4 TIMES DAILY PRN
Qty: 44.3 ML | Refills: 0 | Status: SHIPPED | OUTPATIENT
Start: 2024-05-29 | End: 2024-09-11

## 2024-05-29 RX ORDER — SALIVA STIMULANT COMB. NO.3
2 SPRAY, NON-AEROSOL (ML) MUCOUS MEMBRANE 4 TIMES DAILY PRN
Status: DISCONTINUED | OUTPATIENT
Start: 2024-05-29 | End: 2024-05-30 | Stop reason: HOSPADM

## 2024-05-29 RX ORDER — LIDOCAINE 4 G/G
1 PATCH TOPICAL EVERY 24 HOURS
Qty: 30 PATCH | Refills: 0 | Status: SHIPPED | OUTPATIENT
Start: 2024-05-29 | End: 2024-09-11

## 2024-05-29 RX ADMIN — PRAMIPEXOLE DIHYDROCHLORIDE 0.75 MG: 0.25 TABLET ORAL at 21:18

## 2024-05-29 RX ADMIN — DICLOFENAC SODIUM TOPICAL GEL, 1% 4 G: 10 GEL TOPICAL at 21:17

## 2024-05-29 RX ADMIN — CLONIDINE HYDROCHLORIDE 0.1 MG: 0.1 TABLET ORAL at 08:57

## 2024-05-29 RX ADMIN — PANTOPRAZOLE SODIUM 40 MG: 40 TABLET, DELAYED RELEASE ORAL at 08:57

## 2024-05-29 RX ADMIN — CYCLOBENZAPRINE 5 MG: 5 TABLET, FILM COATED ORAL at 16:06

## 2024-05-29 RX ADMIN — ANASTROZOLE 1 MG: 1 TABLET, COATED ORAL at 08:57

## 2024-05-29 RX ADMIN — ACETAMINOPHEN 650 MG: 325 TABLET, FILM COATED ORAL at 00:02

## 2024-05-29 RX ADMIN — CLONAZEPAM 1 MG: 1 TABLET ORAL at 10:52

## 2024-05-29 RX ADMIN — ACETAMINOPHEN 650 MG: 325 TABLET, FILM COATED ORAL at 16:06

## 2024-05-29 RX ADMIN — DICLOFENAC SODIUM TOPICAL GEL, 1% 4 G: 10 GEL TOPICAL at 08:58

## 2024-05-29 RX ADMIN — LIDOCAINE 1 PATCH: 4 PATCH TOPICAL at 21:18

## 2024-05-29 RX ADMIN — DESVENLAFAXINE 50 MG: 50 TABLET, FILM COATED, EXTENDED RELEASE ORAL at 08:57

## 2024-05-29 RX ADMIN — Medication 3 MG: at 21:21

## 2024-05-29 RX ADMIN — CLONIDINE HYDROCHLORIDE 0.1 MG: 0.1 TABLET ORAL at 21:16

## 2024-05-29 RX ADMIN — HYDROCHLOROTHIAZIDE 12.5 MG: 12.5 TABLET ORAL at 08:57

## 2024-05-29 RX ADMIN — CYCLOBENZAPRINE 5 MG: 5 TABLET, FILM COATED ORAL at 23:43

## 2024-05-29 RX ADMIN — CYCLOBENZAPRINE 5 MG: 5 TABLET, FILM COATED ORAL at 08:57

## 2024-05-29 RX ADMIN — Medication 2 SPRAY: at 13:22

## 2024-05-29 RX ADMIN — CYCLOBENZAPRINE 5 MG: 5 TABLET, FILM COATED ORAL at 00:02

## 2024-05-29 RX ADMIN — GABAPENTIN 900 MG: 300 CAPSULE ORAL at 21:17

## 2024-05-29 RX ADMIN — Medication: at 08:58

## 2024-05-29 RX ADMIN — DICLOFENAC SODIUM TOPICAL GEL, 1% 4 G: 10 GEL TOPICAL at 13:25

## 2024-05-29 RX ADMIN — Medication 2 SPRAY: at 23:43

## 2024-05-29 ASSESSMENT — ACTIVITIES OF DAILY LIVING (ADL)
ADLS_ACUITY_SCORE: 41
ADLS_ACUITY_SCORE: 31
ADLS_ACUITY_SCORE: 31
ADLS_ACUITY_SCORE: 41
ADLS_ACUITY_SCORE: 31
DRESS: INDEPENDENT;SCRUBS (BEHAVIORAL HEALTH)
ADLS_ACUITY_SCORE: 31
ADLS_ACUITY_SCORE: 41
ADLS_ACUITY_SCORE: 31
ADLS_ACUITY_SCORE: 31
ORAL_HYGIENE: INDEPENDENT
HYGIENE/GROOMING: INDEPENDENT
ADLS_ACUITY_SCORE: 41

## 2024-05-29 NOTE — PROGRESS NOTES
"New Patient Oncology Nurse Navigator Note     Referring provider: Ashanti Griffin PA-C      Referring Clinic/Organization: [facility]     Referred to (specialty:) Hematology/Oncology      Requested provider (if applicable): JAMES     Date Referral Received: May 29, 2024     Evaluation for:  D75.1 (ICD-10-CM) - Erythrocytosis      Clinical History (per Nurse review of records provided):    Killian\" Sarita is a 68 year old adult with a PMH significant for breast cancer, HTN, MDD, RLS, ASHLYN, thoracic aortic aneurysm anxiety, depression who was admitted on 5/22/2024 to inpatient Psychiatry for increased anxiety, feeling tired from life and trouble focusing.     5/26:   Erythrocytosis   Recheck showed hematocrit stable at ~19.0. Discussed with Hematology. Agreed with following up on testosterone level and obtaining JAK2 and EPO with persistent elevation. They also recommended obtaining VBG with underlying ASHLYN (hasn't been wearing CPAP PTA, wearing here) which has also been ordered. Unclear if this is in the setting of medication related with testosterone vs starting anastrozole (started ~ 1 month ago), MPN is also on the differential therefore will need to pursue work up with JAK2 and EPO. Also has underlying ASHLYN which could be contributing as well.   -Daily CBC  -JAK2 and EPO ordered   -VBG ordered   -Follow up testosterone level   -Follow up peripheral smear   -Encourage continued CPAP use   -Pending work up, may need formal Hematology consult    5/29:   Testosterone level & EPO returned within appropriate ranges. Peripheral smear showed mild erythrocytosis. JAK2 and CALR pending. CBC has been stable with RBC in the 6's for several days. Patient discharging today. Referral placed to follow up with hematology      Records Location: See Bookmarked material     Records Needed: NA     Additional testing needed prior to consult: NA    Payor: MEDICARE / Plan: MEDICARE / Product " Type: Medicare /     May 29, 2024  Referral received and reviewed.  Sent to NPS to schedule.     Patience PAULN, RN   Oncology Nurse Navigator   [facility]   [phone] / [phone]

## 2024-05-29 NOTE — PROGRESS NOTES
"NOC Shift Report    Pt slept on and off throughout the shift for 5.0 hours. Pt c/o pain to lower back rated 6/10 and having muscle spasms. PRN Tylenol and Flexeril given at 0000. 30 minutes later, pt requested to have an ice pack to apply to his BLE, stating \"the ice pack sometimes calms me down\". Ice pack given. Will continue to monitor.   "

## 2024-05-29 NOTE — PLAN OF CARE
"RN Shift Summary     Patient presented with a labile affect. He reported feeling \"safe\" and \"happier than I've felt since before I went on disability.\" He denies SI/SIB/HI and thinks he will be safe without SIO 1:1 observation. He also expressed that he would like to discharge today, but he understands if he has to stay for longer for further assessment. He was present in the milieu intermittently. He was able to be seen by the dental hygienist today; he reported higher anxiety afterwards, stating that the hygienist brought up chemotherapy. He requested PRN clonazepam after this interaction, stating that he doesn't want to regress into the heightened stress levels he experienced over the weekend. PRN clonazepam provided. Patient was also provided with PRN Flexeril in the morning for back pain. Patient showered in the morning.    Vitals: B/P: 138/81, T: 97.9, P: 75, R: 16    "

## 2024-05-29 NOTE — PLAN OF CARE
"Individual Therapy Note      Date of Service: May 29, 2024    Patient: Killian goes by \"Killian,\" uses he/him pronouns    Individuals Present: Killian & Lilly Gates [facility]    Session start: 1230  Session end: 1300  Session duration in minutes: 30      Modality Used: Person Centered, Brief Therapy, and Solution Focused    Goals: Safety planning, resourcing, verbal processing    Patient Description of current symptoms: Pt feels better today but still has anxiety and intrusive thoughts     Mental Status Exam:   Attitude: cooperative  Eye Contact: good  Mood: anxious and sad   Affect: mood congruent  Speech: clear, coherent  Psychomotor Behavior: no evidence of tardive dyskinesia, dystonia, or tics  Thought Process:  logical, linear, and goal oriented  Associations: no loose associations  Thought Content: no evidence of psychotic thought, no auditory hallucinations present, and no visual hallucinations present  Insight: good  Judgement: intact  Attention Span and Concentration: intact    Pt progress: Met pt in interview room. Pt shared that they are feeling better and feel a weight has been lifted since he knows his insurance was reinstated. Pt mentioned that someone said something like they hoped the surgery he will have goes well. Pt read into that comment and stared to feel like they should be looking for a different provider. Writer reminded pt that he already did his homework and felt really good about the provider, their competence, and their bedside manner. Pt agreed to that. Writer suggested the person probably meant it well and maybe aren't the best at saying things in the best way. Pt agreed to this as well b/c they already had a misunderstanding with this person.     Pt shared some more of their story during the safety planning, some of their goals, their coping strategies, discussed the places they feel safest and people they want to visit. Writer encouraged pt to keep their eye on the upcoming surgery and recovery " and put some of the other worries on the back burner for now. Writer encouraged pt that once this is behind them they can start to settle into their authentic self and lean into that as they continue to heal from the past.     Pt shared the intrusive thoughts they had and recognized that they didn't want to do it but that the thought just came in out of nowhere. Safety planning included discussions about how to make his home safe when he returns. Pt plans to ask  to hold on to a few items (ropes and inner tubes for bike) for the next couple of months if possible.     Treatment Objective(s) Addressed:   The focus of this session was on identifying and practicing coping strategies, processing feelings related to traumas, hospital stay, and mental health, identifying an appropriate aftercare plan, assessing safety, and identifying additional supports     Progress Towards Goals and Assessment of Patient:   Patient is making progress towards treatment goals as evidenced by improved mood.       Therapeutic Intervention(s):   Provided active listening, unconditional positive regard, and validation.   Engaged in safety planning identifying coping skills, warning signs, health support and resources, Understand and identify reasons for hospitalization, how to use the hospital to create change and prevent future hospitalizations , Explored and identified early warning signs and triggers to mental health decline, Engaged in cognitive restructuring/ reframing, looked at common cognitive distortions and challenged negative thoughts, and Provided positive reinforcement for progress towards goals, gains in knowledge, and application of skills previously taught      Plan/next step: Engaged in unit programming  Check in with therapist as needed      11132 - Psychotherapy (with patient) - 30 (16-37*) min    Patient Active Problem List   Diagnosis    CLAIRE (generalized anxiety disorder)    Mass    PTSD (post-traumatic stress  disorder)    Other chronic pain    Restless leg syndrome    Osteoarthritis of spine with radiculopathy, lumbar region    Chondromalacia of left patella    Chronic bilateral low back pain without sciatica    Chronic joint pain    Complex tear of medial meniscus of left knee as current injury    GERD (gastroesophageal reflux disease)    Glaucoma    IBS (irritable bowel syndrome)    Chronic pain of right knee    Lumbar radiculopathy    Major depressive disorder, recurrent severe without psychotic features (H)    Morbid obesity (H)    Postmenopausal    Primary osteoarthritis of right knee    MDD (major depressive disorder), recurrent severe, 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)    Anxiety    Pelvic floor weakness    Gender identity disorder    Primary osteoarthritis involving multiple joints    Status post total knee replacement, right    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

## 2024-05-29 NOTE — PLAN OF CARE
-Attending Provider: JODI Cantu CNP  -Voicemail Code: [id number]  -Team Note Due: Tuesday  -Next Steps:    Connect with community providers        Assessment/Intervention/Current Symtoms and Care Coordination:  Chart review and met with team, discussed pt progress, symptomology, and response to treatment.  Discussed the discharge plan and any potential impediments to discharge.  [facility] spoke with Killian about his ortho appointment that was schedule for this day at 120. Killian stated that appointment is regarding his worker's comp case. Killian asked [facility] to call and postpone the appointment. Killian stated he has to coordinate with his C regarding the appointment due to it being related to worker's comp.  [facility] called Andre Quintanilla and canceled the appointment.         Discharge Plan or Goal:  Pending further stabilization, continued compliance with medications, continued activities of daily living, and development of a safe discharge plan.   Considerations:     Barriers to Discharge:  Impact of mental health symptoms on well being   Impact of mental health symptoms on activities of daily living  Need for medication monitoring and medication management     Referral Status:       Legal Status:   Voluntary  County:   File Number:   Start and expiration date of commitment:     Contacts:  CADI: [facility]  TCM: [facility] Jimenez       Upcoming Meetings and Dates/Important Information:      -Still Needed on AVS:

## 2024-05-29 NOTE — PLAN OF CARE
BEH IP Unit Acuity Rating Score (UARS)  Patient is given one point for every criteria they meet.    CRITERIA SCORING   On a 72 hour hold, court hold, committed, stay of commitment, or revocation. 0    Patient LOS on BEH unit exceeds 20 days. 0  LOS: 7   Patient under guardianship, 55+, otherwise medically complex, or under age 11. 1   Suicide ideation without relief of precipitating factors. 0   Current plan for suicide. 0   Current plan for homicide. 0   Imminent risk or actual attempt to seriously harm another without relief of factors precipitating the attempt. 0   Severe dysfunction in daily living (ex: complete neglect for self care, extreme disruption in vegetative function, extreme deterioration in social interactions). 0   Recent (last 7 days) or current physical aggression in the ED or on unit. 0   Restraints or seclusion episode in past 72 hours. 0   Recent (last 7 days) or current verbal aggression, agitation, yelling, etc., while in the ED or unit. 0   Active psychosis. 0   Need for constant or near constant redirection (from leaving, from others, etc).  0   Intrusive or disruptive behaviors. 0   Patient requires 3 or more hours of individualized nursing care per 8-hour shift (i.e. for ADLs, meds, therapeutic interventions). 0   TOTAL 1

## 2024-05-29 NOTE — CONSULTS
Dental Hygiene Consult Service        Gregoria Stein MRN# [id number]   YOB: 1955 Age: 68 year old     Date of Admission: 5/22/2024  PCP is Ayo Davis  Date of Service: 5/29/2024           Reason for Consult:   Referring MD & Reason for Visit: I was asked by Christopher Nichols MD, to see Gregoria Stein for a LIMITED oral assessment regarding reports having a cavity and dental pain on the affected bottom R molar            History of Present Illness:   This patient is a 68 year old adult with a history of breast cancer, HTN, MDD, RLS, ASHLYN, thoracic aortic aneurysm anxiety, depression who was admitted on 5/22/2024 to inpatient Psychiatry for increased anxiety, feeling tired from life and trouble focusing. Medicine was consulted for restarting testosterone injection and to manage comorbidities. Medicine team following up on erythrocytosis.  Dental and oropharyngeal history is pertinent for recently established dental home at a community dental clinic - last dental visit was in March 2024 for dental cleaning and exam, where a treatment plan was created. The patient reports no current dental pain, but concern for broken teeth and previously diagnosed dental caries.         General Observations   Level of support Patient is fully independent   Patient currently in pain No   Patient wears dentures No   Current oral care routine Occasional toothbrushing with manual toothbrush, toothpaste provided by hospital   Patient has oral care products Toothbrush and Toothpaste   Extraoral assessment   General appearance Symmetrical and Normal TMJ function   Lymph nodes Symmetrical, no abnormalities, non-tender   Oral Health Assessment Tool (OHAT)   Lips 0=Healthy: smooth, pink, moist   Tongue 1=Changes: (ie. patchy, fissured, red, coated ) - geographic tongue (WNL), ulcerations on right and left lateral borders (see IO photos below)    Right lateral border of tongue with ulcer (adjacent to fractured tooth  #31, possibly from constant rubbing against sharp tooth surface)      2. Left lateral border of tongue      Gums and Tissues 2=Unhealthy: (ie. swollen, bleeding, ulcers, white/red patches, generalized redness (under dentures) )- possible palatal petechiae? of soft palate/oropharynx (see IO photo below); pt does not recall recent trauma or thermal burns    Soft palate/Oropharynx      Saliva 1=Changes: (ie. dry, sticky tissues, little saliva present, resident thinks they have dry mouth )- dry, sticky tissues   Natural Teeth Yes/No 2=Unhealthy: (ie. 4+ decayed or broken teeth/roots, or very worn down teeth, or less than 4 teeth) - several scattered broken teeth/suspected caries, primarily of posterior teeth   Dentures Yes/No Patient does not wear dentures   Oral Cleanliness 0=Healthy: clean and no food particles or tartar in mouth or dentures   Dental Pain 0=Healthy: no behavioural, verbal, or physical signs of dental pain   OHAT Total Score (0-16) 6   Other Dental Concerns Suspicious for periodontitis   Care provided during assessment Oral Assessment, Intraoral photo(s) soft palate/oropharynx and bilateral borders of tongue, and Patient education   Follow up DH assessments required? No   Connect with [facility] or SOD care? Yes: [facility] dental team will review IO photos   For Dental Team Service Requesting Consult: [facility] Hosp Adult  Clearance/Reason: cavity/pain LR molar  Dental CT status: not ordered  Upcoming Sx date(s), if known: N/A   Expected discharge date, if known: tomorrow, 5/30  Ability to transfer to [facility]: yes  Pain reported, by pt: none currently  Swelling present: none  Current dental Rx regimen: none    Oral Care Plan Toothbrushing gently with soft-bristled manual toothbrush and fluoridated toothpaste BID to optimize oral health.    Interdental cleaning (flossing) daily as pt is able.    Informed pt of suspicious palatal lesions - pt was unable to recall recent trama or known cause. Showed pt IO photos taken today  and suggested bringing this up to oncologist for further monitoring.     Pt inquired about receiving dental treatment and if it could interfere with upcoming surgical procedures. I recommended that pt make oncologist/surgeon aware of any planned dental treatments and that if pt will ever be undergoing chemotherapy or radiation treatments, there are often recommendations of delaying dental treatment while blood cell counts are low. Any concerns should be deferred to pt's oncology team. Otherwise encouraged pt to continue with dental treatment plan.            Assessment and Plan:   Assessment:  This patient is a 68 year old adult with a history of breast cancer, HTN, MDD, RLS, ASHLYN, thoracic aortic aneurysm anxiety, depression who was admitted on 5/22/2024 to inpatient Psychiatry for increased anxiety, feeling tired from life and trouble focusing. Medicine was consulted for restarting testosterone injection and to manage comorbidities. Medicine team following up on erythrocytosis, oral exam concerning for poor dentition - several scattered broken teeth/suspected caries, primarily of posterior teeth (no current pain or swelling reported by patient); possible petechiae of soft palate/oropharynx area, bilateral ulcers on tongue from adjacent broken teeth rubbing against soft tissue.      Plan:    has forwarded findings and intraoral photos to dentist for further consult.  Pt should resume dental treatment plan on outpatient basis at established dental home upon discharge under medical advisement.  Continue to monitor soft palate/oropharynx lesions - could be a possible manifestation of systemic condition. Recommend that pt informs oncologist.   Implement oral care plan as described above. Pt is currently independent in oral cares.    ALBERTO Dueñas  Message on [employer]  Pager: [phone]      Past Medical History   I have reviewed this patient's medical history and updated it with pertinent information if  needed.  Past Medical History:   Diagnosis Date    Anxiety     Arthritis     C spine, thumbs bilateral, feet, shoulders, knees    Depressive disorder     Gastro-oesophageal reflux disease     intermittent    Labial irritation     labial mass    Other chronic pain     feet, knees, shoulders, full spine, thumbs    PTSD (post-traumatic stress disorder)     Restless leg syndrome        Past Surgical History   I have reviewed this patient's surgical history and updated it with pertinent information if needed.  Past Surgical History:   Procedure Laterality Date    COLONOSCOPY      EXCISE MASS VAGINA Left 4/10/2015    Procedure: EXCISE MASS VAGINA;  Surgeon: Stanislav Byers MD;  Location: [facility] OR    GENITOURINARY SURGERY      Urethrial dilation    JOINT REPLACEMENT Right 2018    ORTHOPEDIC SURGERY      right rotator cuff, left achilles tendon repair, neuroma removed right foot, right knee arthroscopy,        Social History   I have reviewed this patient's social history and updated it with pertinent information if needed.  Social History     Tobacco Use    Smoking status: Former     Current packs/day: 0.00     Types: Cigarettes     Quit date: 1983     Years since quittin.7    Smokeless tobacco: Never   Vaping Use    Vaping status: Never Used   Substance Use Topics    Alcohol use: Yes     Comment: occasional    Drug use: Yes     Types: Marijuana     Comment: daily only at night time for medical conditions     Prior to Admission Medications   Prior to Admission Medications   Prescriptions Last Dose Informant Patient Reported? Taking?   UNABLE TO FIND  Self Yes No   Sig: Pt is using Medical cannabis   albuterol (PROAIR HFA/PROVENTIL HFA/VENTOLIN HFA) 108 (90 Base) MCG/ACT inhaler  Self Yes No   Sig: Inhale 2 puffs into the lungs every 4 hours as needed   anastrozole (ARIMIDEX) 1 MG tablet 2024 at 0806 Self Yes Yes   Sig: Take 1 tablet by mouth daily   clonazePAM (KLONOPIN) 1 MG tablet 2024 at 1936 Self Yes  Yes   Sig: Take 1 mg by mouth 2 times daily as needed for anxiety   gabapentin (NEURONTIN) 300 MG capsule 5/22/2024 at 2124 Self No Yes   Sig: Take 3 capsules (900 mg) by mouth At Bedtime   hydrochlorothiazide (HYDRODIURIL) 12.5 MG tablet 5/22/2024 at 0806 Self Yes Yes   Sig: Take 12.5 mg by mouth daily   omeprazole (PRILOSEC) 40 MG DR capsule 5/20/2024 Self Yes No   Sig: TAKE 1 CAPSULE BY MOUTH EVERY DAY BEFORE A MEAL   pramipexole (MIRAPEX) 0.25 MG tablet 5/22/2024 at 2159 Self No Yes   Sig: Take 3 tablets (0.75 mg) by mouth At Bedtime   testosterone cypionate (DEPOTESTOSTERONE) 200 MG/ML injection 5/10/2024 Self Yes Yes   Sig: Inject 70 mg Subcutaneous once a week on Fridays      Facility-Administered Medications: None     Allergies   Allergies   Allergen Reactions    Midazolam Other (See Comments)     Stopped breathing    Other reaction(s): Apnea   Stopped breathing   Stopped breathing    Penicillins Rash and Hives     Physical Exam

## 2024-05-29 NOTE — PLAN OF CARE
RN: Pt A/O x4. Pt didn't attend group, is very calm and cooperative throughout this shift. Pt was very happy about getting MA back, and had conversation with his permary provider on the phone, saying that he feels very supportive now,  and those took his stressor away,  now he feel safe to go home. Pt endorsed  mild anxiety and depression, rate anxiety/depression at 2-3/10 this evening, declined interventions. Pt denied SI/SIB/HI, hallucinations. Pt was medication compliant, no reported or observed side effects. Pt reports his appetite starts getting better, pt ate dinner in DR, ate 25% of meal. Pt reports no issues with bowel and bladder. Pt is drinking adequately. Continue to have pain in lower back and bilateral should, pain is well controlled with scheduled medication. Pt was out watching basketball game after dinner and was social with selevtive peers. Pt uses CPAP at night, continue to be on SIO for safety.       Goal Outcome Evaluation:    Plan of Care Reviewed With: patient

## 2024-05-29 NOTE — DISCHARGE INSTRUCTIONS
"Behavioral Discharge Planning and Instructions    Summary: You were admitted to [facility] on 5/22/2024 due to Depression, Anxiety, Panic Attacks, and Suicidal Ideations. You were treated by JODI Cantu CNP and discharged on 05/30/2024 to Home.      Continue to follow with your Mental Health  -[facility]     Main Diagnosis:   PTSD  Generalized anxiety disorder  Major depressive disorder, recurrent, severe without psychotic features  Suicidal ideation    Major Treatments, Procedures and Findings:  You were provided with: a psychiatric assessment, assessed for medical stability, medication evaluation and/or management, group therapy, milieu management, and medical interventions    Symptoms to Report: Feeling more aggressive, increased confusion, losing more sleep, mood getting worse, or thoughts of suicide.    Early warning signs can include: Increased depression or anxiety sleep disturbances increased thoughts or behaviors of suicide or self-harm  increased unusual thinking, such as paranoia or hearing voices.    Safety and Wellness: Take all medicines as directed. Make no changes unless your doctor suggests them. Follow treatment recommendations. Refrain from alcohol and non-prescribed drugs. Ask your support system to help you reduce your access to items that could harm yourself or others. If there is a concern for safety, call 761.    Resources:   General Mental Health Resources:   National [place] on Mental Illness (SUGAR) [place]: Connect for help, to navigate the mental health system, and for support and for resources. Call: 3-368-IVVN-Helps / 8-438-477-7633  Crisis Text Line: The 24/7 emergency service is available if you or someone you know is experiencing a psychiatric or mental health crisis. Text: \"[place]\" to 249177  [place] Warmline: Are you an adult needing support? Talk to a specialist who has firsthand experience living with a mental health condition. Call: 711.450.3170  " "Text: \"Support\" to 75475  mentalhealthmn.org/support/[place]-warmline/  National Suicide Prevention Lifeline: The 24/7 lifeline provides support when in distress, has prevention and crisis resources for you or your loved ones, and resources for professionals. Call 3-886-846-TALK (5761)  Peer Support Connection Warmlines: Yqcx-dx-hyfy telephone support that s safe and supportive. Open 5 p.m. to 9 a.m. Call or text: 1-790.808.4362   COVID Cares Stress Phone Support Service. Any [place] experiencing stress can call 070-BFVA2[place] (577-292-2753) for free telephone support from 9am to 9pm every day. The service is a collaboration with volunteers from the [place] Psychiatric Society, the [place] Psychological Association, the [place] Black Psychologists, and [facility] [place]. The free service is also accessible at [employer] where searchers can also find psychiatric and mental health services availability and real-time Substance Use Disorder Treatment program openings.  Adult Rehabilitative Mental Health Services (ARMHS): https://[place].gov/dhs/partners-and-providers/policies-procedures/adult-mental-health/adult-rehabilitative-mental-health-services/armhs-certified-providers/  Suicide Awareness Voices of Education (SAVE) (www.save.org): 844-385-ZYXO (7283)  National Suicide Prevention Line (www.mentalhealthmn.org): 298-839-JIHB (0227)  Mental Health Association of [place] (www.mentalhealth.org): 235.747.7823 or 724-199-6424  Self- Management and Recovery Training., SMART-- Toll free: 952.891.8045  www.[employer].org  [facility] Crisis Response 002 541-6808  [facility] Crisis Response [phone]  [facility] Crisis Response 439-547-9005  [facility] Crisis (COPE) Response - Adult [phone]  [facility] Crisis Response - Adult 234 771-6210  [facility] Rapid Response 061-787-9706    Outpatient Psychiatry/Therapy Resources:   [facility] - (phone: 823.120.3755) https://www.[employer].Sticky/care/specialty/mental-behavioral-health/  https://www.[employer].Sticky/care/find/doctors/psychologists/  [facility] - (phone: 1-941-ACYMDLIP) https://www.[facility].org/treatments/Counseling-Adult  [facility] - (phone: 440.118.6173) https://[facility].Sticky/  [facility] - (phone: 640.163.1008)  https://[facility].Sticky/  [facility] - (phone: 1-400.699.2284) https://www.[employer]/  [facility] - (phone: 345.437.4932) https://www.[employer].Sticky/  [facility] - (phone: 699.329.8198) https://[employer]/  [facility] - (phone: 243.849.3770) https://walkin.[employer]/    General Medication Instructions:   See your medication sheet(s) for instructions.   Take all medicines as directed.  Make no changes unless your doctor suggests them.   Go to all your doctor visits.  Be sure to have all your required lab tests. This way, your medicines can be refilled on time.  Do not use any drugs not prescribed by your doctor.  Avoid alcohol.    Advance Directives:   Scanned document on file with [employer]? No scanned doc  Is document scanned? No. Copy Requested.  Honoring Choices Your Rights Handout: Informed and given  Was more information offered? Pt declined    The Treatment team has appreciated the opportunity to work with you. If you have any questions or concerns about your recent admission, you can contact the unit which can receive your call 24 hours a day, 7 days a week. They will be able to get in touch with a Provider if needed. The unit number is [phone].

## 2024-05-29 NOTE — PROGRESS NOTES
Brief Medicine Note  Medicine following peripherally for erythrocytosis as documented in progress note from 5/26/2024 by my colleague Samantha Lamar PA-C. Testosterone level & EPO returned within appropriate ranges. Peripheral smear showed mild erythrocytosis. JAK2 and CALR pending. CBC has been stable with RBC in the 6's for several days. Patient discharging today. Referral placed to follow up with hematology in discharge navigator and inpatient hematology team notified.     As patient is discharging, medicine will sign off now. Thank you for allowing us to be a part of this patient's care. Please notify on call [facility] if any medical issues arise.     Ashanti Griffin PA-C  Hospitalist Service

## 2024-05-29 NOTE — PROGRESS NOTES
"PSYCHIATRY  PROGRESS NOTE     DATE OF SERVICE   05/29/24          CHIEF COMPLAINT   \"Started out really great.\"       SUBJECTIVE   Per nursing documentation:       RN: Pt A/O x4. Pt didn't attend group, is very calm and cooperative throughout this shift. Pt was very happy about getting MA back, and [facility], saying that he feels very supportive now,  and those took his stressor away,  now he feel safe to go home. Pt endorsed  mild anxiety and depression, rate anxiety/depression at 2-3/10 this evening, declined interventions. Pt denied SI/SIB/HI, hallucinations. Pt was medication compliant, no reported or observed side effects. Pt reports his appetite starts getting better, pt ate dinner in [place], ate 25% of meal. Pt reports no issues with bowel and bladder. Pt is drinking adequately. Continue to have pain in lower back and bilateral should, pain is well controlled with scheduled medication. Pt was out watching basketball game after dinner and was social with selevtive peers. Pt uses CPAP at night, continue to be on SIO for safety.         Goal Outcome Evaluation:     Plan of Care Reviewed With: patient                NOC Shift Report     Pt slept on and off throughout the shift for 5.0 hours. Pt c/o pain to lower back rated 6/10 and having muscle spasms. PRN Tylenol and Flexeril given at 0000. 30 minutes later, pt requested to have an ice pack to apply to his BLE, stating \"the ice pack sometimes calms me down\". Ice pack given. Will continue to monitor.                 Per unit CTC documentation:         -Attending Provider: JODI Cantu, CNP  -Voicemail Code: [id number]#  -Team Note Due: Tuesday  -Next Steps:     Connect with community providers           Assessment/Intervention/Current Symtoms and Care Coordination:  Chart review and met with team, discussed pt progress, symptomology, and response to treatment.  Discussed the discharge plan and any potential " "impediments to discharge.  [facility] spoke with Killian about his ortho appointment that was schedule for this day at 120. Killian stated that appointment is regarding his worker's comp case. Killian asked [facility] to call and postpone the appointment. Killian stated he has to coordinate with his C regarding the appointment due to it being related to worker's comp.  [facility] called Andre Quintanilla and canceled the appointment.          Discharge Plan or Goal:  Pending further stabilization, continued compliance with medications, continued activities of daily living, and development of a safe discharge plan.   Considerations:      Barriers to Discharge:  Impact of mental health symptoms on well being   Impact of mental health symptoms on activities of daily living  Need for medication monitoring and medication management     Referral Status:        Legal Status:   Voluntary  County:   File Number:   Start and expiration date of commitment:      Contacts:  CADI: [facility]  TCM: Svetlana Jimenez        Upcoming Meetings and Dates/Important Information:        -Still Needed on AVS:            OBJECTIVE   Met with patient in hospital room of unit 10N. SIO 1:1 staff were also present. Patient's affect appears blunted and labile however improving compared to previous psychiatric interview.  Patient's mood appears slightly calmer today and patient reports their mood as, \"Started out really great.\"  Patient reports mood is related to meeting with dental team to address previously reported dental pain.  Patient reports that reported that dental team updated him of abnormalities identified in his oral cavity and that he will now need to inform his oncologist of this.  Patient reports this information was, \"triggering.\"  Patient's speech is slightly rapid, tangential, and circumstantial.  Patient does report they are feeling much better due to now knowing their MA insurance is active.  Patient reports ongoing severe anxiety levels rated 6/10  " "(0=none, 10=severe).  Patient reports anxiety levels have been improving since initiation of clonidine tablet 0.1 mg PO 2 times daily to target anxiety symptoms.  Patient reports ongoing depression symptoms rated 3/10   (0=none, 10=severe).  Patient reports depression symptoms are, \"better.\"  Patient denies any side effects of medication Pristiq.  Provider discussed that plan will be to continue 50 mg PO daily to target depression and anxiety symptoms.  Patient verbalizes understanding and agreement with this plan.  Today patient denies any SI, SIB, SA, plan or intent.  Patient also denies HI.  Patient is also able to contract for safety.  Subsequently provider discussed that plan will be to discontinue SIO 1:1 monitoring today.   Patient verbalizes understanding and agreement with this.  Patient denies any AH or VH. Patient denies any paranoid thoughts or delusions.  Patient does not report issues sleeping overnight.  Patient has previously reported decreased appetite however does not endorse this today.  Per nursing documentation patient is eating and as well as drinking fluids adequately.  Today patient denies any issues with bowel or bladder.  Yesterday patient reported concern that they were drinking adequate amount of fluids per day however, \"not putting out much urine.\"  Patient reports urine is normal in color however they are concerned regarding their kidney function.  Subsequently provider ordered intake of fluids and output urine monitoring starting yesterday afternoon for 24 hours and patient in agreement with this.  Patient vital signs reviewed and patient noted to be experiencing some hypertension.  Plan will be to monitor this closely for now.  Provider reviewed pertinent lab testing; patient noted to have multiple abnormal lab results.  See details of abnormal lab results below.  Internal medicine has been following patient to address abnormal labs.  See internal medicine provider documentation below " for details.  Today provider updated internal medicine team that patient will likely discharge tomorrow and internal medicine team reports that patient can follow up with outpatient oncologist in the community as well as needs to follow up with a hematologist; internal medicine place referral for this.  Provider updated patient of this and he verbalizes agreement.    Last week patient reported moderate dental pain related to having an untreated cavity.  Today dental hygiene consult was completed; see documentation below for details.  Patient reports that they plan to follow up with their oncologist as well as dentist in the community after discharge.   Patient denies any medical concerns today. Patient has no other questions or concerns.  Patient reports that they feel they will likely be ready for discharge tomorrow.    Per dental hygiene consult documentation completed 5/29/2024:    Dental Hygiene Consult Service         Gregoria Stein MRN# [id number]   YOB: 1955 Age: 68 year old      Date of Admission:      5/22/2024  PCP is Ayo Davis  Date of Service: 5/29/2024             Reason for Consult:   Referring MD & Reason for Visit: I was asked by Christopher Nichols MD, to see Gregoria Stein for a LIMITED oral assessment regarding reports having a cavity and dental pain on the affected bottom R molar              History of Present Illness:   This patient is a 68 year old adult with a history of breast cancer, HTN, MDD, RLS, ASHLYN, thoracic aortic aneurysm anxiety, depression who was admitted on 5/22/2024 to inpatient Psychiatry for increased anxiety, feeling tired from life and trouble focusing. Medicine was consulted for restarting testosterone injection and to manage comorbidities. Medicine team following up on erythrocytosis.  Dental and oropharyngeal history is pertinent for recently established dental home at a community dental clinic - last dental visit was in March 2024 for dental  cleaning and exam, where a treatment plan was created. The patient reports no current dental pain, but concern for broken teeth and previously diagnosed dental caries.           Oral Care Plan Toothbrushing gently with soft-bristled manual toothbrush and fluoridated toothpaste BID to optimize oral health.     Interdental cleaning (flossing) daily as pt is able.     Informed pt of suspicious palatal lesions - pt was unable to recall recent trama or known cause. Showed pt IO photos taken today and suggested bringing this up to oncologist for further monitoring.      Pt inquired about receiving dental treatment and if it could interfere with upcoming surgical procedures. I recommended that pt make oncologist/surgeon aware of any planned dental treatments and that if pt will ever be undergoing chemotherapy or radiation treatments, there are often recommendations of delaying dental treatment while blood cell counts are low. Any concerns should be deferred to pt's oncology team. Otherwise encouraged pt to continue with dental treatment plan.              Assessment and Plan:   Assessment:  This patient is a 68 year old adult with a history of breast cancer, HTN, MDD, RLS, ASHLYN, thoracic aortic aneurysm anxiety, depression who was admitted on 5/22/2024 to inpatient Psychiatry for increased anxiety, feeling tired from life and trouble focusing. Medicine was consulted for restarting testosterone injection and to manage comorbidities. Medicine team following up on erythrocytosis, oral exam concerning for poor dentition - several scattered broken teeth/suspected caries, primarily of posterior teeth (no current pain or swelling reported by patient); possible petechiae of soft palate/oropharynx area, bilateral ulcers on tongue from adjacent broken teeth rubbing against soft tissue.       Plan:    has forwarded findings and intraoral photos to dentist for further consult.  Pt should resume dental treatment plan on outpatient  "basis at established dental home upon discharge under medical advisement.  Continue to monitor soft palate/oropharynx lesions - could be a possible manifestation of systemic condition. Recommend that pt informs oncologist.   Implement oral care plan as described above. Pt is currently independent in oral cares.     ALBERTO Dueñas  Message on [employer]  Pager: [phone]       Per internal medicine documentation from 5/29/2024:    Brief Medicine Note  Medicine following peripherally for erythrocytosis as documented in progress note from 5/26/2024 by my colleague Samantha Lamar PA-C. Testosterone level & EPO returned within appropriate ranges. Peripheral smear showed mild erythrocytosis. JAK2 and CALR pending. CBC has been stable with RBC in the 6's for several days. Patient discharging today. Referral placed to follow up with hematology in discharge navigator and inpatient hematology team notified.      As patient is discharging, medicine will sign off now. Thank you for allowing us to be a part of this patient's care. Please notify on call AMADEO if any medical issues arise.      Ashanti Griffin PA-C  Hospitalist Service             Per internal medicine team documentation from 5/26/2024:      Brief Hospital Medicine Note:      \"Killian\" Sarita is a 68 year old adult with a PMH significant for breast cancer, HTN, MDD, RLS, ASHLYN, thoracic aortic aneurysm anxiety, depression who was admitted on 5/22/2024 to inpatient Psychiatry for increased anxiety, feeling tired from life and trouble focusing. Medicine was consulted for restarting testosterone injection and to manage comorbidities. Medicine team following up on erythrocytosis. Nursing notes, vitals, and labs reviewed.      Met with patient at bedside to review labwork and plan for further testing for erythrocytosis. Patient then become quite tearful and said \"I would like euthanasia\" and \"didn't feel like he could go on\". \"Will you let me discharge so I can go " "home to die\"      /87 (BP Location: Right arm, Patient Position: Sitting, Cuff Size: Adult Regular)   Pulse 75   Temp 97.8  F (36.6  C) (Oral)   Resp 18   Wt 96.7 kg (213 lb 3.2 oz)   LMP  (LMP Unknown)   SpO2 94%   BMI 35.48 kg/m       General: sitting up in bed, alert, cooperative, awake, tearful  HEENT: normocephalic, atraumatic, anicteric sclera  Respiratory: breathing comfortably on room air  Neuro: grossly non-focal, alert and oriented, normal speech   Psych: very tearful, actively making suicidal statements   MSK: no bony deformities, moving all extremities independently  Skin: no rashes or lesions on uncovered surfaces, no jaundice     Erythrocytosis   Recheck showed hematocrit stable at ~19.0. Discussed with Hematology. Agreed with following up on testosterone level and obtaining JAK2 and EPO with persistent elevation. They also recommended obtaining VBG with underlying ASHLYN (hasn't been wearing CPAP PTA, wearing here) which has also been ordered. Unclear if this is in the setting of medication related with testosterone vs starting anastrozole (started ~ 1 month ago), MPN is also on the differential therefore will need to pursue work up with JAK2 and EPO. Also has underlying ASHLYN which could be contributing as well.   -Daily CBC  -JAK2 and EPO ordered   -VBG ordered   -Follow up testosterone level   -Follow up peripheral smear   -Encourage continued CPAP use   -Pending work up, may need formal Hematology consult     Medicine will continue to follow up erythrocytosis work up.      Samantha Singer PA-C  Hospitalist Service       Internal medicine team was consulted to address restarting PTA testosterone injection as well as due to patient having multiple comorbidities including recently diagnosed breast cancer.  Per internal medicine provider documentation from 5/24/2024:    [facility]  Consult Note - Hospitalist Service  Date of Admission:  " "5/22/2024  Consult Requested by: Psychiatry   Reason for Consult: \"New admit 05/23. Address restarting testosterone injection and also patient has multiple medical comorbidities including recently diagnosed cancer\"         Assessment & Plan  \"Killian\" Sarita is a 68 year old adult with a PMH significant for breast cancer, HTN, MDD, RLS, ASHLYN, thoracic aortic aneurysm anxiety, depression who was admitted on 5/22/2024 to inpatient Psychiatry for increased anxiety, feeling tired from life and trouble focusing. Medicine was consulted for restarting testosterone injection and to manage comorbidities.      Anxiety   Depression   Management per Psychiatry team      Inducible laryngeal obstruction   ASHLYN: Follows with Pulmonary here. No acute concerns or symptoms. Is working on getting CPAP for at home as he is having a challenging time cleaning this since rotator cuff surgery. Continue CPAP here      Invasive ductal carcinoma of left breast, stage 1   Newly diagnosed 03/2024 on routine mammogram found to have a 1.3cm mass of left breast, biopsy consistent with invascie ductal carcinoma grade 1, estrogen positive, progesterone positive HER-2 negative. Was seen by General Surgery with [facility] with plan for mastectomy, but patient would opt to under bilateral mastectomies and was referred to plastic surgery, procedure is planned for mid June (06/12). Med with Oncology 04/30 at [facility] and recommended starting anastrozole at that time. Has also been seen by Oncology here on 05/13 who agreed with above plan.     -Continue PTA anastrozole 1mg daily   -Follow up with Oncology as OP and General Surg/Plastics for planned bilateral mastectomies      Gender dysphoria  Continue testosterone injection weekly (receives this on Fridays), no contraindication from medicine standpoint for holding. Hasn't had a recent testosterone level, last checked 635 in 09/2023, will recheck here. Per PCP pre-op from 05/21, no mention of holding " "this prior to planned procedure.  -Continue weekly testosterone (receives on Fridays)      Erythrocytosis   Noted on admission elevated to 18.5 with recheck 19.0. Appears to have had slightly elevated RBCs and hematocrit previously, but appear to be increased from previous. Patient reports poor oral intake therefore possible hemoconcentration. Question if testosterone dose playing a role as well   -CBC and peripheral smear ordered   -Testosterone level as mentioned above      Elevated creatinine   Was previously ~0.8, slightly up to 1.16 on 05/17, down to 1.06 on admission. Possibly in the setting of poor PO intake.   -BMP in AM     GERD: continue PTA PPI   HTN: PTA hydrochlorothiazide 12.5mg daily  RLS: PTA pramipexole at bedtime    Diabetes mellitus, type 1 A1c 6.6% 05/2024: recommend lifestyle modifications, recheck as OP with PCP  HLD: appears these have been mildly elevated in the past. Recommend lifestyle modifications and recheck as OP with PCP can discuss if initiation of statin indicated at that time   History of glaucoma: denies any current vision changes, but notes he will need new Ophthalmologist as OP, referral placed for you         The patient's care was discussed with the Bedside Nurse, Patient, and Primary team.     Medicine team will continue to follow recheck labs.         Clinically Significant Risk Factors                        # DMII: A1C = 6.6 % (Ref range: <5.7 %) within past 6 months, PRESENT ON ADMISSION  # Obesity: Estimated body mass index is 35.48 kg/m  as calculated from the following:    Height as of 5/21/24: 1.651 m (5' 5\").    Weight as of this encounter: 96.7 kg (213 lb 3.2 oz)., PRESENT ON ADMISSION     # Financial/Environmental Concerns:               Samantha Carver PA-C  Hospitalist Service  Securely message with [employer] (more info)  Text page via [facility] Paging/Directory   ______________________________________________________________________        Chief Complaint  \"I've been " "better\"     History is obtained from the patient           History of Present Illness  Gregoria Stein is a 68 year old adult with a PMH significant for breast cancer, HTN, MDD, RLS, ASHLYN, thoracic aortic aneurysm anxiety, depression who was admitted on 5/22/2024 to inpatient Psychiatry for increased anxiety, feeling tired from life and trouble focusing. Medicine was consulted for restarting testosterone injection and to manage comorbidities.      Feeling okay.  Denies any chest pain, dyspnea, cough, fever, chills.  No abdominal pain, nausea, vomiting. Was having constipation, but notes this is now resolved.  Notes that in light of everything going on has been recently feeling overwhelmed and unable to remember if he was taking his medications or not.  Thinks he might of missed a couple doses of anastrozole.  Knows he missed his dose of testosterone last Friday.  Otherwise no other missed doses that he can remember at this time.  Notes history of glaucoma and needs a referral for a new ophthalmologist as outpatient.  Discussed lab results with patient, asked about GFR.  Notes that prior had been having poor p.o. intake and feeling dehydrated.  Now feels he is eating and drinking better.       MEDICATIONS   Medications:  Scheduled Meds:  Current Facility-Administered Medications   Medication Dose Route Frequency Provider Last Rate Last Admin    anastrozole (ARIMIDEX) tablet 1 mg  1 mg Oral Daily CharlesingRegla APRN CNP   1 mg at 05/29/24 0857    cloNIDine (CATAPRES) tablet 0.1 mg  0.1 mg Oral BID FellingRegla APRN CNP   0.1 mg at 05/29/24 0857    desvenlafaxine (PRISTIQ) 24 hr tablet 50 mg  50 mg Oral Daily FellingRegla APRN CNP   50 mg at 05/29/24 0857    diclofenac (VOLTAREN) 1 % topical gel 2-4 g  2-4 g Topical 4x Daily Samantha Lamar PA-C   4 g at 05/29/24 1325    gabapentin (NEURONTIN) capsule 900 mg  900 mg Oral At Bedtime Regla Yoo APRN CNP   900 mg at 05/28/24 6583    " hydroCHLOROthiazide tablet 12.5 mg  12.5 mg Oral Daily Regla Yoo APRN CNP   12.5 mg at 05/29/24 0857    Lidocaine (LIDOCARE) 4 % Patch 1 patch  1 patch Transdermal Q24h Samantha Lamar PA-C   1 patch at 05/28/24 2233    pantoprazole (PROTONIX) EC tablet 40 mg  40 mg Oral QAM AC Regla Yoo APRN CNP   40 mg at 05/29/24 0857    pramipexole (MIRAPEX) tablet 0.75 mg  0.75 mg Oral At Bedtime Regla Yoo APRN CNP   0.75 mg at 05/28/24 2234    testosterone cypionate (DEPOTESTOSTERONE) injection 70 mg  70 mg Subcutaneous Weekly Samantha Lamar PA-C   70 mg at 05/24/24 1842     Continuous Infusions:  Current Facility-Administered Medications   Medication Dose Route Frequency Provider Last Rate Last Admin     PRN Meds:.  Current Facility-Administered Medications   Medication Dose Route Frequency Provider Last Rate Last Admin    acetaminophen (TYLENOL) tablet 650 mg  650 mg Oral Q4H PRN Regla Yoo APRN CNP   650 mg at 05/29/24 1606    albuterol (PROVENTIL HFA/VENTOLIN HFA) inhaler  2 puff Inhalation Q4H PRN Regla Yoo APRN CNP   2 puff at 05/23/24 0806    alum & mag hydroxide-simethicone (MAALOX) suspension 30 mL  30 mL Oral Q4H PRN Regla Yoo APRN CNP        artificial saliva (BIOTENE MT) solution 2 spray  2 spray Swish & Spit 4x Daily PRN Regla Yoo APRN CNP   2 spray at 05/29/24 1322    clonazePAM (klonoPIN) tablet 1 mg  1 mg Oral BID PRN Regla Yoo APRN CNP   1 mg at 05/29/24 1052    cyclobenzaprine (FLEXERIL) tablet 5 mg  5 mg Oral Q8H PRN Samantha Lamar PA-C   5 mg at 05/29/24 1606    gabapentin (NEURONTIN) capsule 100 mg  100 mg Oral Q6H PRN Regla Yoo APRN CNP   100 mg at 05/28/24 0841    magnesium hydroxide (MILK OF MAGNESIA) suspension 30 mL  30 mL Oral Daily PRN Regla Yoo APRN CNP   30 mL at 05/24/24 1201    melatonin tablet 3 mg  3 mg Oral At Bedtime PRN Regla Yoo APRN CNP   3 mg at 05/28/24 3066    menthol  "(Topical Analgesic) 2.5% (BENGAY VANISHING SCENT) 2.5 % topical gel   Topical Q6H PRN CharlesRegla westbrook APRN CNP   Given at 05/26/24 0829    polyethylene glycol (MIRALAX) Packet 17 g  17 g Oral Daily PRN CharlesRegla westbrook APRN CNP   17 g at 05/24/24 0945    senna-docusate (SENOKOT-S/PERICOLACE) 8.6-50 MG per tablet 1-2 tablet  1-2 tablet Oral BID PRN CharlesRegla westbrook APRN CNP        traZODone (DESYREL) tablet 50 mg  50 mg Oral At Bedtime PRN Regla Yoo APRN CNP        zinc oxide (DESITIN) 40 % paste   Topical Q1H PRN CharlesRegla westbrook APRN CNP   Given at 05/29/24 0858       Medication adherence issues: MS Med Adherence Y/N: No  Medication side effects: MEDICATION SIDE EFFECTS: no side effects reported  Benefit: Yes / No: Yes       ROS   Pertinent items are noted in HPI.       MENTAL STATUS EXAM   Vitals: /81   Pulse 75   Temp 97.9  F (36.6  C) (Oral)   Resp 16   Wt 94.3 kg (207 lb 12.8 oz)   LMP  (LMP Unknown)   SpO2 96%   BMI 34.58 kg/m      Appearance: Casually groomed   Mood: Reports, \"started out really great.\"  Affect: blunted, labile was congruent to speech  Suicidal Ideation: PRESENT / ABSENT: absent   Homicidal Ideation: PRESENT / ABSENT: absent   Thought process: circumstantial, tangential  Thought content: significant for preoccupations.  Denies SI or HI  Fund of Knowledge: Average  Attention/Concentration: Poor  Language ability:  Intact, slightly rapid  Memory:  Immediate recall intact, Short-term memory intact, and Long-term memory intact  Insight: Fair  Judgement: fair  Orientation: Yes, x4  Psychomotor Behavior: normal or unremarkable    Muscle Strength and Tone: MuscleStrength: Normal  Gait and Station: Normal       LABS   personally reviewed.    Latest Reference Range & Units 05/17/24 23:13 05/17/24 23:38 05/21/24 09:13 05/22/24 12:46 05/23/24 09:29 05/24/24 13:26 05/25/24 08:00 05/26/24 08:17 05/26/24 12:57 05/27/24 08:18 05/28/24 08:06   Sodium 135 - 145 mmol/L 139   "  140  137       Potassium 3.4 - 5.3 mmol/L 4.0    4.3  3.8       Chloride 98 - 107 mmol/L 103    98  98       Carbon Dioxide (CO2) 22 - 29 mmol/L 25    31 (H)  28       Urea Nitrogen 8.0 - 23.0 mg/dL 14.6    19.6  16.0       Creatinine 0.51 - 1.17 mg/dL 1.16    1.06  1.04       GFR Estimate >60 mL/min/1.73m2 52 (L)    58 (L)  59 (L)       Calcium 8.8 - 10.2 mg/dL 8.7 (L)    9.7  9.2       Anion Gap 7 - 15 mmol/L 11    11  11       Albumin 3.5 - 5.2 g/dL 4.5    4.8         Protein Total 6.4 - 8.3 g/dL 7.0    7.5         Alkaline Phosphatase 40 - 150 U/L 74    79         ALT 0 - 70 U/L 27    38         AST 0 - 45 U/L 23    30         Bilirubin Total <=1.2 mg/dL 0.5    1.0         Cholesterol <200 mg/dL     214 (H)         Erythropoietin 4 - 27 mU/mL         5     Folate 4.6 - 34.8 ng/mL     10.9         Glucose 70 - 99 mg/dL 105 (H)    207 (H)  117 (H)       HDL Cholesterol >=40 mg/dL     31 (L)         Hemoglobin A1C <5.7 %     6.6 (H)         LDL Cholesterol Calculated <=100 mg/dL     132 (H)         Non HDL Cholesterol <130 mg/dL     183 (H)         T4 Free 0.90 - 1.70 ng/dL 1.27             Testosterone Total 8 - 950 ng/dL      495        Triglycerides <150 mg/dL     253 (H)         TSH 0.30 - 4.20 uIU/mL 4.70 (H)             Vitamin B12 232 - 1,245 pg/mL     330         Vitamin D, Total (25-Hydroxy) 20 - 50 ng/mL       25       FIO2          21     Ph Venous 7.32 - 7.43          7.44 (H)     PCO2 Venous 40 - 50 mm Hg         46     PO2 Venous 25 - 47 mm Hg         59 (H)     O2 Sat, Venous 70.0 - 75.0 %         91.5 (H)     Bicarbonate Venous 21 - 28 mmol/L         32 (H)     Base Excess Venous -3.0 - 3.0 mmol/L         6.0 (H)     Oxyhemoglobin Venous 70 - 75 %         90 (H)     WBC 4.0 - 11.0 10e3/uL 7.4    5.8  5.8 6.6  6.7 7.4   Hemoglobin 13.3 - 17.7 g/dL 18.5 (H)    19.0 (H)  18.8 (H) 19.0 (H)  18.9 (H) 19.3 (H)   Hematocrit 35.0 - 53.0 % 53.9 (H)    57.1 (H)  54.9 (H) 54.8 (H)  54.6 (H) 56.2 (H)    Platelet Count 150 - 450 10e3/uL 202    214  197 218  207 214   RBC Count 3.80 - 5.90 10e6/uL 6.16 (H)    6.55 (H)  6.40 (H) 6.42 (H)  6.42 (H) 6.57 (H)   MCV 78 - 100 fL 88    87  86 85  85 86   MCH 26.5 - 33.0 pg 30.0    29.0  29.4 29.6  29.4 29.4   MCHC 31.5 - 36.5 g/dL 34.3    33.3  34.2 34.7  34.6 34.3   RDW 10.0 - 15.0 % 13.1    13.1  12.8 12.5  13.0 13.0   % Neutrophils % 66    63  58       % Lymphocytes % 23    26  29       % Monocytes % 7    6  8       % Eosinophils % 4    5  5       % Basophils % 0    0  0       Absolute Basophils 0.0 - 0.2 10e3/uL 0.0    0.0  0.0       Absolute Eosinophils 0.0 - 0.7 10e3/uL 0.3    0.3  0.3       Absolute Immature Granulocytes <=0.4 10e3/uL 0.0    0.0  0.0       Absolute Lymphocytes 0.8 - 5.3 10e3/uL 1.7    1.5  1.7       Absolute Monocytes 0.0 - 1.3 10e3/uL 0.5    0.3  0.5       % Immature Granulocytes % 0    0  0       Absolute Neutrophils 1.6 - 8.3 10e3/uL 4.9    3.7  3.3       Absolute NRBCs 10e3/uL 0.0    0.0  0.0       NRBCs per 100 WBC <1 /100 0    0  0       % Retic 0.5 - 2.0 %       0.9       Absolute Retic 0.025 - 0.095 10e6/uL       0.060       SARS CoV2 PCR Negative   Negative            Amphetamine Qual Urine Screen Negative  Screen Negative   Screen Negative          Fentanyl Qual Urine Screen Negative  Screen Negative   Screen Negative          Cocaine Urine Screen Negative  Screen Negative   Screen Negative          Benzodiazepine Urine Screen Negative  Screen Negative   Screen Positive !          Opiates Qualitative Urine Screen Negative  Screen Negative   Screen Negative          PCP Urine Screen Negative  Screen Negative   Screen Negative          Cannabinoids Qual Urine Screen Negative  Screen Positive !   Screen Positive !          Barbiturates Qual Urine Screen Negative  Screen Negative   Screen Negative          JAK2 WITH REFLEX TO MPL CALR MPN FOC NGS          Rpt (IP)     MPL CALR NGS REFLEX PANEL          Rpt (IP)     BLOOD MORPHOLOGY  "PATHOLOGIST REVIEW        Rpt       LAB BLOOD MORPHOLOGY PATHOLOGIST REVIEW        Rpt !       UNMAPPED IMAGING RESULT    Rpt (E)           (H): Data is abnormally high  (L): Data is abnormally low  !: Data is abnormal  (IP): In Process  Rpt: View report in Results Review for more information  (E): External lab result  No results found for: \"PHENYTOIN\", \"PHENOBARB\", \"VALPROATE\", \"CBMZ\"       DIAGNOSIS   Principal Problem:    PTSD  Generalized anxiety disorder  Major depressive disorder, recurrent, severe without psychotic features  Suicidal ideation    Clinically Significant Risk Factors                        # DMII: A1C = 6.6 % (Ref range: <5.7 %) within past 6 months   # Obesity: Estimated body mass index is 34.58 kg/m  as calculated from the following:    Height as of 5/21/24: 1.651 m (5' 5\").    Weight as of this encounter: 94.3 kg (207 lb 12.8 oz).        # Financial/Environmental Concerns:              Active Problem List:  Patient Active Problem List   Diagnosis    CLAIRE (generalized anxiety disorder)    Mass    PTSD (post-traumatic stress disorder)    Other chronic pain    Restless leg syndrome    Osteoarthritis of spine with radiculopathy, lumbar region    Chondromalacia of left patella    Chronic bilateral low back pain without sciatica    Chronic joint pain    Complex tear of medial meniscus of left knee as current injury    GERD (gastroesophageal reflux disease)    Glaucoma    IBS (irritable bowel syndrome)    Chronic pain of right knee    Lumbar radiculopathy    Major depressive disorder, recurrent severe without psychotic features (H)    Morbid obesity (H)    Postmenopausal    Primary osteoarthritis of right knee    MDD (major depressive disorder), recurrent severe, 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)    Anxiety    Pelvic floor weakness    Gender identity disorder    Primary osteoarthritis involving multiple joints    Status post total knee replacement, right    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  Patient presented to the ED on 5/21/2024 due to report of feeling \"tired from life\" and increased anxiety with trouble focusing.  Patient also reported increased PTSD symptoms.  Patient reported experiencing increasing psychosocial stressors leading up to current hospitalization including ongoing worker's comp case, recently diagnosed breast cancer, and financial strain.  Patient was evaluated by provider in the ED and determined to be medically stable.  Patient subsequently transferred to [facility] unit in the ED for further psychiatric evaluation and management.  While on the [facility] unit patient was initiated on Pristiq to target anxiety and depression symptoms.  Trazodone was also utilized to target insomnia symptoms and PTA clonazepam was continued to target anxiety.  Patient patient then admitted voluntarily to inpatient psychiatry unit 10 N with attending Dr. Nichols for further psychiatric stabilization.       Patient's PTA medications were continued.  Patient is agreeable to trial of Pristiq to target depression and anxiety symptoms.  Plan will be to titrate Pristiq to therapeutic dose while monitoring for side effects.  Patient reports they hope to discharge soon as they are scheduled to complete double mastectomy to address recently diagnosed breast cancer on 6/12/2024.  Patient reported anxiety symptoms are not well managed with current medication regimen and was agreeable to trialing medication clonidine to target anxiety symptoms.     PLAN   1. Ongoing education given regarding diagnostic and treatment options with risks, benefits and alternatives and adequate verbalization of understanding.  2.  " Medications:  Continue Pristiq 24-hour 50 mg PO daily to further target depression and anxiety symptoms.  Continue clonidine tablet 0.1 mg PO2 times daily to target anxiety symptoms, hold if blood pressure less than 90/60 or pulse less than 60  Continue senna-docusate 8.6-50 mg tablet, 1-2 tablets PO daily to target constipation symptoms  Continue Arimidex tablet 1 mg PO capsule daily  Continue gabapentin capsule 900 mg PO capsule at bedtime  Continue hydrochlorothiazide 12.5 mg PO daily  Continue pantoprazole EC tablet 400 mg PO every morning before breakfast  Continue Mirapex tablet 0.75 mg PO capsule at bedtime  PRN albuterol inhaler 2 puffs inhaled every 4 hours as needed for shortness of breath, wheezing  PRN clonazepam tablet 1 mg PO 2 times daily as needed for anxiety  PRN gabapentin capsule 100 mg PO capsule every 6 hours as needed for anxiety  PRN trazodone tablet 50 mg B capsule at bedtime as needed for sleep, may repeat dose after 60 minutes  PRN Bengay 2.5% topical gel, apply topical every 6 hours as needed for muscle soreness, moderate pain    3.  Labs/Imaging:  -Per internal medicine provider: CBC, blood morphology pathologist review, BMP  -Daily CBC with platelets ordered by internal medicine provider  -Repeat CMP ordered for 5/30/2024  -Vitamin D deficiency ordered as add on    4. Consults:  -Internal medicine following    5. Precautions:  -Suicide precautions    6. Legal:  -Voluntary    7. Discharge planning:  -Per unit CTC        Risk Assessment:  [facility] RISK ASSESSMENT: Patient able to contract for safety and Patient on precautions    Coordination of Care:   Treatment Plan reviewed and physician signed, Care discussed with Care/Treatment Team Members, Chart reviewed and Patient seen      Re-Certification I certify that the inpatient psychiatric facility services furnished since the previous certification were, and continue to be, medically necessary for, either, treatment which could reasonably be  expected to improve the patient s condition or diagnostic study and that the hospital records indicate that the services furnished were, either, intensive treatment services, admission and related services necessary for diagnostic study, or equivalent services.     I certify that the patient continues to need, on a daily basis, active treatment furnished directly by or requiring the supervision of inpatient psychiatric facility personnel.   I estimate 7-10 days of hospitalization is necessary for proper treatment of the patient. My plans for post-hospital care for this patient are   TBD     JODI Roldan CNP    -     05/29/24       -     11:00 AM      Total time  35 minutes with > 50%spent on coordination of cares and psycho-education.    This note was created with help of Dragon dictation system. Grammatical / typing errors are not intentional.    JODI Roldan CNP

## 2024-05-29 NOTE — CONSULTS
"SPIRITUAL HEALTH SERVICES  SPIRITUAL ASSESSMENT consult Note  [facility] [place] [facility]  On-call     REFERRAL SOURCE: routine consult    Met with Killian who shared that they are hoping to discharge tomorrow and had received good news regarding his MA and that it was reinstated. We reflected in his stay here and how he was feeling \"mostly ok\" and that he was \"excited and nervous\" about discharge and \"the great unknown\".     We discussed how he is looking forward to being able to sit in his community garden while he heals from surgery, even if he can't do anything.     PLAN: no plans to follow as Killian plans to discharge tomorrow. Spiritual health services remains available for any follow-up or requests. For further visits please place spiritual health consults.    Sailaja [facility]   Pager: [phone]    "

## 2024-05-30 VITALS
OXYGEN SATURATION: 92 % | BODY MASS INDEX: 34.58 KG/M2 | SYSTOLIC BLOOD PRESSURE: 131 MMHG | HEART RATE: 75 BPM | TEMPERATURE: 97.7 F | DIASTOLIC BLOOD PRESSURE: 83 MMHG | RESPIRATION RATE: 16 BRPM | WEIGHT: 207.8 LBS

## 2024-05-30 LAB
ALBUMIN SERPL BCG-MCNC: 4.7 G/DL (ref 3.5–5.2)
ALP SERPL-CCNC: 78 U/L (ref 40–150)
ALT SERPL W P-5'-P-CCNC: 37 U/L (ref 0–70)
ANION GAP SERPL CALCULATED.3IONS-SCNC: 9 MMOL/L (ref 7–15)
AST SERPL W P-5'-P-CCNC: 31 U/L (ref 0–45)
BILIRUB SERPL-MCNC: 1.1 MG/DL
BUN SERPL-MCNC: 17.9 MG/DL (ref 8–23)
CALCIUM SERPL-MCNC: 9.2 MG/DL (ref 8.8–10.2)
CHLORIDE SERPL-SCNC: 98 MMOL/L (ref 98–107)
CREAT SERPL-MCNC: 1.06 MG/DL (ref 0.51–1.17)
DEPRECATED HCO3 PLAS-SCNC: 31 MMOL/L (ref 22–29)
EGFRCR SERPLBLD CKD-EPI 2021: 58 ML/MIN/1.73M2
ERYTHROCYTE [DISTWIDTH] IN BLOOD BY AUTOMATED COUNT: 12.4 % (ref 10–15)
GLUCOSE SERPL-MCNC: 116 MG/DL (ref 70–99)
HCT VFR BLD AUTO: 54.1 % (ref 35–53)
HGB BLD-MCNC: 18.4 G/DL (ref 13.3–17.7)
MCH RBC QN AUTO: 28.9 PG (ref 26.5–33)
MCHC RBC AUTO-ENTMCNC: 34 G/DL (ref 31.5–36.5)
MCV RBC AUTO: 85 FL (ref 78–100)
PLATELET # BLD AUTO: 198 10E3/UL (ref 150–450)
POTASSIUM SERPL-SCNC: 3.9 MMOL/L (ref 3.4–5.3)
PROT SERPL-MCNC: 7.4 G/DL (ref 6.4–8.3)
RBC # BLD AUTO: 6.36 10E6/UL (ref 3.8–5.9)
SODIUM SERPL-SCNC: 138 MMOL/L (ref 135–145)
WBC # BLD AUTO: 5.8 10E3/UL (ref 4–11)

## 2024-05-30 PROCEDURE — 250N000013 HC RX MED GY IP 250 OP 250 PS 637

## 2024-05-30 PROCEDURE — 36415 COLL VENOUS BLD VENIPUNCTURE: CPT

## 2024-05-30 PROCEDURE — 82040 ASSAY OF SERUM ALBUMIN: CPT

## 2024-05-30 PROCEDURE — 99239 HOSP IP/OBS DSCHRG MGMT >30: CPT

## 2024-05-30 PROCEDURE — 84155 ASSAY OF PROTEIN SERUM: CPT

## 2024-05-30 PROCEDURE — 85027 COMPLETE CBC AUTOMATED: CPT | Performed by: PHYSICIAN ASSISTANT

## 2024-05-30 RX ADMIN — CLONAZEPAM 1 MG: 1 TABLET ORAL at 04:06

## 2024-05-30 RX ADMIN — PANTOPRAZOLE SODIUM 40 MG: 40 TABLET, DELAYED RELEASE ORAL at 08:17

## 2024-05-30 RX ADMIN — CLONIDINE HYDROCHLORIDE 0.1 MG: 0.1 TABLET ORAL at 08:17

## 2024-05-30 RX ADMIN — DESVENLAFAXINE 50 MG: 50 TABLET, FILM COATED, EXTENDED RELEASE ORAL at 08:17

## 2024-05-30 RX ADMIN — ACETAMINOPHEN 650 MG: 325 TABLET, FILM COATED ORAL at 04:03

## 2024-05-30 RX ADMIN — Medication 2 SPRAY: at 10:13

## 2024-05-30 RX ADMIN — DICLOFENAC SODIUM TOPICAL GEL, 1% 2 G: 10 GEL TOPICAL at 08:18

## 2024-05-30 RX ADMIN — HYDROCHLOROTHIAZIDE 12.5 MG: 12.5 TABLET ORAL at 08:18

## 2024-05-30 RX ADMIN — ANASTROZOLE 1 MG: 1 TABLET, COATED ORAL at 08:17

## 2024-05-30 ASSESSMENT — ACTIVITIES OF DAILY LIVING (ADL)
ADLS_ACUITY_SCORE: 31
ADLS_ACUITY_SCORE: 31
LAUNDRY: UNABLE TO COMPLETE
ADLS_ACUITY_SCORE: 31
HYGIENE/GROOMING: INDEPENDENT
ORAL_HYGIENE: INDEPENDENT
ADLS_ACUITY_SCORE: 31
DRESS: INDEPENDENT
ADLS_ACUITY_SCORE: 31
ADLS_ACUITY_SCORE: 31

## 2024-05-30 NOTE — PLAN OF CARE
Alertness: alert and oriented  Affect: bright  Mood: excited to discharge  Appearance: WNL  Thought Process: Patient denied hallucinations, reported an organized thought process.   Anxiety, depression, SI,SIB,HI: He denied all MH symptoms including SI,SIB,HI  Social: Social with peers   Skin: WNL  Bowel/bladder: WNL  Breakfast and lunch: 100%  Vital signs/pain: Vitally stable and endorsed ongoing cancer pain.  Medications: medication compliant     Patient discharged at 11:20 via lift to home. Patient was given belongings, medications, and a copy of discharge instructions. Verbally discussed discharge plan,follow up care and education; patient verbalized understanding.

## 2024-05-30 NOTE — CONSULTS
Dental Consult,  [facility]     Patient:   Gregoria Stein    Date of birth 1955   MRN:  [id number]   Date of Visit:   05/30/2024   Date of Admission 5/22/2024   Consult Requested by Christopher Nichols MD     I did not see the patient in person, I reviewed the [facility] consult notes on 05/29.                                      Assessment and Recommendations:   ASSESSMENT:  Gregoria Stein is a 68 year old adult with a past medical history pertinent for breast cancer, HTN, MDD, RLS, ASHLYN, thoracic aortic aneurysm anxiety, depression who was admitted on 5/22/2024 to inpatient Psychiatry for increased anxiety, feeling tired from life and trouble focusing. Medicine was consulted for restarting testosterone injection and to manage comorbidities. Medicine team following up on erythrocytosis . Diagnosis upon admission Major Depressive Disorder, Suicidal ideation.   Dental exam pertinent for: poor dentition - several scattered broken teeth/suspected caries, primarily of posterior teeth (no current pain or swelling reported by patient); possible petechiae of soft palate/oropharynx area, bilateral ulcers on tongue from adjacent broken teeth rubbing against soft tissue.  .        RECOMMENDATION:  Recommend to follow up with his community dentist who he had established a plan with for continuation of care and soft palate lesions.   Below is contact information for potential urgent dental care, if needed. Call to schedule an appointment.   [facility]  [facility]  [facility]  [phone]  Continue DH recommendations:  Toothbrushing gently with soft-bristled manual toothbrush and fluoridated toothpaste BID to optimize oral health.     Interdental cleaning (flossing) daily as pt is able.     Informed pt of suspicious palatal lesions - pt was unable to recall recent trama or known cause. Showed pt IO photos taken today and suggested  bringing this up to oncologist for further monitoring.      Pt inquired about receiving dental treatment and if it could interfere with upcoming surgical procedures. I recommended that pt make oncologist/surgeon aware of any planned dental treatments and that if pt will ever be undergoing chemotherapy or radiation treatments, there are often recommendations of delaying dental treatment while blood cell counts are low. Any concerns should be deferred to pt's oncology team. Otherwise encouraged pt to continue with dental treatment plan.  _____________________________________________________________________  Thank you for allowing us to participate in the care of this patient,  Direct any further questions to:     Ede Quick MD DMD, Fellow  Pager: [phone]    Patient discussed with:   Riley Warner DDS  , [facility]     Clinic information:   [facility]  [facility] [place] [address]  Phone:[phone]  [place], [employer] phone: [phone]                                             Reason for Consult:   Referring MD & Reason for Visit: I was asked by Christopher Nichols MD, to see Gregoria Sarita for a dental consultation at patient request.                                               History of Present Illness:   This patient is a 68 year old adult with a history of breast cancer, HTN, MDD, RLS, ASHLYN, thoracic aortic aneurysm anxiety, depression who was admitted on 5/22/2024 to inpatient Psychiatry for increased anxiety, feeling tired from life and trouble focusing. Medicine was consulted for restarting testosterone injection and to manage comorbidities. Medicine team following up on erythrocytosis.  Dental and oropharyngeal history is pertinent for recently established dental home at a community dental clinic - last dental visit was in March 2024  for dental cleaning and exam, where a treatment plan was created. The patient reports no current dental pain, but concern for broken teeth and previously diagnosed dental caries.                                                    Clinical Examination    Please see complete exam from [facility] consult notes on 05/29.  Vitals were reviewed  Temp: 97.6  F (36.4  C) Temp src: Oral BP: 127/79 Pulse: 62   Resp: 16 SpO2: 97 % O2 Device: None (Room air)      See H&P for complete ROS.     Lab results:        CBC RESULTS:   Recent Labs   Lab Test 05/29/24  0851   WBC 6.2   RBC 6.57*   HGB 19.1*   HCT 56.3*   MCV 86   MCH 29.1   MCHC 33.9   RDW 13.2          Last Basic Metabolic Panel:  Lab Results   Component Value Date     05/25/2024     08/14/2020      Lab Results   Component Value Date    POTASSIUM 3.8 05/25/2024    POTASSIUM 4.4 10/04/2022    POTASSIUM 3.7 08/14/2020     Lab Results   Component Value Date    CHLORIDE 98 05/25/2024    CHLORIDE 108 10/04/2022    CHLORIDE 108 08/14/2020     Lab Results   Component Value Date    BRITTNI 9.2 05/25/2024    BRITTNI 8.8 08/14/2020     Lab Results   Component Value Date    CO2 28 05/25/2024    CO2 29 10/04/2022    CO2 28 08/14/2020     Lab Results   Component Value Date    BUN 16.0 05/25/2024    BUN 14 10/04/2022    BUN 12 08/14/2020     Lab Results   Component Value Date    CR 1.04 05/25/2024    CR 0.85 08/14/2020     Lab Results   Component Value Date     05/25/2024     10/04/2022    GLC 93.0 11/19/2020                                                              Imaging     Dental CT taken on none.                                 Past Medical History      Past Medical History:   Diagnosis Date    Anxiety     Arthritis     C spine, thumbs bilateral, feet, shoulders, knees    Depressive disorder     Gastro-oesophageal reflux disease     intermittent    Labial irritation     labial mass    Other chronic pain     feet, knees, shoulders,  full spine, thumbs    PTSD (post-traumatic stress disorder)     Restless leg syndrome        Immunization History   Administered Date(s) Administered    COVID-19 Bivalent 18+ (Moderna) 11/21/2022    COVID-19 MONOVALENT 12+ (Pfizer) 03/26/2021, 04/16/2021, 10/18/2021    Influenza Vaccine 18-64 (Flublok) 09/23/2020    Influenza Vaccine >6 months,quad, PF 02/05/2020    TDAP Vaccine (Boostrix) 07/26/2010, 09/10/2015, 03/09/2019       Past Surgical History   Past Surgical History:   Procedure Laterality Date    COLONOSCOPY      EXCISE MASS VAGINA Left 4/10/2015    Procedure: EXCISE MASS VAGINA;  Surgeon: Stanislav Byers MD;  Location: [facility] OR    GENITOURINARY SURGERY      Urethrial dilation    JOINT REPLACEMENT Right 07/2018    ORTHOPEDIC SURGERY      right rotator cuff, left achilles tendon repair, neuroma removed right foot, right knee arthroscopy,                                            Social History      Family History   Problem Relation Age of Onset    Macular Degeneration Mother     Osteoporosis Mother     Heart Disease Father 49    Multiple Sclerosis Sister     Diabetes Paternal Uncle         heart issues    Cancer No family hx of     Coronary Artery Disease No family hx of                                           Allergies      Allergies   Allergen Reactions    Midazolam Other (See Comments)     Stopped breathing    Other reaction(s): Apnea   Stopped breathing   Stopped breathing    Penicillins Rash and Hives                                        Medications        Medications Prior to Admission   Medication Sig Dispense Refill Last Dose    anastrozole (ARIMIDEX) 1 MG tablet Take 1 tablet by mouth daily   5/22/2024 at 0806    clonazePAM (KLONOPIN) 1 MG tablet Take 1 mg by mouth 2 times daily as needed for anxiety   5/22/2024 at 1936    gabapentin (NEURONTIN) 300 MG capsule Take 3 capsules (900 mg) by mouth At Bedtime 270 capsule 0 5/22/2024 at 2124    pramipexole (MIRAPEX) 0.25 MG tablet Take 3 tablets (0.75  mg) by mouth At Bedtime 90 tablet 3 5/22/2024 at 2159    testosterone cypionate (DEPOTESTOSTERONE) 200 MG/ML injection Inject 70 mg Subcutaneous once a week on Fridays   5/10/2024    omeprazole (PRILOSEC) 40 MG DR capsule TAKE 1 CAPSULE BY MOUTH EVERY DAY BEFORE A MEAL   5/20/2024    UNABLE TO FIND Pt is using Medical cannabis       [DISCONTINUED] albuterol (PROAIR HFA/PROVENTIL HFA/VENTOLIN HFA) 108 (90 Base) MCG/ACT inhaler Inhale 2 puffs into the lungs every 4 hours as needed       [DISCONTINUED] hydrochlorothiazide (HYDRODIURIL) 12.5 MG tablet Take 12.5 mg by mouth daily   5/22/2024 at 0806         Current Facility-Administered Medications   Medication Dose Route Frequency Provider Last Rate Last Admin    acetaminophen (TYLENOL) tablet 650 mg  650 mg Oral Q4H PRN Regla Yoo APRN CNP   650 mg at 05/30/24 0403    albuterol (PROVENTIL HFA/VENTOLIN HFA) inhaler  2 puff Inhalation Q4H PRN Regla Yoo APRN CNP   2 puff at 05/23/24 0806    alum & mag hydroxide-simethicone (MAALOX) suspension 30 mL  30 mL Oral Q4H PRN Regla Yoo APRN CNP        anastrozole (ARIMIDEX) tablet 1 mg  1 mg Oral Daily FellRegla westbrook APRN CNP   1 mg at 05/29/24 0857    artificial saliva (BIOTENE MT) solution 2 spray  2 spray Swish & Spit 4x Daily PRN Regla Yoo APRN CNP   2 spray at 05/29/24 2343    clonazePAM (klonoPIN) tablet 1 mg  1 mg Oral BID PRN Regla Yoo APRN CNP   1 mg at 05/30/24 0406    cloNIDine (CATAPRES) tablet 0.1 mg  0.1 mg Oral BID Regla Yoo APRN CNP   0.1 mg at 05/29/24 2116    cyclobenzaprine (FLEXERIL) tablet 5 mg  5 mg Oral Q8H PRN Samantha Lamar PA-C   5 mg at 05/29/24 2343    desvenlafaxine (PRISTIQ) 24 hr tablet 50 mg  50 mg Oral Daily Regla Yoo APRN CNP   50 mg at 05/29/24 0857    diclofenac (VOLTAREN) 1 % topical gel 2-4 g  2-4 g Topical 4x Daily Samantha Lamar PA-C   4 g at 05/29/24 2111    gabapentin (NEURONTIN) capsule 100 mg  100 mg Oral  Q6H PRN FellRegla westbrook APRN CNP   100 mg at 05/28/24 0841    gabapentin (NEURONTIN) capsule 900 mg  900 mg Oral At Bedtime Regla Yoo APRN CNP   900 mg at 05/29/24 2117    hydroCHLOROthiazide tablet 12.5 mg  12.5 mg Oral Daily FellRegla westbrook APRN CNP   12.5 mg at 05/29/24 0857    Lidocaine (LIDOCARE) 4 % Patch 1 patch  1 patch Transdermal Q24h Samantha Lamar PA-C   1 patch at 05/29/24 2118    magnesium hydroxide (MILK OF MAGNESIA) suspension 30 mL  30 mL Oral Daily PRN Regla Yoo APRN CNP   30 mL at 05/24/24 1201    melatonin tablet 3 mg  3 mg Oral At Bedtime PRN Regla Yoo APRN CNP   3 mg at 05/29/24 2121    menthol (Topical Analgesic) 2.5% (BENGAY VANISHING SCENT) 2.5 % topical gel   Topical Q6H PRN Regla Yoo APRN CNP   Given at 05/26/24 0829    pantoprazole (PROTONIX) EC tablet 40 mg  40 mg Oral QAM AC Regla Yoo APRN CNP   40 mg at 05/29/24 0857    polyethylene glycol (MIRALAX) Packet 17 g  17 g Oral Daily PRN Regla Yoo APRN CNP   17 g at 05/24/24 0945    pramipexole (MIRAPEX) tablet 0.75 mg  0.75 mg Oral At Bedtime Regla Yoo APRN CNP   0.75 mg at 05/29/24 2118    senna-docusate (SENOKOT-S/PERICOLACE) 8.6-50 MG per tablet 1-2 tablet  1-2 tablet Oral BID PRN Regla Yoo APRN CNP        testosterone cypionate (DEPOTESTOSTERONE) injection 70 mg  70 mg Subcutaneous Weekly Samantha Lamar PA-C   70 mg at 05/24/24 1842    traZODone (DESYREL) tablet 50 mg  50 mg Oral At Bedtime PRN Regla Yoo APRN CNP        zinc oxide (DESITIN) 40 % paste   Topical Q1H PRN Regla Yoo, APRN CNP   Given at 05/29/24 1520

## 2024-05-30 NOTE — PLAN OF CARE
BEH IP Unit Acuity Rating Score (UARS)  Patient is given one point for every criteria they meet.    CRITERIA SCORING   On a 72 hour hold, court hold, committed, stay of commitment, or revocation. 0    Patient LOS on BEH unit exceeds 20 days. 0  LOS: 8   Patient under guardianship, 55+, otherwise medically complex, or under age 11. 1   Suicide ideation without relief of precipitating factors. 0   Current plan for suicide. 0   Current plan for homicide. 0   Imminent risk or actual attempt to seriously harm another without relief of factors precipitating the attempt. 0   Severe dysfunction in daily living (ex: complete neglect for self care, extreme disruption in vegetative function, extreme deterioration in social interactions). 0   Recent (last 7 days) or current physical aggression in the ED or on unit. 0   Restraints or seclusion episode in past 72 hours. 0   Recent (last 7 days) or current verbal aggression, agitation, yelling, etc., while in the ED or unit. 0   Active psychosis. 0   Need for constant or near constant redirection (from leaving, from others, etc).  0   Intrusive or disruptive behaviors. 0   Patient requires 3 or more hours of individualized nursing care per 8-hour shift (i.e. for ADLs, meds, therapeutic interventions). 0   TOTAL 1

## 2024-05-30 NOTE — PLAN OF CARE
Problem: Adult Behavioral Health Plan of Care  Goal: Adheres to Safety Considerations for Self and Others  Outcome: Progressing  Intervention: Develop and Maintain Individualized Safety Plan  Recent Flowsheet Documentation  Taken 5/30/2024 0505 by Rachel Escobedo RN  Safety Measures: safety rounds completed  Goal: Absence of New-Onset Illness or Injury  Intervention: Identify and Manage Fall Risk  Recent Flowsheet Documentation  Taken 5/30/2024 0505 by Rachel Escobedo RN  Safety Measures: safety rounds completed  Goal: Optimized Coping Skills in Response to Life Stressors  Outcome: Progressing     Problem: Anxiety Signs/Symptoms  Goal: Improved Sleep (Anxiety Signs/Symptoms)  Intervention: Promote Healthy Sleep Hygiene  Recent Flowsheet Documentation  Taken 5/30/2024 0505 by Rachel Escobedo RN  Sleep Hygiene Promotion: regular sleep pattern promoted   Goal Outcome Evaluation:       Patient was calm on the unit, sleep was off and on, due to reported pain and discomfort. Patient was administered Flexeril for muscle spasm, and Biotene for dry mouth, and  felt better. Patient woke up and asked for Tylenol for pain rated 6/10, and Clonazepam for anxiety.  Patient said that his depression is getting better compared to other days, he denied suicidal thoughts and ideation. He reported that he may be discharging today, and would  like to call friends to pick him up at 11:00 hours today. He appears worried about his upcoming  breast cancer surgery as an outpatient. He was pleasant and medication compliant. He had a loose stool which he attributed to the use of Clonazepam. No other concern was reported.      Sleep Hours: 4.5

## 2024-05-30 NOTE — PLAN OF CARE
RN: Pt A/O x4, VSS. Pt was visible in the lounge intermittently, and was social with selective peers. Pt was medication compliant, no reported or observed side effects. Pt denied SI/SIB/HI, hallucinations. Although pt endorsed mild anxiety and depression, rated both level at 2-3/10 throughout shift, but pt seemed very restless and very anxious at times. Pt reported his mind was very busy today, and constantly thinking and worrying about his upcoming surgery and the recovery. Pt also reported feeling very tired physically. He couldn't record his intake and output this shift because he couldn't focus, his mind was everywhere. Pt had loose stool X1 this shift, pt denied abdominal pain or discomfort. Pt ate dinner in his room, ate 30% of the meal. Pt continued to have pain in bilateral shoulder and lower back, but pain was well controlled with scheduled and PRN medication. Writer asked pt multiple times if pt feel safe at night without a SIO staff at bed side when using CPAP, Pt stated: I promised that I would be very honest about my suicidal thoughts, and that if I felt unsafe, I would let staff know right away. Writer reassured pt that staff is ready for him at anytime. Pt is looking forward to discharging tomorrow. Continue with POC.    Goal Outcome Evaluation:    Plan of Care Reviewed With: patient

## 2024-05-31 ENCOUNTER — PATIENT OUTREACH (OUTPATIENT)
Dept: CARE COORDINATION | Facility: CLINIC | Age: 69
End: 2024-05-31
Payer: MEDICARE

## 2024-05-31 ENCOUNTER — PATIENT OUTREACH (OUTPATIENT)
Dept: ONCOLOGY | Facility: CLINIC | Age: 69
End: 2024-05-31
Payer: MEDICARE

## 2024-05-31 NOTE — PROGRESS NOTES
Clinic Care Coordination Contact  Transitions of Care Outreach  Chief Complaint   Patient presents with    Clinic Care Coordination - Post Hospital       Most Recent Admission Date: 5/22/2024   Most Recent Admission Diagnosis:      Most Recent Discharge Date: 5/30/2024   Most Recent Discharge Diagnosis: Glaucoma, unspecified glaucoma type, unspecified laterality - H40.9  Erythrocytosis - D75.1  Severe episode of recurrent major depressive disorder, without psychotic features (H) - F33.2     Transitions of Care Assessment    Discharge Assessment  How are you doing now that you are home?: Patient shares that they are doing well considering everything that's going on. Patient took some time to explaing what that everything is which includes medical issues and mice in their apartment. She has great support from a CAD  whom she shares is very responsive. Patient is waiting for a call back from Dr. Jordan office to confirm where they should go for Hematology. No other questions/concerns or needs at this time. Thanks writer for the call.  How are your symptoms? (Red Flag symptoms escalate to triage hotline per guidelines): Improved  Do you know how to contact your clinic care team if you have future questions or changes to your health status? : Yes  Does the patient have their discharge instructions? : Yes  Does the patient have questions regarding their discharge instructions? : No  Were you started on any new medications or were there changes to any of your previous medications? : Yes  Does the patient have all of their medications?: Yes  Do you have questions regarding any of your medications? : No  Do you have all of your needed medical supplies or equipment (DME)?  (i.e. oxygen tank, CPAP, cane, etc.): Yes    Post-op ([facility] Only)  If the patient had a surgery or procedure, do they have any questions for a nurse?: No    Post-op (Clinicians Only)  Did the patient have surgery or a procedure: No    Follow  up Plan     Discharge Follow-Up  Discharge follow up appointment scheduled in alignment with recommended follow up timeframe or Transitions of Risk Category? (Low = within 30 days; Moderate= within 14 days; High= within 7 days): Yes  Discharge Follow Up Appointment Date: 06/24/24  Discharge Follow Up Appointment Scheduled with?: Specialty Care Provider    Future Appointments   Date Time Provider Department Center   6/24/2024  3:00 PM [facility]   6/28/2024  1:45 PM Fay Flowers MD [facility]   9/11/2024  3:30 PM Daniel Greer MD [facility]       Outpatient Plan as outlined on AVS reviewed with patient.    For any urgent concerns, please contact our 24 hour nurse triage line: [phone])       JEOL Dotson

## 2024-05-31 NOTE — TELEPHONE ENCOUNTER
[facility]: Hematology                                                                                          Spoke with patient today regarding referral to hematology for erythrocytosis.    Dr. Mary Rosales has reviewed the chart and has agreed to see on June 4th 2024       Patient is agreeable to this appointment.  He has been working with primary care regarding his elevated hemoglobin and hematocrit.  They have adjusted his testosterone and they are discussing therapeutic phlebotomies.  This is all in preparation for surgery that is currently scheduled for June 11 for mastectomy.        Renea Jung LPN  Hematology Care Coordination  [phone]

## 2024-06-01 ENCOUNTER — MYC MEDICAL ADVICE (OUTPATIENT)
Dept: ONCOLOGY | Facility: CLINIC | Age: 69
End: 2024-06-01

## 2024-06-03 NOTE — TELEPHONE ENCOUNTER
Oncology Nurse Triage - Fatigue:     Situation: Gregoria Calling to report symptom of Fatigue.    Background:   Treating Provider: Jeaneth Linares MD    Date of last office visit: 5/13/24 with Dr. Linares    Has patient received recent chemotherapy (if yes, when): anastrazole    Recent surgery (if yes - when): No Bilateral mastectomy is planned for early June.  6/12/24    Called patient to follow up on symptoms sent through a [employer] message on 6/3/24. Left a voicemail message requesting the patient call [phone], option 5, option 2 and speak with any Triage nurse available.

## 2024-06-04 ENCOUNTER — PRE VISIT (OUTPATIENT)
Dept: ONCOLOGY | Facility: CLINIC | Age: 69
End: 2024-06-04
Payer: MEDICARE

## 2024-06-04 ENCOUNTER — ONCOLOGY VISIT (OUTPATIENT)
Dept: ONCOLOGY | Facility: CLINIC | Age: 69
End: 2024-06-04
Attending: INTERNAL MEDICINE
Payer: MEDICARE

## 2024-06-04 VITALS
SYSTOLIC BLOOD PRESSURE: 133 MMHG | WEIGHT: 207 LBS | DIASTOLIC BLOOD PRESSURE: 86 MMHG | OXYGEN SATURATION: 96 % | TEMPERATURE: 98.4 F | RESPIRATION RATE: 12 BRPM | HEART RATE: 79 BPM | HEIGHT: 65 IN | BODY MASS INDEX: 34.49 KG/M2

## 2024-06-04 DIAGNOSIS — D75.1 ERYTHROCYTOSIS: Primary | ICD-10-CM

## 2024-06-04 LAB
INTERPRETATION: NORMAL
INTERPRETATION: NORMAL
SIGNIFICANT RESULTS: NORMAL
SIGNIFICANT RESULTS: NORMAL
SPECIMEN DESCRIPTION: NORMAL
SPECIMEN DESCRIPTION: NORMAL
TEST DETAILS, MDL: NORMAL
TEST DETAILS, MDL: NORMAL

## 2024-06-04 PROCEDURE — G2211 COMPLEX E/M VISIT ADD ON: HCPCS | Performed by: INTERNAL MEDICINE

## 2024-06-04 PROCEDURE — G0463 HOSPITAL OUTPT CLINIC VISIT: HCPCS | Performed by: INTERNAL MEDICINE

## 2024-06-04 PROCEDURE — 99214 OFFICE O/P EST MOD 30 MIN: CPT | Performed by: INTERNAL MEDICINE

## 2024-06-04 RX ORDER — HEPARIN SODIUM (PORCINE) LOCK FLUSH IV SOLN 100 UNIT/ML 100 UNIT/ML
5 SOLUTION INTRAVENOUS
OUTPATIENT
Start: 2024-06-11

## 2024-06-04 RX ORDER — HEPARIN SODIUM,PORCINE 10 UNIT/ML
5-20 VIAL (ML) INTRAVENOUS DAILY PRN
OUTPATIENT
Start: 2024-06-11

## 2024-06-04 ASSESSMENT — PAIN SCALES - GENERAL: PAINLEVEL: MILD PAIN (3)

## 2024-06-04 NOTE — LETTER
2024         RE: Gregoria Stein  [address]  [place] [address]          [facility] Hematology Consultation    Outpatient Visit Note:    Patient: Gregoria Stein  MRN: [id number]  : 1955  JONATHAN: 2024    Reason for Consultation:  erythrocytosis    Assessment:  In summary, Killian Stein is a 68 year old transgendered person (female to male) with recently diagnosed stage IIA (cT2, cN0, cM0) invasive ductal carcinoma of the upper-outer left breast (dx 3/13/24), which is ER/IL positive who presents in consultation due to erythrocytosis and polycythemia    Secondary erythrocytosis and polycythemia  Use of testosterone therapy for gender- affirming care  Use of anastrazole- antiestrogen therapy for ER/IL breast cancer  Sleep apnea    Killian does NOT have polycythemia vera as his JAK2 mutation testing was negative. Additionally, his EPO levels are normal- ruling out other causes. Killian does endorse having sleep apnea and does NOT wear CPAP.     Reviewing laboratory information- appears that Killian has had mild erythrocytosis since at least  when he started androgel formulation of testosterone. His hematocrit appropriately increased due to use of testosterone and has been within age- and expressed gender normative values since  (note that [facility] uses 53% cutoff rather than 50%).      Today, we discussed that within the medical literature there is conflicting reports regarding the risk of VTE and cardiovascular events with use of testosterone. Furthermore, majority of these studies are conducted in males prescribed testosterone for hypogonadism, not for gender affirming purposes. One meta-analysis (Lindsay TAYLOR, et al. 2020. Thromb Res 2018 Dec;172:. ) found little effect, whereas a larger analysis using drug codes and billing data estimated Odds Ratios (OR) of 2.32 with increased risk for men younger than 65 (OR 2.99; 95% CI 1.91-4.68) (Rohit BANKS. CANDE Intern  "Med. 2020;180(2):190-197). Overall, in transgendered individuals., there is very limited data on surgical risk factors and use of testosterone (1 article in a meta-analysis), which found use of exogenous testosterone was not found to be associated with an increased risk of venous thromboembolism or other complications during surgery (RONNIE Alvarez., VAHID Bailey, & ARCADIO Qureshi (2019). Association of Surgical Risk With Exogenous Hormone Use in Transgender Patients: A Systematic Review. CANDE surgery, 154(2), 159-169. https://doi.org/10.1001/jamasurg.2018.4598)    Additionally, removal of blood to achieve \"ideal\" hematocrit of 45% was set based on data from polycythemia vera, which Killian does not have. In other conditions with polycythemia/erythrocytosis, the practice is moving AWAY from use of routine phlebotomy. This is because elevated hematocrit in individuals with other conditions (congential heart disease), is NOT associated with thrombosis as overall there is no correlation between viscosity and measured hematocrit  (LALA Dill, et al. 2018. JACC). In other primary erythrocytosis conditions, phlebotomies cause iron deficiency, which may further elevate HIF activity and transferrin, the HIF-regulated plasma iron transporter recently implicated in thrombogenesis. Additionally, Killian has restless leg syndrome with a relative iron DEFICIENCY due to ferritin on 48. Ideally, there iron level (aka ferritin) should be between 50-60 (refer to Blaine ESPANA et al. 2023. Laboratory-based inequity in thrombosis and hemostaisis: review of the evidence. Res Pract Thromb Haemost Feb; 7(2): 821326. doi: 10.1016/j.rpth.2023.828006 PMCID: [id number])    Overall, Killian DOES have increased thrombosis risk due to his cancer, age, body habitus, and other medical conditions. Given this, I do feel that rather than phlebotomy the following should be done:  - Killian will hold testosterone injections until AFTER mastectomy  - Killian will try to wear CPAP " machine  - instead of phlebotomy- we will proceed with post-operative anticoagulation to reduce thrombotic risk  - 24 h post-surgery START 2.5 mg oral apixaban twice daily for 4 week as VTE prophylaxis  - continue discussion with primary care regarding formulation of gender affirming testosterone    This plan was personally discussed with Dr. Hearn at [facility], who also coordinated care with Killian's plastic surgeon. Dr. Linares (Medical oncology) at [facility] is also aware. All teams agree with course of care as outlined.       Plan:  1. Majority of today's visit was spent counseling the patient regarding risk of thrombosis due to elevated hematocrit.  2. Prescription for Eliquis 2.5 mg twice a day for 35 days sent to [facility]  3. Dr. Rosales will follow labs related to iron post-operatively    The patient is given our center's contact information and is instructed to call if he should have any further questions or concerns.  Otherwise, we will plan on seeing him back Follow up with Provider - TBD by post-operative course of care .        Mary Rosales MD/PhD   of Medicine  [employer] [facility] [employer]     The longitudinal plan of care for the diagnosis(es)/condition(s) as documented were addressed during this visit. Due to the added complexity in care, I will continue to support Killian in the subsequent management and with ongoing continuity of care.    ----------------------------------------------------------------------------------------------------------------------    History of Present Illness:  Killian Stein is a 68 year old transgendered person (female to male) with recently diagnosed stage IIA (cT2, cN0, cM0) invasive ductal carcinoma of the upper-outer left breast (dx 3/13/24), which is ER/TN positive who presents in consultation due to erythrocytosis and polycythemia.    Killian has been on gender-affirming testosterone since 2020. In the past has not had any issues with bleeding,  including after joint replacement or after breast biopsy. Killian has no current issues with bruising, no nosebleeding. Will have some minor gum bleeding with flossing due to decrease in dental care secondary to  mental health.     Killian denies having any history of thrombosis. He has restless leg syndrome and used to take oral iron, which helped a bit- but has since moved onto mirapex. Continues to have some issues with RLS, but other psychiatric issues (PTSD, anxiety, etc) have been making sleep poor as well. Killian does not wear CPAP machine regularly.          Past Medical History:  Past Medical History:   Diagnosis Date    Anxiety     Arthritis     C spine, thumbs bilateral, feet, shoulders, knees    Depressive disorder     Gastro-oesophageal reflux disease     intermittent    Labial irritation     labial mass    Other chronic pain     feet, knees, shoulders, full spine, thumbs    PTSD (post-traumatic stress disorder)     Restless leg syndrome        Past Surgical History:  Past Surgical History:   Procedure Laterality Date    COLONOSCOPY      EXCISE MASS VAGINA Left 4/10/2015    Procedure: EXCISE MASS VAGINA;  Surgeon: Stanislav Byers MD;  Location: [facility] OR    GENITOURINARY SURGERY      Urethrial dilation    JOINT REPLACEMENT Right 07/2018    ORTHOPEDIC SURGERY      right rotator cuff, left achilles tendon repair, neuroma removed right foot, right knee arthroscopy,        Medications:  Current Outpatient Medications   Medication Sig Dispense Refill    albuterol (PROAIR HFA/PROVENTIL HFA/VENTOLIN HFA) 108 (90 Base) MCG/ACT inhaler Inhale 2 puffs into the lungs every 4 hours as needed for shortness of breath, wheezing or cough 18 g 0    anastrozole (ARIMIDEX) 1 MG tablet Take 1 tablet by mouth daily      artificial saliva (BIOTENE MT) SOLN solution Swish and spit 2 sprays in mouth 4 times daily as needed for dry mouth 44.3 mL 0    clonazePAM (KLONOPIN) 1 MG tablet Take 1 mg by mouth 2 times daily as needed for anxiety       cloNIDine (CATAPRES) 0.1 MG tablet Take 1 tablet (0.1 mg) by mouth 2 times daily 60 tablet 0    cyclobenzaprine (FLEXERIL) 5 MG tablet Take 1 tablet (5 mg) by mouth every 8 hours as needed for muscle spasms 90 tablet 0    desvenlafaxine (PRISTIQ) 50 MG 24 hr tablet Take 1 tablet (50 mg) by mouth daily 30 tablet 0    diclofenac (VOLTAREN) 1 % topical gel Apply 2-4 g topically 4 times daily 350 g 1    gabapentin (NEURONTIN) 300 MG capsule Take 3 capsules (900 mg) by mouth At Bedtime 270 capsule 0    hydroCHLOROthiazide 12.5 MG tablet Take 1 tablet (12.5 mg) by mouth daily 30 tablet 0    Lidocaine (LIDOCARE) 4 % Patch Place 1 patch onto the skin every 24 hours To prevent lidocaine toxicity, patient should be patch free for 12 hrs daily. 30 patch 0    magnesium hydroxide (MILK OF MAGNESIA) 400 MG/5ML suspension Take 30 mLs by mouth daily as needed for constipation 118 mL 0    melatonin 3 MG tablet Take 1 tablet (3 mg) by mouth nightly as needed for sleep 30 tablet 0    omeprazole (PRILOSEC) 40 MG DR capsule TAKE 1 CAPSULE BY MOUTH EVERY DAY BEFORE A MEAL      polyethylene glycol (MIRALAX) 17 GM/Dose powder Take 17 g by mouth daily 510 g 0    pramipexole (MIRAPEX) 0.25 MG tablet Take 3 tablets (0.75 mg) by mouth At Bedtime 90 tablet 3    testosterone cypionate (DEPOTESTOSTERONE) 200 MG/ML injection Inject 70 mg Subcutaneous once a week on       UNABLE TO FIND Pt is using Medical cannabis      zinc oxide (DESITIN) 40 % paste Apply topically as needed for irritation or skin protection 453 g 1        Allergies:  Allergies   Allergen Reactions    Midazolam Other (See Comments)     Stopped breathing    Other reaction(s): Apnea   Stopped breathing   Stopped breathing    Penicillins Rash and Hives       ROS:  A 14 point ROS is negative except as stated in the HPI    Social History:  Denies any tobacco use. No significant alcohol use. Denies any illicit drug use.     Family History:  Maternal uncle  from lung  cancer  Father  age 49 from heart disease    Objective:  Vitals: B/P: 133/86, T: 98.4, P: 79, R: 12, Wt: 207 lbs 0 oz  Exam:   Constitutional: Appears well, no distress  HEENT: Pupils equal and reactive to light. No scleral icterus or hemorrhage. Nares without evidence of telangiectasia. Mucous membranes moist with no wet purpura. No pharyngeal exudates. No lymphadenopathy, no thyromeagaly  CV: regular rate and rhythm, no murmurs  Respiratory: clear  GI: abdomen soft, nontender, without guarding or rebound. No hepatomeagaly. No splenomegaly.   Mus/Skele: no edema  Skin: no petechiae, no ecchymosis.  Neuro: CN II-XII intact. Normal gait. AOx3  Heme/Lymph: no supraclavicular, axillary or umbilical adenopathy.     Labs: personally reviewed with relevant trends annotated below  WBC   Date Value Ref Range Status   2020 4.6 4.0 - 11.0 10e9/L Final     WBC Count   Date Value Ref Range Status   2024 5.8 4.0 - 11.0 10e3/uL Final     Hemoglobin   Date Value Ref Range Status   2024 17.9 (H) 13.3 - 17.7 g/dL Final     Comment:     Sex Specific Reference Ranges:    Female  11.7-15.7  g/dL  Male    13.3-17.7   g/dL     2020 14.2 11.7 - 15.7 g/dL Final     Hematocrit   Date Value Ref Range Status   2024 53.3 (H) 35.0 - 53.0 % Final     Comment:     Sex Specific Reference Ranges:     Female  35.0-47.0 %   Male      40.0-53.0 %     2020 45.3 35.0 - 47.0 % Final     Erythropoietin   Date Value Ref Range Status   2024 5 4 - 27 mU/mL Final     Comment:     INTERPRETIVE INFORMATION: Erythropoietin  Normal serum concentrations of erythropoietin for 95% of   individuals with normal hematocrits range from 4-27 mU/mL.    As the hematocrit is lowered by iron deficiency, aplastic,   or hemolytic anemia, the concentration of erythropoietin   increases as shown in the graph below. In the absence of   anemia, elevated concentrations are seen in renal tumors,   as a manifestation of renal  "transplant rejection, and in   secondary polycythemia. Low values may be observed in   hemochromatosis.          Expected Erythropoietin Concentrations in Patients                     with Uncomplicated Anemia       Erythropoietin (mU/mL)                100,000 - +                          +                 10,000 - +.......                          + .......                  1,000 - +    .......                          +     ........                    100 - +       ........                          +        ........                     10 - +          ........                          +---+---+---+---+---+---+                         10   20  30  40  50  60  70                                (Hematocrit %)              (Contributions To Nephrology 1988:66:54-62)    Decreased erythropoietin concentrations with an elevated   hematocrit are observed in patients with polycythemia rubra   vera, and with a decreased hematocrit in patients with HIV   infection who are receiving AZT.  Patients on AZT who have   anemia and erythropoietin concentrations of less than or   equal to 500 mU/mL may benefit from therapy with   recombinant EPO ([facility] [id number],1990).  Performed By: [facility]  [address] [place] [address]  : Junior Lovelace MD, PhD  CLIA Number: [id number]     Specimen Description    Blood: ACD   Significant Results    Detected Alterations of Known or Potential Pathogenicity: None      Interpretation    No mutations were identified in JAK2 (including clinically relevant positions at codon V617 and exon 12).     Reflex testing of CALR and MPL will be reported subsequently.     \"Correlation with clinical information, morphology, and other diagnostic tests is indicated.\"       Imaging:  reviewed           Mary Rosales MD  "

## 2024-06-04 NOTE — NURSING NOTE
"Oncology Rooming Note    June 4, 2024 3:45 PM   Gregoria Stein is a 68 year old adult who presents for:    Chief Complaint   Patient presents with    Oncology Clinic Visit     Mild erthrocytosis      Initial Vitals: /86 (BP Location: Right arm, Patient Position: Sitting, Cuff Size: Adult Large)   Pulse 79   Temp 98.4  F (36.9  C) (Oral)   Resp 12   Ht 1.66 m (5' 5.35\")   Wt 93.9 kg (207 lb)   LMP  (LMP Unknown)   SpO2 96%   BMI 34.07 kg/m   Estimated body mass index is 34.07 kg/m  as calculated from the following:    Height as of this encounter: 1.66 m (5' 5.35\").    Weight as of this encounter: 93.9 kg (207 lb). Body surface area is 2.08 meters squared.  Mild Pain (3) Comment: Data Unavailable   No LMP recorded (lmp unknown). (Menstrual status: Reassignment).  Allergies reviewed: Yes  Medications reviewed: Yes    Medications: Medication refills not needed today.  Pharmacy name entered into [employer]: [facility][id number] [address]    Frailty Screening:   Is the patient here for a new oncology consult visit in cancer care? 1. Yes. Over the past month, have you experienced difficulty or required a caregiver to assist with:   1. Balance, walking or general mobility (including any falls)? YES-slightly  2. Completion of self-care tasks such as bathing, dressing, toileting, grooming/hygiene?  YES- slightly  3. Concentration or memory that affects your daily life?  YES- slightly stress       Clinical concerns: Is kombucha ok  Is gardening okay?  Fatigue blurred vision       Marisol Pa"

## 2024-06-04 NOTE — LETTER
2024      Gregoria Stein  [address]  [place] [address]      Dear Colleague,    Thank you for referring your patient, Gregoria Stein, to the [facility]. Please see a copy of my visit note below.        [facility] Hematology Consultation    Outpatient Visit Note:    Patient: Gregoria Stein  MRN: [id number]  : 1955  JONATHAN: 2024    Reason for Consultation:  erythrocytosis    Assessment:  In summary, Killian Stein is a 68 year old transgendered person (female to male) with recently diagnosed stage IIA (cT2, cN0, cM0) invasive ductal carcinoma of the upper-outer left breast (dx 3/13/24), which is ER/IN positive who presents in consultation due to erythrocytosis and polycythemia    Secondary erythrocytosis and polycythemia  Use of testosterone therapy for gender- affirming care  Use of anastrazole- antiestrogen therapy for ER/IN breast cancer  Sleep apnea    Killian does NOT have polycythemia vera as his JAK2 mutation testing was negative. Additionally, his EPO levels are normal- ruling out other causes. Killian does endorse having sleep apnea and does NOT wear CPAP.     Reviewing laboratory information- appears that Killian has had mild erythrocytosis since at least  when he started androgel formulation of testosterone. His hematocrit appropriately increased due to use of testosterone and has been within age- and expressed gender normative values since  (note that [facility] uses 53% cutoff rather than 50%).      Today, we discussed that within the medical literature there is conflicting reports regarding the risk of VTE and cardiovascular events with use of testosterone. Furthermore, majority of these studies are conducted in males prescribed testosterone for hypogonadism, not for gender affirming purposes. One meta-analysis (Lindsay TAYLOR, et al. 2020. Thromb Res 2018 Dec;172:. ) found little effect, whereas a larger analysis using drug  "codes and billing data estimated Odds Ratios (OR) of 2.32 with increased risk for men younger than 65 (OR 2.99; 95% CI 1.91-4.68) (Rohit BANKS. CANDE Intern Med. 2020;180(2):190-197). Overall, in transgendered individuals., there is very limited data on surgical risk factors and use of testosterone (1 article in a meta-analysis), which found use of exogenous testosterone was not found to be associated with an increased risk of venous thromboembolism or other complications during surgery (RONNIE Alvarez., VAHID Bailey, & ARCADIO Qureshi (2019). Association of Surgical Risk With Exogenous Hormone Use in Transgender Patients: A Systematic Review. CANDE surgery, 154(2), 159-169. https://doi.org/10.1001/jamasurg.2018.4598)    Additionally, removal of blood to achieve \"ideal\" hematocrit of 45% was set based on data from polycythemia vera, which Killian does not have. In other conditions with polycythemia/erythrocytosis, the practice is moving AWAY from use of routine phlebotomy. This is because elevated hematocrit in individuals with other conditions (congential heart disease), is NOT associated with thrombosis as overall there is no correlation between viscosity and measured hematocrit  (LALA Dill, et al. 2018. JACC). In other primary erythrocytosis conditions, phlebotomies cause iron deficiency, which may further elevate HIF activity and transferrin, the HIF-regulated plasma iron transporter recently implicated in thrombogenesis. Additionally, Killian has restless leg syndrome with a relative iron DEFICIENCY due to ferritin on 48. Ideally, there iron level (aka ferritin) should be between 50-60 (refer to Blaine ESPANA et al. 2023. Laboratory-based inequity in thrombosis and hemostaisis: review of the evidence. Res Pract Thromb Haemost Feb; 7(2): 900624. doi: 10.1016/j.rpth.2023.751504 PMCID: [id number])    Overall, Killian DOES have increased thrombosis risk due to his cancer, age, body habitus, and other medical conditions. Given this, I do " feel that rather than phlebotomy the following should be done:  - Killian will hold testosterone injections until AFTER mastectomy  - Killian will try to wear CPAP machine  - instead of phlebotomy- we will proceed with post-operative anticoagulation to reduce thrombotic risk  - 24 h post-surgery START 2.5 mg oral apixaban twice daily for 4 week as VTE prophylaxis  - continue discussion with primary care regarding formulation of gender affirming testosterone    This plan was personally discussed with Dr. Hearn at [facility], who also coordinated care with Killian's plastic surgeon. Dr. Linares (Medical oncology) at [facility] is also aware. All teams agree with course of care as outlined.       Plan:  1. Majority of today's visit was spent counseling the patient regarding risk of thrombosis due to elevated hematocrit.  2. Prescription for Eliquis 2.5 mg twice a day for 35 days sent to [facility]  3. Dr. Rosales will follow labs related to iron post-operatively    The patient is given our center's contact information and is instructed to call if he should have any further questions or concerns.  Otherwise, we will plan on seeing him back Follow up with Provider - TBD by post-operative course of care .        Mary Rosales MD/PhD   of Medicine  [facility] [employer]     The longitudinal plan of care for the diagnosis(es)/condition(s) as documented were addressed during this visit. Due to the added complexity in care, I will continue to support Killian in the subsequent management and with ongoing continuity of care.    ----------------------------------------------------------------------------------------------------------------------    History of Present Illness:  Killian Stein is a 68 year old transgendered person (female to male) with recently diagnosed stage IIA (cT2, cN0, cM0) invasive ductal carcinoma of the upper-outer left breast (dx 3/13/24), which is ER/HI positive who presents in  consultation due to erythrocytosis and polycythemia.    Killian has been on gender-affirming testosterone since 2020. In the past has not had any issues with bleeding, including after joint replacement or after breast biopsy. Killian has no current issues with bruising, no nosebleeding. Will have some minor gum bleeding with flossing due to decrease in dental care secondary to  mental health.     Killian denies having any history of thrombosis. He has restless leg syndrome and used to take oral iron, which helped a bit- but has since moved onto mirapex. Continues to have some issues with RLS, but other psychiatric issues (PTSD, anxiety, etc) have been making sleep poor as well. Killian does not wear CPAP machine regularly.          Past Medical History:  Past Medical History:   Diagnosis Date    Anxiety     Arthritis     C spine, thumbs bilateral, feet, shoulders, knees    Depressive disorder     Gastro-oesophageal reflux disease     intermittent    Labial irritation     labial mass    Other chronic pain     feet, knees, shoulders, full spine, thumbs    PTSD (post-traumatic stress disorder)     Restless leg syndrome        Past Surgical History:  Past Surgical History:   Procedure Laterality Date    COLONOSCOPY      EXCISE MASS VAGINA Left 4/10/2015    Procedure: EXCISE MASS VAGINA;  Surgeon: Stanislav Byers MD;  Location: [facility] OR    GENITOURINARY SURGERY      Urethrial dilation    JOINT REPLACEMENT Right 07/2018    ORTHOPEDIC SURGERY      right rotator cuff, left achilles tendon repair, neuroma removed right foot, right knee arthroscopy,        Medications:  Current Outpatient Medications   Medication Sig Dispense Refill    albuterol (PROAIR HFA/PROVENTIL HFA/VENTOLIN HFA) 108 (90 Base) MCG/ACT inhaler Inhale 2 puffs into the lungs every 4 hours as needed for shortness of breath, wheezing or cough 18 g 0    anastrozole (ARIMIDEX) 1 MG tablet Take 1 tablet by mouth daily      artificial saliva (BIOTENE MT) SOLN solution Swish and spit  2 sprays in mouth 4 times daily as needed for dry mouth 44.3 mL 0    clonazePAM (KLONOPIN) 1 MG tablet Take 1 mg by mouth 2 times daily as needed for anxiety      cloNIDine (CATAPRES) 0.1 MG tablet Take 1 tablet (0.1 mg) by mouth 2 times daily 60 tablet 0    cyclobenzaprine (FLEXERIL) 5 MG tablet Take 1 tablet (5 mg) by mouth every 8 hours as needed for muscle spasms 90 tablet 0    desvenlafaxine (PRISTIQ) 50 MG 24 hr tablet Take 1 tablet (50 mg) by mouth daily 30 tablet 0    diclofenac (VOLTAREN) 1 % topical gel Apply 2-4 g topically 4 times daily 350 g 1    gabapentin (NEURONTIN) 300 MG capsule Take 3 capsules (900 mg) by mouth At Bedtime 270 capsule 0    hydroCHLOROthiazide 12.5 MG tablet Take 1 tablet (12.5 mg) by mouth daily 30 tablet 0    Lidocaine (LIDOCARE) 4 % Patch Place 1 patch onto the skin every 24 hours To prevent lidocaine toxicity, patient should be patch free for 12 hrs daily. 30 patch 0    magnesium hydroxide (MILK OF MAGNESIA) 400 MG/5ML suspension Take 30 mLs by mouth daily as needed for constipation 118 mL 0    melatonin 3 MG tablet Take 1 tablet (3 mg) by mouth nightly as needed for sleep 30 tablet 0    omeprazole (PRILOSEC) 40 MG DR capsule TAKE 1 CAPSULE BY MOUTH EVERY DAY BEFORE A MEAL      polyethylene glycol (MIRALAX) 17 GM/Dose powder Take 17 g by mouth daily 510 g 0    pramipexole (MIRAPEX) 0.25 MG tablet Take 3 tablets (0.75 mg) by mouth At Bedtime 90 tablet 3    testosterone cypionate (DEPOTESTOSTERONE) 200 MG/ML injection Inject 70 mg Subcutaneous once a week on Fridays      UNABLE TO FIND Pt is using Medical cannabis      zinc oxide (DESITIN) 40 % paste Apply topically as needed for irritation or skin protection 453 g 1        Allergies:  Allergies   Allergen Reactions    Midazolam Other (See Comments)     Stopped breathing    Other reaction(s): Apnea   Stopped breathing   Stopped breathing    Penicillins Rash and Hives       ROS:  A 14 point ROS is negative except as stated in the  HPI    Social History:  Denies any tobacco use. No significant alcohol use. Denies any illicit drug use.     Family History:  Maternal uncle  from lung cancer  Father  age 49 from heart disease    Objective:  Vitals: B/P: 133/86, T: 98.4, P: 79, R: 12, Wt: 207 lbs 0 oz  Exam:   Constitutional: Appears well, no distress  HEENT: Pupils equal and reactive to light. No scleral icterus or hemorrhage. Nares without evidence of telangiectasia. Mucous membranes moist with no wet purpura. No pharyngeal exudates. No lymphadenopathy, no thyromeagaly  CV: regular rate and rhythm, no murmurs  Respiratory: clear  GI: abdomen soft, nontender, without guarding or rebound. No hepatomeagaly. No splenomegaly.   Mus/Skele: no edema  Skin: no petechiae, no ecchymosis.  Neuro: CN II-XII intact. Normal gait. AOx3  Heme/Lymph: no supraclavicular, axillary or umbilical adenopathy.     Labs: personally reviewed with relevant trends annotated below  WBC   Date Value Ref Range Status   2020 4.6 4.0 - 11.0 10e9/L Final     WBC Count   Date Value Ref Range Status   2024 5.8 4.0 - 11.0 10e3/uL Final     Hemoglobin   Date Value Ref Range Status   2024 17.9 (H) 13.3 - 17.7 g/dL Final     Comment:     Sex Specific Reference Ranges:    Female  11.7-15.7  g/dL  Male    13.3-17.7   g/dL     2020 14.2 11.7 - 15.7 g/dL Final     Hematocrit   Date Value Ref Range Status   2024 53.3 (H) 35.0 - 53.0 % Final     Comment:     Sex Specific Reference Ranges:     Female  35.0-47.0 %   Male      40.0-53.0 %     2020 45.3 35.0 - 47.0 % Final     Erythropoietin   Date Value Ref Range Status   2024 5 4 - 27 mU/mL Final     Comment:     INTERPRETIVE INFORMATION: Erythropoietin  Normal serum concentrations of erythropoietin for 95% of   individuals with normal hematocrits range from 4-27 mU/mL.    As the hematocrit is lowered by iron deficiency, aplastic,   or hemolytic anemia, the concentration of  "erythropoietin   increases as shown in the graph below. In the absence of   anemia, elevated concentrations are seen in renal tumors,   as a manifestation of renal transplant rejection, and in   secondary polycythemia. Low values may be observed in   hemochromatosis.          Expected Erythropoietin Concentrations in Patients                     with Uncomplicated Anemia       Erythropoietin (mU/mL)                100,000 - +                          +                 10,000 - +.......                          + .......                  1,000 - +    .......                          +     ........                    100 - +       ........                          +        ........                     10 - +          ........                          +---+---+---+---+---+---+                         10   20  30  40  50  60  70                                (Hematocrit %)              (Contributions To Nephrology 1988:66:54-62)    Decreased erythropoietin concentrations with an elevated   hematocrit are observed in patients with polycythemia rubra   vera, and with a decreased hematocrit in patients with HIV   infection who are receiving AZT.  Patients on AZT who have   anemia and erythropoietin concentrations of less than or   equal to 500 mU/mL may benefit from therapy with   recombinant EPO ([facility] [id number]).  Performed By: [facility]  [address] [place] [id number]  : Junior Lovelace MD, PhD  CLIA Number: [id number]     Specimen Description    Blood: ACD   Significant Results    Detected Alterations of Known or Potential Pathogenicity: None      Interpretation    No mutations were identified in JAK2 (including clinically relevant positions at codon V617 and exon 12).     Reflex testing of CALR and MPL will be reported subsequently.     \"Correlation with clinical information, morphology, and other diagnostic tests is indicated.\"       Imaging:  reviewed       Again, " thank you for allowing me to participate in the care of your patient.        Sincerely,        Mary Rosales MD

## 2024-06-04 NOTE — PROGRESS NOTES
[facility] Hematology Consultation    Outpatient Visit Note:    Patient: Gregoria Stein  MRN: [id number]  : 1955  JONATHAN: 2024    Reason for Consultation:  erythrocytosis    Assessment:  In summary, Killian Stein is a 68 year old transgendered person (female to male) with recently diagnosed stage IIA (cT2, cN0, cM0) invasive ductal carcinoma of the upper-outer left breast (dx 3/13/24), which is ER/DE positive who presents in consultation due to erythrocytosis and polycythemia    Secondary erythrocytosis and polycythemia  Use of testosterone therapy for gender- affirming care  Use of anastrazole- antiestrogen therapy for ER/DE breast cancer  Sleep apnea    Killian does NOT have polycythemia vera as his JAK2 mutation testing was negative. Additionally, his EPO levels are normal- ruling out other causes. Killian does endorse having sleep apnea and does NOT wear CPAP.     Reviewing laboratory information- appears that Killian has had mild erythrocytosis since at least  when he started androgel formulation of testosterone. His hematocrit appropriately increased due to use of testosterone and has been within age- and expressed gender normative values since  (note that [facility] uses 53% cutoff rather than 50%).      Today, we discussed that within the medical literature there is conflicting reports regarding the risk of VTE and cardiovascular events with use of testosterone. Furthermore, majority of these studies are conducted in males prescribed testosterone for hypogonadism, not for gender affirming purposes. One meta-analysis (Lindsay DE, et al. 2020. Thromb Res 2018 Dec;172:. ) found little effect, whereas a larger analysis using drug codes and billing data estimated Odds Ratios (OR) of 2.32 with increased risk for men younger than 65 (OR 2.99; 95% CI 1.91-4.68) (Rohit BANKS. CANDE Intern Med. 2020;180(2):190-197). Overall, in transgendered individuals., there is very limited data on  "surgical risk factors and use of testosterone (1 article in a meta-analysis), which found use of exogenous testosterone was not found to be associated with an increased risk of venous thromboembolism or other complications during surgery (RONNIE Alvarez., VAHID Bailey, & ARCADIO Qureshi (2019). Association of Surgical Risk With Exogenous Hormone Use in Transgender Patients: A Systematic Review. CANDE surgery, 154(2), 159-169. https://doi.org/10.1001/jamasurg.2018.4598)    Additionally, removal of blood to achieve \"ideal\" hematocrit of 45% was set based on data from polycythemia vera, which Killian does not have. In other conditions with polycythemia/erythrocytosis, the practice is moving AWAY from use of routine phlebotomy. This is because elevated hematocrit in individuals with other conditions (congential heart disease), is NOT associated with thrombosis as overall there is no correlation between viscosity and measured hematocrit  (LALA Dill, et al. 2018. JACC). In other primary erythrocytosis conditions, phlebotomies cause iron deficiency, which may further elevate HIF activity and transferrin, the HIF-regulated plasma iron transporter recently implicated in thrombogenesis. Additionally, Killian has restless leg syndrome with a relative iron DEFICIENCY due to ferritin on 48. Ideally, there iron level (aka ferritin) should be between 50-60 (refer to Blaine ESPANA et al. 2023. Laboratory-based inequity in thrombosis and hemostaisis: review of the evidence. Res Pract Thromb Haemost Feb; 7(2): 512422. doi: 10.1016/j.rpth.2023.052007 PMCID: [id number])    Overall, Killian DOES have increased thrombosis risk due to his cancer, age, body habitus, and other medical conditions. Given this, I do feel that rather than phlebotomy the following should be done:  - Killian will hold testosterone injections until AFTER mastectomy  - Killian will try to wear CPAP machine  - instead of phlebotomy- we will proceed with post-operative anticoagulation to reduce " thrombotic risk  - 24 h post-surgery START 2.5 mg oral apixaban twice daily for 4 week as VTE prophylaxis  - continue discussion with primary care regarding formulation of gender affirming testosterone    This plan was personally discussed with Dr. Hearn at [facility], who also coordinated care with Killian's plastic surgeon. Dr. Linares (Medical oncology) at [facility] is also aware. All teams agree with course of care as outlined.       Plan:  1. Majority of today's visit was spent counseling the patient regarding risk of thrombosis due to elevated hematocrit.  2. Prescription for Eliquis 2.5 mg twice a day for 35 days sent to [facility]  3. Dr. Rosales will follow labs related to iron post-operatively    The patient is given our center's contact information and is instructed to call if he should have any further questions or concerns.  Otherwise, we will plan on seeing him back Follow up with Provider - TBD by post-operative course of care .        Mary Rosales MD/PhD   of Medicine  [facility] [employer]     The longitudinal plan of care for the diagnosis(es)/condition(s) as documented were addressed during this visit. Due to the added complexity in care, I will continue to support Killian in the subsequent management and with ongoing continuity of care.    ----------------------------------------------------------------------------------------------------------------------    History of Present Illness:  Killian Stein is a 68 year old transgendered person (female to male) with recently diagnosed stage IIA (cT2, cN0, cM0) invasive ductal carcinoma of the upper-outer left breast (dx 3/13/24), which is ER/MS positive who presents in consultation due to erythrocytosis and polycythemia.    Killian has been on gender-affirming testosterone since 2020. In the past has not had any issues with bleeding, including after joint replacement or after breast biopsy. Killian has no current issues with  bruising, no nosebleeding. Will have some minor gum bleeding with flossing due to decrease in dental care secondary to  mental health.     Killian denies having any history of thrombosis. He has restless leg syndrome and used to take oral iron, which helped a bit- but has since moved onto mirapex. Continues to have some issues with RLS, but other psychiatric issues (PTSD, anxiety, etc) have been making sleep poor as well. Killian does not wear CPAP machine regularly.          Past Medical History:  Past Medical History:   Diagnosis Date    Anxiety     Arthritis     C spine, thumbs bilateral, feet, shoulders, knees    Depressive disorder     Gastro-oesophageal reflux disease     intermittent    Labial irritation     labial mass    Other chronic pain     feet, knees, shoulders, full spine, thumbs    PTSD (post-traumatic stress disorder)     Restless leg syndrome        Past Surgical History:  Past Surgical History:   Procedure Laterality Date    COLONOSCOPY      EXCISE MASS VAGINA Left 4/10/2015    Procedure: EXCISE MASS VAGINA;  Surgeon: Stanislav Byers MD;  Location: [facility] OR    GENITOURINARY SURGERY      Urethrial dilation    JOINT REPLACEMENT Right 07/2018    ORTHOPEDIC SURGERY      right rotator cuff, left achilles tendon repair, neuroma removed right foot, right knee arthroscopy,        Medications:  Current Outpatient Medications   Medication Sig Dispense Refill    albuterol (PROAIR HFA/PROVENTIL HFA/VENTOLIN HFA) 108 (90 Base) MCG/ACT inhaler Inhale 2 puffs into the lungs every 4 hours as needed for shortness of breath, wheezing or cough 18 g 0    anastrozole (ARIMIDEX) 1 MG tablet Take 1 tablet by mouth daily      artificial saliva (BIOTENE MT) SOLN solution Swish and spit 2 sprays in mouth 4 times daily as needed for dry mouth 44.3 mL 0    clonazePAM (KLONOPIN) 1 MG tablet Take 1 mg by mouth 2 times daily as needed for anxiety      cloNIDine (CATAPRES) 0.1 MG tablet Take 1 tablet (0.1 mg) by mouth 2 times daily 60  tablet 0    cyclobenzaprine (FLEXERIL) 5 MG tablet Take 1 tablet (5 mg) by mouth every 8 hours as needed for muscle spasms 90 tablet 0    desvenlafaxine (PRISTIQ) 50 MG 24 hr tablet Take 1 tablet (50 mg) by mouth daily 30 tablet 0    diclofenac (VOLTAREN) 1 % topical gel Apply 2-4 g topically 4 times daily 350 g 1    gabapentin (NEURONTIN) 300 MG capsule Take 3 capsules (900 mg) by mouth At Bedtime 270 capsule 0    hydroCHLOROthiazide 12.5 MG tablet Take 1 tablet (12.5 mg) by mouth daily 30 tablet 0    Lidocaine (LIDOCARE) 4 % Patch Place 1 patch onto the skin every 24 hours To prevent lidocaine toxicity, patient should be patch free for 12 hrs daily. 30 patch 0    magnesium hydroxide (MILK OF MAGNESIA) 400 MG/5ML suspension Take 30 mLs by mouth daily as needed for constipation 118 mL 0    melatonin 3 MG tablet Take 1 tablet (3 mg) by mouth nightly as needed for sleep 30 tablet 0    omeprazole (PRILOSEC) 40 MG DR capsule TAKE 1 CAPSULE BY MOUTH EVERY DAY BEFORE A MEAL      polyethylene glycol (MIRALAX) 17 GM/Dose powder Take 17 g by mouth daily 510 g 0    pramipexole (MIRAPEX) 0.25 MG tablet Take 3 tablets (0.75 mg) by mouth At Bedtime 90 tablet 3    testosterone cypionate (DEPOTESTOSTERONE) 200 MG/ML injection Inject 70 mg Subcutaneous once a week on       UNABLE TO FIND Pt is using Medical cannabis      zinc oxide (DESITIN) 40 % paste Apply topically as needed for irritation or skin protection 453 g 1        Allergies:  Allergies   Allergen Reactions    Midazolam Other (See Comments)     Stopped breathing    Other reaction(s): Apnea   Stopped breathing   Stopped breathing    Penicillins Rash and Hives       ROS:  A 14 point ROS is negative except as stated in the HPI    Social History:  Denies any tobacco use. No significant alcohol use. Denies any illicit drug use.     Family History:  Maternal uncle  from lung cancer  Father  age 49 from heart disease    Objective:  Vitals: B/P: 133/86, T:  98.4, P: 79, R: 12, Wt: 207 lbs 0 oz  Exam:   Constitutional: Appears well, no distress  HEENT: Pupils equal and reactive to light. No scleral icterus or hemorrhage. Nares without evidence of telangiectasia. Mucous membranes moist with no wet purpura. No pharyngeal exudates. No lymphadenopathy, no thyromeagaly  CV: regular rate and rhythm, no murmurs  Respiratory: clear  GI: abdomen soft, nontender, without guarding or rebound. No hepatomeagaly. No splenomegaly.   Mus/Skele: no edema  Skin: no petechiae, no ecchymosis.  Neuro: CN II-XII intact. Normal gait. AOx3  Heme/Lymph: no supraclavicular, axillary or umbilical adenopathy.     Labs: personally reviewed with relevant trends annotated below  WBC   Date Value Ref Range Status   08/14/2020 4.6 4.0 - 11.0 10e9/L Final     WBC Count   Date Value Ref Range Status   05/30/2024 5.8 4.0 - 11.0 10e3/uL Final     Hemoglobin   Date Value Ref Range Status   06/06/2024 17.9 (H) 13.3 - 17.7 g/dL Final     Comment:     Sex Specific Reference Ranges:    Female  11.7-15.7  g/dL  Male    13.3-17.7   g/dL     11/19/2020 14.2 11.7 - 15.7 g/dL Final     Hematocrit   Date Value Ref Range Status   06/06/2024 53.3 (H) 35.0 - 53.0 % Final     Comment:     Sex Specific Reference Ranges:     Female  35.0-47.0 %   Male      40.0-53.0 %     11/19/2020 45.3 35.0 - 47.0 % Final     Erythropoietin   Date Value Ref Range Status   05/26/2024 5 4 - 27 mU/mL Final     Comment:     INTERPRETIVE INFORMATION: Erythropoietin  Normal serum concentrations of erythropoietin for 95% of   individuals with normal hematocrits range from 4-27 mU/mL.    As the hematocrit is lowered by iron deficiency, aplastic,   or hemolytic anemia, the concentration of erythropoietin   increases as shown in the graph below. In the absence of   anemia, elevated concentrations are seen in renal tumors,   as a manifestation of renal transplant rejection, and in   secondary polycythemia. Low values may be observed in  "  hemochromatosis.          Expected Erythropoietin Concentrations in Patients                     with Uncomplicated Anemia       Erythropoietin (mU/mL)                100,000 - +                          +                 10,000 - +.......                          + .......                  1,000 - +    .......                          +     ........                    100 - +       ........                          +        ........                     10 - +          ........                          +---+---+---+---+---+---+                         10   20  30  40  50  60  70                                (Hematocrit %)              (Contributions To Nephrology 1988:66:54-62)    Decreased erythropoietin concentrations with an elevated   hematocrit are observed in patients with polycythemia rubra   vera, and with a decreased hematocrit in patients with HIV   infection who are receiving AZT.  Patients on AZT who have   anemia and erythropoietin concentrations of less than or   equal to 500 mU/mL may benefit from therapy with   recombinant EPO ([facility] [id number],1990).  Performed By: [employer]  [address] [place] [address]  : Junior Lovelace MD, PhD  CLIA Number: [id number]     Specimen Description    Blood: ACD   Significant Results    Detected Alterations of Known or Potential Pathogenicity: None      Interpretation    No mutations were identified in JAK2 (including clinically relevant positions at codon V617 and exon 12).     Reflex testing of CALR and MPL will be reported subsequently.     \"Correlation with clinical information, morphology, and other diagnostic tests is indicated.\"       Imaging:  reviewed     "

## 2024-06-04 NOTE — TELEPHONE ENCOUNTER
RECORDS STATUS - ALL OTHER DIAGNOSIS      RECORDS RECEIVED FROM: [facility]   NOTES STATUS DETAILS   OFFICE NOTE from referring provider Epic Ashanti Griffin PA-C   [facility]   MEDICATION LIST [facility]    LABS     PATHOLOGY REPORTS Reports in Epic JAK2 (still in process as of 06/03    Blood Morph:  05/25/24: [id number]   ANYTHING RELATED TO DIAGNOSIS Epic Most recent 05/30/24

## 2024-06-04 NOTE — PATIENT INSTRUCTIONS
You do NOT have polycythemia vera    Dr. Rosales will call your surgeon and discuss plans    In meantime we have you on the waitlist for blood removal (which we hope you do not need)      Mary Rosales MD/PhD  [facility]  of Medicine  Division of Hematology, Oncology and Transplantation    [facility]  [address] [place] [address], Mail [id number]  [place] [address]    Clinic Scheduling and Nurse Line: [phone], option 5, option 2    [facility] RN Care Coordinator:   Renea Jung  Direct line:   (P) [phone]  (F) [phone]

## 2024-06-05 ENCOUNTER — TELEPHONE (OUTPATIENT)
Dept: ONCOLOGY | Facility: CLINIC | Age: 69
End: 2024-06-05
Payer: MEDICARE

## 2024-06-05 NOTE — TELEPHONE ENCOUNTER
Killian (pt) states sorry missed a call from dr. Rosales as had vacuum running and anticipated surgery consult for 6/12.     Dr Rosales can call back anytime tonight. Best phone number is [phone]    This writer informed Killian will send this message to Dr. Rosales.     0380 Per Dr. Rosales, NO call was made to patient, pt needs to follow up with other provider/surgical team doing surgery on 6/12.

## 2024-06-06 ENCOUNTER — LAB (OUTPATIENT)
Dept: LAB | Facility: CLINIC | Age: 69
End: 2024-06-06
Attending: INTERNAL MEDICINE
Payer: MEDICARE

## 2024-06-06 ENCOUNTER — PATIENT OUTREACH (OUTPATIENT)
Dept: ONCOLOGY | Facility: CLINIC | Age: 69
End: 2024-06-06

## 2024-06-06 PROCEDURE — 82728 ASSAY OF FERRITIN: CPT | Performed by: INTERNAL MEDICINE

## 2024-06-06 PROCEDURE — 85018 HEMOGLOBIN: CPT | Performed by: INTERNAL MEDICINE

## 2024-06-06 PROCEDURE — 36415 COLL VENOUS BLD VENIPUNCTURE: CPT | Performed by: INTERNAL MEDICINE

## 2024-06-06 NOTE — NURSING NOTE
Chief Complaint   Patient presents with    Labs Only     venipuncture     Stacey Adams RN on 6/6/2024 at 12:11 PM

## 2024-06-06 NOTE — PROGRESS NOTES
[facility]: Cancer Care Short Note                                                                                          Outgoing Call: RNCC called patient to update her that her labs did not meet parameters for phlebotomy on Saturday.     Also, informed patient that Dr. Rosales spoke with patient's surgeon, Dr. Benavides, at [facility] who also spoke with their plastic surgeon. They agree that the plan to NOT do phlebotomy and to use post-operative anticoagulation is reasonable. Dr. Rosales will write for post-operative anticoagulation     Patient understood an had no further questions for RNCC.    HUMBERTO SequeiraN, RN  RN Care Coordinator- covering for [facility]  [address] [place] [id number]  Phone: [phone] Option 5, Option 2  Fax: [phone]

## 2024-06-07 ENCOUNTER — NURSE TRIAGE (OUTPATIENT)
Dept: ONCOLOGY | Facility: CLINIC | Age: 69
End: 2024-06-07
Payer: MEDICARE

## 2024-06-07 NOTE — TELEPHONE ENCOUNTER
Killian calling about when to start taking anticoagulation Eliquis sent in by Dr. Rosales    Surgical Date is Wednesday 6/12 with [facility].     This writer informed pt usually surgical team would decide when it okay to start anticoagulation post surgery but I will send inquiry to Dr. Rosales/Care Team.      Per Dr. Parks, Killian should start on 6/13 at NIGHT     Synupt message sent to opt withreply

## 2024-06-12 LAB
PATH REPORT.COMMENTS IMP SPEC: ABNORMAL
PATH REPORT.COMMENTS IMP SPEC: YES
PATH REPORT.FINAL DX SPEC: ABNORMAL
PATH REPORT.GROSS SPEC: ABNORMAL
PATH REPORT.MICROSCOPIC SPEC OTHER STN: ABNORMAL
PATH REPORT.RELEVANT HX SPEC: ABNORMAL
PATH REPORT.RELEVANT HX SPEC: ABNORMAL
PATH REPORT.SITE OF ORIGIN SPEC: ABNORMAL

## 2024-06-17 ENCOUNTER — PATIENT OUTREACH (OUTPATIENT)
Dept: ONCOLOGY | Facility: CLINIC | Age: 69
End: 2024-06-17
Payer: MEDICARE

## 2024-06-17 DIAGNOSIS — C50.812 MALIGNANT NEOPLASM OF OVERLAPPING SITES OF LEFT BREAST IN FEMALE, ESTROGEN RECEPTOR POSITIVE (H): ICD-10-CM

## 2024-06-17 DIAGNOSIS — Z17.0 MALIGNANT NEOPLASM OF OVERLAPPING SITES OF LEFT BREAST IN FEMALE, ESTROGEN RECEPTOR POSITIVE (H): ICD-10-CM

## 2024-06-17 DIAGNOSIS — D75.1 ERYTHROCYTOSIS: ICD-10-CM

## 2024-06-17 DIAGNOSIS — G25.81 RESTLESS LEG SYNDROME: Primary | ICD-10-CM

## 2024-06-17 NOTE — PROGRESS NOTES
Oncotype submitted on specimen [id number]                                 .     Order: [id number]       Letty Garcia MSN, RN ,OCN  Lead RN Care Coordinator - [facility]  [phone]

## 2024-06-17 NOTE — TELEPHONE ENCOUNTER
[facility]: Hematology                                                                                        Killian calls to report to  they had some bleeding at the grafts sites post breast surgery. Was seen by surgeons' office 2x and was instructed to hold anti-coag (ELiquis) one day( 2 doses) on 6/15/24    No further bleeding noted.  ABHI drains in place, liquid is clear/ orange tinged.      Killian was just letting  know and also to inquire about future plan of care following the 35 days of anti-coag    Will route to MD for review     Plan of Care Education Review:   Assessment completed with:: Patient    Plan of Care Education        Evaluation of Learning  Patient Education Provided: Yes  Readiness:: Acceptance  Method:: Explanation  Response:: Verbalizes understanding           Renea Jung LPN  Hematology Care Coordination  [phone]

## 2024-06-24 ENCOUNTER — ONCOLOGY VISIT (OUTPATIENT)
Dept: ONCOLOGY | Facility: CLINIC | Age: 69
End: 2024-06-24
Attending: INTERNAL MEDICINE
Payer: MEDICARE

## 2024-06-24 VITALS
OXYGEN SATURATION: 95 % | TEMPERATURE: 97.5 F | SYSTOLIC BLOOD PRESSURE: 158 MMHG | HEART RATE: 81 BPM | BODY MASS INDEX: 32.92 KG/M2 | DIASTOLIC BLOOD PRESSURE: 85 MMHG | WEIGHT: 200 LBS | RESPIRATION RATE: 16 BRPM

## 2024-06-24 DIAGNOSIS — Z17.0 MALIGNANT NEOPLASM OF OVERLAPPING SITES OF LEFT BREAST IN FEMALE, ESTROGEN RECEPTOR POSITIVE (H): ICD-10-CM

## 2024-06-24 DIAGNOSIS — D75.1 ERYTHROCYTOSIS: ICD-10-CM

## 2024-06-24 DIAGNOSIS — G25.81 RESTLESS LEG SYNDROME: Primary | ICD-10-CM

## 2024-06-24 DIAGNOSIS — C50.812 MALIGNANT NEOPLASM OF OVERLAPPING SITES OF LEFT BREAST IN FEMALE, ESTROGEN RECEPTOR POSITIVE (H): ICD-10-CM

## 2024-06-24 PROCEDURE — G0463 HOSPITAL OUTPT CLINIC VISIT: HCPCS | Performed by: INTERNAL MEDICINE

## 2024-06-24 PROCEDURE — 99215 OFFICE O/P EST HI 40 MIN: CPT | Performed by: INTERNAL MEDICINE

## 2024-06-24 RX ORDER — OXYCODONE HYDROCHLORIDE 5 MG/1
1 TABLET ORAL EVERY 6 HOURS PRN
COMMUNITY
Start: 2024-06-13 | End: 2024-09-11

## 2024-06-24 RX ORDER — TRAZODONE HYDROCHLORIDE 100 MG/1
50-100 TABLET ORAL PRN
COMMUNITY
Start: 2024-06-05 | End: 2025-06-05

## 2024-06-24 ASSESSMENT — PAIN SCALES - GENERAL: PAINLEVEL: NO PAIN (0)

## 2024-06-24 NOTE — NURSING NOTE
"Oncology Rooming Note    June 24, 2024 3:01 PM   Gregoria Stein is a 68 year old adult who presents for:    Chief Complaint   Patient presents with    Oncology Clinic Visit     Malignant neoplasm of overlapping sites of left breast in female, estrogen receptor positive      Initial Vitals: BP (!) 158/85   Pulse 81   Temp 97.5  F (36.4  C)   Resp 16   Wt 90.7 kg (200 lb)   LMP  (LMP Unknown)   SpO2 95%   BMI 32.92 kg/m   Estimated body mass index is 32.92 kg/m  as calculated from the following:    Height as of 6/4/24: 1.66 m (5' 5.35\").    Weight as of this encounter: 90.7 kg (200 lb). Body surface area is 2.05 meters squared.  No Pain (0) Comment: Data Unavailable   No LMP recorded (lmp unknown). (Menstrual status: Reassignment).  Allergies reviewed: Yes  Medications reviewed: Yes    Medications: Medication refills not needed today.  [facility] into [id number] [address]    Frailty Screening:   Is the patient here for a new oncology consult visit in cancer care? 2. No      Clinical concerns: no other complaints      Fredi Mchugh"

## 2024-06-24 NOTE — PROGRESS NOTES
Oncology Visit:      Reason:  new left breast cancer now s/p resection    HPI:    Clinical/anatomic stage IIA (cT2, cN0, cM0) invasive ductal carcinoma of the upper-outer quadrant of the left breast  - Diagnosed 3/13/2024 via left breast biopsy  - ER (positive - 80%), MS (positive - 10%), HER2 (low, score of 1+ by IHC)  - Tomasz grade I histology on biopsy   - Tumor size: 2.7 cm by MRI  - Final pathology 35 mm, grade 1.  0/4 lymph nodes involved.    HISTORY OF PRESENT ILLNESS:  Killian (formerly Gregoria at birth) Sarita is a very pleasant gentleman who is evaluated in follow up for LEFT breast cancer.     Patient was evaluated for  top  surgery and as part of the evaluation a left breast abnormality was found. Screening mammogram in March 2024 revealed an area of architectural distortion in the left breast 6 oclock position.  Diagnostic mammogram revealed 1.3 cm mass.  A biopsy revealed invasive breast cancer ER ++ MS + HER2 negative.  MRI imaging revealed no other specific foci of disease and there was no clinical or radiographic evidence of lymphadenopathy.  He met with Dr. Martin Ruano at [facility] and was scheduled for top surgery.  By breast MRI, this measured 2.7 cm.  Lymph nodes appeared normal.      Killian underwent bilateral mastectomy with SNLB.  Margins negative.  We reviewed final pathology.  Lymph nodes were negative.    Killian is understanding overall treatment goal is curative in intent.       Drain is out since surgery.  Incisions are healing well.  No f/c.  No chest pain or shortness of breath.      At the time of surgery, Killian was found to have elevated hemoglobin.  Killian has been seeing my colleague Dr Mary Rosales for evaluation.       ROS: 10 point ROS neg other than the symptoms noted above in the HPI.       PMH:       Pelvic floor weakness   Status post total knee replacement, right   Thoracic aortic aneurysm without rupture (HRC)    Controlled substance agreement signed   Gender dysphoria    Generalized anxiety disorder (HRC)   PTSD (post-traumatic stress disorder) (HRC)   RLS (restless legs syndrome)   ASHLYN (obstructive sleep apnea)   Severe episode of recurrent major depressive disorder, without psychotic features (HRC)   Financial difficulties  Primary hypertension (HRC)   Malignant neoplasm of upper-outer quadrant of left female breast (HRC)   Gender dysphoria in adult   Invasive ductal carcinoma of breast, stage 1, left (HRC)   Malignant neoplasm of female breast (HRC)     Past Surgical History: eye surgery, R knee surgery s/p total knee arthroplasty 7/19/18     FAMILY HISTORY:  His mother is living without any history of malignancy. His father passed away with a history of myocardial infarction. He is one brother and two sisters, no history of cancer. There is a maternal uncle that passed away with a history of lung cancer. He does not have children.  Has not seen genetics or had testing yet.      SOCIAL HISTORY:  Retired registered nurse.   Also has worked at [employer].   Quit smoking greater than 30 years ago.   Denies any frequent/recent history of ethanol overuse.    Current Outpatient Medications   Medication Sig Dispense Refill    oxyCODONE (ROXICODONE) 5 MG tablet Take 1 tablet by mouth every 4 hours as needed      traZODone (DESYREL) 100 MG tablet Take  mg by mouth      albuterol (PROAIR HFA/PROVENTIL HFA/VENTOLIN HFA) 108 (90 Base) MCG/ACT inhaler Inhale 2 puffs into the lungs every 4 hours as needed for shortness of breath, wheezing or cough 18 g 0    anastrozole (ARIMIDEX) 1 MG tablet Take 1 tablet by mouth daily      apixaban ANTICOAGULANT (ELIQUIS) 2.5 MG tablet Take 1 tablet (2.5 mg) by mouth 2 times daily for 35 days 70 tablet 0    artificial saliva (BIOTENE MT) SOLN solution Swish and spit 2 sprays in mouth 4 times daily as needed for dry mouth 44.3 mL 0    clonazePAM (KLONOPIN) 1 MG tablet Take 1 mg by mouth 2 times daily as needed for anxiety      cloNIDine  (CATAPRES) 0.1 MG tablet Take 1 tablet (0.1 mg) by mouth 2 times daily 60 tablet 0    cyclobenzaprine (FLEXERIL) 5 MG tablet Take 1 tablet (5 mg) by mouth every 8 hours as needed for muscle spasms 90 tablet 0    desvenlafaxine (PRISTIQ) 50 MG 24 hr tablet Take 1 tablet (50 mg) by mouth daily 30 tablet 0    diclofenac (VOLTAREN) 1 % topical gel Apply 2-4 g topically 4 times daily 350 g 1    gabapentin (NEURONTIN) 300 MG capsule Take 3 capsules (900 mg) by mouth At Bedtime 270 capsule 0    hydroCHLOROthiazide 12.5 MG tablet Take 1 tablet (12.5 mg) by mouth daily 30 tablet 0    Lidocaine (LIDOCARE) 4 % Patch Place 1 patch onto the skin every 24 hours To prevent lidocaine toxicity, patient should be patch free for 12 hrs daily. 30 patch 0    magnesium hydroxide (MILK OF MAGNESIA) 400 MG/5ML suspension Take 30 mLs by mouth daily as needed for constipation 118 mL 0    melatonin 3 MG tablet Take 1 tablet (3 mg) by mouth nightly as needed for sleep 30 tablet 0    omeprazole (PRILOSEC) 40 MG DR capsule TAKE 1 CAPSULE BY MOUTH EVERY DAY BEFORE A MEAL      polyethylene glycol (MIRALAX) 17 GM/Dose powder Take 17 g by mouth daily 510 g 0    pramipexole (MIRAPEX) 0.25 MG tablet Take 3 tablets (0.75 mg) by mouth At Bedtime 90 tablet 3    testosterone cypionate (DEPOTESTOSTERONE) 200 MG/ML injection Inject 70 mg Subcutaneous once a week on Fridays      UNABLE TO FIND Pt is using Medical cannabis      zinc oxide (DESITIN) 40 % paste Apply topically as needed for irritation or skin protection 453 g 1     No current facility-administered medications for this visit.     PE:   Gen : well appearing  HEENT:  no icterus  NECK: supple  CV :rrr   Lungs: clear  Chest: s/p bilateral mastectomy without reconstruction.  INcisions are c/d/I.  Well healing.  Abd: soft, nt nd + bs  Ext : no edema    Reviewed labs, imaging and pathology as outlined below.      Diagnostic imaging followed on 3/13/24.  MAMMOGRAPHIC FINDINGS: Left full-field digital  diagnostic mammogram performed. There are scattered areas of fibroglandular density. Images evaluated with the assistance of CAD. Breast tomosynthesis was used in interpretation. On additional views, the architectural distortion in the left breast persists and is associated with an irregular shape, spiculated margin, equal density mass in the left breast 1:00 position at posterior depth.   ULTRASOUND FINDINGS: Targeted sonographic images of the left breast 1:00 position 6 cm from nipple demonstrates a 1.3 x 1.1 x 1.3 cm irregular shape, spiculated margin, parallel, hypoechoic mass with posterior shadowing and adjacent vascularity. This corresponds the left breast mammographic mass with architectural distortion. Targeted sonographic images of the left axilla demonstrates normal morphology, nonenlarged left axillary lymph nodes.     3/13/24 - Case: [id number]  A.  Breast, left, 1:00, 6cm FN, needle core biopsy:   Invasive ductal carcinoma, Royal grade 1  Estrogen Receptor (ER): Positive (80%, moderate)  Progesterone Receptor (CT): Positive (10% moderate)  HER2 by Immunohistochemistry - Negative     BREAST MRI:    RIGHT BREAST: There is scattered fibroglandular tissue.  There is minimal background parenchymal enhancement. No suspicious enhancing mass or nonmass enhancement. No enlarged lymph nodes identified in the right axilla. There is no right internal mammary chain lymphadenopathy.     LEFT BREAST: There is scattered fibroglandular tissue.  There is minimal background parenchymal enhancement. In the left breast 1:00 position at middle to posterior depth, there is a 1.4 x 2.7 x 2.6 (transverse by AP by cc) enhancing mass with surrounding nonmass enhancement with biopsy clip internally in the superior portion (axial image 58 of 128 and sagittal image 47 of 180). No enlarged lymph nodes identified in the left axilla. There is no left internal mammary chain lymphadenopathy.     Surgical pathology:    FINAL  DIAGNOSIS    A.  Lymph node, sentinel, left axillary sentitanl lymph node #, resection:  Four lymph nodes negative for metastatic carcinoma     B.  Breast, left, mastectomy:  Well-differentiated invasive ductal carcinoma, [place] grade 1, size 35 mm  All margins negative for carcinoma  Skin and nipple negative for carcinoma  Skin with seborrheic keratosis     C.  Nipple, left, LEFT NIPPLE BIOPSY, biopsy:  Negative for carcinoma     D.  Breast, right mastectomy:  Proliferative fibrocystic change  Negative for atypia or malignancy  Nipple with area of ectasia  Benign skin     Block B1 is appropriate for any molecular testing.         A/P:  69 y/o transmale here with a new LEFT breast cancer clinical T2N0 Er + VA + HER2 negative, prognostic staging 1A, now s/p bilateral mastectomy without reconstruction    Breast cancer - I reviewed with Killian that all of our breast cancer treatment is curative in intent.  We discussed by older staging classifications, he technically has a stage 2 breast cancer.  Prognostic staging is 1A.  Final pathology revealed T2N0.      I discussed with him that the typical plan is surgical resection.  Bilateral mastectomies he will not need radiation.    He is tolerating AI well.      We discussed that I think it is unlikely he would need chemotherapy.  We discussed using genomic testing with Oncotype Dx to determine risk of recurrence.   Oncotype dx pending.  I will call with results.      2. Bone health - given use of anastrazole, he will eventually need baseline dexa scan.    3. Transmasculine - discussed his use of testosterone and reviewed plan.  Given elevated hgb, additional labs are indicated in a few months.  We will discuss these results with Dr Rosales.    4. Coping - he is having a lot of anxiety.  He is working with multiple healthcare providers.  Overall this is better since surgery.    5. He would benefit from genetics evaluation.  We will rediscuss this at our next visit.    6.  HTN - he works closely with Dr Davis.  Will follow .      Jeaneth Linares MD     60 min were spent in reviewing records, counseling and coordinating care.

## 2024-06-28 ENCOUNTER — OFFICE VISIT (OUTPATIENT)
Dept: OPHTHALMOLOGY | Facility: CLINIC | Age: 69
End: 2024-06-28
Attending: PHYSICIAN ASSISTANT
Payer: MEDICARE

## 2024-06-28 DIAGNOSIS — H40.9 GLAUCOMA, UNSPECIFIED GLAUCOMA TYPE, UNSPECIFIED LATERALITY: ICD-10-CM

## 2024-06-28 PROCEDURE — G0463 HOSPITAL OUTPT CLINIC VISIT: HCPCS | Performed by: STUDENT IN AN ORGANIZED HEALTH CARE EDUCATION/TRAINING PROGRAM

## 2024-06-28 PROCEDURE — 92083 EXTENDED VISUAL FIELD XM: CPT | Performed by: STUDENT IN AN ORGANIZED HEALTH CARE EDUCATION/TRAINING PROGRAM

## 2024-06-28 PROCEDURE — 92004 COMPRE OPH EXAM NEW PT 1/>: CPT | Performed by: STUDENT IN AN ORGANIZED HEALTH CARE EDUCATION/TRAINING PROGRAM

## 2024-06-28 PROCEDURE — 92015 DETERMINE REFRACTIVE STATE: CPT | Mod: GY

## 2024-06-28 PROCEDURE — 92133 CPTRZD OPH DX IMG PST SGM ON: CPT | Performed by: STUDENT IN AN ORGANIZED HEALTH CARE EDUCATION/TRAINING PROGRAM

## 2024-06-28 RX ORDER — LATANOPROST 50 UG/ML
1 SOLUTION/ DROPS OPHTHALMIC AT BEDTIME
Qty: 2.5 ML | Refills: 11 | Status: SHIPPED | OUTPATIENT
Start: 2024-06-28 | End: 2024-09-11

## 2024-06-28 ASSESSMENT — CONF VISUAL FIELD
OS_NORMAL: 1
OD_INFERIOR_TEMPORAL_RESTRICTION: 0
OS_SUPERIOR_TEMPORAL_RESTRICTION: 0
OD_NORMAL: 1
OS_INFERIOR_TEMPORAL_RESTRICTION: 0
OD_INFERIOR_NASAL_RESTRICTION: 0
OD_SUPERIOR_TEMPORAL_RESTRICTION: 0
OS_SUPERIOR_NASAL_RESTRICTION: 0
OS_INFERIOR_NASAL_RESTRICTION: 0
METHOD: COUNTING FINGERS
OD_SUPERIOR_NASAL_RESTRICTION: 0

## 2024-06-28 ASSESSMENT — TONOMETRY
OS_IOP_MMHG: 15
OD_IOP_MMHG: 15
IOP_METHOD: TONOPEN

## 2024-06-28 ASSESSMENT — REFRACTION_MANIFEST
OS_CYLINDER: SPHERE
OS_ADD: +2.50
OD_AXIS: 175
OD_SPHERE: PLANO
OD_CYLINDER: +0.50
OD_ADD: +2.50
OS_SPHERE: +1.25

## 2024-06-28 ASSESSMENT — CUP TO DISC RATIO
OS_RATIO: 0.7
OD_RATIO: 0.6

## 2024-06-28 ASSESSMENT — VISUAL ACUITY
OD_SC+: -1
OS_SC+: -2
OD_SC: 20/25
METHOD: SNELLEN - LINEAR
METHOD_MR_RETINOSCOPY: 1
OS_SC: 20/30

## 2024-06-28 ASSESSMENT — EXTERNAL EXAM - RIGHT EYE: OD_EXAM: WNL

## 2024-06-28 ASSESSMENT — PACHYMETRY
OS_CT(UM): 520
OD_CT(UM): 540

## 2024-06-28 ASSESSMENT — SLIT LAMP EXAM - LIDS
COMMENTS: WNL
COMMENTS: WNL

## 2024-06-28 ASSESSMENT — EXTERNAL EXAM - LEFT EYE: OS_EXAM: WNL

## 2024-06-28 NOTE — PROGRESS NOTES
HPI       COMPREHENSIVE EYE EXAM    In both eyes.  Since onset it is gradually worsening.  Associated symptoms include floaters.  Negative for eye pain and flashes.  Treatments tried include artificial tears.             Comments    Gregoria Stein is a(n) 68 year old adult who presents for a comprehensive exam. Last eye exam was 2 year(s) ago. Since exam, vision has decreased. Uses lubricating drops. No flashes with occasional floaters. No eye pain.     Lab Results       Component                Value               Date                       A1C                      6.6                 05/23/2024                 A1C                      5.3                 11/04/2019              Kade Porras COT 1:16 PM June 28, 2024     Hx of LASIK each eye, SLT both eyes            Last edited by Kade Porras on 6/28/2024  1:19 PM.          Review of systems for the eyes was negative other than the pertinent positives/negatives listed in the HPI.    Ocular Meds: ATs prn OU    Ocular Hx: glaucoma OU    FOHx: mom - AMD; sister  - MS    PMHx:   Past Medical History:   Diagnosis Date    Anxiety     Arthritis     C spine, thumbs bilateral, feet, shoulders, knees    Depressive disorder     Gastro-oesophageal reflux disease     intermittent    Labial irritation     labial mass    Other chronic pain     feet, knees, shoulders, full spine, thumbs    PTSD (post-traumatic stress disorder)     Restless leg syndrome      Assessment & Plan     Gregoria Stein is a 68 year old adult with the following diagnoses:    POAG mild OD, moderate OS  - Year diagnosis, 2010/2011  - Previous glaucoma surgery/laser; s/p SLT OU (~2018)  - Tmax: unknown  - CCT: 540/520  - Gonio (dilated): open 360 OU  - Trauma history: negative  - Steroid exposure: positive not using now but previously short term course inhaled steroids  - Vasospastic disease: Migrane/Raynaud phenomenon: negative  - A past known hemodynamic crisis or Low BP:: negative  - Family history: negative  -  PMHx: Asthma and respiratory problems/Cardiac/Renal/Kidney stones/Sulfa Allergy; has restrictive airway disease   - Today's testing:  - Visual field June 28, 2024:   -- Right eye - enlarged blind spot, with superior defect, otherwise full, reliable;  -- Left eye - few central defects with with INS, fairly reliable  - OCT Optic Nerve RNFL Spectralis June 28, 2024  right eye: g83, bdln SN/IN  left eye: g75, thin ST, bdln IT  - IOP okay by applanation today, OCT RNFL with visual field defect consistent with nerve thinning of left eye; had extensive discussion with patient about starting IOP lowering eye drops including their side effects, also discussed with patient about selective laser trabeculoplasty vs close observation; patient interested in starting IOP lowering drops and returning for possible SLT left eye; will return 3 months for IOP check, OVF 24-2, possible SLT left eye  - Rx for latanaprost at bedtime OU sent (medication side effects reviewed)    S/p LASIK OU  - ~2000s  - observe    Combined form of age-related cataract OD  Nuclear sclerotic cataract left eye  - mild issues with glare, but not bothering patient  - observe    T2DM without Retinopathy OU  - Diagnosed: 5/2024  - strict BP/BG control  - patient understands importance  of good blood glucose control in order to reduce the risk of developing diabetic retinopathy  - yearly DFE    Dry Eyes OU  - ATs prn OU    Breast cancer  - s/p mastectomy (diagnosed stage 1 breast cancer)  - getting treated with anastrozole started 4/13/24  - no ocular involvement    Gender dysphoria  - on testosterone  - preferred pronouns: He/Him/They/Them    Astigmatism of right eye with presbyopia  Hyperopia of left eye with presbyopia  - good BCVA    Counseled return/RD precautions  Letter to referring provider    Patient disposition:   Return for [facility] 3 months IOP check and OVF 24-2, possible SLT OS, sooner changes.    Attending Physician Attestation:  Complete  documentation of historical and exam elements from today's encounter can be found in the full encounter summary report (not reduplicated in this progress note).  I personally obtained the chief complaint(s) and history of present illness.  I confirmed and edited as necessary the review of systems, past medical/surgical history, family history, social history, and examination findings as documented by others; and I examined the patient myself.  I personally reviewed the relevant tests, images, and reports as documented above.  I formulated and edited as necessary the assessment and plan and discussed the findings and management plan with the patient and family. . - Fay Flowers MD

## 2024-06-28 NOTE — LETTER
6/28/2024       RE: Gregoria Stein  [address]  [place] [address]     Dear Colleague,    Thank you for referring your patient, Gregoria Stein, to the [facility] at [facility]. Please see a copy of my visit note below.    HPI       COMPREHENSIVE EYE EXAM    In both eyes.  Since onset it is gradually worsening.  Associated symptoms include floaters.  Negative for eye pain and flashes.  Treatments tried include artificial tears.             Comments    Gregoria Stein is a(n) 68 year old adult who presents for a comprehensive exam. Last eye exam was 2 year(s) ago. Since exam, vision has decreased. Uses lubricating drops. No flashes with occasional floaters. No eye pain.     Lab Results       Component                Value               Date                       A1C                      6.6                 05/23/2024                 A1C                      5.3                 11/04/2019              Kade Porras COT 1:16 PM June 28, 2024     Hx of LASIK each eye, SLT both eyes            Last edited by Kade Porras on 6/28/2024  1:19 PM.          Review of systems for the eyes was negative other than the pertinent positives/negatives listed in the HPI.    Ocular Meds: ATs prn OU    Ocular Hx: glaucoma OU    FOHx: mom - AMD; sister  - MS    PMHx:   Past Medical History:   Diagnosis Date     Anxiety      Arthritis     C spine, thumbs bilateral, feet, shoulders, knees     Depressive disorder      Gastro-oesophageal reflux disease     intermittent     Labial irritation     labial mass     Other chronic pain     feet, knees, shoulders, full spine, thumbs     PTSD (post-traumatic stress disorder)      Restless leg syndrome      Assessment & Plan     Gregoria Stein is a 68 year old adult with the following diagnoses:    POAG mild OD, moderate OS  - Year diagnosis, 2010/2011  - Previous glaucoma surgery/laser; s/p SLT OU (~2018)  - Tmax:  unknown  - CCT: 540/520  - Gonio (dilated): open 360 OU  - Trauma history: negative  - Steroid exposure: positive not using now but previously short term course inhaled steroids  - Vasospastic disease: Migrane/Raynaud phenomenon: negative  - A past known hemodynamic crisis or Low BP:: negative  - Family history: negative  - PMHx: Asthma and respiratory problems/Cardiac/Renal/Kidney stones/Sulfa Allergy; has restrictive airway disease   - Today's testing:  - Visual field June 28, 2024:   -- Right eye - enlarged blind spot, with superior defect, otherwise full, reliable;  -- Left eye - few central defects with with INS, fairly reliable  - OCT Optic Nerve RNFL Spectralis June 28, 2024  right eye: g83, bdln SN/IN  left eye: g75, thin ST, bdln IT  - IOP okay by applanation today, OCT RNFL with visual field defect consistent with nerve thinning of left eye; had extensive discussion with patient about starting IOP lowering eye drops including their side effects, also discussed with patient about selective laser trabeculoplasty vs close observation; patient interested in starting IOP lowering drops and returning for possible SLT left eye; will return 3 months for IOP check, OVF 24-2, possible SLT left eye  - Rx for latanaprost at bedtime OU sent (medication side effects reviewed)    S/p LASIK OU  - ~2000s  - observe    Combined form of age-related cataract OD  Nuclear sclerotic cataract left eye  - mild issues with glare, but not bothering patient  - observe    T2DM without Retinopathy OU  - Diagnosed: 5/2024  - strict BP/BG control  - patient understands importance  of good blood glucose control in order to reduce the risk of developing diabetic retinopathy  - yearly DFE    Dry Eyes OU  - ATs prn OU    Breast cancer  - s/p mastectomy (diagnosed stage 1 breast cancer)  - getting treated with anastrozole started 4/13/24  - no ocular involvement    Gender dysphoria  - on testosterone  - preferred pronouns:  He/Him/They/Them    Astigmatism of right eye with presbyopia  Hyperopia of left eye with presbyopia  - good BCVA    Counseled return/RD precautions  Letter to referring provider    Patient disposition:   Return for [facility] 3 months IOP check and OVF 24-2, possible SLT OS, sooner changes.    Attending Physician Attestation:  Complete documentation of historical and exam elements from today's encounter can be found in the full encounter summary report (not reduplicated in this progress note).  I personally obtained the chief complaint(s) and history of present illness.  I confirmed and edited as necessary the review of systems, past medical/surgical history, family history, social history, and examination findings as documented by others; and I examined the patient myself.  I personally reviewed the relevant tests, images, and reports as documented above.  I formulated and edited as necessary the assessment and plan and discussed the findings and management plan with the patient and family. . - Fay Flowers MD        Again, thank you for allowing me to participate in the care of your patient.      Sincerely,    Fay Flowers MD

## 2024-06-30 ENCOUNTER — TRANSFERRED RECORDS (OUTPATIENT)
Dept: HEALTH INFORMATION MANAGEMENT | Facility: CLINIC | Age: 69
End: 2024-06-30
Payer: MEDICARE

## 2024-07-06 ENCOUNTER — HOSPITAL ENCOUNTER (OUTPATIENT)
Facility: CLINIC | Age: 69
Setting detail: OBSERVATION
Discharge: HOME | End: 2024-07-07
Attending: STUDENT IN AN ORGANIZED HEALTH CARE EDUCATION/TRAINING PROGRAM | Admitting: STUDENT IN AN ORGANIZED HEALTH CARE EDUCATION/TRAINING PROGRAM
Payer: MEDICARE

## 2024-07-06 DIAGNOSIS — F33.1 MAJOR DEPRESSIVE DISORDER, RECURRENT EPISODE, MODERATE (H): ICD-10-CM

## 2024-07-06 DIAGNOSIS — F41.1 GAD (GENERALIZED ANXIETY DISORDER): ICD-10-CM

## 2024-07-06 DIAGNOSIS — F32.A DEPRESSION, UNSPECIFIED DEPRESSION TYPE: ICD-10-CM

## 2024-07-06 DIAGNOSIS — F43.10 PTSD (POST-TRAUMATIC STRESS DISORDER): ICD-10-CM

## 2024-07-06 DIAGNOSIS — L03.313 CELLULITIS OF CHEST WALL: ICD-10-CM

## 2024-07-06 PROCEDURE — G0378 HOSPITAL OBSERVATION PER HR: HCPCS

## 2024-07-06 PROCEDURE — 99285 EMERGENCY DEPT VISIT HI MDM: CPT

## 2024-07-06 PROCEDURE — 250N000013 HC RX MED GY IP 250 OP 250 PS 637: Performed by: STUDENT IN AN ORGANIZED HEALTH CARE EDUCATION/TRAINING PROGRAM

## 2024-07-06 PROCEDURE — 250N000013 HC RX MED GY IP 250 OP 250 PS 637: Performed by: NURSE PRACTITIONER

## 2024-07-06 RX ORDER — ALBUTEROL SULFATE 90 UG/1
2 AEROSOL, METERED RESPIRATORY (INHALATION) EVERY 4 HOURS PRN
Status: DISCONTINUED | OUTPATIENT
Start: 2024-07-06 | End: 2024-07-07 | Stop reason: HOSPADM

## 2024-07-06 RX ORDER — LATANOPROST 50 UG/ML
1 SOLUTION/ DROPS OPHTHALMIC AT BEDTIME
Status: DISCONTINUED | OUTPATIENT
Start: 2024-07-06 | End: 2024-07-07 | Stop reason: HOSPADM

## 2024-07-06 RX ORDER — CLONIDINE HYDROCHLORIDE 0.1 MG/1
0.1 TABLET ORAL 2 TIMES DAILY
Status: DISCONTINUED | OUTPATIENT
Start: 2024-07-06 | End: 2024-07-07

## 2024-07-06 RX ORDER — GABAPENTIN 300 MG/1
900 CAPSULE ORAL AT BEDTIME
Status: DISCONTINUED | OUTPATIENT
Start: 2024-07-06 | End: 2024-07-07 | Stop reason: HOSPADM

## 2024-07-06 RX ORDER — ANASTROZOLE 1 MG/1
1 TABLET ORAL DAILY
Status: DISCONTINUED | OUTPATIENT
Start: 2024-07-07 | End: 2024-07-07 | Stop reason: HOSPADM

## 2024-07-06 RX ORDER — DESVENLAFAXINE 25 MG/1
50 TABLET, EXTENDED RELEASE ORAL DAILY
Status: DISCONTINUED | OUTPATIENT
Start: 2024-07-07 | End: 2024-07-07 | Stop reason: HOSPADM

## 2024-07-06 RX ORDER — TRAZODONE HYDROCHLORIDE 50 MG/1
50-100 TABLET, FILM COATED ORAL
Status: DISCONTINUED | OUTPATIENT
Start: 2024-07-06 | End: 2024-07-07 | Stop reason: HOSPADM

## 2024-07-06 RX ORDER — ACETAMINOPHEN 325 MG/1
650 TABLET ORAL EVERY 4 HOURS PRN
Status: DISCONTINUED | OUTPATIENT
Start: 2024-07-06 | End: 2024-07-07 | Stop reason: HOSPADM

## 2024-07-06 RX ORDER — HYDROXYZINE HCL 25 MG
12.5-25 TABLET ORAL EVERY 6 HOURS PRN
Status: DISCONTINUED | OUTPATIENT
Start: 2024-07-06 | End: 2024-07-07 | Stop reason: HOSPADM

## 2024-07-06 RX ORDER — ONDANSETRON 4 MG/1
4 TABLET, ORALLY DISINTEGRATING ORAL EVERY 6 HOURS PRN
Status: DISCONTINUED | OUTPATIENT
Start: 2024-07-06 | End: 2024-07-07 | Stop reason: HOSPADM

## 2024-07-06 RX ORDER — PANTOPRAZOLE SODIUM 40 MG/1
40 TABLET, DELAYED RELEASE ORAL DAILY
Status: DISCONTINUED | OUTPATIENT
Start: 2024-07-07 | End: 2024-07-07 | Stop reason: HOSPADM

## 2024-07-06 RX ORDER — CLONAZEPAM 1 MG/1
1 TABLET ORAL 2 TIMES DAILY PRN
Status: DISCONTINUED | OUTPATIENT
Start: 2024-07-06 | End: 2024-07-07 | Stop reason: HOSPADM

## 2024-07-06 RX ORDER — CLINDAMYCIN HCL 300 MG
300 CAPSULE ORAL 3 TIMES DAILY
Status: DISCONTINUED | OUTPATIENT
Start: 2024-07-06 | End: 2024-07-07 | Stop reason: HOSPADM

## 2024-07-06 RX ADMIN — APIXABAN 2.5 MG: 2.5 TABLET, FILM COATED ORAL at 22:29

## 2024-07-06 RX ADMIN — CLINDAMYCIN HYDROCHLORIDE 300 MG: 300 CAPSULE ORAL at 21:14

## 2024-07-06 RX ADMIN — PRAMIPEXOLE DIHYDROCHLORIDE 0.75 MG: 0.5 TABLET ORAL at 22:29

## 2024-07-06 RX ADMIN — GABAPENTIN 900 MG: 300 CAPSULE ORAL at 22:29

## 2024-07-06 RX ADMIN — LATANOPROST 1 DROP: 50 SOLUTION OPHTHALMIC at 22:30

## 2024-07-06 RX ADMIN — ACETAMINOPHEN 650 MG: 325 TABLET, FILM COATED ORAL at 22:29

## 2024-07-06 RX ADMIN — CLONIDINE HYDROCHLORIDE 0.1 MG: 0.1 TABLET ORAL at 22:29

## 2024-07-06 ASSESSMENT — ACTIVITIES OF DAILY LIVING (ADL)
ADLS_ACUITY_SCORE: 35

## 2024-07-06 NOTE — ED PROVIDER NOTES
"  Emergency Department Note      History of Present Illness     Chief Complaint   Psychiatric Evaluation    HPI   Killian Stein is a 68 year old male with a history of cancer and diabetes mellitus who presents for depression management. Killian says he has experienced issues with receiving care for his recent mastectomy and other related procedures. He says he has experienced lack of communication from his care team and feels like he has no help. He says he has \"tried so long\" and that he feels done with life. Patient endorses SI and notes it is difficult to get help. He mentions that he has no family help and has not had a partner in 25 years. He acknowledges there is a lot going on in his mind and thinks that taking a break to \"chill out\" will help with his depression symptoms. Patient was seen yesterday following a possible infection from surgery and was prescribed clindamycin. Upon examination, he was not aware of this prescription.    Independent Historian   None    Review of External Notes   I reviewed the emergency summary note from 7/5/24. Patient was evaluated after possible infection following a nipple graft. Prescribed clindamycin.    Past Medical History     Medical History and Problem List   Anxiety  Arthritis  Cancer, breast   Depressive disorder  Hypertension   Hyperlipidemia   GERD  Gender dysphoria   Glaucoma   OA  ASHLYN  Other chronic pain  PTSD   RLS  Thoracic aortic aneurysm     Medications   Albuterol   Apixaban  Anastrozole  Clonazepam  Clonidine  Cyclobenzaprine  Desvenlafaxine   Famotidine   Gabapentin  Hydrochlorothiazide  Montelukast   Oxycodone   Omeprazole   Pramipexole  Testosterone cypionate  Trazodone     Surgical History   Colonoscopy   Excise mass vagina  Genitourinary Surgery  Right Joint Replacement  Orthopedic Surgery   Bilateral mastectomy   Right knee surgery   Achilles tendon repair   Foot surgery     Physical Exam     Patient Vitals for the past 24 hrs:   BP Temp Temp src Pulse Resp " "SpO2 Height Weight   07/06/24 2229 122/80 -- -- -- -- -- -- --   07/06/24 1931 (!) 164/96 -- -- 76 18 95 % 1.676 m (5' 6\") 94.6 kg (208 lb 9.6 oz)   07/06/24 1649 (!) 172/82 97.7  F (36.5  C) Oral 91 16 94 % 1.676 m (5' 6\") 94.3 kg (208 lb)     Physical Exam  General:  Alert, interactive  Cardiovascular:  Normal rate  Lungs:  No respiratory distress, no accessory muscle use  Abdominal: No distension   Neuro:  Moving all 4 extremities  MSK: No gross deformities  Skin:  Warm, dry. Left nipple flap with a small area of sloughing on the medial aspect, with no surrounding erythema. No purulent drainage  Psych: depressed mood, depressed affect, tearful, suicidal ideations.    Diagnostics     Lab Results   Labs Ordered and Resulted from Time of ED Arrival to Time of ED Departure - No data to display    Imaging   No orders to display     Independent Interpretation   None    ED Course      Medications Administered   Medications   clindamycin (CLEOCIN) capsule 300 mg (300 mg Oral $Given 7/6/24 2114)   albuterol (PROVENTIL HFA/VENTOLIN HFA) inhaler (has no administration in time range)   anastrozole (ARIMIDEX) tablet 1 mg (has no administration in time range)   apixaban ANTICOAGULANT (ELIQUIS) tablet 2.5 mg (2.5 mg Oral $Given 7/6/24 2229)   clonazePAM (klonoPIN) tablet 1 mg (has no administration in time range)   desvenlafaxine succinate (PRISTIQ) 24 hr tablet 50 mg (has no administration in time range)   gabapentin (NEURONTIN) capsule 900 mg (900 mg Oral $Given 7/6/24 2229)   latanoprost (XALATAN) 0.005 % ophthalmic solution 1 drop (1 drop Both Eyes $Given 7/6/24 2230)   pantoprazole (PROTONIX) EC tablet 40 mg (has no administration in time range)   pramipexole (MIRAPEX) tablet 0.75 mg (0.75 mg Oral $Given 7/6/24 2229)   traZODone (DESYREL) tablet  mg (has no administration in time range)   acetaminophen (TYLENOL) tablet 650 mg (650 mg Oral $Given 7/6/24 2226)   hydrOXYzine HCl (ATARAX) half-tab 12.5-25 mg (has no " administration in time range)   ondansetron (ZOFRAN ODT) ODT tab 4 mg (has no administration in time range)   cloNIDine (CATAPRES) tablet 0.1 mg (has no administration in time range)       Procedures   Procedures     Discussion of Management   None    ED Course   ED Course as of 07/07/24 0153   Sat Jul 06, 2024   1895 I evaluated the patient and obtained the history as noted above.          Optional/Additional Documentation  None    Medical Decision Making / Diagnosis     CMS Diagnoses: None    MIPS       None    MDM   Gregoria Stein is a 68 year old adult who presents with severe depression, suicidal thoughts.  Here today seeking help.  Will not put on a hold at this time as it is willing to transfer to [facility].  Of note, he was seen at outside ED and seen by plastic surgery resident for concern for sloughing skin by his left nipple graft.  Today it looks better than photographed yesterday though will start him on the recommended clindamycin 3 times daily for 7 days.  Ordered for complete course.  Asked patient to request remainder on discharge.  No indication for lab testing at this time.  Transferred to [facility] in stable condition.      Disposition   The patient was transferred to [facility].     Diagnosis     ICD-10-CM    1. Depression, unspecified depression type  F32.A       2. Cellulitis of chest wall  L03.313          Scribe Disclosure:  I, Ryann Ledesma, am serving as a scribe at 6:01 PM on 7/6/2024 to document services personally performed by Anahi Rojas DO based on my observations and the provider's statements to me.     Scribe Disclosure:  I, Amy Lorenzo, am serving as the scribe  for Ryann Ledesma, the scribe trainee, at 6:19 PM on 7/6/2024 to document services personally performed by Anahi Rojas DO based on our observations and the provider's statements to us.         Anahi Rojas DO  07/07/24 0153

## 2024-07-06 NOTE — ED TRIAGE NOTES
Pt presents for [facility] for depression management.      Triage Assessment (Adult)       Row Name 07/06/24 0811          Triage Assessment    Airway WDL WDL        Cardiac WDL    Cardiac WDL WDL        Cognitive/Neuro/Behavioral WDL    Cognitive/Neuro/Behavioral WDL WDL

## 2024-07-06 NOTE — ED NOTES
Pt's prescription oxycodone 5mg x 4 pills and 1/2 pill, in bottle, sent down to pharmacy in envelope No:[id number].

## 2024-07-07 VITALS
HEIGHT: 66 IN | TEMPERATURE: 97.7 F | SYSTOLIC BLOOD PRESSURE: 120 MMHG | OXYGEN SATURATION: 95 % | DIASTOLIC BLOOD PRESSURE: 70 MMHG | BODY MASS INDEX: 33.52 KG/M2 | RESPIRATION RATE: 18 BRPM | HEART RATE: 60 BPM | WEIGHT: 208.6 LBS

## 2024-07-07 PROCEDURE — 250N000013 HC RX MED GY IP 250 OP 250 PS 637: Performed by: EMERGENCY MEDICINE

## 2024-07-07 PROCEDURE — 99223 1ST HOSP IP/OBS HIGH 75: CPT | Performed by: NURSE PRACTITIONER

## 2024-07-07 PROCEDURE — G0378 HOSPITAL OBSERVATION PER HR: HCPCS

## 2024-07-07 PROCEDURE — 250N000013 HC RX MED GY IP 250 OP 250 PS 637: Performed by: STUDENT IN AN ORGANIZED HEALTH CARE EDUCATION/TRAINING PROGRAM

## 2024-07-07 PROCEDURE — 250N000013 HC RX MED GY IP 250 OP 250 PS 637: Performed by: NURSE PRACTITIONER

## 2024-07-07 RX ORDER — CLINDAMYCIN HCL 300 MG
300 CAPSULE ORAL 4 TIMES DAILY
Qty: 36 CAPSULE | Refills: 0 | Status: SHIPPED | OUTPATIENT
Start: 2024-07-07 | End: 2024-07-16

## 2024-07-07 RX ORDER — OXYCODONE HYDROCHLORIDE 5 MG/1
5 TABLET ORAL
Status: COMPLETED | OUTPATIENT
Start: 2024-07-07 | End: 2024-07-07

## 2024-07-07 RX ORDER — CLONIDINE HYDROCHLORIDE 0.1 MG/1
0.1 TABLET ORAL 2 TIMES DAILY
Status: DISCONTINUED | OUTPATIENT
Start: 2024-07-07 | End: 2024-07-07 | Stop reason: HOSPADM

## 2024-07-07 RX ORDER — OXYCODONE HYDROCHLORIDE 5 MG/1
5 TABLET ORAL ONCE
Status: COMPLETED | OUTPATIENT
Start: 2024-07-07 | End: 2024-07-07

## 2024-07-07 RX ADMIN — CLINDAMYCIN HYDROCHLORIDE 300 MG: 300 CAPSULE ORAL at 08:47

## 2024-07-07 RX ADMIN — APIXABAN 2.5 MG: 2.5 TABLET, FILM COATED ORAL at 08:47

## 2024-07-07 RX ADMIN — CLONIDINE HYDROCHLORIDE 0.1 MG: 0.1 TABLET ORAL at 08:47

## 2024-07-07 RX ADMIN — DESVENLAFAXINE 50 MG: 25 TABLET, EXTENDED RELEASE ORAL at 08:47

## 2024-07-07 RX ADMIN — OXYCODONE HYDROCHLORIDE 5 MG: 5 TABLET ORAL at 07:44

## 2024-07-07 RX ADMIN — ANASTROZOLE 1 MG: 1 TABLET, COATED ORAL at 08:47

## 2024-07-07 ASSESSMENT — ACTIVITIES OF DAILY LIVING (ADL)
ADLS_ACUITY_SCORE: 35

## 2024-07-07 ASSESSMENT — COLUMBIA-SUICIDE SEVERITY RATING SCALE - C-SSRS
ATTEMPT SINCE LAST CONTACT: NO
6. HAVE YOU EVER DONE ANYTHING, STARTED TO DO ANYTHING, OR PREPARED TO DO ANYTHING TO END YOUR LIFE?: NO
TOTAL  NUMBER OF ABORTED OR SELF INTERRUPTED ATTEMPTS SINCE LAST CONTACT: NO
2. HAVE YOU ACTUALLY HAD ANY THOUGHTS OF KILLING YOURSELF?: NO
1. SINCE LAST CONTACT, HAVE YOU WISHED YOU WERE DEAD OR WISHED YOU COULD GO TO SLEEP AND NOT WAKE UP?: NO
TOTAL  NUMBER OF INTERRUPTED ATTEMPTS SINCE LAST CONTACT: NO
SUICIDE, SINCE LAST CONTACT: NO

## 2024-07-07 NOTE — PLAN OF CARE
Gregoria Stein  July 6, 2024  Plan of Care Hand-off Note     Patient Care Path: observation    Plan for Care:   Pt reported that he had a double mastectomy on 6/12 due to chest cancer. Since then he has not been getting the medical care he needs and is concerned about black tissues surrounding the incision site. Pt has made repeated attempts to get the care he needs and has grown increasingly hopeless and suicidal. Pt reported that he realized that this was not a good weekend for him to be home alone and isolating and he decided to go to the the ED/[facility]. Pt reported that he does not want IP MH at this time and that he wants to attend the appointment with his plastic surgeon on Monday 7/8 to address post surgery issues that have escalated. Pt denied AH and VH. He endorsed thoughts of jumping off the bridge, but denied plan and intent. A lower level of care has been unsuccessful in treating and stabilizing patient s mental health symptoms. Patient will remain on [facility] unit under observation for continued monitoring, treatment and therapeutic intervention of mental health symptoms. Observation at [facility] could help mitigate the need for a more restrictive level of care in an inpatient setting.    Identified Goals and Safety Issues: Stabilization and symptom reducation. In 2021, pt tied bed sheet around neck while on unit at [facility].    Overview:  [place]: [phone] (no NETTIE on file)         Legal Status: Legal Status at Admission: Voluntary/Patient has signed consent for treatment    Psychiatry Consult:       Updated APRN and RN  regarding plan of care. Updated RN re past safety concern.          Mahogany Gupta
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
## 2024-07-07 NOTE — ED NOTES
Patient received PRN oxycodone. Much calmer now. Reporting pain is now 4/10. Received morning medications. Willing to meet with the provider this morning. Eating breakfast in consult room. Reports feeling embarrassed about pacing and yelling at staff. Therapeutic listening was provided. Will continue to monitor.
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
## 2024-07-07 NOTE — ED NOTES
"Pt had poor sleep and was up around 3 am. Pt expressed how frustrated and dissatisfied he was with the [place] healthcare system and the level of care he was receiving. Pt stated that he was contemplating to be discharged and go home before seeing psych provider today. Pt stated that \"I can take care of my body myself\" and \"I am sick and tired of this lack of care\". \"I don't want this anymore\", \"I don't want this kind of care\". Pt also stated that \"in the end we all die\" and it's okay with him vs \"dealing with all this shit\". In addition, pt reported chronic pain and was unhappy to know that PRN Oxycodone was not ordered for him. This writer called on called provider and ordered one time dose of Oxycodone.     Pt asked to take a shower and do a dressing change. Pt used his dressing supplies (found in his backpack) to do a wound dressing change. After taking a shower and completing dressing change, pt went back to the sensory room and laid down on the mat. This was around 6 am.     "

## 2024-07-07 NOTE — ED PROVIDER NOTES
"EmPATH Unit - Psychiatry  Combined Observation Note and Discharge Summary  [facility] Emergency Department  Observation Initiation Date: Jul 6, 2024    Gregoria Stein MRN: [id number]   Age: 68 year old YOB: 1955     History     Chief Complaint   Patient presents with    Psychiatric Evaluation     HPI  Gregoria Stein is a 68 year old adult with a past history notable for a history of cancer and diabetes mellitus who presented to emergency department for depression management. He was cleared medically and transferred to [facility] overnight for additional assessment and treatment planning. At the time of the assessment today, 7/7/24 he is nearing 18 hours in emergency care.    Please see the [facility] assessment & reassessment (if available), ED physician notes and nursing notes for additional information and collateral content if available. All were reviewed prior to meeting with the patient. Pertinent content includes the following:    From Dr. Roro Munoz's note from the emergency department:     Killian says he has experienced issues with receiving care for his recent mastectomy and other related procedures. He says he has experienced lack of communication from his care team and feels like he has no help. He says he has \"tried so long\" and that he feels done with life. Patient endorses SI and notes it is difficult to get help. He mentions that he has no family help and has not had a partner in 25 years. He acknowledges there is a lot going on in his mind and thinks that taking a break to \"chill out\" will help with his depression symptoms. Patient was seen yesterday following a possible infection from surgery and was prescribed clindamycin. Upon examination, he was not aware of this prescription.      From RICHARD Shields:    Patient is presenting to the ED for the following concerns: Suicidal ideation, Depression, Anxiety, Worsening psychosocial stress, Health stressors.   Factors that make the mental health " crisis life threatening or complex are:  Pt reported that he had a double mastectomy on 6/12 due to chest cancer. Since then he has not been getting the medical care he needs and is concerned about black tissues surrounding the incision site. Pt has made repeated attempts to get the care he needs and has grown increasingly hopeless and suicidal. Pt shared that today he went to garden with recently [facility], but they were not there due to travelling that he was unaware of. While there, he was close the the [place] bridge, which is a bridge he has considered jumping off of in the past. He reported that this association along with the multiple stressors he had going on his life was just all too much for him. He felt that having a body and being alive was the reason for his distress and that ending his life could solve this problem. Pt reported that he realized that this was not a good weekend for him to be home alone and isolating and he decided to go to the the ED/EmPATH. Pt reported that he does not want IP MH at this time and that he wants to attend the appointment with his plastic surgeon on Monday 7/8 to address post surgery issues that have escalated. Pt denied AH and VH. He endorsed thoughts of jumping off the bridge, but denied plan and intent.    On approach, Killian is found sitting in a chair in the milieu. He agreed to an interview in a conference room. He was engaged throughout and appeared to be a reliable informant. He spent much of the time describing how his care after his mastectomy has been handled. He expressed frustration that he has not been able to see the surgeon and had to follow up with an AMADEO. He endorses intermittent suicide ideation for many years, and contemplated jumping off of a bridge. He reports a lifetime of not feeling that his needs were met, which he reports has been his perception from childhood when he was not allowed to express his gender identity. He currently  identifies as a transmasculine adult with they/them/he/him pronouns. He reports feeling safe in the milieu, reports that his SI has diminished and agrees to remain in [facility] for additional mood regulation, repose and reflection.  Past Medical History  Past Medical History:   Diagnosis Date    Anxiety     Arthritis     C spine, thumbs bilateral, feet, shoulders, knees    Depressive disorder     Gastro-oesophageal reflux disease     intermittent    Labial irritation     labial mass    Other chronic pain     feet, knees, shoulders, full spine, thumbs    PTSD (post-traumatic stress disorder)     Restless leg syndrome      Past Surgical History:   Procedure Laterality Date    COLONOSCOPY      EXCISE MASS VAGINA Left 4/10/2015    Procedure: EXCISE MASS VAGINA;  Surgeon: Stanislav Byers MD;  Location: [facility] OR    GENITOURINARY SURGERY      Urethrial dilation    JOINT REPLACEMENT Right 07/2018    ORTHOPEDIC SURGERY      right rotator cuff, left achilles tendon repair, neuroma removed right foot, right knee arthroscopy,      albuterol (PROAIR HFA/PROVENTIL HFA/VENTOLIN HFA) 108 (90 Base) MCG/ACT inhaler  anastrozole (ARIMIDEX) 1 MG tablet  apixaban ANTICOAGULANT (ELIQUIS) 2.5 MG tablet  artificial saliva (BIOTENE MT) SOLN solution  clindamycin (CLEOCIN) 300 MG capsule  clonazePAM (KLONOPIN) 1 MG tablet  cloNIDine (CATAPRES) 0.1 MG tablet  cyclobenzaprine (FLEXERIL) 5 MG tablet  desvenlafaxine (PRISTIQ) 50 MG 24 hr tablet  hydroCHLOROthiazide 12.5 MG tablet  latanoprost (XALATAN) 0.005 % ophthalmic solution  omeprazole (PRILOSEC) 40 MG DR capsule  oxyCODONE (ROXICODONE) 5 MG tablet  pramipexole (MIRAPEX) 0.25 MG tablet  testosterone cypionate (DEPOTESTOSTERONE) 200 MG/ML injection  traZODone (DESYREL) 100 MG tablet  UNABLE TO FIND  diclofenac (VOLTAREN) 1 % topical gel  gabapentin (NEURONTIN) 300 MG capsule  Lidocaine (LIDOCARE) 4 % Patch  magnesium hydroxide (MILK OF MAGNESIA) 400 MG/5ML suspension  melatonin 3 MG  "tablet  polyethylene glycol (MIRALAX) 17 GM/Dose powder  zinc oxide (DESITIN) 40 % paste      Allergies   Allergen Reactions    Midazolam Other (See Comments)     Stopped breathing    Other reaction(s): Apnea   Stopped breathing   Stopped breathing    Penicillins Rash and Hives     Family History  Family History   Problem Relation Age of Onset    Macular Degeneration Mother     Osteoporosis Mother     Heart Disease Father 49    Multiple Sclerosis Sister     Diabetes Paternal Uncle         heart issues    Cancer No family hx of     Coronary Artery Disease No family hx of      Social History   Social History     Tobacco Use    Smoking status: Former     Current packs/day: 0.00     Types: Cigarettes     Quit date: 1983     Years since quittin.8    Smokeless tobacco: Never   Vaping Use    Vaping status: Never Used   Substance Use Topics    Alcohol use: Yes     Comment: occasional    Drug use: Yes     Types: Marijuana     Comment: daily only at night time for medical conditions          Review of Systems  A medically appropriate review of systems was performed with pertinent positives and negatives noted in the HPI, and all other systems negative.    Physical Examination   BP: (!) 172/82  Pulse: 91  Temp: 97.7  F (36.5  C)  Resp: 16  Height: 167.6 cm (5' 6\")  Weight: 94.3 kg (208 lb)  SpO2: 94 %    Physical Exam  General: Appears stated age.   Neuro: Alert and fully oriented. Extremities appear to demonstrate normal strength on visual inspection.   Integumentary/Skin: no rash visualized, normal color    Psychiatric Examination   Appearance: awake, alert  Attitude:  cooperative  Eye Contact:  fair  Mood:  anxious  Affect:  mood congruent  Speech:  clear, coherent  Psychomotor Behavior:  no evidence of tardive dyskinesia, dystonia, or tics  Thought Process:  linear  Associations:  no loose associations  Thought Content:  no evidence of suicidal ideation or homicidal ideation and no evidence of psychotic " thought  Insight:  fair  Judgement:  fair  Oriented to:  time, person, and place  Attention Span and Concentration:  intact  Recent and Remote Memory:  intact  Language: able to name/identify objects without impairment  Fund of Knowledge: intact with awareness of current and past events    ED Course     ED Course as of 07/07/24 1103   Sat Jul 06, 2024   5131 I evaluated the patient and obtained the history as noted above.          Labs Ordered and Resulted from Time of ED Arrival to Time of ED Departure - No data to display    Assessments & Plan (with Medical Decision Making)   Patient presenting with emotional dysregulation and enhanced SI in the presence of psychosocial stress and a post surgical infection. He has a psychiatric provider and therapist he sees in the community.  Initially, he agreed to remain in EmPATH, then became upset and reported that he wanted to leave. [facility] after ascertaining that there were no imminent safety concerns . Nursing notes reviewed noting no acute issues.     I have reviewed the assessment completed by the [facility].     During the observation period, the patient did not require medications for agitation, and did not require restraints/seclusion for patient and/or provider safety.    The patient was found to have a psychiatric condition that would benefit from an observation stay in the emergency department for further psychiatric stabilization and/or coordination of a safe disposition. The observation plan includes serial assessments of psychiatric condition, potential administration of medications if indicated, further disposition pending the patient's psychiatric course during the monitoring period.     Preliminary diagnosis:    ICD-10-CM    1. Depression, unspecified depression type  F32.A       2. Cellulitis of chest wall  L03.313       3. CLAIRE (generalized anxiety disorder)  F41.1       4. PTSD (post-traumatic stress disorder)   F43.10       5. Major depressive disorder, recurrent episode, moderate (H)  F33.1            Treatment Plan:  - Resume prior to admission medications.  - Follow up with appointments as scheduled.  - Discharge home.  - Return if symptoms worsen.    After the period in observation care, the patient's circumstances and mental state were safe for outpatient management. After counseling on the diagnosis, work-up, and treatment plan, the patient was discharged. Close follow-up with a psychiatrist and/or therapist was recommended and community psychiatric resources were provided. Patient is to return to the ED if any urgent or potentially life-threatening concerns.      At the time of discharge, the patient's acute suicide risk was determined to be low due to the following factors: Reduction in the intensity of mood/anxiety symptoms that preceded the admission, denial of suicidal thoughts, denies feeling helpless or helpless, not currently under the influence of alcohol or illicit substances, denies experiencing command hallucinations, no immediate access to firearms. The patient's acute risk could be higher if noncompliant with their treatment plan, medications, follow-up appointments or using illicit substances or alcohol. Protective factors include: social supports, stable housing, and a desire to work toward mental health and wellness.    --  JODI Johnson CNP   [facility] EMERGENCY DEPT  EmPATH Unit         Linda Marino APRN CNP  07/11/24 8330

## 2024-07-07 NOTE — ED NOTES
Patient agreeable to discharge plan. Discharge instructions reviewed with patient including follow-up care plan. Medications: Clindamycin prescribed and sent home with patient. Reviewed safety plan and outpatient resources. Denies SI and HI. All belongings that were brought into the hospital have been returned to patient. Escorted off the unit at 1255 accompanied by [facility] staff. Discharged to home via friend.

## 2024-07-07 NOTE — PROGRESS NOTES
68 year old Trans Male received from Triage for depression. Pt reports he came in today as he is currently under a lot of stress and has been having a lot of dark, negative thoughts. He states he was worried about his impulse control and needed a safe pace to be. Pt reported having anxiety and suicidal thoughts. He denies a plan or intent. Pt reports when he was inpatient on Oct of 2021 he tried to use a bed sheet to strangle himself. Pt states that in Oct of 2019 he had a suicide attempt by standing on a bridge. Pt endorses a lot of stress from recent mastectomies from cancer and states he feels as if he is not getting adequate healthcare. Pt reports he does not have any support at home and feels like he can not take care of himself anymore like he used to when he was younger. Pt states this increases his suicidal thoughts. Pt currently denies any suicidal thoughts. He contracts for safety. Pt denies HI/Brown. He denies the use of tobacco, alcohol or drugs. Pt reports he uses medical marijuana. Pt states last used it prior to coming to the hospital. Pt tearful during intake process. He is pleasant and cooperative.       Pt reports he had surgery on June 12th, 2024 and has dressing changes he has to do daily. Pt states he can take care of the dressing change himself and usually does so after he showers for the day. Pt was seen yesterday in the ED for some drainage he noted on his mastectomy. Pt stated they started clindamycin yesterday but has not yet started this medication. ED provider ordered clindamycin for patient to start tonight. Pt care ordered received from Provider so Pt can use an ACE bandage and binder to keep dressings in place.     Nursing and risk assessments completed.  Assessments reviewed with LMHP and physician. Video monitoring in progress, patient informed.  Admission information reviewed with patient. Patient given a tour of Em[facility] and instructions on using the facility. Questions regarding  [facility] addressed. Pt search completed and belongings inventoried.

## 2024-07-07 NOTE — ED NOTES
"Patient agitated, demanding to be discharged due to not receiving medication (Oxycodone). Patient has his own supply in main pharmacy. Seen pacing around the unit. Continues to demand for his stuff. Frustrated with care. Therapeutic listening was provided, but patient unwilling to have further conversation with nursing staff. States, \"You do not know what it's like to be skinned alive\". [facility] notified. Meeting with them now to determine any safety concerns.  "

## 2024-07-07 NOTE — ED NOTES
Pt spent time out in the lounge socializing with a peer. He states it was good to talk to peers. Pt resting on the bean bag in Sensory Room C. Pt endorses feeling safe in the hospital and contracts for safety. Pt agreeable to stay on observation status.

## 2024-07-09 ENCOUNTER — PATIENT OUTREACH (OUTPATIENT)
Dept: CARE COORDINATION | Facility: CLINIC | Age: 69
End: 2024-07-09
Payer: MEDICARE

## 2024-07-09 NOTE — PROGRESS NOTES
Connected Care Resource Center Contact  [facility]/Voicemail     Clinical Data: Post-Discharge Outreach     Outreach attempted x 2.  Left message on patient's voicemail, providing [facility]'s central phone number of [phone] ([phone]) for questions/concerns and/or to schedule an appt with an [facility] provider, if they do not have a PCP.      Plan:  General acute hospital will do no further outreaches at this time.       JOEL Dotson  Connected Care Resource Center, [facility]    *Connected Care Resource Team does NOT follow patient ongoing. Referrals are identified based on internal discharge reports and the outreach is to ensure patient has an understanding of their discharge instructions.

## 2024-07-12 ENCOUNTER — HOSPITAL ENCOUNTER (OUTPATIENT)
Facility: CLINIC | Age: 69
Setting detail: OBSERVATION
Discharge: HOME | End: 2024-07-14
Attending: EMERGENCY MEDICINE | Admitting: EMERGENCY MEDICINE
Payer: MEDICARE

## 2024-07-12 DIAGNOSIS — R45.851 SUICIDAL THOUGHTS: ICD-10-CM

## 2024-07-12 PROCEDURE — G0378 HOSPITAL OBSERVATION PER HR: HCPCS

## 2024-07-12 PROCEDURE — 250N000013 HC RX MED GY IP 250 OP 250 PS 637

## 2024-07-12 PROCEDURE — 250N000013 HC RX MED GY IP 250 OP 250 PS 637: Performed by: NURSE PRACTITIONER

## 2024-07-12 PROCEDURE — 99285 EMERGENCY DEPT VISIT HI MDM: CPT

## 2024-07-12 RX ORDER — GABAPENTIN 300 MG/1
900 CAPSULE ORAL AT BEDTIME
Status: DISCONTINUED | OUTPATIENT
Start: 2024-07-12 | End: 2024-07-14 | Stop reason: HOSPADM

## 2024-07-12 RX ORDER — OXYCODONE HYDROCHLORIDE 5 MG/1
5 TABLET ORAL EVERY 6 HOURS PRN
Status: DISCONTINUED | OUTPATIENT
Start: 2024-07-12 | End: 2024-07-14 | Stop reason: HOSPADM

## 2024-07-12 RX ORDER — ALBUTEROL SULFATE 90 UG/1
2 AEROSOL, METERED RESPIRATORY (INHALATION) EVERY 4 HOURS PRN
Status: DISCONTINUED | OUTPATIENT
Start: 2024-07-12 | End: 2024-07-14 | Stop reason: HOSPADM

## 2024-07-12 RX ORDER — OLANZAPINE 10 MG/1
10 TABLET, ORALLY DISINTEGRATING ORAL
Status: DISCONTINUED | OUTPATIENT
Start: 2024-07-12 | End: 2024-07-14 | Stop reason: HOSPADM

## 2024-07-12 RX ORDER — LATANOPROST 50 UG/ML
1 SOLUTION/ DROPS OPHTHALMIC AT BEDTIME
Status: DISCONTINUED | OUTPATIENT
Start: 2024-07-12 | End: 2024-07-14 | Stop reason: HOSPADM

## 2024-07-12 RX ORDER — TRAZODONE HYDROCHLORIDE 50 MG/1
100 TABLET, FILM COATED ORAL
Status: DISCONTINUED | OUTPATIENT
Start: 2024-07-12 | End: 2024-07-14 | Stop reason: HOSPADM

## 2024-07-12 RX ORDER — ACETAMINOPHEN 325 MG/1
650 TABLET ORAL EVERY 4 HOURS PRN
Status: DISCONTINUED | OUTPATIENT
Start: 2024-07-12 | End: 2024-07-14 | Stop reason: HOSPADM

## 2024-07-12 RX ORDER — HYDROCHLOROTHIAZIDE 12.5 MG/1
12.5 TABLET ORAL DAILY
Status: DISCONTINUED | OUTPATIENT
Start: 2024-07-12 | End: 2024-07-14 | Stop reason: HOSPADM

## 2024-07-12 RX ORDER — CYCLOBENZAPRINE HCL 5 MG
5 TABLET ORAL EVERY 8 HOURS PRN
Status: DISCONTINUED | OUTPATIENT
Start: 2024-07-12 | End: 2024-07-14 | Stop reason: HOSPADM

## 2024-07-12 RX ORDER — CLONIDINE HYDROCHLORIDE 0.1 MG/1
0.1 TABLET ORAL 2 TIMES DAILY
Status: DISCONTINUED | OUTPATIENT
Start: 2024-07-12 | End: 2024-07-14 | Stop reason: HOSPADM

## 2024-07-12 RX ORDER — DESVENLAFAXINE 50 MG/1
50 TABLET, FILM COATED, EXTENDED RELEASE ORAL DAILY
Status: DISCONTINUED | OUTPATIENT
Start: 2024-07-12 | End: 2024-07-14 | Stop reason: HOSPADM

## 2024-07-12 RX ORDER — POLYETHYLENE GLYCOL 3350 17 G/17G
17 POWDER, FOR SOLUTION ORAL DAILY
Status: DISCONTINUED | OUTPATIENT
Start: 2024-07-12 | End: 2024-07-14 | Stop reason: HOSPADM

## 2024-07-12 RX ORDER — CLINDAMYCIN HCL 300 MG
300 CAPSULE ORAL 4 TIMES DAILY
Status: DISCONTINUED | OUTPATIENT
Start: 2024-07-12 | End: 2024-07-14 | Stop reason: HOSPADM

## 2024-07-12 RX ORDER — SALIVA STIMULANT COMB. NO.3
2 SPRAY, NON-AEROSOL (ML) MUCOUS MEMBRANE 4 TIMES DAILY PRN
Status: DISCONTINUED | OUTPATIENT
Start: 2024-07-12 | End: 2024-07-14 | Stop reason: HOSPADM

## 2024-07-12 RX ORDER — CLONAZEPAM 1 MG/1
1 TABLET ORAL 2 TIMES DAILY PRN
Status: DISCONTINUED | OUTPATIENT
Start: 2024-07-12 | End: 2024-07-14 | Stop reason: HOSPADM

## 2024-07-12 RX ORDER — OLANZAPINE 10 MG/2ML
10 INJECTION, POWDER, FOR SOLUTION INTRAMUSCULAR
Status: COMPLETED | OUTPATIENT
Start: 2024-07-12 | End: 2024-07-13

## 2024-07-12 RX ADMIN — ACETAMINOPHEN 650 MG: 325 TABLET, FILM COATED ORAL at 18:24

## 2024-07-12 RX ADMIN — CLONIDINE HYDROCHLORIDE 0.1 MG: 0.1 TABLET ORAL at 20:33

## 2024-07-12 RX ADMIN — CLINDAMYCIN HYDROCHLORIDE 300 MG: 300 CAPSULE ORAL at 18:22

## 2024-07-12 ASSESSMENT — COLUMBIA-SUICIDE SEVERITY RATING SCALE - C-SSRS
6. HAVE YOU EVER DONE ANYTHING, STARTED TO DO ANYTHING, OR PREPARED TO DO ANYTHING TO END YOUR LIFE?: YES
3. HAVE YOU BEEN THINKING ABOUT HOW YOU MIGHT KILL YOURSELF?: YES
5. HAVE YOU STARTED TO WORK OUT OR WORKED OUT THE DETAILS OF HOW TO KILL YOURSELF? DO YOU INTEND TO CARRY OUT THIS PLAN?: YES
1. IN THE PAST MONTH, HAVE YOU WISHED YOU WERE DEAD OR WISHED YOU COULD GO TO SLEEP AND NOT WAKE UP?: YES
2. HAVE YOU ACTUALLY HAD ANY THOUGHTS OF KILLING YOURSELF IN THE PAST MONTH?: YES
4. HAVE YOU HAD THESE THOUGHTS AND HAD SOME INTENTION OF ACTING ON THEM?: YES

## 2024-07-12 ASSESSMENT — ACTIVITIES OF DAILY LIVING (ADL)
ADLS_ACUITY_SCORE: 35

## 2024-07-12 NOTE — ED TRIAGE NOTES
"Pt reports chronic suicidal thoughts, but increased over the last few days. Pt reports \"I have a few ideas\" when asked about a plan. Pt states \"I wish I would've already done it\".      Triage Assessment (Adult)       Row Name 07/12/24 8642          Triage Assessment    Airway WDL WDL        Respiratory WDL    Respiratory WDL WDL        Skin Circulation/Temperature WDL    Skin Circulation/Temperature WDL WDL        Cardiac WDL    Cardiac WDL WDL        Peripheral/Neurovascular WDL    Peripheral Neurovascular WDL WDL        Cognitive/Neuro/Behavioral WDL    Cognitive/Neuro/Behavioral WDL WDL                     "

## 2024-07-12 NOTE — PROGRESS NOTES
Clinic Care Coordination Contact  Care Team Conversations    Received message from patient this morning asking that I call them back.    Writer called patient who states they are not doing well. Spent a great deal of time sharing with writer everything that has been going on medically, physically and emotionally. Patient also shares that he has great , but sill can't seem to get the things he really needs, such as a new bed. Patient was tearful during most of our conversation and states he plans to go back to the hospital, however, is fearful due to his last hospitalization. Patient explained waking up in excruciating pain due to not getting his pain medication and felt like he was on fire. He yelled out and screamed due to the pain and is embarrassed. Writer let them know they don't need to feel like this. Patient confirms that they are safe until they head back to the hospital.    :  [phone]-Writer left message  [phone]-Writer left message    JOEL Farfan   Social Work Clinic Care Coordinator   [facility]  PH: [phone]  [email or web address]

## 2024-07-12 NOTE — PROGRESS NOTES
68 year old male with history of SI received from ED due to SI. Reports feeling active SI with a plan to jump off a bridge, but no HI. Patient reports new health problems: breast cancer, diabetes, incontinance, and recent bilateral breast surgery.  Nursing and risk assessment completed. Assessments reviewed with the [facility]HP and Physician. Admission information reviewed with patient. Patient given a tour of [facility] and instructions on using the facility. Questions addressed regarding [facility]. Patient safety search completed.      Patient is resting in sensory room B.

## 2024-07-12 NOTE — ED NOTES
[facility]  ED to [facility] Checklist:      Goal for [facility]: Suicidality    Current Behavior: Sad and Cooperative    Safety Concerns: Suicidal, no specific plan    Legal Hold Status: Voluntary    Medically Cleared by ED provider: Yes    Patient Therapeutically Searched: Therapeutic search by ED staff (strings, belts, shoes, pockets, electronics, etc.)    Belongings: Remain with patient    Independent Ambulation at Baseline: Yes/No: Yes    Participates in Care/Conversation: Yes/No: Yes    Patient Informed about [facility]: Yes/No: Yes    DEC: Not ordered    Patient Ready to be Transferred to [facility]? Yes/No: Yes

## 2024-07-12 NOTE — ED PROVIDER NOTES
"  Emergency Department Note      History of Present Illness     Chief Complaint   Mental Health Problem      HPI   Gregoria Stein is a 68 year old adult with a history of hypertension taking Eliquis who presents to the emergency department for evaluation of a mental health problem. The patient reports that they haven't felt good for the past week. They state that they have not been sleeping well, not eating much, and missing some doses on his medications. He reports that he \"doesn't want to be on this planet\", very little he wants to do, and feels unsafe as a transgender individual. He states that he is \"fed up with everything and believes \"humanity is a hell\". Patient reports desire of going to [facility]. He reports that he is taking clindamycin daily but has missed doses occasionally. Patient reports suicidal ideation. He denies any homicidal ideation, psychotic thinking, fever, chills, or history of kidney issues. Patient lives alone.    Independent Historian   None    Review of External Notes   None    Past Medical History     Medical History and Problem List   Anxiety  Arthritis  Depressive disorder  GERD  Labial irritation  Chronic pain  PTSD  RLS  Chondromalacia of left patella  Chronic low back pain without sciatica  Glaucoma  IBS  Lumbar radiculopathy  Obesity  Suicidal ideation  Erythrocytosis  Cellulitis  Malignant neoplasm of female breast  Hypertension    Medications   Eliquis  Albuterol  Arimidex  Biotene  Cleocin  Klonopin  Catapres  Flexeril  Pristiq  Voltaren  Neurontin  Hydrochlorothiazide  Xalatan  Lidocaine  Prilosec  Roxicodone  Miralax  Depotestosterone  Desyrel    Surgical History   Excise mass vagina  Urethral dilation  Joint replacement  Orthopedic surgery    Physical Exam     Patient Vitals for the past 24 hrs:   BP Temp Temp src Pulse Resp SpO2 Height Weight   07/12/24 1427 -- -- -- -- -- -- 1.676 m (5' 6\") 94.3 kg (208 lb)   07/12/24 1326 (!) 149/73 98.3  F (36.8  C) Temporal 75 18 93 % -- " --     Physical Exam  Constitutional: Transgender male sitting, no respiratory distress.  HENT: No signs of trauma.   Eyes: EOM are normal. Pupils are equal, round, and reactive to light.   Neck: Normal range of motion. No JVD present. No cervical adenopathy.  Cardiovascular: Regular rhythm.  Exam reveals no gallop and no friction rub.    No murmur heard.  Pulmonary/Chest: Bilateral breath sounds normal. Transverse chest incision with nipple implants bilaterally, no redness or discharge. No wheezes, rhonchi or rales.  Abdominal: Soft. No tenderness. No rebound or guarding.   Musculoskeletal: No edema. No tenderness.   Lymphadenopathy: No lymphadenopathy.   Neurological: Alert and oriented to person, place, and time. Normal strength. Coordination normal.   Skin: Skin is warm and dry. No rash noted. No erythema.   Psych: initially calm with flat affect and then became excited and agitated and then calmed down again. Admits to suicidal thought. Denies homicidal or psychotic thinking.    Diagnostics     Lab Results   Labs Ordered and Resulted from Time of ED Arrival to Time of ED Departure - No data to display    Imaging   No orders to display       EKG   None    Independent Interpretation   None    ED Course      Medications Administered   Medications   clindamycin (CLEOCIN) capsule 300 mg (has no administration in time range)   acetaminophen (TYLENOL) tablet 650 mg (has no administration in time range)       Procedures   None     Discussion of Management   None    ED Course   ED Course as of 07/12/24 1638   Fri Jul 12, 2024   1333 I initially assessed the patient and obtained the above history and physical exam.         Optional/Additional Documentation  None    Medical Decision Making / Diagnosis     CMS Diagnoses: None    MIPS       None    MDM   Gregoria Stein is a 68 year old adult who presents to the emergency department with suicidal thought.  She feels she no longer wants to be in this world she does not want  to discuss a plan with me.  She denies homicidal or psychotic thinking and is not using any stimulants he states he has been taking his mental health meds.  He also has a myriad of other medical issues which are chronic and not acutely decompensating at this point.  He has been to [facility] before and he is comfortable going there at this time.    Disposition   The patient was transferred to [facility].     Diagnosis     ICD-10-CM    1. Suicidal thoughts  R45.851            Discharge Medications   New Prescriptions    No medications on file         Scribe Disclosure:  I, Amarilis Godinez, am serving as a scribe at 1:46 PM on 7/12/2024 to document services personally performed by Miguel Hamm MD based on my observations and the provider's statements to me.        Miguel Hamm MD  07/12/24 2020

## 2024-07-13 PROCEDURE — 250N000013 HC RX MED GY IP 250 OP 250 PS 637: Performed by: EMERGENCY MEDICINE

## 2024-07-13 PROCEDURE — G0378 HOSPITAL OBSERVATION PER HR: HCPCS

## 2024-07-13 PROCEDURE — 250N000013 HC RX MED GY IP 250 OP 250 PS 637

## 2024-07-13 PROCEDURE — 96372 THER/PROPH/DIAG INJ SC/IM: CPT | Performed by: EMERGENCY MEDICINE

## 2024-07-13 PROCEDURE — 250N000011 HC RX IP 250 OP 636: Performed by: EMERGENCY MEDICINE

## 2024-07-13 PROCEDURE — 250N000013 HC RX MED GY IP 250 OP 250 PS 637: Performed by: NURSE PRACTITIONER

## 2024-07-13 PROCEDURE — 99205 OFFICE O/P NEW HI 60 MIN: CPT | Performed by: NURSE PRACTITIONER

## 2024-07-13 RX ORDER — OLANZAPINE 10 MG/2ML
5 INJECTION, POWDER, FOR SOLUTION INTRAMUSCULAR 2 TIMES DAILY PRN
Status: DISCONTINUED | OUTPATIENT
Start: 2024-07-13 | End: 2024-07-14 | Stop reason: HOSPADM

## 2024-07-13 RX ORDER — LORAZEPAM 2 MG/ML
1 INJECTION INTRAMUSCULAR
Status: COMPLETED | OUTPATIENT
Start: 2024-07-13 | End: 2024-07-13

## 2024-07-13 RX ORDER — HYDROXYZINE HYDROCHLORIDE 25 MG/1
25 TABLET, FILM COATED ORAL EVERY 4 HOURS PRN
Status: DISCONTINUED | OUTPATIENT
Start: 2024-07-13 | End: 2024-07-14 | Stop reason: HOSPADM

## 2024-07-13 RX ORDER — ANASTROZOLE 1 MG/1
1 TABLET ORAL DAILY
Status: DISCONTINUED | OUTPATIENT
Start: 2024-07-13 | End: 2024-07-14 | Stop reason: HOSPADM

## 2024-07-13 RX ORDER — WATER 10 ML/10ML
INJECTION INTRAMUSCULAR; INTRAVENOUS; SUBCUTANEOUS
Status: COMPLETED
Start: 2024-07-13 | End: 2024-07-13

## 2024-07-13 RX ORDER — TESTOSTERONE CYPIONATE 200 MG/ML
70 INJECTION, SOLUTION INTRAMUSCULAR WEEKLY
Status: DISCONTINUED | OUTPATIENT
Start: 2024-07-13 | End: 2024-07-14 | Stop reason: HOSPADM

## 2024-07-13 RX ADMIN — TRAZODONE HYDROCHLORIDE 100 MG: 50 TABLET ORAL at 01:45

## 2024-07-13 RX ADMIN — DESVENLAFAXINE 50 MG: 50 TABLET, EXTENDED RELEASE ORAL at 08:02

## 2024-07-13 RX ADMIN — ACETAMINOPHEN 650 MG: 325 TABLET, FILM COATED ORAL at 07:59

## 2024-07-13 RX ADMIN — CYCLOBENZAPRINE HYDROCHLORIDE 5 MG: 5 TABLET, FILM COATED ORAL at 11:06

## 2024-07-13 RX ADMIN — CLONAZEPAM 1 MG: 1 TABLET ORAL at 08:03

## 2024-07-13 RX ADMIN — CLINDAMYCIN HYDROCHLORIDE 300 MG: 300 CAPSULE ORAL at 13:32

## 2024-07-13 RX ADMIN — OLANZAPINE 10 MG: 10 INJECTION, POWDER, FOR SOLUTION INTRAMUSCULAR at 00:10

## 2024-07-13 RX ADMIN — APIXABAN 2.5 MG: 2.5 TABLET, FILM COATED ORAL at 08:01

## 2024-07-13 RX ADMIN — PRAMIPEXOLE DIHYDROCHLORIDE 0.75 MG: 0.5 TABLET ORAL at 02:49

## 2024-07-13 RX ADMIN — ANASTROZOLE 1 MG: 1 TABLET, COATED ORAL at 13:32

## 2024-07-13 RX ADMIN — OXYCODONE HYDROCHLORIDE 5 MG: 5 TABLET ORAL at 01:45

## 2024-07-13 RX ADMIN — CLONIDINE HYDROCHLORIDE 0.1 MG: 0.1 TABLET ORAL at 08:04

## 2024-07-13 RX ADMIN — HYDROXYZINE HYDROCHLORIDE 25 MG: 25 TABLET, FILM COATED ORAL at 11:06

## 2024-07-13 RX ADMIN — CLINDAMYCIN HYDROCHLORIDE 300 MG: 300 CAPSULE ORAL at 08:04

## 2024-07-13 RX ADMIN — CLINDAMYCIN HYDROCHLORIDE 300 MG: 300 CAPSULE ORAL at 16:38

## 2024-07-13 RX ADMIN — OXYCODONE HYDROCHLORIDE 5 MG: 5 TABLET ORAL at 11:06

## 2024-07-13 RX ADMIN — TESTOSTERONE CYPIONATE 70 MG: 200 INJECTION, SOLUTION INTRAMUSCULAR at 13:31

## 2024-07-13 RX ADMIN — GABAPENTIN 900 MG: 300 CAPSULE ORAL at 21:32

## 2024-07-13 RX ADMIN — CYCLOBENZAPRINE HYDROCHLORIDE 5 MG: 5 TABLET, FILM COATED ORAL at 03:31

## 2024-07-13 RX ADMIN — CLINDAMYCIN HYDROCHLORIDE 300 MG: 300 CAPSULE ORAL at 21:32

## 2024-07-13 RX ADMIN — GABAPENTIN 900 MG: 300 CAPSULE ORAL at 01:45

## 2024-07-13 RX ADMIN — PRAMIPEXOLE DIHYDROCHLORIDE 0.75 MG: 0.5 TABLET ORAL at 21:32

## 2024-07-13 RX ADMIN — HYDROCHLOROTHIAZIDE 12.5 MG: 12.5 TABLET ORAL at 08:04

## 2024-07-13 RX ADMIN — CLONIDINE HYDROCHLORIDE 0.1 MG: 0.1 TABLET ORAL at 21:33

## 2024-07-13 RX ADMIN — LORAZEPAM 1 MG: 2 INJECTION INTRAMUSCULAR; INTRAVENOUS at 00:40

## 2024-07-13 RX ADMIN — APIXABAN 2.5 MG: 2.5 TABLET, FILM COATED ORAL at 21:31

## 2024-07-13 NOTE — ED NOTES
Responded to the code 21 called on this pt. I was informed that pt wanted to speak with ANS.  Introduced self to pt and stated my role (charge RN), and asked how I could be of help. Pt continued to speak over me, and stated that he wanted the nurse supervisor.  Pt was not redirectable and was agitated. I managed to inform him that the ANS had been notified and would come to speak with him as soon as possible. Pt was not happy with my response and continued to escalate.    Julieth Osborne RN,.......................................... 7/13/2024   12:01 AM

## 2024-07-13 NOTE — ED NOTES
"Pt was read 72 HH rights. Pt was also offered PRN Oxycodone. Pt declined medication and stated \"I'm going to sit in this chair for 3 days, not eat, drink, or sleep\". Pt making suicidal statements \"I'm going to do myself in anyway\". Pt was heard yelling in the bathroom and punching the wall.    "

## 2024-07-13 NOTE — ED NOTES
Patient very agitated, yelling, kicking legs on mattress/cart, hitting legs with pillows. States has restless leg and there is nothing we can do to help. Patient yelling at staff again about events of yesterday and last night. Supportive listening and deescalation utilized. Writer again requested MD to come see patient. Patient agreeable to take some PRN medications were which administered. [facility] psych associated over and also assisting with patient deescalation. Patient states will contract for safety and desperately requesting a reevaluation on 72-hr hold

## 2024-07-13 NOTE — ED NOTES
"Late Entry:      Pt arrived to room with  from main ED d/t escalating agitation. Pt seemed to be perseverating on what he feels was done during the day/ lakshmi at [facility]. Pt citing frustration over need for pain med earlier and not receiving it. Pt also hyper fixated on desire to speak with nursing supervisor as he feels 72 hr hold was placed \"punitively\". Attempts to validate patient's emotions/ frustration unsuccessful. Pt continued to be agitated, screaming at staff, pacing in room. Pt offered prn anxiety med but ultimately declined. While exiting pt's room, security noted pt to charge at the door this writer was exiting. Pt had to be manually escorted from door back into room.     Code 21 called, pt placed in 4 pt restraints (fifth, chest restraint not used per provider d/t recent mastectomy). Provider at bedside. Pt given IM meds- see MAR.     Pt continued to scream in restraints and fight against them. Pt yelled about needing to urinate. Pt offered use of bedpan or when calm could ambulate to BR. Pt declined bedpan and continued to scream and make verbal threats to staff including \"You are all going to lose your jobs!\", \"you will never going to work again\", \"you will pay for this!\". Pt urinating in bed, pulled his pants down and yelled that staff were \"not giving me dignity\". Writer and security entered room, blanket placed over pt's exposed lower half of body. Pt was then able to engage in a productive conversation about safe behavior, need to physically calm in order to have restraints removed and use BR indep. Pt verbalized understanding and was able to demonstrate learning of expected behaviors. Pt taken out of restraints and escorted to BR with . Pt remained calm and cooperative while out of room.     Pt given bedtime meds that he previously refused.   "

## 2024-07-13 NOTE — ED NOTES
Pt presents as depressed and irritable. Pt has been moving between the sensory room and the [place]. Pt talked with writer about being frustrated with the health care system and wanting to move off the grid. Pt makes suicidal statements about deserving to die, and not wanting to suffer in pain anymore. Pt also talks about different suicidal methods like cutting his achilles.   Pt later asked about getting Oxy ordered and after being told that it hadn't been ordered, he demanded to leave. Pt was told that he would have to meet with the provider first, and the pt was in agreement to wait.

## 2024-07-13 NOTE — PROGRESS NOTES
"This writer attempted to safety plan with pt at 2200 in preparation for discharge. Pt only able to provide responses for Step 1: Warning Signs stating \"anger and pain.\" Pt declined providing responses to all other steps of the safety plan stating, \"I am suicidal. I don't feel safe going home but I will because the  here won't give me any meds for pain\" lifting up his shirt to show this writer the scar across his chest. Pt was speaking loudly and observed to be visibly agitated and upset. This writer provided supportive listening and attempted to engage in crisis de-escalation techniques with pt. Pt endorsed SI with a plan to jump off a bridge near his home if discharged this evening noting, \"I am suicidal and you are discharging me.\"    Pt placed on a 72 hour hold starting at 2228.    "

## 2024-07-13 NOTE — ED NOTES
Ripped down the shower guard in the bathroom. Banging on the walls. Not willing to come out of the bathroom. Staffed used safety tool to break inside. Security called. Declining medications. Continues to be verbally aggressive and blaming others. Demanding to see supervisor. Verbal descalation was not successful at first, but boundaries were put in place and patient was willing to get into the wheel chair for transfer. Writer transported back to to ED 20 due to escalating behaviors. ED charge notified. Belongings sent back with patient. Patient remains on a 72 hour hold. Patient is aware.

## 2024-07-13 NOTE — ED NOTES
I was on my way to talk with this patient when code 21 was called for them. Arrived to the room and several staff members were in the room placing restraints on patient. I will not try to talk with patient with concern it will escalate the situation and I can not do anything to remove pt's 72 hour hold. Pt had been demanding pain medications earlier while in [facility] and these were ordered and offered to the patient however they refused them after demanding them. Pt had been given a grievance form while in [facility] as well by staff.

## 2024-07-13 NOTE — ED NOTES
Pt observed lying on a blanket on the floor, per pt helps with restless legs. Given prn flexeril per request.

## 2024-07-13 NOTE — PLAN OF CARE
Gregoria Stein  July 12, 2024  Plan of Care Hand-off Note     Patient Care Path: observation    Plan for Care:   Patient recommended for OBS status to stabilize current presenting issues. He denies any SIB/HI/AH/VH and is willing to entertain admission if unable to resolve current SI issues. Patient has overwhelming medical concerns and is navigating as best he can, but considering suicide  feeling the act in his case is dignified. A review of MH medications and support prior to discharge is warranted.    Identified Goals and Safety Issues: Stabilize current SI and reduce symptoms to baseline.    Overview:  [facility]: [phone] (no NETTIE on file)            Legal Status: Legal Status at Admission: Voluntary/Patient has signed consent for treatment    Psychiatry Consult:       Updated  MD and RN regarding plan of care.           Jaleel Joy MA LPC

## 2024-07-13 NOTE — ED PROVIDER NOTES
EmPATH Unit - Initial Psychiatric Observation Note  [facility] Emergency Department  Observation Initiation Date: Jul 12, 2024    Gregoria Stein MRN: [id number]   Age: 68 year old YOB: 1955     History     Chief Complaint   Patient presents with    Mental Health Problem     HPI  Gregoria Stein is a 68 year old adult with a past history notable for anxiety, depression, and suicidal ideation.  Patient identifies as  transgender male. He stated that some of the anxiety is related to the society not being friendly to transgender individuals.  Patient stated that he is experiencing a lot of chest wall pain secondary to breast cancer surgery last year.  Patient started to get more dysregulated and demanded opioids for chest wall pain. Patient also mentioned that he had top surgery.  Patient demanded discharge and said that he has opioids at home.  Patient did not want to consider any other options for pain management.  Patient left the appointment abruptly said that he does not want to be dramatic.  I offered the patient one dose of oxycodone 5 mg.  I also reminded him that he has an order for gabapentin 900 mg and lorazepam.  Patient was not receptive to those recommendations and left the room.     Apparently, patient comes to the unit frequently with similar presentation, requesting opioids for pain.  When I mentioned to the patient that I recommend pain clinic, he became angry and said that he does not want to attend another appointment.  Patient said that he has a safety plan and denied suicidal ideation.    I requested that the patient is assessed one more time by a therapist and a nurse.  Initially it was confirmed the patient is safe and will discharge.  Then I received a phone call from charge RN, who told me that the patient said that he has an active plan to jump from a bridge tonight.  Considering high risk for suicide, patient was placed on a 72-hour hold.  Patient will be transferred back to ED  for reassessment and reevaluation of pain.    Patient has an outpatient psychiatric provider from [facility].  Patient has a therapist.  He has a supportive team from oncology and primary care clinics.    Past Medical History  Past Medical History:   Diagnosis Date    Anxiety     Arthritis     C spine, thumbs bilateral, feet, shoulders, knees    Depressive disorder     Gastro-oesophageal reflux disease     intermittent    Labial irritation     labial mass    Other chronic pain     feet, knees, shoulders, full spine, thumbs    PTSD (post-traumatic stress disorder)     Restless leg syndrome      Past Surgical History:   Procedure Laterality Date    COLONOSCOPY      EXCISE MASS VAGINA Left 4/10/2015    Procedure: EXCISE MASS VAGINA;  Surgeon: Stanislav Byers MD;  Location: [facility] OR    GENITOURINARY SURGERY      Urethrial dilation    JOINT REPLACEMENT Right 07/2018    ORTHOPEDIC SURGERY      right rotator cuff, left achilles tendon repair, neuroma removed right foot, right knee arthroscopy,      albuterol (PROAIR HFA/PROVENTIL HFA/VENTOLIN HFA) 108 (90 Base) MCG/ACT inhaler  anastrozole (ARIMIDEX) 1 MG tablet  artificial saliva (BIOTENE MT) SOLN solution  clindamycin (CLEOCIN) 300 MG capsule  clonazePAM (KLONOPIN) 1 MG tablet  cloNIDine (CATAPRES) 0.1 MG tablet  desvenlafaxine (PRISTIQ) 50 MG 24 hr tablet  gabapentin (NEURONTIN) 300 MG capsule  hydroCHLOROthiazide 12.5 MG tablet  latanoprost (XALATAN) 0.005 % ophthalmic solution  magnesium hydroxide (MILK OF MAGNESIA) 400 MG/5ML suspension  melatonin 3 MG tablet  omeprazole (PRILOSEC) 40 MG DR capsule  oxyCODONE (ROXICODONE) 5 MG tablet  polyethylene glycol (MIRALAX) 17 GM/Dose powder  pramipexole (MIRAPEX) 0.25 MG tablet  testosterone cypionate (DEPOTESTOSTERONE) 200 MG/ML injection  traZODone (DESYREL) 100 MG tablet  cyclobenzaprine (FLEXERIL) 5 MG tablet  diclofenac (VOLTAREN) 1 % topical gel  Lidocaine (LIDOCARE) 4 % Patch  UNABLE TO FIND  zinc oxide (DESITIN) 40 %  "paste      Allergies   Allergen Reactions    Midazolam Other (See Comments)     Stopped breathing    Other reaction(s): Apnea   Stopped breathing   Stopped breathing    Penicillins Rash and Hives     Family History  Family History   Problem Relation Age of Onset    Macular Degeneration Mother     Osteoporosis Mother     Heart Disease Father 49    Multiple Sclerosis Sister     Diabetes Paternal Uncle         heart issues    Cancer No family hx of     Coronary Artery Disease No family hx of      Social History   Social History     Tobacco Use    Smoking status: Former     Current packs/day: 0.00     Types: Cigarettes     Quit date: 1983     Years since quittin.8    Smokeless tobacco: Never   Vaping Use    Vaping status: Never Used   Substance Use Topics    Alcohol use: Yes     Comment: occasional    Drug use: Yes     Types: Marijuana     Comment: daily only at night time for medical conditions      Past medical history, past surgical history, medications, allergies, family history, and social history were reviewed with the patient. No additional pertinent items.       Review of Systems  A medically appropriate review of systems was performed with pertinent positives and negatives noted in the HPI, and all other systems negative.    Physical Examination   BP: (!) 149/73  Pulse: 75  Temp: 98.3  F (36.8  C)  Resp: 18  Height: 167.6 cm (5' 6\")  Weight: 94.3 kg (208 lb)  SpO2: 93 %    Physical Exam  General: Appears stated age.   Neuro: Alert and fully oriented. Extremities appear to demonstrate normal strength on visual inspection.   Integumentary/Skin: no rash visualized, normal color    Psychiatric Examination   Appearance: awake, alert  Attitude:  uncooperative  Eye Contact:  good  Mood:  angry  Affect:  intensity is dramatic  Speech:  clear, coherent  Psychomotor Behavior:  no evidence of tardive dyskinesia, dystonia, or tics  Thought Process:  logical  Associations:  no loose associations  Thought Content:  " active suicidal ideation with plan and intent  Insight:  limited  Judgement:  poor  Oriented to:  time, person, and place  Attention Span and Concentration:  fair  Recent and Remote Memory:  intact  Language: able to name/identify objects without impairment  Fund of Knowledge: intact with awareness of current and past events    ED Course     ED Course as of 07/13/24 0117   Fri Jul 12, 2024   1333 I initially assessed the patient and obtained the above history and physical exam.     Sat Jul 13, 2024   0007 Code 21 called.  Received signout from empath staff, patient was becoming more agitated at empath unit.  Initially was requesting oxycodone for postop pain for the bilateral mastectomy, which was the trigger to their being upset earlier.  I had ordered patient as needed Zyprexa IM versus ODT in addition to home dose oxycodone every 6 hours as needed.  However, empath staff reports that patient declined oxycodone at that time.  They went into the restroom and started throwing objects around in the restroom after they were informed of needing to be on a 72-hour hold.  Due to continued escalation, empath staff transfer patient back to main ER.  Patient is pending reassessment by psychiatry in a.m. DEC.  IM Zyprexa was ordered.  Patient was placed in 4-point restraints, sparing the chest due to recent bilateral mastectomy procedure.  IM Ativan also ordered as needed if inadequate treatment for agitation from Zyprexa.       Labs Ordered and Resulted from Time of ED Arrival to Time of ED Departure - No data to display    Assessments & Plan (with Medical Decision Making)   Patient presenting with . Nursing notes reviewed noting no acute issues.     I have reviewed the assessment completed by the [facility].     During the observation period, the patient did not require medications for agitation, and did not require restraints/seclusion for patient and/or provider safety.     The patient was found to have a psychiatric condition  that would benefit from an observation stay in the emergency department for further psychiatric stabilization and/or coordination of a safe disposition. The observation plan includes serial assessments of psychiatric condition, potential administration of medications if indicated, further disposition pending the patient's psychiatric course during the monitoring period.     Preliminary diagnosis:    ICD-10-CM    1. Suicidal thoughts  R45.851            Treatment Plan:  Place on observation status for further crisis stabilization and medication management.  -Continue home medications  -EmPATH standing order medications for comfort, anxiety and agitation.    -72 hour placed for active suicidal ideation with plan and intent   -Anticipating the patient will require more time to gain benefit from their treatment plan as what we are allowed to provide on observation status, they might be transferred to the inpatient setting.  -Problem focused, supportive therapy provided around patients stressors and symptoms related to their diagnosis.  Validated patients emotions and problems solved with patient around stress management and self care strategies. Patient was not receptive.   -Medication education provided this visit includes, rationale for medication, importance of compliance, medication interactions, and common side effects.  -The patient was educated regarding their differential diagnoses and the importance of adhering to their treatment plan and outpatient follow up appointments to aid with diagnostic clarity.  --  JODI Sarkar CNP   [facility] EMERGENCY DEPT  EmPATH Unit       Korina Thakkar APRN CNP  07/13/24 0149

## 2024-07-13 NOTE — ED NOTES
"Pt asked to speak with writer. Pt again wanted to discuss what he feels happened with psychiatrist at [facility] yesterday and not getting oxycodone as requested. Pt encouraged to focus on present and what can be done moving forward. Pt not able to continue with present oriented thinking and repeatedly stated \"I will never come here again\", \"I can't take this\". Pt offered distractions, food/ fluids, prn's- all declined.   "

## 2024-07-13 NOTE — ED NOTES
Pt appears restless, fidgety, pacing in room. Pt cooperative with staff. Pt asked for a hallway to pace, was given the option to walk around on the  side.

## 2024-07-13 NOTE — ED NOTES
Nursing staff heard patient banging in the bathroom. Patient making statements that he is safe. Continues to demand to speak to a supervisor.

## 2024-07-13 NOTE — ED PROVIDER NOTES
Patient received in signout from Dr. Hamm.  Patient sent back from empath unit due to uncooperative behavior at empath unit including lifting up shirt, agitation, and throwing objects.  Patient refused p.o. Zyprexa or IM Zyprexa.  Originally presenting to the ER for mental health evaluation with suicidal ideation.  Patient is pending mental health evaluation and further disposition.    ED Course as of 07/13/24 0030   Fri Jul 12, 2024   1333 I initially assessed the patient and obtained the above history and physical exam.     Sat Jul 13, 2024   0007 Code 21 called.  Received signout from empath staff, patient was becoming more agitated at empath unit.  Initially was requesting oxycodone for postop pain for the bilateral mastectomy, which was the trigger to their being upset earlier.  I had ordered patient as needed Zyprexa IM versus ODT in addition to home dose oxycodone every 6 hours as needed.  However, empath staff reports that patient declined oxycodone at that time.  They went into the restroom and started throwing objects around in the restroom after they were informed of needing to be on a 72-hour hold.  Due to continued escalation, empath staff transfer patient back to main ER.  Patient is pending reassessment by psychiatry in a.m. DEC.  IM Zyprexa was ordered.  Patient was placed in 4-point restraints, sparing the chest due to recent bilateral mastectomy procedure.  IM Ativan also ordered as needed if inadequate treatment for agitation from Zyprexa.       Disposition:  Patient signed out to Dr. Canales pending psych/DEC reassessment in AM.     Donny Razo DO  07/13/24 0795

## 2024-07-13 NOTE — PHARMACY-ADMISSION MEDICATION HISTORY
Pharmacist Admission Medication History    Admission medication history is complete. The information provided in this note is only as accurate as the sources available at the time of the update.    Information Source(s):  RN/Provider completed MedRec  via in-person    Pertinent Information:   - Patient originally in empath, RN/Provider completed medrec while over in EmPath complete with last doses.  - Cross referenced with refill hx, which aligns.     Changes made to PTA medication list:  Added: None  Deleted: None  Changed: None    Allergies reviewed with patient and updates made in EHR: unable to assess    Medication History Completed By: Lidia Lovett [facility] 2024 9:36 AM    PTA Med List   Medication Sig Last Dose    albuterol (PROAIR HFA/PROVENTIL HFA/VENTOLIN HFA) 108 (90 Base) MCG/ACT inhaler Inhale 2 puffs into the lungs every 4 hours as needed for shortness of breath, wheezing or cough 2024    anastrozole (ARIMIDEX) 1 MG tablet Take 1 tablet by mouth daily 2024    [] apixaban ANTICOAGULANT (ELIQUIS) 2.5 MG tablet Take 1 tablet (2.5 mg) by mouth 2 times daily for 35 days 2024    artificial saliva (BIOTENE MT) SOLN solution Swish and spit 2 sprays in mouth 4 times daily as needed for dry mouth 2024    clindamycin (CLEOCIN) 300 MG capsule Take 1 capsule (300 mg) by mouth 4 times daily for 9 days 2024    clonazePAM (KLONOPIN) 1 MG tablet Take 1 mg by mouth 2 times daily as needed for anxiety 2024    cloNIDine (CATAPRES) 0.1 MG tablet Take 1 tablet (0.1 mg) by mouth 2 times daily (Patient taking differently: Take 0.1 mg by mouth 2 times daily . May also take 1 tablet twice daily as needed.) 2024    desvenlafaxine (PRISTIQ) 50 MG 24 hr tablet Take 1 tablet (50 mg) by mouth daily 2024    gabapentin (NEURONTIN) 300 MG capsule Take 3 capsules (900 mg) by mouth At Bedtime (Patient taking differently: Take 900 mg by mouth at bedtime . May also take up to 2 additional  capsules daily as needed for anxiety.) 7/11/2024    hydroCHLOROthiazide 12.5 MG tablet Take 1 tablet (12.5 mg) by mouth daily 7/12/2024    latanoprost (XALATAN) 0.005 % ophthalmic solution Place 1 drop into both eyes at bedtime 7/11/2024    magnesium hydroxide (MILK OF MAGNESIA) 400 MG/5ML suspension Take 30 mLs by mouth daily as needed for constipation Unknown    melatonin 3 MG tablet Take 1 tablet (3 mg) by mouth nightly as needed for sleep Unknown    omeprazole (PRILOSEC) 40 MG DR capsule TAKE 1 CAPSULE BY MOUTH EVERY DAY BEFORE A MEAL 7/12/2024    oxyCODONE (ROXICODONE) 5 MG tablet Take 1 tablet by mouth every 6 hours as needed for pain Unknown    polyethylene glycol (MIRALAX) 17 GM/Dose powder Take 17 g by mouth daily 7/12/2024    pramipexole (MIRAPEX) 0.25 MG tablet Take 3 tablets (0.75 mg) by mouth At Bedtime 7/11/2024    testosterone cypionate (DEPOTESTOSTERONE) 200 MG/ML injection Inject 70 mg Subcutaneous once a week on Fridays 7/5/2024    traZODone (DESYREL) 100 MG tablet Take  mg by mouth 7/11/2024

## 2024-07-13 NOTE — ED NOTES
Patient has remained angry and perseverating all morning. Has made numerous phone calls. Writer continued to validate feeling and work towards forward goal setting.     Called [facility] and inquired about re-eval per patient request

## 2024-07-13 NOTE — CONSULTS
Diagnostic Evaluation Consultation  Crisis Assessment    Patient Name: Gregoria Stein  Age:  68 year old  Legal Sex: female  Gender Identity: transgender male  Pronouns: they/them/theirs, he/him/his  Race: White  Ethnicity: Not  or   Language: English      Patient was assessed: In person   Crisis Assessment Start Date: 07/12/24  Crisis Assessment Start Time: 1700  Crisis Assessment Stop Time: 1800  Patient location: [facility] EMERGENCY DEPT                             [id number]    Referral Data and Chief Complaint  Gregoria Stein presents to the ED by  self. Patient is presenting to the ED for the following concerns: Suicidal ideation, Depression, Anxiety, Worsening psychosocial stress, Health stressors.   Factors that make the mental health crisis life threatening or complex are:  Patient is undergoing breast cancer treatment and has struggled with providers and is feeling hopeless and suicidal..      Informed Consent and Assessment Methods  Explained the crisis assessment process, including applicable information disclosures and limits to confidentiality, assessed understanding of the process, and obtained consent to proceed with the assessment.  Assessment methods included conducting a formal interview with patient, review of medical records, collaboration with medical staff, and obtaining relevant collateral information from family and community providers when available.  : done     Patient response to interventions: acceptance expressed, verbalizes understanding  Coping skills were attempted to reduce the crisis:  Seeking out the ED, meditation.     History of the Crisis   Patient presents to [facility]  with increased SI and has not felt good for the past week. He has prior diagnosis of MDD, CLAIRE, cPTSD, and SI. There is also attachment disorder advocated by patient. Patient endorses little sleep and loss of apetite, and has missed some doses of current medications. Patient denies any  "SIB/HI/AH/VH. He endorses active SI with a plan to jump from a bridge near his home, saying in triage \"I wish I'd already done it\", and saying he \"does not want to be on the planet\".  ENdorses loss of interest in doing things and feels unsafe as a transgendered person, saying \"humanity is a hell\" and is fed up with everything. Patient shared he is in constant pain from his breast cancer surgery. As a result he feels that \"the out is dignified\". Patient comes from what he desrbibes as a toxic family with a narcissitic mother growing up in a [employer] community/family. He endorses extensive trauma and cPTSD from this. Patient endorses bi-month;y panic attacks of the fight or flight type. He also currently is not working and there are money concerns weighing on him. He does have a support group of people. He has a PCP in Dr Ayo Sanchez MD, [address], [place] [address],  [phone] and last saw him three weeks ago. He gets medication management through a Dr Ahuja, and a therapist through Dr Nicole Lopez, [address], [place] [address], [phone]. Patient has several other providers as part of his care team, Shirley Velazco as   Wing Mccoy as [facility] worker  Juliana Gary as [facility] worker. Patient is presecribed Albuterol, Klonopin, Clonidine, Pristiq, Gabapentin, Hydrochlorothiazide, Oxycodone, and Trazodone as MH medications, and says he is current save for missing a few doses.  Patient lives alone in his own apartment. Patient is a BSN-RN.    Brief Psychosocial History  Family:  Single, Children no  Support System:  Friend (Wally Whalen Monique)  Employment Status:  disabled  Source of Income:  disability  Financial Environmental Concerns:   (Overall money issues.)  Current Hobbies:  outdoor activities, arts/crafts, music  Barriers in Personal Life:  mental health concerns, medical conditions/precautions, lack of motivation, emotional concerns, financial " concerns    Significant Clinical History  Current Anxiety Symptoms:  anxious, excessive worry, racing thoughts  Current Depression/Trauma:  avoidance, withdrawl/isolation, negativistic, helplessness, hopelessness, sadness, thoughts of death/suicide, difficulty concentrating, crying or feels like crying  Current Somatic Symptoms:  excessive worry, anxious  Current Psychosis/Thought Disturbance:   (None noted.)  Current Eating Symptoms:  loss of appetite  Chemical Use History:  Alcohol: Social  Last Use:: 07/10/24  Benzodiazepines: None  Last Use:: 07/12/24  Opiates: None  Last Use:: 07/11/24  Cocaine: None  Marijuana: None  Last Use:: 07/12/24  Other Use: None  Withdrawal Symptoms:  (None noted.)  Addictions:  (None noted.)   Past diagnosis:  Anxiety Disorder, Depression, PTSD, Other (Attachment Disorder)  Family history:  Depression, Bipolar Disorder, Substance Use Disorder, Personality Disorder  Past treatment:  Individual therapy, Primary Care, Psychiatric Medication Management, Inpatient Hospitalization, Day Treatment, Case management  Details of most recent treatment:  case management, therapy, medication management.  Other relevant history:          Collateral Information  Is there collateral information: No      Risk Assessment  [place] Suicide Severity Rating Scale Full Clinical Version:  Suicidal Ideation  Q1 Wish to be Dead (Lifetime): Yes  Q2 Non-Specific Active Suicidal Thoughts (Lifetime): Yes  3. Active Suicidal Ideation with any Methods (Not Plan) Without Intent to Act (Lifetime): Yes  Q4 Active Suicidal Ideation with Some Intent to Act, Without Specific Plan (Lifetime): Yes  Q5 Active Suicidal Ideation with Specific Plan and Intent (Lifetime): Yes  Q6 Suicide Behavior (Lifetime): yes     Suicidal Behavior (Lifetime)  Actual Attempt (Lifetime): Yes  Total Number of Actual Attempts (Lifetime): 2  Actual Attempt Description (Lifetime): Aborted jump from bridge. Attempt to hang in 2021 while IP.  Has  subject engaged in non-suicidal self-injurious behavior? (Lifetime): No  Interrupted Attempts (Lifetime): No  Aborted or Self-Interrupted Attempt (Lifetime): Yes  Total Number of Aborted or Self-Interrupted Attempts (Lifetime): 1  Aborted or Self-Interrupted Attempt Description (Lifetime): Stood on bridge.  Preparatory Acts or Behavior (Lifetime): No    [place] Suicide Severity Rating Scale Recent:   Suicidal Ideation (Recent)  Q1 Wished to be Dead (Past Month): yes  Q2 Suicidal Thoughts (Past Month): yes  Q3 Suicidal Thought Method: yes  Q4 Suicidal Intent without Specific Plan: yes  Q5 Suicide Intent with Specific Plan: yes  If yes to Q6, within past 3 months?: no  Level of Risk per Screen: high risk  Intensity of Ideation (Recent)  Most Severe Ideation Rating (Past 1 Month): 4  Description of Most Severe Ideation (Past 1 Month): Jump from a bridge.  Frequency (Past 1 Month): 2-5 times in week  Duration (Past 1 Month): Fleeting, few seconds or minutes  Controllability (Past 1 Month): Can control thoughts with little difficulty  Deterrents (Past 1 Month): Deterrents definitely stopped you from attempting suicide  Reasons for Ideation (Past 1 Month): Completely to end or stop the pain (You couldn't go on living with the pain or how you were feeling)  Suicidal Behavior (Recent)  Actual Attempt (Past 3 Months): No  Total Number of Actual Attempts (Past 3 Months): 0  Actual Attempt Description (Past 3 Months): None noted.  Has subject engaged in non-suicidal self-injurious behavior? (Past 3 Months): No  Interrupted Attempts (Past 3 Months): No  Total Number of Interrupted Attempts (Past 3 Months): 0  Interrupted Attempt Description (Past 3 Months): None noted.  Aborted or Self-Interrupted Attempt (Past 3 Months): No  Total Number of Aborted or Self-Interrupted Attempts (Past 3 Months): 0  Aborted or Self-Interrupted Attempt Description (Past 3 Months): None noted.  Preparatory Acts or Behavior (Past 3 Months):  No  Total Number of Preparatory Acts (Past 3 Months): 0  Preparatory Acts or Behavior Description (Past 3 Months): None noted.    Environmental or Psychosocial Events: helplessness/hopelessness, new diagnosis of major illness, worsening chronic illness, unemployment/underemployment, other life stressors, other (see comment) (Money issues)  Protective Factors: Protective Factors: reality testing ability, constructive use of leisure time, enjoyable activities, resilience, cultural, spiritual , or Taoism beliefs associated with meaning and value in life, help seeking, supportive ongoing medical and mental health care relationships, sense of importance of health and wellness, good treatment engagement, lives in a responsibly safe and stable environment, responsibilities and duties to others, including pets and children, able to access care without barriers    Does the patient have thoughts of harming others? Feels Like Hurting Others: no  Previous Attempt to Hurt Others: no  Current presentation:  (Calm, cooperative, conversational.)  Is the patient engaging in sexually inappropriate behavior?: no    Is the patient engaging in sexually inappropriate behavior?  no        Mental Status Exam   Affect: Appropriate  Appearance: Appropriate  Attention Span/Concentration: Attentive  Eye Contact: Engaged    Fund of Knowledge: Appropriate   Language /Speech Content: Fluent  Language /Speech Volume: Normal  Language /Speech Rate/Productions: Articulate  Recent Memory: Intact  Remote Memory: Intact  Mood: Depressed, Sad  Orientation to Person: Yes   Orientation to Place: Yes  Orientation to Time of Day: Yes  Orientation to Date: Yes     Situation (Do they understand why they are here?): Yes  Psychomotor Behavior: Normal  Thought Content: Suicidal  Thought Form: Intact, Goal Directed    Medication  Psychotropic medications:   Medication Orders - Psychiatric (From admission, onward)      Start     Dose/Rate Route Frequency  Ordered Stop    07/12/24 1900  desvenlafaxine succinate (PRISTIQ) 24 hr tablet 50 mg         50 mg Oral DAILY 07/12/24 1855 07/12/24 1853  traZODone (DESYREL) tablet 100 mg         100 mg Oral AT BEDTIME PRN 07/12/24 1855               Current Care Team  Patient Care Team:  Ayo Davis MD as PCP - General (Family Medicine)  Mera Ramos as Nurse Practitioner (Licensed Mental Health)  Radha Suazo MD as MD (OB/Gyn)  Shirley Velazco as   Wing Mccoy as [facility] worker  Juliana Tonsager as CADI worker  [facility]  ( - [facility]  Asha Damon MA, MSFELIX, UYEN as Therapist ( - Clinical)  Miladys Benavides MD as MD (Plastic Surgery)  Zeb Medina, SLP as Speech Language Pathologist (Speech Language/Path)  Daniel Greer MD as Assigned Pulmonology Provider  Grover Lennon, RN as Registered Nurse  Rafael Haskins [facility] as  (Plastic Surgery)  Luz Garcia, RN as Specialty Care Coordinator (Hematology & Oncology)  Ralph Conde [facility] as Assigned Behavioral Health Provider  Fay Flowers MD as MD (Ophthalmology)  Mary Rosales MD as Assigned Cancer Care Provider  Giulia Lopez as Therapist    Diagnosis  Patient Active Problem List   Diagnosis Code    CLAIRE (generalized anxiety disorder) F41.1    Mass R22.9    PTSD (post-traumatic stress disorder) F43.10    Other chronic pain G89.29    Restless leg syndrome G25.81    Osteoarthritis of spine with radiculopathy, lumbar region M47.26    Chondromalacia of left patella M22.42    Chronic bilateral low back pain without sciatica M54.50, G89.29    Chronic joint pain M25.50, G89.29    Complex tear of medial meniscus of left knee as current injury S83.232A    GERD (gastroesophageal reflux disease) K21.9    Glaucoma H40.9    IBS (irritable bowel syndrome) K58.9    Chronic pain of right knee M25.561, G89.29    Lumbar radiculopathy M54.16    Major  depressive disorder, recurrent severe 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.2    Morbid obesity (H) E66.01    Postmenopausal Z78.0    Primary osteoarthritis of right knee M17.11    MDD (major depressive disorder), recurrent severe, 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) F33.2    Anxiety F41.9    Pelvic floor weakness N81.89    Gender identity disorder F64.9    Primary osteoarthritis involving multiple joints M15.9    Status post total knee replacement, right Z96.651    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    Malignant neoplasm of overlapping sites of left breast in female, estrogen receptor positive (H) C50.812, Z17.0    Recurrent major depression (H24) F33.9    Suicidal ideation R45.851    Erythrocytosis D75.1    Cellulitis of chest wall L03.313    Depression, unspecified depression type F32.A       Primary Problem This Admission  Active Hospital Problems    *Suicidal ideation      PTSD (post-traumatic stress disorder)      Major depressive disorder, recurrent severe without psychotic features (H)      CLAIRE (generalized anxiety disorder)        Clinical Summary and Substantiation of Recommendations   Patient recommended for OBS status to stabilize current presenting issues. He denies any SIB/HI/AH/VH and is willing to entertain admission if unable to resolve current SI issues. Patient has overwhelming medical concerns and is navigating as best he can, but considering suicide  feeling the act in his case is dignified. A review of MH medications and support prior to discharge is warranted.    Patient coping skills attempted to reduce the crisis:  Seeking out the ED, meditation.    Disposition  Recommended disposition: Observation        Reviewed case and recommendations with attending provider. Attending Name:   Dr Ariel MD      Attending concurs with disposition: yes       Patient and/or validated legal guardian concurs with disposition:   yes       Final disposition:  observation    Legal status on admission:  Voluntary/Patient has signed consent for treatment    Assessment Details   Total duration spent with the patient: 60 min     CPT code(s) utilized: 61685 - Psychotherapy for Crisis - 60 (30-74*) min    Jaleel Joy MA [facility] Psychotherapist  [facility] - Triage & Transition Services  Callback: [phone]

## 2024-07-13 NOTE — ED NOTES
"Pt became very agitated, yelling in [place] and removing shirt. Pt adament that he needs Oxycodone. PRN Zyprexa was ordered by provider but pt declined. Pt stated \"Zyprexa isn't for post-op pain. I am an RN.\" Pt requested a grievance form which was given. Security was called. Pt reports a suicidal plan to jump off a bridge and was placed on a 72HH.   "

## 2024-07-13 NOTE — ED NOTES
"Patient angry and perseverating on events of yesterday and last night. Writer validated emotions and repeatedly attempted to redirect attention and efforts on moving forwards and goals. Patient repeatedly saying \"I'm in fight or flight. I will NOT stay here any more\". Patient acknowledges 72-hour hold and states is abuse and was punishment for asking for pain medications.    Patient provided Patient Relations number and telephone. Writer explained rules of telephone use and patient agreeable.  "

## 2024-07-14 ENCOUNTER — TELEPHONE (OUTPATIENT)
Dept: EMERGENCY MEDICINE | Facility: CLINIC | Age: 69
End: 2024-07-14

## 2024-07-14 VITALS
HEIGHT: 66 IN | BODY MASS INDEX: 33.43 KG/M2 | TEMPERATURE: 98.8 F | WEIGHT: 208 LBS | DIASTOLIC BLOOD PRESSURE: 75 MMHG | HEART RATE: 60 BPM | SYSTOLIC BLOOD PRESSURE: 138 MMHG | OXYGEN SATURATION: 94 % | RESPIRATION RATE: 16 BRPM

## 2024-07-14 PROCEDURE — G0378 HOSPITAL OBSERVATION PER HR: HCPCS

## 2024-07-14 ASSESSMENT — COLUMBIA-SUICIDE SEVERITY RATING SCALE - C-SSRS
ATTEMPT SINCE LAST CONTACT: NO
1. SINCE LAST CONTACT, HAVE YOU WISHED YOU WERE DEAD OR WISHED YOU COULD GO TO SLEEP AND NOT WAKE UP?: NO
6. HAVE YOU EVER DONE ANYTHING, STARTED TO DO ANYTHING, OR PREPARED TO DO ANYTHING TO END YOUR LIFE?: NO
2. HAVE YOU ACTUALLY HAD ANY THOUGHTS OF KILLING YOURSELF?: NO
SUICIDE, SINCE LAST CONTACT: NO
TOTAL  NUMBER OF ABORTED OR SELF INTERRUPTED ATTEMPTS SINCE LAST CONTACT: NO
TOTAL  NUMBER OF INTERRUPTED ATTEMPTS SINCE LAST CONTACT: NO

## 2024-07-14 ASSESSMENT — ACTIVITIES OF DAILY LIVING (ADL): ADLS_ACUITY_SCORE: 35

## 2024-07-14 NOTE — TELEPHONE ENCOUNTER
Triage and Transition Services- Patient Follow Up Call  Service Line Making Phone Call: Extended Care    Who did Writer Talk to: Patient    Details of Call: Spoke with PT about follow-up care. Offered additional appts/resources. Gave pt the number for Patient Relations due to pt having complaints of care received. No further follow-up needed.     Sujatha Ospina 7/14/2024 2:50 PM

## 2024-07-14 NOTE — ED NOTES
Patient awake and expressing frustration and anger regarding current admission events. Writer provided active listening and emotional support. Patient requested a re-evaluation of the 72 hour hold and would like to go home. Writer called [facility] to request re-eval.

## 2024-07-14 NOTE — ED NOTES
Patient in a much calmer state. Watching nature/calm channel on TV. He spoke with a friend on the phone for a while who helped prepare him for his conversation with the mental health .   Surgical incisions dressed.

## 2024-07-14 NOTE — PROGRESS NOTES
"Triage and Transition Services Extended Care Reassessment     Patient: Killian goes by \"Killian,\" uses he/him pronouns  Date of Service: July 14, 2024  Site of Service: [facility] EMERGENCY DEPT                               Patient was seen yes  Mode of Assessment: In person     Reason for Reassessment: other (see comment) (Pt requesting)    History of Patient's Original Emergency Room Encounter: Patient presents to [facility]  with increased SI and has not felt good for the past week. He has prior diagnosis of MDD, CLAIRE, cPTSD, and SI. There is also attachment disorder advocated by patient. Patient endorses little sleep and loss of apetite, and has missed some doses of current medications. Patient denies any SIB/HI/AH/VH. He endorses active SI with a plan to jump from a bridge near his home, saying in triage \"I wish I'd already done it\", and saying he \"does not want to be on the planet\".  ENdorses loss of interest in doing things and feels unsafe as a transgendered person, saying \"humanity is a hell\" and is fed up with everything. Patient shared he is in constant pain from his breast cancer surgery. As a result he feels that \"the out is dignified\". Patient comes from what he desrbibes as a toxic family with a narcissitic mother growing up in a [employer] community/family. He endorses extensive trauma and cPTSD from this. Patient endorses bi-month;y panic attacks of the fight or flight type. He also currently is not working and there are money concerns weighing on him. He does have a support group of people. He has a PCP in Dr Ayo Sanchez MD, [address], [facility] [place] [address],  [phone] and last saw him three weeks ago. He gets medication management through a Dr Ahuja, and a therapist through Dr Nicole Lopez, [address], [place] [address], [phone]. Patient has several other providers as part of his care team, Shirley Velazco as   Wing Mccoy as [facility] " "worker  Juliana Bañuelosager as CADI worker. Patient is presecribed Albuterol, Klonopin, Clonidine, Pristiq, Gabapentin, Hydrochlorothiazide, Oxycodone, and Trazodone as MH medications, and says he is current save for missing a few doses.  Patient lives alone in his own apartment. Patient is a BSN-RN.    Current Patient Presentation: Pt was calm, cooperative, and engaged with Writer during therapeutic check-in.    Presentation Summary: Writer presented to Pt's room for therapeutic check-in. Writer introduced self and discussed purpose of interaction. Pt was agreeable to meet with Writer. Writer prompted Pt to discuss what concerns he had. Pt voiced frustration with his time in the emergency department and feelings of being mistreated and being traumatized by his experience here. Writer validated Pt's feelings. Pt reports being in communication with Patient Relations as well. Writer discussed with Pt how Pt is on a 72 hour hold and what Pt would prefer to proceed with regarding his care. Pt reports feeling as though he is safe to discharge. Writer and Pt discussed Pt's resources in the community, such as professionals like psychiatry, therapy, CADI, case management, a homemaker, and a RN who checks on him due to recent surgery. Pt then discussed about his \"community\" which consists of neighbors, friends, and peers who engage in community gardening and look out for each other. Pt discussed how he could utilize them in times of mental health crisis. When discussing suicidal ideation, Pt does endorse he was initially experiencing suicidal ideation upon presentation to the ED, yet since last interaction, declines any passive or active suicidal ideation. Writer discussed verbal reports of jumping off the bridge and how he could mitigate harmful actions. Pt reports he will reach out to his friend Wally or use his Calm application for meditation. Pt discussed in future focused terms of finding care elsewhere moving forward due to " experience in the emergency department. Pt discussed future goals of van life with a dog and wanting to reconnect with friends while traveling. Pt was calm, cooperative, and engaged with Writer during therapeutic check-in. Pt denies any SI, NSSIB, HI, AH/VH. Writer and Pt engaged in and completed an aftercare/safety plan. Pt reports he will utilize his mobile ride share fatou for transportation back to his apartment.    Changes Observed Since Initial Assessment: patient/family request, decrease in presenting symptoms    Therapeutic Interventions Provided: Engaged in safety planning, Engaged in social skills training.    Current Symptoms:     sweating, flushing, shaking (Pt reports experiencing restless leg syndrome and Writer observed Pt's legs swinging and moving during therapeutic check-in.)        Mental Status Exam   Affect: Appropriate  Appearance: Appropriate  Attention Span/Concentration: Attentive  Eye Contact: Engaged    Fund of Knowledge: Appropriate   Language /Speech Content: Fluent  Language /Speech Volume: Normal  Language /Speech Rate/Productions: Normal  Recent Memory: Intact  Remote Memory: Intact  Mood: Normal  Orientation to Person: Yes   Orientation to Place: Yes  Orientation to Time of Day: Yes  Orientation to Date: Yes     Situation (Do they understand why they are here?): Yes  Psychomotor Behavior: Normal  Thought Content: Clear  Thought Form: Intact    Treatment Objective(s) Addressed: rapport building, processing feelings, safety planning, identifying an appropriate aftercare plan, identifying treatment goals, assessing safety    Patient Response to Interventions: acceptance expressed, verbalizes understanding    Progress Towards Goals:  Patient Reports Symptoms Are: improving  Patient Progress Toward Goals: is making progress  Comment: Pt was calm, cooperative, and engaged with Writer during therapeutic check-in. Pt denies any SI, HI, AH/VH. Writer and Pt engaged in and completed an  "aftercare/safety plan and discussed supports in the community.  Next Step to Work Toward Discharge: patient ability to engage in safety planning  Ability to Engage Comment: Writer and Pt engaged in and completed an aftercare/safety plan and discussed supports in the community.    Case Management: Summary of Interaction: None at time of reassessment.    C-SSRS Since Last Contact:   1. Wish to be Dead (Since Last Contact): No  2. Non-Specific Active Suicidal Thoughts (Since Last Contact): No     Actual Attempt (Since Last Contact): No  Has subject engaged in non-suicidal self-injurious behavior? (Since Last Contact): No  Interrupted Attempts (Since Last Contact): No  Aborted or Self-Interrupted Attempt (Since Last Contact): No  Preparatory Acts or Behavior (Since Last Contact): No  Suicide (Since Last Contact): No     Calculated C-SSRS Risk Score (Since Last Contact): No Risk Indicated    Plan: Final Disposition / Recommended Care Path: discharge  Plan for Care reviewed with assigned Medical Provider: yes  Plan for Care Team Review: provider, RN  Comments: Dr. Razo  Patient and/or validated legal guardian concurs: yes  Clinical Substantiation:     Pt voiced frustration with his time in the emergency department and feelings of being mistreated and being traumatized by his experience here.  Pt reports feeling as though he is safe to discharge. Writer and Pt discussed Pt's resources in the community, such as professionals like psychiatry, therapy, CADI, case management, a homemaker, and a RN who checks on him due to recent surgery. Pt then discussed about his \"community\" which consists of neighbors, friends, and peers who engage in community gardening and look out for each other. Pt discussed how he could utilize them in times of mental health crisis. When discussing suicidal ideation, Pt does endorse he was initially experiencing suicidal ideation upon presentation to the ED, yet since last interaction, declines any " passive or active suicidal ideation. Writer discussed verbal reports of jumping off the bridge and how he could mitigate harmful actions. Pt reports he will reach out to his friend Wally or use his Calm application for meditation. Pt discussed in future focused terms of finding care elsewhere moving forward due to experience in the emergency department. Pt discussed future goals of van life with a dog and wanting to reconnect with friends while traveling. Pt was calm, cooperative, and engaged with Writer during therapeutic check-in. Pt denies any SI, NSSIB, HI, AH/VH. Writer and Pt engaged in and completed an aftercare/safety plan. Pt reports he will utilize his mobile ride [employer] fatou for transportation back to his apartment.     Pt presents with a bright affect. Pts mood is congruent with this presentation. Pt is oriented x4. Pt was cooperative and engaged during assessment. Pt endorsed that they are in the ED due to recent suicidal ideation and psychosocial stressors associated with medical concerns. Pt is currently not endorsing SI/SIB/HI or AH/VH.     At this time IP MH admission is not being recommended due. Pt was able to fully safety plan with writer. Pt's current presentation appears to be chronic in nature and Pt's current sx appear to be at Pt's baseline. Pt does appear to be at higher risk of death by suicide accidental or intentional due to mental health hx and substance use sx.  At this time IP MH admission does not appear to be the most therapeutically beneficial intervention/ level of care for Pt. Pt appears to be able to use and motivated to engage in supportive mental health/ community resources.       Legal Status: Legal Status at Admission: 72 Hour Hold  72 Hour Hold - Date/Time Initiated: 2327 07/12/2024  72 Hour Hold - Date/Time Ends: 2327 07/17/2024    Session Status: Time session started: 2327  Time session ended: 0022  Session Duration (minutes): 55 minutes  Session Number: 1  Anticipated  number of sessions or this episode of care: 1    Session Start Time: 2327  Session Stop Time: 0022  CPT codes: 97811 - Psychotherapy (with patient) - 60 (53+*) min  Time Spent: 55 minutes      CPT code(s) utilized: 08806 - Psychotherapy (with patient) - 60 (53+*) min    Diagnosis:   Patient Active Problem List   Diagnosis Code    CLAIRE (generalized anxiety disorder) F41.1    Mass R22.9    PTSD (post-traumatic stress disorder) F43.10    Other chronic pain G89.29    Restless leg syndrome G25.81    Osteoarthritis of spine with radiculopathy, lumbar region M47.26    Chondromalacia of left patella M22.42    Chronic bilateral low back pain without sciatica M54.50, G89.29    Chronic joint pain M25.50, G89.29    Complex tear of medial meniscus of left knee as current injury S83.232A    GERD (gastroesophageal reflux disease) K21.9    Glaucoma H40.9    IBS (irritable bowel syndrome) K58.9    Chronic pain of right knee M25.561, G89.29    Lumbar radiculopathy M54.16    Major depressive disorder, recurrent severe 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.2    Morbid obesity (H) E66.01    Postmenopausal Z78.0    Primary osteoarthritis of right knee M17.11    MDD (major depressive disorder), recurrent severe, 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) F33.2    Anxiety F41.9    Pelvic floor weakness N81.89    Gender identity disorder F64.9    Primary osteoarthritis involving multiple joints M15.9    Status post total knee replacement, right Z96.651    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    Malignant neoplasm of overlapping sites of left breast in female, estrogen receptor positive (H) C50.812, Z17.0    Recurrent major depression (H24) F33.9    Suicidal ideation R45.851    Erythrocytosis D75.1    Cellulitis of chest wall L03.313    Depression, unspecified depression type F32.A    Suicidal thoughts R45.851       Primary Problem This Admission: Active Hospital Problems    Suicidal thoughts      *Suicidal ideation      PTSD (post-traumatic stress  disorder)      Major depressive disorder, recurrent severe without psychotic features (H)      CLAIRE (generalized anxiety disorder)        Walter Howell [facility]   Licensed Mental Health Professional (LMHP), [facility]  [phone]

## 2024-07-14 NOTE — ED NOTES
Jordonath called back with time estimate for reassessment around 2330 tonight pending no other first time assessments come in. Patient updated.

## 2024-07-14 NOTE — ED NOTES
Pt's discharge plan and safety plan discussed with pt.  Pt verbalized understanding of all information.  Pt escorted to lobby, where he awaits his Uber.

## 2024-07-14 NOTE — ED PROVIDER NOTES
Patient received in signout from Dr. Ballard pending DEC/psychiatry reevaluation.  Patient initially presented to the ER for mental health concerns and suicidal ideation.  Please refer to Dr. Hamm's H&P for details.    ED Course as of 07/14/24 0049   Sun Jul 14, 2024   0038 Discussed patient with Walter with DEC who reevaluated patient and deemed patient safe for discharge home at this time.  Killian reported to Walter that SI comments from last night were out of frustration of being labeled as a drug seeker and that he does not have any plans for himself at this time.  Per DEC , patient is cleared for discharge at this time.   0046 On reevaluation, patient resting comfortably in bed in no acute distress.  Calm and cooperative.  Discussed with patient conversation with DEC  Walter.  Patient feels comfortable with discharge home at this time and reports that he has a Lyft pass will return back to his independent living.  He has good outpatient resources and support.  In regards to his mastectomy postop wounds, he has follow-up appointment with the surgeon on 8 August.  He also has home health nurse that visits.  Patient admits that his statement from yesterday in regards to thoughts of self-harm were out of frustration and he does not have any active plans.  He denies any SI, HI, or hallucinations at this time.  He feels comfortable with being discharged home.  Discussed return precautions.  Answered all questions.     Disposition:  Discharge     Donny Razo DO  07/14/24 2769

## 2024-07-15 ENCOUNTER — TELEPHONE (OUTPATIENT)
Dept: BEHAVIORAL HEALTH | Facility: CLINIC | Age: 69
End: 2024-07-15
Payer: MEDICARE

## 2024-07-15 NOTE — TELEPHONE ENCOUNTER
Phone number verified in [facility]. [facility] for patient regarding follow up, left [facility] number if they would like additional assistance.  No further follow-up is needed.

## 2024-07-15 NOTE — TELEPHONE ENCOUNTER
Patient called back from follow up VM left earlier distressed about being traumatized at hospital and upset about documentation in chart. [employer] demanded to be connected to director of [facility]. Writer offered patient opportunity to file a report with Patient Relations, patient not accepting of this multiple times. Writer informed patient that if they were unhappy with their care, they have every right to seek care at a different hospital. Writer eventually transferred patient to patient relations.

## 2024-08-14 ENCOUNTER — PATIENT OUTREACH (OUTPATIENT)
Dept: ONCOLOGY | Facility: CLINIC | Age: 69
End: 2024-08-14
Payer: MEDICARE

## 2024-08-14 NOTE — PROGRESS NOTES
"[facility]: Cancer Care                                                                                          Received voicemail message stating they are having a \"dysphoria situation related to chest surgery\".     Returned call, unable to reach, left voicemail asking for a return call to discus concerns.     Letty Garcia MSN, RN ,OCN  Lead RN Care Coordinator - [facility]  [phone]  "

## 2024-08-19 ENCOUNTER — ONCOLOGY VISIT (OUTPATIENT)
Dept: ONCOLOGY | Facility: CLINIC | Age: 69
End: 2024-08-19
Payer: MEDICARE

## 2024-08-19 ENCOUNTER — PATIENT OUTREACH (OUTPATIENT)
Dept: ONCOLOGY | Facility: CLINIC | Age: 69
End: 2024-08-19

## 2024-08-19 ENCOUNTER — APPOINTMENT (OUTPATIENT)
Dept: LAB | Facility: CLINIC | Age: 69
End: 2024-08-19
Payer: MEDICARE

## 2024-08-19 VITALS
RESPIRATION RATE: 16 BRPM | WEIGHT: 204.2 LBS | HEART RATE: 78 BPM | OXYGEN SATURATION: 96 % | DIASTOLIC BLOOD PRESSURE: 85 MMHG | TEMPERATURE: 98.3 F | BODY MASS INDEX: 32.96 KG/M2 | SYSTOLIC BLOOD PRESSURE: 146 MMHG

## 2024-08-19 DIAGNOSIS — D75.1 ERYTHROCYTOSIS: ICD-10-CM

## 2024-08-19 DIAGNOSIS — Z17.0 MALIGNANT NEOPLASM OF OVERLAPPING SITES OF LEFT BREAST IN FEMALE, ESTROGEN RECEPTOR POSITIVE (H): Primary | ICD-10-CM

## 2024-08-19 DIAGNOSIS — G25.81 RESTLESS LEG SYNDROME: ICD-10-CM

## 2024-08-19 DIAGNOSIS — C50.812 MALIGNANT NEOPLASM OF OVERLAPPING SITES OF LEFT BREAST IN FEMALE, ESTROGEN RECEPTOR POSITIVE (H): Primary | ICD-10-CM

## 2024-08-19 LAB
BASOPHILS # BLD AUTO: 0 10E3/UL (ref 0–0.2)
BASOPHILS NFR BLD AUTO: 0 %
EOSINOPHIL # BLD AUTO: 0.3 10E3/UL (ref 0–0.7)
EOSINOPHIL NFR BLD AUTO: 5 %
ERYTHROCYTE [DISTWIDTH] IN BLOOD BY AUTOMATED COUNT: 13.1 % (ref 10–15)
FERRITIN SERPL-MCNC: 77 NG/ML (ref 11–409)
HCT VFR BLD AUTO: 48.7 % (ref 35–53)
HGB BLD-MCNC: 16.6 G/DL (ref 13.3–17.7)
IMM GRANULOCYTES # BLD: 0 10E3/UL
IMM GRANULOCYTES NFR BLD: 0 %
IRON SERPL-MCNC: 103 UG/DL (ref 37–157)
LYMPHOCYTES # BLD AUTO: 1.3 10E3/UL (ref 0.8–5.3)
LYMPHOCYTES NFR BLD AUTO: 24 %
MCH RBC QN AUTO: 29.6 PG (ref 26.5–33)
MCHC RBC AUTO-ENTMCNC: 34.1 G/DL (ref 31.5–36.5)
MCV RBC AUTO: 87 FL (ref 78–100)
MONOCYTES # BLD AUTO: 0.4 10E3/UL (ref 0–1.3)
MONOCYTES NFR BLD AUTO: 7 %
NEUTROPHILS # BLD AUTO: 3.6 10E3/UL (ref 1.6–8.3)
NEUTROPHILS NFR BLD AUTO: 64 %
NRBC # BLD AUTO: 0 10E3/UL
NRBC BLD AUTO-RTO: 0 /100
PLATELET # BLD AUTO: 176 10E3/UL (ref 150–450)
RBC # BLD AUTO: 5.61 10E6/UL (ref 3.8–5.9)
WBC # BLD AUTO: 5.5 10E3/UL (ref 4–11)

## 2024-08-19 PROCEDURE — 36415 COLL VENOUS BLD VENIPUNCTURE: CPT

## 2024-08-19 PROCEDURE — 85025 COMPLETE CBC W/AUTO DIFF WBC: CPT

## 2024-08-19 PROCEDURE — 82728 ASSAY OF FERRITIN: CPT

## 2024-08-19 PROCEDURE — 99214 OFFICE O/P EST MOD 30 MIN: CPT

## 2024-08-19 PROCEDURE — 83540 ASSAY OF IRON: CPT

## 2024-08-19 PROCEDURE — G0463 HOSPITAL OUTPT CLINIC VISIT: HCPCS

## 2024-08-19 ASSESSMENT — PAIN SCALES - GENERAL: PAINLEVEL: EXTREME PAIN (8)

## 2024-08-19 NOTE — NURSING NOTE
"Oncology Rooming Note    August 19, 2024 12:24 PM   Gregoria Stein is a 69 year old adult who presents for:    Chief Complaint   Patient presents with    Blood Draw     Vitals, blood drawn via VPT by LPN. Pt checked into appt.     Oncology Clinic Visit     Breast cancer     Initial Vitals: BP (!) 146/85   Pulse 78   Temp 98.3  F (36.8  C) (Oral)   Resp 16   Wt 92.6 kg (204 lb 3.2 oz)   SpO2 96%   BMI 32.96 kg/m   Estimated body mass index is 32.96 kg/m  as calculated from the following:    Height as of 7/12/24: 1.676 m (5' 6\").    Weight as of this encounter: 92.6 kg (204 lb 3.2 oz). Body surface area is 2.08 meters squared.  Extreme Pain (8) Comment: Data Unavailable   No LMP recorded. (Menstrual status: Reassignment).  Allergies reviewed: Yes  Medications reviewed: Yes    Medications: Medication refills not needed today.  Pharmacy name entered into [employer]: [id number] [address]    Frailty Screening:   Is the patient here for a new oncology consult visit in cancer care? 2. No      Clinical concerns: Patient has a bit of pain around the incision and the nipples. Patient also has a lot of sweating since starting the anastrozole. Patient is also experiencing a lot of joint danie.   Shania was notified.      Jaylene Fairbanks EMT            "

## 2024-08-19 NOTE — PROGRESS NOTES
Aug 19, 2024    Return Oncology Visit:      Reason:  new left breast cancer now s/p resection on Anastrozole.     HPI:    Clinical/anatomic stage IIA (cT2, cN0, cM0) invasive ductal carcinoma of the upper-outer quadrant of the left breast  - Diagnosed 3/13/2024 via left breast biopsy  - ER (positive - 80%), LA (positive - 10%), HER2 (low, score of 1+ by IHC)  - Tomasz grade I histology on biopsy   - Tumor size: 2.7 cm by MRI  - Final pathology 35 mm, grade 1.  0/4 lymph nodes involved.    HISTORY OF PRESENT ILLNESS:  Killian (formerly Gregoria at birth) Sarita is a very pleasant gentleman who is evaluated in follow up for LEFT breast cancer.     Patient was evaluated for  top  surgery and as part of the evaluation a left breast abnormality was found. Screening mammogram in March 2024 revealed an area of architectural distortion in the left breast 6 oclock position.  Diagnostic mammogram revealed 1.3 cm mass.  A biopsy revealed invasive breast cancer ER ++ LA + HER2 negative.  MRI imaging revealed no other specific foci of disease and there was no clinical or radiographic evidence of lymphadenopathy.  He met with Dr. Martin Ruano at [facility] and was scheduled for top surgery.  By breast MRI, this measured 2.7 cm.  Lymph nodes appeared normal.      Killian underwent bilateral mastectomy with SNLB.  Margins negative.  We reviewed final pathology.  Lymph nodes were negative.    Killian is understanding overall treatment goal is curative in intent.       INTERVAL HISTORY:  Killian returns clinic today for routine follow-up. RNCC Esmer joined visit today to say mabel and with Killian's permission/request stayed for the entirety of the visit. He reports that he is tolerating anastrozole well but does notice increase in baseline arthralgias.  Particular in his bilateral shoulders, left knee, and right knee.  In part he believes some increased knee arthralgias due to a fall.  He has seen orthopedics.  He also has carpal tunnel which is  bothersome.  He is planning to start physical therapy for the shoulders and knee.  He does have occasional sweats since starting anastrozole.  Despite the side effects he is agreeable to continue.  He has not had any fevers or chills.  He is trying to stay active with riding his bike.  No chest pain, shortness of breath, or cough.  He is dissatisfied with the appearance of his chest and is working to consider finding a new plastic surgeon. No chest lumps or bumps. No rashes or skin changes. No other new persistent pains. Has loose BM's at baseline. One episode of nausea yesterday after eating otherwise no other persistent nausea. Further social stressors discussed at length.     Takes anastrozole regularly.      ROS: 10 point ROS neg other than the symptoms noted above in the HPI.       PMH:       Pelvic floor weakness   Status post total knee replacement, right   Thoracic aortic aneurysm without rupture (HRC)    Controlled substance agreement signed   Gender dysphoria   Generalized anxiety disorder (HRC)   PTSD (post-traumatic stress disorder) (HRC)   RLS (restless legs syndrome)   ASHLYN (obstructive sleep apnea)   Severe episode of recurrent major depressive disorder, without psychotic features (HRC)   Financial difficulties  Primary hypertension (HRC)   Malignant neoplasm of upper-outer quadrant of left female breast (HRC)   Gender dysphoria in adult   Invasive ductal carcinoma of breast, stage 1, left (HRC)   Malignant neoplasm of female breast (HRC)     Past Surgical History: eye surgery, R knee surgery s/p total knee arthroplasty 7/19/18     FAMILY HISTORY:  His mother is living without any history of malignancy. His father passed away with a history of myocardial infarction. He is one brother and two sisters, no history of cancer. There is a maternal uncle that passed away with a history of lung cancer. He does not have children.  Has not seen genetics or had testing yet.      SOCIAL HISTORY:  Retired  registered nurse.   Also has worked at [employer].   Quit smoking greater than 30 years ago.   Denies any frequent/recent history of ethanol overuse.    Current Outpatient Medications   Medication Sig Dispense Refill    anastrozole (ARIMIDEX) 1 MG tablet Take 1 tablet by mouth daily      clonazePAM (KLONOPIN) 1 MG tablet Take 1 mg by mouth 2 times daily as needed for anxiety      cloNIDine (CATAPRES) 0.1 MG tablet Take 1 tablet (0.1 mg) by mouth 2 times daily (Patient taking differently: Take 0.1 mg by mouth 2 times daily . May also take 1 tablet twice daily as needed.) 60 tablet 0    cyclobenzaprine (FLEXERIL) 5 MG tablet Take 1 tablet (5 mg) by mouth every 8 hours as needed for muscle spasms 90 tablet 0    desvenlafaxine (PRISTIQ) 50 MG 24 hr tablet Take 1 tablet (50 mg) by mouth daily 30 tablet 0    gabapentin (NEURONTIN) 300 MG capsule Take 3 capsules (900 mg) by mouth At Bedtime (Patient taking differently: Take 900 mg by mouth at bedtime . May also take up to 2 additional capsules daily as needed for anxiety.) 270 capsule 0    hydroCHLOROthiazide 12.5 MG tablet Take 1 tablet (12.5 mg) by mouth daily 30 tablet 0    omeprazole (PRILOSEC) 40 MG DR capsule TAKE 1 CAPSULE BY MOUTH EVERY DAY BEFORE A MEAL      pramipexole (MIRAPEX) 0.25 MG tablet Take 3 tablets (0.75 mg) by mouth At Bedtime 90 tablet 3    testosterone cypionate (DEPOTESTOSTERONE) 200 MG/ML injection Inject 70 mg Subcutaneous once a week on Fridays      traZODone (DESYREL) 100 MG tablet Take  mg by mouth as needed      UNABLE TO FIND Pt is using Medical cannabis      albuterol (PROAIR HFA/PROVENTIL HFA/VENTOLIN HFA) 108 (90 Base) MCG/ACT inhaler Inhale 2 puffs into the lungs every 4 hours as needed for shortness of breath, wheezing or cough (Patient not taking: Reported on 8/19/2024) 18 g 0    artificial saliva (BIOTENE MT) SOLN solution Swish and spit 2 sprays in mouth 4 times daily as needed for dry mouth (Patient not taking:  Reported on 8/19/2024) 44.3 mL 0    latanoprost (XALATAN) 0.005 % ophthalmic solution Place 1 drop into both eyes at bedtime (Patient not taking: Reported on 8/19/2024) 2.5 mL 11    Lidocaine (LIDOCARE) 4 % Patch Place 1 patch onto the skin every 24 hours To prevent lidocaine toxicity, patient should be patch free for 12 hrs daily. (Patient not taking: Reported on 7/6/2024) 30 patch 0    magnesium hydroxide (MILK OF MAGNESIA) 400 MG/5ML suspension Take 30 mLs by mouth daily as needed for constipation (Patient not taking: Reported on 8/19/2024) 118 mL 0    melatonin 3 MG tablet Take 1 tablet (3 mg) by mouth nightly as needed for sleep (Patient not taking: Reported on 8/19/2024) 30 tablet 0    oxyCODONE (ROXICODONE) 5 MG tablet Take 1 tablet by mouth every 6 hours as needed for pain (Patient not taking: Reported on 8/19/2024)      polyethylene glycol (MIRALAX) 17 GM/Dose powder Take 17 g by mouth daily (Patient not taking: Reported on 8/19/2024) 510 g 0    zinc oxide (DESITIN) 40 % paste Apply topically as needed for irritation or skin protection (Patient not taking: Reported on 8/19/2024) 453 g 1     No current facility-administered medications for this visit.     PE:   BP (!) 146/85   Pulse 78   Temp 98.3  F (36.8  C) (Oral)   Resp 16   Wt 92.6 kg (204 lb 3.2 oz)   SpO2 96%   BMI 32.96 kg/m    General: No acute distress  HEENT: Sclera anicteric.   Heart: Regular, rate, and rhythm  Lungs: Clear to ascultation bilaterally  Chest: s/p bilateral mastectomy without reconstruction.  INcisions are c/d/I.  Well healing.  Extremities: no lower extremity edema. Wearing left knee brace.   Neuro: Cranial nerves grossly intact    Reviewed labs, imaging and pathology as outlined below.      Diagnostic imaging followed on 3/13/24.  MAMMOGRAPHIC FINDINGS: Left full-field digital diagnostic mammogram performed. There are scattered areas of fibroglandular density. Images evaluated with the assistance of CAD. Breast  tomosynthesis was used in interpretation. On additional views, the architectural distortion in the left breast persists and is associated with an irregular shape, spiculated margin, equal density mass in the left breast 1:00 position at posterior depth.   ULTRASOUND FINDINGS: Targeted sonographic images of the left breast 1:00 position 6 cm from nipple demonstrates a 1.3 x 1.1 x 1.3 cm irregular shape, spiculated margin, parallel, hypoechoic mass with posterior shadowing and adjacent vascularity. This corresponds the left breast mammographic mass with architectural distortion. Targeted sonographic images of the left axilla demonstrates normal morphology, nonenlarged left axillary lymph nodes.     3/13/24 - Case: [id number]  A.  Breast, left, 1:00, 6cm FN, needle core biopsy:   Invasive ductal carcinoma, Tomasz grade 1  Estrogen Receptor (ER): Positive (80%, moderate)  Progesterone Receptor (AK): Positive (10% moderate)  HER2 by Immunohistochemistry - Negative     BREAST MRI:    RIGHT BREAST: There is scattered fibroglandular tissue.  There is minimal background parenchymal enhancement. No suspicious enhancing mass or nonmass enhancement. No enlarged lymph nodes identified in the right axilla. There is no right internal mammary chain lymphadenopathy.     LEFT BREAST: There is scattered fibroglandular tissue.  There is minimal background parenchymal enhancement. In the left breast 1:00 position at middle to posterior depth, there is a 1.4 x 2.7 x 2.6 (transverse by AP by cc) enhancing mass with surrounding nonmass enhancement with biopsy clip internally in the superior portion (axial image 58 of 128 and sagittal image 47 of 180). No enlarged lymph nodes identified in the left axilla. There is no left internal mammary chain lymphadenopathy.     Surgical pathology:    FINAL DIAGNOSIS    A.  Lymph node, sentinel, left axillary sentitanl lymph node #, resection:  Four lymph nodes negative for metastatic carcinoma      B.  Breast, left, mastectomy:  Well-differentiated invasive ductal carcinoma, Tomasz grade 1, size 35 mm  All margins negative for carcinoma  Skin and nipple negative for carcinoma  Skin with seborrheic keratosis     C.  Nipple, left, LEFT NIPPLE BIOPSY, biopsy:  Negative for carcinoma     D.  Breast, right mastectomy:  Proliferative fibrocystic change  Negative for atypia or malignancy  Nipple with area of ectasia  Benign skin     Block B1 is appropriate for any molecular testing.         A/P:  67 y/o transmale here with a new LEFT breast cancer clinical T2N0 Er + AR + HER2 negative, prognostic staging 1A, now s/p bilateral mastectomy without reconstruction    Breast cancer - Stage 2 breast cancer.  Prognostic staging is 1A.  Final pathology revealed T2N0.    Tolerating anastrozole well with expected hot flashes and arthralgias. Agree with PT referral for exacerbation of arthralgias after a fall. Despite these side effects Killian is willing to continue anastrozole at this time.   Given bilateral mastectomies he will not need radiation. Oncotype 18, therefore did not receive adjuvant chemotherapy.      2. Bone health - given use of anastrazole, he will eventually need baseline dexa scan. We did not discuss this today given multiple upcoming appointments.     3. Transmasculine - using of testosterone. Hx of elevated hgb. Today 8/19/24, heemoglobin is stable.     4. Coping - he is having a lot of anxiety.  He is working with multiple healthcare providers.    5. He would benefit from genetics evaluation.  We will rediscuss this at our next visit. Not specifically discussed today given discussed several other stressors at length.     6. HTN - he works closely with Dr Davis.  Will follow.     35 minutes spent on the date of the encounter doing chart review, review of test results, interpretation of tests, patient visit, and documentation      Shania Sahu PA-C

## 2024-08-19 NOTE — NURSING NOTE
Chief Complaint   Patient presents with    Blood Draw     Vitals, blood drawn via VPT by LPN. Pt checked into appt.      ALBERTO Bagley LPN

## 2024-08-19 NOTE — PROGRESS NOTES
[facility]: Cancer Care                                                                                          Present for clinic visit with Shania to offer patient support. They were anxious about the meeting a new provider today, they were appreciative of the support.     Letty Garcia MSN, RN ,OCN  Lead RN Care Coordinator - [facility]  [phone]         Will forward to Dr. Baker for order approval.

## 2024-08-19 NOTE — LETTER
8/19/2024      Gregoria Stein  [address]  [place] [address]      Dear Colleague,    Thank you for referring your patient, Gregoria Stein, to the [facility]. Please see a copy of my visit note below.    Aug 19, 2024    Return Oncology Visit:      Reason:  new left breast cancer now s/p resection on Anastrozole.     HPI:    Clinical/anatomic stage IIA (cT2, cN0, cM0) invasive ductal carcinoma of the upper-outer quadrant of the left breast  - Diagnosed 3/13/2024 via left breast biopsy  - ER (positive - 80%), AZ (positive - 10%), HER2 (low, score of 1+ by IHC)  - [place] grade I histology on biopsy   - Tumor size: 2.7 cm by MRI  - Final pathology 35 mm, grade 1.  0/4 lymph nodes involved.    HISTORY OF PRESENT ILLNESS:  Killian (formerly Gregoria at birth) Sarita is a very pleasant gentleman who is evaluated in follow up for LEFT breast cancer.     Patient was evaluated for  top  surgery and as part of the evaluation a left breast abnormality was found. Screening mammogram in March 2024 revealed an area of architectural distortion in the left breast 6 oclock position.  Diagnostic mammogram revealed 1.3 cm mass.  A biopsy revealed invasive breast cancer ER ++ AZ + HER2 negative.  MRI imaging revealed no other specific foci of disease and there was no clinical or radiographic evidence of lymphadenopathy.  He met with Dr. Martin Ruano at [facility] and was scheduled for top surgery.  By breast MRI, this measured 2.7 cm.  Lymph nodes appeared normal.      Killian underwent bilateral mastectomy with SNLB.  Margins negative.  We reviewed final pathology.  Lymph nodes were negative.    Killian is understanding overall treatment goal is curative in intent.       INTERVAL HISTORY:  Killian returns clinic today for routine follow-up. RNCC Esmer joined visit today to say helradha and with Killian's permission/request stayed for the entirety of the visit. He reports that he is tolerating anastrozole  well but does notice increase in baseline arthralgias.  Particular in his bilateral shoulders, left knee, and right knee.  In part he believes some increased knee arthralgias due to a fall.  He has seen orthopedics.  He also has carpal tunnel which is bothersome.  He is planning to start physical therapy for the shoulders and knee.  He does have occasional sweats since starting anastrozole.  Despite the side effects he is agreeable to continue.  He has not had any fevers or chills.  He is trying to stay active with riding his bike.  No chest pain, shortness of breath, or cough.  He is dissatisfied with the appearance of his chest and is working to consider finding a new plastic surgeon. No chest lumps or bumps. No rashes or skin changes. No other new persistent pains. Has loose BM's at baseline. One episode of nausea yesterday after eating otherwise no other persistent nausea. Further social stressors discussed at length.     Takes anastrozole regularly.      ROS: 10 point ROS neg other than the symptoms noted above in the HPI.       PMH:       Pelvic floor weakness   Status post total knee replacement, right   Thoracic aortic aneurysm without rupture (HRC)    Controlled substance agreement signed   Gender dysphoria   Generalized anxiety disorder (HRC)   PTSD (post-traumatic stress disorder) (HRC)   RLS (restless legs syndrome)   ASHLYN (obstructive sleep apnea)   Severe episode of recurrent major depressive disorder, without psychotic features (HRC)   Financial difficulties  Primary hypertension (HRC)   Malignant neoplasm of upper-outer quadrant of left female breast (HRC)   Gender dysphoria in adult   Invasive ductal carcinoma of breast, stage 1, left (HRC)   Malignant neoplasm of female breast (HRC)     Past Surgical History: eye surgery, R knee surgery s/p total knee arthroplasty 7/19/18     FAMILY HISTORY:  His mother is living without any history of malignancy. His father passed away with a history of myocardial  infarction. He is one brother and two sisters, no history of cancer. There is a maternal uncle that passed away with a history of lung cancer. He does not have children.  Has not seen genetics or had testing yet.      SOCIAL HISTORY:  Retired registered nurse.   Also has worked at [employer].   Quit smoking greater than 30 years ago.   Denies any frequent/recent history of ethanol overuse.    Current Outpatient Medications   Medication Sig Dispense Refill     anastrozole (ARIMIDEX) 1 MG tablet Take 1 tablet by mouth daily       clonazePAM (KLONOPIN) 1 MG tablet Take 1 mg by mouth 2 times daily as needed for anxiety       cloNIDine (CATAPRES) 0.1 MG tablet Take 1 tablet (0.1 mg) by mouth 2 times daily (Patient taking differently: Take 0.1 mg by mouth 2 times daily . May also take 1 tablet twice daily as needed.) 60 tablet 0     cyclobenzaprine (FLEXERIL) 5 MG tablet Take 1 tablet (5 mg) by mouth every 8 hours as needed for muscle spasms 90 tablet 0     desvenlafaxine (PRISTIQ) 50 MG 24 hr tablet Take 1 tablet (50 mg) by mouth daily 30 tablet 0     gabapentin (NEURONTIN) 300 MG capsule Take 3 capsules (900 mg) by mouth At Bedtime (Patient taking differently: Take 900 mg by mouth at bedtime . May also take up to 2 additional capsules daily as needed for anxiety.) 270 capsule 0     hydroCHLOROthiazide 12.5 MG tablet Take 1 tablet (12.5 mg) by mouth daily 30 tablet 0     omeprazole (PRILOSEC) 40 MG DR capsule TAKE 1 CAPSULE BY MOUTH EVERY DAY BEFORE A MEAL       pramipexole (MIRAPEX) 0.25 MG tablet Take 3 tablets (0.75 mg) by mouth At Bedtime 90 tablet 3     testosterone cypionate (DEPOTESTOSTERONE) 200 MG/ML injection Inject 70 mg Subcutaneous once a week on Fridays       traZODone (DESYREL) 100 MG tablet Take  mg by mouth as needed       UNABLE TO FIND Pt is using Medical cannabis       albuterol (PROAIR HFA/PROVENTIL HFA/VENTOLIN HFA) 108 (90 Base) MCG/ACT inhaler Inhale 2 puffs into the lungs every  4 hours as needed for shortness of breath, wheezing or cough (Patient not taking: Reported on 8/19/2024) 18 g 0     artificial saliva (BIOTENE MT) SOLN solution Swish and spit 2 sprays in mouth 4 times daily as needed for dry mouth (Patient not taking: Reported on 8/19/2024) 44.3 mL 0     latanoprost (XALATAN) 0.005 % ophthalmic solution Place 1 drop into both eyes at bedtime (Patient not taking: Reported on 8/19/2024) 2.5 mL 11     Lidocaine (LIDOCARE) 4 % Patch Place 1 patch onto the skin every 24 hours To prevent lidocaine toxicity, patient should be patch free for 12 hrs daily. (Patient not taking: Reported on 7/6/2024) 30 patch 0     magnesium hydroxide (MILK OF MAGNESIA) 400 MG/5ML suspension Take 30 mLs by mouth daily as needed for constipation (Patient not taking: Reported on 8/19/2024) 118 mL 0     melatonin 3 MG tablet Take 1 tablet (3 mg) by mouth nightly as needed for sleep (Patient not taking: Reported on 8/19/2024) 30 tablet 0     oxyCODONE (ROXICODONE) 5 MG tablet Take 1 tablet by mouth every 6 hours as needed for pain (Patient not taking: Reported on 8/19/2024)       polyethylene glycol (MIRALAX) 17 GM/Dose powder Take 17 g by mouth daily (Patient not taking: Reported on 8/19/2024) 510 g 0     zinc oxide (DESITIN) 40 % paste Apply topically as needed for irritation or skin protection (Patient not taking: Reported on 8/19/2024) 453 g 1     No current facility-administered medications for this visit.     PE:   BP (!) 146/85   Pulse 78   Temp 98.3  F (36.8  C) (Oral)   Resp 16   Wt 92.6 kg (204 lb 3.2 oz)   SpO2 96%   BMI 32.96 kg/m    General: No acute distress  HEENT: Sclera anicteric.   Heart: Regular, rate, and rhythm  Lungs: Clear to ascultation bilaterally  Chest: s/p bilateral mastectomy without reconstruction.  INcisions are c/d/I.  Well healing.  Extremities: no lower extremity edema. Wearing left knee brace.   Neuro: Cranial nerves grossly intact    Reviewed labs, imaging and pathology  as outlined below.      Diagnostic imaging followed on 3/13/24.  MAMMOGRAPHIC FINDINGS: Left full-field digital diagnostic mammogram performed. There are scattered areas of fibroglandular density. Images evaluated with the assistance of CAD. Breast tomosynthesis was used in interpretation. On additional views, the architectural distortion in the left breast persists and is associated with an irregular shape, spiculated margin, equal density mass in the left breast 1:00 position at posterior depth.   ULTRASOUND FINDINGS: Targeted sonographic images of the left breast 1:00 position 6 cm from nipple demonstrates a 1.3 x 1.1 x 1.3 cm irregular shape, spiculated margin, parallel, hypoechoic mass with posterior shadowing and adjacent vascularity. This corresponds the left breast mammographic mass with architectural distortion. Targeted sonographic images of the left axilla demonstrates normal morphology, nonenlarged left axillary lymph nodes.     3/13/24 - Case: [id number]  A.  Breast, left, 1:00, 6cm FN, needle core biopsy:   Invasive ductal carcinoma, M[place] grade 1  Estrogen Receptor (ER): Positive (80%, moderate)  Progesterone Receptor (DE): Positive (10% moderate)  HER2 by Immunohistochemistry - Negative     BREAST MRI:    RIGHT BREAST: There is scattered fibroglandular tissue.  There is minimal background parenchymal enhancement. No suspicious enhancing mass or nonmass enhancement. No enlarged lymph nodes identified in the right axilla. There is no right internal mammary chain lymphadenopathy.     LEFT BREAST: There is scattered fibroglandular tissue.  There is minimal background parenchymal enhancement. In the left breast 1:00 position at middle to posterior depth, there is a 1.4 x 2.7 x 2.6 (transverse by AP by cc) enhancing mass with surrounding nonmass enhancement with biopsy clip internally in the superior portion (axial image 58 of 128 and sagittal image 47 of 180). No enlarged lymph nodes identified in  the left axilla. There is no left internal mammary chain lymphadenopathy.     Surgical pathology:    FINAL DIAGNOSIS    A.  Lymph node, sentinel, left axillary sentitanl lymph node #, resection:  Four lymph nodes negative for metastatic carcinoma     B.  Breast, left, mastectomy:  Well-differentiated invasive ductal carcinoma, Tomasz grade 1, size 35 mm  All margins negative for carcinoma  Skin and nipple negative for carcinoma  Skin with seborrheic keratosis     C.  Nipple, left, LEFT NIPPLE BIOPSY, biopsy:  Negative for carcinoma     D.  Breast, right mastectomy:  Proliferative fibrocystic change  Negative for atypia or malignancy  Nipple with area of ectasia  Benign skin     Block B1 is appropriate for any molecular testing.         A/P:  69 y/o transmale here with a new LEFT breast cancer clinical T2N0 Er + MA + HER2 negative, prognostic staging 1A, now s/p bilateral mastectomy without reconstruction    Breast cancer - Stage 2 breast cancer.  Prognostic staging is 1A.  Final pathology revealed T2N0.    Tolerating anastrozole well with expected hot flashes and arthralgias. Agree with PT referral for exacerbation of arthralgias after a fall. Despite these side effects Killian is willing to continue anastrozole at this time.   Given bilateral mastectomies he will not need radiation. Oncotype 18, therefore did not receive adjuvant chemotherapy.      2. Bone health - given use of anastrazole, he will eventually need baseline dexa scan. We did not discuss this today given multiple upcoming appointments.     3. Transmasculine - using of testosterone. Hx of elevated hgb. Today 8/19/24, heemoglobin is stable.     4. Coping - he is having a lot of anxiety.  He is working with multiple healthcare providers.    5. He would benefit from genetics evaluation.  We will rediscuss this at our next visit. Not specifically discussed today given discussed several other stressors at length.     6. HTN - he works closely with   Susan.  Will follow.     35 minutes spent on the date of the encounter doing chart review, review of test results, interpretation of tests, patient visit, and documentation      Shania Sahu PA-C        Again, thank you for allowing me to participate in the care of your patient.        Sincerely,        Shania Sahu PA-C

## 2024-08-20 ENCOUNTER — TELEPHONE (OUTPATIENT)
Dept: PLASTIC SURGERY | Facility: CLINIC | Age: 69
End: 2024-08-20
Payer: MEDICARE

## 2024-08-20 NOTE — CONFIDENTIAL NOTE
Writer called pt back from their voicemail requesting top surgery revision with Dr. Benavides. Killian had a DI mastectomy due to cancer in June 2024. Pt is seeking a revision for gender affirming purposes. Pt scheduled for Dr. Benavides's next available on 12/10/24. Killian previously saw Dr. Benavides in office in 2022.

## 2024-09-11 ENCOUNTER — OFFICE VISIT (OUTPATIENT)
Dept: PULMONOLOGY | Facility: CLINIC | Age: 69
End: 2024-09-11
Attending: INTERNAL MEDICINE
Payer: MEDICARE

## 2024-09-11 VITALS
HEART RATE: 80 BPM | SYSTOLIC BLOOD PRESSURE: 138 MMHG | BODY MASS INDEX: 33.41 KG/M2 | OXYGEN SATURATION: 96 % | DIASTOLIC BLOOD PRESSURE: 76 MMHG | WEIGHT: 207 LBS

## 2024-09-11 DIAGNOSIS — F33.2 SEVERE EPISODE OF RECURRENT MAJOR DEPRESSIVE DISORDER, WITHOUT PSYCHOTIC FEATURES (H): ICD-10-CM

## 2024-09-11 PROCEDURE — 99214 OFFICE O/P EST MOD 30 MIN: CPT | Performed by: INTERNAL MEDICINE

## 2024-09-11 PROCEDURE — G2211 COMPLEX E/M VISIT ADD ON: HCPCS | Performed by: INTERNAL MEDICINE

## 2024-09-11 RX ORDER — ALBUTEROL SULFATE 90 UG/1
2 AEROSOL, METERED RESPIRATORY (INHALATION) EVERY 4 HOURS PRN
Qty: 18 G | Refills: 0 | Status: SHIPPED | OUTPATIENT
Start: 2024-09-11

## 2024-09-11 NOTE — PATIENT INSTRUCTIONS
It was good to see you in clinic today. This is what we discussed:    Continue the CPAP.  Stay active with exercise.  Use the albuterol as needed.  I will see you in one year.  Contact me with questions or concerning symptoms.    Daniel Greer MD  Pulmonary and Critical Care Medicine  [facility]  Office [phone]  he/him

## 2024-09-11 NOTE — LETTER
"9/11/2024      Gregoria Stein  [address]  [place] [address]      Dear Colleague,    Thank you for referring your patient, Gregoria Stein, to the [facility]. Please see a copy of my visit note below.    Pulmonary Clinic Follow-up Visit    Assessment and Plan:   69 year old male with a history of remote tobacco use, inducible laryngeal obstruction, moderate obstructive sleep apnea on APAP, GERD, iron deficiency anemia, osteoarthritis s/p right TKA, breast cancer s/p bilateral mastectomy, left shoulder and left knee injury/pain, and chronic exertional dyspnea, presenting for follow-up.     Chronic exertional dyspnea, inducible laryngeal obstruction, obstructive sleep apnea: Killian has been doing better overall, able to bicycle (has a long bike and can bike up incline with groceries). Fewer episodes of laryngospasm with omeprazole and prior SLP exercises (eg hot potato mouth), happening less often, also on antidepressant which may be contributing to improvement. He is using APAP via nasal pillows which is going well and improving daytime energy level. Recall from prior visits that Killian likely has multifactorial dyspnea. He does have confirmed inducible laryngeal obstruction that leads to difficulty talking while walking, for example, previously followed by SLP at [facility], improved recently. He has previously found benefit from yawning (\"hot potato mouth\") exercises when an episode occurs and has noted that emotional stress does also sometimes precipitate an episode and worsen breathing. During pulmonary visit in September 2024 he notes that this has been less of an issue recently. He has no clear history of seasonal allergies or childhood asthma and normal PFT. Very subtle (questionable) cylindrical bronchiectasis in RML, subtle expiratory subsegmental air trapping, but this can also be seen in normal lungs. Had a RLL nodule that resolved on follow-up imaging in " "January 2024. He has moderate ASHLYN with AHI 21 and received a new device after the recall, auto-CPAP 5-15 cmH2O via nasal pillows.     Plan:  - continue APAP via nasal pillows as detailed above  - continue to use yawning (\"hot potato mouth\") breaths to abort acute episodes of inducible laryngeal obstruction; has happened less frequently recently and also on PPI for GERD which helps  - completed pulmonary rehabilitation at [facility] and continues with regular exercise  - albuterol HFA as needed  - recommend remaining up to date with respiratory vaccinations  - follow up in one year  - encouraged him to contact us with questions or concerning symptoms    Daniel Greer MD  Pulmonary and Critical Care Medicine  [facility]  Office [phone]  he/him    CCx: Chronic exertional dyspnea, inducible laryngeal obstruction, obstructive sleep apnea    HPI: 69 year old male with a history of remote tobacco use, inducible laryngeal obstruction, moderate obstructive sleep apnea on APAP, GERD, iron deficiency anemia, osteoarthritis s/p right TKA, left shoulder and left knee injury/pain, and chronic exertional dyspnea, presenting for follow-up. Killian has been doing better overall, able to bicycle (has a long bike and can bike up incline with groceries). Fewer episodes of laryngospasm with omeprazole and prior SLP exercises (eg hot potato mouth), happening less often, also on antidepressant which may be contributing to improvement. He is using APAP via nasal pillows which is going well and improving daytime energy level. Recall from prior visits that Killian likely has multifactorial dyspnea. He does have confirmed inducible laryngeal obstruction that leads to difficulty talking while walking, for example, previously followed by SLP at [facility], improved recently. He has previously found benefit from yawning (\"hot potato mouth\") exercises when an episode occurs and has noted that emotional stress does also " sometimes precipitate an episode and worsen breathing. During pulmonary visit in September 2024 he notes that this has been less of an issue recently. He has no clear history of seasonal allergies or childhood asthma and normal PFT. Very subtle (questionable) cylindrical bronchiectasis in RML, subtle expiratory subsegmental air trapping, but this can also be seen in normal lungs. Had a RLL nodule that resolved on follow-up imaging in January 2024. He has moderate ASHLYN with AHI 21 and received a new device after the recall, auto-CPAP 5-15 cmH2O via nasal pillows.    ROS:  A 12-system review was obtained and was negative with the exception of the symptoms endorsed in the history of present illness.    PMH:  remote tobacco use, inducible laryngeal obstruction, moderate obstructive sleep apnea on APAP, GERD, iron deficiency anemia, osteoarthritis s/p right TKA, left shoulder and left knee injury/pain, and chronic exertional dyspnea    PSH:  Past Surgical History:   Procedure Laterality Date     COLONOSCOPY       EXCISE MASS VAGINA Left 4/10/2015    Procedure: EXCISE MASS VAGINA;  Surgeon: Stanislav Byers MD;  Location: [facility] OR     GENITOURINARY SURGERY      Urethrial dilation     JOINT REPLACEMENT Right 07/2018     ORTHOPEDIC SURGERY      right rotator cuff, left achilles tendon repair, neuroma removed right foot, right knee arthroscopy,        Allergies:  Allergies   Allergen Reactions     Midazolam Other (See Comments)     Stopped breathing    Other reaction(s): Apnea   Stopped breathing   Stopped breathing     Penicillins Rash and Hives       Family HX:  Family History   Problem Relation Age of Onset     Macular Degeneration Mother      Osteoporosis Mother      Heart Disease Father 49     Multiple Sclerosis Sister      Diabetes Paternal Uncle         heart issues     Cancer No family hx of      Coronary Artery Disease No family hx of        Social Hx:  Social History     Socioeconomic History     Marital status: Single      Spouse name: Not on file     Number of children: Not on file     Years of education: Not on file     Highest education level: Not on file   Occupational History     Not on file   Tobacco Use     Smoking status: Former     Current packs/day: 0.00     Types: Cigarettes     Quit date: 1983     Years since quittin.0     Smokeless tobacco: Never   Vaping Use     Vaping status: Never Used   Substance and Sexual Activity     Alcohol use: Yes     Comment: occasional     Drug use: Yes     Types: Marijuana     Comment: daily only at night time for medical conditions     Sexual activity: Not Currently   Other Topics Concern     Parent/sibling w/ CABG, MI or angioplasty before 65F 55M? Not Asked   Social History Narrative     Not on file     Social Determinants of Health     Financial Resource Strain: High Risk (2022)    Received from [facility], [employer] [facility]    Financial Resource Strain      Difficulty of Paying Living Expenses: Not on file      Difficulty of Paying Living Expenses: Not on file   Food Insecurity: Not on file   Transportation Needs: Not on file   Physical Activity: Not on file   Stress: Not on file   Social Connections: Unknown (2022)    [facility], [facility]    Social Connections      Frequency of Communication with Friends and Family: Not on file   Interpersonal Safety: Unknown (2024)    Received from [facility]    Humiliation, Afraid, Rape, and Kick questionnaire      Fear of Current or Ex-Partner: Not on file      Emotionally Abused: No      Physically Abused: No      Sexually Abused: No   Housing Stability: Not on file       Current Meds:  Current Outpatient Medications   Medication Sig Dispense Refill     albuterol (PROAIR HFA/PROVENTIL HFA/VENTOLIN HFA) 108 (90 Base) MCG/ACT inhaler Inhale 2 puffs into the lungs every 4 hours as  needed for shortness of breath, wheezing or cough. 18 g 0     anastrozole (ARIMIDEX) 1 MG tablet Take 1 tablet by mouth daily       clonazePAM (KLONOPIN) 1 MG tablet Take 1 mg by mouth 2 times daily as needed for anxiety       cloNIDine (CATAPRES) 0.1 MG tablet Take 1 tablet (0.1 mg) by mouth 2 times daily (Patient taking differently: Take 0.1 mg by mouth 2 times daily. . May also take 1 tablet twice daily as needed.) 60 tablet 0     cyclobenzaprine (FLEXERIL) 5 MG tablet Take 1 tablet (5 mg) by mouth every 8 hours as needed for muscle spasms 90 tablet 0     desvenlafaxine (PRISTIQ) 50 MG 24 hr tablet Take 1 tablet (50 mg) by mouth daily 30 tablet 0     diclofenac (VOLTAREN) 1 % topical gel SMARTSI-4 Gram(s) Topical 4 Times Daily       gabapentin (NEURONTIN) 300 MG capsule Take 3 capsules (900 mg) by mouth At Bedtime (Patient taking differently: Take 900 mg by mouth at bedtime. . May also take up to 2 additional capsules daily as needed for anxiety.) 270 capsule 0     hydroCHLOROthiazide 12.5 MG tablet Take 1 tablet (12.5 mg) by mouth daily 30 tablet 0     omeprazole (PRILOSEC) 40 MG DR capsule TAKE 1 CAPSULE BY MOUTH EVERY DAY BEFORE A MEAL       pramipexole (MIRAPEX) 0.25 MG tablet Take 3 tablets (0.75 mg) by mouth At Bedtime 90 tablet 3     testosterone cypionate (DEPOTESTOSTERONE) 200 MG/ML injection Inject 70 mg Subcutaneous once a week on        traZODone (DESYREL) 100 MG tablet Take  mg by mouth as needed       UNABLE TO FIND Pt is using Medical cannabis         Physical Exam:  /76 (BP Location: Right arm, Patient Position: Sitting, Cuff Size: Adult Regular)   Pulse 80   Wt 93.9 kg (207 lb)   SpO2 96%   BMI 33.41 kg/m    Gen: alert, oriented, no distress  HEENT: nasal mucosa is unremarkable, no oropharyngeal lesions, no cervical or supraclavicular lymphadenopathy  CV: RRR, no M/G/R  Resp: CTAB, no focal crackles or wheezes  Skin: no apparent rashes  Ext: no cyanosis, clubbing or  edema  Neuro: alert, nonfocal    Labs:  reviewed    Imaging studies:  Chest CT (January 2024):  - images directly reviewed, formal interpretation follows:  FINDINGS:   LUNGS AND PLEURA: Previously seen nodule that was new in the right lower lobe has resolved. No new nodules. No infiltrates or effusions.     MEDIASTINUM/AXILLAE: No adenopathy or aneurysm.     CORONARY ARTERY CALCIFICATION: Trace.     UPPER ABDOMEN: No acute findings.     MUSCULOSKELETAL: No frankly destructive bony lesions.                                                                   IMPRESSION:   1.  Resolved right lower lobe nodule.     TTE (September 2020):  Global and regional left ventricular function is normal with an EF of 60-65%.  Left ventricular diastolic function is normal.  Right ventricular function, chamber size, wall motion, and thickness are  normal.  Both atria appear normal.  Pulmonary artery systolic pressure is normal.  The inferior vena cava is normal.  No pericardial effusion is present.  No structural cause for symptom identified.     Pulmonary Function Testing  June 2022:  FVC 2.35 (72%)  FEV1 1.97 (77%)  FEV1/FVC 0.84  No bronchodilator response  RV 1.93 (93%)  TLC 4.29 (81%)  DLCOc 106%  Flow-volume loop is normal with no evidence of upper airway obstruction    Time spent on chart and image review, meeting with the patient to obtain history, perform physical exam, discuss test results, diagnostic possibilities, further testing options, treatment plan options, and care coordination: 34 minutes    The longitudinal plan of care for the diagnosis(es)/condition(s) as documented were addressed during this visit. Due to the added complexity in care, I will continue to support Killian in the subsequent management and with ongoing continuity of care.      Again, thank you for allowing me to participate in the care of your patient.        Sincerely,        Daniel Greer MD

## 2024-09-11 NOTE — PROGRESS NOTES
"Pulmonary Clinic Follow-up Visit    Assessment and Plan:   69 year old male with a history of remote tobacco use, inducible laryngeal obstruction, moderate obstructive sleep apnea on APAP, GERD, iron deficiency anemia, osteoarthritis s/p right TKA, breast cancer s/p bilateral mastectomy, left shoulder and left knee injury/pain, and chronic exertional dyspnea, presenting for follow-up.     Chronic exertional dyspnea, inducible laryngeal obstruction, obstructive sleep apnea: Killian has been doing better overall, able to bicycle (has a long bike and can bike up incline with groceries). Fewer episodes of laryngospasm with omeprazole and prior SLP exercises (eg hot potato mouth), happening less often, also on antidepressant which may be contributing to improvement. He is using APAP via nasal pillows which is going well and improving daytime energy level. Recall from prior visits that Killian likely has multifactorial dyspnea. He does have confirmed inducible laryngeal obstruction that leads to difficulty talking while walking, for example, previously followed by SLP at [facility], improved recently. He has previously found benefit from yawning (\"hot potato mouth\") exercises when an episode occurs and has noted that emotional stress does also sometimes precipitate an episode and worsen breathing. During pulmonary visit in September 2024 he notes that this has been less of an issue recently. He has no clear history of seasonal allergies or childhood asthma and normal PFT. Very subtle (questionable) cylindrical bronchiectasis in RML, subtle expiratory subsegmental air trapping, but this can also be seen in normal lungs. Had a RLL nodule that resolved on follow-up imaging in January 2024. He has moderate ASHLYN with AHI 21 and received a new device after the recall, auto-CPAP 5-15 cmH2O via nasal pillows.     Plan:  - continue APAP via nasal pillows as detailed above  - continue to use yawning (\"hot potato mouth\") breaths to abort " "acute episodes of inducible laryngeal obstruction; has happened less frequently recently and also on PPI for GERD which helps  - completed pulmonary rehabilitation at [facility] and continues with regular exercise  - albuterol HFA as needed  - recommend remaining up to date with respiratory vaccinations  - follow up in one year  - encouraged him to contact us with questions or concerning symptoms    Daniel Greer MD  Pulmonary and Critical Care Medicine  [facility]  Office [phone]  he/him    CCx: Chronic exertional dyspnea, inducible laryngeal obstruction, obstructive sleep apnea    HPI: 69 year old male with a history of remote tobacco use, inducible laryngeal obstruction, moderate obstructive sleep apnea on APAP, GERD, iron deficiency anemia, osteoarthritis s/p right TKA, left shoulder and left knee injury/pain, and chronic exertional dyspnea, presenting for follow-up. Killian has been doing better overall, able to bicycle (has a long bike and can bike up incline with groceries). Fewer episodes of laryngospasm with omeprazole and prior SLP exercises (eg hot potato mouth), happening less often, also on antidepressant which may be contributing to improvement. He is using APAP via nasal pillows which is going well and improving daytime energy level. Recall from prior visits that Killian likely has multifactorial dyspnea. He does have confirmed inducible laryngeal obstruction that leads to difficulty talking while walking, for example, previously followed by SLP at [facility], improved recently. He has previously found benefit from yawning (\"hot potato mouth\") exercises when an episode occurs and has noted that emotional stress does also sometimes precipitate an episode and worsen breathing. During pulmonary visit in September 2024 he notes that this has been less of an issue recently. He has no clear history of seasonal allergies or childhood asthma and normal PFT. Very subtle " (questionable) cylindrical bronchiectasis in RML, subtle expiratory subsegmental air trapping, but this can also be seen in normal lungs. Had a RLL nodule that resolved on follow-up imaging in January 2024. He has moderate ASHLYN with AHI 21 and received a new device after the recall, auto-CPAP 5-15 cmH2O via nasal pillows.    ROS:  A 12-system review was obtained and was negative with the exception of the symptoms endorsed in the history of present illness.    PMH:  remote tobacco use, inducible laryngeal obstruction, moderate obstructive sleep apnea on APAP, GERD, iron deficiency anemia, osteoarthritis s/p right TKA, left shoulder and left knee injury/pain, and chronic exertional dyspnea    PSH:  Past Surgical History:   Procedure Laterality Date    COLONOSCOPY      EXCISE MASS VAGINA Left 4/10/2015    Procedure: EXCISE MASS VAGINA;  Surgeon: Stanislav Byers MD;  Location: [facility] OR    GENITOURINARY SURGERY      Urethrial dilation    JOINT REPLACEMENT Right 07/2018    ORTHOPEDIC SURGERY      right rotator cuff, left achilles tendon repair, neuroma removed right foot, right knee arthroscopy,        Allergies:  Allergies   Allergen Reactions    Midazolam Other (See Comments)     Stopped breathing    Other reaction(s): Apnea   Stopped breathing   Stopped breathing    Penicillins Rash and Hives       Family HX:  Family History   Problem Relation Age of Onset    Macular Degeneration Mother     Osteoporosis Mother     Heart Disease Father 49    Multiple Sclerosis Sister     Diabetes Paternal Uncle         heart issues    Cancer No family hx of     Coronary Artery Disease No family hx of        Social Hx:  Social History     Socioeconomic History    Marital status: Single     Spouse name: Not on file    Number of children: Not on file    Years of education: Not on file    Highest education level: Not on file   Occupational History    Not on file   Tobacco Use    Smoking status: Former     Current packs/day: 0.00     Types:  Cigarettes     Quit date: 1983     Years since quittin.0    Smokeless tobacco: Never   Vaping Use    Vaping status: Never Used   Substance and Sexual Activity    Alcohol use: Yes     Comment: occasional    Drug use: Yes     Types: Marijuana     Comment: daily only at night time for medical conditions    Sexual activity: Not Currently   Other Topics Concern    Parent/sibling w/ CABG, MI or angioplasty before 65F 55M? Not Asked   Social History Narrative    Not on file     Social Determinants of Health     Financial Resource Strain: High Risk (2022)    [facility]    Financial Resource Strain     Difficulty of Paying Living Expenses: Not on file     Difficulty of Paying Living Expenses: Not on file   Food Insecurity: Not on file   Transportation Needs: Not on file   Physical Activity: Not on file   Stress: Not on file   Social Connections: Unknown (2022)    Received from [employer] [facility], [facility]    Social Connections     Frequency of Communication with Friends and Family: Not on file   Interpersonal Safety: Unknown (2024)    [facility]    Humiliation, Afraid, Rape, and Kick questionnaire     Fear of Current or Ex-Partner: Not on file     Emotionally Abused: No     Physically Abused: No     Sexually Abused: No   Housing Stability: Not on file       Current Meds:  Current Outpatient Medications   Medication Sig Dispense Refill    albuterol (PROAIR HFA/PROVENTIL HFA/VENTOLIN HFA) 108 (90 Base) MCG/ACT inhaler Inhale 2 puffs into the lungs every 4 hours as needed for shortness of breath, wheezing or cough. 18 g 0    anastrozole (ARIMIDEX) 1 MG tablet Take 1 tablet by mouth daily      clonazePAM (KLONOPIN) 1 MG tablet Take 1 mg by mouth 2 times daily as needed for anxiety      cloNIDine (CATAPRES) 0.1 MG tablet Take 1 tablet  (0.1 mg) by mouth 2 times daily (Patient taking differently: Take 0.1 mg by mouth 2 times daily. . May also take 1 tablet twice daily as needed.) 60 tablet 0    cyclobenzaprine (FLEXERIL) 5 MG tablet Take 1 tablet (5 mg) by mouth every 8 hours as needed for muscle spasms 90 tablet 0    desvenlafaxine (PRISTIQ) 50 MG 24 hr tablet Take 1 tablet (50 mg) by mouth daily 30 tablet 0    diclofenac (VOLTAREN) 1 % topical gel SMARTSI-4 Gram(s) Topical 4 Times Daily      gabapentin (NEURONTIN) 300 MG capsule Take 3 capsules (900 mg) by mouth At Bedtime (Patient taking differently: Take 900 mg by mouth at bedtime. . May also take up to 2 additional capsules daily as needed for anxiety.) 270 capsule 0    hydroCHLOROthiazide 12.5 MG tablet Take 1 tablet (12.5 mg) by mouth daily 30 tablet 0    omeprazole (PRILOSEC) 40 MG DR capsule TAKE 1 CAPSULE BY MOUTH EVERY DAY BEFORE A MEAL      pramipexole (MIRAPEX) 0.25 MG tablet Take 3 tablets (0.75 mg) by mouth At Bedtime 90 tablet 3    testosterone cypionate (DEPOTESTOSTERONE) 200 MG/ML injection Inject 70 mg Subcutaneous once a week on       traZODone (DESYREL) 100 MG tablet Take  mg by mouth as needed      UNABLE TO FIND Pt is using Medical cannabis         Physical Exam:  /76 (BP Location: Right arm, Patient Position: Sitting, Cuff Size: Adult Regular)   Pulse 80   Wt 93.9 kg (207 lb)   SpO2 96%   BMI 33.41 kg/m    Gen: alert, oriented, no distress  HEENT: nasal mucosa is unremarkable, no oropharyngeal lesions, no cervical or supraclavicular lymphadenopathy  CV: RRR, no M/G/R  Resp: CTAB, no focal crackles or wheezes  Skin: no apparent rashes  Ext: no cyanosis, clubbing or edema  Neuro: alert, nonfocal    Labs:  reviewed    Imaging studies:  Chest CT (2024):  - images directly reviewed, formal interpretation follows:  FINDINGS:   LUNGS AND PLEURA: Previously seen nodule that was new in the right lower lobe has resolved. No new nodules. No infiltrates  or effusions.     MEDIASTINUM/AXILLAE: No adenopathy or aneurysm.     CORONARY ARTERY CALCIFICATION: Trace.     UPPER ABDOMEN: No acute findings.     MUSCULOSKELETAL: No frankly destructive bony lesions.                                                                   IMPRESSION:   1.  Resolved right lower lobe nodule.     TTE (September 2020):  Global and regional left ventricular function is normal with an EF of 60-65%.  Left ventricular diastolic function is normal.  Right ventricular function, chamber size, wall motion, and thickness are  normal.  Both atria appear normal.  Pulmonary artery systolic pressure is normal.  The inferior vena cava is normal.  No pericardial effusion is present.  No structural cause for symptom identified.     Pulmonary Function Testing  June 2022:  FVC 2.35 (72%)  FEV1 1.97 (77%)  FEV1/FVC 0.84  No bronchodilator response  RV 1.93 (93%)  TLC 4.29 (81%)  DLCOc 106%  Flow-volume loop is normal with no evidence of upper airway obstruction    Time spent on chart and image review, meeting with the patient to obtain history, perform physical exam, discuss test results, diagnostic possibilities, further testing options, treatment plan options, and care coordination: 34 minutes    The longitudinal plan of care for the diagnosis(es)/condition(s) as documented were addressed during this visit. Due to the added complexity in care, I will continue to support Killian in the subsequent management and with ongoing continuity of care.

## 2024-10-09 ENCOUNTER — OFFICE VISIT (OUTPATIENT)
Dept: OPHTHALMOLOGY | Facility: CLINIC | Age: 69
End: 2024-10-09
Attending: OPHTHALMOLOGY
Payer: MEDICARE

## 2024-10-09 DIAGNOSIS — Z85.3 HISTORY OF BREAST CANCER: ICD-10-CM

## 2024-10-09 DIAGNOSIS — H40.1122 PRIMARY OPEN ANGLE GLAUCOMA (POAG) OF LEFT EYE, MODERATE STAGE: ICD-10-CM

## 2024-10-09 DIAGNOSIS — H52.03 HYPEROPIA OF BOTH EYES WITH ASTIGMATISM AND PRESBYOPIA: ICD-10-CM

## 2024-10-09 DIAGNOSIS — H40.1111 PRIMARY OPEN ANGLE GLAUCOMA (POAG) OF RIGHT EYE, MILD STAGE: Primary | ICD-10-CM

## 2024-10-09 DIAGNOSIS — H52.203 HYPEROPIA OF BOTH EYES WITH ASTIGMATISM AND PRESBYOPIA: ICD-10-CM

## 2024-10-09 DIAGNOSIS — F64.0 TRANSSEXUALISM: ICD-10-CM

## 2024-10-09 DIAGNOSIS — E11.9 TYPE 2 DIABETES MELLITUS WITHOUT RETINOPATHY (H): ICD-10-CM

## 2024-10-09 DIAGNOSIS — Z98.890 H/O LASER ASSISTED IN SITU KERATOMILEUSIS: ICD-10-CM

## 2024-10-09 DIAGNOSIS — H40.9 GLAUCOMA, UNSPECIFIED GLAUCOMA TYPE, UNSPECIFIED LATERALITY: Primary | ICD-10-CM

## 2024-10-09 DIAGNOSIS — H52.4 HYPEROPIA OF BOTH EYES WITH ASTIGMATISM AND PRESBYOPIA: ICD-10-CM

## 2024-10-09 DIAGNOSIS — H04.123 DRY EYE SYNDROME OF BOTH EYES: ICD-10-CM

## 2024-10-09 DIAGNOSIS — H40.9 GLAUCOMA, UNSPECIFIED GLAUCOMA TYPE, UNSPECIFIED LATERALITY: ICD-10-CM

## 2024-10-09 PROCEDURE — 99213 OFFICE O/P EST LOW 20 MIN: CPT | Mod: 25 | Performed by: OPHTHALMOLOGY

## 2024-10-09 PROCEDURE — 92083 EXTENDED VISUAL FIELD XM: CPT | Performed by: OPHTHALMOLOGY

## 2024-10-09 PROCEDURE — G0463 HOSPITAL OUTPT CLINIC VISIT: HCPCS | Performed by: OPHTHALMOLOGY

## 2024-10-09 PROCEDURE — 65855 TRABECULOPLASTY LASER SURG: CPT | Mod: LT | Performed by: OPHTHALMOLOGY

## 2024-10-09 RX ORDER — KETOROLAC TROMETHAMINE 5 MG/ML
1 SOLUTION OPHTHALMIC 4 TIMES DAILY
Qty: 3 ML | Refills: 0 | Status: SHIPPED | OUTPATIENT
Start: 2024-10-09

## 2024-10-09 RX ORDER — LATANOPROST 50 UG/ML
1 SOLUTION/ DROPS OPHTHALMIC DAILY
COMMUNITY

## 2024-10-09 ASSESSMENT — TONOMETRY
IOP_METHOD: APPLANATION
IOP_METHOD: TONOPEN
OD_IOP_MMHG: 15
OS_IOP_MMHG: 11
OS_IOP_MMHG: 14
OD_IOP_MMHG: 11

## 2024-10-09 ASSESSMENT — CONF VISUAL FIELD
OS_NORMAL: 1
OD_SUPERIOR_TEMPORAL_RESTRICTION: 0
OS_SUPERIOR_NASAL_RESTRICTION: 0
OS_INFERIOR_NASAL_RESTRICTION: 0
METHOD: COUNTING FINGERS
OD_INFERIOR_NASAL_RESTRICTION: 0
OD_NORMAL: 1
OD_INFERIOR_TEMPORAL_RESTRICTION: 0
OS_SUPERIOR_TEMPORAL_RESTRICTION: 0
OD_SUPERIOR_NASAL_RESTRICTION: 0
OS_INFERIOR_TEMPORAL_RESTRICTION: 0

## 2024-10-09 ASSESSMENT — VISUAL ACUITY
CORRECTION_TYPE: GLASSES
METHOD: SNELLEN - LINEAR
OS_SC: 20/40
OD_SC: 20/25
OS_PH_SC: 20/25
OD_SC+: -2

## 2024-10-09 ASSESSMENT — CUP TO DISC RATIO
OS_RATIO: 0.7
OD_RATIO: 0.6

## 2024-10-09 ASSESSMENT — EXTERNAL EXAM - RIGHT EYE: OD_EXAM: WNL

## 2024-10-09 ASSESSMENT — EXTERNAL EXAM - LEFT EYE: OS_EXAM: WNL

## 2024-10-09 ASSESSMENT — SLIT LAMP EXAM - LIDS
COMMENTS: WNL
COMMENTS: WNL

## 2024-10-09 NOTE — NURSING NOTE
Chief Complaints and History of Present Illnesses   Patient presents with    Glaucoma Follow-Up     Chief Complaint(s) and History of Present Illness(es)       Glaucoma Follow-Up               Comments    Patient states that he forgot his glasses. He states that he can see well. No changes in vision. He does not wear the glasses. No pain and irritation. No flashes of light. No changes in floaters.     Ocular Meds: latanaprost at bedtime- is not consistent with the drops     Jose Alberto PUGA, October 9, 2024 12:39 PM

## 2024-10-09 NOTE — PROGRESS NOTES
HPI    Patient states that he forgot his glasses. He states that he can see well. No changes in vision. He does not wear the glasses. No pain and irritation. No flashes of light. No changes in floaters.     Ocular Meds: latanaprost at bedtime- is not consistent with the drops     Jose Alberto Mccartney [facility], 2024 12:39 PM        Last edited by Jose Alberto Mccartney on 10/9/2024 12:42 PM.         Review of systems for the eyes was negative other than the pertinent positives/negatives listed in the HPI.      Assessment & Plan    HPI:  Gregoria Stein is a 69 year old adult with history of gender dysphoria, HTN, GERD, CLAIRE, MDD, laser in situ keratomileusis (LASIK), Primary open angle glaucoma (POAG) left eye > right eye presents for exam after starting latanoprost. Notes poor compliance with drops.     POHx: Primary open angle glaucoma (POAG), LASIK  PMHx: gender dysphoria, HTN, GERD, CLAIRE, MDD  Current Medications:   Current Outpatient Medications   Medication Sig Dispense Refill    albuterol (PROAIR HFA/PROVENTIL HFA/VENTOLIN HFA) 108 (90 Base) MCG/ACT inhaler Inhale 2 puffs into the lungs every 4 hours as needed for shortness of breath, wheezing or cough. 18 g 0    anastrozole (ARIMIDEX) 1 MG tablet Take 1 tablet by mouth daily      clonazePAM (KLONOPIN) 1 MG tablet Take 1 mg by mouth 2 times daily as needed for anxiety      cyclobenzaprine (FLEXERIL) 5 MG tablet Take 1 tablet (5 mg) by mouth every 8 hours as needed for muscle spasms 90 tablet 0    desvenlafaxine (PRISTIQ) 50 MG 24 hr tablet Take 1 tablet (50 mg) by mouth daily 30 tablet 0    diclofenac (VOLTAREN) 1 % topical gel SMARTSI-4 Gram(s) Topical 4 Times Daily      hydroCHLOROthiazide 12.5 MG tablet Take 1 tablet (12.5 mg) by mouth daily 30 tablet 0    ketorolac (ACULAR) 0.5 % ophthalmic solution Place 1 drop Into the left eye 4 times daily. 3 mL 0    latanoprost (XALATAN) 0.005 % ophthalmic solution Place 1 drop into both eyes daily.      omeprazole (PRILOSEC) 40  MG DR capsule TAKE 1 CAPSULE BY MOUTH EVERY DAY BEFORE A MEAL      testosterone cypionate (DEPOTESTOSTERONE) 200 MG/ML injection Inject 70 mg Subcutaneous once a week on Fridays      traZODone (DESYREL) 100 MG tablet Take  mg by mouth as needed      UNABLE TO FIND Pt is using Medical cannabis      cloNIDine (CATAPRES) 0.1 MG tablet Take 1 tablet (0.1 mg) by mouth 2 times daily (Patient taking differently: Take 0.1 mg by mouth 2 times daily. . May also take 1 tablet twice daily as needed.) 60 tablet 0    gabapentin (NEURONTIN) 300 MG capsule Take 3 capsules (900 mg) by mouth At Bedtime (Patient taking differently: Take 900 mg by mouth at bedtime. . May also take up to 2 additional capsules daily as needed for anxiety.) 270 capsule 0    pramipexole (MIRAPEX) 0.25 MG tablet Take 3 tablets (0.75 mg) by mouth At Bedtime 90 tablet 3     No current facility-administered medications for this visit.     FHx: denies family history of ocular conditions   PSHx: laser in situ keratomileusis (LASIK)       Current Eye Medications:      Assessment & Plan:  (H40.1111) Primary open angle glaucoma (POAG) of right eye, mild stage  (primary encounter diagnosis)  (H40.1122) Primary open angle glaucoma (POAG) of left eye, moderate stage  Maximum intraocular pressure unknown  Family history: No  Trauma history: No  Gonioscopy: open, mild pigment  Pachymetry: 540/520  Treatment History:   Selected laser trabeculoplasty (SLT) 2018 both eyes  Latanoprost (poor compliance)    Today's testing:  Visual field 10/09/24:   Right eye - full, reliable; MD-3.2,sLV2.8   Left eye - inferior nasal step, reliable, MD-4.5, sLV 4.8    OCT nerve fiber layer 10/09/24:   Right eye - G(g) 83 NI (y) 61 TI (g) 132 NS (y) 68 TS (g) 108      Left eye - G(y) 75 NI (g) 89 TI (y) 95 NS (g) 84 TS (r) 75    IOP goal: low teens  Discussed glaucoma diagnosis at length including etiology of disease and treatment options including laser, drops, or surgery  Unclear if  this is progression as patient had been previously treated with Selected laser trabeculoplasty (SLT) and intraocular pressure is within normal limits  Risks, benefits, and alterantives discussed  Patient does not feel he can be compliant with drops  Will pursue Selected laser trabeculoplasty (SLT) left eye today followed by right eye       (Z98.890) H/O laser assisted in situ keratomileusis  (H52.03,  H52.203,  H52.4) Hyperopia of both eyes with astigmatism and presbyopia  With mild hyperopic regression      (E11.9) Type 2 diabetes mellitus without retinopathy (H)  Diagnosed 2024  Most recent HgBA1c 6.6 on 5/23/24  No background diabetic retinopathy or neovascularization noted on today's exam.  Discussed ocular and systemic benefits of blood pressure and blood sugar control.  Return in 1 year for full exam with dilation or sooner if changes to vision.       (H04.123) Dry eye syndrome of both eyes  Consider AT    (Z85.3) History of breast cancer    (F64.0) Gender dysphoria in adolescent and adult        Return in about 2 weeks (around 10/23/2024) for SLT OD.        Nishant Serrano MD     Attending Physician Attestation:  Complete documentation of historical and exam elements from today's encounter can be found in the full encounter summary report (not reduplicated in this progress note).  I personally obtained the chief complaint(s) and history of present illness.  I confirmed and edited as necessary the review of systems, past medical/surgical history, family history, social history, and examination findings as documented by others; and I examined the patient myself.  I personally reviewed the relevant tests, images, and reports as documented above.  I formulated and edited as necessary the assessment and plan and discussed the findings and management plan with the patient and family. - Nishant Serrano MD

## 2024-10-30 ENCOUNTER — OFFICE VISIT (OUTPATIENT)
Dept: OPHTHALMOLOGY | Facility: CLINIC | Age: 69
End: 2024-10-30
Attending: OPHTHALMOLOGY
Payer: MEDICARE

## 2024-10-30 DIAGNOSIS — H40.1111 PRIMARY OPEN ANGLE GLAUCOMA (POAG) OF RIGHT EYE, MILD STAGE: ICD-10-CM

## 2024-10-30 DIAGNOSIS — H40.9 GLAUCOMA, UNSPECIFIED GLAUCOMA TYPE, UNSPECIFIED LATERALITY: Primary | ICD-10-CM

## 2024-10-30 DIAGNOSIS — H40.1122 PRIMARY OPEN ANGLE GLAUCOMA (POAG) OF LEFT EYE, MODERATE STAGE: ICD-10-CM

## 2024-10-30 PROBLEM — E11.9 DIABETES MELLITUS, TYPE 2 (H): Status: ACTIVE | Noted: 2024-10-30

## 2024-10-30 PROCEDURE — 65855 TRABECULOPLASTY LASER SURG: CPT | Mod: RT | Performed by: OPHTHALMOLOGY

## 2024-10-30 ASSESSMENT — TONOMETRY
IOP_METHOD: TONOPEN
IOP_METHOD: TONOPEN
OS_IOP_MMHG: 12
OD_IOP_MMHG: 14
OD_IOP_MMHG: 10

## 2024-10-30 ASSESSMENT — VISUAL ACUITY
OS_PH_SC: 20/20
OD_SC+: -2
OS_SC+: -1
OS_SC: 20/30
METHOD: SNELLEN - LINEAR
OD_SC: 20/20

## 2024-11-01 DIAGNOSIS — Z17.0 MALIGNANT NEOPLASM OF OVERLAPPING SITES OF LEFT BREAST IN FEMALE, ESTROGEN RECEPTOR POSITIVE (H): Primary | ICD-10-CM

## 2024-11-01 DIAGNOSIS — C50.812 MALIGNANT NEOPLASM OF OVERLAPPING SITES OF LEFT BREAST IN FEMALE, ESTROGEN RECEPTOR POSITIVE (H): Primary | ICD-10-CM

## 2024-11-01 RX ORDER — ANASTROZOLE 1 MG/1
1 TABLET ORAL DAILY
Qty: 90 TABLET | Refills: 3 | Status: SHIPPED | OUTPATIENT
Start: 2024-11-01

## 2024-11-02 ENCOUNTER — HEALTH MAINTENANCE LETTER (OUTPATIENT)
Age: 69
End: 2024-11-02

## 2024-11-25 ENCOUNTER — ONCOLOGY VISIT (OUTPATIENT)
Dept: ONCOLOGY | Facility: CLINIC | Age: 69
End: 2024-11-25
Attending: INTERNAL MEDICINE
Payer: MEDICARE

## 2024-11-25 VITALS
SYSTOLIC BLOOD PRESSURE: 172 MMHG | HEART RATE: 87 BPM | TEMPERATURE: 97.7 F | WEIGHT: 200.2 LBS | BODY MASS INDEX: 32.31 KG/M2 | DIASTOLIC BLOOD PRESSURE: 101 MMHG | RESPIRATION RATE: 19 BRPM | OXYGEN SATURATION: 96 %

## 2024-11-25 DIAGNOSIS — F33.2 SEVERE EPISODE OF RECURRENT MAJOR DEPRESSIVE DISORDER, WITHOUT PSYCHOTIC FEATURES (H): ICD-10-CM

## 2024-11-25 DIAGNOSIS — D75.1 ERYTHROCYTOSIS: ICD-10-CM

## 2024-11-25 DIAGNOSIS — C50.812 MALIGNANT NEOPLASM OF OVERLAPPING SITES OF LEFT BREAST IN FEMALE, ESTROGEN RECEPTOR POSITIVE (H): Primary | ICD-10-CM

## 2024-11-25 DIAGNOSIS — Z17.0 MALIGNANT NEOPLASM OF OVERLAPPING SITES OF LEFT BREAST IN FEMALE, ESTROGEN RECEPTOR POSITIVE (H): Primary | ICD-10-CM

## 2024-11-25 PROCEDURE — G0463 HOSPITAL OUTPT CLINIC VISIT: HCPCS | Performed by: INTERNAL MEDICINE

## 2024-11-25 PROCEDURE — 99214 OFFICE O/P EST MOD 30 MIN: CPT | Performed by: INTERNAL MEDICINE

## 2024-11-25 RX ORDER — DOXYCYCLINE HYCLATE 100 MG
100 TABLET ORAL 2 TIMES DAILY
COMMUNITY

## 2024-11-25 RX ORDER — PREDNISONE 20 MG/1
20 TABLET ORAL DAILY
COMMUNITY
Start: 2024-11-20

## 2024-11-25 RX ORDER — ALBUTEROL SULFATE 90 UG/1
2 INHALANT RESPIRATORY (INHALATION) EVERY 4 HOURS PRN
Qty: 18 G | Refills: 11 | Status: SHIPPED | OUTPATIENT
Start: 2024-11-25

## 2024-11-25 ASSESSMENT — PAIN SCALES - GENERAL: PAINLEVEL_OUTOF10: NO PAIN (0)

## 2024-11-25 NOTE — NURSING NOTE
"Oncology Rooming Note    November 25, 2024 10:16 AM   Gregoria Stein is a 69 year old adult who presents for:    Chief Complaint   Patient presents with    Breast Cancer     Malignant neoplasm of overlapping sites of left breast in female, estrogen receptor positive      Initial Vitals: Resp 19  Estimated body mass index is 33.41 kg/m  as calculated from the following:    Height as of 7/12/24: 1.676 m (5' 6\").    Weight as of 9/11/24: 93.9 kg (207 lb). There is no height or weight on file to calculate BSA.  Data Unavailable Comment: Data Unavailable   No LMP recorded. (Menstrual status: Reassignment).  Allergies reviewed: Yes  Medications reviewed: Yes    Medications: Medication refills not needed today.  Pharmacy name entered into [facility]: [facility][id number] [address]    Frailty Screening:   Is the patient here for a new oncology consult visit in cancer care? 2. No      Clinical concerns: Pt reports having upper respiratory infection and a bruise on left arm. Dr. Linares was notified via message.       Zuly Hoyt, EMT     "

## 2024-11-25 NOTE — PROGRESS NOTES
Oncology Visit:      Reason:    left breast cancer s/p resection    HPI:    Clinical/anatomic stage IIA (cT2, cN0, cM0) invasive ductal carcinoma of the upper-outer quadrant of the left breast  - Diagnosed 3/13/2024 via left breast biopsy  - ER (positive - 80%), NM (positive - 10%), HER2 (low, score of 1+ by IHC)  - Tomasz grade I histology on biopsy   - Tumor size: 2.7 cm by MRI  - Final pathology 35 mm, grade 1.  0/4 lymph nodes involved.  T2N0  - Oncotype dx 18  -On adjuvant anastrazole     INTERVAL HISTORY:  In follow up, Killian is doing well.  Killian is working with a series of OT, counseling to help with complex PTSD.  He recently got a support animal, a dog named Bamboo, that has been a great thing for him.      No nodules or masses.  Has some tenderness over left nipple.    No f/c.  No n/v/d/c.  No pain.    Has occasional hot flashes from the anastrazole but is tolerating this okay.      At the time of surgery, Killian was found to have elevated hemoglobin.  Killian has been seeing my colleague Dr Mary Rosales for evaluation.  Hgb has improved.       ROS: 10 point ROS neg other than the symptoms noted above in the HPI.       PMH:       Pelvic floor weakness   Status post total knee replacement, right   Thoracic aortic aneurysm without rupture (HRC)    Controlled substance agreement signed   Gender dysphoria   Generalized anxiety disorder (HRC)   PTSD (post-traumatic stress disorder) (HRC)   RLS (restless legs syndrome)   ASHLYN (obstructive sleep apnea)   Severe episode of recurrent major depressive disorder, without psychotic features (HRC)   Financial difficulties  Primary hypertension (HRC)   Malignant neoplasm of upper-outer quadrant of left female breast (HRC)   Gender dysphoria in adult   Invasive ductal carcinoma of breast, stage 1, left (HRC)   Malignant neoplasm of female breast (HRC)     Past Surgical History: eye surgery, R knee surgery s/p total knee arthroplasty 7/19/18      SOCIAL HISTORY:  Retired  registered nurse.   Also has worked at [employer].   Quit smoking greater than 30 years ago.   Denies any frequent/recent history of ethanol overuse.    Current Outpatient Medications   Medication Sig Dispense Refill    albuterol (PROAIR HFA/PROVENTIL HFA/VENTOLIN HFA) 108 (90 Base) MCG/ACT inhaler Inhale 2 puffs into the lungs every 4 hours as needed for shortness of breath, wheezing or cough. 18 g 0    anastrozole (ARIMIDEX) 1 MG tablet Take 1 tablet (1 mg) by mouth daily. 90 tablet 3    clonazePAM (KLONOPIN) 1 MG tablet Take 1 mg by mouth 2 times daily as needed for anxiety      cloNIDine (CATAPRES) 0.1 MG tablet Take 1 tablet (0.1 mg) by mouth 2 times daily (Patient taking differently: Take 0.1 mg by mouth 2 times daily. . May also take 1 tablet twice daily as needed.) 60 tablet 0    cyclobenzaprine (FLEXERIL) 5 MG tablet Take 1 tablet (5 mg) by mouth every 8 hours as needed for muscle spasms 90 tablet 0    desvenlafaxine (PRISTIQ) 50 MG 24 hr tablet Take 1 tablet (50 mg) by mouth daily 30 tablet 0    diclofenac (VOLTAREN) 1 % topical gel SMARTSI-4 Gram(s) Topical 4 Times Daily      gabapentin (NEURONTIN) 300 MG capsule Take 3 capsules (900 mg) by mouth At Bedtime (Patient taking differently: Take 900 mg by mouth at bedtime. . May also take up to 2 additional capsules daily as needed for anxiety.) 270 capsule 0    hydroCHLOROthiazide 12.5 MG tablet Take 1 tablet (12.5 mg) by mouth daily 30 tablet 0    ketorolac (ACULAR) 0.5 % ophthalmic solution Place 1 drop Into the left eye 4 times daily. 3 mL 0    latanoprost (XALATAN) 0.005 % ophthalmic solution Place 1 drop into both eyes daily.      omeprazole (PRILOSEC) 40 MG DR capsule TAKE 1 CAPSULE BY MOUTH EVERY DAY BEFORE A MEAL      pramipexole (MIRAPEX) 0.25 MG tablet Take 3 tablets (0.75 mg) by mouth At Bedtime 90 tablet 3    testosterone cypionate (DEPOTESTOSTERONE) 200 MG/ML injection Inject 70 mg Subcutaneous once a week on       traZODone  (DESYREL) 100 MG tablet Take  mg by mouth as needed      UNABLE TO FIND Pt is using Medical cannabis       No current facility-administered medications for this visit.     PE: BP (!) 172/101 (BP Location: Right arm, Patient Position: Sitting, Cuff Size: Adult Regular)   Pulse 87   Temp 97.7  F (36.5  C) (Oral)   Resp 19   Wt 90.8 kg (200 lb 3.2 oz)   SpO2 96%   BMI 32.31 kg/m      Gen : well appearing  HEENT:  no icterus  NECK: supple  CV :rrr   Lungs: clear  Chest: s/p bilateral mastectomy without reconstruction.  INcisions are c/d/I.  Well healing.  Abd: soft, nt nd + bs  Ext : no edema    Reviewed labs, imaging and pathology as outlined below.    Hgb improved.      A/P:  68 y/o transmale here with a LEFT breast cancer clinical T2N0 Er + AR + HER2 negative, prognostic staging 1A, now s/p bilateral mastectomy without reconstruction    Breast cancer - I reviewed with Killian that all of our breast cancer treatment is curative in intent.  We discussed by older staging classifications, he technically has a stage 2 breast cancer.  Prognostic staging is 1A.  Final pathology revealed T2N0.  Oncotype Dx score of 18.  On anastrazole.     We discussed follow up in 6 months.  I'm thrilled with how Killian is doing now.    He has some concerns regarding tenderness over left nipple; he is going to follow up with Dr Benavides in Dec; no cancer concerns from my perspective.    2. Bone health - given use of anastrazole, he will eventually need baseline dexa scan.    3. Transmasculine - discussed his use of testosterone and reviewed plan.  He is seeing Dr Rosales for elevated hgb; this is improved.      4. Coping - overall much better.      5. He would benefit from genetics evaluation.  We will rediscuss this at our next visit.    6. HTN - he works closely with Dr Davis.  He ran out of hydrochlorothiazide recently and just restarted.  Discussed importance of clonidine and hydrochlorothiazide.      Jeaneth Linares MD

## 2024-11-25 NOTE — LETTER
11/25/2024      Gregoria Stein  [address]  [place] [address]      Dear Colleague,    Thank you for referring your patient, Gregoria Stein, to the [facility]. Please see a copy of my visit note below.    Oncology Visit:      Reason:    left breast cancer s/p resection    HPI:    Clinical/anatomic stage IIA (cT2, cN0, cM0) invasive ductal carcinoma of the upper-outer quadrant of the left breast  - Diagnosed 3/13/2024 via left breast biopsy  - ER (positive - 80%), DE (positive - 10%), HER2 (low, score of 1+ by IHC)  - Tomasz grade I histology on biopsy   - Tumor size: 2.7 cm by MRI  - Final pathology 35 mm, grade 1.  0/4 lymph nodes involved.  T2N0  - Oncotype dx 18  -On adjuvant anastrazole     INTERVAL HISTORY:  In follow up, Killian is doing well.  Killian is working with a series of OT, counseling to help with complex PTSD.  He recently got a support animal, a dog named Bamboo, that has been a great thing for him.      No nodules or masses.  Has some tenderness over left nipple.    No f/c.  No n/v/d/c.  No pain.    Has occasional hot flashes from the anastrazole but is tolerating this okay.      At the time of surgery, Killian was found to have elevated hemoglobin.  Killian has been seeing my colleague Dr Mary Rosales for evaluation.  Hgb has improved.       ROS: 10 point ROS neg other than the symptoms noted above in the HPI.       PMH:       Pelvic floor weakness   Status post total knee replacement, right   Thoracic aortic aneurysm without rupture (HRC)    Controlled substance agreement signed   Gender dysphoria   Generalized anxiety disorder (HRC)   PTSD (post-traumatic stress disorder) (HRC)   RLS (restless legs syndrome)   ASHLYN (obstructive sleep apnea)   Severe episode of recurrent major depressive disorder, without psychotic features (HRC)   Financial difficulties  Primary hypertension (HRC)   Malignant neoplasm of upper-outer quadrant of left female breast (HRC)    Gender dysphoria in adult   Invasive ductal carcinoma of breast, stage 1, left (HRC)   Malignant neoplasm of female breast (HRC)     Past Surgical History: eye surgery, R knee surgery s/p total knee arthroplasty 18      SOCIAL HISTORY:  Retired registered nurse.   Also has worked at [employer].   Quit smoking greater than 30 years ago.   Denies any frequent/recent history of ethanol overuse.    Current Outpatient Medications   Medication Sig Dispense Refill    albuterol (PROAIR HFA/PROVENTIL HFA/VENTOLIN HFA) 108 (90 Base) MCG/ACT inhaler Inhale 2 puffs into the lungs every 4 hours as needed for shortness of breath, wheezing or cough. 18 g 0    anastrozole (ARIMIDEX) 1 MG tablet Take 1 tablet (1 mg) by mouth daily. 90 tablet 3    clonazePAM (KLONOPIN) 1 MG tablet Take 1 mg by mouth 2 times daily as needed for anxiety      cloNIDine (CATAPRES) 0.1 MG tablet Take 1 tablet (0.1 mg) by mouth 2 times daily (Patient taking differently: Take 0.1 mg by mouth 2 times daily. . May also take 1 tablet twice daily as needed.) 60 tablet 0    cyclobenzaprine (FLEXERIL) 5 MG tablet Take 1 tablet (5 mg) by mouth every 8 hours as needed for muscle spasms 90 tablet 0    desvenlafaxine (PRISTIQ) 50 MG 24 hr tablet Take 1 tablet (50 mg) by mouth daily 30 tablet 0    diclofenac (VOLTAREN) 1 % topical gel SMARTSI-4 Gram(s) Topical 4 Times Daily      gabapentin (NEURONTIN) 300 MG capsule Take 3 capsules (900 mg) by mouth At Bedtime (Patient taking differently: Take 900 mg by mouth at bedtime. . May also take up to 2 additional capsules daily as needed for anxiety.) 270 capsule 0    hydroCHLOROthiazide 12.5 MG tablet Take 1 tablet (12.5 mg) by mouth daily 30 tablet 0    ketorolac (ACULAR) 0.5 % ophthalmic solution Place 1 drop Into the left eye 4 times daily. 3 mL 0    latanoprost (XALATAN) 0.005 % ophthalmic solution Place 1 drop into both eyes daily.      omeprazole (PRILOSEC) 40 MG DR capsule TAKE 1 CAPSULE BY MOUTH  EVERY DAY BEFORE A MEAL      pramipexole (MIRAPEX) 0.25 MG tablet Take 3 tablets (0.75 mg) by mouth At Bedtime 90 tablet 3    testosterone cypionate (DEPOTESTOSTERONE) 200 MG/ML injection Inject 70 mg Subcutaneous once a week on Fridays      traZODone (DESYREL) 100 MG tablet Take  mg by mouth as needed      UNABLE TO FIND Pt is using Medical cannabis       No current facility-administered medications for this visit.     PE: BP (!) 172/101 (BP Location: Right arm, Patient Position: Sitting, Cuff Size: Adult Regular)   Pulse 87   Temp 97.7  F (36.5  C) (Oral)   Resp 19   Wt 90.8 kg (200 lb 3.2 oz)   SpO2 96%   BMI 32.31 kg/m      Gen : well appearing  HEENT:  no icterus  NECK: supple  CV :rrr   Lungs: clear  Chest: s/p bilateral mastectomy without reconstruction.  INcisions are c/d/I.  Well healing.  Abd: soft, nt nd + bs  Ext : no edema    Reviewed labs, imaging and pathology as outlined below.    Hgb improved.      A/P:  68 y/o transmale here with a LEFT breast cancer clinical T2N0 Er + MI + HER2 negative, prognostic staging 1A, now s/p bilateral mastectomy without reconstruction    Breast cancer - I reviewed with Killian that all of our breast cancer treatment is curative in intent.  We discussed by older staging classifications, he technically has a stage 2 breast cancer.  Prognostic staging is 1A.  Final pathology revealed T2N0.  Oncotype Dx score of 18.  On anastrazole.     We discussed follow up in 6 months.  I'm thrilled with how Killian is doing now.    He has some concerns regarding tenderness over left nipple; he is going to follow up with Dr Benavides in Dec; no cancer concerns from my perspective.    2. Bone health - given use of anastrazole, he will eventually need baseline dexa scan.    3. Transmasculine - discussed his use of testosterone and reviewed plan.  He is seeing Dr Rosales for elevated hgb; this is improved.      4. Coping - overall much better.      5. He would benefit from genetics  evaluation.  We will rediscuss this at our next visit.    6. HTN - he works closely with Dr Davis.  He ran out of hydrochlorothiazide recently and just restarted.  Discussed importance of clonidine and hydrochlorothiazide.          Again, thank you for allowing me to participate in the care of your patient.      Sincerely,    Jeaneth Linares MD

## 2024-12-10 ENCOUNTER — OFFICE VISIT (OUTPATIENT)
Dept: PLASTIC SURGERY | Facility: CLINIC | Age: 69
End: 2024-12-10
Payer: MEDICARE

## 2024-12-10 VITALS
OXYGEN SATURATION: 95 % | WEIGHT: 200 LBS | BODY MASS INDEX: 32.14 KG/M2 | SYSTOLIC BLOOD PRESSURE: 147 MMHG | HEIGHT: 66 IN | HEART RATE: 88 BPM | DIASTOLIC BLOOD PRESSURE: 90 MMHG

## 2024-12-10 DIAGNOSIS — Z90.13 S/P BILATERAL MASTECTOMY: ICD-10-CM

## 2024-12-10 DIAGNOSIS — Z85.3 H/O MALIGNANT NEOPLASM OF FEMALE BREAST: ICD-10-CM

## 2024-12-10 DIAGNOSIS — F64.9 GENDER DYSPHORIA: Primary | ICD-10-CM

## 2024-12-10 ASSESSMENT — PAIN SCALES - GENERAL: PAINLEVEL_OUTOF10: MODERATE PAIN (5)

## 2024-12-10 NOTE — NURSING NOTE
"Chief Complaint   Patient presents with    SAUL Daily, is being seen today for a top revision - pt had mastectomy for cancer prevention in June 2024 - previously saw Dr. Benavides in office in 2022       Vitals:    12/10/24 1041   BP: (!) 147/90   BP Location: Left arm   Patient Position: Chair   Cuff Size: Adult Large   Pulse: 88   SpO2: 95%   Weight: 90.7 kg (200 lb)   Height: 1.676 m (5' 6\")       Body mass index is 32.28 kg/m .      Leida Suazo LPN    "

## 2024-12-10 NOTE — LETTER
"12/10/2024       RE: Gregoria Stein  [address]  [place] [address]     Dear Colleague,    Thank you for referring your patient, Gregoria Stein, to the [facility] PLASTIC AND RECON[facility] SURGERY CLINIC [place] at [facility]. Please see a copy of my visit note below.    PLASTICS NEW TOP   HPI: This is a 69 year old non-binary adult with a history of gender dysphoria who presents today for a consultation for top surgery. Their pronouns are they/them and their preferred name is Killian.  They still need a letter of support from their therapist, Giulia Lopez at [facility] in [place]. They also see a psychiatrist and a gender therapist at [facility] periodically. They have been on testosterone for 4+ years, administered by daily gel. They have been living their chosen gender identity/role for 6+ years. They do not bind.    I saw Killian first in 2022 for a consult, but they then had a few cancellations and various problems over the past couple of years which stopped them from doing their surgery with me, including a breast cancer diagnosis s/p traditional breast cancer mastectomy with nipple grafts 6/12/24 (done by a different Dr. Benavides at [facility]. Grafts were done by Anuja Vernon). He feels like he has \"one big uniboob\" right now rather than 2 separate pecs and has lots of asymmetries and issues causing dysphoria with the way his chest looks. Killian is here today for a second opinion on a revision consult, as he is experiencing persistent and worse dysphoria than he had before the mastectomy.     Medical Hx: 2 rotator cuff tears, restrictive airway disease, reports \"134 doc visits in 10 months.\" GERD, ASLHYN, restless legs, vocal cord dysfunction, chronic pain of lower back, joints, and feet. Obesity. Cancer now followed by Dr. Linares at the .     Mental Hx: Gender dysphoria. C-PTSD, CLAIRE, MDD.     Surgical Hx: s/p traditional breast " "cancer mastectomy with nipple grafts 24. Reports a left nipple hematoma a few hours after the surgery.     Vulvar mass excision. Bilateral rotator cuff repairs. Left achilles repair. R foot neuroma excision. No adverse effects from anesthesia.     Having left carpal tunnel surgery on Thursday with Dr. Snow at [facility].   Also supposed to get a left knee replacement at some point.     Family Hx: No family history of breast or ovarian cancer. Dad  age 49 secondary to MI. Brother had CVA. Sister has MS. Mom had Parkinson's.     Social Hx:   Occupation: Unemployed on disability. Work comp issue with [employer].   Relationship status/family:   Killian's mom  last Friday and they have been experiencing a lot of additional struggles with their family essentially erasing them from the picture (ex. Mom's hospice didn't have Killian's phone number).    Smoking status: Quit over 30 years ago        PE: General: Height: 5'6\"  Weight: 200 lbs BMI:32  Nice upper chest contour, though appears slightly \"pectus carinatum\". Barrel chested.   The areas of breast resection appear a bit carved out and flatter than surrounding anterior axillary lipodystrophy, and the lower midline sternal fullness described as \"uni-boob\" between medial incisions.  IMF incisions are fairly symmetric as far as level and shape.   Moderate bilateral dogears posteriorly.  Nipple grafts with hypertrophy on left and R NG too lateral.    Photos taken with consent.     A&P: 69 year old non-binary adult who is a good candidate for gender affirming top surgery. They will most likely need a bilateral mastectomy revisions, including posterior dogears, anterior axillary debulking to match the concavities rather than building up tissue thickness, and nipple graft re-do. The patient is still interested in nipple grafts. The patient will need an H&P from their PCP.     We discussed meeting with Dr. Hendrickson to see about fat grafting to bring back some " symmetry to his chest but Killian is leaning towards just seeing me for revision. The fat grafting route would more than likely be a multi-step revision due to the various/multiple issues that need fixing. We do still need an LOS and to see what things insurance is able to cover for Killian.      They did meet with our Transgender , Bhavna. They did receive an introductory folder of information and contact numbers/names. Any further discussion of risks and complications will be reviewed during the pre-op visit.    Patient accepts the risks of this procedure and would like to proceed with surgery. They are able to give informed consent for this medically necessary procedure. Once we receive and review their therapist letter of support  we will initiate the prior authorization process.     Patient was counseled regarding the requirement to abstain from using nicotine products within at least 6 weeks before surgery date and agreed to these terms.     According to [place] Case Law and [facility] standards of care, with an appropriate letter of support from a mental health provider, top surgery/mastectomy is medically necessary for the treatment of gender dysphoria.     Total time = 35 minutes, spent on the date of encounter doing chart review, history and physical, dressing changes, documentation, patient education, and any further activity as noted above.     This note was prepared on behalf of Miladys Benavides MD by Addie Bennett (she/her), a trained medical scribe, based on my observations and the provider's statements to me.          Again, thank you for allowing me to participate in the care of your patient.      Sincerely,    Miladys Benavides MD

## 2024-12-10 NOTE — PROGRESS NOTES
"PLASTICS NEW TOP   HPI: This is a 69 year old non-binary adult with a history of gender dysphoria who presents today for a consultation for top surgery. Their pronouns are they/them and their preferred name is Killian.  They still need a letter of support from their therapist, Giulia Lopez at [facility] in [place]. They also see a psychiatrist and a gender therapist at [facility] periodically. They have been on testosterone for 4+ years, administered by daily gel. They have been living their chosen gender identity/role for 6+ years. They do not bind.    I saw Killian first in 2022 for a consult, but they then had a few cancellations and various problems over the past couple of years which stopped them from doing their surgery with me, including a breast cancer diagnosis s/p traditional breast cancer mastectomy with nipple grafts 6/12/24 (done by a different Dr. Benavides at [facility]. Grafts were done by Anuja Vernon). He feels like he has \"one big uniboob\" right now rather than 2 separate pecs and has lots of asymmetries and issues causing dysphoria with the way his chest looks. Killian is here today for a second opinion on a revision consult, as he is experiencing persistent and worse dysphoria than he had before the mastectomy.     Medical Hx: 2 rotator cuff tears, restrictive airway disease, reports \"134 doc visits in 10 months.\" GERD, ASHLYN, restless legs, vocal cord dysfunction, chronic pain of lower back, joints, and feet. Obesity. Cancer now followed by Dr. Linares at the .     Mental Hx: Gender dysphoria. C-PTSD, CLAIRE, MDD.     Surgical Hx: s/p traditional breast cancer mastectomy with nipple grafts 6/12/24. Reports a left nipple hematoma a few hours after the surgery.     Vulvar mass excision. Bilateral rotator cuff repairs. Left achilles repair. R foot neuroma excision. No adverse effects from anesthesia.     Having left carpal tunnel surgery on Thursday with Dr. Snow at [facility].   Also supposed to get a left " "knee replacement at some point.     Family Hx: No family history of breast or ovarian cancer. Dad  age 49 secondary to MI. Brother had CVA. Sister has MS. Mom had Parkinson's.     Social Hx:   Occupation: Unemployed on disability. Work comp issue with [employer].   Relationship status/family:   Killian's mom  last Friday and they have been experiencing a lot of additional struggles with their family essentially erasing them from the picture (ex. Mom's hospice didn't have Killian's phone number).    Smoking status: Quit over 30 years ago        PE: General: Height: 5'6\"  Weight: 200 lbs BMI:32  Nice upper chest contour, though appears slightly \"pectus carinatum\". Barrel chested.   The areas of breast resection appear a bit carved out and flatter than surrounding anterior axillary lipodystrophy, and the lower midline sternal fullness described as \"uni-boob\" between medial incisions.  IMF incisions are fairly symmetric as far as level and shape.   Moderate bilateral dogears posteriorly.  Nipple grafts with hypertrophy on left and R NG too lateral.    Photos taken with consent.     A&P: 69 year old non-binary adult who is a good candidate for gender affirming top surgery. They will most likely need a bilateral mastectomy revisions, including posterior dogears, anterior axillary debulking to match the concavities rather than building up tissue thickness, and nipple graft re-do. The patient is still interested in nipple grafts. The patient will need an H&P from their PCP.     We discussed meeting with Dr. Hendrickson to see about fat grafting to bring back some symmetry to his chest but Killian is leaning towards just seeing me for revision. The fat grafting route would more than likely be a multi-step revision due to the various/multiple issues that need fixing. We do still need an LOS and to see what things insurance is able to cover for Killian.      They did meet with our Transgender , Bhavna. They did " receive an introductory folder of information and contact numbers/names. Any further discussion of risks and complications will be reviewed during the pre-op visit.    Patient accepts the risks of this procedure and would like to proceed with surgery. They are able to give informed consent for this medically necessary procedure. Once we receive and review their therapist letter of support  we will initiate the prior authorization process.     Patient was counseled regarding the requirement to abstain from using nicotine products within at least 6 weeks before surgery date and agreed to these terms.     According to [place] Case Law and [facility] standards of care, with an appropriate letter of support from a mental health provider, top surgery/mastectomy is medically necessary for the treatment of gender dysphoria.     Total time = 35 minutes, spent on the date of encounter doing chart review, history and physical, dressing changes, documentation, patient education, and any further activity as noted above.     This note was prepared on behalf of Miladys Benavides MD by Addie Bennett (she/her), a trained medical scribe, based on my observations and the provider's statements to me.

## 2024-12-18 ENCOUNTER — OFFICE VISIT (OUTPATIENT)
Dept: OPHTHALMOLOGY | Facility: CLINIC | Age: 69
End: 2024-12-18
Attending: OPHTHALMOLOGY
Payer: MEDICARE

## 2024-12-18 DIAGNOSIS — H40.1122 PRIMARY OPEN ANGLE GLAUCOMA (POAG) OF LEFT EYE, MODERATE STAGE: ICD-10-CM

## 2024-12-18 DIAGNOSIS — F64.0 TRANSSEXUALISM: ICD-10-CM

## 2024-12-18 DIAGNOSIS — H40.1111 PRIMARY OPEN ANGLE GLAUCOMA (POAG) OF RIGHT EYE, MILD STAGE: Primary | ICD-10-CM

## 2024-12-18 DIAGNOSIS — E11.9 TYPE 2 DIABETES MELLITUS WITHOUT RETINOPATHY (H): ICD-10-CM

## 2024-12-18 DIAGNOSIS — Z98.890 H/O LASER ASSISTED IN SITU KERATOMILEUSIS: ICD-10-CM

## 2024-12-18 PROCEDURE — G0463 HOSPITAL OUTPT CLINIC VISIT: HCPCS | Performed by: OPHTHALMOLOGY

## 2024-12-18 ASSESSMENT — TONOMETRY
OS_IOP_MMHG: 15
IOP_METHOD: APPLANATION
OS_IOP_MMHG: 12
OD_IOP_MMHG: 14
IOP_METHOD: TONOPEN
OD_IOP_MMHG: 13

## 2024-12-18 ASSESSMENT — EXTERNAL EXAM - LEFT EYE: OS_EXAM: WNL

## 2024-12-18 ASSESSMENT — VISUAL ACUITY
OD_SC: 20/20
OS_SC: 20/50
OS_PH_SC: 20/25
METHOD: SNELLEN - LINEAR
OS_PH_SC+: +2
OS_SC+: +1

## 2024-12-18 ASSESSMENT — SLIT LAMP EXAM - LIDS
COMMENTS: WNL
COMMENTS: WNL

## 2024-12-18 ASSESSMENT — CUP TO DISC RATIO
OD_RATIO: 0.6
OS_RATIO: 0.7

## 2024-12-18 ASSESSMENT — EXTERNAL EXAM - RIGHT EYE: OD_EXAM: WNL

## 2024-12-18 NOTE — NURSING NOTE
Chief Complaints and History of Present Illnesses   Patient presents with    Follow Up     Chief Complaint(s) and History of Present Illness(es)       Follow Up              Course: gradually improving    Associated symptoms: Negative for eye pain, flashes, floaters, discharge and swelling              Comments    Pt reports vision has improved. No new concerns.   Pt recently adopted a brindle and white puppy named Loli. He also recently had carpal tunnel surgery this past Wednesday.     Lab Results       Component                Value               Date                       A1C                      6.6                 05/23/2024                 A1C                      5.3                 11/04/2019            Leida Griffith OA 1:46 PM December 18, 2024

## 2024-12-19 NOTE — PROGRESS NOTES
HPI       Follow Up    Since onset it is gradually improving.  Associated symptoms include Negative for eye pain, flashes, floaters, discharge and swelling.             Comments    Pt reports vision has improved. No new concerns.   Pt recently adopted a brindle and white puppy named Loli. He also recently had carpal tunnel surgery this past Wednesday.     Lab Results       Component                Value               Date                       A1C                      6.6                 05/23/2024                 A1C                      5.3                 11/04/2019            Leida Girffith OA 1:46 PM December 18, 2024                 Last edited by Leida Griffith on 12/18/2024  1:50 PM.         Review of systems for the eyes was negative other than the pertinent positives/negatives listed in the HPI.      Assessment & Plan    HPI:  Gregoria Stein is a 69 year old adult with history of gender dysphoria, HTN, GERD, CLAIRE, MDD, laser in situ keratomileusis (LASIK), Primary open angle glaucoma (POAG) left eye > right eye presents for  followup IOP check after Selected laser trabeculoplasty (SLT). Not using any drops currently    POHx: Primary open angle glaucoma (POAG), LASIK  PMHx: gender dysphoria, HTN, GERD, CLAIRE, MDD  Current Medications:   Current Outpatient Medications   Medication Sig Dispense Refill    albuterol (PROAIR HFA/PROVENTIL HFA/VENTOLIN HFA) 108 (90 Base) MCG/ACT inhaler Inhale 2 puffs into the lungs every 4 hours as needed for shortness of breath, wheezing or cough. 18 g 11    anastrozole (ARIMIDEX) 1 MG tablet Take 1 tablet (1 mg) by mouth daily. 90 tablet 3    clonazePAM (KLONOPIN) 1 MG tablet Take 1 mg by mouth 2 times daily as needed for anxiety      cloNIDine (CATAPRES) 0.1 MG tablet Take 1 tablet (0.1 mg) by mouth 2 times daily (Patient taking differently: Take 0.1 mg by mouth 2 times daily. . May also take 1 tablet twice daily as needed.) 60 tablet 0    cyclobenzaprine (FLEXERIL)  5 MG tablet Take 1 tablet (5 mg) by mouth every 8 hours as needed for muscle spasms 90 tablet 0    desvenlafaxine (PRISTIQ) 50 MG 24 hr tablet Take 1 tablet (50 mg) by mouth daily 30 tablet 0    diclofenac (VOLTAREN) 1 % topical gel SMARTSI-4 Gram(s) Topical 4 Times Daily      doxycycline hyclate (VIBRA-TABS) 100 MG tablet Take 100 mg by mouth 2 times daily.      gabapentin (NEURONTIN) 300 MG capsule Take 3 capsules (900 mg) by mouth At Bedtime 270 capsule 0    hydroCHLOROthiazide 12.5 MG tablet Take 1 tablet (12.5 mg) by mouth daily 30 tablet 0    omeprazole (PRILOSEC) 40 MG DR capsule TAKE 1 CAPSULE BY MOUTH EVERY DAY BEFORE A MEAL      pramipexole (MIRAPEX) 0.25 MG tablet Take 3 tablets (0.75 mg) by mouth At Bedtime 90 tablet 3    testosterone cypionate (DEPOTESTOSTERONE) 200 MG/ML injection Inject 70 mg Subcutaneous once a week on       traZODone (DESYREL) 100 MG tablet Take  mg by mouth as needed      UNABLE TO FIND Pt is using Medical cannabis      ketorolac (ACULAR) 0.5 % ophthalmic solution Place 1 drop Into the left eye 4 times daily. (Patient not taking: Reported on 2024) 3 mL 0    latanoprost (XALATAN) 0.005 % ophthalmic solution Place 1 drop into both eyes daily. (Patient not taking: Reported on 2024)      predniSONE (DELTASONE) 20 MG tablet Take 20 mg by mouth daily. (Patient not taking: Reported on 2024)       No current facility-administered medications for this visit.     FHx: denies family history of ocular conditions   PSHx: laser in situ keratomileusis (LASIK)       Current Eye Medications:      Assessment & Plan:  (H40.1111) Primary open angle glaucoma (POAG) of right eye, mild stage  (primary encounter diagnosis)  (H40.1122) Primary open angle glaucoma (POAG) of left eye, moderate stage  Maximum intraocular pressure unknown  Family history: No  Trauma history: No  Gonioscopy: open, mild pigment  Pachymetry: 540/520    Treatment History:   Selected laser  trabeculoplasty (SLT) 2018, 2024 both eyes   (poor compliance)    Today's testing:  Visual field 10/09/24:   Right eye - full, reliable; MD-3.2,sLV2.8   Left eye - inferior nasal step, reliable, MD-4.5, sLV 4.8    OCT nerve fiber layer 10/09/24:   Right eye - G(g) 83 NI (y) 61 TI (g) 132 NS (y) 68 TS (g) 108      Left eye - G(y) 75 NI (g) 89 TI (y) 95 NS (g) 84 TS (r) 75    IOP goal: low teens  IOP doing great post SLT    (Z98.890) H/O laser assisted in situ keratomileusis  (H52.03,  H52.203,  H52.4) Hyperopia of both eyes with astigmatism and presbyopia  With mild hyperopic regression      (E11.9) Type 2 diabetes mellitus without retinopathy (H)  Diagnosed 2024  Most recent HgBA1c 6.6 on 5/23/24  No background diabetic retinopathy or neovascularization noted on today's exam.  Discussed ocular and systemic benefits of blood pressure and blood sugar control.  Return in 1 year for full exam with dilation or sooner if changes to vision.       (H04.123) Dry eye syndrome of both eyes  Consider AT    (Z85.3) History of breast cancer    (F64.0) Gender dysphoria in adolescent and adult        Return in about 5 months (around 5/18/2025) for Follow Up-v/t, OCT RNFL, 24-2 VF.        Nishant Serrano MD     Attending Physician Attestation:  Complete documentation of historical and exam elements from today's encounter can be found in the full encounter summary report (not reduplicated in this progress note).  I personally obtained the chief complaint(s) and history of present illness.  I confirmed and edited as necessary the review of systems, past medical/surgical history, family history, social history, and examination findings as documented by others; and I examined the patient myself.  I personally reviewed the relevant tests, images, and reports as documented above.  I formulated and edited as necessary the assessment and plan and discussed the findings and management plan with the patient and family. - Nishant Serrano,  MD

## 2024-12-29 ENCOUNTER — HEALTH MAINTENANCE LETTER (OUTPATIENT)
Age: 69
End: 2024-12-29

## 2025-02-01 ENCOUNTER — HEALTH MAINTENANCE LETTER (OUTPATIENT)
Age: 70
End: 2025-02-01

## 2025-03-01 ENCOUNTER — HEALTH MAINTENANCE LETTER (OUTPATIENT)
Age: 70
End: 2025-03-01

## 2025-04-23 ENCOUNTER — PATIENT OUTREACH (OUTPATIENT)
Dept: ONCOLOGY | Facility: CLINIC | Age: 70
End: 2025-04-23
Payer: MEDICARE

## 2025-04-23 NOTE — PROGRESS NOTES
[facility]: Cancer Care                                                                                          Reached out to Killian.  Let her know some of the muscle/joint aches and pains could be due to the Anastrozole.  Dr. Linares would like Killian to see AMADEO and discuss other medication options to try a different aromatase inhibitor.  Message sent to CC team.     Sophia Sky RN

## 2025-04-24 ENCOUNTER — ONCOLOGY VISIT (OUTPATIENT)
Dept: ONCOLOGY | Facility: CLINIC | Age: 70
End: 2025-04-24
Payer: MEDICARE

## 2025-04-24 ENCOUNTER — HOSPITAL ENCOUNTER (EMERGENCY)
Facility: CLINIC | Age: 70
Discharge: HOME | End: 2025-04-24
Attending: EMERGENCY MEDICINE
Payer: MEDICARE

## 2025-04-24 VITALS
TEMPERATURE: 99.1 F | BODY MASS INDEX: 33.57 KG/M2 | OXYGEN SATURATION: 95 % | DIASTOLIC BLOOD PRESSURE: 83 MMHG | RESPIRATION RATE: 18 BRPM | HEART RATE: 87 BPM | WEIGHT: 208 LBS | SYSTOLIC BLOOD PRESSURE: 164 MMHG

## 2025-04-24 VITALS
HEART RATE: 89 BPM | SYSTOLIC BLOOD PRESSURE: 166 MMHG | RESPIRATION RATE: 18 BRPM | TEMPERATURE: 98.3 F | OXYGEN SATURATION: 100 % | DIASTOLIC BLOOD PRESSURE: 103 MMHG

## 2025-04-24 DIAGNOSIS — F43.10 PTSD (POST-TRAUMATIC STRESS DISORDER): ICD-10-CM

## 2025-04-24 DIAGNOSIS — C50.812 MALIGNANT NEOPLASM OF OVERLAPPING SITES OF LEFT BREAST IN FEMALE, ESTROGEN RECEPTOR POSITIVE (H): Primary | ICD-10-CM

## 2025-04-24 DIAGNOSIS — Z17.0 MALIGNANT NEOPLASM OF OVERLAPPING SITES OF LEFT BREAST IN FEMALE, ESTROGEN RECEPTOR POSITIVE (H): Primary | ICD-10-CM

## 2025-04-24 DIAGNOSIS — M25.50 ARTHRALGIA, UNSPECIFIED JOINT: ICD-10-CM

## 2025-04-24 PROCEDURE — 99283 EMERGENCY DEPT VISIT LOW MDM: CPT | Mod: FS

## 2025-04-24 PROCEDURE — 80307 DRUG TEST PRSMV CHEM ANLYZR: CPT | Performed by: EMERGENCY MEDICINE

## 2025-04-24 PROCEDURE — 250N000013 HC RX MED GY IP 250 OP 250 PS 637

## 2025-04-24 PROCEDURE — G0463 HOSPITAL OUTPT CLINIC VISIT: HCPCS

## 2025-04-24 PROCEDURE — 99285 EMERGENCY DEPT VISIT HI MDM: CPT | Performed by: EMERGENCY MEDICINE

## 2025-04-24 RX ORDER — ACETAMINOPHEN 325 MG/1
325-650 TABLET ORAL
COMMUNITY
Start: 2025-04-22

## 2025-04-24 RX ORDER — OXYCODONE HYDROCHLORIDE 5 MG/1
5 TABLET ORAL
COMMUNITY
Start: 2025-04-22

## 2025-04-24 RX ORDER — AMOXICILLIN 250 MG
1-2 CAPSULE ORAL EVERY 12 HOURS
COMMUNITY
Start: 2025-04-22

## 2025-04-24 RX ORDER — ASPIRIN 81 MG/1
81 TABLET, COATED ORAL DAILY
COMMUNITY
Start: 2025-04-22

## 2025-04-24 RX ORDER — ACETAMINOPHEN 500 MG
500 TABLET ORAL ONCE
Status: COMPLETED | OUTPATIENT
Start: 2025-04-24 | End: 2025-04-24

## 2025-04-24 RX ADMIN — ACETAMINOPHEN 500 MG: 500 TABLET ORAL at 19:30

## 2025-04-24 ASSESSMENT — COLUMBIA-SUICIDE SEVERITY RATING SCALE - C-SSRS
2. HAVE YOU ACTUALLY HAD ANY THOUGHTS OF KILLING YOURSELF IN THE PAST MONTH?: YES
4. HAVE YOU HAD THESE THOUGHTS AND HAD SOME INTENTION OF ACTING ON THEM?: YES
1. IN THE PAST MONTH, HAVE YOU WISHED YOU WERE DEAD OR WISHED YOU COULD GO TO SLEEP AND NOT WAKE UP?: YES
5. HAVE YOU STARTED TO WORK OUT OR WORKED OUT THE DETAILS OF HOW TO KILL YOURSELF? DO YOU INTEND TO CARRY OUT THIS PLAN?: NO
3. HAVE YOU BEEN THINKING ABOUT HOW YOU MIGHT KILL YOURSELF?: NO
6. HAVE YOU EVER DONE ANYTHING, STARTED TO DO ANYTHING, OR PREPARED TO DO ANYTHING TO END YOUR LIFE?: NO

## 2025-04-24 ASSESSMENT — ACTIVITIES OF DAILY LIVING (ADL)
ADLS_ACUITY_SCORE: 46
ADLS_ACUITY_SCORE: 46

## 2025-04-24 ASSESSMENT — PAIN SCALES - GENERAL: PAINLEVEL_OUTOF10: SEVERE PAIN (8)

## 2025-04-24 NOTE — PROGRESS NOTES
Oncology Visit:  Add on for joint arthralgias     Reason:  left breast cancer s/p resection    HPI:    Clinical/anatomic stage IIA (cT2, cN0, cM0) invasive ductal carcinoma of the upper-outer quadrant of the left breast  - Diagnosed 3/13/2024 via left breast biopsy  - ER (positive - 80%), DC (positive - 10%), HER2 (low, score of 1+ by IHC)  - [place] grade I histology on biopsy   - Tumor size: 2.7 cm by MRI  - Final pathology 35 mm, grade 1.  0/4 lymph nodes involved.  T2N0  - Oncotype dx 18  -On adjuvant anastrazole     INTERVAL HISTORY:    Killian returns for an add on to discuss AI associated arthralgias on anastrazole.     Patient reports that he has been dealing with significant arthralgias in multiple joints.  Particularly in the left ankle with a Achilles tendon that needed to be reparied.  Along with lateral joint pain.  He also has shoulder pain for example.  These arthralgias are worse than they have been in the past and are starting to become debilitating.  He did also have a an injury about 2 years ago which may have contributed.  He underwent a left partial knee replacement on 4/22/25 and is healing from this.  He does have hot flashes as well.  He is taking oxycodone and tylenol 500 mg every 6 hours prn related to the left knee replacement.     ROS: 10 point ROS neg other than the symptoms noted above in the HPI.       PMH:       Pelvic floor weakness   Status post total knee replacement, right   Thoracic aortic aneurysm without rupture (HRC)    Controlled substance agreement signed   Gender dysphoria   Generalized anxiety disorder (HRC)   PTSD (post-traumatic stress disorder) (HRC)   RLS (restless legs syndrome)   ASHLYN (obstructive sleep apnea)   Severe episode of recurrent major depressive disorder, without psychotic features (HRC)   Financial difficulties  Primary hypertension (HRC)   Malignant neoplasm of upper-outer quadrant of left female breast (HRC)   Gender dysphoria in adult   Invasive ductal  carcinoma of breast, stage 1, left (HRC)   Malignant neoplasm of female breast (HRC)     Past Surgical History: eye surgery, R knee surgery s/p total knee arthroplasty 18      SOCIAL HISTORY:  Retired registered nurse.   Also has worked at [employer].   Quit smoking greater than 30 years ago.   Denies any frequent/recent history of ethanol overuse.    Current Outpatient Medications   Medication Sig Dispense Refill    albuterol (PROAIR HFA/PROVENTIL HFA/VENTOLIN HFA) 108 (90 Base) MCG/ACT inhaler Inhale 2 puffs into the lungs every 4 hours as needed for shortness of breath, wheezing or cough. 18 g 11    anastrozole (ARIMIDEX) 1 MG tablet Take 1 tablet (1 mg) by mouth daily. 90 tablet 3    clonazePAM (KLONOPIN) 1 MG tablet Take 1 mg by mouth 2 times daily as needed for anxiety      cloNIDine (CATAPRES) 0.1 MG tablet Take 1 tablet (0.1 mg) by mouth 2 times daily (Patient taking differently: Take 0.1 mg by mouth 2 times daily. . May also take 1 tablet twice daily as needed.) 60 tablet 0    cyclobenzaprine (FLEXERIL) 5 MG tablet Take 1 tablet (5 mg) by mouth every 8 hours as needed for muscle spasms 90 tablet 0    desvenlafaxine (PRISTIQ) 50 MG 24 hr tablet Take 1 tablet (50 mg) by mouth daily 30 tablet 0    diclofenac (VOLTAREN) 1 % topical gel SMARTSI-4 Gram(s) Topical 4 Times Daily      doxycycline hyclate (VIBRA-TABS) 100 MG tablet Take 100 mg by mouth 2 times daily.      gabapentin (NEURONTIN) 300 MG capsule Take 3 capsules (900 mg) by mouth At Bedtime 270 capsule 0    hydroCHLOROthiazide 12.5 MG tablet Take 1 tablet (12.5 mg) by mouth daily 30 tablet 0    ketorolac (ACULAR) 0.5 % ophthalmic solution Place 1 drop Into the left eye 4 times daily. (Patient not taking: Reported on 2024) 3 mL 0    latanoprost (XALATAN) 0.005 % ophthalmic solution Place 1 drop into both eyes daily. (Patient not taking: Reported on 2024)      omeprazole (PRILOSEC) 40 MG DR capsule TAKE 1 CAPSULE BY MOUTH  EVERY DAY BEFORE A MEAL      pramipexole (MIRAPEX) 0.25 MG tablet Take 3 tablets (0.75 mg) by mouth At Bedtime 90 tablet 3    predniSONE (DELTASONE) 20 MG tablet Take 20 mg by mouth daily. (Patient not taking: Reported on 12/18/2024)      testosterone cypionate (DEPOTESTOSTERONE) 200 MG/ML injection Inject 70 mg Subcutaneous once a week on Fridays      traZODone (DESYREL) 100 MG tablet Take  mg by mouth as needed      UNABLE TO FIND Pt is using Medical cannabis       No current facility-administered medications for this visit.     PE: BP (!) 164/83   Pulse 87   Temp 99.1  F (37.3  C) (Oral)   Resp 18   Wt 94.3 kg (208 lb)   SpO2 95%   BMI 33.57 kg/m      General: No acute distress  HEENT: Sclera anicteric. Oral mucosa pink and moist.  No mucositis or thrush  Lymph: No lymphadenopathy in neck  Heart: Regular, rate, and rhythm  Lungs: Clear to ascultation bilaterally  Abdomen: Positive bowel sounds. Soft, non-distended, non-tender. No organomegaly or mass.   Extremities: no lower extremity edema. Left knee wrapped in bandage.   Neuro: Cranial nerves grossly intact  Rash: none      A/P:  68 y/o transmale here with a LEFT breast cancer clinical T2N0 Er + SC + HER2 negative, prognostic staging 1A, now s/p bilateral mastectomy without reconstruction    Breast cancer - I reviewed with Killian that all of our breast cancer treatment is curative in intent.  We discussed by older staging classifications, he technically has a stage 2 breast cancer.  Prognostic staging is 1A.  Final pathology revealed T2N0.  Oncotype Dx score of 18.    On anastrazole. Seen for worsening arthralagias. Discussed holding anastrazole for 2 weeks and then will schedule a video visit to follow up on symptoms. We will consider changing AI's to exemestane at that time. Discussed letrozole is an additional option.   Can consider glucosamine supplementation or referral to Dr. Steph Moreland as well but will re discuss after we re-evaluate Killian's  symptoms.        The following was not discussed in detail April 24, 2025 due to time limitations of the visit as patient was late to appointment:     2. Bone health - given use of anastrazole, he will eventually need baseline dexa scan. Will discuss upon return visit on 5/7/25.     3. Transmasculine - discussed his use of testosterone and reviewed plan.  He is seeing Dr Rosales for elevated hgb; this is improved.      4. Coping - overall much better.      5. He would benefit from genetics evaluation.  We will rediscuss this at our next visit.    6. HTN - he works closely with Dr Davis.  He ran out of hydrochlorothiazide recently and just restarted.  Discussed importance of clonidine and hydrochlorothiazide.    7. Elevated hemoglobin - has improved. Has seen Dr. Rosales.     31 minutes spent on the date of the encounter doing chart review, review of test results, interpretation of tests, patient visit, and documentation     Shania Sahu PA-C

## 2025-04-24 NOTE — ED TRIAGE NOTES
Triage Assessment (Adult)       Row Name 04/24/25 4015          Triage Assessment    Airway WDL WDL        Respiratory WDL    Respiratory WDL WDL        Skin Circulation/Temperature WDL    Skin Circulation/Temperature WDL WDL        Cardiac WDL    Cardiac WDL WDL        Peripheral/Neurovascular WDL    Peripheral Neurovascular WDL WDL        Cognitive/Neuro/Behavioral WDL    Cognitive/Neuro/Behavioral WDL WDL

## 2025-04-24 NOTE — ED TRIAGE NOTES
EMS states that patient was at an appointment at [address] [place] [address], something happened with security at that location, and they told security that they were going to kill themself and was unable to emotional regulate appropriately. MEGAN placed him on a hold. En route with paramedics the patient did calm down, and was able to acknowledge that they had an oversize reaction.     Toradol 30 MG IM given for pain. Declined any antianxiety medications. .

## 2025-04-24 NOTE — Clinical Note
4/24/2025      Gregoria Stein  [address]  [place] [address]      Dear Colleague,    Thank you for referring your patient, Gregoria Stein, to the [facility]. Please see a copy of my visit note below.    Oncology Visit:  Add on for joint arthralgias     Reason:  left breast cancer s/p resection    HPI:    Clinical/anatomic stage IIA (cT2, cN0, cM0) invasive ductal carcinoma of the upper-outer quadrant of the left breast  - Diagnosed 3/13/2024 via left breast biopsy  - ER (positive - 80%), NV (positive - 10%), HER2 (low, score of 1+ by IHC)  - [place] grade I histology on biopsy   - Tumor size: 2.7 cm by MRI  - Final pathology 35 mm, grade 1.  0/4 lymph nodes involved.  T2N0  - Oncotype dx 18  -On adjuvant anastrazole     INTERVAL HISTORY:    Killian returns for an add on to discuss AI associated arthralgias on anastrazole.     ***     Underwent a left partial knee replacement on 4/22/25.       In follow up, Killian is doing well.  Killian is working with a series of OT, counseling to help with complex PTSD.  He recently got a support animal, a dog named Bamboo, that has been a great thing for him.      No nodules or masses.  Has some tenderness over left nipple.    No f/c.  No n/v/d/c.  No pain.    Has occasional hot flashes from the anastrazole but is tolerating this okay.      At the time of surgery, Killian was found to have elevated hemoglobin.  Killian has been seeing my colleague Dr Mary Rosales for evaluation.  Hgb has improved.       ROS: 10 point ROS neg other than the symptoms noted above in the HPI.       PMH:       Pelvic floor weakness   Status post total knee replacement, right   Thoracic aortic aneurysm without rupture (HRC)    Controlled substance agreement signed   Gender dysphoria   Generalized anxiety disorder (HRC)   PTSD (post-traumatic stress disorder) (HRC)   RLS (restless legs syndrome)   ASHLYN (obstructive sleep apnea)   Severe episode of recurrent major  depressive disorder, without psychotic features (HRC)   Financial difficulties  Primary hypertension (HRC)   Malignant neoplasm of upper-outer quadrant of left female breast (HRC)   Gender dysphoria in adult   Invasive ductal carcinoma of breast, stage 1, left (HRC)   Malignant neoplasm of female breast (HRC)     Past Surgical History: eye surgery, R knee surgery s/p total knee arthroplasty 18      SOCIAL HISTORY:  Retired registered nurse.   Also has worked at [employer].   Quit smoking greater than 30 years ago.   Denies any frequent/recent history of ethanol overuse.    Current Outpatient Medications   Medication Sig Dispense Refill    albuterol (PROAIR HFA/PROVENTIL HFA/VENTOLIN HFA) 108 (90 Base) MCG/ACT inhaler Inhale 2 puffs into the lungs every 4 hours as needed for shortness of breath, wheezing or cough. 18 g 11    anastrozole (ARIMIDEX) 1 MG tablet Take 1 tablet (1 mg) by mouth daily. 90 tablet 3    clonazePAM (KLONOPIN) 1 MG tablet Take 1 mg by mouth 2 times daily as needed for anxiety      cloNIDine (CATAPRES) 0.1 MG tablet Take 1 tablet (0.1 mg) by mouth 2 times daily (Patient taking differently: Take 0.1 mg by mouth 2 times daily. . May also take 1 tablet twice daily as needed.) 60 tablet 0    cyclobenzaprine (FLEXERIL) 5 MG tablet Take 1 tablet (5 mg) by mouth every 8 hours as needed for muscle spasms 90 tablet 0    desvenlafaxine (PRISTIQ) 50 MG 24 hr tablet Take 1 tablet (50 mg) by mouth daily 30 tablet 0    diclofenac (VOLTAREN) 1 % topical gel SMARTSI-4 Gram(s) Topical 4 Times Daily      doxycycline hyclate (VIBRA-TABS) 100 MG tablet Take 100 mg by mouth 2 times daily.      gabapentin (NEURONTIN) 300 MG capsule Take 3 capsules (900 mg) by mouth At Bedtime 270 capsule 0    hydroCHLOROthiazide 12.5 MG tablet Take 1 tablet (12.5 mg) by mouth daily 30 tablet 0    ketorolac (ACULAR) 0.5 % ophthalmic solution Place 1 drop Into the left eye 4 times daily. (Patient not taking: Reported  on 12/18/2024) 3 mL 0    latanoprost (XALATAN) 0.005 % ophthalmic solution Place 1 drop into both eyes daily. (Patient not taking: Reported on 12/18/2024)      omeprazole (PRILOSEC) 40 MG DR capsule TAKE 1 CAPSULE BY MOUTH EVERY DAY BEFORE A MEAL      pramipexole (MIRAPEX) 0.25 MG tablet Take 3 tablets (0.75 mg) by mouth At Bedtime 90 tablet 3    predniSONE (DELTASONE) 20 MG tablet Take 20 mg by mouth daily. (Patient not taking: Reported on 12/18/2024)      testosterone cypionate (DEPOTESTOSTERONE) 200 MG/ML injection Inject 70 mg Subcutaneous once a week on Fridays      traZODone (DESYREL) 100 MG tablet Take  mg by mouth as needed      UNABLE TO FIND Pt is using Medical cannabis       No current facility-administered medications for this visit.     PE: There were no vitals taken for this visit.    Gen : well appearing  HEENT:  no icterus  NECK: supple  CV :rrr   Lungs: clear  Chest: s/p bilateral mastectomy without reconstruction.  INcisions are c/d/I.  Well healing.  Abd: soft, nt nd + bs  Ext : no edema    Reviewed labs, imaging and pathology as outlined below.    Hgb improved.      A/P:  70 y/o transmale here with a LEFT breast cancer clinical T2N0 Er + IA + HER2 negative, prognostic staging 1A, now s/p bilateral mastectomy without reconstruction    Breast cancer - I reviewed with Killian that all of our breast cancer treatment is curative in intent.  We discussed by older staging classifications, he technically has a stage 2 breast cancer.  Prognostic staging is 1A.  Final pathology revealed T2N0.  Oncotype Dx score of 18.    On anastrazole. Seen for worsening arthralagias. Discussed holding anastrazole for 2 weeks and then considering changing AI's to exemestane. ***  Can consider glucosamine supplementation     We discussed follow up in 6 months.  I'm thrilled with how Killian is doing now.    He has some concerns regarding tenderness over left nipple; he is going to follow up with Dr Benavides in Dec; no cancer  concerns from my perspective.    The following was not discussed in detail April 24, 2025 due to time limitations of the visit as patient was late to appointment:     2. Bone health - given use of anastrazole, he will eventually need baseline dexa scan.    3. Transmasculine - discussed his use of testosterone and reviewed plan.  He is seeing Dr Rosales for elevated hgb; this is improved.      4. Coping - overall much better.      5. He would benefit from genetics evaluation.  We will rediscuss this at our next visit.    6. HTN - he works closely with Dr Davis.  He ran out of hydrochlorothiazide recently and just restarted.  Discussed importance of clonidine and hydrochlorothiazide.    *** minutes spent on the date of the encounter doing chart review, review of test results, interpretation of tests, patient visit, and documentation     Shania Sahu PA-C       Taking oxycodone prn and 500tylenol q 6 hours.          Again, thank you for allowing me to participate in the care of your patient.        Sincerely,        Shania Sahu PA-C    Electronically signed

## 2025-04-24 NOTE — ED NOTES
Bed: ED15  Expected date:   Expected time:   Means of arrival:   Comments:  Hold HEMS 453 68 y/o M on hold emotional distress/SI

## 2025-04-25 NOTE — ED PROVIDER NOTES
ED Provider Note  [facility]      History     Chief Complaint   Patient presents with    Suicidal     EMS states that patient was at an appointment at [address], something happened with security at that location, and they told security that they were going to kill themself and was unable to emotional regulate appropriately. MEGAN placed him on a hold. En route with paramedics the patient did calm down, and was able to acknowledge that they had an oversize reaction.      The history is provided by the patient and medical records. No  was used.     Gregoria Stein is a 69 year old adult who presents to the ED for mental health evaluation.  Past medical history notable for PTSD, hypertension, GERD, anxiety.  He recently had a knee surgery with [facility] on 4/22/2025.  He has been struggling with being at home alone since the surgery and getting around appropriately.  He states that he was at an appointment today at [address] when he began to have increased anxiety due to by standards intervening and subsequently security and law enforcement.  EMS was called due to bystanders noting him making comments about wanting to kill himself or not wanting to be around anymore.  Is not remember what he said at the time but states that he was very overwhelmed and yelling at the time due to his history of PTSD.  During EMS transfer to the ED, patient called and did not require any medications.  On evaluation, he denies any suicidal ideation or plans to hurt himself.  He states that he has just been overwhelmed recently and having little sleep due to his postoperative state.  He states that he is planning on seeing his outpatient provider tomorrow for follow-up as well as his psychiatrist/therapist on Monday next week and is working with his  to have home health scheduled to come and visit him at home.  He denies any homicidal ideation, alcohol use, marijuana use, or illicit drug  use.  He denies any auditory visual hallucinations.  He denies any medical concerns that are not already being managed by his surgical team and primary care provider.    Past Medical History  Past Medical History:   Diagnosis Date    Anxiety     Arthritis     C spine, thumbs bilateral, feet, shoulders, knees    Breast cancer (H)     Depressive disorder     Gastro-oesophageal reflux disease     intermittent    Hypertension     Labial irritation     labial mass    Other chronic pain     feet, knees, shoulders, full spine, thumbs    PTSD (post-traumatic stress disorder)     Restless leg syndrome      Past Surgical History:   Procedure Laterality Date    COLONOSCOPY      EXCISE MASS VAGINA Left 04/10/2015    Procedure: EXCISE MASS VAGINA;  Surgeon: Stanislav Byers MD;  Location: [facility] OR    GENITOURINARY SURGERY      Urethrial dilation    JOINT REPLACEMENT Right 07/2018    MASTECTOMY Bilateral 06/2024    ORTHOPEDIC SURGERY      right rotator cuff, left achilles tendon repair, neuroma removed right foot, right knee arthroscopy,     ORTHOPEDIC SURGERY Left 09/2023    left rotator cuff repair     acetaminophen (TYLENOL) 325 MG tablet  albuterol (PROAIR HFA/PROVENTIL HFA/VENTOLIN HFA) 108 (90 Base) MCG/ACT inhaler  anastrozole (ARIMIDEX) 1 MG tablet  aspirin (ASA) 81 MG EC tablet  clonazePAM (KLONOPIN) 1 MG tablet  cloNIDine (CATAPRES) 0.1 MG tablet  cyclobenzaprine (FLEXERIL) 5 MG tablet  desvenlafaxine (PRISTIQ) 50 MG 24 hr tablet  diclofenac (VOLTAREN) 1 % topical gel  doxycycline hyclate (VIBRA-TABS) 100 MG tablet  gabapentin (NEURONTIN) 300 MG capsule  hydroCHLOROthiazide 12.5 MG tablet  ketorolac (ACULAR) 0.5 % ophthalmic solution  latanoprost (XALATAN) 0.005 % ophthalmic solution  omeprazole (PRILOSEC) 40 MG DR capsule  oxyCODONE (ROXICODONE) 5 MG tablet  pramipexole (MIRAPEX) 0.25 MG tablet  predniSONE (DELTASONE) 20 MG tablet  senna-docusate (SENOKOT-S/PERICOLACE) 8.6-50 MG tablet  testosterone cypionate  (DEPOTESTOSTERONE) 200 MG/ML injection  traZODone (DESYREL) 100 MG tablet  UNABLE TO FIND      Allergies   Allergen Reactions    Midazolam Other (See Comments)     Stopped breathing    Other reaction(s): Apnea   Stopped breathing   Stopped breathing    Penicillins Rash and Hives     Family History  Family History   Problem Relation Age of Onset    Macular Degeneration Mother     Osteoporosis Mother     Heart Disease Father 49    Multiple Sclerosis Sister     Diabetes Paternal Uncle         heart issues    Cancer No family hx of     Coronary Artery Disease No family hx of      Social History   Social History     Tobacco Use    Smoking status: Former     Current packs/day: 0.00     Types: Cigarettes     Quit date: 1983     Years since quittin.6     Passive exposure: Current    Smokeless tobacco: Never   Vaping Use    Vaping status: Never Used   Substance Use Topics    Alcohol use: Yes     Comment: occasional    Drug use: Yes     Types: Marijuana     Comment: daily only at night time for medical conditions      Past medical history, past surgical history, medications, allergies, family history, and social history were reviewed with the patient. No additional pertinent items.     A medically appropriate review of systems was performed with pertinent positives and negatives noted in the HPI, and all other systems negative.    Physical Exam   BP: (!) 166/103  Pulse: 89  Temp: 98.3  F (36.8  C)  Resp: 18  SpO2: 100 %  Physical Exam  Constitutional:       General: He is not in acute distress.     Appearance: Normal appearance. He is normal weight. He is not ill-appearing, toxic-appearing or diaphoretic.   Cardiovascular:      Rate and Rhythm: Normal rate and regular rhythm.      Pulses:           Dorsalis pedis pulses are 2+ on the right side and 2+ on the left side.   Pulmonary:      Effort: Pulmonary effort is normal.      Breath sounds: Normal breath sounds.   Musculoskeletal:      Right knee: Normal.      Left  knee: Swelling (Dressing in place from patient's recent knee surgery on 4/22/2025) present. Tenderness present.      Right lower leg: No edema.      Left lower leg: No edema.   Neurological:      Mental Status: He is alert and oriented to person, place, and time. Mental status is at baseline.   Psychiatric:         Attention and Perception: Attention normal.         Mood and Affect: Mood and affect normal.         Speech: Speech normal.         Behavior: Behavior normal. Behavior is cooperative.         Thought Content: Thought content normal. Thought content does not include homicidal or suicidal ideation. Thought content does not include homicidal or suicidal plan.         Judgment: Judgment normal.           ED Course, Procedures, & Data      Procedures                Results for orders placed or performed during the hospital encounter of 04/24/25   Urine Drug Screen Panel     Status: Abnormal   Result Value Ref Range    Amphetamines Urine Screen Negative Screen Negative    Barbituates Urine Screen Negative Screen Negative    Benzodiazepine Urine Screen Positive (A) Screen Negative    Cannabinoids Urine Screen Positive (A) Screen Negative    Cocaine Urine Screen Negative Screen Negative    Fentanyl Qual Urine Screen Negative Screen Negative    Opiates Urine Screen Negative Screen Negative    PCP Urine Screen Negative Screen Negative   Urine Drug Screen     Status: Abnormal    Narrative    The following orders were created for panel order Urine Drug Screen.  Procedure                               Abnormality         Status                     ---------                               -----------         ------                     Urine Drug Screen Panel[[id number]]     Abnormal            Final result                 Please view results for these tests on the individual orders.     Medications   acetaminophen (TYLENOL) tablet 500 mg (500 mg Oral $Given 4/24/25 1930)     Labs Ordered and Resulted from Time of ED  Arrival to Time of ED Departure   URINE DRUG SCREEN PANEL - Abnormal       Result Value    Amphetamines Urine Screen Negative      Barbituates Urine Screen Negative      Benzodiazepine Urine Screen Positive (*)     Cannabinoids Urine Screen Positive (*)     Cocaine Urine Screen Negative      Fentanyl Qual Urine Screen Negative      Opiates Urine Screen Negative      PCP Urine Screen Negative       No orders to display          Critical care was not performed.     Medical Decision Making  The patient's presentation was of high complexity (an acute health issue posing potential threat to life or bodily function).    The patient's evaluation involved:  history and exam without other MDM data elements    The patient's management necessitated only low risk treatment.    Assessment & Plan    Killian is a 69-year-old transgender male who presented to the ED by EMS for mental health evaluation.  Elevated blood pressure in the setting of known hypertension but otherwise afebrile without any tachycardia or hypoxia on room air.  Patient in no acute distress and nontoxic-appearing throughout the ED visit.  Patient denying any acute mental health concerns at this time and suspect that his PTSD was exacerbated due to the amount of people including security and law enforcement involved in the de-escalation process after his clinic visit.  Patient is reliable and has much to look forward to in his life as stated by him throughout the initial interview.  Patient does not have any medical complaints otherwise that are not already being managed by his outpatient teams.  DEC assessment was deferred.  Patient asking for Tylenol and an ace wrap for his postsurgical knee.  Otherwise, patient stable for discharge home at this time.  Strict return precautions given.  Patient is scheduled to see his PCP tomorrow and his behavioral health team on Monday.  Patient is close with his brother who has been looking after him since his surgery and feels  comfortable going to him if he has any concerns medically or mentally.  Safety plan was discussed.  Patient was agreeable to the discharge treatment plan, voiced understanding, and all questions answered prior to discharge.    I have reviewed the nursing notes. I have reviewed the findings, diagnosis, plan and need for follow up with the patient.    Discharge Medication List as of 4/24/2025  7:26 PM          Final diagnoses:   PTSD (post-traumatic stress disorder)       Dorcas Jones PA-C    [facility] EMERGENCY DEPARTMENT  4/24/2025     Dorcas Jones PA-C  04/24/25 2024

## 2025-04-25 NOTE — DISCHARGE INSTRUCTIONS
Follow-up with your primary care office tomorrow as scheduled  Follow-up with your therapist/psychiatrist next week as scheduled  Continue to be in contact with your orthopedic surgeon for further management of your postoperative pain and concerns as needed  Continue to be in contact with your  for help with at home services in this postoperative period  Otherwise, do not agitate to seek urgent or emergent reevaluation if you have any worsening or concerning signs or symptoms

## 2025-04-25 NOTE — ED NOTES
Patient was given discharge paperwork and leg was wrapped with ace bandage per MD. No other questions or concerns at time of discharge.

## 2025-05-06 DIAGNOSIS — H40.1122 PRIMARY OPEN ANGLE GLAUCOMA (POAG) OF LEFT EYE, MODERATE STAGE: ICD-10-CM

## 2025-05-06 DIAGNOSIS — H40.1111 PRIMARY OPEN ANGLE GLAUCOMA (POAG) OF RIGHT EYE, MILD STAGE: Primary | ICD-10-CM

## 2025-05-07 ENCOUNTER — VIRTUAL VISIT (OUTPATIENT)
Dept: ONCOLOGY | Facility: CLINIC | Age: 70
End: 2025-05-07
Payer: MEDICARE

## 2025-05-07 VITALS — HEIGHT: 66 IN | WEIGHT: 205 LBS | BODY MASS INDEX: 32.95 KG/M2

## 2025-05-07 DIAGNOSIS — Z17.0 MALIGNANT NEOPLASM OF OVERLAPPING SITES OF LEFT BREAST IN FEMALE, ESTROGEN RECEPTOR POSITIVE (H): Primary | ICD-10-CM

## 2025-05-07 DIAGNOSIS — C50.812 MALIGNANT NEOPLASM OF OVERLAPPING SITES OF LEFT BREAST IN FEMALE, ESTROGEN RECEPTOR POSITIVE (H): Primary | ICD-10-CM

## 2025-05-07 ASSESSMENT — PAIN SCALES - GENERAL: PAINLEVEL_OUTOF10: SEVERE PAIN (7)

## 2025-05-07 NOTE — NURSING NOTE
Patient confirms medications and allergies are accurate via patients echeck in completion, and or denies any changes since last reviewed/verified.     Current patient location: [address]   [place] [address]    Is the patient currently in the state of [place]? YES    Visit mode: VIDEO    If the visit is dropped, the patient can be reconnected by:VIDEO VISIT: Text to cell phone:   Telephone Information:   Mobile [phone]       Will anyone else be joining the visit? NO  (If patient encounters technical issues they should call [phone])    Are changes needed to the allergy or medication list? No    Are refills needed on medications prescribed by this physician? NO    Rooming Documentation:  Questionnaire(s) completed    Reason for visit: RECHECK    Daniella MEJIA

## 2025-05-07 NOTE — PROGRESS NOTES
"Virtual Visit Details    Type of service:  Video Visit   Video Start Time: 10:17 AM  Video End Time:10:28 AM    Originating Location (pt. Location): Home  Distant Location (provider location):  Off-site  Platform used for Video Visit: [facility]    Oncology Visit:  Add on for joint arthralgias     Reason:  left breast cancer s/p resection    HPI:    Clinical/anatomic stage IIA (cT2, cN0, cM0) invasive ductal carcinoma of the upper-outer quadrant of the left breast  - Diagnosed 3/13/2024 via left breast biopsy  - ER (positive - 80%), DC (positive - 10%), HER2 (low, score of 1+ by IHC)  - [place] grade I histology on biopsy   - Tumor size: 2.7 cm by MRI  - Final pathology 35 mm, grade 1.  0/4 lymph nodes involved.  T2N0  - Oncotype dx 18  -On adjuvant anastrazole     INTERVAL HISTORY:    Killian returns for close follow up on AI associated arthralgias on anastrazole. Patient has been holding anastrozole since our visit on 4/24/25. He has been still dealing with quite a bit of diffuse pain for which he feels is at least in part secondary to chronic back pain and recent left knee replacement. The achilles pain is ongoing as well and unchanged with holding. Killian had a follow up with orthopedics yesterday for follow up on recent left knee replacement. Due to recent projectile vomiting after taking oxycodone Killian has stopped this per the advice of orthopedics.     Overall, Killian reports it is hard to tell if holding the anastrozole has been helpful or not. Overall feeling fatigued and is not walking as much as they'd like. It can be hard to get out of the bed some days. Killian continues to do PT twice a week. Next appointment is on Friday, 5/9/25. Has not been doing very well with staying on top of medications in general. Killian has a visit with occupational therapy May 7, 2025 and they will help with the medications. Killian is overall having a hard time with everything feeling \"out of wack\". Killian is working to get services for home health " care. He is working through issues with high blood sugars with his PCP.      ROS: 10 point ROS neg other than the symptoms noted above in the HPI.       PMH:       Pelvic floor weakness   Status post total knee replacement, right   Thoracic aortic aneurysm without rupture ([facility]C)    Controlled substance agreement signed   Gender dysphoria   Generalized anxiety disorder ([facility])   PTSD (post-traumatic stress disorder) ([facility])   RLS (restless legs syndrome)   ASHLYN (obstructive sleep apnea)   Severe episode of recurrent major depressive disorder, without psychotic features ([facility]C)   Financial difficulties  Primary hypertension ([facility])   Malignant neoplasm of upper-outer quadrant of left female breast (HRC)   Gender dysphoria in adult   Invasive ductal carcinoma of breast, stage 1, left (HRC)   Malignant neoplasm of female breast (HRC)     Past Surgical History: eye surgery, R knee surgery s/p total knee arthroplasty 7/19/18      SOCIAL HISTORY:  Retired registered nurse.   Also has worked at [employer].   Quit smoking greater than 30 years ago.   Denies any frequent/recent history of ethanol overuse.    Current Outpatient Medications   Medication Sig Dispense Refill    acetaminophen (TYLENOL) 325 MG tablet Take 325-650 mg by mouth.      albuterol (PROAIR HFA/PROVENTIL HFA/VENTOLIN HFA) 108 (90 Base) MCG/ACT inhaler Inhale 2 puffs into the lungs every 4 hours as needed for shortness of breath, wheezing or cough. 18 g 11    anastrozole (ARIMIDEX) 1 MG tablet Take 1 tablet (1 mg) by mouth daily. 90 tablet 3    aspirin (ASA) 81 MG EC tablet Take 81 mg by mouth daily.      clonazePAM (KLONOPIN) 1 MG tablet Take 1 mg by mouth 2 times daily as needed for anxiety      cloNIDine (CATAPRES) 0.1 MG tablet Take 1 tablet (0.1 mg) by mouth 2 times daily (Patient taking differently: Take 0.1 mg by mouth 2 times daily. . May also take 1 tablet twice daily as needed.) 60 tablet 0    cyclobenzaprine (FLEXERIL) 5 MG tablet Take 1 tablet  "(5 mg) by mouth every 8 hours as needed for muscle spasms (Patient not taking: Reported on 2025) 90 tablet 0    desvenlafaxine (PRISTIQ) 50 MG 24 hr tablet Take 1 tablet (50 mg) by mouth daily 30 tablet 0    diclofenac (VOLTAREN) 1 % topical gel SMARTSI-4 Gram(s) Topical 4 Times Daily (Patient not taking: Reported on 2025)      doxycycline hyclate (VIBRA-TABS) 100 MG tablet Take 100 mg by mouth 2 times daily. (Patient not taking: Reported on 2025)      gabapentin (NEURONTIN) 300 MG capsule Take 3 capsules (900 mg) by mouth At Bedtime 270 capsule 0    hydroCHLOROthiazide 12.5 MG tablet Take 1 tablet (12.5 mg) by mouth daily 30 tablet 0    ketorolac (ACULAR) 0.5 % ophthalmic solution Place 1 drop Into the left eye 4 times daily. (Patient not taking: Reported on 2025) 3 mL 0    latanoprost (XALATAN) 0.005 % ophthalmic solution Place 1 drop into both eyes daily. (Patient not taking: Reported on 2025)      omeprazole (PRILOSEC) 40 MG DR capsule TAKE 1 CAPSULE BY MOUTH EVERY DAY BEFORE A MEAL      oxyCODONE (ROXICODONE) 5 MG tablet Take 5 mg by mouth. (Patient not taking: Reported on 2025)      pramipexole (MIRAPEX) 0.25 MG tablet Take 3 tablets (0.75 mg) by mouth At Bedtime 90 tablet 3    predniSONE (DELTASONE) 20 MG tablet Take 20 mg by mouth daily. (Patient not taking: Reported on 2025)      senna-docusate (SENOKOT-S/PERICOLACE) 8.6-50 MG tablet Take 1-2 tablets by mouth every 12 hours. (Patient not taking: Reported on 2025)      testosterone cypionate (DEPOTESTOSTERONE) 200 MG/ML injection Inject 70 mg Subcutaneous once a week on       traZODone (DESYREL) 100 MG tablet Take  mg by mouth as needed (Patient not taking: Reported on 2025)      UNABLE TO FIND Pt is using Medical cannabis       No current facility-administered medications for this visit.     PE: Ht 1.676 m (5' 6\")   Wt 93 kg (205 lb)   BMI 33.09 kg/m      Video physical exam  General: Patient appears " well in no acute distress.   Skin: No visualized rash or lesions on visualized skin  Eyes: EOMI, no erythema, sclera icterus or discharge noted  Resp: Appears to be breathing comfortably without accessory muscle usage, speaking in full sentences, no cough  MSK: Appears to have normal range of motion based on visualized movements  Neurologic: No apparent tremors, facial movements symmetric  Psych: affect bright, alert and oriented       A/P:  68 y/o transmale here with a LEFT breast cancer clinical T2N0 Er + VT + HER2 negative, prognostic staging 1A, now s/p bilateral mastectomy without reconstruction    Breast cancer- breast cancer treatment is curative in intent. By older staging classifications, he technically has a stage 2 breast cancer.  Prognostic staging is 1A.  Final pathology revealed T2N0.  Oncotype Dx score of 18.    On anastrazole- has been holding since 4/24/25 visit due to concern for worsening arthralgias. The diffuse pains have not improved after 2 weeks of holding and we have agreed that due to multiple other health conditions unrelated to Killian's cancer history that it is difficult to discern if anastrozole is the culprit. After our discussion, Killian agree's and is willing to restart anastrozole and re-evaluate in a couple of months once while he works with other specialists and PCP. Killian would like a couple of more days off and to restart on 5/12/25 which I am agreeable with.   Return to clinic on 7/7/25 with Dr. Linares for re-evaluation. Discussed with Killian if it is felt anastrozole is not tolerable we could consider switching AI's to exemestane (or letrozole).   Can consider glucosamine supplementation or referral to Dr. Steph Moreland as well but will re discuss after we re-evaluate Killian's symptoms in a couple of months.       2. Bone health - given use of anastrazole, he will eventually need baseline dexa scan. Will re-discuss with future visits.     3. Transmasculine - discussed his use of testosterone  and reviewed plan.  He is seeing Dr Rosales for elevated hgb; this is improved.      5. He would benefit from genetics evaluation.  We will rediscuss this at our next visit.    6. HTN - he works closely with Dr Davis.      22 minutes spent on the date of the encounter doing chart review, review of test results, interpretation of tests, patient visit, and documentation      Shania Sahu PA-C

## 2025-05-07 NOTE — LETTER
5/7/2025      Gregoria Stein  [address]  [place] [address]      Dear Colleague,    Thank you for referring your patient, Gregoria Stein, to the [facility]. Please see a copy of my visit note below.    Virtual Visit Details    Type of service:  Video Visit   Video Start Time: 10:17 AM  Video End Time:10:28 AM    Originating Location (pt. Location): Home  Distant Location (provider location):  Off-site  Platform used for Video Visit: [facility]    Oncology Visit:  Add on for joint arthralgias     Reason:  left breast cancer s/p resection    HPI:    Clinical/anatomic stage IIA (cT2, cN0, cM0) invasive ductal carcinoma of the upper-outer quadrant of the left breast  - Diagnosed 3/13/2024 via left breast biopsy  - ER (positive - 80%), NC (positive - 10%), HER2 (low, score of 1+ by IHC)  - Menno grade I histology on biopsy   - Tumor size: 2.7 cm by MRI  - Final pathology 35 mm, grade 1.  0/4 lymph nodes involved.  T2N0  - Oncotype dx 18  -On adjuvant anastrazole     INTERVAL HISTORY:    Killian returns for close follow up on AI associated arthralgias on anastrazole. Patient has been holding anastrozole since our visit on 4/24/25. He has been still dealing with quite a bit of diffuse pain for which he feels is at least in part secondary to chronic back pain and recent left knee replacement. The achilles pain is ongoing as well and unchanged with holding. Killian had a follow up with orthopedics yesterday for follow up on recent left knee replacement. Due to recent projectile vomiting after taking oxycodone Killian has stopped this per the advice of orthopedics.     Overall, Killian reports it is hard to tell if holding the anastrozole has been helpful or not. Overall feeling fatigued and is not walking as much as they'd like. It can be hard to get out of the bed some days. Killian continues to do PT twice a week. Next appointment is on Friday, 5/9/25. Has not been doing very well with  "staying on top of medications in general. Killian has a visit with occupational therapy May 7, 2025 and they will help with the medications. Killian is overall having a hard time with everything feeling \"out of wack\". Killian is working to get services for home health care. He is working through issues with high blood sugars with his PCP.      ROS: 10 point ROS neg other than the symptoms noted above in the HPI.       PMH:       Pelvic floor weakness   Status post total knee replacement, right   Thoracic aortic aneurysm without rupture (HRC)    Controlled substance agreement signed   Gender dysphoria   Generalized anxiety disorder (HRC)   PTSD (post-traumatic stress disorder) (HRC)   RLS (restless legs syndrome)   ASHLYN (obstructive sleep apnea)   Severe episode of recurrent major depressive disorder, without psychotic features (HRC)   Financial difficulties  Primary hypertension (HRC)   Malignant neoplasm of upper-outer quadrant of left female breast (HRC)   Gender dysphoria in adult   Invasive ductal carcinoma of breast, stage 1, left (HRC)   Malignant neoplasm of female breast (HRC)     Past Surgical History: eye surgery, R knee surgery s/p total knee arthroplasty 7/19/18      SOCIAL HISTORY:  Retired registered nurse.   Also has worked at [employer].   Quit smoking greater than 30 years ago.   Denies any frequent/recent history of ethanol overuse.    Current Outpatient Medications   Medication Sig Dispense Refill     acetaminophen (TYLENOL) 325 MG tablet Take 325-650 mg by mouth.       albuterol (PROAIR HFA/PROVENTIL HFA/VENTOLIN HFA) 108 (90 Base) MCG/ACT inhaler Inhale 2 puffs into the lungs every 4 hours as needed for shortness of breath, wheezing or cough. 18 g 11     anastrozole (ARIMIDEX) 1 MG tablet Take 1 tablet (1 mg) by mouth daily. 90 tablet 3     aspirin (ASA) 81 MG EC tablet Take 81 mg by mouth daily.       clonazePAM (KLONOPIN) 1 MG tablet Take 1 mg by mouth 2 times daily as needed for anxiety       " cloNIDine (CATAPRES) 0.1 MG tablet Take 1 tablet (0.1 mg) by mouth 2 times daily (Patient taking differently: Take 0.1 mg by mouth 2 times daily. . May also take 1 tablet twice daily as needed.) 60 tablet 0     cyclobenzaprine (FLEXERIL) 5 MG tablet Take 1 tablet (5 mg) by mouth every 8 hours as needed for muscle spasms (Patient not taking: Reported on 2025) 90 tablet 0     desvenlafaxine (PRISTIQ) 50 MG 24 hr tablet Take 1 tablet (50 mg) by mouth daily 30 tablet 0     diclofenac (VOLTAREN) 1 % topical gel SMARTSI-4 Gram(s) Topical 4 Times Daily (Patient not taking: Reported on 2025)       doxycycline hyclate (VIBRA-TABS) 100 MG tablet Take 100 mg by mouth 2 times daily. (Patient not taking: Reported on 2025)       gabapentin (NEURONTIN) 300 MG capsule Take 3 capsules (900 mg) by mouth At Bedtime 270 capsule 0     hydroCHLOROthiazide 12.5 MG tablet Take 1 tablet (12.5 mg) by mouth daily 30 tablet 0     ketorolac (ACULAR) 0.5 % ophthalmic solution Place 1 drop Into the left eye 4 times daily. (Patient not taking: Reported on 2025) 3 mL 0     latanoprost (XALATAN) 0.005 % ophthalmic solution Place 1 drop into both eyes daily. (Patient not taking: Reported on 2025)       omeprazole (PRILOSEC) 40 MG DR capsule TAKE 1 CAPSULE BY MOUTH EVERY DAY BEFORE A MEAL       oxyCODONE (ROXICODONE) 5 MG tablet Take 5 mg by mouth. (Patient not taking: Reported on 2025)       pramipexole (MIRAPEX) 0.25 MG tablet Take 3 tablets (0.75 mg) by mouth At Bedtime 90 tablet 3     predniSONE (DELTASONE) 20 MG tablet Take 20 mg by mouth daily. (Patient not taking: Reported on 2025)       senna-docusate (SENOKOT-S/PERICOLACE) 8.6-50 MG tablet Take 1-2 tablets by mouth every 12 hours. (Patient not taking: Reported on 2025)       testosterone cypionate (DEPOTESTOSTERONE) 200 MG/ML injection Inject 70 mg Subcutaneous once a week on        traZODone (DESYREL) 100 MG tablet Take  mg by mouth as needed  "(Patient not taking: Reported on 5/7/2025)       UNABLE TO FIND Pt is using Medical cannabis       No current facility-administered medications for this visit.     PE: Ht 1.676 m (5' 6\")   Wt 93 kg (205 lb)   BMI 33.09 kg/m      Video physical exam  General: Patient appears well in no acute distress.   Skin: No visualized rash or lesions on visualized skin  Eyes: EOMI, no erythema, sclera icterus or discharge noted  Resp: Appears to be breathing comfortably without accessory muscle usage, speaking in full sentences, no cough  MSK: Appears to have normal range of motion based on visualized movements  Neurologic: No apparent tremors, facial movements symmetric  Psych: affect bright, alert and oriented       A/P:  68 y/o transmale here with a LEFT breast cancer clinical T2N0 Er + OK + HER2 negative, prognostic staging 1A, now s/p bilateral mastectomy without reconstruction    Breast cancer- breast cancer treatment is curative in intent. By older staging classifications, he technically has a stage 2 breast cancer.  Prognostic staging is 1A.  Final pathology revealed T2N0.  Oncotype Dx score of 18.    On anastrazole- has been holding since 4/24/25 visit due to concern for worsening arthralgias. The diffuse pains have not improved after 2 weeks of holding and we have agreed that due to multiple other health conditions unrelated to Killian's cancer history that it is difficult to discern if anastrozole is the culprit. After our discussion, Killian agree's and is willing to restart anastrozole and re-evaluate in a couple of months once while he works with other specialists and PCP. Killian would like a couple of more days off and to restart on 5/12/25 which I am agreeable with.   Return to clinic on 7/7/25 with Dr. Linares for re-evaluation. Discussed with Killian if it is felt anastrozole is not tolerable we could consider switching AI's to exemestane (or letrozole).   Can consider glucosamine supplementation or referral to Dr. Choi " Aniceto as well but will re discuss after we re-evaluate Killian's symptoms in a couple of months.       2. Bone health - given use of anastrazole, he will eventually need baseline dexa scan. Will re-discuss with future visits.     3. Transmasculine - discussed his use of testosterone and reviewed plan.  He is seeing Dr Rosales for elevated hgb; this is improved.      5. He would benefit from genetics evaluation.  We will rediscuss this at our next visit.    6. HTN - he works closely with Dr Davis.      22 minutes spent on the date of the encounter doing chart review, review of test results, interpretation of tests, patient visit, and documentation      Shania Sahu PA-C      Again, thank you for allowing me to participate in the care of your patient.        Sincerely,        Shania Sahu PA-C    Electronically signed

## 2025-05-14 ENCOUNTER — OFFICE VISIT (OUTPATIENT)
Dept: PLASTIC SURGERY | Facility: CLINIC | Age: 70
End: 2025-05-14
Payer: MEDICARE

## 2025-05-14 VITALS
SYSTOLIC BLOOD PRESSURE: 143 MMHG | WEIGHT: 206.9 LBS | DIASTOLIC BLOOD PRESSURE: 99 MMHG | OXYGEN SATURATION: 95 % | HEART RATE: 84 BPM | BODY MASS INDEX: 33.25 KG/M2 | HEIGHT: 66 IN

## 2025-05-14 DIAGNOSIS — F64.0 GENDER DYSPHORIA IN ADULT: ICD-10-CM

## 2025-05-14 DIAGNOSIS — F64.9 GENDER DYSPHORIA: Primary | ICD-10-CM

## 2025-05-14 PROCEDURE — 3077F SYST BP >= 140 MM HG: CPT | Performed by: STUDENT IN AN ORGANIZED HEALTH CARE EDUCATION/TRAINING PROGRAM

## 2025-05-14 PROCEDURE — 1125F AMNT PAIN NOTED PAIN PRSNT: CPT | Performed by: STUDENT IN AN ORGANIZED HEALTH CARE EDUCATION/TRAINING PROGRAM

## 2025-05-14 PROCEDURE — 99204 OFFICE O/P NEW MOD 45 MIN: CPT | Performed by: STUDENT IN AN ORGANIZED HEALTH CARE EDUCATION/TRAINING PROGRAM

## 2025-05-14 PROCEDURE — 3080F DIAST BP >= 90 MM HG: CPT | Performed by: STUDENT IN AN ORGANIZED HEALTH CARE EDUCATION/TRAINING PROGRAM

## 2025-05-14 ASSESSMENT — PAIN SCALES - GENERAL: PAINLEVEL_OUTOF10: MODERATE PAIN (6)

## 2025-05-14 NOTE — LETTER
"5/14/2025       RE: Gregoria Stein  [address]  [place] [address]     Dear Colleague,    Thank you for referring your patient, Gregoria Stein, to the [facility] [place] at [facility]. Please see a copy of my visit note below.    PRS    HPI: 69-year-old transgender male with history of left breast cancer status post bilateral mastectomies approximately 1 year ago presenting with aesthetic concerns and desire for revision surgery.  Patient also has expressed that there is residual gender dysphoria as a result of the reconstruction.  Patient has been in their current gender role for many years.  Patient has been on testosterone since 2020.  Of note, he is recovering from left knee surgery, done approximately 3 weeks ago.  His primary complaints are skin excess and contour irregularity.    ROS: Negative, see HPI  Past medical history: Type 2 diabetes, anxiety, depression, gender dysphoria  Past surgical history: Bilateral oncologic mastectomies 6/2024, left knee surgery 3 weeks ago  Medications: Aspirin 81, hydrochlorothiazide, anastrozole, pramipexole, Prilosec, testosterone  Allergies: Midazolam, penicillins  Family history: No bleeding or clotting problems, or issues with anesthesia.  No known breast cancer history.  Social history: Non-smoker, denies any tobacco or nicotine use    Examination:  BP (!) 143/99 (BP Location: Left arm, Patient Position: Sitting, Cuff Size: Adult Large)   Pulse 84   Ht 1.676 m (5' 6\")   Wt 93.8 kg (206 lb 14.4 oz)   SpO2 95%   BMI 33.39 kg/m    Nonlabored breathing  Not distressed  Bilateral well remodeled chest scars with nipple areolar grafts fully healed with excess bilateral skin and lipodystrophy with excess skin medially and contour irregularity in the form of concavity along the lateral borders of both pectoralis major muscles    A/P: 69-year-old trans male " status post bilateral oncologic mastectomies for left breast cancer, top surgery, presenting with contour irregularities and suboptimal aesthetic result    - Discussed options for management.  In my opinion, patient would be a reasonable candidate for a revision chest reconstructive surgery.  My plan would be to excise lateral skin excess and lipodystrophy to improve the contour.  Additionally, there is skin excess along the medial aspect of the chest, which would necessitate an extension of the scars across the midline to adequately treat this skin excess.  Furthermore, we could plan for liposuction from the trunk and fat grafting into the area over the lateral pectoralis major muscles to help improve the overall concavity.  Explained that fat grafting is less predictable especially when reliant on higher volume and the availability of fewer planes of injection.  In either case, patient is interested in pursuing revision surgery.  -Discussed the risks of surgery, including but not limited to: Infection, bleeding, pain, poor scarring, wound healing issues, need for revision or additional surgery, suboptimal aesthetic result, fat grafting loss, fat necrosis, anesthesia related complication, DVT, PE, death.  Despite these risks, patient consents to and would like to proceed with bilateral chest scar revision with excision of standing cone deformities and skin excess, liposuction from the trunk including flanks and fat grafting into the chest wall.  All questions answered for now.  -Photography today  -Case request placed  -A total of 45 minutes was devoted to review of chart, direct face-to-face patient counseling and documentation during and on the day of this encounter, exclusive of any procedure performed.    Zach Hendrickson MD, PhD, FACS      Again, thank you for allowing me to participate in the care of your patient.      Sincerely,    Zach Hendrickson MD

## 2025-05-14 NOTE — NURSING NOTE
"Chief Complaint   Patient presents with    Consult     Fat grafting.       Vitals:    05/14/25 1252   BP: (!) 143/99   BP Location: Left arm   Patient Position: Sitting   Cuff Size: Adult Large   Pulse: 84   SpO2: 95%   Weight: 93.8 kg (206 lb 14.4 oz)   Height: 1.676 m (5' 6\")       Body mass index is 33.39 kg/m .        Fredi Gallegos, EMT    "

## 2025-05-15 RX ORDER — CEFAZOLIN SODIUM 2 G/50ML
2 SOLUTION INTRAVENOUS
OUTPATIENT
Start: 2025-05-15

## 2025-05-15 RX ORDER — CEFAZOLIN SODIUM 2 G/50ML
2 SOLUTION INTRAVENOUS SEE ADMIN INSTRUCTIONS
OUTPATIENT
Start: 2025-05-15

## 2025-05-15 NOTE — PROGRESS NOTES
"PRS    HPI: 69-year-old transgender male with history of left breast cancer status post bilateral mastectomies approximately 1 year ago presenting with aesthetic concerns and desire for revision surgery.  Patient also has expressed that there is residual gender dysphoria as a result of the reconstruction.  Patient has been in their current gender role for many years.  Patient has been on testosterone since 2020.  Of note, he is recovering from left knee surgery, done approximately 3 weeks ago.  His primary complaints are skin excess and contour irregularity.    ROS: Negative, see HPI  Past medical history: Type 2 diabetes, anxiety, depression, gender dysphoria  Past surgical history: Bilateral oncologic mastectomies 6/2024, left knee surgery 3 weeks ago  Medications: Aspirin 81, hydrochlorothiazide, anastrozole, pramipexole, Prilosec, testosterone  Allergies: Midazolam, penicillins  Family history: No bleeding or clotting problems, or issues with anesthesia.  No known breast cancer history.  Social history: Non-smoker, denies any tobacco or nicotine use    Examination:  BP (!) 143/99 (BP Location: Left arm, Patient Position: Sitting, Cuff Size: Adult Large)   Pulse 84   Ht 1.676 m (5' 6\")   Wt 93.8 kg (206 lb 14.4 oz)   SpO2 95%   BMI 33.39 kg/m    Nonlabored breathing  Not distressed  Bilateral well remodeled chest scars with nipple areolar grafts fully healed with excess bilateral skin and lipodystrophy with excess skin medially and contour irregularity in the form of concavity along the lateral borders of both pectoralis major muscles    A/P: 69-year-old trans male status post bilateral oncologic mastectomies for left breast cancer, top surgery, presenting with contour irregularities and suboptimal aesthetic result    - Discussed options for management.  In my opinion, patient would be a reasonable candidate for a revision chest reconstructive surgery.  My plan would be to excise lateral skin excess and " lipodystrophy to improve the contour.  Additionally, there is skin excess along the medial aspect of the chest, which would necessitate an extension of the scars across the midline to adequately treat this skin excess.  Furthermore, we could plan for liposuction from the trunk and fat grafting into the area over the lateral pectoralis major muscles to help improve the overall concavity.  Explained that fat grafting is less predictable especially when reliant on higher volume and the availability of fewer planes of injection.  In either case, patient is interested in pursuing revision surgery.  -Discussed the risks of surgery, including but not limited to: Infection, bleeding, pain, poor scarring, wound healing issues, need for revision or additional surgery, suboptimal aesthetic result, fat grafting loss, fat necrosis, anesthesia related complication, DVT, PE, death.  Despite these risks, patient consents to and would like to proceed with bilateral chest scar revision with excision of standing cone deformities and skin excess, liposuction from the trunk including flanks and fat grafting into the chest wall.  All questions answered for now.  -Photography today  -Case request placed  -A total of 45 minutes was devoted to review of chart, direct face-to-face patient counseling and documentation during and on the day of this encounter, exclusive of any procedure performed.    Zach Hendrickson MD, PhD, FACS

## 2025-05-15 NOTE — PATIENT INSTRUCTIONS
Thank you for coming to the [facility]astic [facility] Department in [place]; please note the following instructions:    1. Our team will place surgery orders, and our scheduling team will connect with you for scheduling of your procedure, as well as post op visit as well. Please connect with us for any questions!       If you have any questions regarding your visit, please contact your care team:     NEGAR Gomez    As always, thank you for trusting us with your health care needs!

## 2025-05-22 PROBLEM — F64.9 GENDER DYSPHORIA: Status: ACTIVE | Noted: 2020-11-13

## 2025-05-22 PROBLEM — F64.0 GENDER DYSPHORIA IN ADULT: Status: ACTIVE | Noted: 2025-05-14

## 2025-05-30 PROBLEM — Z85.3 HISTORY OF BREAST CANCER: Status: ACTIVE | Noted: 2025-05-14

## 2025-05-30 PROBLEM — Z90.13 HISTORY OF BILATERAL MASTECTOMY: Status: ACTIVE | Noted: 2025-05-14

## 2025-06-15 ENCOUNTER — HEALTH MAINTENANCE LETTER (OUTPATIENT)
Age: 70
End: 2025-06-15

## 2025-06-16 ENCOUNTER — TELEPHONE (OUTPATIENT)
Dept: PLASTIC SURGERY | Facility: CLINIC | Age: 70
End: 2025-06-16
Payer: MEDICARE

## 2025-06-16 NOTE — TELEPHONE ENCOUNTER
Left first voice message for patient to return phone call to schedule surgery with Dr. Hendrickson.     Provided surgery scheduling phone number: [phone].    Parrish Lynn on 6/16/2025 at 3:30 PM

## 2025-06-19 ENCOUNTER — NURSE TRIAGE (OUTPATIENT)
Dept: ONCOLOGY | Facility: CLINIC | Age: 70
End: 2025-06-19

## 2025-06-19 NOTE — TELEPHONE ENCOUNTER
Call placed to pt and informed him of provider recommendation. Pt verbalized understanding and grateful for help.
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
## 2025-07-07 ENCOUNTER — ONCOLOGY VISIT (OUTPATIENT)
Dept: ONCOLOGY | Facility: CLINIC | Age: 70
End: 2025-07-07
Attending: INTERNAL MEDICINE
Payer: MEDICARE

## 2025-07-07 VITALS
TEMPERATURE: 98.4 F | DIASTOLIC BLOOD PRESSURE: 109 MMHG | OXYGEN SATURATION: 95 % | HEART RATE: 85 BPM | WEIGHT: 220.2 LBS | SYSTOLIC BLOOD PRESSURE: 178 MMHG | RESPIRATION RATE: 17 BRPM | BODY MASS INDEX: 35.54 KG/M2

## 2025-07-07 DIAGNOSIS — C50.812 MALIGNANT NEOPLASM OF OVERLAPPING SITES OF LEFT BREAST IN FEMALE, ESTROGEN RECEPTOR POSITIVE (H): Primary | ICD-10-CM

## 2025-07-07 DIAGNOSIS — R22.41 MASS OF RIGHT THIGH: ICD-10-CM

## 2025-07-07 DIAGNOSIS — Z17.0 MALIGNANT NEOPLASM OF OVERLAPPING SITES OF LEFT BREAST IN FEMALE, ESTROGEN RECEPTOR POSITIVE (H): Primary | ICD-10-CM

## 2025-07-07 PROCEDURE — G2211 COMPLEX E/M VISIT ADD ON: HCPCS | Performed by: INTERNAL MEDICINE

## 2025-07-07 PROCEDURE — 99214 OFFICE O/P EST MOD 30 MIN: CPT | Mod: GC | Performed by: INTERNAL MEDICINE

## 2025-07-07 PROCEDURE — G0463 HOSPITAL OUTPT CLINIC VISIT: HCPCS | Performed by: INTERNAL MEDICINE

## 2025-07-07 RX ORDER — TAMOXIFEN CITRATE 20 MG/1
10 TABLET ORAL DAILY
Qty: 60 TABLET | Refills: 1 | Status: SHIPPED | OUTPATIENT
Start: 2025-07-07

## 2025-07-07 ASSESSMENT — PAIN SCALES - GENERAL: PAINLEVEL_OUTOF10: MODERATE PAIN (4)

## 2025-07-07 NOTE — PROGRESS NOTES
Oncology Visit:      Reason:  left breast cancer s/p resection    HPI:    Clinical/anatomic stage IIA (cT2, cN0, cM0) invasive ductal carcinoma of the upper-outer quadrant of the left breast  - Diagnosed 3/13/2024 via left breast biopsy  - ER (positive - 80%), DE (positive - 10%), HER2 (low, score of 1+ by IHC)  - [place] grade I histology on biopsy   - Tumor size: 2.7 cm by MRI  - Final pathology 35 mm, grade 1.  0/4 lymph nodes involved.  T2N0  - Oncotype dx 18  - On adjuvant anastrazole  discontinued June 2025. Starting tamoxifen      INTERVAL HISTORY:  In follow up, Killian is having a hard time and things have been very difficult.       Killian is having challenges with joint, legs and ankle pain. There is a lump at the back of the right thigh, that most recently started to cause a lot of discomfort with associated stiffness throughout the whole extremity.     Killian called the clinic and stopped the AI about two weeks ago. Does not think things have improved since that time including hot flashes which are still constant.      Killian is working with a series of OT, counseling to help with complex PTSD.  Recently got a support animal, a dog named Bamboo which is helping a lot but Killian is going through some other stresses as well, and is planning on moving.         At the time of surgery, Killian was found to have elevated hemoglobin and met with my colleague Dr Mary Rosales for evaluation.  Hgb has improved and remained stable.      ROS: 10 point ROS neg other than the symptoms noted above in the HPI.    No new medical history    SOCIAL HISTORY:  Retired registered nurse.   Also has worked at [employer].   Quit smoking greater than 30 years ago.   Denies any frequent/recent history of ethanol overuse.    Current Outpatient Medications   Medication Sig Dispense Refill    acetaminophen (TYLENOL) 325 MG tablet Take 325-650 mg by mouth.      albuterol (PROAIR HFA/PROVENTIL HFA/VENTOLIN HFA) 108 (90 Base) MCG/ACT inhaler  Inhale 2 puffs into the lungs every 4 hours as needed for shortness of breath, wheezing or cough. 18 g 11    anastrozole (ARIMIDEX) 1 MG tablet Take 1 tablet (1 mg) by mouth daily. 90 tablet 3    aspirin (ASA) 81 MG EC tablet Take 81 mg by mouth daily.      clonazePAM (KLONOPIN) 1 MG tablet Take 1 mg by mouth 2 times daily as needed for anxiety      cloNIDine (CATAPRES) 0.1 MG tablet Take 1 tablet (0.1 mg) by mouth 2 times daily (Patient taking differently: Take 0.1 mg by mouth 2 times daily. . May also take 1 tablet twice daily as needed.) 60 tablet 0    cyclobenzaprine (FLEXERIL) 5 MG tablet Take 1 tablet (5 mg) by mouth every 8 hours as needed for muscle spasms 90 tablet 0    desvenlafaxine (PRISTIQ) 50 MG 24 hr tablet Take 1 tablet (50 mg) by mouth daily 30 tablet 0    diclofenac (VOLTAREN) 1 % topical gel SMARTSI-4 Gram(s) Topical 4 Times Daily      gabapentin (NEURONTIN) 300 MG capsule Take 3 capsules (900 mg) by mouth At Bedtime 270 capsule 0    hydroCHLOROthiazide 12.5 MG tablet Take 1 tablet (12.5 mg) by mouth daily 30 tablet 0    ketorolac (ACULAR) 0.5 % ophthalmic solution Place 1 drop Into the left eye 4 times daily. 3 mL 0    latanoprost (XALATAN) 0.005 % ophthalmic solution Place 1 drop into both eyes daily.      omeprazole (PRILOSEC) 40 MG DR capsule TAKE 1 CAPSULE BY MOUTH EVERY DAY BEFORE A MEAL      pramipexole (MIRAPEX) 0.25 MG tablet Take 3 tablets (0.75 mg) by mouth At Bedtime 90 tablet 3    predniSONE (DELTASONE) 20 MG tablet Take 20 mg by mouth daily.      tamoxifen (NOLVADEX) 20 MG tablet Take 0.5 tablets (10 mg) by mouth daily. 60 tablet 1    testosterone cypionate (DEPOTESTOSTERONE) 200 MG/ML injection Inject 70 mg Subcutaneous once a week on       UNABLE TO FIND Pt is using Medical cannabis      oxyCODONE (ROXICODONE) 5 MG tablet Take 5 mg by mouth. (Patient not taking: No sig reported)      senna-docusate (SENOKOT-S/PERICOLACE) 8.6-50 MG tablet Take 1-2 tablets by mouth every 12  hours. (Patient not taking: No sig reported)       No current facility-administered medications for this visit.     PE: BP (!) 178/109 (BP Location: Right arm, Patient Position: Sitting, Cuff Size: Adult Large)   Pulse 85   Temp 98.4  F (36.9  C) (Oral)   Resp 17   Wt 99.9 kg (220 lb 3.2 oz)   SpO2 95%   BMI 35.54 kg/m      Gen : well appearing  HEENT:  no icterus  NECK: Supple  CV :RRR  Lungs: Clear to auscultation bilateral  Chest: s/p bilateral mastectomy without reconstruction.  Incisions are c/d/I.  Well healing.  Abd: soft, nt nd + bs  Ext : no edema    Reviewed labs and recent imaging.     A/P:  70 y/o transmale here with a LEFT breast cancer clinical T2N0 Er + NC + HER2 negative, prognostic staging 1A, now s/p bilateral mastectomy without reconstruction    Breast cancer - I reviewed with Killian that all of our  breast cancer treatment is curative in intent.  We discussed by older staging classifications, he technically has a stage 2 breast cancer.  Prognostic staging is 1A.  Final pathology revealed T2N0.  Oncotype Dx score of 18. Started adjuvant anastrozole but has been dealing with significant joint pain and hot flashes. We discussed switching to tamoxifen and monitor for side effects. Killian is planning on moving around September, so will plan for follow up on symptoms/refill late August.     - Plan to start Tamoxifen 10 mg     2. Bone health - Last DXA scan 5/21/24 without evidence of low bone mass or osteoporosis. No longer on anastrozole .     3. Thigh mass - Less likely cancer related but causing a lot of discomfort. Ordered MRI.     4. Transmasculine - discussed his use of testosterone and reviewed plan.  He is seeing Dr Rosales for elevated hgb; this is improved.      5. Coping - Dealing with multiple stressors. Receiving support from counseling.      6. Genetic evaluation - We will rediscuss this at our next visit.    7. HTN - he works closely with Dr Davis.  Ran out of hydrochlorothiazide  recently.  Discussed importance of clonidine and hydrochlorothiazide.    Discussed with Dr. Milagros Ross MD, PGY-5    Addendum:  Pt was seen and evaluated by me with Dr Woody.  We reviewed patient's symptoms and presentation together.  Pt complaining of arthralgias and external stressed.  No longer on AIs.  Does not want to consider restarting.      We discussed the goal of endocrine therapy, to reduce the risk of recurrence particularly metastatic disease.  His risk remains low, however tamoxifen or an AI reduce this risk by about another one-third.  We reviewed pros/cons of tamoxifen.  Discussed side effects.  Consent obtained.  Will start at tamoxifen 10 mg daily.      Return in August to assess side effects.    Jeaneth Linares MD     The longitudinal plan of care for the diagnosis(es)/condition(s) as documented were addressed during this visit. Due to the added complexity in care, I will continue to support Killian in the subsequent management and with ongoing continuity of care.

## 2025-07-07 NOTE — LETTER
7/7/2025      Gregoria Stein  [address]  [place] [address]      Dear Colleague,    Thank you for referring your patient, Gregoria Stein, to the [facility]. Please see a copy of my visit note below.    Oncology Visit:      Reason:  left breast cancer s/p resection    HPI:    Clinical/anatomic stage IIA (cT2, cN0, cM0) invasive ductal carcinoma of the upper-outer quadrant of the left breast  - Diagnosed 3/13/2024 via left breast biopsy  - ER (positive - 80%), [place] (positive - 10%), HER2 (low, score of 1+ by IHC)  - [place] grade I histology on biopsy   - Tumor size: 2.7 cm by MRI  - Final pathology 35 mm, grade 1.  0/4 lymph nodes involved.  T2N0  - Oncotype dx 18  - On adjuvant anastrazole  discontinued June 2025. Starting tamoxifen      INTERVAL HISTORY:  In follow up, Killian is having a hard time and things have been very difficult.       Killian is having challenges with joint, legs and ankle pain. There is a lump at the back of the right thigh, that most recently started to cause a lot of discomfort with associated stiffness throughout the whole extremity.     Killian called the clinic and stopped the AI about two weeks ago. Does not think things have improved since that time including hot flashes which are still constant.      Killian is working with a series of OT, counseling to help with complex PTSD.  Recently got a support animal, a dog named Bamboo which is helping a lot but Killian is going through some other stresses as well, and is planning on moving.         At the time of surgery, Killian was found to have elevated hemoglobin and met with my colleague Dr Mary Rosales for evaluation.  Hgb has improved and remained stable.      ROS: 10 point ROS neg other than the symptoms noted above in the HPI.    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   Quit smoking greater than 30 years ago.   Denies any  frequent/recent history of ethanol overuse.    Current Outpatient Medications   Medication Sig Dispense Refill     acetaminophen (TYLENOL) 325 MG tablet Take 325-650 mg by mouth.       albuterol (PROAIR HFA/PROVENTIL HFA/VENTOLIN HFA) 108 (90 Base) MCG/ACT inhaler Inhale 2 puffs into the lungs every 4 hours as needed for shortness of breath, wheezing or cough. 18 g 11     anastrozole (ARIMIDEX) 1 MG tablet Take 1 tablet (1 mg) by mouth daily. 90 tablet 3     aspirin (ASA) 81 MG EC tablet Take 81 mg by mouth daily.       clonazePAM (KLONOPIN) 1 MG tablet Take 1 mg by mouth 2 times daily as needed for anxiety       cloNIDine (CATAPRES) 0.1 MG tablet Take 1 tablet (0.1 mg) by mouth 2 times daily (Patient taking differently: Take 0.1 mg by mouth 2 times daily. . May also take 1 tablet twice daily as needed.) 60 tablet 0     cyclobenzaprine (FLEXERIL) 5 MG tablet Take 1 tablet (5 mg) by mouth every 8 hours as needed for muscle spasms 90 tablet 0     desvenlafaxine (PRISTIQ) 50 MG 24 hr tablet Take 1 tablet (50 mg) by mouth daily 30 tablet 0     diclofenac (VOLTAREN) 1 % topical gel SMARTSI-4 Gram(s) Topical 4 Times Daily       gabapentin (NEURONTIN) 300 MG capsule Take 3 capsules (900 mg) by mouth At Bedtime 270 capsule 0     hydroCHLOROthiazide 12.5 MG tablet Take 1 tablet (12.5 mg) by mouth daily 30 tablet 0     ketorolac (ACULAR) 0.5 % ophthalmic solution Place 1 drop Into the left eye 4 times daily. 3 mL 0     latanoprost (XALATAN) 0.005 % ophthalmic solution Place 1 drop into both eyes daily.       omeprazole (PRILOSEC) 40 MG DR capsule TAKE 1 CAPSULE BY MOUTH EVERY DAY BEFORE A MEAL       pramipexole (MIRAPEX) 0.25 MG tablet Take 3 tablets (0.75 mg) by mouth At Bedtime 90 tablet 3     predniSONE (DELTASONE) 20 MG tablet Take 20 mg by mouth daily.       tamoxifen (NOLVADEX) 20 MG tablet Take 0.5 tablets (10 mg) by mouth daily. 60 tablet 1     testosterone cypionate (DEPOTESTOSTERONE) 200 MG/ML injection Inject 70  mg Subcutaneous once a week on Fridays       UNABLE TO FIND Pt is using Medical cannabis       oxyCODONE (ROXICODONE) 5 MG tablet Take 5 mg by mouth. (Patient not taking: No sig reported)       senna-docusate (SENOKOT-S/PERICOLACE) 8.6-50 MG tablet Take 1-2 tablets by mouth every 12 hours. (Patient not taking: No sig reported)       No current facility-administered medications for this visit.     PE: BP (!) 178/109 (BP Location: Right arm, Patient Position: Sitting, Cuff Size: Adult Large)   Pulse 85   Temp 98.4  F (36.9  C) (Oral)   Resp 17   Wt 99.9 kg (220 lb 3.2 oz)   SpO2 95%   BMI 35.54 kg/m      Gen : well appearing  HEENT:  no icterus  NECK: Supple  CV :RRR  Lungs: Clear to auscultation bilateral  Chest: s/p bilateral mastectomy without reconstruction.  Incisions are c/d/I.  Well healing.  Abd: soft, nt nd + bs  Ext : no edema    Reviewed labs and recent imaging.     A/P:  68 y/o transmale here with a LEFT breast cancer clinical T2N0 Er + [place] + HER2 negative, prognostic staging 1A, now s/p bilateral mastectomy without reconstruction    Breast cancer - I reviewed with Killian that all of our  breast cancer treatment is curative in intent.  We discussed by older staging classifications, he technically has a stage 2 breast cancer.  Prognostic staging is 1A.  Final pathology revealed T2N0.  Oncotype Dx score of 18. Started adjuvant anastrozole but has been dealing with significant joint pain and hot flashes. We discussed switching to tamoxifen and monitor for side effects. Killian is planning on moving around September, so will plan for follow up on symptoms/refill late August.     - Plan to start Tamoxifen 10 mg     2. Bone health - Last DXA scan 5/21/24 without evidence of low bone mass or osteoporosis. No longer on anastrozole .     3. Thigh mass - Less likely cancer related but causing a lot of discomfort. Ordered MRI.     4. Transmasculine - discussed his use of testosterone and reviewed plan.  He is seeing   Bobby for elevated hgb; this is improved.      5. Coping - Dealing with multiple stressors. Receiving support from counseling.      6. Genetic evaluation - We will rediscuss this at our next visit.    7. HTN - he works closely with Dr Davis.  Ran out of hydrochlorothiazide recently.  Discussed importance of clonidine and hydrochlorothiazide.    Discussed with Dr. Milagros Ross MD, PGY-5    Addendum:  Pt was seen and evaluated by me with Dr Woody.  We reviewed patient's symptoms and presentation together.  Pt complaining of arthralgias and external stressed.  No longer on AIs.  Does not want to consider restarting.      We discussed the goal of endocrine therapy, to reduce the risk of recurrence particularly metastatic disease.  His risk remains low, however tamoxifen or an AI reduce this risk by about another one-third.  We reviewed pros/cons of tamoxifen.  Discussed side effects.  Consent obtained.  Will start at tamoxifen 10 mg daily.      Return in August to assess side effects.    Jeaneth Linares MD     The longitudinal plan of care for the diagnosis(es)/condition(s) as documented were addressed during this visit. Due to the added complexity in care, I will continue to support Killian in the subsequent management and with ongoing continuity of care.                Again, thank you for allowing me to participate in the care of your patient.        Sincerely,        Jeaneth Linares MD    Electronically signed

## 2025-07-07 NOTE — NURSING NOTE
"Oncology Rooming Note    July 7, 2025 10:02 AM   Gregoria Stein is a 69 year old adult who presents for:    Chief Complaint   Patient presents with    Oncology Clinic Visit    Breast Cancer     Malignant neoplasm of overlapping sites of left breast in female, estrogen receptor positive      Initial Vitals: BP (!) 178/109 (BP Location: Right arm, Patient Position: Sitting, Cuff Size: Adult Large)   Pulse 85   Temp 98.4  F (36.9  C) (Oral)   Resp 17   Wt 99.9 kg (220 lb 3.2 oz)   SpO2 95%   BMI 35.54 kg/m   Estimated body mass index is 35.54 kg/m  as calculated from the following:    Height as of 5/14/25: 1.676 m (5' 6\").    Weight as of this encounter: 99.9 kg (220 lb 3.2 oz). Body surface area is 2.16 meters squared.  Moderate Pain (4) Comment: Data Unavailable   No LMP recorded. (Menstrual status: Reassignment).  Allergies reviewed: Yes  Medications reviewed: Yes    Medications: Medication refills not needed today.  Pharmacy name entered into [employer]: [id number] [address]    Frailty Screening:   Is the patient here for a new oncology consult visit in cancer care? 2. No      Clinical concerns: Provider came in before rooming process could be completed.       Zuly Hoyt, EMT     "

## 2025-07-20 ENCOUNTER — ANCILLARY PROCEDURE (OUTPATIENT)
Dept: MRI IMAGING | Facility: CLINIC | Age: 70
End: 2025-07-20
Attending: INTERNAL MEDICINE
Payer: MEDICARE

## 2025-07-20 DIAGNOSIS — C50.812 MALIGNANT NEOPLASM OF OVERLAPPING SITES OF LEFT BREAST IN FEMALE, ESTROGEN RECEPTOR POSITIVE (H): ICD-10-CM

## 2025-07-20 DIAGNOSIS — Z17.0 MALIGNANT NEOPLASM OF OVERLAPPING SITES OF LEFT BREAST IN FEMALE, ESTROGEN RECEPTOR POSITIVE (H): ICD-10-CM

## 2025-07-20 DIAGNOSIS — R22.41 MASS OF RIGHT THIGH: ICD-10-CM

## 2025-07-20 PROCEDURE — A9585 GADOBUTROL INJECTION: HCPCS | Mod: JZ | Performed by: RADIOLOGY

## 2025-07-20 PROCEDURE — 73720 MRI LWR EXTREMITY W/O&W/DYE: CPT | Mod: RT | Performed by: RADIOLOGY

## 2025-07-20 RX ORDER — GADOBUTROL 604.72 MG/ML
10 INJECTION INTRAVENOUS ONCE
Status: COMPLETED | OUTPATIENT
Start: 2025-07-20 | End: 2025-07-20

## 2025-07-20 RX ADMIN — GADOBUTROL 10 ML: 604.72 INJECTION INTRAVENOUS at 10:00

## 2025-07-29 ENCOUNTER — PATIENT OUTREACH (OUTPATIENT)
Dept: ONCOLOGY | Facility: CLINIC | Age: 70
End: 2025-07-29
Payer: MEDICARE

## 2025-07-29 NOTE — PROGRESS NOTES
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 Dr. Linares recommends watching this however she is willing to refer to orthooncology for their recommendations if he would like.   Unable to reach, left voicemail asking for a return call to discuss MRI results. Patient has severe anxiety, stressed in message I was not calling with bad news but do need to talk to him.    Letty Garcia MSN, RN ,OCN  Lead RN Care Coordinator - [facility]  [phone]

## 2025-07-31 ENCOUNTER — PATIENT OUTREACH (OUTPATIENT)
Dept: ONCOLOGY | Facility: CLINIC | Age: 70
End: 2025-07-31
Payer: MEDICARE

## (undated) RX ORDER — NITROGLYCERIN 0.4 MG/1
TABLET SUBLINGUAL
Status: DISPENSED
Start: 2020-08-10

## (undated) RX ORDER — METOPROLOL TARTRATE 100 MG
TABLET ORAL
Status: DISPENSED
Start: 2020-08-10